# Patient Record
Sex: FEMALE | Race: WHITE | Employment: PART TIME | URBAN - METROPOLITAN AREA
[De-identification: names, ages, dates, MRNs, and addresses within clinical notes are randomized per-mention and may not be internally consistent; named-entity substitution may affect disease eponyms.]

---

## 2017-01-03 RX ORDER — TOPIRAMATE 100 MG/1
100 TABLET, FILM COATED ORAL 2 TIMES DAILY
Qty: 60 TABLET | Refills: 3 | Status: SHIPPED | OUTPATIENT
Start: 2017-01-03 | End: 2017-05-17 | Stop reason: SDUPTHER

## 2017-01-13 ENCOUNTER — OFFICE VISIT (OUTPATIENT)
Dept: FAMILY MEDICINE CLINIC | Age: 47
End: 2017-01-13

## 2017-01-13 VITALS
WEIGHT: 110.3 LBS | BODY MASS INDEX: 20.3 KG/M2 | OXYGEN SATURATION: 99 % | HEART RATE: 84 BPM | DIASTOLIC BLOOD PRESSURE: 78 MMHG | HEIGHT: 62 IN | SYSTOLIC BLOOD PRESSURE: 120 MMHG | RESPIRATION RATE: 18 BRPM | TEMPERATURE: 98.8 F

## 2017-01-13 DIAGNOSIS — M79.602 PAIN OF LEFT UPPER EXTREMITY: ICD-10-CM

## 2017-01-13 DIAGNOSIS — M79.89 LEFT ARM SWELLING: Primary | ICD-10-CM

## 2017-01-13 PROCEDURE — 1036F TOBACCO NON-USER: CPT | Performed by: FAMILY MEDICINE

## 2017-01-13 PROCEDURE — G8484 FLU IMMUNIZE NO ADMIN: HCPCS | Performed by: FAMILY MEDICINE

## 2017-01-13 PROCEDURE — 99213 OFFICE O/P EST LOW 20 MIN: CPT | Performed by: FAMILY MEDICINE

## 2017-01-13 PROCEDURE — G8427 DOCREV CUR MEDS BY ELIG CLIN: HCPCS | Performed by: FAMILY MEDICINE

## 2017-01-13 PROCEDURE — G8420 CALC BMI NORM PARAMETERS: HCPCS | Performed by: FAMILY MEDICINE

## 2017-01-13 RX ORDER — PREDNISONE 10 MG/1
TABLET ORAL
Qty: 30 TABLET | Refills: 0 | Status: SHIPPED | OUTPATIENT
Start: 2017-01-13 | End: 2017-01-30

## 2017-01-30 ENCOUNTER — TELEPHONE (OUTPATIENT)
Dept: FAMILY MEDICINE CLINIC | Age: 47
End: 2017-01-30

## 2017-01-30 ENCOUNTER — OFFICE VISIT (OUTPATIENT)
Dept: FAMILY MEDICINE CLINIC | Age: 47
End: 2017-01-30

## 2017-01-30 VITALS
SYSTOLIC BLOOD PRESSURE: 100 MMHG | BODY MASS INDEX: 19.77 KG/M2 | HEART RATE: 84 BPM | TEMPERATURE: 99.1 F | OXYGEN SATURATION: 99 % | WEIGHT: 107.4 LBS | DIASTOLIC BLOOD PRESSURE: 60 MMHG | HEIGHT: 62 IN

## 2017-01-30 DIAGNOSIS — M79.89 LEFT ARM SWELLING: ICD-10-CM

## 2017-01-30 DIAGNOSIS — F98.8 ADD (ATTENTION DEFICIT DISORDER): ICD-10-CM

## 2017-01-30 DIAGNOSIS — L98.9 SKIN LESION: ICD-10-CM

## 2017-01-30 DIAGNOSIS — R59.0 AXILLARY LYMPHADENOPATHY: Primary | ICD-10-CM

## 2017-01-30 PROCEDURE — 99213 OFFICE O/P EST LOW 20 MIN: CPT | Performed by: NURSE PRACTITIONER

## 2017-01-30 PROCEDURE — G8484 FLU IMMUNIZE NO ADMIN: HCPCS | Performed by: NURSE PRACTITIONER

## 2017-01-30 PROCEDURE — 1036F TOBACCO NON-USER: CPT | Performed by: NURSE PRACTITIONER

## 2017-01-30 PROCEDURE — G8428 CUR MEDS NOT DOCUMENT: HCPCS | Performed by: NURSE PRACTITIONER

## 2017-01-30 PROCEDURE — G8420 CALC BMI NORM PARAMETERS: HCPCS | Performed by: NURSE PRACTITIONER

## 2017-01-30 RX ORDER — DEXTROAMPHETAMINE SACCHARATE, AMPHETAMINE ASPARTATE MONOHYDRATE, DEXTROAMPHETAMINE SULFATE AND AMPHETAMINE SULFATE 6.25; 6.25; 6.25; 6.25 MG/1; MG/1; MG/1; MG/1
25 CAPSULE, EXTENDED RELEASE ORAL 2 TIMES DAILY
Qty: 60 CAPSULE | Refills: 0 | Status: SHIPPED | OUTPATIENT
Start: 2017-01-30 | End: 2017-03-01 | Stop reason: SDUPTHER

## 2017-01-30 RX ORDER — AMOXICILLIN 500 MG/1
500 CAPSULE ORAL 2 TIMES DAILY
Qty: 20 CAPSULE | Refills: 0 | Status: SHIPPED | OUTPATIENT
Start: 2017-01-30 | End: 2017-02-09

## 2017-01-31 ENCOUNTER — HOSPITAL ENCOUNTER (OUTPATIENT)
Dept: CT IMAGING | Age: 47
Discharge: HOME | End: 2017-01-31
Payer: MEDICARE

## 2017-01-31 VITALS
SYSTOLIC BLOOD PRESSURE: 107 MMHG | WEIGHT: 107 LBS | HEART RATE: 87 BPM | RESPIRATION RATE: 16 BRPM | HEIGHT: 62 IN | DIASTOLIC BLOOD PRESSURE: 73 MMHG | BODY MASS INDEX: 19.69 KG/M2

## 2017-01-31 DIAGNOSIS — R59.0 AXILLARY LYMPHADENOPATHY: ICD-10-CM

## 2017-01-31 DIAGNOSIS — M79.89 LEFT ARM SWELLING: ICD-10-CM

## 2017-01-31 PROCEDURE — 6360000004 HC RX CONTRAST MEDICATION: Performed by: RADIOLOGY

## 2017-01-31 PROCEDURE — 2580000003 HC RX 258: Performed by: RADIOLOGY

## 2017-01-31 PROCEDURE — 71260 CT THORAX DX C+: CPT

## 2017-01-31 RX ORDER — SODIUM CHLORIDE 0.9 % (FLUSH) 0.9 %
10 SYRINGE (ML) INJECTION
Status: COMPLETED | OUTPATIENT
Start: 2017-01-31 | End: 2017-01-31

## 2017-01-31 RX ADMIN — SODIUM CHLORIDE, PRESERVATIVE FREE 10 ML: 5 INJECTION INTRAVENOUS at 14:18

## 2017-01-31 RX ADMIN — IOPAMIDOL 75 ML: 755 INJECTION, SOLUTION INTRAVENOUS at 14:17

## 2017-02-01 ENCOUNTER — TELEPHONE (OUTPATIENT)
Dept: FAMILY MEDICINE CLINIC | Age: 47
End: 2017-02-01

## 2017-02-02 ENCOUNTER — HOSPITAL ENCOUNTER (EMERGENCY)
Age: 47
Discharge: HOME | End: 2017-02-03
Payer: MEDICARE

## 2017-02-02 DIAGNOSIS — K85.90 ACUTE PANCREATITIS WITHOUT INFECTION OR NECROSIS, UNSPECIFIED PANCREATITIS TYPE: Primary | ICD-10-CM

## 2017-02-02 PROCEDURE — 99284 EMERGENCY DEPT VISIT MOD MDM: CPT

## 2017-02-02 ASSESSMENT — PAIN DESCRIPTION - LOCATION: LOCATION: ABDOMEN

## 2017-02-02 ASSESSMENT — PAIN DESCRIPTION - PAIN TYPE: TYPE: ACUTE PAIN

## 2017-02-02 ASSESSMENT — PAIN SCALES - GENERAL: PAINLEVEL_OUTOF10: 8

## 2017-02-02 ASSESSMENT — PAIN DESCRIPTION - DESCRIPTORS: DESCRIPTORS: ACHING

## 2017-02-03 VITALS
HEART RATE: 60 BPM | HEIGHT: 61 IN | OXYGEN SATURATION: 100 % | TEMPERATURE: 98.1 F | WEIGHT: 104 LBS | SYSTOLIC BLOOD PRESSURE: 106 MMHG | BODY MASS INDEX: 19.63 KG/M2 | DIASTOLIC BLOOD PRESSURE: 76 MMHG | RESPIRATION RATE: 20 BRPM

## 2017-02-03 LAB
ALBUMIN SERPL-MCNC: 4.4 G/DL (ref 3.9–4.9)
ALP BLD-CCNC: 64 U/L (ref 40–130)
ALT SERPL-CCNC: 10 U/L (ref 0–33)
ANION GAP SERPL CALCULATED.3IONS-SCNC: 12 MEQ/L (ref 7–13)
AST SERPL-CCNC: 11 U/L (ref 0–35)
BASOPHILS ABSOLUTE: 0.1 K/UL (ref 0–0.2)
BASOPHILS RELATIVE PERCENT: 2 %
BILIRUB SERPL-MCNC: 0.2 MG/DL (ref 0–1.2)
BILIRUBIN URINE: NEGATIVE
BLOOD, URINE: NEGATIVE
BUN BLDV-MCNC: 17 MG/DL (ref 6–20)
CALCIUM SERPL-MCNC: 9.8 MG/DL (ref 8.6–10.2)
CHLORIDE BLD-SCNC: 104 MEQ/L (ref 98–107)
CLARITY: ABNORMAL
CO2: 21 MEQ/L (ref 22–29)
COLOR: YELLOW
CREAT SERPL-MCNC: 0.81 MG/DL (ref 0.5–0.9)
EOSINOPHILS ABSOLUTE: 0.1 K/UL (ref 0–0.7)
EOSINOPHILS RELATIVE PERCENT: 2.1 %
GFR AFRICAN AMERICAN: >60
GFR NON-AFRICAN AMERICAN: >60
GLOBULIN: 2.3 G/DL (ref 2.3–3.5)
GLUCOSE BLD-MCNC: 82 MG/DL (ref 74–109)
GLUCOSE URINE: NEGATIVE MG/DL
HCT VFR BLD CALC: 36.1 % (ref 37–47)
HEMOGLOBIN: 11.9 G/DL (ref 12–16)
KETONES, URINE: NEGATIVE MG/DL
LEUKOCYTE ESTERASE, URINE: NEGATIVE
LIPASE: 112 U/L (ref 13–60)
LYMPHOCYTES ABSOLUTE: 2.3 K/UL (ref 1–4.8)
LYMPHOCYTES RELATIVE PERCENT: 44.3 %
MCH RBC QN AUTO: 28.6 PG (ref 27–31.3)
MCHC RBC AUTO-ENTMCNC: 33.1 % (ref 33–37)
MCV RBC AUTO: 86.4 FL (ref 82–100)
MONOCYTES ABSOLUTE: 0.4 K/UL (ref 0.2–0.8)
MONOCYTES RELATIVE PERCENT: 7.3 %
NEUTROPHILS ABSOLUTE: 2.3 K/UL (ref 1.4–6.5)
NEUTROPHILS RELATIVE PERCENT: 44.3 %
NITRITE, URINE: NEGATIVE
PDW BLD-RTO: 12.7 % (ref 11.5–14.5)
PH UA: 7 (ref 5–9)
PLATELET # BLD: 275 K/UL (ref 130–400)
POTASSIUM SERPL-SCNC: 3.6 MEQ/L (ref 3.5–5.1)
PROTEIN UA: NEGATIVE MG/DL
RBC # BLD: 4.18 M/UL (ref 4.2–5.4)
SODIUM BLD-SCNC: 137 MEQ/L (ref 132–144)
SPECIFIC GRAVITY UA: 1.02 (ref 1–1.03)
TOTAL PROTEIN: 6.7 G/DL (ref 6.4–8.1)
UROBILINOGEN, URINE: 0.2 E.U./DL
WBC # BLD: 5.2 K/UL (ref 4.8–10.8)

## 2017-02-03 PROCEDURE — 6360000002 HC RX W HCPCS: Performed by: NURSE PRACTITIONER

## 2017-02-03 PROCEDURE — 83690 ASSAY OF LIPASE: CPT

## 2017-02-03 PROCEDURE — 81003 URINALYSIS AUTO W/O SCOPE: CPT

## 2017-02-03 PROCEDURE — 85025 COMPLETE CBC W/AUTO DIFF WBC: CPT

## 2017-02-03 PROCEDURE — 80053 COMPREHEN METABOLIC PANEL: CPT

## 2017-02-03 PROCEDURE — 36415 COLL VENOUS BLD VENIPUNCTURE: CPT

## 2017-02-03 PROCEDURE — 2580000003 HC RX 258: Performed by: NURSE PRACTITIONER

## 2017-02-03 PROCEDURE — 96375 TX/PRO/DX INJ NEW DRUG ADDON: CPT

## 2017-02-03 PROCEDURE — 96374 THER/PROPH/DIAG INJ IV PUSH: CPT

## 2017-02-03 RX ORDER — HYDROCODONE BITARTRATE AND ACETAMINOPHEN 5; 325 MG/1; MG/1
1-2 TABLET ORAL EVERY 6 HOURS PRN
Qty: 14 TABLET | Refills: 0 | Status: SHIPPED | OUTPATIENT
Start: 2017-02-03 | End: 2017-02-10

## 2017-02-03 RX ORDER — MORPHINE SULFATE 4 MG/ML
4 INJECTION, SOLUTION INTRAMUSCULAR; INTRAVENOUS
Status: DISCONTINUED | OUTPATIENT
Start: 2017-02-03 | End: 2017-02-03 | Stop reason: HOSPADM

## 2017-02-03 RX ORDER — 0.9 % SODIUM CHLORIDE 0.9 %
1000 INTRAVENOUS SOLUTION INTRAVENOUS ONCE
Status: COMPLETED | OUTPATIENT
Start: 2017-02-03 | End: 2017-02-03

## 2017-02-03 RX ORDER — SODIUM CHLORIDE 9 MG/ML
INJECTION, SOLUTION INTRAVENOUS CONTINUOUS
Status: DISCONTINUED | OUTPATIENT
Start: 2017-02-03 | End: 2017-02-03 | Stop reason: HOSPADM

## 2017-02-03 RX ORDER — ONDANSETRON 2 MG/ML
4 INJECTION INTRAMUSCULAR; INTRAVENOUS ONCE
Status: COMPLETED | OUTPATIENT
Start: 2017-02-03 | End: 2017-02-03

## 2017-02-03 RX ORDER — ONDANSETRON 4 MG/1
4-8 TABLET, ORALLY DISINTEGRATING ORAL EVERY 12 HOURS PRN
Qty: 12 TABLET | Refills: 0 | Status: SHIPPED | OUTPATIENT
Start: 2017-02-03 | End: 2019-05-31

## 2017-02-03 RX ADMIN — MORPHINE SULFATE 4 MG: 4 INJECTION, SOLUTION INTRAMUSCULAR; INTRAVENOUS at 00:18

## 2017-02-03 RX ADMIN — ONDANSETRON 4 MG: 2 INJECTION INTRAMUSCULAR; INTRAVENOUS at 00:18

## 2017-02-03 RX ADMIN — SODIUM CHLORIDE 1000 ML: 9 INJECTION, SOLUTION INTRAVENOUS at 00:18

## 2017-02-03 RX ADMIN — SODIUM CHLORIDE: 9 INJECTION, SOLUTION INTRAVENOUS at 01:03

## 2017-02-03 ASSESSMENT — ENCOUNTER SYMPTOMS
EYES NEGATIVE: 1
ABDOMINAL PAIN: 1
RESPIRATORY NEGATIVE: 1
ALLERGIC/IMMUNOLOGIC NEGATIVE: 1

## 2017-02-03 ASSESSMENT — PAIN DESCRIPTION - DESCRIPTORS: DESCRIPTORS: STABBING

## 2017-02-03 ASSESSMENT — PAIN DESCRIPTION - ORIENTATION: ORIENTATION: LEFT

## 2017-02-03 ASSESSMENT — PAIN SCALES - GENERAL
PAINLEVEL_OUTOF10: 8
PAINLEVEL_OUTOF10: 6

## 2017-02-03 ASSESSMENT — PAIN DESCRIPTION - LOCATION: LOCATION: ABDOMEN

## 2017-02-06 ENCOUNTER — CARE COORDINATION (OUTPATIENT)
Dept: CARE COORDINATION | Age: 47
End: 2017-02-06

## 2017-02-07 ENCOUNTER — OFFICE VISIT (OUTPATIENT)
Dept: FAMILY MEDICINE CLINIC | Age: 47
End: 2017-02-07

## 2017-02-07 VITALS
BODY MASS INDEX: 19.88 KG/M2 | SYSTOLIC BLOOD PRESSURE: 110 MMHG | HEIGHT: 62 IN | HEART RATE: 81 BPM | WEIGHT: 108 LBS | TEMPERATURE: 97.1 F | DIASTOLIC BLOOD PRESSURE: 76 MMHG | OXYGEN SATURATION: 99 %

## 2017-02-07 DIAGNOSIS — R16.1 SPLENOMEGALY: Primary | ICD-10-CM

## 2017-02-07 DIAGNOSIS — R74.8 ELEVATED LIPASE: ICD-10-CM

## 2017-02-07 PROCEDURE — 99213 OFFICE O/P EST LOW 20 MIN: CPT | Performed by: NURSE PRACTITIONER

## 2017-02-07 PROCEDURE — G8484 FLU IMMUNIZE NO ADMIN: HCPCS | Performed by: NURSE PRACTITIONER

## 2017-02-07 PROCEDURE — G8428 CUR MEDS NOT DOCUMENT: HCPCS | Performed by: NURSE PRACTITIONER

## 2017-02-07 PROCEDURE — G8420 CALC BMI NORM PARAMETERS: HCPCS | Performed by: NURSE PRACTITIONER

## 2017-02-07 PROCEDURE — 1036F TOBACCO NON-USER: CPT | Performed by: NURSE PRACTITIONER

## 2017-02-07 ASSESSMENT — ENCOUNTER SYMPTOMS
COUGH: 0
ABDOMINAL PAIN: 1
SHORTNESS OF BREATH: 0

## 2017-02-08 ENCOUNTER — HOSPITAL ENCOUNTER (OUTPATIENT)
Dept: ULTRASOUND IMAGING | Age: 47
Discharge: HOME | End: 2017-02-08
Payer: MEDICARE

## 2017-02-08 DIAGNOSIS — R16.1 SPLENOMEGALY: ICD-10-CM

## 2017-02-08 DIAGNOSIS — R74.8 ELEVATED LIPASE: ICD-10-CM

## 2017-02-08 PROCEDURE — 76705 ECHO EXAM OF ABDOMEN: CPT

## 2017-02-09 ENCOUNTER — TELEPHONE (OUTPATIENT)
Dept: FAMILY MEDICINE CLINIC | Age: 47
End: 2017-02-09

## 2017-02-09 DIAGNOSIS — E83.59 NEPHROCALCINOSIS: Primary | ICD-10-CM

## 2017-02-09 DIAGNOSIS — N29 NEPHROCALCINOSIS: Primary | ICD-10-CM

## 2017-02-14 ENCOUNTER — OFFICE VISIT (OUTPATIENT)
Dept: FAMILY MEDICINE CLINIC | Age: 47
End: 2017-02-14

## 2017-02-14 VITALS
RESPIRATION RATE: 14 BRPM | HEIGHT: 62 IN | WEIGHT: 110 LBS | OXYGEN SATURATION: 97 % | SYSTOLIC BLOOD PRESSURE: 108 MMHG | TEMPERATURE: 98.1 F | BODY MASS INDEX: 20.24 KG/M2 | HEART RATE: 73 BPM | DIASTOLIC BLOOD PRESSURE: 60 MMHG

## 2017-02-14 DIAGNOSIS — R07.9 INTERMITTENT CHEST PAIN: ICD-10-CM

## 2017-02-14 DIAGNOSIS — R53.83 OTHER FATIGUE: ICD-10-CM

## 2017-02-14 DIAGNOSIS — R53.83 OTHER FATIGUE: Primary | ICD-10-CM

## 2017-02-14 LAB
T4 FREE: 1 NG/DL (ref 0.93–1.7)
TSH SERPL DL<=0.05 MIU/L-ACNC: 3.14 UIU/ML (ref 0.27–4.2)

## 2017-02-14 PROCEDURE — 93040 RHYTHM ECG WITH REPORT: CPT | Performed by: NURSE PRACTITIONER

## 2017-02-14 PROCEDURE — G8420 CALC BMI NORM PARAMETERS: HCPCS | Performed by: NURSE PRACTITIONER

## 2017-02-14 PROCEDURE — 1036F TOBACCO NON-USER: CPT | Performed by: NURSE PRACTITIONER

## 2017-02-14 PROCEDURE — G8427 DOCREV CUR MEDS BY ELIG CLIN: HCPCS | Performed by: NURSE PRACTITIONER

## 2017-02-14 PROCEDURE — 99213 OFFICE O/P EST LOW 20 MIN: CPT | Performed by: NURSE PRACTITIONER

## 2017-02-14 PROCEDURE — G8484 FLU IMMUNIZE NO ADMIN: HCPCS | Performed by: NURSE PRACTITIONER

## 2017-02-14 ASSESSMENT — ENCOUNTER SYMPTOMS
COUGH: 0
SHORTNESS OF BREATH: 0

## 2017-02-16 LAB
CALCIUM SERPL-MCNC: NORMAL MG/DL (ref 8.4–10.2)
PARATHYROID HORMONE INTACT: 28 PG/ML (ref 15–65)

## 2017-02-20 ENCOUNTER — HOSPITAL ENCOUNTER (OUTPATIENT)
Dept: CT IMAGING | Age: 47
Discharge: HOME | End: 2017-02-20
Payer: MEDICARE

## 2017-02-20 ENCOUNTER — OFFICE VISIT (OUTPATIENT)
Dept: FAMILY MEDICINE CLINIC | Age: 47
End: 2017-02-20

## 2017-02-20 VITALS — HEIGHT: 62 IN | BODY MASS INDEX: 19.88 KG/M2 | WEIGHT: 108 LBS

## 2017-02-20 VITALS
DIASTOLIC BLOOD PRESSURE: 70 MMHG | OXYGEN SATURATION: 96 % | SYSTOLIC BLOOD PRESSURE: 114 MMHG | TEMPERATURE: 97.8 F | HEART RATE: 123 BPM

## 2017-02-20 DIAGNOSIS — E31.20 MULTIPLE ENDOCRINE NEOPLASIA (HCC): ICD-10-CM

## 2017-02-20 DIAGNOSIS — R10.32 LLQ PAIN: Primary | ICD-10-CM

## 2017-02-20 DIAGNOSIS — R10.32 LLQ PAIN: ICD-10-CM

## 2017-02-20 PROCEDURE — 6360000004 HC RX CONTRAST MEDICATION: Performed by: RADIOLOGY

## 2017-02-20 PROCEDURE — 74177 CT ABD & PELVIS W/CONTRAST: CPT

## 2017-02-20 PROCEDURE — G8420 CALC BMI NORM PARAMETERS: HCPCS | Performed by: NURSE PRACTITIONER

## 2017-02-20 PROCEDURE — 1036F TOBACCO NON-USER: CPT | Performed by: NURSE PRACTITIONER

## 2017-02-20 PROCEDURE — G8428 CUR MEDS NOT DOCUMENT: HCPCS | Performed by: NURSE PRACTITIONER

## 2017-02-20 PROCEDURE — G8484 FLU IMMUNIZE NO ADMIN: HCPCS | Performed by: NURSE PRACTITIONER

## 2017-02-20 PROCEDURE — 2580000003 HC RX 258: Performed by: RADIOLOGY

## 2017-02-20 PROCEDURE — 99213 OFFICE O/P EST LOW 20 MIN: CPT | Performed by: NURSE PRACTITIONER

## 2017-02-20 PROCEDURE — 2500000003 HC RX 250 WO HCPCS: Performed by: RADIOLOGY

## 2017-02-20 RX ORDER — SODIUM CHLORIDE 0.9 % (FLUSH) 0.9 %
10 SYRINGE (ML) INJECTION PRN
Status: DISCONTINUED | OUTPATIENT
Start: 2017-02-20 | End: 2017-02-23 | Stop reason: HOSPADM

## 2017-02-20 RX ADMIN — IOPAMIDOL 100 ML: 612 INJECTION, SOLUTION INTRAVENOUS at 14:20

## 2017-02-20 RX ADMIN — BARIUM SULFATE 450 ML: 21 SUSPENSION ORAL at 12:55

## 2017-02-20 RX ADMIN — SODIUM CHLORIDE, PRESERVATIVE FREE 10 ML: 5 INJECTION INTRAVENOUS at 14:22

## 2017-02-20 ASSESSMENT — ENCOUNTER SYMPTOMS: ABDOMINAL PAIN: 1

## 2017-02-21 DIAGNOSIS — R10.32 LEFT LOWER QUADRANT PAIN: Primary | ICD-10-CM

## 2017-02-28 ENCOUNTER — OFFICE VISIT (OUTPATIENT)
Dept: FAMILY MEDICINE CLINIC | Age: 47
End: 2017-02-28

## 2017-02-28 VITALS
OXYGEN SATURATION: 98 % | HEART RATE: 89 BPM | HEIGHT: 62 IN | SYSTOLIC BLOOD PRESSURE: 110 MMHG | BODY MASS INDEX: 20.24 KG/M2 | DIASTOLIC BLOOD PRESSURE: 70 MMHG | TEMPERATURE: 98.2 F | WEIGHT: 110 LBS

## 2017-02-28 DIAGNOSIS — R10.9 ABDOMINAL PAIN, UNSPECIFIED LOCATION: ICD-10-CM

## 2017-02-28 DIAGNOSIS — E86.0 DEHYDRATION: Primary | ICD-10-CM

## 2017-02-28 PROCEDURE — 1036F TOBACCO NON-USER: CPT | Performed by: NURSE PRACTITIONER

## 2017-02-28 PROCEDURE — G8427 DOCREV CUR MEDS BY ELIG CLIN: HCPCS | Performed by: NURSE PRACTITIONER

## 2017-02-28 PROCEDURE — G8484 FLU IMMUNIZE NO ADMIN: HCPCS | Performed by: NURSE PRACTITIONER

## 2017-02-28 PROCEDURE — G8420 CALC BMI NORM PARAMETERS: HCPCS | Performed by: NURSE PRACTITIONER

## 2017-02-28 PROCEDURE — 99213 OFFICE O/P EST LOW 20 MIN: CPT | Performed by: NURSE PRACTITIONER

## 2017-02-28 ASSESSMENT — ENCOUNTER SYMPTOMS: ABDOMINAL PAIN: 1

## 2017-03-01 DIAGNOSIS — F98.8 ADD (ATTENTION DEFICIT DISORDER): ICD-10-CM

## 2017-03-02 RX ORDER — DEXTROAMPHETAMINE SACCHARATE, AMPHETAMINE ASPARTATE MONOHYDRATE, DEXTROAMPHETAMINE SULFATE AND AMPHETAMINE SULFATE 6.25; 6.25; 6.25; 6.25 MG/1; MG/1; MG/1; MG/1
25 CAPSULE, EXTENDED RELEASE ORAL 2 TIMES DAILY
Qty: 60 CAPSULE | Refills: 0 | Status: SHIPPED | OUTPATIENT
Start: 2017-03-02 | End: 2017-04-04 | Stop reason: SDUPTHER

## 2017-03-03 ENCOUNTER — TELEPHONE (OUTPATIENT)
Dept: FAMILY MEDICINE CLINIC | Age: 47
End: 2017-03-03

## 2017-03-29 DIAGNOSIS — F98.8 ADD (ATTENTION DEFICIT DISORDER): ICD-10-CM

## 2017-03-29 RX ORDER — HYDROXYZINE PAMOATE 25 MG/1
25 CAPSULE ORAL 3 TIMES DAILY PRN
Qty: 30 CAPSULE | Refills: 1 | Status: SHIPPED | OUTPATIENT
Start: 2017-03-29 | End: 2018-02-08 | Stop reason: SDUPTHER

## 2017-04-04 RX ORDER — DEXTROAMPHETAMINE SACCHARATE, AMPHETAMINE ASPARTATE MONOHYDRATE, DEXTROAMPHETAMINE SULFATE AND AMPHETAMINE SULFATE 6.25; 6.25; 6.25; 6.25 MG/1; MG/1; MG/1; MG/1
25 CAPSULE, EXTENDED RELEASE ORAL 2 TIMES DAILY
Qty: 60 CAPSULE | Refills: 0 | Status: SHIPPED | OUTPATIENT
Start: 2017-04-04 | End: 2017-05-01 | Stop reason: SDUPTHER

## 2017-04-05 ENCOUNTER — TELEPHONE (OUTPATIENT)
Dept: FAMILY MEDICINE CLINIC | Age: 47
End: 2017-04-05

## 2017-04-06 RX ORDER — DEXLANSOPRAZOLE 30 MG/1
30 CAPSULE, DELAYED RELEASE ORAL DAILY
Qty: 30 CAPSULE | Refills: 3 | Status: SHIPPED | OUTPATIENT
Start: 2017-04-06 | End: 2017-06-07

## 2017-04-24 ENCOUNTER — OFFICE VISIT (OUTPATIENT)
Dept: FAMILY MEDICINE CLINIC | Age: 47
End: 2017-04-24

## 2017-04-24 VITALS
TEMPERATURE: 98.4 F | DIASTOLIC BLOOD PRESSURE: 70 MMHG | WEIGHT: 112.7 LBS | BODY MASS INDEX: 20.74 KG/M2 | OXYGEN SATURATION: 98 % | HEIGHT: 62 IN | SYSTOLIC BLOOD PRESSURE: 120 MMHG | HEART RATE: 87 BPM

## 2017-04-24 DIAGNOSIS — J18.9 PNEUMONIA DUE TO INFECTIOUS ORGANISM, UNSPECIFIED LATERALITY, UNSPECIFIED PART OF LUNG: Primary | ICD-10-CM

## 2017-04-24 DIAGNOSIS — R53.83 OTHER FATIGUE: ICD-10-CM

## 2017-04-24 DIAGNOSIS — R10.9 ABDOMINAL PAIN, UNSPECIFIED LOCATION: ICD-10-CM

## 2017-04-24 PROCEDURE — G8420 CALC BMI NORM PARAMETERS: HCPCS | Performed by: NURSE PRACTITIONER

## 2017-04-24 PROCEDURE — 1036F TOBACCO NON-USER: CPT | Performed by: NURSE PRACTITIONER

## 2017-04-24 PROCEDURE — G8427 DOCREV CUR MEDS BY ELIG CLIN: HCPCS | Performed by: NURSE PRACTITIONER

## 2017-04-24 PROCEDURE — 99213 OFFICE O/P EST LOW 20 MIN: CPT | Performed by: NURSE PRACTITIONER

## 2017-04-24 ASSESSMENT — ENCOUNTER SYMPTOMS
SHORTNESS OF BREATH: 0
COUGH: 0

## 2017-04-25 ENCOUNTER — TELEPHONE (OUTPATIENT)
Dept: FAMILY MEDICINE CLINIC | Age: 47
End: 2017-04-25

## 2017-04-27 DIAGNOSIS — R53.83 OTHER FATIGUE: ICD-10-CM

## 2017-04-27 DIAGNOSIS — J18.9 PNEUMONIA DUE TO INFECTIOUS ORGANISM, UNSPECIFIED LATERALITY, UNSPECIFIED PART OF LUNG: ICD-10-CM

## 2017-04-27 DIAGNOSIS — R10.9 ABDOMINAL PAIN, UNSPECIFIED LOCATION: ICD-10-CM

## 2017-05-01 DIAGNOSIS — F98.8 ADD (ATTENTION DEFICIT DISORDER): ICD-10-CM

## 2017-05-02 RX ORDER — DEXTROAMPHETAMINE SACCHARATE, AMPHETAMINE ASPARTATE MONOHYDRATE, DEXTROAMPHETAMINE SULFATE AND AMPHETAMINE SULFATE 6.25; 6.25; 6.25; 6.25 MG/1; MG/1; MG/1; MG/1
25 CAPSULE, EXTENDED RELEASE ORAL 2 TIMES DAILY
Qty: 60 CAPSULE | Refills: 0 | Status: SHIPPED | OUTPATIENT
Start: 2017-05-02 | End: 2017-05-31 | Stop reason: SDUPTHER

## 2017-05-03 DIAGNOSIS — R10.9 ABDOMINAL PAIN, UNSPECIFIED LOCATION: ICD-10-CM

## 2017-05-03 LAB
ALBUMIN SERPL-MCNC: 4.5 G/DL (ref 3.9–4.9)
ALP BLD-CCNC: 70 U/L (ref 40–130)
ALT SERPL-CCNC: 10 U/L (ref 0–33)
AMYLASE: 156 U/L (ref 28–100)
ANION GAP SERPL CALCULATED.3IONS-SCNC: 14 MEQ/L (ref 7–13)
AST SERPL-CCNC: 12 U/L (ref 0–35)
BASOPHILS ABSOLUTE: 0.1 K/UL (ref 0–0.2)
BASOPHILS RELATIVE PERCENT: 2.3 %
BILIRUB SERPL-MCNC: 0.2 MG/DL (ref 0–1.2)
BUN BLDV-MCNC: 21 MG/DL (ref 6–20)
CALCIUM SERPL-MCNC: 9.1 MG/DL (ref 8.6–10.2)
CHLORIDE BLD-SCNC: 101 MEQ/L (ref 98–107)
CO2: 23 MEQ/L (ref 22–29)
CREAT SERPL-MCNC: 0.89 MG/DL (ref 0.5–0.9)
EOSINOPHILS ABSOLUTE: 0 K/UL (ref 0–0.7)
EOSINOPHILS RELATIVE PERCENT: 0.9 %
GFR AFRICAN AMERICAN: >60
GFR NON-AFRICAN AMERICAN: >60
GLOBULIN: 2.6 G/DL (ref 2.3–3.5)
GLUCOSE BLD-MCNC: 77 MG/DL (ref 74–109)
HCT VFR BLD CALC: 36.6 % (ref 37–47)
HEMOGLOBIN: 12.3 G/DL (ref 12–16)
LIPASE: 93 U/L (ref 13–60)
LYMPHOCYTES ABSOLUTE: 2.2 K/UL (ref 1–4.8)
LYMPHOCYTES RELATIVE PERCENT: 40.7 %
MCH RBC QN AUTO: 28.9 PG (ref 27–31.3)
MCHC RBC AUTO-ENTMCNC: 33.7 % (ref 33–37)
MCV RBC AUTO: 86 FL (ref 82–100)
MONOCYTES ABSOLUTE: 0.3 K/UL (ref 0.2–0.8)
MONOCYTES RELATIVE PERCENT: 5.1 %
NEUTROPHILS ABSOLUTE: 2.8 K/UL (ref 1.4–6.5)
NEUTROPHILS RELATIVE PERCENT: 51 %
PDW BLD-RTO: 13.1 % (ref 11.5–14.5)
PLATELET # BLD: 333 K/UL (ref 130–400)
POTASSIUM SERPL-SCNC: 3.6 MEQ/L (ref 3.5–5.1)
RBC # BLD: 4.25 M/UL (ref 4.2–5.4)
SODIUM BLD-SCNC: 138 MEQ/L (ref 132–144)
TOTAL PROTEIN: 7.1 G/DL (ref 6.4–8.1)
WBC # BLD: 5.5 K/UL (ref 4.8–10.8)

## 2017-05-08 ENCOUNTER — TELEPHONE (OUTPATIENT)
Dept: FAMILY MEDICINE CLINIC | Age: 47
End: 2017-05-08

## 2017-05-08 DIAGNOSIS — R10.9 ABDOMINAL PAIN, UNSPECIFIED LOCATION: ICD-10-CM

## 2017-05-08 DIAGNOSIS — R74.8 ELEVATED PANCREATIC ENZYME: Primary | ICD-10-CM

## 2017-05-12 ENCOUNTER — APPOINTMENT (OUTPATIENT)
Dept: MRI IMAGING | Age: 47
End: 2017-05-12
Attending: FAMILY MEDICINE
Payer: MEDICARE

## 2017-05-12 ENCOUNTER — HOSPITAL ENCOUNTER (OUTPATIENT)
Age: 47
Setting detail: OBSERVATION
Discharge: HOME | End: 2017-05-13
Attending: FAMILY MEDICINE | Admitting: FAMILY MEDICINE
Payer: MEDICARE

## 2017-05-12 LAB
ALBUMIN SERPL-MCNC: 3.1 G/DL (ref 3.9–4.9)
ALP BLD-CCNC: 50 U/L (ref 40–130)
ALT SERPL-CCNC: 9 U/L (ref 0–33)
AMYLASE: 179 U/L (ref 28–100)
ANION GAP SERPL CALCULATED.3IONS-SCNC: 8 MEQ/L (ref 7–13)
AST SERPL-CCNC: 11 U/L (ref 0–35)
BASOPHILS ABSOLUTE: 0.1 K/UL (ref 0–0.2)
BASOPHILS RELATIVE PERCENT: 1.3 %
BILIRUB SERPL-MCNC: 0.2 MG/DL (ref 0–1.2)
BUN BLDV-MCNC: 19 MG/DL (ref 6–20)
CALCIUM SERPL-MCNC: 7.9 MG/DL (ref 8.6–10.2)
CHLORIDE BLD-SCNC: 114 MEQ/L (ref 98–107)
CO2: 19 MEQ/L (ref 22–29)
CREAT SERPL-MCNC: 0.58 MG/DL (ref 0.5–0.9)
EOSINOPHILS ABSOLUTE: 0.1 K/UL (ref 0–0.7)
EOSINOPHILS RELATIVE PERCENT: 2.2 %
GFR AFRICAN AMERICAN: >60
GFR NON-AFRICAN AMERICAN: >60
GLOBULIN: 1.9 G/DL (ref 2.3–3.5)
GLUCOSE BLD-MCNC: 112 MG/DL (ref 74–109)
HCT VFR BLD CALC: 29.4 % (ref 37–47)
HEMOGLOBIN: 9.9 G/DL (ref 12–16)
LIPASE: 196 U/L (ref 13–60)
LYMPHOCYTES ABSOLUTE: 1.9 K/UL (ref 1–4.8)
LYMPHOCYTES RELATIVE PERCENT: 48.9 %
MCH RBC QN AUTO: 29.3 PG (ref 27–31.3)
MCHC RBC AUTO-ENTMCNC: 33.8 % (ref 33–37)
MCV RBC AUTO: 86.8 FL (ref 82–100)
MONOCYTES ABSOLUTE: 0.3 K/UL (ref 0.2–0.8)
MONOCYTES RELATIVE PERCENT: 7.4 %
NEUTROPHILS ABSOLUTE: 1.6 K/UL (ref 1.4–6.5)
NEUTROPHILS RELATIVE PERCENT: 40.2 %
PDW BLD-RTO: 13.1 % (ref 11.5–14.5)
PLATELET # BLD: 206 K/UL (ref 130–400)
POTASSIUM SERPL-SCNC: 3.6 MEQ/L (ref 3.5–5.1)
RBC # BLD: 3.38 M/UL (ref 4.2–5.4)
SLIDE REVIEW: ABNORMAL
SODIUM BLD-SCNC: 141 MEQ/L (ref 132–144)
TOTAL PROTEIN: 5 G/DL (ref 6.4–8.1)
WBC # BLD: 3.9 K/UL (ref 4.8–10.8)

## 2017-05-12 PROCEDURE — 6360000002 HC RX W HCPCS: Performed by: FAMILY MEDICINE

## 2017-05-12 PROCEDURE — G0378 HOSPITAL OBSERVATION PER HR: HCPCS

## 2017-05-12 PROCEDURE — 82150 ASSAY OF AMYLASE: CPT

## 2017-05-12 PROCEDURE — 96375 TX/PRO/DX INJ NEW DRUG ADDON: CPT

## 2017-05-12 PROCEDURE — 96372 THER/PROPH/DIAG INJ SC/IM: CPT

## 2017-05-12 PROCEDURE — 2580000003 HC RX 258: Performed by: FAMILY MEDICINE

## 2017-05-12 PROCEDURE — 83690 ASSAY OF LIPASE: CPT

## 2017-05-12 PROCEDURE — A9579 GAD-BASE MR CONTRAST NOS,1ML: HCPCS | Performed by: RADIOLOGY

## 2017-05-12 PROCEDURE — 85025 COMPLETE CBC W/AUTO DIFF WBC: CPT

## 2017-05-12 PROCEDURE — C9113 INJ PANTOPRAZOLE SODIUM, VIA: HCPCS | Performed by: INTERNAL MEDICINE

## 2017-05-12 PROCEDURE — 96376 TX/PRO/DX INJ SAME DRUG ADON: CPT

## 2017-05-12 PROCEDURE — 6360000004 HC RX CONTRAST MEDICATION: Performed by: RADIOLOGY

## 2017-05-12 PROCEDURE — 74183 MRI ABD W/O CNTR FLWD CNTR: CPT

## 2017-05-12 PROCEDURE — 96374 THER/PROPH/DIAG INJ IV PUSH: CPT

## 2017-05-12 PROCEDURE — 80053 COMPREHEN METABOLIC PANEL: CPT

## 2017-05-12 PROCEDURE — 36415 COLL VENOUS BLD VENIPUNCTURE: CPT

## 2017-05-12 PROCEDURE — 6360000002 HC RX W HCPCS: Performed by: INTERNAL MEDICINE

## 2017-05-12 RX ORDER — ONDANSETRON 2 MG/ML
4 INJECTION INTRAMUSCULAR; INTRAVENOUS EVERY 6 HOURS PRN
Status: DISCONTINUED | OUTPATIENT
Start: 2017-05-12 | End: 2017-05-13 | Stop reason: HOSPADM

## 2017-05-12 RX ORDER — PANTOPRAZOLE SODIUM 40 MG/10ML
40 INJECTION, POWDER, LYOPHILIZED, FOR SOLUTION INTRAVENOUS DAILY
Status: DISCONTINUED | OUTPATIENT
Start: 2017-05-12 | End: 2017-05-13 | Stop reason: HOSPADM

## 2017-05-12 RX ORDER — MORPHINE SULFATE 4 MG/ML
4 INJECTION, SOLUTION INTRAMUSCULAR; INTRAVENOUS
Status: DISCONTINUED | OUTPATIENT
Start: 2017-05-12 | End: 2017-05-13 | Stop reason: HOSPADM

## 2017-05-12 RX ORDER — SODIUM CHLORIDE 0.9 % (FLUSH) 0.9 %
10 SYRINGE (ML) INJECTION 2 TIMES DAILY
Status: DISCONTINUED | OUTPATIENT
Start: 2017-05-12 | End: 2017-05-13 | Stop reason: HOSPADM

## 2017-05-12 RX ORDER — MORPHINE SULFATE 2 MG/ML
2 INJECTION, SOLUTION INTRAMUSCULAR; INTRAVENOUS
Status: DISCONTINUED | OUTPATIENT
Start: 2017-05-12 | End: 2017-05-13 | Stop reason: HOSPADM

## 2017-05-12 RX ORDER — SODIUM CHLORIDE 9 MG/ML
INJECTION, SOLUTION INTRAVENOUS CONTINUOUS
Status: DISCONTINUED | OUTPATIENT
Start: 2017-05-12 | End: 2017-05-13 | Stop reason: HOSPADM

## 2017-05-12 RX ORDER — SODIUM CHLORIDE 9 MG/ML
INJECTION, SOLUTION INTRAVENOUS ONCE
Status: COMPLETED | OUTPATIENT
Start: 2017-05-12 | End: 2017-05-12

## 2017-05-12 RX ADMIN — PANTOPRAZOLE SODIUM 40 MG: 40 INJECTION, POWDER, FOR SOLUTION INTRAVENOUS at 13:30

## 2017-05-12 RX ADMIN — SODIUM CHLORIDE: 9 INJECTION, SOLUTION INTRAVENOUS at 05:53

## 2017-05-12 RX ADMIN — MORPHINE SULFATE 2 MG: 2 INJECTION, SOLUTION INTRAMUSCULAR; INTRAVENOUS at 02:21

## 2017-05-12 RX ADMIN — MORPHINE SULFATE 2 MG: 2 INJECTION, SOLUTION INTRAMUSCULAR; INTRAVENOUS at 08:55

## 2017-05-12 RX ADMIN — SODIUM CHLORIDE: 9 INJECTION, SOLUTION INTRAVENOUS at 02:21

## 2017-05-12 RX ADMIN — MORPHINE SULFATE 2 MG: 2 INJECTION, SOLUTION INTRAMUSCULAR; INTRAVENOUS at 13:35

## 2017-05-12 RX ADMIN — MORPHINE SULFATE 2 MG: 2 INJECTION, SOLUTION INTRAMUSCULAR; INTRAVENOUS at 21:34

## 2017-05-12 RX ADMIN — GADOVERSETAMIDE 30 ML: 0.5 INJECTION, SOLUTION INTRAVENOUS at 15:50

## 2017-05-12 RX ADMIN — ENOXAPARIN SODIUM 40 MG: 40 INJECTION SUBCUTANEOUS at 08:55

## 2017-05-12 RX ADMIN — MORPHINE SULFATE 2 MG: 2 INJECTION, SOLUTION INTRAMUSCULAR; INTRAVENOUS at 19:53

## 2017-05-12 ASSESSMENT — PAIN SCALES - GENERAL
PAINLEVEL_OUTOF10: 2
PAINLEVEL_OUTOF10: 8
PAINLEVEL_OUTOF10: 9
PAINLEVEL_OUTOF10: 7
PAINLEVEL_OUTOF10: 5
PAINLEVEL_OUTOF10: 8
PAINLEVEL_OUTOF10: 8

## 2017-05-12 ASSESSMENT — ENCOUNTER SYMPTOMS
NAUSEA: 1
SHORTNESS OF BREATH: 0
VOMITING: 0
DIARRHEA: 0

## 2017-05-13 VITALS
BODY MASS INDEX: 13.39 KG/M2 | OXYGEN SATURATION: 100 % | HEIGHT: 62 IN | SYSTOLIC BLOOD PRESSURE: 90 MMHG | RESPIRATION RATE: 16 BRPM | WEIGHT: 72.75 LBS | DIASTOLIC BLOOD PRESSURE: 44 MMHG | TEMPERATURE: 98.6 F | HEART RATE: 74 BPM

## 2017-05-13 PROBLEM — K85.90 PANCREATITIS: Status: ACTIVE | Noted: 2017-05-13

## 2017-05-13 LAB
ALBUMIN SERPL-MCNC: 3.1 G/DL (ref 3.9–4.9)
ALP BLD-CCNC: 51 U/L (ref 40–130)
ALT SERPL-CCNC: 8 U/L (ref 0–33)
AMYLASE: 128 U/L (ref 28–100)
ANION GAP SERPL CALCULATED.3IONS-SCNC: 8 MEQ/L (ref 7–13)
AST SERPL-CCNC: 11 U/L (ref 0–35)
BASOPHILS ABSOLUTE: 0.1 K/UL (ref 0–0.2)
BASOPHILS RELATIVE PERCENT: 1.8 %
BILIRUB SERPL-MCNC: 0.1 MG/DL (ref 0–1.2)
BUN BLDV-MCNC: 15 MG/DL (ref 6–20)
CALCIUM SERPL-MCNC: 8.2 MG/DL (ref 8.6–10.2)
CHLORIDE BLD-SCNC: 108 MEQ/L (ref 98–107)
CO2: 24 MEQ/L (ref 22–29)
CREAT SERPL-MCNC: 0.73 MG/DL (ref 0.5–0.9)
EOSINOPHILS ABSOLUTE: 0.1 K/UL (ref 0–0.7)
EOSINOPHILS RELATIVE PERCENT: 1.6 %
GFR AFRICAN AMERICAN: >60
GFR NON-AFRICAN AMERICAN: >60
GLOBULIN: 1.9 G/DL (ref 2.3–3.5)
GLUCOSE BLD-MCNC: 99 MG/DL (ref 74–109)
HCT VFR BLD CALC: 30.2 % (ref 37–47)
HEMOGLOBIN: 10.2 G/DL (ref 12–16)
LIPASE: 118 U/L (ref 13–60)
LYMPHOCYTES ABSOLUTE: 2.1 K/UL (ref 1–4.8)
LYMPHOCYTES RELATIVE PERCENT: 51.6 %
MCH RBC QN AUTO: 29.4 PG (ref 27–31.3)
MCHC RBC AUTO-ENTMCNC: 33.9 % (ref 33–37)
MCV RBC AUTO: 86.7 FL (ref 82–100)
MONOCYTES ABSOLUTE: 0.3 K/UL (ref 0.2–0.8)
MONOCYTES RELATIVE PERCENT: 8 %
NEUTROPHILS ABSOLUTE: 1.5 K/UL (ref 1.4–6.5)
NEUTROPHILS RELATIVE PERCENT: 37 %
PDW BLD-RTO: 13.2 % (ref 11.5–14.5)
PLATELET # BLD: 212 K/UL (ref 130–400)
POTASSIUM SERPL-SCNC: 3.9 MEQ/L (ref 3.5–5.1)
RBC # BLD: 3.48 M/UL (ref 4.2–5.4)
SODIUM BLD-SCNC: 140 MEQ/L (ref 132–144)
TOTAL PROTEIN: 5 G/DL (ref 6.4–8.1)
WBC # BLD: 4.1 K/UL (ref 4.8–10.8)

## 2017-05-13 PROCEDURE — G0378 HOSPITAL OBSERVATION PER HR: HCPCS

## 2017-05-13 PROCEDURE — 6360000002 HC RX W HCPCS: Performed by: FAMILY MEDICINE

## 2017-05-13 PROCEDURE — 85025 COMPLETE CBC W/AUTO DIFF WBC: CPT

## 2017-05-13 PROCEDURE — 80053 COMPREHEN METABOLIC PANEL: CPT

## 2017-05-13 PROCEDURE — 36415 COLL VENOUS BLD VENIPUNCTURE: CPT

## 2017-05-13 PROCEDURE — C9113 INJ PANTOPRAZOLE SODIUM, VIA: HCPCS | Performed by: INTERNAL MEDICINE

## 2017-05-13 PROCEDURE — 96376 TX/PRO/DX INJ SAME DRUG ADON: CPT

## 2017-05-13 PROCEDURE — 83690 ASSAY OF LIPASE: CPT

## 2017-05-13 PROCEDURE — 2580000003 HC RX 258: Performed by: FAMILY MEDICINE

## 2017-05-13 PROCEDURE — 6370000000 HC RX 637 (ALT 250 FOR IP): Performed by: INTERNAL MEDICINE

## 2017-05-13 PROCEDURE — 6360000002 HC RX W HCPCS: Performed by: INTERNAL MEDICINE

## 2017-05-13 PROCEDURE — 82150 ASSAY OF AMYLASE: CPT

## 2017-05-13 PROCEDURE — 96372 THER/PROPH/DIAG INJ SC/IM: CPT

## 2017-05-13 RX ORDER — ESTRADIOL 1 MG/1
1 TABLET ORAL DAILY
Status: DISCONTINUED | OUTPATIENT
Start: 2017-05-13 | End: 2017-05-13 | Stop reason: HOSPADM

## 2017-05-13 RX ORDER — PROCHLORPERAZINE MALEATE 10 MG
10 TABLET ORAL EVERY 6 HOURS PRN
Status: DISCONTINUED | OUTPATIENT
Start: 2017-05-13 | End: 2017-05-13 | Stop reason: HOSPADM

## 2017-05-13 RX ORDER — TOPIRAMATE 100 MG/1
100 TABLET, FILM COATED ORAL 2 TIMES DAILY
Status: DISCONTINUED | OUTPATIENT
Start: 2017-05-13 | End: 2017-05-13 | Stop reason: HOSPADM

## 2017-05-13 RX ORDER — LEVOTHYROXINE SODIUM 0.03 MG/1
25 TABLET ORAL DAILY
Status: DISCONTINUED | OUTPATIENT
Start: 2017-05-13 | End: 2017-05-13 | Stop reason: HOSPADM

## 2017-05-13 RX ORDER — OXYCODONE HYDROCHLORIDE AND ACETAMINOPHEN 5; 325 MG/1; MG/1
1 TABLET ORAL EVERY 8 HOURS PRN
Qty: 19 TABLET | Refills: 0 | Status: SHIPPED | OUTPATIENT
Start: 2017-05-13 | End: 2017-05-20

## 2017-05-13 RX ORDER — DEXTROAMPHETAMINE SACCHARATE, AMPHETAMINE ASPARTATE MONOHYDRATE, DEXTROAMPHETAMINE SULFATE AND AMPHETAMINE SULFATE 6.25; 6.25; 6.25; 6.25 MG/1; MG/1; MG/1; MG/1
25 CAPSULE, EXTENDED RELEASE ORAL 2 TIMES DAILY
Status: DISCONTINUED | OUTPATIENT
Start: 2017-05-13 | End: 2017-05-13 | Stop reason: HOSPADM

## 2017-05-13 RX ORDER — HYDROXYCHLOROQUINE SULFATE 200 MG/1
300 TABLET, FILM COATED ORAL DAILY
Status: DISCONTINUED | OUTPATIENT
Start: 2017-05-13 | End: 2017-05-13 | Stop reason: HOSPADM

## 2017-05-13 RX ORDER — PROMETHAZINE HYDROCHLORIDE 25 MG/1
25 TABLET ORAL EVERY 6 HOURS PRN
Status: DISCONTINUED | OUTPATIENT
Start: 2017-05-13 | End: 2017-05-13 | Stop reason: HOSPADM

## 2017-05-13 RX ORDER — BUPROPION HYDROCHLORIDE 150 MG/1
300 TABLET ORAL DAILY
Status: DISCONTINUED | OUTPATIENT
Start: 2017-05-13 | End: 2017-05-13 | Stop reason: HOSPADM

## 2017-05-13 RX ORDER — ONDANSETRON 4 MG/1
4-8 TABLET, ORALLY DISINTEGRATING ORAL EVERY 12 HOURS PRN
Status: CANCELLED | OUTPATIENT
Start: 2017-05-13

## 2017-05-13 RX ORDER — ACETAMINOPHEN 80 MG
TABLET,CHEWABLE ORAL ONCE
Status: DISCONTINUED | OUTPATIENT
Start: 2017-05-13 | End: 2017-05-13 | Stop reason: HOSPADM

## 2017-05-13 RX ADMIN — MORPHINE SULFATE 2 MG: 2 INJECTION, SOLUTION INTRAMUSCULAR; INTRAVENOUS at 16:01

## 2017-05-13 RX ADMIN — SODIUM CHLORIDE: 9 INJECTION, SOLUTION INTRAVENOUS at 05:03

## 2017-05-13 RX ADMIN — ENOXAPARIN SODIUM 40 MG: 40 INJECTION SUBCUTANEOUS at 08:22

## 2017-05-13 RX ADMIN — BUPROPION HYDROCHLORIDE 300 MG: 150 TABLET, FILM COATED, EXTENDED RELEASE ORAL at 15:06

## 2017-05-13 RX ADMIN — LEVOTHYROXINE SODIUM 25 MCG: 25 TABLET ORAL at 15:06

## 2017-05-13 RX ADMIN — PANTOPRAZOLE SODIUM 40 MG: 40 INJECTION, POWDER, FOR SOLUTION INTRAVENOUS at 08:49

## 2017-05-13 RX ADMIN — PANCRELIPASE 3 CAPSULE: 60000; 12000; 38000 CAPSULE, DELAYED RELEASE PELLETS ORAL at 15:05

## 2017-05-13 RX ADMIN — ESTRADIOL 1 MG: 1 TABLET ORAL at 15:06

## 2017-05-13 RX ADMIN — MORPHINE SULFATE 2 MG: 2 INJECTION, SOLUTION INTRAMUSCULAR; INTRAVENOUS at 04:50

## 2017-05-13 RX ADMIN — HYDROXYCHLOROQUINE SULFATE 300 MG: 200 TABLET, FILM COATED ORAL at 15:05

## 2017-05-13 ASSESSMENT — PAIN SCALES - GENERAL
PAINLEVEL_OUTOF10: 8
PAINLEVEL_OUTOF10: 7

## 2017-05-14 ENCOUNTER — CARE COORDINATION (OUTPATIENT)
Dept: CASE MANAGEMENT | Age: 47
End: 2017-05-14

## 2017-05-14 ENCOUNTER — EPISODE CHANGES (OUTPATIENT)
Dept: CASE MANAGEMENT | Age: 47
End: 2017-05-14

## 2017-05-15 ENCOUNTER — CARE COORDINATION (OUTPATIENT)
Dept: CASE MANAGEMENT | Age: 47
End: 2017-05-15

## 2017-05-18 RX ORDER — TOPIRAMATE 100 MG/1
100 TABLET, FILM COATED ORAL 2 TIMES DAILY
Qty: 60 TABLET | Refills: 3 | Status: SHIPPED | OUTPATIENT
Start: 2017-05-18 | End: 2017-09-20 | Stop reason: SDUPTHER

## 2017-05-23 ENCOUNTER — OFFICE VISIT (OUTPATIENT)
Dept: FAMILY MEDICINE CLINIC | Age: 47
End: 2017-05-23

## 2017-05-23 VITALS
TEMPERATURE: 98 F | HEART RATE: 85 BPM | DIASTOLIC BLOOD PRESSURE: 70 MMHG | WEIGHT: 111.4 LBS | OXYGEN SATURATION: 98 % | BODY MASS INDEX: 20.5 KG/M2 | HEIGHT: 62 IN | SYSTOLIC BLOOD PRESSURE: 110 MMHG

## 2017-05-23 DIAGNOSIS — K85.90 ACUTE PANCREATITIS, UNSPECIFIED COMPLICATION STATUS, UNSPECIFIED PANCREATITIS TYPE: Primary | ICD-10-CM

## 2017-05-23 DIAGNOSIS — R10.9 FLANK PAIN: ICD-10-CM

## 2017-05-23 DIAGNOSIS — D70.9 NEUTROPENIA, UNSPECIFIED TYPE (HCC): ICD-10-CM

## 2017-05-23 DIAGNOSIS — D64.9 ANEMIA, UNSPECIFIED TYPE: ICD-10-CM

## 2017-05-23 DIAGNOSIS — R93.3 ABNORMAL MAGNETIC RESONANCE CHOLANGIOPANCREATOGRAPHY (MRCP): ICD-10-CM

## 2017-05-23 PROCEDURE — 99213 OFFICE O/P EST LOW 20 MIN: CPT | Performed by: NURSE PRACTITIONER

## 2017-05-23 PROCEDURE — G8420 CALC BMI NORM PARAMETERS: HCPCS | Performed by: NURSE PRACTITIONER

## 2017-05-23 PROCEDURE — 1036F TOBACCO NON-USER: CPT | Performed by: NURSE PRACTITIONER

## 2017-05-23 PROCEDURE — G8427 DOCREV CUR MEDS BY ELIG CLIN: HCPCS | Performed by: NURSE PRACTITIONER

## 2017-05-23 RX ORDER — OXYCODONE HYDROCHLORIDE AND ACETAMINOPHEN 5; 325 MG/1; MG/1
1 TABLET ORAL EVERY 4 HOURS PRN
COMMUNITY
End: 2017-09-20

## 2017-05-23 ASSESSMENT — ENCOUNTER SYMPTOMS
NAUSEA: 1
ABDOMINAL PAIN: 1

## 2017-05-31 DIAGNOSIS — G43.809 OTHER MIGRAINE WITHOUT STATUS MIGRAINOSUS, NOT INTRACTABLE: ICD-10-CM

## 2017-05-31 DIAGNOSIS — F98.8 ADD (ATTENTION DEFICIT DISORDER): ICD-10-CM

## 2017-05-31 RX ORDER — DEXTROAMPHETAMINE SACCHARATE, AMPHETAMINE ASPARTATE MONOHYDRATE, DEXTROAMPHETAMINE SULFATE AND AMPHETAMINE SULFATE 6.25; 6.25; 6.25; 6.25 MG/1; MG/1; MG/1; MG/1
25 CAPSULE, EXTENDED RELEASE ORAL 2 TIMES DAILY
Qty: 60 CAPSULE | Refills: 0 | Status: SHIPPED | OUTPATIENT
Start: 2017-05-31 | End: 2017-06-13 | Stop reason: SDUPTHER

## 2017-05-31 RX ORDER — KETOROLAC TROMETHAMINE 10 MG/1
10 TABLET, FILM COATED ORAL EVERY 6 HOURS PRN
Qty: 20 TABLET | Refills: 0 | Status: SHIPPED | OUTPATIENT
Start: 2017-05-31 | End: 2018-02-08 | Stop reason: SDUPTHER

## 2017-06-07 ENCOUNTER — OFFICE VISIT (OUTPATIENT)
Dept: SURGERY | Age: 47
End: 2017-06-07

## 2017-06-07 VITALS
BODY MASS INDEX: 20.8 KG/M2 | DIASTOLIC BLOOD PRESSURE: 76 MMHG | TEMPERATURE: 97.6 F | HEIGHT: 62 IN | SYSTOLIC BLOOD PRESSURE: 118 MMHG | WEIGHT: 113 LBS

## 2017-06-07 DIAGNOSIS — S60.459A FOREIGN BODY IN SKIN OF FINGER, INITIAL ENCOUNTER: Primary | ICD-10-CM

## 2017-06-07 PROCEDURE — 10120 INC&RMVL FB SUBQ TISS SMPL: CPT | Performed by: SURGERY

## 2017-06-07 PROCEDURE — G8420 CALC BMI NORM PARAMETERS: HCPCS | Performed by: SURGERY

## 2017-06-07 PROCEDURE — 1036F TOBACCO NON-USER: CPT | Performed by: SURGERY

## 2017-06-07 PROCEDURE — G8427 DOCREV CUR MEDS BY ELIG CLIN: HCPCS | Performed by: SURGERY

## 2017-06-07 PROCEDURE — 99212 OFFICE O/P EST SF 10 MIN: CPT | Performed by: SURGERY

## 2017-06-07 RX ORDER — CYCLOBENZAPRINE HCL 5 MG
TABLET ORAL
Refills: 0 | COMMUNITY
Start: 2017-05-28 | End: 2018-02-20 | Stop reason: CLARIF

## 2017-06-13 DIAGNOSIS — F98.8 ADD (ATTENTION DEFICIT DISORDER): ICD-10-CM

## 2017-06-13 RX ORDER — DEXTROAMPHETAMINE SACCHARATE, AMPHETAMINE ASPARTATE MONOHYDRATE, DEXTROAMPHETAMINE SULFATE AND AMPHETAMINE SULFATE 6.25; 6.25; 6.25; 6.25 MG/1; MG/1; MG/1; MG/1
25 CAPSULE, EXTENDED RELEASE ORAL 2 TIMES DAILY
Qty: 60 CAPSULE | Refills: 0 | Status: SHIPPED | OUTPATIENT
Start: 2017-06-13 | End: 2017-07-18 | Stop reason: SDUPTHER

## 2017-06-26 RX ORDER — LEVOTHYROXINE SODIUM 0.03 MG/1
TABLET ORAL
Qty: 90 TABLET | Refills: 1 | Status: SHIPPED | OUTPATIENT
Start: 2017-06-26 | End: 2017-11-06 | Stop reason: SDUPTHER

## 2017-06-28 ENCOUNTER — CARE COORDINATION (OUTPATIENT)
Dept: CASE MANAGEMENT | Age: 47
End: 2017-06-28

## 2017-07-14 ENCOUNTER — CARE COORDINATION (OUTPATIENT)
Dept: CASE MANAGEMENT | Age: 47
End: 2017-07-14

## 2017-07-18 DIAGNOSIS — F98.8 ADD (ATTENTION DEFICIT DISORDER): ICD-10-CM

## 2017-07-18 RX ORDER — DEXTROAMPHETAMINE SACCHARATE, AMPHETAMINE ASPARTATE MONOHYDRATE, DEXTROAMPHETAMINE SULFATE AND AMPHETAMINE SULFATE 6.25; 6.25; 6.25; 6.25 MG/1; MG/1; MG/1; MG/1
25 CAPSULE, EXTENDED RELEASE ORAL 2 TIMES DAILY
Qty: 60 CAPSULE | Refills: 0 | Status: SHIPPED | OUTPATIENT
Start: 2017-07-18 | End: 2017-08-14 | Stop reason: SDUPTHER

## 2017-07-25 ENCOUNTER — TELEPHONE (OUTPATIENT)
Dept: FAMILY MEDICINE CLINIC | Age: 47
End: 2017-07-25

## 2017-07-31 RX ORDER — ESOMEPRAZOLE MAGNESIUM 40 MG/1
CAPSULE, DELAYED RELEASE ORAL
Qty: 30 CAPSULE | Refills: 5 | Status: SHIPPED | OUTPATIENT
Start: 2017-07-31 | End: 2018-02-02 | Stop reason: SDUPTHER

## 2017-08-14 DIAGNOSIS — F98.8 ADD (ATTENTION DEFICIT DISORDER): ICD-10-CM

## 2017-08-14 RX ORDER — DEXTROAMPHETAMINE SACCHARATE, AMPHETAMINE ASPARTATE MONOHYDRATE, DEXTROAMPHETAMINE SULFATE AND AMPHETAMINE SULFATE 6.25; 6.25; 6.25; 6.25 MG/1; MG/1; MG/1; MG/1
25 CAPSULE, EXTENDED RELEASE ORAL 2 TIMES DAILY
Qty: 60 CAPSULE | Refills: 0 | Status: SHIPPED | OUTPATIENT
Start: 2017-08-14 | End: 2017-09-20 | Stop reason: SDUPTHER

## 2017-09-11 RX ORDER — FUROSEMIDE 20 MG/1
40 TABLET ORAL DAILY
Qty: 120 TABLET | Refills: 3 | Status: SHIPPED | OUTPATIENT
Start: 2017-09-11 | End: 2018-04-25 | Stop reason: SDUPTHER

## 2017-09-11 RX ORDER — FUROSEMIDE 40 MG/1
40 TABLET ORAL DAILY
Qty: 90 TABLET | Refills: 1 | Status: SHIPPED | OUTPATIENT
Start: 2017-09-11 | End: 2017-09-11 | Stop reason: RX

## 2017-09-20 ENCOUNTER — OFFICE VISIT (OUTPATIENT)
Dept: FAMILY MEDICINE CLINIC | Age: 47
End: 2017-09-20

## 2017-09-20 VITALS
TEMPERATURE: 98.1 F | DIASTOLIC BLOOD PRESSURE: 68 MMHG | HEIGHT: 62 IN | WEIGHT: 112.4 LBS | BODY MASS INDEX: 20.68 KG/M2 | SYSTOLIC BLOOD PRESSURE: 110 MMHG | HEART RATE: 83 BPM | OXYGEN SATURATION: 98 %

## 2017-09-20 DIAGNOSIS — G43.909 MIGRAINE WITHOUT STATUS MIGRAINOSUS, NOT INTRACTABLE, UNSPECIFIED MIGRAINE TYPE: ICD-10-CM

## 2017-09-20 DIAGNOSIS — F98.8 ADD (ATTENTION DEFICIT DISORDER): ICD-10-CM

## 2017-09-20 DIAGNOSIS — R53.83 FATIGUE, UNSPECIFIED TYPE: ICD-10-CM

## 2017-09-20 DIAGNOSIS — K85.90 ACUTE PANCREATITIS, UNSPECIFIED COMPLICATION STATUS, UNSPECIFIED PANCREATITIS TYPE: ICD-10-CM

## 2017-09-20 DIAGNOSIS — F32.4 DEPRESSION, MAJOR, SINGLE EPISODE, IN PARTIAL REMISSION (HCC): ICD-10-CM

## 2017-09-20 DIAGNOSIS — K85.90 ACUTE PANCREATITIS, UNSPECIFIED COMPLICATION STATUS, UNSPECIFIED PANCREATITIS TYPE: Primary | ICD-10-CM

## 2017-09-20 LAB
ALBUMIN SERPL-MCNC: 4.7 G/DL (ref 3.9–4.9)
ALP BLD-CCNC: 80 U/L (ref 40–130)
ALT SERPL-CCNC: 13 U/L (ref 0–33)
AMYLASE: 228 U/L (ref 28–100)
ANION GAP SERPL CALCULATED.3IONS-SCNC: 18 MEQ/L (ref 7–13)
AST SERPL-CCNC: 15 U/L (ref 0–35)
BILIRUB SERPL-MCNC: 0.2 MG/DL (ref 0–1.2)
BUN BLDV-MCNC: 18 MG/DL (ref 6–20)
CALCIUM SERPL-MCNC: 9.4 MG/DL (ref 8.6–10.2)
CHLORIDE BLD-SCNC: 102 MEQ/L (ref 98–107)
CO2: 23 MEQ/L (ref 22–29)
CREAT SERPL-MCNC: 0.71 MG/DL (ref 0.5–0.9)
GFR AFRICAN AMERICAN: >60
GFR NON-AFRICAN AMERICAN: >60
GLOBULIN: 2.5 G/DL (ref 2.3–3.5)
GLUCOSE BLD-MCNC: 86 MG/DL (ref 74–109)
LIPASE: 137 U/L (ref 13–60)
POTASSIUM SERPL-SCNC: 3.4 MEQ/L (ref 3.5–5.1)
SODIUM BLD-SCNC: 143 MEQ/L (ref 132–144)
T4 FREE: 1.29 NG/DL (ref 0.93–1.7)
TOTAL PROTEIN: 7.2 G/DL (ref 6.4–8.1)
TSH SERPL DL<=0.05 MIU/L-ACNC: 1.23 UIU/ML (ref 0.27–4.2)

## 2017-09-20 PROCEDURE — 99214 OFFICE O/P EST MOD 30 MIN: CPT | Performed by: NURSE PRACTITIONER

## 2017-09-20 PROCEDURE — G8427 DOCREV CUR MEDS BY ELIG CLIN: HCPCS | Performed by: NURSE PRACTITIONER

## 2017-09-20 PROCEDURE — G8420 CALC BMI NORM PARAMETERS: HCPCS | Performed by: NURSE PRACTITIONER

## 2017-09-20 PROCEDURE — 1036F TOBACCO NON-USER: CPT | Performed by: NURSE PRACTITIONER

## 2017-09-20 RX ORDER — TOPIRAMATE 100 MG/1
100 TABLET, FILM COATED ORAL 2 TIMES DAILY
Qty: 60 TABLET | Refills: 3 | Status: SHIPPED | OUTPATIENT
Start: 2017-09-20 | End: 2018-01-19 | Stop reason: SDUPTHER

## 2017-09-20 RX ORDER — OXYCODONE HYDROCHLORIDE AND ACETAMINOPHEN 5; 325 MG/1; MG/1
1 TABLET ORAL EVERY 8 HOURS PRN
Qty: 21 TABLET | Refills: 0 | Status: SHIPPED | OUTPATIENT
Start: 2017-09-20 | End: 2017-09-27

## 2017-09-20 RX ORDER — DEXTROAMPHETAMINE SACCHARATE, AMPHETAMINE ASPARTATE MONOHYDRATE, DEXTROAMPHETAMINE SULFATE AND AMPHETAMINE SULFATE 6.25; 6.25; 6.25; 6.25 MG/1; MG/1; MG/1; MG/1
25 CAPSULE, EXTENDED RELEASE ORAL 2 TIMES DAILY
Qty: 60 CAPSULE | Refills: 0 | Status: SHIPPED | OUTPATIENT
Start: 2017-09-20 | End: 2017-10-18 | Stop reason: SDUPTHER

## 2017-09-20 ASSESSMENT — ENCOUNTER SYMPTOMS
SHORTNESS OF BREATH: 0
DIARRHEA: 0
COUGH: 0
ABDOMINAL PAIN: 1
NAUSEA: 1

## 2017-09-21 ENCOUNTER — TELEPHONE (OUTPATIENT)
Dept: FAMILY MEDICINE CLINIC | Age: 47
End: 2017-09-21

## 2017-10-18 ENCOUNTER — TELEPHONE (OUTPATIENT)
Dept: FAMILY MEDICINE CLINIC | Age: 47
End: 2017-10-18

## 2017-10-18 DIAGNOSIS — F98.8 ADD (ATTENTION DEFICIT DISORDER): ICD-10-CM

## 2017-10-19 RX ORDER — DEXTROAMPHETAMINE SACCHARATE, AMPHETAMINE ASPARTATE MONOHYDRATE, DEXTROAMPHETAMINE SULFATE AND AMPHETAMINE SULFATE 6.25; 6.25; 6.25; 6.25 MG/1; MG/1; MG/1; MG/1
25 CAPSULE, EXTENDED RELEASE ORAL 2 TIMES DAILY
Qty: 60 CAPSULE | Refills: 0 | Status: SHIPPED | OUTPATIENT
Start: 2017-10-19 | End: 2017-11-14 | Stop reason: SDUPTHER

## 2017-11-01 PROBLEM — D21.9 FIBROIDS: Status: ACTIVE | Noted: 2017-11-01

## 2017-11-01 PROBLEM — M35.00 SJOGREN'S SYNDROME (HCC): Chronic | Status: ACTIVE | Noted: 2017-11-01

## 2017-11-01 PROBLEM — Z92.89 TRANSFUSION HISTORY: Status: ACTIVE | Noted: 2017-11-01

## 2017-11-01 PROBLEM — R07.89 OTHER CHEST PAIN: Status: ACTIVE | Noted: 2017-11-01

## 2017-11-01 PROBLEM — K57.32 DIVERTICULITIS OF LARGE INTESTINE WITHOUT PERFORATION OR ABSCESS WITHOUT BLEEDING: Chronic | Status: ACTIVE | Noted: 2017-11-01

## 2017-11-01 PROBLEM — R10.32 LEFT LOWER QUADRANT PAIN: Status: ACTIVE | Noted: 2017-11-01

## 2017-11-06 ENCOUNTER — OFFICE VISIT (OUTPATIENT)
Dept: FAMILY MEDICINE CLINIC | Age: 47
End: 2017-11-06

## 2017-11-06 ENCOUNTER — HOSPITAL ENCOUNTER (OUTPATIENT)
Dept: CT IMAGING | Age: 47
Discharge: HOME | End: 2017-11-06
Payer: MEDICARE

## 2017-11-06 VITALS
WEIGHT: 113.8 LBS | HEART RATE: 92 BPM | DIASTOLIC BLOOD PRESSURE: 70 MMHG | OXYGEN SATURATION: 99 % | HEIGHT: 61 IN | SYSTOLIC BLOOD PRESSURE: 114 MMHG | TEMPERATURE: 97 F | BODY MASS INDEX: 21.49 KG/M2

## 2017-11-06 VITALS — HEART RATE: 66 BPM | RESPIRATION RATE: 16 BRPM | SYSTOLIC BLOOD PRESSURE: 104 MMHG | DIASTOLIC BLOOD PRESSURE: 56 MMHG

## 2017-11-06 DIAGNOSIS — R10.32 LLQ PAIN: Primary | ICD-10-CM

## 2017-11-06 DIAGNOSIS — R10.32 LLQ PAIN: ICD-10-CM

## 2017-11-06 PROCEDURE — 1036F TOBACCO NON-USER: CPT | Performed by: NURSE PRACTITIONER

## 2017-11-06 PROCEDURE — G8420 CALC BMI NORM PARAMETERS: HCPCS | Performed by: NURSE PRACTITIONER

## 2017-11-06 PROCEDURE — 74177 CT ABD & PELVIS W/CONTRAST: CPT

## 2017-11-06 PROCEDURE — G8427 DOCREV CUR MEDS BY ELIG CLIN: HCPCS | Performed by: NURSE PRACTITIONER

## 2017-11-06 PROCEDURE — 99213 OFFICE O/P EST LOW 20 MIN: CPT | Performed by: NURSE PRACTITIONER

## 2017-11-06 PROCEDURE — 6360000004 HC RX CONTRAST MEDICATION: Performed by: NURSE PRACTITIONER

## 2017-11-06 PROCEDURE — G8484 FLU IMMUNIZE NO ADMIN: HCPCS | Performed by: NURSE PRACTITIONER

## 2017-11-06 RX ORDER — LEVOTHYROXINE SODIUM 0.03 MG/1
TABLET ORAL
Qty: 90 TABLET | Refills: 1 | Status: SHIPPED | OUTPATIENT
Start: 2017-11-06 | End: 2018-04-27 | Stop reason: SDUPTHER

## 2017-11-06 RX ORDER — SODIUM CHLORIDE 0.9 % (FLUSH) 0.9 %
10 SYRINGE (ML) INJECTION
Status: DISPENSED | OUTPATIENT
Start: 2017-11-06 | End: 2017-11-06

## 2017-11-06 RX ADMIN — IOPAMIDOL 100 ML: 755 INJECTION, SOLUTION INTRAVENOUS at 11:13

## 2017-11-06 ASSESSMENT — ENCOUNTER SYMPTOMS
DIARRHEA: 1
CONSTIPATION: 1
ABDOMINAL PAIN: 1

## 2017-11-06 NOTE — PROGRESS NOTES
Patient complains of left lower quadrant pain that radiates around to her lower back since Thursday. She had been treated with Cipro antibiotics and has had no relief of pain. Pain level currently 5/10 all the time. Complains of periods where pain level is sharp and radiates around to her back at level 8/ 10. Patient dos have history of pancreatitis and Sjogren's syndrome. Patient informed that will will begin the study once the barium travel onto her colon. Encouraged to drink plenty of fluids taday after the testing to help remove the contrast and the barium from her system.

## 2017-11-06 NOTE — PROGRESS NOTES
Subjective  Chief Complaint   Patient presents with    Abdominal Pain     pt in Thursday to see another phycian in [facility]. pt was given flagyl and cipro. NOT any better. lower left abdominal area. HPI     Pt here for a fu of LLQ pain. States that it is not really any better. Tx for presumed diverticulitis. Has been taking the flagyl and cipro  UA was negative. 5/10 with occassional break through pain. Feels like it is endometriosis which she used to have. Slightly more diarrhea. Not new. Getting more solid. Due for colonoscopy.     Patient Active Problem List    Diagnosis Date Noted    Other chest pain 11/01/2017    Left lower quadrant pain 11/01/2017    Sjogren's syndrome ([facility] [address] 11/01/2017    Diverticulitis of large intestine without perforation or abscess without bleeding 11/01/2017    Fibroids 11/01/2017    Transfusion history 11/01/2017    Pancreatitis 05/13/2017    Acquired hypothyroidism 09/26/2016    GERD (gastroesophageal reflux disease) 09/26/2016    Anxiety 12/09/2015    Depression 12/09/2015    ADD (attention deficit disorder) 02/12/2015    Endometriosis 09/09/2014    ASCUS favoring benign 05/01/2013    S/P endometrial ablation 05/01/2013     Past Medical History:   Diagnosis Date    Acquired hypothyroidism 9/26/2016    Acute pancreatitis     ADD (attention deficit disorder) 2/12/2015    Anxiety     Depression 12/9/2015    Endometrial cyst of ovary 5/10/14    RT ovary, ruptured    GERD (gastroesophageal reflux disease) 9/26/2016    Stress     Swelling      Past Surgical History:   Procedure Laterality Date    CHOLECYSTECTOMY  2011    HYSTERECTOMY  2014    sept    OVARY REMOVAL Left Jan 2014    UPPER GASTROINTESTINAL ENDOSCOPY  09/16/2016    EGD W/BX     Family History   Problem Relation Age of Onset    Heart Disease Father 48    Cancer Maternal Grandmother      breast    Heart Disease Other      mom and dad's side     Social History     Social History   

## 2017-11-14 DIAGNOSIS — F98.8 ADD (ATTENTION DEFICIT DISORDER): ICD-10-CM

## 2017-11-14 RX ORDER — DEXTROAMPHETAMINE SACCHARATE, AMPHETAMINE ASPARTATE MONOHYDRATE, DEXTROAMPHETAMINE SULFATE AND AMPHETAMINE SULFATE 6.25; 6.25; 6.25; 6.25 MG/1; MG/1; MG/1; MG/1
25 CAPSULE, EXTENDED RELEASE ORAL 2 TIMES DAILY
Qty: 60 CAPSULE | Refills: 0 | Status: SHIPPED | OUTPATIENT
Start: 2017-11-14 | End: 2017-12-12 | Stop reason: SDUPTHER

## 2017-11-14 NOTE — TELEPHONE ENCOUNTER
From: Fitz Carter  Sent: 11/14/2017 10:33 AM EST  Subject: Medication Renewal Request    Donna Roland. Jennifer Salomon would like a refill of the following medications:  amphetamine-dextroamphetamine (ADDERALL XR) 25 MG extended release capsule Marsha HOLLAND Moore]    Preferred pharmacy: [id number] - [facility] [place] [address] [facility] [phone] -  [phone]    Comment:  I will need script soon, last dosage is over this Weekend, thanks. I did see gastro for colitis And also ended up with bad eye inflammation, was put Due to the colitis., On large dosage of steroids over weekend. Feeling Slightly better today.  Thanks for everything

## 2017-12-07 ENCOUNTER — CARE COORDINATION (OUTPATIENT)
Dept: CARE COORDINATION | Age: 47
End: 2017-12-07

## 2017-12-12 DIAGNOSIS — F98.8 ADD (ATTENTION DEFICIT DISORDER): ICD-10-CM

## 2017-12-13 RX ORDER — DEXTROAMPHETAMINE SACCHARATE, AMPHETAMINE ASPARTATE MONOHYDRATE, DEXTROAMPHETAMINE SULFATE AND AMPHETAMINE SULFATE 6.25; 6.25; 6.25; 6.25 MG/1; MG/1; MG/1; MG/1
25 CAPSULE, EXTENDED RELEASE ORAL 2 TIMES DAILY
Qty: 60 CAPSULE | Refills: 0 | Status: SHIPPED | OUTPATIENT
Start: 2017-12-13 | End: 2018-01-15 | Stop reason: SDUPTHER

## 2017-12-19 NOTE — CARE COORDINATION
I spoke with Nehal Tracy about care coordination. She says that she is not doing very good today. She did receive my earlier call and she says that she is interested in the program and she says that it sounds like a very good program. She has asked that I call her back tomorrow after 1 pm.    I have made several attempts to call Nehal Tracy to discuss care coordination. She tells me that she is interested in this program but she has been very busy with her health and her daughter's health. She did talk with me today and she says that she is seeing her new rheumatoidologist as she had been seeing Dr Adriel Cline and did not connect with the staff and seemed to get delayed in obtaining appointments during \"flare ups. \"  She also tells me that she is going to see her nephrologist while she is there for her nephrocalcinosis as she is having severe pain in her kidney. We have agreed to begin the care coordination protocol tomorrow morning as she does not have anything on her schedule.

## 2017-12-29 ENCOUNTER — CARE COORDINATION (OUTPATIENT)
Dept: CARE COORDINATION | Age: 47
End: 2017-12-29

## 2017-12-29 NOTE — CARE COORDINATION
Yes Dene Osler, NP   furosemide (LASIX) 20 MG tablet Take 2 tablets by mouth daily 9/11/17  Yes Dene Osler, NP   esomeprazole (NEXIUM) 40 MG delayed release capsule TK ONE C PO D 7/31/17  Yes Dene Osler, NP   ketorolac (TORADOL) 10 MG tablet Take 1 tablet by mouth every 6 hours as needed for Pain 5/31/17  Yes Dene Osler, NP   Polyethylene Glycol 3350 (MIRALAX PO) Take by mouth   Yes Historical Provider, MD   ondansetron (ZOFRAN ODT) 4 MG disintegrating tablet Take 1-2 tablets by mouth every 12 hours as needed for Nausea May Sub regular tablet (non-ODT) if insurance does not cover ODT. 2/3/17  Yes Nik Thomson CNP   hydroxychloroquine (PLAQUENIL) 200 MG tablet Take 300 mg by mouth daily    Yes Historical Provider, MD   prochlorperazine (COMPAZINE) 10 MG tablet Take 1 tablet by mouth every 6 hours as needed (nausea) 10/4/16  Yes Dene Osler, NP   Pancrelipase, Lip-Prot-Amyl, (CREON) 70233 UNITS CPEP 2 caps tid 9/23/16  Yes Trino Campuzano MD   promethazine (PHENERGAN) 25 MG tablet Take 1 tablet by mouth every 6 hours as needed for Nausea 4/27/16  Yes Dene Osler, NP   fluorouracil (EFUDEX) 5 % cream APPLY TO AFFECTED AREAS TWICE DAILY UNTIL AREA BECOMES CRUSTY 8/22/17   Historical Provider, MD   cyclobenzaprine (FLEXERIL) 5 MG tablet TK 1 T PO TID 5/28/17   Historical Provider, MD   POTASSIUM CITRATE PO Take 10 mEq by mouth daily     Historical Provider, MD   hydrOXYzine (VISTARIL) 25 MG capsule Take 1 capsule by mouth 3 times daily as needed for Anxiety 3/29/17   Dene Osler, NP   estradiol (ESTRACE) 1 MG tablet TAKE 1 TABLET BY MOUTH EVERY DAY 2/10/16   Historical Provider, MD       No future appointments.

## 2018-01-05 ENCOUNTER — CARE COORDINATION (OUTPATIENT)
Dept: CARE COORDINATION | Age: 48
End: 2018-01-05

## 2018-01-15 DIAGNOSIS — F98.8 ADD (ATTENTION DEFICIT DISORDER): ICD-10-CM

## 2018-01-16 RX ORDER — DEXTROAMPHETAMINE SACCHARATE, AMPHETAMINE ASPARTATE MONOHYDRATE, DEXTROAMPHETAMINE SULFATE AND AMPHETAMINE SULFATE 6.25; 6.25; 6.25; 6.25 MG/1; MG/1; MG/1; MG/1
25 CAPSULE, EXTENDED RELEASE ORAL 2 TIMES DAILY
Qty: 60 CAPSULE | Refills: 0 | Status: SHIPPED | OUTPATIENT
Start: 2018-01-16 | End: 2018-02-13 | Stop reason: SDUPTHER

## 2018-01-16 NOTE — CARE COORDINATION
tid 9/23/16   Susi Chang MD   promethazine (PHENERGAN) 25 MG tablet Take 1 tablet by mouth every 6 hours as needed for Nausea 4/27/16   Isamar Shay NP   estradiol (ESTRACE) 1 MG tablet TAKE 1 TABLET BY MOUTH EVERY DAY 2/10/16   Historical Provider, MD       No future appointments.

## 2018-01-18 ENCOUNTER — HOSPITAL ENCOUNTER (OUTPATIENT)
Dept: MRI IMAGING | Age: 48
Discharge: HOME | End: 2018-01-18
Payer: MEDICARE

## 2018-01-18 DIAGNOSIS — M25.531 WRIST PAIN, ACUTE, RIGHT: ICD-10-CM

## 2018-01-18 PROCEDURE — 73221 MRI JOINT UPR EXTREM W/O DYE: CPT

## 2018-01-19 DIAGNOSIS — G43.909 MIGRAINE WITHOUT STATUS MIGRAINOSUS, NOT INTRACTABLE, UNSPECIFIED MIGRAINE TYPE: ICD-10-CM

## 2018-01-19 RX ORDER — TOPIRAMATE 100 MG/1
100 TABLET, FILM COATED ORAL 2 TIMES DAILY
Qty: 60 TABLET | Refills: 3 | Status: SHIPPED | OUTPATIENT
Start: 2018-01-19 | End: 2018-04-06 | Stop reason: ALTCHOICE

## 2018-01-30 ENCOUNTER — CARE COORDINATION (OUTPATIENT)
Dept: FAMILY MEDICINE CLINIC | Age: 48
End: 2018-01-30

## 2018-02-03 RX ORDER — ESOMEPRAZOLE MAGNESIUM 40 MG/1
CAPSULE, DELAYED RELEASE ORAL
Qty: 30 CAPSULE | Refills: 5 | Status: SHIPPED | OUTPATIENT
Start: 2018-02-03 | End: 2018-07-26 | Stop reason: SDUPTHER

## 2018-02-08 ENCOUNTER — CARE COORDINATION (OUTPATIENT)
Dept: FAMILY MEDICINE CLINIC | Age: 48
End: 2018-02-08

## 2018-02-08 ENCOUNTER — OFFICE VISIT (OUTPATIENT)
Dept: FAMILY MEDICINE CLINIC | Age: 48
End: 2018-02-08
Payer: MEDICARE

## 2018-02-08 VITALS
HEART RATE: 87 BPM | BODY MASS INDEX: 21.42 KG/M2 | SYSTOLIC BLOOD PRESSURE: 92 MMHG | OXYGEN SATURATION: 98 % | TEMPERATURE: 97.8 F | HEIGHT: 62 IN | DIASTOLIC BLOOD PRESSURE: 68 MMHG | WEIGHT: 116.4 LBS

## 2018-02-08 DIAGNOSIS — R07.9 CHEST PAIN, UNSPECIFIED TYPE: ICD-10-CM

## 2018-02-08 DIAGNOSIS — F41.9 ANXIETY: ICD-10-CM

## 2018-02-08 DIAGNOSIS — G43.809 OTHER MIGRAINE WITHOUT STATUS MIGRAINOSUS, NOT INTRACTABLE: ICD-10-CM

## 2018-02-08 DIAGNOSIS — F32.4 DEPRESSION, MAJOR, SINGLE EPISODE, IN PARTIAL REMISSION (HCC): ICD-10-CM

## 2018-02-08 DIAGNOSIS — R10.9 ABDOMINAL PAIN, UNSPECIFIED ABDOMINAL LOCATION: ICD-10-CM

## 2018-02-08 DIAGNOSIS — R10.9 ABDOMINAL PAIN, UNSPECIFIED ABDOMINAL LOCATION: Primary | ICD-10-CM

## 2018-02-08 LAB
ALBUMIN SERPL-MCNC: 4.7 G/DL (ref 3.9–4.9)
ALP BLD-CCNC: 88 U/L (ref 40–130)
ALT SERPL-CCNC: 11 U/L (ref 0–33)
AMYLASE: 218 U/L (ref 28–100)
ANION GAP SERPL CALCULATED.3IONS-SCNC: 16 MEQ/L (ref 7–13)
AST SERPL-CCNC: 12 U/L (ref 0–35)
BILIRUB SERPL-MCNC: 0.1 MG/DL (ref 0–1.2)
BUN BLDV-MCNC: 20 MG/DL (ref 6–20)
CALCIUM SERPL-MCNC: 9.8 MG/DL (ref 8.6–10.2)
CHLORIDE BLD-SCNC: 104 MEQ/L (ref 98–107)
CO2: 23 MEQ/L (ref 22–29)
CREAT SERPL-MCNC: 0.71 MG/DL (ref 0.5–0.9)
GFR AFRICAN AMERICAN: >60
GFR NON-AFRICAN AMERICAN: >60
GLOBULIN: 2.6 G/DL (ref 2.3–3.5)
GLUCOSE BLD-MCNC: 97 MG/DL (ref 74–109)
LIPASE: 158 U/L (ref 13–60)
POTASSIUM SERPL-SCNC: 3.8 MEQ/L (ref 3.5–5.1)
SODIUM BLD-SCNC: 143 MEQ/L (ref 132–144)
TOTAL PROTEIN: 7.3 G/DL (ref 6.4–8.1)

## 2018-02-08 PROCEDURE — G8420 CALC BMI NORM PARAMETERS: HCPCS | Performed by: NURSE PRACTITIONER

## 2018-02-08 PROCEDURE — 99214 OFFICE O/P EST MOD 30 MIN: CPT | Performed by: NURSE PRACTITIONER

## 2018-02-08 PROCEDURE — G8427 DOCREV CUR MEDS BY ELIG CLIN: HCPCS | Performed by: NURSE PRACTITIONER

## 2018-02-08 PROCEDURE — G8484 FLU IMMUNIZE NO ADMIN: HCPCS | Performed by: NURSE PRACTITIONER

## 2018-02-08 PROCEDURE — 1036F TOBACCO NON-USER: CPT | Performed by: NURSE PRACTITIONER

## 2018-02-08 PROCEDURE — 93000 ELECTROCARDIOGRAM COMPLETE: CPT | Performed by: NURSE PRACTITIONER

## 2018-02-08 RX ORDER — KETOROLAC TROMETHAMINE 10 MG/1
10 TABLET, FILM COATED ORAL EVERY 6 HOURS PRN
Qty: 20 TABLET | Refills: 1 | Status: SHIPPED | OUTPATIENT
Start: 2018-02-08 | End: 2018-10-30 | Stop reason: SDUPTHER

## 2018-02-08 RX ORDER — PROCHLORPERAZINE MALEATE 10 MG
10 TABLET ORAL EVERY 6 HOURS PRN
Qty: 30 TABLET | Refills: 3 | Status: SHIPPED | OUTPATIENT
Start: 2018-02-08 | End: 2018-08-19 | Stop reason: SDUPTHER

## 2018-02-08 RX ORDER — BUPROPION HYDROCHLORIDE 300 MG/1
TABLET ORAL
COMMUNITY
Start: 2017-10-30 | End: 2018-10-23

## 2018-02-08 RX ORDER — HYDROXYZINE PAMOATE 25 MG/1
25 CAPSULE ORAL 3 TIMES DAILY PRN
Qty: 30 CAPSULE | Refills: 1 | Status: SHIPPED | OUTPATIENT
Start: 2018-02-08 | End: 2018-08-03 | Stop reason: ALTCHOICE

## 2018-02-08 ASSESSMENT — ENCOUNTER SYMPTOMS: ABDOMINAL PAIN: 1

## 2018-02-08 NOTE — CARE COORDINATION
Ambulatory Care Coordination Note  2/8/2018  CM Risk Score: 8  Thom Mortality Risk Score:      ACC: Patricia Kowalski RN    Summary Note: Romy Martinez called me following her check up with Ericka Turk. She says that she is not feeling well in general and she feels that her pancreas is acting up. She is going to have lab work completed that Ericka Turk ordered. She says that she also talked with Ericka Turk about chest pain and she did complete an EKG but it was normal.  However, due to her family history Ericka Turk referred her to cardiology. She tells me that her hand is still not working well and she continues to see orthopedics for this issue. She says that she is using it a little bit better and now she is at least able to button buttons on her clothes. She tells me that she is taking her medications as prescribed and she is compliant with her health care         Care Coordination Interventions    Program Enrollment:  Rising Risk  Referral from Primary Care Provider:  No  Suggested Interventions and Community Resources         Goals Addressed             Most Recent     Conditions and Symptoms   Improving (2/8/2018)             I will schedule office visits, as directed by my provider. I will keep my appointment or reschedule if I have to cancel. I will notify my provider of any symptoms that indicate a worsening of my condition. Barriers: none  Plan for overcoming my barriers: N/A  Confidence: 10/10  Anticipated Goal Completion Date: 3/30/17              Prior to Admission medications    Medication Sig Start Date End Date Taking?  Authorizing Provider   buPROPion (WELLBUTRIN XL) 300 MG extended release tablet  10/30/17   Historical Provider, MD   ketorolac (TORADOL) 10 MG tablet Take 1 tablet by mouth every 6 hours as needed for Pain 2/8/18   Aileen Montoya NP   prochlorperazine (COMPAZINE) 10 MG tablet Take 1 tablet by mouth every 6 hours as needed (nausea) 2/8/18   Aileen Montoya NP   hydrOXYzine (VISTARIL) 25 MG capsule

## 2018-02-08 NOTE — PROGRESS NOTES
side     Social History     Social History    Marital status: Single     Spouse name: N/A    Number of children: N/A    Years of education: N/A     Social History Main Topics    Smoking status: Never Smoker    Smokeless tobacco: Never Used    Alcohol use No    Drug use: No    Sexual activity: Not Asked     Other Topics Concern    None     Social History Narrative    ** Merged History Encounter **          Current Outpatient Prescriptions on File Prior to Visit   Medication Sig Dispense Refill    esomeprazole (NEXIUM) 40 MG delayed release capsule TK ONE C PO D 30 capsule 5    topiramate (TOPAMAX) 100 MG tablet Take 1 tablet by mouth 2 times daily 60 tablet 3    amphetamine-dextroamphetamine (ADDERALL XR) 25 MG extended release capsule Take 1 capsule by mouth 2 times daily for 30 days. 60 capsule 0    levothyroxine (SYNTHROID) 25 MCG tablet Take one daily 90 tablet 1    potassium chloride (KLOR-CON M) 20 MEQ extended release tablet Take 20 mEq by mouth      linaclotide (LINZESS) 290 MCG CAPS capsule Take 1 capsule by mouth every morning (before breakfast) 30 capsule 5    furosemide (LASIX) 20 MG tablet Take 2 tablets by mouth daily 120 tablet 3    cyclobenzaprine (FLEXERIL) 5 MG tablet TK 1 T PO TID  0    POTASSIUM CITRATE PO Take 10 mEq by mouth daily       Polyethylene Glycol 3350 (MIRALAX PO) Take by mouth      ondansetron (ZOFRAN ODT) 4 MG disintegrating tablet Take 1-2 tablets by mouth every 12 hours as needed for Nausea May Sub regular tablet (non-ODT) if insurance does not cover ODT.  12 tablet 0    hydroxychloroquine (PLAQUENIL) 200 MG tablet Take 300 mg by mouth daily       Pancrelipase, Lip-Prot-Amyl, (CREON) 63942 UNITS CPEP 2 caps tid 180 capsule 03    promethazine (PHENERGAN) 25 MG tablet Take 1 tablet by mouth every 6 hours as needed for Nausea 40 tablet 1    HYDROcodone-acetaminophen (NORCO) 5-325 MG per tablet TAKE ONE TABLET BY MOUTH EVERY 6 HOURS AS NEEDED  0     No current facility-administered medications on file prior to visit. No Known Allergies    Review of Systems   Constitutional: Positive for fatigue. Cardiovascular: Positive for chest pain. Gastrointestinal: Positive for abdominal pain. Objective  Vitals:    02/08/18 0803   BP: 92/68   Site: Left Arm   Position: Sitting   Cuff Size: Medium Adult   Pulse: 87   Temp: 97.8 °F (36.6 °C)   TempSrc: Tympanic   SpO2: 98%   Weight: 116 lb 6.4 oz (52.8 kg)   Height: 5' 2\" (1.575 m)     Physical Exam   Constitutional: She is oriented to person, place, and time. She appears well-developed and well-nourished. Eyes: Conjunctivae are normal. Pupils are equal, round, and reactive to light. Neck: Neck supple. No thyromegaly present. Cardiovascular: Normal rate, regular rhythm, normal heart sounds and intact distal pulses. Pulmonary/Chest: Effort normal and breath sounds normal.   Abdominal: Soft. She exhibits no distension. There is no tenderness. There is no rebound. Lymphadenopathy:     She has no cervical adenopathy. Neurological: She is alert and oriented to person, place, and time. Psychiatric: She has a normal mood and affect. Her behavior is normal. Judgment and thought content normal.     EKG ok    Assessment & Plan    1. Abdominal pain, unspecified abdominal location  Amylase    Lipase    Comprehensive Metabolic Panel   2. Other migraine without status migrainosus, not intractable  ketorolac (TORADOL) 10 MG tablet    prochlorperazine (COMPAZINE) 10 MG tablet   3. Anxiety  hydrOXYzine (VISTARIL) 25 MG capsule   4.  Chest pain, unspecified type  EKG 12 lead       Orders Placed This Encounter   Procedures    Amylase     Standing Status:   Future     Standing Expiration Date:   2/8/2019    Lipase     Standing Status:   Future     Standing Expiration Date:   2/8/2019    Comprehensive Metabolic Panel     Standing Status:   Future     Standing Expiration Date:   2/8/2019    EKG 12 lead     Order Specific Question:   Reason for Exam?     Answer:   Chest pain       Orders Placed This Encounter   Medications    ketorolac (TORADOL) 10 MG tablet     Sig: Take 1 tablet by mouth every 6 hours as needed for Pain     Dispense:  20 tablet     Refill:  1    prochlorperazine (COMPAZINE) 10 MG tablet     Sig: Take 1 tablet by mouth every 6 hours as needed (nausea)     Dispense:  30 tablet     Refill:  3    hydrOXYzine (VISTARIL) 25 MG capsule     Sig: Take 1 capsule by mouth 3 times daily as needed for Anxiety     Dispense:  30 capsule     Refill:  1     Side effects, adverse effects of the medication prescribed today, as well as treatment plan/ rationale and result expectations have been discussed with the patient who expresses understanding and desires to proceed. Close follow up to evaluate treatment results and for coordination of care. I have reviewed the patient's medical history in detail and updated the computerized patient record. As always, patient is advised that if symptoms worsen in any way they will proceed to the nearest emergency room. Toni lees.     Phil Mike NP

## 2018-02-09 ENCOUNTER — TELEPHONE (OUTPATIENT)
Dept: FAMILY MEDICINE CLINIC | Age: 48
End: 2018-02-09

## 2018-02-09 RX ORDER — ONDANSETRON 4 MG/1
4 TABLET, ORALLY DISINTEGRATING ORAL EVERY 8 HOURS PRN
Qty: 15 TABLET | Refills: 0 | Status: SHIPPED | OUTPATIENT
Start: 2018-02-09 | End: 2018-06-05 | Stop reason: SDUPTHER

## 2018-02-09 NOTE — TELEPHONE ENCOUNTER
Pancreatic enzymes up where they wer in 9/2017  Would consider ER if continue epigastric pain/nausea

## 2018-02-13 ENCOUNTER — TELEPHONE (OUTPATIENT)
Dept: FAMILY MEDICINE CLINIC | Age: 48
End: 2018-02-13

## 2018-02-13 DIAGNOSIS — K21.9 GASTROESOPHAGEAL REFLUX DISEASE WITHOUT ESOPHAGITIS: Primary | ICD-10-CM

## 2018-02-13 DIAGNOSIS — F98.8 ADD (ATTENTION DEFICIT DISORDER): ICD-10-CM

## 2018-02-14 RX ORDER — DEXTROAMPHETAMINE SACCHARATE, AMPHETAMINE ASPARTATE MONOHYDRATE, DEXTROAMPHETAMINE SULFATE AND AMPHETAMINE SULFATE 6.25; 6.25; 6.25; 6.25 MG/1; MG/1; MG/1; MG/1
25 CAPSULE, EXTENDED RELEASE ORAL 2 TIMES DAILY
Qty: 60 CAPSULE | Refills: 0 | Status: SHIPPED | OUTPATIENT
Start: 2018-02-14 | End: 2018-03-13 | Stop reason: SDUPTHER

## 2018-02-14 RX ORDER — DEXLANSOPRAZOLE 30 MG/1
30 CAPSULE, DELAYED RELEASE ORAL DAILY
Qty: 30 CAPSULE | Refills: 5 | Status: SHIPPED | OUTPATIENT
Start: 2018-02-14 | End: 2018-04-06 | Stop reason: ALTCHOICE

## 2018-02-20 ENCOUNTER — HOSPITAL ENCOUNTER (EMERGENCY)
Age: 48
Discharge: HOME | End: 2018-02-20
Payer: MEDICARE

## 2018-02-20 ENCOUNTER — TELEPHONE (OUTPATIENT)
Dept: FAMILY MEDICINE CLINIC | Age: 48
End: 2018-02-20

## 2018-02-20 ENCOUNTER — APPOINTMENT (OUTPATIENT)
Dept: GENERAL RADIOLOGY | Age: 48
End: 2018-02-20
Payer: MEDICARE

## 2018-02-20 ENCOUNTER — APPOINTMENT (OUTPATIENT)
Dept: CT IMAGING | Age: 48
End: 2018-02-20
Payer: MEDICARE

## 2018-02-20 VITALS
RESPIRATION RATE: 18 BRPM | SYSTOLIC BLOOD PRESSURE: 133 MMHG | HEIGHT: 62 IN | WEIGHT: 115 LBS | DIASTOLIC BLOOD PRESSURE: 84 MMHG | OXYGEN SATURATION: 99 % | TEMPERATURE: 98.3 F | HEART RATE: 88 BPM | BODY MASS INDEX: 21.16 KG/M2

## 2018-02-20 DIAGNOSIS — N28.1 BILATERAL RENAL CYSTS: Primary | ICD-10-CM

## 2018-02-20 DIAGNOSIS — R10.9 LEFT FLANK PAIN: ICD-10-CM

## 2018-02-20 DIAGNOSIS — R10.9 LEFT FLANK PAIN: Primary | ICD-10-CM

## 2018-02-20 LAB
ACETAMINOPHEN LEVEL: <15 UG/ML (ref 10–30)
ALBUMIN SERPL-MCNC: 4.1 G/DL (ref 3.9–4.9)
ALP BLD-CCNC: 75 U/L (ref 40–130)
ALT SERPL-CCNC: 8 U/L (ref 0–33)
ANION GAP SERPL CALCULATED.3IONS-SCNC: 13 MEQ/L (ref 7–13)
APTT: 23.8 SEC (ref 21.6–35.4)
AST SERPL-CCNC: 10 U/L (ref 0–35)
BACTERIA: ABNORMAL /HPF
BASOPHILS ABSOLUTE: 0.1 K/UL (ref 0–0.2)
BASOPHILS RELATIVE PERCENT: 1.3 %
BILIRUB SERPL-MCNC: 0.1 MG/DL (ref 0–1.2)
BILIRUBIN URINE: NEGATIVE
BLOOD, URINE: ABNORMAL
BUN BLDV-MCNC: 20 MG/DL (ref 6–20)
CALCIUM SERPL-MCNC: 9.4 MG/DL (ref 8.6–10.2)
CHLORIDE BLD-SCNC: 104 MEQ/L (ref 98–107)
CLARITY: CLEAR
CO2: 22 MEQ/L (ref 22–29)
COLOR: YELLOW
CREAT SERPL-MCNC: 0.63 MG/DL (ref 0.5–0.9)
CRYSTALS, UA: ABNORMAL
EKG ATRIAL RATE: 66 BPM
EKG P AXIS: 58 DEGREES
EKG P-R INTERVAL: 134 MS
EKG Q-T INTERVAL: 388 MS
EKG QRS DURATION: 84 MS
EKG QTC CALCULATION (BAZETT): 406 MS
EKG R AXIS: 59 DEGREES
EKG T AXIS: 55 DEGREES
EKG VENTRICULAR RATE: 66 BPM
EOSINOPHILS ABSOLUTE: 0.1 K/UL (ref 0–0.7)
EOSINOPHILS RELATIVE PERCENT: 1.9 %
EPITHELIAL CELLS, UA: ABNORMAL /HPF
GFR AFRICAN AMERICAN: >60
GFR NON-AFRICAN AMERICAN: >60
GLOBULIN: 2.2 G/DL (ref 2.3–3.5)
GLUCOSE BLD-MCNC: 105 MG/DL (ref 74–109)
GLUCOSE URINE: NEGATIVE MG/DL
HCT VFR BLD CALC: 38.8 % (ref 37–47)
HEMOGLOBIN: 13 G/DL (ref 12–16)
INR BLD: 1
KETONES, URINE: NEGATIVE MG/DL
LACTIC ACID, SEPSIS: 1.3 MMOL/L (ref 0.5–1.9)
LEUKOCYTE ESTERASE, URINE: ABNORMAL
LIPASE: 101 U/L (ref 13–60)
LYMPHOCYTES ABSOLUTE: 2.3 K/UL (ref 1–4.8)
LYMPHOCYTES RELATIVE PERCENT: 43.1 %
MAGNESIUM: 2.1 MG/DL (ref 1.7–2.3)
MCH RBC QN AUTO: 30.1 PG (ref 27–31.3)
MCHC RBC AUTO-ENTMCNC: 33.5 % (ref 33–37)
MCV RBC AUTO: 89.7 FL (ref 82–100)
MONO TEST: NEGATIVE
MONOCYTES ABSOLUTE: 0.4 K/UL (ref 0.2–0.8)
MONOCYTES RELATIVE PERCENT: 7.4 %
NEUTROPHILS ABSOLUTE: 2.5 K/UL (ref 1.4–6.5)
NEUTROPHILS RELATIVE PERCENT: 46.3 %
NITRITE, URINE: NEGATIVE
PDW BLD-RTO: 12.4 % (ref 11.5–14.5)
PH UA: 6 (ref 5–9)
PLATELET # BLD: 267 K/UL (ref 130–400)
POTASSIUM SERPL-SCNC: 3.8 MEQ/L (ref 3.5–5.1)
PRO-BNP: 78 PG/ML
PROTEIN UA: NEGATIVE MG/DL
PROTHROMBIN TIME: 10.3 SEC (ref 8.1–13.7)
RAPID INFLUENZA  B AGN: NEGATIVE
RAPID INFLUENZA A AGN: NEGATIVE
RBC # BLD: 4.33 M/UL (ref 4.2–5.4)
RBC UA: ABNORMAL /HPF (ref 0–2)
SODIUM BLD-SCNC: 139 MEQ/L (ref 132–144)
SPECIFIC GRAVITY UA: 1.02 (ref 1–1.03)
TOTAL CK: 70 U/L (ref 0–170)
TOTAL PROTEIN: 6.3 G/DL (ref 6.4–8.1)
TROPONIN: <0.01 NG/ML (ref 0–0.01)
TSH SERPL DL<=0.05 MIU/L-ACNC: 2.24 UIU/ML (ref 0.27–4.2)
URINE REFLEX TO CULTURE: YES
UROBILINOGEN, URINE: 0.2 E.U./DL
WBC # BLD: 5.3 K/UL (ref 4.8–10.8)
WBC UA: ABNORMAL /HPF (ref 0–5)

## 2018-02-20 PROCEDURE — 80053 COMPREHEN METABOLIC PANEL: CPT

## 2018-02-20 PROCEDURE — 93005 ELECTROCARDIOGRAM TRACING: CPT

## 2018-02-20 PROCEDURE — 86308 HETEROPHILE ANTIBODY SCREEN: CPT

## 2018-02-20 PROCEDURE — 85610 PROTHROMBIN TIME: CPT

## 2018-02-20 PROCEDURE — 84484 ASSAY OF TROPONIN QUANT: CPT

## 2018-02-20 PROCEDURE — 85730 THROMBOPLASTIN TIME PARTIAL: CPT

## 2018-02-20 PROCEDURE — 83690 ASSAY OF LIPASE: CPT

## 2018-02-20 PROCEDURE — 83880 ASSAY OF NATRIURETIC PEPTIDE: CPT

## 2018-02-20 PROCEDURE — 96374 THER/PROPH/DIAG INJ IV PUSH: CPT

## 2018-02-20 PROCEDURE — 2580000003 HC RX 258: Performed by: PERSONAL EMERGENCY RESPONSE ATTENDANT

## 2018-02-20 PROCEDURE — 86403 PARTICLE AGGLUT ANTBDY SCRN: CPT

## 2018-02-20 PROCEDURE — 87040 BLOOD CULTURE FOR BACTERIA: CPT

## 2018-02-20 PROCEDURE — 99284 EMERGENCY DEPT VISIT MOD MDM: CPT

## 2018-02-20 PROCEDURE — 74176 CT ABD & PELVIS W/O CONTRAST: CPT

## 2018-02-20 PROCEDURE — 83735 ASSAY OF MAGNESIUM: CPT

## 2018-02-20 PROCEDURE — 82550 ASSAY OF CK (CPK): CPT

## 2018-02-20 PROCEDURE — 81001 URINALYSIS AUTO W/SCOPE: CPT

## 2018-02-20 PROCEDURE — 84443 ASSAY THYROID STIM HORMONE: CPT

## 2018-02-20 PROCEDURE — G0480 DRUG TEST DEF 1-7 CLASSES: HCPCS

## 2018-02-20 PROCEDURE — 6360000002 HC RX W HCPCS: Performed by: PERSONAL EMERGENCY RESPONSE ATTENDANT

## 2018-02-20 PROCEDURE — 85025 COMPLETE CBC W/AUTO DIFF WBC: CPT

## 2018-02-20 PROCEDURE — 93010 ELECTROCARDIOGRAM REPORT: CPT | Performed by: INTERNAL MEDICINE

## 2018-02-20 PROCEDURE — 96375 TX/PRO/DX INJ NEW DRUG ADDON: CPT

## 2018-02-20 PROCEDURE — 71046 X-RAY EXAM CHEST 2 VIEWS: CPT

## 2018-02-20 PROCEDURE — 36415 COLL VENOUS BLD VENIPUNCTURE: CPT

## 2018-02-20 PROCEDURE — 83605 ASSAY OF LACTIC ACID: CPT

## 2018-02-20 PROCEDURE — 87086 URINE CULTURE/COLONY COUNT: CPT

## 2018-02-20 RX ORDER — ONDANSETRON 2 MG/ML
4 INJECTION INTRAMUSCULAR; INTRAVENOUS ONCE
Status: COMPLETED | OUTPATIENT
Start: 2018-02-20 | End: 2018-02-20

## 2018-02-20 RX ORDER — KETOROLAC TROMETHAMINE 30 MG/ML
30 INJECTION, SOLUTION INTRAMUSCULAR; INTRAVENOUS ONCE
Status: COMPLETED | OUTPATIENT
Start: 2018-02-20 | End: 2018-02-20

## 2018-02-20 RX ORDER — SODIUM CHLORIDE 9 MG/ML
INJECTION, SOLUTION INTRAVENOUS CONTINUOUS
Status: DISCONTINUED | OUTPATIENT
Start: 2018-02-20 | End: 2018-02-20 | Stop reason: HOSPADM

## 2018-02-20 RX ORDER — CYCLOBENZAPRINE HCL 10 MG
10 TABLET ORAL 3 TIMES DAILY PRN
Qty: 6 TABLET | Refills: 0 | Status: SHIPPED | OUTPATIENT
Start: 2018-02-20 | End: 2018-03-02

## 2018-02-20 RX ORDER — IBUPROFEN 600 MG/1
600 TABLET ORAL EVERY 6 HOURS PRN
COMMUNITY
End: 2018-06-05 | Stop reason: ALTCHOICE

## 2018-02-20 RX ORDER — OXYCODONE HYDROCHLORIDE AND ACETAMINOPHEN 5; 325 MG/1; MG/1
1 TABLET ORAL EVERY 8 HOURS PRN
COMMUNITY
End: 2018-04-06 | Stop reason: ALTCHOICE

## 2018-02-20 RX ADMIN — SODIUM CHLORIDE: 9 INJECTION, SOLUTION INTRAVENOUS at 01:07

## 2018-02-20 RX ADMIN — ONDANSETRON 4 MG: 2 INJECTION INTRAMUSCULAR; INTRAVENOUS at 01:06

## 2018-02-20 RX ADMIN — KETOROLAC TROMETHAMINE 30 MG: 30 INJECTION, SOLUTION INTRAMUSCULAR at 01:06

## 2018-02-20 ASSESSMENT — ENCOUNTER SYMPTOMS
COUGH: 0
VOMITING: 0
ABDOMINAL PAIN: 1
NAUSEA: 1
RHINORRHEA: 0
COLOR CHANGE: 0
DIARRHEA: 1
SHORTNESS OF BREATH: 1
BLOOD IN STOOL: 0
PHOTOPHOBIA: 0

## 2018-02-20 ASSESSMENT — PAIN SCALES - GENERAL
PAINLEVEL_OUTOF10: 6
PAINLEVEL_OUTOF10: 6
PAINLEVEL_OUTOF10: 3

## 2018-02-20 ASSESSMENT — PAIN DESCRIPTION - LOCATION
LOCATION: FLANK
LOCATION: FLANK

## 2018-02-20 ASSESSMENT — PAIN DESCRIPTION - ORIENTATION: ORIENTATION: LEFT

## 2018-02-20 ASSESSMENT — PAIN DESCRIPTION - PAIN TYPE
TYPE: ACUTE PAIN
TYPE: ACUTE PAIN

## 2018-02-20 NOTE — TELEPHONE ENCOUNTER
Pt was in the ER for \"left-side pain\" and had a bunch of testing done, but isn't sure why an US wasn't done. Pt states that she still has severe left-side pain, thinking it's her kidney. Pt is wondering if you could create a US order for her to get her kidney checked? Please advise pt.

## 2018-02-20 NOTE — ED TRIAGE NOTES
Pt c/o left flank pain for a week, states she has hx of pancreatitis, they did blood work about the same time the pain started and her enzymes were elevated, she said this pain doesn't feel like pancreatitis pain, she last took percocet last night, nothing taken today for pain, she rates the pain 6/10, she's had some diarrhea, c/o nausea, denies vomiting, denies painful urination.

## 2018-02-20 NOTE — ED PROVIDER NOTES
Take 300 mg by mouth daily     HYDROXYZINE (VISTARIL) 25 MG CAPSULE    Take 1 capsule by mouth 3 times daily as needed for Anxiety    IBUPROFEN (ADVIL;MOTRIN) 600 MG TABLET    Take 600 mg by mouth every 6 hours as needed for Pain    KETOROLAC (TORADOL) 10 MG TABLET    Take 1 tablet by mouth every 6 hours as needed for Pain    LEVOTHYROXINE (SYNTHROID) 25 MCG TABLET    Take one daily    LINACLOTIDE (LINZESS) 290 MCG CAPS CAPSULE    Take 1 capsule by mouth every morning (before breakfast)    ONDANSETRON (ZOFRAN ODT) 4 MG DISINTEGRATING TABLET    Take 1-2 tablets by mouth every 12 hours as needed for Nausea May Sub regular tablet (non-ODT) if insurance does not cover ODT. ONDANSETRON (ZOFRAN ODT) 4 MG DISINTEGRATING TABLET    Take 1 tablet by mouth every 8 hours as needed for Nausea or Vomiting    OXYCODONE-ACETAMINOPHEN (PERCOCET) 5-325 MG PER TABLET    Take 1 tablet by mouth every 8 hours as needed for Pain. PANCRELIPASE, LIP-PROT-AMYL, (CREON) 58325 UNITS CPEP    2 caps tid    POLYETHYLENE GLYCOL 3350 (MIRALAX PO)    Take by mouth    POTASSIUM CHLORIDE (KLOR-CON M) 20 MEQ EXTENDED RELEASE TABLET    Take 20 mEq by mouth    POTASSIUM CITRATE PO    Take 10 mEq by mouth daily     PROCHLORPERAZINE (COMPAZINE) 10 MG TABLET    Take 1 tablet by mouth every 6 hours as needed (nausea)    PROMETHAZINE (PHENERGAN) 25 MG TABLET    Take 1 tablet by mouth every 6 hours as needed for Nausea    TOPIRAMATE (TOPAMAX) 100 MG TABLET    Take 1 tablet by mouth 2 times daily       ALLERGIES     Review of patient's allergies indicates no known allergies.     FAMILY HISTORY       Family History   Problem Relation Age of Onset    Heart Disease Father 48    Cancer Maternal Grandmother      breast    Heart Disease Other      mom and dad's side          SOCIAL HISTORY       Social History     Social History    Marital status: Single     Spouse name: N/A    Number of children: N/A    Years of education: N/A     Social History Main Topics    Smoking status: Never Smoker    Smokeless tobacco: Never Used    Alcohol use No    Drug use: No    Sexual activity: Not Asked     Other Topics Concern    None     Social History Narrative    ** Merged History Encounter **              PHYSICAL EXAM         ED Triage Vitals [02/19/18 2359]   BP Temp Temp Source Pulse Resp SpO2 Height Weight   135/89 98.3 °F (36.8 °C) Oral 92 18 100 % 5' 2\" (1.575 m) 115 lb (52.2 kg)       Physical Exam   Constitutional: She is oriented to person, place, and time. She appears well-developed and well-nourished. HENT:   Head: Normocephalic and atraumatic. Right Ear: External ear normal.   Left Ear: External ear normal.   Mouth/Throat: Oropharynx is clear and moist.   Poor dentition throughout. However no obvious lacerations or wounds, no abscess, no gingivitis, no erythema. No blood noted   Eyes: Conjunctivae and EOM are normal. Pupils are equal, round, and reactive to light. Neck: Normal range of motion. Neck supple. No tracheal deviation present. Cardiovascular: Normal heart sounds and intact distal pulses. Pulmonary/Chest: Effort normal and breath sounds normal. No stridor. No respiratory distress. Abdominal: Soft. Bowel sounds are normal. She exhibits no distension and no mass. There is no tenderness. There is no rebound and no guarding. Patient is moderately tender palpate throughout her upper abdomen, no rebound or rigidity, abdomen soft and nondistended, bowel sounds hypoactive   Musculoskeletal: Normal range of motion. She exhibits tenderness. Back:    Neurological: She is alert and oriented to person, place, and time. She has normal reflexes. Skin: Skin is warm and dry. No rash noted. Psychiatric: She has a normal mood and affect.  Her behavior is normal. Judgment and thought content normal.       DIAGNOSTIC RESULTS     EKG: All EKG's are interpreted by the Emergency Department Physician who either signs or Co-signs this chart in the absence of a cardiologist.    EKG shows normal sinus rhythm, heart 66, normal intervals, normal axis, no ST segment changes    RADIOLOGY:   Non-plain film images such as CT, Ultrasound and MRI are read by the radiologist. Plain radiographic images are visualized and preliminarily interpreted by the emergency physician with the below findings:    Interpretation per the Radiologist below, if available at the time of this note:    CT ABDOMEN PELVIS WO CONTRAST Additional Contrast? None    (Results Pending)   XR CHEST STANDARD (2 VW)    (Results Pending)           LABS:  Labs Reviewed   COMPREHENSIVE METABOLIC PANEL - Abnormal; Notable for the following:        Result Value    Total Protein 6.3 (*)     Globulin 2.2 (*)     All other components within normal limits   LIPASE - Abnormal; Notable for the following:     Lipase 101 (*)     All other components within normal limits   URINE RT REFLEX TO CULTURE - Abnormal; Notable for the following:     Blood, Urine TRACE (*)     Leukocyte Esterase, Urine TRACE (*)     All other components within normal limits   MICROSCOPIC URINALYSIS - Abnormal; Notable for the following:     RBC, UA 3-5 (*)     All other components within normal limits   RAPID INFLUENZA A/B ANTIGENS   CULTURE BLOOD #1   CULTURE BLOOD #2   URINE CULTURE   APTT   BRAIN NATRIURETIC PEPTIDE   CBC WITH AUTO DIFFERENTIAL   CK   LACTATE, SEPSIS   MAGNESIUM   PROTIME-INR   TROPONIN   TSH WITHOUT REFLEX   ACETAMINOPHEN LEVEL   MONONUCLEOSIS SCREEN       All other labs were within normal range or not returned as of this dictation. EMERGENCY DEPARTMENT COURSE and DIFFERENTIAL DIAGNOSIS/MDM:   Vitals:    Vitals:    02/19/18 2359   BP: 135/89   Pulse: 92   Resp: 18   Temp: 98.3 °F (36.8 °C)   TempSrc: Oral   SpO2: 100%   Weight: 115 lb (52.2 kg)   Height: 5' 2\" (1.575 m)         MDM    CT of the abdomen reveals bilateral hemorrhagic cysts.   Pneumobilia cholecystectomy is noted which may represent incompetence of signed)  Emergency Physician [address], [address]  02/20/18 0028

## 2018-02-21 LAB — URINE CULTURE, ROUTINE: NORMAL

## 2018-02-22 ENCOUNTER — CARE COORDINATION (OUTPATIENT)
Dept: CARE COORDINATION | Age: 48
End: 2018-02-22

## 2018-02-25 LAB
BLOOD CULTURE, ROUTINE: NORMAL
CULTURE, BLOOD 2: NORMAL

## 2018-03-07 ENCOUNTER — CARE COORDINATION (OUTPATIENT)
Dept: FAMILY MEDICINE CLINIC | Age: 48
End: 2018-03-07

## 2018-03-13 DIAGNOSIS — F98.8 ADD (ATTENTION DEFICIT DISORDER): ICD-10-CM

## 2018-03-13 DIAGNOSIS — R11.0 NAUSEA: ICD-10-CM

## 2018-03-14 RX ORDER — PROMETHAZINE HYDROCHLORIDE 25 MG/1
25 TABLET ORAL EVERY 6 HOURS PRN
Qty: 40 TABLET | Refills: 1 | Status: SHIPPED | OUTPATIENT
Start: 2018-03-14 | End: 2019-01-12 | Stop reason: SDUPTHER

## 2018-03-14 RX ORDER — DEXTROAMPHETAMINE SACCHARATE, AMPHETAMINE ASPARTATE MONOHYDRATE, DEXTROAMPHETAMINE SULFATE AND AMPHETAMINE SULFATE 6.25; 6.25; 6.25; 6.25 MG/1; MG/1; MG/1; MG/1
25 CAPSULE, EXTENDED RELEASE ORAL 2 TIMES DAILY
Qty: 60 CAPSULE | Refills: 0 | Status: SHIPPED | OUTPATIENT
Start: 2018-03-14 | End: 2018-04-13 | Stop reason: SDUPTHER

## 2018-04-06 ENCOUNTER — OFFICE VISIT (OUTPATIENT)
Dept: FAMILY MEDICINE CLINIC | Age: 48
End: 2018-04-06
Payer: MEDICARE

## 2018-04-06 ENCOUNTER — CARE COORDINATION (OUTPATIENT)
Dept: FAMILY MEDICINE CLINIC | Age: 48
End: 2018-04-06

## 2018-04-06 VITALS
HEIGHT: 62 IN | DIASTOLIC BLOOD PRESSURE: 62 MMHG | WEIGHT: 120 LBS | OXYGEN SATURATION: 97 % | BODY MASS INDEX: 22.08 KG/M2 | TEMPERATURE: 98.7 F | RESPIRATION RATE: 16 BRPM | HEART RATE: 86 BPM | SYSTOLIC BLOOD PRESSURE: 90 MMHG

## 2018-04-06 DIAGNOSIS — L03.211 CELLULITIS, FACE: Chronic | ICD-10-CM

## 2018-04-06 PROCEDURE — G8420 CALC BMI NORM PARAMETERS: HCPCS | Performed by: FAMILY MEDICINE

## 2018-04-06 PROCEDURE — G8427 DOCREV CUR MEDS BY ELIG CLIN: HCPCS | Performed by: FAMILY MEDICINE

## 2018-04-06 PROCEDURE — 1036F TOBACCO NON-USER: CPT | Performed by: FAMILY MEDICINE

## 2018-04-06 PROCEDURE — 99213 OFFICE O/P EST LOW 20 MIN: CPT | Performed by: FAMILY MEDICINE

## 2018-04-06 RX ORDER — FLUCONAZOLE 150 MG/1
TABLET ORAL
Qty: 2 TABLET | Refills: 1 | Status: SHIPPED | OUTPATIENT
Start: 2018-04-06 | End: 2018-08-06 | Stop reason: SDUPTHER

## 2018-04-06 RX ORDER — CEPHALEXIN 500 MG/1
500 CAPSULE ORAL 3 TIMES DAILY
Qty: 21 CAPSULE | Refills: 0 | Status: SHIPPED | OUTPATIENT
Start: 2018-04-06 | End: 2018-04-13

## 2018-04-13 ENCOUNTER — CARE COORDINATION (OUTPATIENT)
Dept: FAMILY MEDICINE CLINIC | Age: 48
End: 2018-04-13

## 2018-04-13 RX ORDER — DEXTROAMPHETAMINE SACCHARATE, AMPHETAMINE ASPARTATE MONOHYDRATE, DEXTROAMPHETAMINE SULFATE AND AMPHETAMINE SULFATE 6.25; 6.25; 6.25; 6.25 MG/1; MG/1; MG/1; MG/1
25 CAPSULE, EXTENDED RELEASE ORAL 2 TIMES DAILY
Qty: 60 CAPSULE | Refills: 0 | Status: SHIPPED | OUTPATIENT
Start: 2018-04-13 | End: 2018-05-15 | Stop reason: SDUPTHER

## 2018-04-26 RX ORDER — FUROSEMIDE 20 MG/1
40 TABLET ORAL DAILY
Qty: 120 TABLET | Refills: 3 | Status: SHIPPED | OUTPATIENT
Start: 2018-04-26 | End: 2018-12-14 | Stop reason: SDUPTHER

## 2018-04-29 RX ORDER — LEVOTHYROXINE SODIUM 0.03 MG/1
TABLET ORAL
Qty: 90 TABLET | Refills: 1 | Status: SHIPPED | OUTPATIENT
Start: 2018-04-29

## 2018-05-03 RX ORDER — LINACLOTIDE 290 UG/1
CAPSULE, GELATIN COATED ORAL
Qty: 30 CAPSULE | Refills: 4 | Status: SHIPPED | OUTPATIENT
Start: 2018-05-03 | End: 2018-05-03 | Stop reason: SDUPTHER

## 2018-05-04 ENCOUNTER — CARE COORDINATION (OUTPATIENT)
Dept: FAMILY MEDICINE CLINIC | Age: 48
End: 2018-05-04

## 2018-05-11 ENCOUNTER — CARE COORDINATION (OUTPATIENT)
Dept: FAMILY MEDICINE CLINIC | Age: 48
End: 2018-05-11

## 2018-05-15 ENCOUNTER — OFFICE VISIT (OUTPATIENT)
Dept: FAMILY MEDICINE CLINIC | Age: 48
End: 2018-05-15
Payer: MEDICARE

## 2018-05-15 VITALS
BODY MASS INDEX: 21.71 KG/M2 | WEIGHT: 118 LBS | OXYGEN SATURATION: 98 % | HEART RATE: 89 BPM | TEMPERATURE: 98.2 F | SYSTOLIC BLOOD PRESSURE: 108 MMHG | HEIGHT: 62 IN | DIASTOLIC BLOOD PRESSURE: 70 MMHG

## 2018-05-15 DIAGNOSIS — R00.2 HEART PALPITATIONS: ICD-10-CM

## 2018-05-15 DIAGNOSIS — F98.8 ATTENTION DEFICIT DISORDER, UNSPECIFIED HYPERACTIVITY PRESENCE: Primary | ICD-10-CM

## 2018-05-15 DIAGNOSIS — F32.A DEPRESSION, UNSPECIFIED DEPRESSION TYPE: ICD-10-CM

## 2018-05-15 PROCEDURE — G8427 DOCREV CUR MEDS BY ELIG CLIN: HCPCS | Performed by: NURSE PRACTITIONER

## 2018-05-15 PROCEDURE — 1036F TOBACCO NON-USER: CPT | Performed by: NURSE PRACTITIONER

## 2018-05-15 PROCEDURE — G0444 DEPRESSION SCREEN ANNUAL: HCPCS | Performed by: NURSE PRACTITIONER

## 2018-05-15 PROCEDURE — G8420 CALC BMI NORM PARAMETERS: HCPCS | Performed by: NURSE PRACTITIONER

## 2018-05-15 PROCEDURE — 99213 OFFICE O/P EST LOW 20 MIN: CPT | Performed by: NURSE PRACTITIONER

## 2018-05-15 RX ORDER — DEXTROAMPHETAMINE SACCHARATE, AMPHETAMINE ASPARTATE MONOHYDRATE, DEXTROAMPHETAMINE SULFATE AND AMPHETAMINE SULFATE 6.25; 6.25; 6.25; 6.25 MG/1; MG/1; MG/1; MG/1
25 CAPSULE, EXTENDED RELEASE ORAL 2 TIMES DAILY
Qty: 60 CAPSULE | Refills: 0 | Status: SHIPPED | OUTPATIENT
Start: 2018-05-15 | End: 2018-06-13 | Stop reason: SDUPTHER

## 2018-05-15 RX ORDER — BUTALBITAL, ACETAMINOPHEN AND CAFFEINE 50; 325; 40 MG/1; MG/1; MG/1
CAPSULE ORAL
Refills: 3 | COMMUNITY
Start: 2018-04-24 | End: 2019-03-11 | Stop reason: ALTCHOICE

## 2018-05-15 RX ORDER — LAMOTRIGINE 100 MG/1
TABLET ORAL
Refills: 3 | COMMUNITY
Start: 2018-04-10 | End: 2018-12-20 | Stop reason: ALTCHOICE

## 2018-05-15 ASSESSMENT — PATIENT HEALTH QUESTIONNAIRE - PHQ9
SUM OF ALL RESPONSES TO PHQ9 QUESTIONS 1 & 2: 2
2. FEELING DOWN, DEPRESSED OR HOPELESS: 2
3. TROUBLE FALLING OR STAYING ASLEEP: 3
8. MOVING OR SPEAKING SO SLOWLY THAT OTHER PEOPLE COULD HAVE NOTICED. OR THE OPPOSITE, BEING SO FIGETY OR RESTLESS THAT YOU HAVE BEEN MOVING AROUND A LOT MORE THAN USUAL: 1
5. POOR APPETITE OR OVEREATING: 2
4. FEELING TIRED OR HAVING LITTLE ENERGY: 3
9. THOUGHTS THAT YOU WOULD BE BETTER OFF DEAD, OR OF HURTING YOURSELF: 0
SUM OF ALL RESPONSES TO PHQ QUESTIONS 1-9: 14
7. TROUBLE CONCENTRATING ON THINGS, SUCH AS READING THE NEWSPAPER OR WATCHING TELEVISION: 3
1. LITTLE INTEREST OR PLEASURE IN DOING THINGS: 0
6. FEELING BAD ABOUT YOURSELF - OR THAT YOU ARE A FAILURE OR HAVE LET YOURSELF OR YOUR FAMILY DOWN: 0
10. IF YOU CHECKED OFF ANY PROBLEMS, HOW DIFFICULT HAVE THESE PROBLEMS MADE IT FOR YOU TO DO YOUR WORK, TAKE CARE OF THINGS AT HOME, OR GET ALONG WITH OTHER PEOPLE: 1

## 2018-05-15 ASSESSMENT — ENCOUNTER SYMPTOMS
COUGH: 0
SHORTNESS OF BREATH: 0

## 2018-06-05 ENCOUNTER — OFFICE VISIT (OUTPATIENT)
Dept: FAMILY MEDICINE CLINIC | Age: 48
End: 2018-06-05
Payer: MEDICARE

## 2018-06-05 VITALS
SYSTOLIC BLOOD PRESSURE: 104 MMHG | WEIGHT: 120.4 LBS | DIASTOLIC BLOOD PRESSURE: 68 MMHG | HEART RATE: 87 BPM | BODY MASS INDEX: 22.16 KG/M2 | TEMPERATURE: 98.3 F | HEIGHT: 62 IN | OXYGEN SATURATION: 98 %

## 2018-06-05 DIAGNOSIS — Z01.818 PRE-OPERATIVE CLEARANCE: ICD-10-CM

## 2018-06-05 DIAGNOSIS — M35.00 SJOGREN'S SYNDROME, WITH UNSPECIFIED ORGAN INVOLVEMENT (HCC): ICD-10-CM

## 2018-06-05 DIAGNOSIS — M65.4 TENOSYNOVITIS, DE QUERVAIN: Primary | ICD-10-CM

## 2018-06-05 DIAGNOSIS — Z01.818 PREOPERATIVE TESTING: ICD-10-CM

## 2018-06-05 DIAGNOSIS — G47.00 INSOMNIA, UNSPECIFIED TYPE: ICD-10-CM

## 2018-06-05 LAB
ALBUMIN SERPL-MCNC: 4.4 G/DL (ref 3.9–4.9)
ALP BLD-CCNC: 87 U/L (ref 40–130)
ALT SERPL-CCNC: 13 U/L (ref 0–33)
ANION GAP SERPL CALCULATED.3IONS-SCNC: 13 MEQ/L (ref 7–13)
APTT: 26.8 SEC (ref 21.6–35.4)
AST SERPL-CCNC: 15 U/L (ref 0–35)
BACTERIA: NORMAL /HPF
BASOPHILS ABSOLUTE: 0.1 K/UL (ref 0–0.2)
BASOPHILS RELATIVE PERCENT: 1.3 %
BILIRUB SERPL-MCNC: <0.2 MG/DL (ref 0–1.2)
BILIRUBIN URINE: NEGATIVE
BLOOD, URINE: ABNORMAL
BUN BLDV-MCNC: 15 MG/DL (ref 6–20)
CALCIUM SERPL-MCNC: 9.3 MG/DL (ref 8.6–10.2)
CHLORIDE BLD-SCNC: 99 MEQ/L (ref 98–107)
CLARITY: CLEAR
CO2: 25 MEQ/L (ref 22–29)
COLOR: YELLOW
CREAT SERPL-MCNC: 0.77 MG/DL (ref 0.5–0.9)
EOSINOPHILS ABSOLUTE: 0.2 K/UL (ref 0–0.7)
EOSINOPHILS RELATIVE PERCENT: 3.6 %
GFR AFRICAN AMERICAN: >60
GFR NON-AFRICAN AMERICAN: >60
GLOBULIN: 2.6 G/DL (ref 2.3–3.5)
GLUCOSE BLD-MCNC: 86 MG/DL (ref 74–109)
GLUCOSE URINE: NEGATIVE MG/DL
HCT VFR BLD CALC: 37.4 % (ref 37–47)
HEMOGLOBIN: 12.5 G/DL (ref 12–16)
INR BLD: 1
KETONES, URINE: NEGATIVE MG/DL
LEUKOCYTE ESTERASE, URINE: NEGATIVE
LYMPHOCYTES ABSOLUTE: 1.8 K/UL (ref 1–4.8)
LYMPHOCYTES RELATIVE PERCENT: 32.3 %
MCH RBC QN AUTO: 29.7 PG (ref 27–31.3)
MCHC RBC AUTO-ENTMCNC: 33.6 % (ref 33–37)
MCV RBC AUTO: 88.5 FL (ref 82–100)
MONOCYTES ABSOLUTE: 0.4 K/UL (ref 0.2–0.8)
MONOCYTES RELATIVE PERCENT: 7.4 %
NEUTROPHILS ABSOLUTE: 3 K/UL (ref 1.4–6.5)
NEUTROPHILS RELATIVE PERCENT: 55.4 %
NITRITE, URINE: NEGATIVE
PDW BLD-RTO: 12.6 % (ref 11.5–14.5)
PH UA: 7 (ref 5–9)
PLATELET # BLD: 279 K/UL (ref 130–400)
POTASSIUM SERPL-SCNC: 3.9 MEQ/L (ref 3.5–5.1)
PROTEIN UA: NEGATIVE MG/DL
PROTHROMBIN TIME: 10.2 SEC (ref 9.6–12.3)
RBC # BLD: 4.22 M/UL (ref 4.2–5.4)
RBC UA: NORMAL /HPF (ref 0–2)
SODIUM BLD-SCNC: 137 MEQ/L (ref 132–144)
SPECIFIC GRAVITY UA: 1.02 (ref 1–1.03)
TOTAL PROTEIN: 7 G/DL (ref 6.4–8.1)
UROBILINOGEN, URINE: 0.2 E.U./DL
WBC # BLD: 5.4 K/UL (ref 4.8–10.8)
WBC UA: NORMAL /HPF (ref 0–5)

## 2018-06-05 PROCEDURE — G8420 CALC BMI NORM PARAMETERS: HCPCS | Performed by: NURSE PRACTITIONER

## 2018-06-05 PROCEDURE — 99214 OFFICE O/P EST MOD 30 MIN: CPT | Performed by: NURSE PRACTITIONER

## 2018-06-05 PROCEDURE — 1036F TOBACCO NON-USER: CPT | Performed by: NURSE PRACTITIONER

## 2018-06-05 PROCEDURE — G8427 DOCREV CUR MEDS BY ELIG CLIN: HCPCS | Performed by: NURSE PRACTITIONER

## 2018-06-05 RX ORDER — ZOLPIDEM TARTRATE 5 MG/1
5 TABLET ORAL NIGHTLY PRN
Qty: 30 TABLET | Refills: 1 | Status: SHIPPED | OUTPATIENT
Start: 2018-06-05 | End: 2018-07-28 | Stop reason: SDUPTHER

## 2018-06-05 RX ORDER — NORTRIPTYLINE HYDROCHLORIDE 10 MG/1
CAPSULE ORAL
Refills: 2 | COMMUNITY
Start: 2018-05-22 | End: 2018-12-20 | Stop reason: ALTCHOICE

## 2018-06-05 RX ORDER — PREDNISOLONE ACETATE 10 MG/ML
1 SUSPENSION/ DROPS OPHTHALMIC 4 TIMES DAILY
COMMUNITY
End: 2018-12-20 | Stop reason: ALTCHOICE

## 2018-06-05 ASSESSMENT — ENCOUNTER SYMPTOMS
SHORTNESS OF BREATH: 0
CONSTIPATION: 1
COUGH: 0
ABDOMINAL PAIN: 0

## 2018-06-07 LAB — URINE CULTURE, ROUTINE: NORMAL

## 2018-06-08 ENCOUNTER — CARE COORDINATION (OUTPATIENT)
Dept: FAMILY MEDICINE CLINIC | Age: 48
End: 2018-06-08

## 2018-06-13 DIAGNOSIS — F98.8 ATTENTION DEFICIT DISORDER, UNSPECIFIED HYPERACTIVITY PRESENCE: ICD-10-CM

## 2018-06-14 RX ORDER — DEXTROAMPHETAMINE SACCHARATE, AMPHETAMINE ASPARTATE MONOHYDRATE, DEXTROAMPHETAMINE SULFATE AND AMPHETAMINE SULFATE 6.25; 6.25; 6.25; 6.25 MG/1; MG/1; MG/1; MG/1
25 CAPSULE, EXTENDED RELEASE ORAL 2 TIMES DAILY
Qty: 60 CAPSULE | Refills: 0 | Status: SHIPPED | OUTPATIENT
Start: 2018-06-14 | End: 2018-06-27

## 2018-06-27 ENCOUNTER — OFFICE VISIT (OUTPATIENT)
Dept: FAMILY MEDICINE CLINIC | Age: 48
End: 2018-06-27
Payer: MEDICARE

## 2018-06-27 VITALS
WEIGHT: 123 LBS | BODY MASS INDEX: 22.63 KG/M2 | DIASTOLIC BLOOD PRESSURE: 70 MMHG | HEIGHT: 62 IN | SYSTOLIC BLOOD PRESSURE: 98 MMHG | TEMPERATURE: 98.2 F | HEART RATE: 83 BPM | OXYGEN SATURATION: 98 %

## 2018-06-27 DIAGNOSIS — B02.9 HERPES ZOSTER WITHOUT COMPLICATION: ICD-10-CM

## 2018-06-27 DIAGNOSIS — R10.32 LLQ PAIN: ICD-10-CM

## 2018-06-27 DIAGNOSIS — F98.8 ATTENTION DEFICIT DISORDER, UNSPECIFIED HYPERACTIVITY PRESENCE: Primary | ICD-10-CM

## 2018-06-27 PROCEDURE — G8420 CALC BMI NORM PARAMETERS: HCPCS | Performed by: NURSE PRACTITIONER

## 2018-06-27 PROCEDURE — 99214 OFFICE O/P EST MOD 30 MIN: CPT | Performed by: NURSE PRACTITIONER

## 2018-06-27 PROCEDURE — 1036F TOBACCO NON-USER: CPT | Performed by: NURSE PRACTITIONER

## 2018-06-27 PROCEDURE — G8427 DOCREV CUR MEDS BY ELIG CLIN: HCPCS | Performed by: NURSE PRACTITIONER

## 2018-06-27 RX ORDER — ACYCLOVIR 800 MG/1
800 TABLET ORAL EVERY 4 HOURS
COMMUNITY
End: 2018-08-03 | Stop reason: ALTCHOICE

## 2018-06-27 RX ORDER — HYDROCODONE BITARTRATE AND ACETAMINOPHEN 5; 325 MG/1; MG/1
1 TABLET ORAL EVERY 6 HOURS PRN
COMMUNITY
End: 2018-10-29 | Stop reason: ALTCHOICE

## 2018-06-27 RX ORDER — VALACYCLOVIR HYDROCHLORIDE 1 G/1
1000 TABLET, FILM COATED ORAL 3 TIMES DAILY
Qty: 21 TABLET | Refills: 0 | Status: SHIPPED | OUTPATIENT
Start: 2018-06-27 | End: 2018-07-04

## 2018-06-27 RX ORDER — CIPROFLOXACIN 500 MG/1
TABLET, FILM COATED ORAL
Refills: 0 | COMMUNITY
Start: 2018-06-18 | End: 2018-08-03 | Stop reason: ALTCHOICE

## 2018-06-27 RX ORDER — DEXTROAMPHETAMINE SACCHARATE, AMPHETAMINE ASPARTATE MONOHYDRATE, DEXTROAMPHETAMINE SULFATE AND AMPHETAMINE SULFATE 7.5; 7.5; 7.5; 7.5 MG/1; MG/1; MG/1; MG/1
30 CAPSULE, EXTENDED RELEASE ORAL 2 TIMES DAILY
Qty: 60 CAPSULE | Refills: 0 | Status: SHIPPED | OUTPATIENT
Start: 2018-06-27 | End: 2018-08-01 | Stop reason: SDUPTHER

## 2018-06-27 RX ORDER — ZOLMITRIPTAN 5 MG/1
TABLET, FILM COATED ORAL
Refills: 11 | COMMUNITY
Start: 2018-06-04

## 2018-06-27 ASSESSMENT — ENCOUNTER SYMPTOMS
ABDOMINAL PAIN: 1
NAUSEA: 0
FLATUS: 0
CONSTIPATION: 1
DIARRHEA: 1

## 2018-06-28 ENCOUNTER — TELEPHONE (OUTPATIENT)
Dept: FAMILY MEDICINE CLINIC | Age: 48
End: 2018-06-28

## 2018-06-28 NOTE — TELEPHONE ENCOUNTER
[address] [facility] [place] reg PA for Adderall? What has failed, anything?

## 2018-07-26 RX ORDER — ESOMEPRAZOLE MAGNESIUM 40 MG/1
CAPSULE, DELAYED RELEASE ORAL
Qty: 30 CAPSULE | Refills: 5 | Status: SHIPPED | OUTPATIENT
Start: 2018-07-26 | End: 2019-01-14 | Stop reason: SDUPTHER

## 2018-07-28 DIAGNOSIS — G47.00 INSOMNIA, UNSPECIFIED TYPE: ICD-10-CM

## 2018-07-30 ENCOUNTER — CARE COORDINATION (OUTPATIENT)
Dept: FAMILY MEDICINE CLINIC | Age: 48
End: 2018-07-30

## 2018-07-30 RX ORDER — ZOLPIDEM TARTRATE 5 MG/1
TABLET ORAL
Qty: 30 TABLET | Refills: 0 | Status: SHIPPED | OUTPATIENT
Start: 2018-07-30 | End: 2018-08-29

## 2018-08-01 DIAGNOSIS — F98.8 ATTENTION DEFICIT DISORDER, UNSPECIFIED HYPERACTIVITY PRESENCE: ICD-10-CM

## 2018-08-01 RX ORDER — DEXTROAMPHETAMINE SACCHARATE, AMPHETAMINE ASPARTATE MONOHYDRATE, DEXTROAMPHETAMINE SULFATE AND AMPHETAMINE SULFATE 7.5; 7.5; 7.5; 7.5 MG/1; MG/1; MG/1; MG/1
30 CAPSULE, EXTENDED RELEASE ORAL 2 TIMES DAILY
Qty: 60 CAPSULE | Refills: 0 | Status: SHIPPED | OUTPATIENT
Start: 2018-08-01 | End: 2018-08-20 | Stop reason: SDUPTHER

## 2018-08-01 NOTE — TELEPHONE ENCOUNTER
Rx request   Requested Prescriptions     Pending Prescriptions Disp Refills    fluconazole (DIFLUCAN) 150 MG tablet [Pharmacy Med Name: Fluconazole Oral Tablet 150 MG] 2 tablet 0     Sig: take 1 tablet by mouth now then repeat in 48 hours is symptoms persist     LOV 4/6/2018  Next Visit Date:  Future Appointments  Date Time Provider Angela Hernandez   8/15/2018 10:00 AM ORTIZ Marr CNP [facility] [place]

## 2018-08-03 ENCOUNTER — OFFICE VISIT (OUTPATIENT)
Dept: INTERNAL MEDICINE CLINIC | Age: 48
End: 2018-08-03
Payer: MEDICARE

## 2018-08-03 VITALS
HEART RATE: 96 BPM | TEMPERATURE: 98.3 F | SYSTOLIC BLOOD PRESSURE: 128 MMHG | BODY MASS INDEX: 21.53 KG/M2 | WEIGHT: 117 LBS | OXYGEN SATURATION: 98 % | HEIGHT: 62 IN | DIASTOLIC BLOOD PRESSURE: 86 MMHG | RESPIRATION RATE: 14 BRPM

## 2018-08-03 DIAGNOSIS — R10.13 EPIGASTRIC PAIN: ICD-10-CM

## 2018-08-03 DIAGNOSIS — R10.13 EPIGASTRIC PAIN: Primary | ICD-10-CM

## 2018-08-03 LAB
ALBUMIN SERPL-MCNC: 4.6 G/DL (ref 3.9–4.9)
ALP BLD-CCNC: 87 U/L (ref 40–130)
ALT SERPL-CCNC: 13 U/L (ref 0–33)
AST SERPL-CCNC: 14 U/L (ref 0–35)
BASOPHILS ABSOLUTE: 0.1 K/UL (ref 0–0.2)
BASOPHILS RELATIVE PERCENT: 1.1 %
BILIRUB SERPL-MCNC: <0.2 MG/DL (ref 0–1.2)
BILIRUBIN DIRECT: <0.2 MG/DL (ref 0–0.3)
BILIRUBIN, INDIRECT: NORMAL MG/DL (ref 0–0.6)
EOSINOPHILS ABSOLUTE: 0 K/UL (ref 0–0.7)
EOSINOPHILS RELATIVE PERCENT: 0.6 %
HCT VFR BLD CALC: 40.2 % (ref 37–47)
HEMOGLOBIN: 13.6 G/DL (ref 12–16)
LIPASE: 39 U/L (ref 13–60)
LYMPHOCYTES ABSOLUTE: 1.8 K/UL (ref 1–4.8)
LYMPHOCYTES RELATIVE PERCENT: 35.8 %
MCH RBC QN AUTO: 30.1 PG (ref 27–31.3)
MCHC RBC AUTO-ENTMCNC: 33.9 % (ref 33–37)
MCV RBC AUTO: 88.8 FL (ref 82–100)
MONOCYTES ABSOLUTE: 0.4 K/UL (ref 0.2–0.8)
MONOCYTES RELATIVE PERCENT: 7 %
NEUTROPHILS ABSOLUTE: 2.8 K/UL (ref 1.4–6.5)
NEUTROPHILS RELATIVE PERCENT: 55.5 %
PDW BLD-RTO: 13 % (ref 11.5–14.5)
PLATELET # BLD: 298 K/UL (ref 130–400)
RBC # BLD: 4.53 M/UL (ref 4.2–5.4)
TOTAL PROTEIN: 7.4 G/DL (ref 6.4–8.1)
WBC # BLD: 5 K/UL (ref 4.8–10.8)

## 2018-08-03 PROCEDURE — 99214 OFFICE O/P EST MOD 30 MIN: CPT | Performed by: FAMILY MEDICINE

## 2018-08-03 RX ORDER — FLUCONAZOLE 150 MG/1
TABLET ORAL
Qty: 2 TABLET | Refills: 0 | OUTPATIENT
Start: 2018-08-03

## 2018-08-03 NOTE — PROGRESS NOTES
mouth every 6 hours as needed (nausea) 30 tablet 3    ondansetron (ZOFRAN ODT) 4 MG disintegrating tablet Take 1-2 tablets by mouth every 12 hours as needed for Nausea May Sub regular tablet (non-ODT) if insurance does not cover ODT. 12 tablet 0    hydroxychloroquine (PLAQUENIL) 200 MG tablet Take 300 mg by mouth daily       Pancrelipase, Lip-Prot-Amyl, (CREON) 20885 UNITS CPEP 2 caps tid 180 capsule 03     No current facility-administered medications for this visit. ROS  CONSTITUTIONAL: subjective fevers   HEENT: Denies headache, blurry vision, eye pain, tinnitus, vertigo,  sore throat, neck or thyroid masses. RESPIRATORY: Denies cough, sputum, hemoptysis. CARDIAC: Denies chest pain, pressure, palpitations, Denies lower extremity edema. GASTROINTESTINAL: Admits to abdominal pain. Denies constipation, diarrhea, bleeding in the stools,   GENITOURINARY: Denies dysuria, hematuria, nocturia or frequency, urinary incontinence. NEUROLOGIC: Denies headaches, dizziness, syncope, weakness  MUSCULOSKELETAL: changes in range of motion, joint pain, stiffness. ENDOCRINOLOGY: Denies heat or cold intolerance. HEMATOLOGY: Denies easy bleeding or blood transfusion,anemia  DERMATOLOGY: Denies changes in moles or pigmentation changes. PSYCHIATRY: Admits to stress.     Past Medical History:   Diagnosis Date    Acquired hypothyroidism 9/26/2016    Acute pancreatitis     ADD (attention deficit disorder) 2/12/2015    Anxiety     Depression 12/9/2015    Endometrial cyst of ovary 5/10/14    RT ovary, ruptured    GERD (gastroesophageal reflux disease) 9/26/2016    Sjogren's disease ([facility] [address]     Stress     Swelling         Past Surgical History:   Procedure Laterality Date    CHOLECYSTECTOMY  2011    HYSTERECTOMY  2014    sept    OVARY REMOVAL Left Jan 2014    UPPER GASTROINTESTINAL ENDOSCOPY  09/16/2016    EGD W/BX        Family History   Problem Relation Age of Onset    Heart Disease Father 48    Cancer Maternal Grandmother         breast    Heart Disease Other         mom and dad's side        Social History     Social History    Marital status: Single     Spouse name: N/A    Number of children: N/A    Years of education: N/A     Occupational History    Not on file. Social History Main Topics    Smoking status: Never Smoker    Smokeless tobacco: Never Used    Alcohol use No      Comment: no alcohol since 2011    Drug use: No    Sexual activity: Not on file     Other Topics Concern    Not on file     Social History Narrative    ** Merged History Encounter **             /86 (Site: Right Arm, Position: Sitting, Cuff Size: Medium Adult)   Pulse 96   Temp 98.3 °F (36.8 °C) (Tympanic)   Resp 14   Ht 5' 2\" (1.575 m)   Wt 117 lb (53.1 kg)   SpO2 98%   BMI 21.40 kg/m²        Physical Exam:    General appearance - alert, well appearing,   Mental Status - alert, oriented to person, place, and time  Eyes - pupils equal and reactive, extraocular eye movements intact   Sinuses - Normal paranasal sinuses without tenderness   Throat - mild erythema in the oropharynx. Neck - supple, no significant adenopathy   Thyroid - thyroid is normal in size without nodules or tenderness    Chest - clear to auscultation, no wheezes, rales or rhonchi, symmetric air entry   Heart - normal rate, regular rhythm, normal S1, S2, no murmurs, rubs, clicks or gallops  Abdomen - tenderness to palpation in the epigastric region and in the left upper quadrant. No organomegaly. Back exam - full range of motion, no tenderness, palpable spasm or pain on motion   Neurological - alert, oriented, normal speech, no focal findings or movement disorder noted   Musculoskeletal - no joint tenderness, deformity or swelling   Extremities - peripheral pulses normal, no pedal edema, no clubbing or cyanosis   Skin - normal coloration and turgor, no rashes, no suspicious skin lesions noted, warm to touch.     A/P: Tabby Maldonado 41 y.o. female presenting for     1. Epigastric pain  Pt is afebrile, non-tachy, non-tachypneic in the office. Given history of pancreatitis we'll obtain labs. Will notify patient with the results. Went over with patient if symptoms get worse to go to the emergency department. Patient is agreeable with this plan. Unlikely UTI given no urinary complaints. Unlikely kidney stone given no hematuria and Ro sign negative. - CBC with Differential; Future  - Hepatic Function Panel; Future  - Lipase;  Future

## 2018-08-06 ENCOUNTER — TELEPHONE (OUTPATIENT)
Dept: FAMILY MEDICINE CLINIC | Age: 48
End: 2018-08-06

## 2018-08-06 ENCOUNTER — TELEPHONE (OUTPATIENT)
Dept: INTERNAL MEDICINE CLINIC | Age: 48
End: 2018-08-06

## 2018-08-06 RX ORDER — FLUCONAZOLE 150 MG/1
TABLET ORAL
Qty: 2 TABLET | Refills: 1 | Status: SHIPPED | OUTPATIENT
Start: 2018-08-06 | End: 2018-08-17 | Stop reason: SDUPTHER

## 2018-08-06 RX ORDER — FLUCONAZOLE 150 MG/1
TABLET ORAL
Qty: 2 TABLET | Refills: 1 | Status: CANCELLED | OUTPATIENT
Start: 2018-08-06 | End: 2018-08-07

## 2018-08-18 RX ORDER — FLUCONAZOLE 150 MG/1
TABLET ORAL
Qty: 2 TABLET | Refills: 0 | Status: SHIPPED | OUTPATIENT
Start: 2018-08-18 | End: 2018-12-20 | Stop reason: ALTCHOICE

## 2018-08-19 DIAGNOSIS — G43.809 OTHER MIGRAINE WITHOUT STATUS MIGRAINOSUS, NOT INTRACTABLE: ICD-10-CM

## 2018-08-20 DIAGNOSIS — F98.8 ATTENTION DEFICIT DISORDER, UNSPECIFIED HYPERACTIVITY PRESENCE: ICD-10-CM

## 2018-08-20 RX ORDER — PROCHLORPERAZINE MALEATE 10 MG
TABLET ORAL
Qty: 30 TABLET | Refills: 2 | Status: SHIPPED | OUTPATIENT
Start: 2018-08-20 | End: 2018-10-30 | Stop reason: SDUPTHER

## 2018-08-20 RX ORDER — DEXTROAMPHETAMINE SACCHARATE, AMPHETAMINE ASPARTATE MONOHYDRATE, DEXTROAMPHETAMINE SULFATE AND AMPHETAMINE SULFATE 7.5; 7.5; 7.5; 7.5 MG/1; MG/1; MG/1; MG/1
30 CAPSULE, EXTENDED RELEASE ORAL 2 TIMES DAILY
Qty: 60 CAPSULE | Refills: 0 | Status: SHIPPED | OUTPATIENT
Start: 2018-08-20 | End: 2018-09-22 | Stop reason: SDUPTHER

## 2018-08-20 NOTE — TELEPHONE ENCOUNTER
From: Oseas Seaman  Sent: 8/20/2018 10:13 AM EDT  Subject: Medication Renewal Request    Suzan Ortega.  Neptali Mckeon would like a refill of the following medications:     amphetamine-dextroamphetamine (ADDERALL XR) 30 MG extended release capsule Marlen Koenig MD]    Preferred pharmacy: [facility] [address] [place] [address]

## 2018-09-04 ENCOUNTER — OFFICE VISIT (OUTPATIENT)
Dept: FAMILY MEDICINE CLINIC | Age: 48
End: 2018-09-04
Payer: MEDICARE

## 2018-09-04 VITALS
WEIGHT: 121 LBS | HEIGHT: 62 IN | TEMPERATURE: 98.1 F | DIASTOLIC BLOOD PRESSURE: 60 MMHG | OXYGEN SATURATION: 98 % | HEART RATE: 96 BPM | SYSTOLIC BLOOD PRESSURE: 128 MMHG | BODY MASS INDEX: 22.26 KG/M2

## 2018-09-04 DIAGNOSIS — H65.01 RIGHT ACUTE SEROUS OTITIS MEDIA, RECURRENCE NOT SPECIFIED: Primary | ICD-10-CM

## 2018-09-04 DIAGNOSIS — K52.9 COLITIS: ICD-10-CM

## 2018-09-04 DIAGNOSIS — Z13.31 POSITIVE DEPRESSION SCREENING: ICD-10-CM

## 2018-09-04 PROCEDURE — G8431 POS CLIN DEPRES SCRN F/U DOC: HCPCS | Performed by: NURSE PRACTITIONER

## 2018-09-04 PROCEDURE — 99214 OFFICE O/P EST MOD 30 MIN: CPT | Performed by: NURSE PRACTITIONER

## 2018-09-04 PROCEDURE — G8427 DOCREV CUR MEDS BY ELIG CLIN: HCPCS | Performed by: NURSE PRACTITIONER

## 2018-09-04 PROCEDURE — G8420 CALC BMI NORM PARAMETERS: HCPCS | Performed by: NURSE PRACTITIONER

## 2018-09-04 PROCEDURE — 1036F TOBACCO NON-USER: CPT | Performed by: NURSE PRACTITIONER

## 2018-09-04 RX ORDER — CIPROFLOXACIN 500 MG/1
500 TABLET, FILM COATED ORAL 2 TIMES DAILY
Qty: 14 TABLET | Refills: 0 | Status: SHIPPED | OUTPATIENT
Start: 2018-09-04 | End: 2018-09-11

## 2018-09-04 RX ORDER — AMOXICILLIN 500 MG/1
500 CAPSULE ORAL 3 TIMES DAILY
COMMUNITY
End: 2018-12-20 | Stop reason: ALTCHOICE

## 2018-09-04 RX ORDER — FLUTICASONE PROPIONATE 50 MCG
1 SPRAY, SUSPENSION (ML) NASAL DAILY
Qty: 1 BOTTLE | Refills: 3 | Status: SHIPPED | OUTPATIENT
Start: 2018-09-04 | End: 2018-10-29 | Stop reason: ALTCHOICE

## 2018-09-04 ASSESSMENT — ENCOUNTER SYMPTOMS
EYE PAIN: 0
RHINORRHEA: 1
ANAL BLEEDING: 0
EYE ITCHING: 0
COLOR CHANGE: 1
EYE DISCHARGE: 0
BLOOD IN STOOL: 0
COUGH: 0
DIARRHEA: 1
CHOKING: 0
SHORTNESS OF BREATH: 0
CONSTIPATION: 0

## 2018-09-04 NOTE — PROGRESS NOTES
dad's side     Social History     Social History    Marital status: Single     Spouse name: N/A    Number of children: N/A    Years of education: N/A     Social History Main Topics    Smoking status: Never Smoker    Smokeless tobacco: Never Used    Alcohol use No      Comment: no alcohol since 2011    Drug use: No    Sexual activity: Not Asked     Other Topics Concern    None     Social History Narrative    ** Merged History Encounter **          Current Outpatient Prescriptions on File Prior to Visit   Medication Sig Dispense Refill    prochlorperazine (COMPAZINE) 10 MG tablet TAKE ONE TABLET BY MOUTH EVERY 6 HOURS AS NEEDED FOR NAUSEA 30 tablet 2    amphetamine-dextroamphetamine (ADDERALL XR) 30 MG extended release capsule Take 1 capsule by mouth 2 times daily for 30 days. . 60 capsule 0    fluconazole (DIFLUCAN) 150 MG tablet take 1 tablet by mouth now, then repeat in 48 hours if symptoms persists 2 tablet 0    esomeprazole (NEXIUM) 40 MG delayed release capsule TK ONE C PO D 30 capsule 5    ZOLMitriptan (ZOMIG) 5 MG tablet TAKE 1 TABLET BY MOUTH AT ONSET OF MIGRAINE. MAY REPEAT ONCE AFTER 2 HOURS. MAX 10 MG (2 TABLETS) PER DAY  11    HYDROcodone-acetaminophen (NORCO) 5-325 MG per tablet Take 1 tablet by mouth every 6 hours as needed for Pain. Wendy Chambers nortriptyline (PAMELOR) 10 MG capsule TAKE ONE CAPSULE BY MOUTH AT BEDTIME FOR 7 NIGHTS  THEN INCREASE TO 2 CAPSULES AT NIGHT THEREAFTER  2    prednisoLONE acetate (PRED FORTE) 1 % ophthalmic suspension 1 drop 4 times daily      lamoTRIgine (LAMICTAL) 100 MG tablet TAKE ONE TABLET BY MOUTH TWO TIMES A DAY  3    butalbital-apap-caffeine -40 MG CAPS TAKE 1 TO 2 CAPSULES BY MOUTH EVERY 4 TO 6 HOURS AS NEEDED FOR PAIN  3    linaclotide (LINZESS) 290 MCG CAPS capsule TAKE ONE CAPSULE BY MOUTH EVERY MORNING BEFORE BREAKFAST 30 capsule 4    levothyroxine (SYNTHROID) 25 MCG tablet Take one daily 90 tablet 1    furosemide (LASIX) 20 MG tablet Take 2 tablets by mouth daily 120 tablet 3    promethazine (PHENERGAN) 25 MG tablet Take 1 tablet by mouth every 6 hours as needed for Nausea 40 tablet 1    buPROPion (WELLBUTRIN XL) 300 MG extended release tablet       ketorolac (TORADOL) 10 MG tablet Take 1 tablet by mouth every 6 hours as needed for Pain 20 tablet 1    ondansetron (ZOFRAN ODT) 4 MG disintegrating tablet Take 1-2 tablets by mouth every 12 hours as needed for Nausea May Sub regular tablet (non-ODT) if insurance does not cover ODT. 12 tablet 0    hydroxychloroquine (PLAQUENIL) 200 MG tablet Take 300 mg by mouth daily       Pancrelipase, Lip-Prot-Amyl, (CREON) 69016 UNITS CPEP 2 caps tid 180 capsule 03     No current facility-administered medications on file prior to visit. No Known Allergies    Review of Systems   Constitutional: Positive for activity change and fatigue. Negative for diaphoresis, fever and unexpected weight change. HENT: Positive for ear pain and rhinorrhea. Negative for ear discharge, hearing loss and mouth sores. Eyes: Negative for pain, discharge and itching. Respiratory: Negative for cough, choking and shortness of breath. Cardiovascular: Negative. Gastrointestinal: Positive for diarrhea. Negative for anal bleeding, blood in stool and constipation. Musculoskeletal: Positive for arthralgias, myalgias, neck pain and neck stiffness. Skin: Positive for color change. Neurological: Positive for dizziness and headaches. Negative for tremors, syncope, weakness and numbness. Psychiatric/Behavioral: Positive for dysphoric mood and sleep disturbance. Negative for self-injury and suicidal ideas. Objective  Vitals:    09/04/18 1115   BP: 128/60   Pulse: 96   Temp: 98.1 °F (36.7 °C)   SpO2: 98%   Weight: 121 lb (54.9 kg)   Height: 5' 2\" (1.575 m)     Physical Exam   Constitutional: She is oriented to person, place, and time. She appears well-developed and well-nourished.    HENT:   Head: Normocephalic and atraumatic. Eyes: Pupils are equal, round, and reactive to light. Conjunctivae are normal.   Neck: Neck supple. No tracheal deviation present. No thyromegaly present. Cardiovascular: Normal rate and regular rhythm. Pulmonary/Chest: Effort normal and breath sounds normal.   Abdominal: She exhibits distension. She exhibits no mass. There is tenderness. There is no rebound and no guarding. Musculoskeletal: She exhibits tenderness. Lymphadenopathy:     She has no cervical adenopathy. Neurological: She is alert and oriented to person, place, and time. Skin: Skin is warm and dry. Psychiatric: Her speech is normal. Judgment normal. She is withdrawn. She is not actively hallucinating. Cognition and memory are normal. She exhibits a depressed mood. She expresses no suicidal ideation. She expresses no suicidal plans. Assessment & Plan     Diagnosis Orders   1. Right acute serous otitis media, recurrence not specified  fluticasone (FLONASE) 50 MCG/ACT nasal spray   2. Colitis  ciprofloxacin (CIPRO) 500 MG tablet   3. Positive depression screening  Positive Screen for Clinical Depression with a Documented Follow-up Plan        No orders of the defined types were placed in this encounter. Orders Placed This Encounter   Medications    fluticasone (FLONASE) 50 MCG/ACT nasal spray     Si spray by Nasal route daily     Dispense:  1 Bottle     Refill:  3    ciprofloxacin (CIPRO) 500 MG tablet     Sig: Take 1 tablet by mouth 2 times daily for 7 days     Dispense:  14 tablet     Refill:  0     Discussed with pt to continue with current medication regimen and try new med addons for current sx mgmt and to come back sooner if her diarrhea worsens significantly sooner than schedule.      Side effects, adverse effects of the medication prescribed today, as well as treatment plan/ rationale and result expectations have been discussed with the patient who expresses understanding and desires to proceed. Close follow up to evaluate treatment results and for coordination of care. I have reviewed the patient's medical history in detail and updated the computerized patient record. As always, patient is advised that if symptoms worsen in any way they will proceed to the nearest emergency room. Return in about 1 week (around 9/11/2018), or if symptoms worsen or fail to improve, for sx fu. ORTIZ Almodovar CNP        On the basis of positive PHQ-9 screening ( ), the following plan was implemented: patient declines further evaluation/treatment for depression. Patient will follow-up in 1 month(s) with PCP.

## 2018-09-14 NOTE — CARE COORDINATION
Ambulatory Care Coordination Note  9/14/2018  CM Risk Score: 8  Thom Mortality Risk Score:      ACC: Aimee Garcia, RN    Summary Note: I called to check in on Rome Nguyễn. She states that she has a lot of things going on with her health. She states that she had surgery on her hand and she is still dealing with a lot of issues related to the damage. She states that she is still very swollen but she is moving it a little better. She states that she did ask Dr Rema Estrada about occupational health helping with the hand but she was told that this needs to wait for a bit. She says that her liver has been biopsied but nothing really showed up with the iron deposits. She tells me that her Sjogren's has flared up. She states that she had a low grade fever and chills so much so over the weekend that she had herself wrapped up in a blanket all weekend. She says that her bones just ached and she ended up calling her rheumatoid who sent her to see immunology. She also spoke at length about loosing her 13year old dog a few weeks and I spoke with her about her positive depression screening. I also scheduled her for follow up with Charla Jose regarding her depression. Care Coordination Interventions    Program Enrollment:  Rising Risk  Referral from Primary Care Provider:  No  Suggested Interventions and Community Resources         Goals Addressed     None          Prior to Admission medications    Medication Sig Start Date End Date Taking? Authorizing Provider   amoxicillin (AMOXIL) 500 MG capsule Take 500 mg by mouth 3 times daily    Historical Provider, MD   mupirocin (BACTROBAN) 2 % nasal ointment by Nasal route 2 times daily Take by Nasal route 2 times daily.     Historical Provider, MD   fluticasone Maryelizabeth Acre) 50 MCG/ACT nasal spray 1 spray by Nasal route daily 9/4/18   Berdine Starch, APRN - CNP   prochlorperazine (COMPAZINE) 10 MG tablet TAKE ONE TABLET BY MOUTH EVERY 6 HOURS AS NEEDED FOR NAUSEA 8/20/18   Lanie Lanes

## 2018-09-22 DIAGNOSIS — F98.8 ATTENTION DEFICIT DISORDER, UNSPECIFIED HYPERACTIVITY PRESENCE: ICD-10-CM

## 2018-09-22 RX ORDER — DEXTROAMPHETAMINE SACCHARATE, AMPHETAMINE ASPARTATE MONOHYDRATE, DEXTROAMPHETAMINE SULFATE AND AMPHETAMINE SULFATE 7.5; 7.5; 7.5; 7.5 MG/1; MG/1; MG/1; MG/1
30 CAPSULE, EXTENDED RELEASE ORAL 2 TIMES DAILY
Qty: 60 CAPSULE | Refills: 0 | Status: SHIPPED | OUTPATIENT
Start: 2018-09-22 | End: 2018-10-30 | Stop reason: SDUPTHER

## 2018-10-15 ENCOUNTER — CARE COORDINATION (OUTPATIENT)
Dept: FAMILY MEDICINE CLINIC | Age: 48
End: 2018-10-15

## 2018-10-24 ENCOUNTER — HOSPITAL ENCOUNTER (OUTPATIENT)
Dept: MRI IMAGING | Age: 48
Discharge: HOME | End: 2018-10-26
Payer: MEDICARE

## 2018-10-24 DIAGNOSIS — S83.91XA SPRAIN OF RIGHT KNEE, UNSPECIFIED LIGAMENT, INITIAL ENCOUNTER: ICD-10-CM

## 2018-10-24 DIAGNOSIS — S83.241A TEAR OF MEDIAL MENISCUS OF RIGHT KNEE, UNSPECIFIED TEAR TYPE, UNSPECIFIED WHETHER OLD OR CURRENT TEAR, INITIAL ENCOUNTER: ICD-10-CM

## 2018-10-24 PROCEDURE — 73721 MRI JNT OF LWR EXTRE W/O DYE: CPT

## 2018-10-29 ENCOUNTER — OFFICE VISIT (OUTPATIENT)
Dept: FAMILY MEDICINE CLINIC | Age: 48
End: 2018-10-29
Payer: MEDICARE

## 2018-10-29 VITALS
HEIGHT: 62 IN | HEART RATE: 89 BPM | SYSTOLIC BLOOD PRESSURE: 118 MMHG | OXYGEN SATURATION: 98 % | TEMPERATURE: 97.8 F | DIASTOLIC BLOOD PRESSURE: 68 MMHG | BODY MASS INDEX: 22.71 KG/M2 | WEIGHT: 123.4 LBS

## 2018-10-29 DIAGNOSIS — R20.2 NUMBNESS AND TINGLING IN RIGHT HAND: ICD-10-CM

## 2018-10-29 DIAGNOSIS — M35.00 SJOGREN'S SYNDROME, WITH UNSPECIFIED ORGAN INVOLVEMENT (HCC): Chronic | ICD-10-CM

## 2018-10-29 DIAGNOSIS — F41.9 ANXIETY: ICD-10-CM

## 2018-10-29 DIAGNOSIS — R22.31 LOCALIZED SWELLING ON RIGHT HAND: ICD-10-CM

## 2018-10-29 DIAGNOSIS — R53.83 FATIGUE, UNSPECIFIED TYPE: ICD-10-CM

## 2018-10-29 DIAGNOSIS — R20.0 NUMBNESS AND TINGLING IN RIGHT HAND: ICD-10-CM

## 2018-10-29 DIAGNOSIS — F32.A DEPRESSION, UNSPECIFIED DEPRESSION TYPE: ICD-10-CM

## 2018-10-29 DIAGNOSIS — M25.50 ARTHRALGIA, UNSPECIFIED JOINT: Primary | ICD-10-CM

## 2018-10-29 PROCEDURE — 99214 OFFICE O/P EST MOD 30 MIN: CPT | Performed by: NURSE PRACTITIONER

## 2018-10-29 PROCEDURE — G8420 CALC BMI NORM PARAMETERS: HCPCS | Performed by: NURSE PRACTITIONER

## 2018-10-29 PROCEDURE — 1036F TOBACCO NON-USER: CPT | Performed by: NURSE PRACTITIONER

## 2018-10-29 PROCEDURE — G8484 FLU IMMUNIZE NO ADMIN: HCPCS | Performed by: NURSE PRACTITIONER

## 2018-10-29 PROCEDURE — G8427 DOCREV CUR MEDS BY ELIG CLIN: HCPCS | Performed by: NURSE PRACTITIONER

## 2018-10-29 RX ORDER — METHYLPREDNISOLONE 4 MG/1
TABLET ORAL
Qty: 21 TABLET | Refills: 0 | Status: SHIPPED | OUTPATIENT
Start: 2018-10-29 | End: 2018-11-04

## 2018-10-29 ASSESSMENT — ENCOUNTER SYMPTOMS
GASTROINTESTINAL NEGATIVE: 1
RESPIRATORY NEGATIVE: 1
EYES NEGATIVE: 1

## 2018-10-29 NOTE — PROGRESS NOTES
Subjective  Chief Complaint   Patient presents with    Wrist Pain     RT wrist pain, from an IV a year ago Dec 6th. pt states swelling in the Rt thumb area. pt states in a lot of pain.  Flu Vaccine     DECLINED       HPI     Pt is here for a fu of multiple issues. Really struggling with right thumb pain and swelling. Ortho suggested plastic surgery, pain likely related to nerve damage in combination with sjogren's. She notes significant swelling even more than usual the past few days. Also complains of severe right knee pain. Extending into the thigh. Recent MRI completed. Has appt with ortho today at 2:15 to go over results. Has difficulty walking far distances. Overall pt mentally struggling with the many medical issues she has going on.     Patient Active Problem List    Diagnosis Date Noted    Cellulitis, face 04/06/2018    Other chest pain 11/01/2017    Left lower quadrant pain 11/01/2017    Sjogren's syndrome ([facility] [address] 11/01/2017    Diverticulitis of large intestine without perforation or abscess without bleeding 11/01/2017    Fibroids 11/01/2017    Transfusion history 11/01/2017    Pancreatitis 05/13/2017    Acquired hypothyroidism 09/26/2016    GERD (gastroesophageal reflux disease) 09/26/2016    Anxiety 12/09/2015    Depression 12/09/2015    ADD (attention deficit disorder) 02/12/2015    Endometriosis 09/09/2014    ASCUS favoring benign 05/01/2013    S/P endometrial ablation 05/01/2013     Past Medical History:   Diagnosis Date    Acquired hypothyroidism 9/26/2016    Acute pancreatitis     ADD (attention deficit disorder) 2/12/2015    Anxiety     Depression 12/9/2015    Endometrial cyst of ovary 5/10/14    RT ovary, ruptured    GERD (gastroesophageal reflux disease) 9/26/2016    Sjogren's disease ([facility] [address]     Stress     Swelling      Past Surgical History:   Procedure Laterality Date    CHOLECYSTECTOMY  2011    HYSTERECTOMY  2014    sept    OVARY REMOVAL Left Jan Pt instructed to continue fu appts with specialists and take medications as prescribed. Encouraged positive coping mechanisms. Lab work orders provided. Discussed avoiding pain triggers and reducing weight on affected knee. Side effects, adverse effects of the medication prescribed today, as well as treatment plan/ rationale and result expectations have been discussed with the patient who expresses understanding and desires to proceed. Close follow up to evaluate treatment results and for coordination of care. I have reviewed the patient's medical history in detail and updated the computerized patient record. As always, patient is advised that if symptoms worsen in any way they will proceed to the nearest emergency room.      Zach Fisher, APRN - CNP

## 2018-10-30 DIAGNOSIS — G43.809 OTHER MIGRAINE WITHOUT STATUS MIGRAINOSUS, NOT INTRACTABLE: ICD-10-CM

## 2018-10-30 DIAGNOSIS — F98.8 ATTENTION DEFICIT DISORDER, UNSPECIFIED HYPERACTIVITY PRESENCE: ICD-10-CM

## 2018-10-30 NOTE — TELEPHONE ENCOUNTER
----- Message from Arabella Galeano sent at 10/30/2018  3:02 PM EDT -----  Regarding: PLEASE EXTEND EXPIRATION DATE  We have scheduled this patient on 1/4/19 for the EMG that you ordered. However, the order expires on 12/30/18. Please extend the expiration date and re-submit to the EPIC system so that there is no delay in the patient's care on the date of service. Thank you  [employer]

## 2018-10-31 ENCOUNTER — TELEPHONE (OUTPATIENT)
Dept: FAMILY MEDICINE CLINIC | Age: 48
End: 2018-10-31

## 2018-10-31 DIAGNOSIS — R20.2 NUMBNESS AND TINGLING IN RIGHT HAND: Primary | ICD-10-CM

## 2018-10-31 DIAGNOSIS — R20.0 NUMBNESS AND TINGLING IN RIGHT HAND: Primary | ICD-10-CM

## 2018-10-31 RX ORDER — DEXTROAMPHETAMINE SACCHARATE, AMPHETAMINE ASPARTATE MONOHYDRATE, DEXTROAMPHETAMINE SULFATE AND AMPHETAMINE SULFATE 7.5; 7.5; 7.5; 7.5 MG/1; MG/1; MG/1; MG/1
30 CAPSULE, EXTENDED RELEASE ORAL 2 TIMES DAILY
Qty: 60 CAPSULE | Refills: 0 | Status: SHIPPED | OUTPATIENT
Start: 2018-10-31 | End: 2018-11-05 | Stop reason: SDUPTHER

## 2018-10-31 RX ORDER — KETOROLAC TROMETHAMINE 10 MG/1
10 TABLET, FILM COATED ORAL EVERY 6 HOURS PRN
Qty: 20 TABLET | Refills: 1 | Status: SHIPPED | OUTPATIENT
Start: 2018-10-31 | End: 2019-03-11 | Stop reason: ALTCHOICE

## 2018-10-31 RX ORDER — PROCHLORPERAZINE MALEATE 10 MG
10 TABLET ORAL EVERY 6 HOURS PRN
Qty: 30 TABLET | Refills: 2 | Status: SHIPPED | OUTPATIENT
Start: 2018-10-31 | End: 2020-09-29 | Stop reason: SDUPTHER

## 2018-10-31 NOTE — TELEPHONE ENCOUNTER
MARIAM called, Barry Rust. Barry Rust states that Toradol is not covered and should not have refills!!! Barry Rust called back and states that pt is paying out of pocket @ a discount. Barry Rust asking to confirm NO REFILLS. Per Robin Turk, no refills. She did not mean to put refills on that med.

## 2018-10-31 NOTE — TELEPHONE ENCOUNTER
----- Message from Marito Garnett sent at 10/30/2018  3:02 PM EDT -----  Regarding: PLEASE EXTEND EXPIRATION DATE  We have scheduled this patient on 1/4/19 for the EMG that you ordered. However, the order expires on 12/30/18. Please extend the expiration date and re-submit to the EPIC system so that there is no delay in the patient's care on the date of service. Thank you  [employer]

## 2018-11-01 ENCOUNTER — TELEPHONE (OUTPATIENT)
Dept: FAMILY MEDICINE CLINIC | Age: 48
End: 2018-11-01

## 2018-11-02 DIAGNOSIS — R53.83 FATIGUE, UNSPECIFIED TYPE: ICD-10-CM

## 2018-11-02 DIAGNOSIS — M25.50 ARTHRALGIA, UNSPECIFIED JOINT: ICD-10-CM

## 2018-11-02 LAB
ALBUMIN SERPL-MCNC: 4.7 G/DL (ref 3.9–4.9)
ALP BLD-CCNC: 94 U/L (ref 40–130)
ALT SERPL-CCNC: 13 U/L (ref 0–33)
ANION GAP SERPL CALCULATED.3IONS-SCNC: 18 MEQ/L (ref 7–13)
AST SERPL-CCNC: 16 U/L (ref 0–35)
BASOPHILS ABSOLUTE: 0.1 K/UL (ref 0–0.2)
BASOPHILS RELATIVE PERCENT: 1.9 %
BILIRUB SERPL-MCNC: <0.2 MG/DL (ref 0–1.2)
BUN BLDV-MCNC: 19 MG/DL (ref 6–20)
C-REACTIVE PROTEIN: <0.3 MG/L (ref 0–5)
CALCIUM SERPL-MCNC: 9.5 MG/DL (ref 8.6–10.2)
CHLORIDE BLD-SCNC: 101 MEQ/L (ref 98–107)
CO2: 22 MEQ/L (ref 22–29)
CREAT SERPL-MCNC: 0.7 MG/DL (ref 0.5–0.9)
EOSINOPHILS ABSOLUTE: 0.1 K/UL (ref 0–0.7)
EOSINOPHILS RELATIVE PERCENT: 1.9 %
GFR AFRICAN AMERICAN: >60
GFR NON-AFRICAN AMERICAN: >60
GLOBULIN: 2.5 G/DL (ref 2.3–3.5)
GLUCOSE BLD-MCNC: 59 MG/DL (ref 74–109)
HCT VFR BLD CALC: 38.6 % (ref 37–47)
HEMOGLOBIN: 13 G/DL (ref 12–16)
LYMPHOCYTES ABSOLUTE: 1.6 K/UL (ref 1–4.8)
LYMPHOCYTES RELATIVE PERCENT: 34.5 %
MCH RBC QN AUTO: 30.3 PG (ref 27–31.3)
MCHC RBC AUTO-ENTMCNC: 33.8 % (ref 33–37)
MCV RBC AUTO: 89.6 FL (ref 82–100)
MONOCYTES ABSOLUTE: 0.3 K/UL (ref 0.2–0.8)
MONOCYTES RELATIVE PERCENT: 6.5 %
NEUTROPHILS ABSOLUTE: 2.6 K/UL (ref 1.4–6.5)
NEUTROPHILS RELATIVE PERCENT: 55.2 %
PDW BLD-RTO: 13.7 % (ref 11.5–14.5)
PLATELET # BLD: 307 K/UL (ref 130–400)
POTASSIUM SERPL-SCNC: 4 MEQ/L (ref 3.5–5.1)
RBC # BLD: 4.3 M/UL (ref 4.2–5.4)
SEDIMENTATION RATE, ERYTHROCYTE: 5 MM (ref 0–20)
SODIUM BLD-SCNC: 141 MEQ/L (ref 132–144)
TOTAL PROTEIN: 7.2 G/DL (ref 6.4–8.1)
WBC # BLD: 4.7 K/UL (ref 4.8–10.8)

## 2018-11-09 ENCOUNTER — OFFICE VISIT (OUTPATIENT)
Dept: BEHAVIORAL/MENTAL HEALTH CLINIC | Age: 48
End: 2018-11-09
Payer: MEDICARE

## 2018-11-09 DIAGNOSIS — F43.23 ADJUSTMENT DISORDER WITH MIXED ANXIETY AND DEPRESSED MOOD: Primary | ICD-10-CM

## 2018-11-09 PROCEDURE — 90791 PSYCH DIAGNOSTIC EVALUATION: CPT | Performed by: PSYCHOLOGIST

## 2018-11-09 ASSESSMENT — PATIENT HEALTH QUESTIONNAIRE - PHQ9
1. LITTLE INTEREST OR PLEASURE IN DOING THINGS: 1
SUM OF ALL RESPONSES TO PHQ QUESTIONS 1-9: 8
6. FEELING BAD ABOUT YOURSELF - OR THAT YOU ARE A FAILURE OR HAVE LET YOURSELF OR YOUR FAMILY DOWN: 0
5. POOR APPETITE OR OVEREATING: 0
SUM OF ALL RESPONSES TO PHQ9 QUESTIONS 1 & 2: 3
8. MOVING OR SPEAKING SO SLOWLY THAT OTHER PEOPLE COULD HAVE NOTICED. OR THE OPPOSITE, BEING SO FIGETY OR RESTLESS THAT YOU HAVE BEEN MOVING AROUND A LOT MORE THAN USUAL: 0
4. FEELING TIRED OR HAVING LITTLE ENERGY: 2
2. FEELING DOWN, DEPRESSED OR HOPELESS: 2
10. IF YOU CHECKED OFF ANY PROBLEMS, HOW DIFFICULT HAVE THESE PROBLEMS MADE IT FOR YOU TO DO YOUR WORK, TAKE CARE OF THINGS AT HOME, OR GET ALONG WITH OTHER PEOPLE: 2
SUM OF ALL RESPONSES TO PHQ QUESTIONS 1-9: 8
3. TROUBLE FALLING OR STAYING ASLEEP: 3
7. TROUBLE CONCENTRATING ON THINGS, SUCH AS READING THE NEWSPAPER OR WATCHING TELEVISION: 0
9. THOUGHTS THAT YOU WOULD BE BETTER OFF DEAD, OR OF HURTING YOURSELF: 0

## 2018-11-09 NOTE — PATIENT INSTRUCTIONS
1. Dedicate 5 minutes 3x/day to practice the deep breathing    2. Review the list of apps and give some a try! ([id number], CBTi  and progressive muscle relaxation for sleep, etc.)    3. Read the below information about stress management    4. Return in about 2-3 weeks    \"The entire autonomic nervous system (and through it, our internal organs and glands) is largely driven by our breathing patterns. By changing our breathing we can influence millions of biochemical reactions in our body, producing more relaxing substances such as endorphins and fewer anxiety-producing ones like adrenaline and higher blood acidity. Mindfulness of the breath is so effective that it is common to all meditative and prayer traditions. \" Anxiety Fear & Breathing - Breathing. com    \"When overcoming high levels of anxiety, it is important to learn the techniques of correct breathing. Many people who live with high levels of anxiety are known to breathe through their chest. Shallow breathing through the chest means you are disrupting the balance of oxygen and carbon dioxide necessary to be in a relaxed state. This type of breathing will perpetuate the symptoms of anxiety. \" Bit9. com      Diaphragmatic Breathing             _____________________________________________________________________________  1. Sit in a comfortable position    2. Place one hand on your stomach and the other on your chest    3. Try to breathe so that only your stomach rises and falls    As you inhale, concentrate on your chest remaining relatively still while your stomach rises. It may be helpful for you to imagine that your pants are too big and you need to push your stomach out to hold them up. When exhaling, allow your stomach to fall in and the air to fully escape. Inhale slowly. You may choose to hold the air in for about a second. Exhale slowly.   Dont push the air out, but just let the natural pressure of your body slowly move it

## 2018-11-12 ENCOUNTER — TELEPHONE (OUTPATIENT)
Dept: FAMILY MEDICINE CLINIC | Age: 48
End: 2018-11-12

## 2018-11-12 DIAGNOSIS — K85.90 ACUTE PANCREATITIS, UNSPECIFIED COMPLICATION STATUS, UNSPECIFIED PANCREATITIS TYPE: Primary | ICD-10-CM

## 2018-11-13 DIAGNOSIS — K85.90 ACUTE PANCREATITIS, UNSPECIFIED COMPLICATION STATUS, UNSPECIFIED PANCREATITIS TYPE: ICD-10-CM

## 2018-11-13 LAB
AMYLASE: 188 U/L (ref 28–100)
LIPASE: 137 U/L (ref 13–60)

## 2018-11-15 ENCOUNTER — OFFICE VISIT (OUTPATIENT)
Dept: FAMILY MEDICINE CLINIC | Age: 48
End: 2018-11-15
Payer: MEDICARE

## 2018-11-15 VITALS
HEIGHT: 62 IN | BODY MASS INDEX: 22.57 KG/M2 | DIASTOLIC BLOOD PRESSURE: 70 MMHG | SYSTOLIC BLOOD PRESSURE: 118 MMHG | TEMPERATURE: 97.7 F | HEART RATE: 88 BPM | OXYGEN SATURATION: 98 %

## 2018-11-15 DIAGNOSIS — J02.9 ACUTE PHARYNGITIS, UNSPECIFIED ETIOLOGY: Primary | ICD-10-CM

## 2018-11-15 DIAGNOSIS — J01.90 ACUTE BACTERIAL SINUSITIS: ICD-10-CM

## 2018-11-15 DIAGNOSIS — R05.9 COUGH: ICD-10-CM

## 2018-11-15 DIAGNOSIS — Z23 NEED FOR PNEUMOCOCCAL VACCINATION: ICD-10-CM

## 2018-11-15 DIAGNOSIS — B96.89 ACUTE BACTERIAL SINUSITIS: ICD-10-CM

## 2018-11-15 LAB — S PYO AG THROAT QL: NORMAL

## 2018-11-15 PROCEDURE — G8484 FLU IMMUNIZE NO ADMIN: HCPCS | Performed by: NURSE PRACTITIONER

## 2018-11-15 PROCEDURE — G8427 DOCREV CUR MEDS BY ELIG CLIN: HCPCS | Performed by: NURSE PRACTITIONER

## 2018-11-15 PROCEDURE — G0009 ADMIN PNEUMOCOCCAL VACCINE: HCPCS | Performed by: NURSE PRACTITIONER

## 2018-11-15 PROCEDURE — 1036F TOBACCO NON-USER: CPT | Performed by: NURSE PRACTITIONER

## 2018-11-15 PROCEDURE — G8420 CALC BMI NORM PARAMETERS: HCPCS | Performed by: NURSE PRACTITIONER

## 2018-11-15 PROCEDURE — 87880 STREP A ASSAY W/OPTIC: CPT | Performed by: NURSE PRACTITIONER

## 2018-11-15 PROCEDURE — 90732 PPSV23 VACC 2 YRS+ SUBQ/IM: CPT | Performed by: NURSE PRACTITIONER

## 2018-11-15 PROCEDURE — 99214 OFFICE O/P EST MOD 30 MIN: CPT | Performed by: NURSE PRACTITIONER

## 2018-11-15 RX ORDER — ESTRADIOL 1 MG/1
1 TABLET ORAL
COMMUNITY
Start: 2018-10-08 | End: 2021-03-23 | Stop reason: SDUPTHER

## 2018-11-15 RX ORDER — PROMETHAZINE HYDROCHLORIDE AND CODEINE PHOSPHATE 6.25; 1 MG/5ML; MG/5ML
5 SYRUP ORAL EVERY 4 HOURS PRN
Qty: 180 ML | Refills: 0 | Status: SHIPPED | OUTPATIENT
Start: 2018-11-15 | End: 2018-11-20 | Stop reason: SDUPTHER

## 2018-11-15 RX ORDER — AMOXICILLIN AND CLAVULANATE POTASSIUM 875; 125 MG/1; MG/1
1 TABLET, FILM COATED ORAL 2 TIMES DAILY
Qty: 20 TABLET | Refills: 0 | Status: SHIPPED | OUTPATIENT
Start: 2018-11-15 | End: 2018-11-25

## 2018-11-15 ASSESSMENT — ENCOUNTER SYMPTOMS
COUGH: 1
TROUBLE SWALLOWING: 1
SHORTNESS OF BREATH: 0
RHINORRHEA: 1
SINUS PAIN: 1
SWOLLEN GLANDS: 1
SORE THROAT: 1
SINUS PRESSURE: 1
ABDOMINAL PAIN: 1

## 2018-11-15 NOTE — LETTER
[address], [address] [place] [address], [address]  [place] Diop  Phone: [phone]  Fax: [address] [facility] ORTIZ Aiken - CNP        November 15, 2018     Patient: Paola Dominguez   YOB: 1970   Date of Visit: 11/15/2018       To Whom it May Concern:    Lisa Hui was seen in my clinic on 11/15/2018. She may return to work on 11/26/2018 with no restrictions. Please excuse 11/15/2018. If you have any questions or concerns, please don't hesitate to call.     Sincerely,           ORTIZ Matute CNP

## 2018-11-15 NOTE — PROGRESS NOTES
After obtaining consent, and per orders of Dr. schultz, injection of pneumovax given in Left deltoid by Troy Westbrook. Patient instructed to remain in clinic for 20 minutes afterwards, and to report any adverse reaction to me immediately.
(gastroesophageal reflux disease) 9/26/2016    Sjogren's disease ([address]     Stress     Swelling      Past Surgical History:   Procedure Laterality Date    CHOLECYSTECTOMY  2011    HYSTERECTOMY  2014    sept    OVARY REMOVAL Left Jan 2014    UPPER GASTROINTESTINAL ENDOSCOPY  09/16/2016    EGD W/BX     Family History   Problem Relation Age of Onset    Heart Disease Father 48    Cancer Maternal Grandmother         breast    Heart Disease Other         mom and dad's side     Social History     Social History    Marital status: Single     Spouse name: N/A    Number of children: N/A    Years of education: N/A     Social History Main Topics    Smoking status: Never Smoker    Smokeless tobacco: Never Used    Alcohol use No      Comment: no alcohol since 2011    Drug use: No    Sexual activity: Not on file     Other Topics Concern    Not on file     Social History Narrative    ** Merged History Encounter **          Current Outpatient Prescriptions on File Prior to Visit   Medication Sig Dispense Refill    amphetamine-dextroamphetamine (ADDERALL XR) 30 MG extended release capsule Take 1 capsule by mouth 2 times daily for 15 days. . 30 capsule 0    ketorolac (TORADOL) 10 MG tablet Take 1 tablet by mouth every 6 hours as needed for Pain 20 tablet 1    prochlorperazine (COMPAZINE) 10 MG tablet Take 1 tablet by mouth every 6 hours as needed (nausea) 30 tablet 2    buPROPion (WELLBUTRIN XL) 300 MG extended release tablet TAKE ONE TABLET BY MOUTH EVERY MORNING 90 tablet 2    linaclotide (LINZESS) 290 MCG CAPS capsule TAKE ONE CAPSULE BY MOUTH EVERY MORNING BEFORE BREAKFAST 30 capsule 4    amoxicillin (AMOXIL) 500 MG capsule Take 500 mg by mouth 3 times daily      fluconazole (DIFLUCAN) 150 MG tablet take 1 tablet by mouth now, then repeat in 48 hours if symptoms persists (Patient taking differently: take 1 tablet by mouth now, then repeat in 48 hours if symptoms persists, PRN) 2 tablet 0    esomeprazole

## 2018-11-20 ENCOUNTER — TELEPHONE (OUTPATIENT)
Dept: FAMILY MEDICINE CLINIC | Age: 48
End: 2018-11-20

## 2018-11-20 DIAGNOSIS — R05.9 COUGH: ICD-10-CM

## 2018-11-20 RX ORDER — AZITHROMYCIN 250 MG/1
250 TABLET, FILM COATED ORAL SEE ADMIN INSTRUCTIONS
Qty: 6 TABLET | Refills: 0 | Status: SHIPPED | OUTPATIENT
Start: 2018-11-20 | End: 2018-11-25

## 2018-11-20 RX ORDER — PROMETHAZINE HYDROCHLORIDE AND CODEINE PHOSPHATE 6.25; 1 MG/5ML; MG/5ML
5 SYRUP ORAL EVERY 4 HOURS PRN
Qty: 180 ML | Refills: 0 | Status: SHIPPED | OUTPATIENT
Start: 2018-11-20 | End: 2018-11-27

## 2018-11-27 ENCOUNTER — TELEPHONE (OUTPATIENT)
Dept: FAMILY MEDICINE CLINIC | Age: 48
End: 2018-11-27

## 2018-11-29 DIAGNOSIS — F98.8 ATTENTION DEFICIT DISORDER, UNSPECIFIED HYPERACTIVITY PRESENCE: ICD-10-CM

## 2018-11-29 RX ORDER — DEXTROAMPHETAMINE SACCHARATE, AMPHETAMINE ASPARTATE MONOHYDRATE, DEXTROAMPHETAMINE SULFATE AND AMPHETAMINE SULFATE 7.5; 7.5; 7.5; 7.5 MG/1; MG/1; MG/1; MG/1
30 CAPSULE, EXTENDED RELEASE ORAL 2 TIMES DAILY
Qty: 30 CAPSULE | Refills: 0 | Status: SHIPPED | OUTPATIENT
Start: 2018-11-29 | End: 2018-12-04 | Stop reason: SDUPTHER

## 2018-11-30 ENCOUNTER — CARE COORDINATION (OUTPATIENT)
Dept: CARE COORDINATION | Age: 48
End: 2018-11-30

## 2018-12-04 DIAGNOSIS — F98.8 ATTENTION DEFICIT DISORDER, UNSPECIFIED HYPERACTIVITY PRESENCE: ICD-10-CM

## 2018-12-04 RX ORDER — DEXTROAMPHETAMINE SACCHARATE, AMPHETAMINE ASPARTATE MONOHYDRATE, DEXTROAMPHETAMINE SULFATE AND AMPHETAMINE SULFATE 7.5; 7.5; 7.5; 7.5 MG/1; MG/1; MG/1; MG/1
30 CAPSULE, EXTENDED RELEASE ORAL 2 TIMES DAILY
Qty: 30 CAPSULE | Refills: 0 | Status: SHIPPED | OUTPATIENT
Start: 2018-12-04 | End: 2018-12-04 | Stop reason: SDUPTHER

## 2018-12-17 RX ORDER — FUROSEMIDE 20 MG/1
TABLET ORAL
Qty: 120 TABLET | Refills: 2 | Status: SHIPPED | OUTPATIENT
Start: 2018-12-17 | End: 2019-01-31 | Stop reason: SDUPTHER

## 2018-12-20 ENCOUNTER — HOSPITAL ENCOUNTER (EMERGENCY)
Age: 48
Discharge: HOME | End: 2018-12-20
Attending: EMERGENCY MEDICINE
Payer: MEDICARE

## 2018-12-20 ENCOUNTER — APPOINTMENT (OUTPATIENT)
Dept: CT IMAGING | Age: 48
End: 2018-12-20
Payer: MEDICARE

## 2018-12-20 ENCOUNTER — OFFICE VISIT (OUTPATIENT)
Dept: INTERNAL MEDICINE CLINIC | Age: 48
End: 2018-12-20
Payer: MEDICARE

## 2018-12-20 VITALS
WEIGHT: 121 LBS | SYSTOLIC BLOOD PRESSURE: 110 MMHG | BODY MASS INDEX: 22.26 KG/M2 | DIASTOLIC BLOOD PRESSURE: 82 MMHG | OXYGEN SATURATION: 97 % | HEIGHT: 62 IN | HEART RATE: 78 BPM | TEMPERATURE: 97.4 F | RESPIRATION RATE: 20 BRPM

## 2018-12-20 VITALS
WEIGHT: 121.8 LBS | TEMPERATURE: 98.1 F | HEIGHT: 62 IN | DIASTOLIC BLOOD PRESSURE: 80 MMHG | SYSTOLIC BLOOD PRESSURE: 116 MMHG | HEART RATE: 86 BPM | BODY MASS INDEX: 22.41 KG/M2 | OXYGEN SATURATION: 100 %

## 2018-12-20 DIAGNOSIS — Z86.19 HISTORY OF SHINGLES: ICD-10-CM

## 2018-12-20 DIAGNOSIS — D84.9 IMMUNOSUPPRESSION (HCC): Primary | ICD-10-CM

## 2018-12-20 DIAGNOSIS — R10.32 LEFT LOWER QUADRANT PAIN: Primary | ICD-10-CM

## 2018-12-20 DIAGNOSIS — R10.12 LEFT UPPER QUADRANT PAIN: Primary | ICD-10-CM

## 2018-12-20 PROBLEM — E87.20 METABOLIC ACIDOSIS: Status: ACTIVE | Noted: 2018-05-22

## 2018-12-20 PROBLEM — R63.4 WEIGHT LOSS, ABNORMAL: Status: ACTIVE | Noted: 2018-05-22

## 2018-12-20 PROBLEM — E83.19 IRON OVERLOAD: Status: ACTIVE | Noted: 2018-05-22

## 2018-12-20 PROBLEM — R60.9 EDEMA: Status: ACTIVE | Noted: 2018-05-22

## 2018-12-20 PROBLEM — R11.0 NAUSEA: Status: ACTIVE | Noted: 2018-05-22

## 2018-12-20 PROBLEM — R51.9 CHRONIC HEADACHES: Status: ACTIVE | Noted: 2018-05-22

## 2018-12-20 PROBLEM — N25.89 RENAL TUBULAR ACIDOSIS TYPE I: Status: ACTIVE | Noted: 2018-05-22

## 2018-12-20 PROBLEM — H90.12 CONDUCTIVE HEARING LOSS IN LEFT EAR: Status: ACTIVE | Noted: 2018-05-22

## 2018-12-20 PROBLEM — R93.2 ABNORMAL MRI, LIVER: Status: ACTIVE | Noted: 2018-05-22

## 2018-12-20 PROBLEM — Z91.89 AT RISK OF FRACTURE DUE TO OSTEOPOROSIS: Status: ACTIVE | Noted: 2018-05-22

## 2018-12-20 PROBLEM — R22.1 MASS OF LEFT SIDE OF NECK: Status: ACTIVE | Noted: 2018-05-22

## 2018-12-20 PROBLEM — G44.209 TENSION TYPE HEADACHE: Status: ACTIVE | Noted: 2018-05-22

## 2018-12-20 PROBLEM — E83.59 CALCINOSIS: Status: ACTIVE | Noted: 2018-05-22

## 2018-12-20 PROBLEM — R31.29 MICROSCOPIC HEMATURIA: Status: ACTIVE | Noted: 2018-05-22

## 2018-12-20 PROBLEM — M06.9 RHEUMATOID ARTHRITIS (HCC): Status: ACTIVE | Noted: 2018-05-22

## 2018-12-20 PROBLEM — Q61.5 MEDULLARY SPONGE KIDNEY OF BOTH KIDNEYS: Status: ACTIVE | Noted: 2018-05-22

## 2018-12-20 PROBLEM — M81.0 AT RISK OF FRACTURE DUE TO OSTEOPOROSIS: Status: ACTIVE | Noted: 2018-05-22

## 2018-12-20 PROBLEM — G89.29 CHRONIC HEADACHES: Status: ACTIVE | Noted: 2018-05-22

## 2018-12-20 PROBLEM — K85.90 ACUTE RECURRENT PANCREATITIS: Status: ACTIVE | Noted: 2018-05-22

## 2018-12-20 LAB
ALBUMIN SERPL-MCNC: 4.4 G/DL (ref 3.9–4.9)
ALP BLD-CCNC: 94 U/L (ref 40–130)
ALT SERPL-CCNC: 14 U/L (ref 0–33)
ANION GAP SERPL CALCULATED.3IONS-SCNC: 13 MEQ/L (ref 7–13)
AST SERPL-CCNC: 14 U/L (ref 0–35)
BACTERIA: NORMAL /HPF
BASOPHILS ABSOLUTE: 0.1 K/UL (ref 0–0.2)
BASOPHILS RELATIVE PERCENT: 1.3 %
BILIRUB SERPL-MCNC: 0.3 MG/DL (ref 0–1.2)
BILIRUBIN URINE: NEGATIVE
BLOOD, URINE: ABNORMAL
BUN BLDV-MCNC: 9 MG/DL (ref 6–20)
CALCIUM SERPL-MCNC: 9.5 MG/DL (ref 8.6–10.2)
CHLORIDE BLD-SCNC: 101 MEQ/L (ref 98–107)
CLARITY: CLEAR
CO2: 24 MEQ/L (ref 22–29)
COLOR: YELLOW
CREAT SERPL-MCNC: 0.57 MG/DL (ref 0.5–0.9)
EOSINOPHILS ABSOLUTE: 0.1 K/UL (ref 0–0.7)
EOSINOPHILS RELATIVE PERCENT: 0.9 %
GFR AFRICAN AMERICAN: >60
GFR AFRICAN AMERICAN: >60
GFR NON-AFRICAN AMERICAN: >60
GFR NON-AFRICAN AMERICAN: >60
GLOBULIN: 2.8 G/DL (ref 2.3–3.5)
GLUCOSE BLD-MCNC: 88 MG/DL (ref 74–109)
GLUCOSE URINE: NEGATIVE MG/DL
HCT VFR BLD CALC: 38.7 % (ref 37–47)
HEMOGLOBIN: 13.2 G/DL (ref 12–16)
KETONES, URINE: NEGATIVE MG/DL
LACTIC ACID: 1.1 MMOL/L (ref 0.5–2.2)
LEUKOCYTE ESTERASE, URINE: NEGATIVE
LIPASE: 43 U/L (ref 13–60)
LYMPHOCYTES ABSOLUTE: 2.2 K/UL (ref 1–4.8)
LYMPHOCYTES RELATIVE PERCENT: 40.6 %
MCH RBC QN AUTO: 30.3 PG (ref 27–31.3)
MCHC RBC AUTO-ENTMCNC: 34.2 % (ref 33–37)
MCV RBC AUTO: 88.5 FL (ref 82–100)
MONOCYTES ABSOLUTE: 0.3 K/UL (ref 0.2–0.8)
MONOCYTES RELATIVE PERCENT: 5.6 %
NEUTROPHILS ABSOLUTE: 2.8 K/UL (ref 1.4–6.5)
NEUTROPHILS RELATIVE PERCENT: 51.6 %
NITRITE, URINE: NEGATIVE
PDW BLD-RTO: 13.1 % (ref 11.5–14.5)
PERFORMED ON: NORMAL
PH UA: 7 (ref 5–9)
PLATELET # BLD: 270 K/UL (ref 130–400)
POC CREATININE: 0.6 MG/DL (ref 0.6–1.1)
POC SAMPLE TYPE: NORMAL
POTASSIUM SERPL-SCNC: 3.8 MEQ/L (ref 3.5–5.1)
PROTEIN UA: NEGATIVE MG/DL
RBC # BLD: 4.37 M/UL (ref 4.2–5.4)
RBC UA: NORMAL /HPF (ref 0–2)
SODIUM BLD-SCNC: 138 MEQ/L (ref 132–144)
SPECIFIC GRAVITY UA: 1.01 (ref 1–1.03)
TOTAL PROTEIN: 7.2 G/DL (ref 6.4–8.1)
UROBILINOGEN, URINE: 0.2 E.U./DL
WBC # BLD: 5.5 K/UL (ref 4.8–10.8)
WBC UA: NORMAL /HPF (ref 0–5)

## 2018-12-20 PROCEDURE — G8484 FLU IMMUNIZE NO ADMIN: HCPCS | Performed by: FAMILY MEDICINE

## 2018-12-20 PROCEDURE — 80053 COMPREHEN METABOLIC PANEL: CPT

## 2018-12-20 PROCEDURE — 85025 COMPLETE CBC W/AUTO DIFF WBC: CPT

## 2018-12-20 PROCEDURE — 81001 URINALYSIS AUTO W/SCOPE: CPT

## 2018-12-20 PROCEDURE — 99284 EMERGENCY DEPT VISIT MOD MDM: CPT

## 2018-12-20 PROCEDURE — G8427 DOCREV CUR MEDS BY ELIG CLIN: HCPCS | Performed by: FAMILY MEDICINE

## 2018-12-20 PROCEDURE — 6360000002 HC RX W HCPCS: Performed by: EMERGENCY MEDICINE

## 2018-12-20 PROCEDURE — 6360000004 HC RX CONTRAST MEDICATION: Performed by: EMERGENCY MEDICINE

## 2018-12-20 PROCEDURE — G8420 CALC BMI NORM PARAMETERS: HCPCS | Performed by: FAMILY MEDICINE

## 2018-12-20 PROCEDURE — 1036F TOBACCO NON-USER: CPT | Performed by: FAMILY MEDICINE

## 2018-12-20 PROCEDURE — 96374 THER/PROPH/DIAG INJ IV PUSH: CPT

## 2018-12-20 PROCEDURE — 96376 TX/PRO/DX INJ SAME DRUG ADON: CPT

## 2018-12-20 PROCEDURE — 96375 TX/PRO/DX INJ NEW DRUG ADDON: CPT

## 2018-12-20 PROCEDURE — 83690 ASSAY OF LIPASE: CPT

## 2018-12-20 PROCEDURE — 99214 OFFICE O/P EST MOD 30 MIN: CPT | Performed by: FAMILY MEDICINE

## 2018-12-20 PROCEDURE — 83605 ASSAY OF LACTIC ACID: CPT

## 2018-12-20 PROCEDURE — 36415 COLL VENOUS BLD VENIPUNCTURE: CPT

## 2018-12-20 PROCEDURE — 74177 CT ABD & PELVIS W/CONTRAST: CPT

## 2018-12-20 PROCEDURE — 2580000003 HC RX 258: Performed by: EMERGENCY MEDICINE

## 2018-12-20 RX ORDER — 0.9 % SODIUM CHLORIDE 0.9 %
1000 INTRAVENOUS SOLUTION INTRAVENOUS ONCE
Status: COMPLETED | OUTPATIENT
Start: 2018-12-20 | End: 2018-12-20

## 2018-12-20 RX ORDER — ONDANSETRON 2 MG/ML
4 INJECTION INTRAMUSCULAR; INTRAVENOUS ONCE
Status: COMPLETED | OUTPATIENT
Start: 2018-12-20 | End: 2018-12-20

## 2018-12-20 RX ORDER — OXYCODONE HYDROCHLORIDE AND ACETAMINOPHEN 5; 325 MG/1; MG/1
1-2 TABLET ORAL EVERY 6 HOURS PRN
Qty: 15 TABLET | Refills: 0 | Status: SHIPPED | OUTPATIENT
Start: 2018-12-20 | End: 2018-12-23

## 2018-12-20 RX ORDER — KETOROLAC TROMETHAMINE 30 MG/ML
30 INJECTION, SOLUTION INTRAMUSCULAR; INTRAVENOUS ONCE
Status: COMPLETED | OUTPATIENT
Start: 2018-12-20 | End: 2018-12-20

## 2018-12-20 RX ORDER — ONDANSETRON 4 MG/1
4 TABLET, FILM COATED ORAL EVERY 8 HOURS PRN
Qty: 20 TABLET | Refills: 0 | Status: SHIPPED | OUTPATIENT
Start: 2018-12-20 | End: 2019-01-15 | Stop reason: SDUPTHER

## 2018-12-20 RX ADMIN — HYDROMORPHONE HYDROCHLORIDE 0.5 MG: 1 INJECTION, SOLUTION INTRAMUSCULAR; INTRAVENOUS; SUBCUTANEOUS at 20:16

## 2018-12-20 RX ADMIN — HYDROMORPHONE HYDROCHLORIDE 1 MG: 1 INJECTION, SOLUTION INTRAMUSCULAR; INTRAVENOUS; SUBCUTANEOUS at 18:01

## 2018-12-20 RX ADMIN — ONDANSETRON 4 MG: 2 INJECTION INTRAMUSCULAR; INTRAVENOUS at 18:01

## 2018-12-20 RX ADMIN — IOPAMIDOL 100 ML: 612 INJECTION, SOLUTION INTRAVENOUS at 18:57

## 2018-12-20 RX ADMIN — HYDROMORPHONE HYDROCHLORIDE 1 MG: 1 INJECTION, SOLUTION INTRAMUSCULAR; INTRAVENOUS; SUBCUTANEOUS at 19:16

## 2018-12-20 RX ADMIN — KETOROLAC TROMETHAMINE 30 MG: 30 INJECTION, SOLUTION INTRAMUSCULAR at 18:01

## 2018-12-20 RX ADMIN — SODIUM CHLORIDE 1000 ML: 9 INJECTION, SOLUTION INTRAVENOUS at 18:01

## 2018-12-20 ASSESSMENT — ENCOUNTER SYMPTOMS
VOMITING: 0
ABDOMINAL PAIN: 1
DIARRHEA: 1
NAUSEA: 0
COUGH: 0
SORE THROAT: 0
SHORTNESS OF BREATH: 0
BACK PAIN: 0

## 2018-12-20 ASSESSMENT — PAIN DESCRIPTION - DESCRIPTORS
DESCRIPTORS: CONSTANT;RADIATING;SHARP
DESCRIPTORS: THROBBING

## 2018-12-20 ASSESSMENT — PAIN DESCRIPTION - FREQUENCY: FREQUENCY: CONTINUOUS

## 2018-12-20 ASSESSMENT — PAIN SCALES - GENERAL
PAINLEVEL_OUTOF10: 5
PAINLEVEL_OUTOF10: 10
PAINLEVEL_OUTOF10: 7
PAINLEVEL_OUTOF10: 10
PAINLEVEL_OUTOF10: 7

## 2018-12-20 ASSESSMENT — PAIN DESCRIPTION - PAIN TYPE
TYPE: ACUTE PAIN
TYPE: ACUTE PAIN

## 2018-12-20 ASSESSMENT — PAIN DESCRIPTION - LOCATION
LOCATION: ABDOMEN
LOCATION: ABDOMEN

## 2018-12-20 ASSESSMENT — PAIN DESCRIPTION - PROGRESSION: CLINICAL_PROGRESSION: NOT CHANGED

## 2018-12-20 NOTE — PATIENT INSTRUCTIONS
gallstone may need to be removed. This is done during a procedure called ERCP. The doctor puts a scope in your mouth and moves it gently through the stomach and into the ducts from the pancreas and gallbladder. The doctor is then able to see a stone and remove it. Sometimes the gallbladder, which makes gallstones, needs to be removed by surgery. People with pancreatitis often need a lot of fluid to help support their other organs and their blood pressure. They get fluids through a vein (intravenous, or IV). Instead of food by mouth, nutrition is sometimes given through a vein while the pancreas heals. Follow-up care is a key part of your treatment and safety. Be sure to make and go to all appointments, and call your doctor if you are having problems. It's also a good idea to know your test results and keep a list of the medicines you take. Where can you learn more? Go to https://chpepiceweb.[employer]. org and sign in to your shopp account. Enter B334 in the [employer] box to learn more about \"Learning About Acute Pancreatitis. \"     If you do not have an account, please click on the \"Sign Up Now\" link. Current as of: March 28, 2018  Content Version: 11.8  © 3866-2958 [employer], [employer]. Care instructions adapted under license by [facility] ([facility]). If you have questions about a medical condition or this instruction, always ask your healthcare professional. [place] [address] any warranty or liability for your use of this information.

## 2018-12-20 NOTE — PROGRESS NOTES
(gastroesophageal reflux disease) 9/26/2016    Medullary sponge kidney of both kidneys 5/22/2018    Sjogren's disease ([facility] [address]     Stress     Swelling         Past Surgical History:   Procedure Laterality Date    CHOLECYSTECTOMY  2011    HYSTERECTOMY  2014    sept    OVARY REMOVAL Left Jan 2014    UPPER GASTROINTESTINAL ENDOSCOPY  09/16/2016    EGD W/BX        Family History   Problem Relation Age of Onset    Heart Disease Father 48    Cancer Maternal Grandmother         breast    Heart Disease Other         mom and dad's side        Social History     Social History    Marital status: Single     Spouse name: N/A    Number of children: N/A    Years of education: N/A     Occupational History    Not on file. Social History Main Topics    Smoking status: Never Smoker    Smokeless tobacco: Never Used    Alcohol use No      Comment: no alcohol since 2011    Drug use: No    Sexual activity: Not on file     Other Topics Concern    Not on file     Social History Narrative    ** Merged History Encounter **             /80 (Site: Left Upper Arm, Position: Sitting, Cuff Size: Medium Adult)   Pulse 86   Temp 98.1 °F (36.7 °C) (Oral)   Ht 5' 2\" (1.575 m)   Wt 121 lb 12.8 oz (55.2 kg)   SpO2 100%   BMI 22.28 kg/m²        Physical Exam:    General appearance - alert, in moderate discomfort   Mental Status - alert, oriented to person, place, and time  Eyes - pupils equal and reactive, extraocular eye movements intact   Sinuses - Normal paranasal sinuses without tenderness   Throat - mild erythema in the oropharynx. Neck - supple, no significant adenopathy   Chest - clear to auscultation, no wheezes, rales or rhonchi, symmetric air entry   Heart - normal rate, regular rhythm, normal S1, S2, no murmurs, rubs, clicks or gallops  Abdomen - tenderness to palpation in the left upper abdomen. Bowel sounds hypoactive bowel sounds. Abdomen soft to palpation.     Back exam - full range of motion, no

## 2018-12-24 ENCOUNTER — OFFICE VISIT (OUTPATIENT)
Dept: FAMILY MEDICINE CLINIC | Age: 48
End: 2018-12-24
Payer: MEDICARE

## 2018-12-24 VITALS
HEART RATE: 89 BPM | WEIGHT: 122.6 LBS | BODY MASS INDEX: 22.56 KG/M2 | OXYGEN SATURATION: 99 % | SYSTOLIC BLOOD PRESSURE: 120 MMHG | HEIGHT: 62 IN | DIASTOLIC BLOOD PRESSURE: 76 MMHG | TEMPERATURE: 98.2 F

## 2018-12-24 DIAGNOSIS — M79.604 RIGHT LEG PAIN: Primary | ICD-10-CM

## 2018-12-24 PROCEDURE — 96372 THER/PROPH/DIAG INJ SC/IM: CPT | Performed by: NURSE PRACTITIONER

## 2018-12-24 PROCEDURE — G8484 FLU IMMUNIZE NO ADMIN: HCPCS | Performed by: NURSE PRACTITIONER

## 2018-12-24 PROCEDURE — 1036F TOBACCO NON-USER: CPT | Performed by: NURSE PRACTITIONER

## 2018-12-24 PROCEDURE — 99213 OFFICE O/P EST LOW 20 MIN: CPT | Performed by: NURSE PRACTITIONER

## 2018-12-24 PROCEDURE — G8420 CALC BMI NORM PARAMETERS: HCPCS | Performed by: NURSE PRACTITIONER

## 2018-12-24 PROCEDURE — G8427 DOCREV CUR MEDS BY ELIG CLIN: HCPCS | Performed by: NURSE PRACTITIONER

## 2018-12-24 RX ORDER — CYCLOBENZAPRINE HCL 10 MG
TABLET ORAL
Qty: 30 TABLET | Refills: 0 | Status: SHIPPED | OUTPATIENT
Start: 2018-12-24 | End: 2019-12-17

## 2018-12-24 RX ORDER — KETOROLAC TROMETHAMINE 30 MG/ML
60 INJECTION, SOLUTION INTRAMUSCULAR; INTRAVENOUS ONCE
Status: COMPLETED | OUTPATIENT
Start: 2018-12-24 | End: 2018-12-24

## 2018-12-24 RX ADMIN — KETOROLAC TROMETHAMINE 60 MG: 30 INJECTION, SOLUTION INTRAMUSCULAR; INTRAVENOUS at 16:09

## 2018-12-24 ASSESSMENT — ENCOUNTER SYMPTOMS
SHORTNESS OF BREATH: 0
BACK PAIN: 0
CHEST TIGHTNESS: 0

## 2018-12-24 NOTE — PROGRESS NOTES
Subjective  Torres Aguirre, 41 y.o. female presents today with:  Chief Complaint   Patient presents with    Leg Pain     Pt presents today c/o Rt leg pain x 2 weeks that is getting worse. Leg Pain    Incident onset: one month ago, gradually getting worse. There was no injury mechanism. The pain is present in the right knee and right thigh. The quality of the pain is described as aching. The pain is at a severity of 6/10. The pain has been fluctuating since onset. Pertinent negatives include no inability to bear weight, loss of motion, loss of sensation, muscle weakness, numbness or tingling. She reports no foreign bodies present. The symptoms are aggravated by weight bearing. She has tried heat and acetaminophen for the symptoms. The treatment provided mild relief. Review of Systems   Constitutional: Negative for fever. Respiratory: Negative for chest tightness and shortness of breath. Cardiovascular: Negative for chest pain, palpitations and leg swelling. Musculoskeletal: Positive for arthralgias and joint swelling. Negative for back pain, gait problem, myalgias and neck pain. Neurological: Negative for tingling and numbness. Objective    Vitals:    12/24/18 1536   BP: 120/76   Pulse: 89   Temp: 98.2 °F (36.8 °C)   TempSrc: Temporal   SpO2: 99%   Weight: 122 lb 9.6 oz (55.6 kg)   Height: 5' 2\" (1.575 m)       Physical Exam   Constitutional: She appears well-developed and well-nourished. No distress. HENT:   Right Ear: External ear normal.   Left Ear: External ear normal.   Mouth/Throat: Oropharynx is clear and moist.   Eyes: Conjunctivae are normal. Right eye exhibits no discharge. Left eye exhibits no discharge. Cardiovascular: Normal rate, regular rhythm and normal heart sounds. Pulmonary/Chest: Effort normal and breath sounds normal.   Musculoskeletal: She exhibits no edema.         Right knee: She exhibits normal range of motion, no swelling, no effusion, no ecchymosis, no deformity, no laceration, no erythema, normal alignment, normal meniscus and no MCL laxity. Tenderness found. No medial joint line, no lateral joint line, no MCL, no LCL and no patellar tendon tenderness noted. Legs:  Lymphadenopathy:     She has no cervical adenopathy. Skin: No rash noted. She is not diaphoretic. No erythema. No pallor. Vitals reviewed. POC Testing Today:No results found for this visit on 12/24/18. Assessment & Plan    Diagnosis Orders   1. Right leg pain  ketorolac (TORADOL) injection 60 mg    cyclobenzaprine (FLEXERIL) 10 MG tablet       No orders of the defined types were placed in this encounter. Patient has Toradol at home. She stated she will take as needed. Reviewed the use of muscle relaxers - Start with a 1/2 tab to evaluate effect. Use the lowest dose possible to achieve relief. Do not drive or operate machinery while taking them. Continue utilizing a heating pad. Do not fall asleep with heating pad on. Based on VS, ROS, HPI and physical assessment (no edema or warmth at site), patient is stable and comfortable sending patient home. Reassurance given that does not have DVT symptoms. Discussed symptoms to look out for, and patient verbalized understanding. Patient would like an ultrasound of leg due to length of time symptoms have been going on. Will discuss with pcp Rach Tejeda. Return if symptoms worsen or fail to improve, for follow up with your PCP. Side effects and adverse effects of any medication prescribed today, as well as treatment plan/rationale, follow-up care, and result expectations have been discussed with the patient. Expresses understanding and desires to proceed with treatment plan. Discussed signs and symptoms which require immediate follow-up in ED/call to 911. Understanding verbalized. I have reviewed and updated the electronic medical record.     Madonna Bravo, APRN - CNP

## 2018-12-26 DIAGNOSIS — F98.8 ATTENTION DEFICIT DISORDER, UNSPECIFIED HYPERACTIVITY PRESENCE: ICD-10-CM

## 2018-12-26 NOTE — TELEPHONE ENCOUNTER
From: Gulshan Darden  Sent: 12/26/2018 3:48 PM EST  Subject: Medication Renewal Request    Robert Davidson.  Ravindra Sheikh would like a refill of the following medications:     amphetamine-dextroamphetamine (ADDERALL XR) 30 MG extended release capsule Pradip Willis, APRN - CNP]    Preferred pharmacy: [facility] [place] [address]

## 2018-12-27 RX ORDER — DEXTROAMPHETAMINE SACCHARATE, AMPHETAMINE ASPARTATE MONOHYDRATE, DEXTROAMPHETAMINE SULFATE AND AMPHETAMINE SULFATE 7.5; 7.5; 7.5; 7.5 MG/1; MG/1; MG/1; MG/1
30 CAPSULE, EXTENDED RELEASE ORAL 2 TIMES DAILY
Qty: 30 CAPSULE | Refills: 0 | Status: SHIPPED | OUTPATIENT
Start: 2018-12-27 | End: 2019-01-10 | Stop reason: SDUPTHER

## 2018-12-28 ENCOUNTER — CARE COORDINATION (OUTPATIENT)
Dept: CARE COORDINATION | Age: 48
End: 2018-12-28

## 2019-01-10 ENCOUNTER — PATIENT MESSAGE (OUTPATIENT)
Dept: FAMILY MEDICINE CLINIC | Age: 49
End: 2019-01-10

## 2019-01-10 DIAGNOSIS — F98.8 ATTENTION DEFICIT DISORDER, UNSPECIFIED HYPERACTIVITY PRESENCE: ICD-10-CM

## 2019-01-10 RX ORDER — DEXTROAMPHETAMINE SACCHARATE, AMPHETAMINE ASPARTATE MONOHYDRATE, DEXTROAMPHETAMINE SULFATE AND AMPHETAMINE SULFATE 7.5; 7.5; 7.5; 7.5 MG/1; MG/1; MG/1; MG/1
30 CAPSULE, EXTENDED RELEASE ORAL 2 TIMES DAILY
Qty: 30 CAPSULE | Refills: 0 | Status: SHIPPED | OUTPATIENT
Start: 2019-01-10 | End: 2019-01-29 | Stop reason: SDUPTHER

## 2019-01-10 RX ORDER — DEXTROAMPHETAMINE SACCHARATE, AMPHETAMINE ASPARTATE MONOHYDRATE, DEXTROAMPHETAMINE SULFATE AND AMPHETAMINE SULFATE 7.5; 7.5; 7.5; 7.5 MG/1; MG/1; MG/1; MG/1
30 CAPSULE, EXTENDED RELEASE ORAL 2 TIMES DAILY
Qty: 30 CAPSULE | Refills: 0 | Status: CANCELLED | OUTPATIENT
Start: 2019-01-10 | End: 2019-01-25

## 2019-01-12 DIAGNOSIS — R11.0 NAUSEA: ICD-10-CM

## 2019-01-14 RX ORDER — ESOMEPRAZOLE MAGNESIUM 40 MG/1
CAPSULE, DELAYED RELEASE ORAL
Qty: 30 CAPSULE | Refills: 5 | Status: SHIPPED | OUTPATIENT
Start: 2019-01-14 | End: 2019-07-08 | Stop reason: SDUPTHER

## 2019-01-14 RX ORDER — PROMETHAZINE HYDROCHLORIDE 25 MG/1
TABLET ORAL
Qty: 40 TABLET | Refills: 0 | Status: SHIPPED | OUTPATIENT
Start: 2019-01-14 | End: 2020-10-30 | Stop reason: SDUPTHER

## 2019-01-15 ENCOUNTER — OFFICE VISIT (OUTPATIENT)
Dept: FAMILY MEDICINE CLINIC | Age: 49
End: 2019-01-15
Payer: MEDICARE

## 2019-01-15 VITALS
OXYGEN SATURATION: 98 % | DIASTOLIC BLOOD PRESSURE: 68 MMHG | WEIGHT: 128.8 LBS | TEMPERATURE: 97.1 F | HEART RATE: 86 BPM | SYSTOLIC BLOOD PRESSURE: 112 MMHG | HEIGHT: 62 IN | BODY MASS INDEX: 23.7 KG/M2

## 2019-01-15 DIAGNOSIS — F41.9 ANXIETY: ICD-10-CM

## 2019-01-15 DIAGNOSIS — M79.604 RIGHT LEG PAIN: Primary | ICD-10-CM

## 2019-01-15 DIAGNOSIS — F32.4 MAJOR DEPRESSIVE DISORDER, SINGLE EPISODE, IN PARTIAL REMISSION (HCC): ICD-10-CM

## 2019-01-15 DIAGNOSIS — M54.5 LOW BACK PAIN, UNSPECIFIED BACK PAIN LATERALITY, UNSPECIFIED CHRONICITY, WITH SCIATICA PRESENCE UNSPECIFIED: ICD-10-CM

## 2019-01-15 DIAGNOSIS — M35.00 SJOGREN'S SYNDROME, WITH UNSPECIFIED ORGAN INVOLVEMENT (HCC): Chronic | ICD-10-CM

## 2019-01-15 DIAGNOSIS — F32.A DEPRESSION, UNSPECIFIED DEPRESSION TYPE: ICD-10-CM

## 2019-01-15 PROCEDURE — G8427 DOCREV CUR MEDS BY ELIG CLIN: HCPCS | Performed by: NURSE PRACTITIONER

## 2019-01-15 PROCEDURE — G8420 CALC BMI NORM PARAMETERS: HCPCS | Performed by: NURSE PRACTITIONER

## 2019-01-15 PROCEDURE — 99214 OFFICE O/P EST MOD 30 MIN: CPT | Performed by: NURSE PRACTITIONER

## 2019-01-15 PROCEDURE — 1036F TOBACCO NON-USER: CPT | Performed by: NURSE PRACTITIONER

## 2019-01-15 PROCEDURE — G8484 FLU IMMUNIZE NO ADMIN: HCPCS | Performed by: NURSE PRACTITIONER

## 2019-01-15 RX ORDER — LORAZEPAM 0.5 MG/1
0.5 TABLET ORAL DAILY PRN
Qty: 10 TABLET | Refills: 0 | Status: SHIPPED | OUTPATIENT
Start: 2019-01-15 | End: 2019-01-25

## 2019-01-15 ASSESSMENT — ENCOUNTER SYMPTOMS
VOMITING: 0
DIARRHEA: 1
CONSTIPATION: 0
ABDOMINAL PAIN: 1
NAUSEA: 0
BACK PAIN: 1

## 2019-01-29 ENCOUNTER — HOSPITAL ENCOUNTER (OUTPATIENT)
Dept: OCCUPATIONAL THERAPY | Age: 49
Setting detail: THERAPIES SERIES
Discharge: HOME | End: 2019-01-29
Payer: MEDICARE

## 2019-01-29 DIAGNOSIS — F98.8 ATTENTION DEFICIT DISORDER, UNSPECIFIED HYPERACTIVITY PRESENCE: ICD-10-CM

## 2019-01-29 PROCEDURE — G8990 OTHER PT/OT CURRENT STATUS: HCPCS

## 2019-01-29 PROCEDURE — G8991 OTHER PT/OT GOAL STATUS: HCPCS

## 2019-01-29 PROCEDURE — 97166 OT EVAL MOD COMPLEX 45 MIN: CPT

## 2019-01-29 RX ORDER — DEXTROAMPHETAMINE SACCHARATE, AMPHETAMINE ASPARTATE MONOHYDRATE, DEXTROAMPHETAMINE SULFATE AND AMPHETAMINE SULFATE 7.5; 7.5; 7.5; 7.5 MG/1; MG/1; MG/1; MG/1
30 CAPSULE, EXTENDED RELEASE ORAL 2 TIMES DAILY
Qty: 60 CAPSULE | Refills: 0 | Status: SHIPPED | OUTPATIENT
Start: 2019-01-29 | End: 2019-02-26 | Stop reason: SDUPTHER

## 2019-01-31 RX ORDER — FUROSEMIDE 20 MG/1
20 TABLET ORAL 2 TIMES DAILY
Qty: 60 TABLET | Refills: 2 | Status: SHIPPED | OUTPATIENT
Start: 2019-01-31 | End: 2019-07-26 | Stop reason: SDUPTHER

## 2019-02-06 ENCOUNTER — HOSPITAL ENCOUNTER (OUTPATIENT)
Dept: NEUROLOGY | Age: 49
Discharge: HOME | End: 2019-02-06
Payer: MEDICARE

## 2019-02-06 DIAGNOSIS — R20.0 NUMBNESS AND TINGLING IN RIGHT HAND: ICD-10-CM

## 2019-02-06 DIAGNOSIS — G43.809 OTHER MIGRAINE WITHOUT STATUS MIGRAINOSUS, NOT INTRACTABLE: ICD-10-CM

## 2019-02-06 DIAGNOSIS — R20.2 NUMBNESS AND TINGLING IN RIGHT HAND: ICD-10-CM

## 2019-02-06 PROCEDURE — 95910 NRV CNDJ TEST 7-8 STUDIES: CPT

## 2019-02-06 PROCEDURE — 95886 MUSC TEST DONE W/N TEST COMP: CPT

## 2019-02-06 RX ORDER — KETOROLAC TROMETHAMINE 10 MG/1
10 TABLET, FILM COATED ORAL EVERY 6 HOURS PRN
Qty: 20 TABLET | Refills: 1 | Status: CANCELLED | OUTPATIENT
Start: 2019-02-06

## 2019-02-08 ENCOUNTER — HOSPITAL ENCOUNTER (OUTPATIENT)
Dept: OCCUPATIONAL THERAPY | Age: 49
Setting detail: THERAPIES SERIES
Discharge: HOME | End: 2019-02-08
Payer: MEDICARE

## 2019-02-08 PROCEDURE — 97140 MANUAL THERAPY 1/> REGIONS: CPT

## 2019-02-08 PROCEDURE — 97530 THERAPEUTIC ACTIVITIES: CPT

## 2019-02-08 ASSESSMENT — PAIN DESCRIPTION - PAIN TYPE: TYPE: CHRONIC PAIN

## 2019-02-08 ASSESSMENT — PAIN SCALES - GENERAL: PAINLEVEL_OUTOF10: 4

## 2019-02-08 ASSESSMENT — PAIN DESCRIPTION - LOCATION: LOCATION: HAND;FINGER (COMMENT WHICH ONE)

## 2019-02-13 ENCOUNTER — HOSPITAL ENCOUNTER (OUTPATIENT)
Dept: OCCUPATIONAL THERAPY | Age: 49
Setting detail: THERAPIES SERIES
Discharge: HOME | End: 2019-02-13
Payer: MEDICARE

## 2019-02-13 PROCEDURE — 97140 MANUAL THERAPY 1/> REGIONS: CPT

## 2019-02-13 PROCEDURE — 97530 THERAPEUTIC ACTIVITIES: CPT

## 2019-02-13 ASSESSMENT — PAIN SCALES - GENERAL: PAINLEVEL_OUTOF10: 3

## 2019-02-13 ASSESSMENT — PAIN DESCRIPTION - PAIN TYPE: TYPE: CHRONIC PAIN

## 2019-02-15 ENCOUNTER — HOSPITAL ENCOUNTER (OUTPATIENT)
Dept: OCCUPATIONAL THERAPY | Age: 49
Setting detail: THERAPIES SERIES
Discharge: HOME | End: 2019-02-15
Payer: MEDICARE

## 2019-02-15 PROCEDURE — 97140 MANUAL THERAPY 1/> REGIONS: CPT

## 2019-02-15 PROCEDURE — 97530 THERAPEUTIC ACTIVITIES: CPT

## 2019-02-15 ASSESSMENT — PAIN SCALES - GENERAL: PAINLEVEL_OUTOF10: 4

## 2019-02-15 ASSESSMENT — PAIN DESCRIPTION - PAIN TYPE: TYPE: CHRONIC PAIN

## 2019-02-20 ENCOUNTER — HOSPITAL ENCOUNTER (OUTPATIENT)
Dept: OCCUPATIONAL THERAPY | Age: 49
Setting detail: THERAPIES SERIES
Discharge: HOME | End: 2019-02-20
Payer: MEDICARE

## 2019-02-26 DIAGNOSIS — F98.8 ATTENTION DEFICIT DISORDER, UNSPECIFIED HYPERACTIVITY PRESENCE: ICD-10-CM

## 2019-02-27 RX ORDER — DEXTROAMPHETAMINE SACCHARATE, AMPHETAMINE ASPARTATE MONOHYDRATE, DEXTROAMPHETAMINE SULFATE AND AMPHETAMINE SULFATE 7.5; 7.5; 7.5; 7.5 MG/1; MG/1; MG/1; MG/1
30 CAPSULE, EXTENDED RELEASE ORAL 2 TIMES DAILY
Qty: 60 CAPSULE | Refills: 0 | Status: SHIPPED | OUTPATIENT
Start: 2019-02-27 | End: 2019-03-25 | Stop reason: SDUPTHER

## 2019-03-11 ENCOUNTER — OFFICE VISIT (OUTPATIENT)
Dept: FAMILY MEDICINE CLINIC | Age: 49
End: 2019-03-11
Payer: MEDICARE

## 2019-03-11 VITALS
WEIGHT: 129 LBS | HEIGHT: 62 IN | OXYGEN SATURATION: 97 % | DIASTOLIC BLOOD PRESSURE: 70 MMHG | BODY MASS INDEX: 23.74 KG/M2 | HEART RATE: 81 BPM | SYSTOLIC BLOOD PRESSURE: 112 MMHG | TEMPERATURE: 97.1 F

## 2019-03-11 DIAGNOSIS — R53.83 OTHER FATIGUE: ICD-10-CM

## 2019-03-11 DIAGNOSIS — E53.8 B12 DEFICIENCY: ICD-10-CM

## 2019-03-11 DIAGNOSIS — E03.9 HYPOTHYROIDISM, UNSPECIFIED TYPE: Primary | ICD-10-CM

## 2019-03-11 DIAGNOSIS — M79.18 BUTTOCK PAIN: ICD-10-CM

## 2019-03-11 PROCEDURE — 96372 THER/PROPH/DIAG INJ SC/IM: CPT | Performed by: NURSE PRACTITIONER

## 2019-03-11 PROCEDURE — 1036F TOBACCO NON-USER: CPT | Performed by: NURSE PRACTITIONER

## 2019-03-11 PROCEDURE — G8420 CALC BMI NORM PARAMETERS: HCPCS | Performed by: NURSE PRACTITIONER

## 2019-03-11 PROCEDURE — 99214 OFFICE O/P EST MOD 30 MIN: CPT | Performed by: NURSE PRACTITIONER

## 2019-03-11 PROCEDURE — G8484 FLU IMMUNIZE NO ADMIN: HCPCS | Performed by: NURSE PRACTITIONER

## 2019-03-11 PROCEDURE — G8427 DOCREV CUR MEDS BY ELIG CLIN: HCPCS | Performed by: NURSE PRACTITIONER

## 2019-03-11 RX ORDER — LIOTHYRONINE SODIUM 25 UG/1
25 TABLET ORAL DAILY
Qty: 30 TABLET | Refills: 5 | Status: SHIPPED | OUTPATIENT
Start: 2019-03-11 | End: 2019-03-11 | Stop reason: SDUPTHER

## 2019-03-11 RX ORDER — LIOTHYRONINE SODIUM 25 UG/1
25 TABLET ORAL DAILY
COMMUNITY
End: 2019-03-11

## 2019-03-11 RX ORDER — CYANOCOBALAMIN 1000 UG/ML
1000 INJECTION INTRAMUSCULAR; SUBCUTANEOUS ONCE
Status: COMPLETED | OUTPATIENT
Start: 2019-03-11 | End: 2019-03-11

## 2019-03-11 RX ADMIN — CYANOCOBALAMIN 1000 MCG: 1000 INJECTION INTRAMUSCULAR; SUBCUTANEOUS at 08:21

## 2019-03-13 DIAGNOSIS — M79.18 BUTTOCK PAIN: Primary | ICD-10-CM

## 2019-03-18 ENCOUNTER — TELEPHONE (OUTPATIENT)
Dept: FAMILY MEDICINE CLINIC | Age: 49
End: 2019-03-18

## 2019-03-22 ENCOUNTER — CLINICAL DOCUMENTATION (OUTPATIENT)
Dept: OCCUPATIONAL THERAPY | Age: 49
End: 2019-03-22

## 2019-03-23 ENCOUNTER — HOSPITAL ENCOUNTER (OUTPATIENT)
Dept: GENERAL RADIOLOGY | Age: 49
Discharge: HOME | End: 2019-03-25
Payer: MEDICARE

## 2019-03-23 DIAGNOSIS — M79.18 BUTTOCK PAIN: ICD-10-CM

## 2019-03-23 PROCEDURE — 72220 X-RAY EXAM SACRUM TAILBONE: CPT

## 2019-04-10 ENCOUNTER — OFFICE VISIT (OUTPATIENT)
Dept: FAMILY MEDICINE CLINIC | Age: 49
End: 2019-04-10
Payer: MEDICARE

## 2019-04-10 VITALS
OXYGEN SATURATION: 97 % | SYSTOLIC BLOOD PRESSURE: 110 MMHG | TEMPERATURE: 97.6 F | BODY MASS INDEX: 23.19 KG/M2 | HEIGHT: 62 IN | DIASTOLIC BLOOD PRESSURE: 70 MMHG | WEIGHT: 126 LBS | HEART RATE: 77 BPM

## 2019-04-10 DIAGNOSIS — M53.3 COCCYX PAIN: Primary | ICD-10-CM

## 2019-04-10 DIAGNOSIS — K86.1 CHRONIC PANCREATITIS, UNSPECIFIED PANCREATITIS TYPE (HCC): ICD-10-CM

## 2019-04-10 PROCEDURE — 1036F TOBACCO NON-USER: CPT | Performed by: NURSE PRACTITIONER

## 2019-04-10 PROCEDURE — G8428 CUR MEDS NOT DOCUMENT: HCPCS | Performed by: NURSE PRACTITIONER

## 2019-04-10 PROCEDURE — G8420 CALC BMI NORM PARAMETERS: HCPCS | Performed by: NURSE PRACTITIONER

## 2019-04-10 PROCEDURE — 99213 OFFICE O/P EST LOW 20 MIN: CPT | Performed by: NURSE PRACTITIONER

## 2019-04-10 ASSESSMENT — ENCOUNTER SYMPTOMS
NAUSEA: 1
ABDOMINAL PAIN: 1
BACK PAIN: 1

## 2019-04-10 NOTE — PROGRESS NOTES
Subjective  Chief Complaint   Patient presents with    Tailbone Pain     pt states that fell on 2/1/19. she has been in to see us and ready cares and nothing found. pt states that she is still struggling with the pain. has to be carefull how she sits        HPI    Here for tailbone pain. I had initially seen her for this about a month ago. Tailbone-still painful, even with BM's, worse on left side. Previous xray showed cortical irregularity in the coccyx   Having a hard time dealing with the pain. Would like further testing to find out what is going on . Denies warm or erythema. Having abdominal pain again that radiates around the side. Worried that she is having a flair of pancreatitis. Has not had much to eat today. Trying to push the fluids.       Patient Active Problem List    Diagnosis Date Noted    Major depressive disorder, single episode, 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[address] 01/15/2019    Abnormal MRI, liver 05/22/2018    Acute recurrent pancreatitis 05/22/2018    At risk of fracture due to osteoporosis 05/22/2018    Calcinosis 05/22/2018    Chronic headaches 05/22/2018    Conductive hearing loss in left ear 05/22/2018    Edema 05/22/2018    Iron overload 05/22/2018    Mass of left side of neck 05/22/2018    Medullary sponge kidney of both kidneys 35/08/2144    Metabolic acidosis 51/52/4253    Microscopic hematuria 05/22/2018    Nausea 05/22/2018    Renal tubular acidosis type I 05/22/2018    Rheumatoid arthritis ([address] 05/22/2018    Tension type headache 05/22/2018    Weight loss, abnormal 05/22/2018    Cellulitis, face 04/06/2018    Other chest pain 11/01/2017    Left lower quadrant pain 11/01/2017    Sjogren's syndrome ([address] 11/01/2017    Diverticulitis of large intestine without perforation or abscess without bleeding 11/01/2017    Fibroids 11/01/2017    Transfusion history 11/01/2017    Pancreatitis 05/13/2017    Acquired hypothyroidism 09/26/2016    GERD (gastroesophageal reflux disease) 09/26/2016    Anxiety 12/09/2015    Depression 12/09/2015    ADD (attention deficit disorder) 02/12/2015    Endometriosis 09/09/2014    ASCUS favoring benign 05/01/2013    S/P endometrial ablation 05/01/2013     Past Medical History:   Diagnosis Date    Acquired hypothyroidism 9/26/2016    Acute pancreatitis     ADD (attention deficit disorder) 2/12/2015    Anxiety     Depression 12/9/2015    Endometrial cyst of ovary 5/10/14    RT ovary, ruptured    GERD (gastroesophageal reflux disease) 9/26/2016    Medullary sponge kidney of both kidneys 5/22/2018    Sjogren's disease ([facility] [address]     Stress     Swelling      Past Surgical History:   Procedure Laterality Date    CHOLECYSTECTOMY  2011    HYSTERECTOMY  2014    sept    OVARY REMOVAL Left Jan 2014    UPPER GASTROINTESTINAL ENDOSCOPY  09/16/2016    EGD W/BX     Family History   Problem Relation Age of Onset    Heart Disease Father 48    Cancer Maternal Grandmother         breast    Heart Disease Other         mom and dad's side     Social History     Socioeconomic History    Marital status: Single     Spouse name: None    Number of children: None    Years of education: None    Highest education level: None   Occupational History    None   Social Needs    Financial resource strain: None    Food insecurity:     Worry: None     Inability: None    Transportation needs:     Medical: None     Non-medical: None   Tobacco Use    Smoking status: Never Smoker    Smokeless tobacco: Never Used   Substance and Sexual Activity    Alcohol use: No     Alcohol/week: 0.0 oz     Comment: no alcohol since 2011    Drug use: No    Sexual activity: None   Lifestyle    Physical activity:     Days per week: None     Minutes per session: None    Stress: None   Relationships    Social connections:     Talks on phone: None     Gets together: None     Attends [employer] service: None     Active member of club or organization: None     Attends meetings of clubs or organizations: None     Relationship status: None    Intimate partner violence:     Fear of current or ex partner: None     Emotionally abused: None     Physically abused: None     Forced sexual activity: None   Other Topics Concern    None   Social History Narrative    ** Merged History Encounter **          Current Outpatient Medications on File Prior to Visit   Medication Sig Dispense Refill    amphetamine-dextroamphetamine (ADDERALL XR) 30 MG extended release capsule Take 1 capsule by mouth 2 times daily for 30 days. 60 capsule 0    methotrexate (RHEUMATREX) 2.5 MG chemo tablet TAKE 3 TABLETS BY MOUTH ONCE WEEKLY  (TAKE ALL 3 TABLETS AT ONCE JUST ONE DAY OF WEEK)  3    liothyronine (CYTOMEL) 25 MCG tablet TAKE 1 TABLET BY MOUTH DAILY 90 tablet 1    linaclotide (LINZESS) 290 MCG CAPS capsule TAKE ONE CAPSULE BY MOUTH EVERY MORNING BEFORE BREAKFAST 30 capsule 4    furosemide (LASIX) 20 MG tablet Take 1 tablet by mouth 2 times daily 60 tablet 2    promethazine (PHENERGAN) 25 MG tablet TAKE ONE TABLET BY MOUTH EVERY SIX HOURS AS NEEDED FOR NAUSEA 40 tablet 0    esomeprazole (NEXIUM) 40 MG delayed release capsule TK ONE C PO D 30 capsule 5    cyclobenzaprine (FLEXERIL) 10 MG tablet Start with 1/2 tab. Do not drive or operate machinery while taking muscle relaxers. Can take up to three times daily as needed. 30 tablet 0    estradiol (ESTRACE) 1 MG tablet Take 1 mg by mouth      prochlorperazine (COMPAZINE) 10 MG tablet Take 1 tablet by mouth every 6 hours as needed (nausea) 30 tablet 2    buPROPion (WELLBUTRIN XL) 300 MG extended release tablet TAKE ONE TABLET BY MOUTH EVERY MORNING 90 tablet 2    ZOLMitriptan (ZOMIG) 5 MG tablet TAKE 1 TABLET BY MOUTH AT ONSET OF MIGRAINE. MAY REPEAT ONCE AFTER 2 HOURS.  MAX 10 MG (2 TABLETS) PER DAY  11    levothyroxine (SYNTHROID) 25 MCG tablet Take one daily 90 tablet 1    ondansetron (ZOFRAN ODT) 4 MG disintegrating tablet Take 1-2 tablets by mouth every 12 hours as needed for Nausea May Sub regular tablet (non-ODT) if insurance does not cover ODT. 12 tablet 0    hydroxychloroquine (PLAQUENIL) 200 MG tablet Take 300 mg by mouth daily       Pancrelipase, Lip-Prot-Amyl, (CREON) 30587 UNITS CPEP 2 caps tid 180 capsule 03     No current facility-administered medications on file prior to visit. No Known Allergies    Review of Systems   Constitutional: Negative for fatigue. Gastrointestinal: Positive for abdominal pain and nausea. Musculoskeletal: Positive for back pain. Objective  Vitals:    04/10/19 1634   BP: 110/70   Pulse: 77   Temp: 97.6 °F (36.4 °C)   SpO2: 97%   Weight: 126 lb (57.2 kg)   Height: 5' 2\" (1.575 m)     Physical Exam   Constitutional: She is oriented to person, place, and time. She appears well-developed and well-nourished. Cardiovascular: Normal rate, regular rhythm, normal heart sounds and intact distal pulses. Pulmonary/Chest: Effort normal and breath sounds normal.   Abdominal: Soft. There is tenderness (LUQ). Musculoskeletal:        Lumbar back: She exhibits tenderness, bony tenderness and pain. Back:    Neurological: She is alert and oriented to person, place, and time. Skin: Skin is warm. Psychiatric: She has a normal mood and affect. Her behavior is normal. Judgment and thought content normal.   Nursing note and vitals reviewed. Assessment & Plan     Diagnosis Orders   1. Coccyx pain  MRI SACRUM COCCYX WO CONTRAST   2.  Chronic pancreatitis, unspecified pancreatitis type ([facility] [address]  CBC Auto Differential    Comprehensive Metabolic Panel    Amylase    Lipase       Orders Placed This Encounter   Procedures    MRI SACRUM COCCYX WO CONTRAST     Standing Status:   Future     Standing Expiration Date:   4/10/2020     Order Specific Question:   Reason for exam:     Answer:   persistent coccyx pain, abnormal xray    CBC Auto Differential     Standing Status:   Future     Standing Expiration Date: 4/10/2020    Comprehensive Metabolic Panel     Standing Status:   Future     Standing Expiration Date:   4/10/2020    Amylase     Standing Status:   Future     Standing Expiration Date:   4/10/2020    Lipase     Standing Status:   Future     Standing Expiration Date:   4/10/2020     We will fu after testing. Side effects, adverse effects of the medication prescribed today, as well as treatment plan/ rationale and result expectations have been discussed with the patient who expresses understanding and desires to proceed. Close follow up to evaluate treatment results and for coordination of care. I have reviewed the patient's medical history in detail and updated the computerized patient record. As always, patient is advised that if symptoms worsen in any way they will proceed to the nearest emergency room.      Marisol Zheng, ORTIZ - CNP

## 2019-04-12 ENCOUNTER — HOSPITAL ENCOUNTER (OUTPATIENT)
Dept: MRI IMAGING | Age: 49
Discharge: HOME | End: 2019-04-14
Payer: MEDICARE

## 2019-04-12 DIAGNOSIS — M53.3 COCCYX PAIN: ICD-10-CM

## 2019-04-12 PROCEDURE — 72195 MRI PELVIS W/O DYE: CPT

## 2019-04-15 DIAGNOSIS — S32.2XXS CLOSED FRACTURE OF COCCYX, SEQUELA: Primary | ICD-10-CM

## 2019-04-15 RX ORDER — TRAMADOL HYDROCHLORIDE 50 MG/1
50 TABLET ORAL EVERY 6 HOURS PRN
Qty: 28 TABLET | Refills: 0 | Status: SHIPPED | OUTPATIENT
Start: 2019-04-15 | End: 2019-04-22

## 2019-04-25 DIAGNOSIS — F98.8 ATTENTION DEFICIT DISORDER, UNSPECIFIED HYPERACTIVITY PRESENCE: ICD-10-CM

## 2019-04-25 RX ORDER — DEXTROAMPHETAMINE SACCHARATE, AMPHETAMINE ASPARTATE MONOHYDRATE, DEXTROAMPHETAMINE SULFATE AND AMPHETAMINE SULFATE 7.5; 7.5; 7.5; 7.5 MG/1; MG/1; MG/1; MG/1
30 CAPSULE, EXTENDED RELEASE ORAL 2 TIMES DAILY
Qty: 60 CAPSULE | Refills: 0 | Status: SHIPPED | OUTPATIENT
Start: 2019-04-25 | End: 2019-05-28 | Stop reason: SDUPTHER

## 2019-05-15 ENCOUNTER — HOSPITAL ENCOUNTER (OUTPATIENT)
Dept: OCCUPATIONAL THERAPY | Age: 49
Setting detail: THERAPIES SERIES
Discharge: HOME | End: 2019-05-15
Payer: MEDICARE

## 2019-05-15 PROCEDURE — 97166 OT EVAL MOD COMPLEX 45 MIN: CPT

## 2019-05-15 NOTE — PROGRESS NOTES
MCG tablet, TAKE 1 TABLET BY MOUTH DAILY, Disp: 90 tablet, Rfl: 1    linaclotide (LINZESS) 290 MCG CAPS capsule, TAKE ONE CAPSULE BY MOUTH EVERY MORNING BEFORE BREAKFAST, Disp: 30 capsule, Rfl: 4    furosemide (LASIX) 20 MG tablet, Take 1 tablet by mouth 2 times daily, Disp: 60 tablet, Rfl: 2    promethazine (PHENERGAN) 25 MG tablet, TAKE ONE TABLET BY MOUTH EVERY SIX HOURS AS NEEDED FOR NAUSEA, Disp: 40 tablet, Rfl: 0    esomeprazole (NEXIUM) 40 MG delayed release capsule, TK ONE C PO D, Disp: 30 capsule, Rfl: 5    cyclobenzaprine (FLEXERIL) 10 MG tablet, Start with 1/2 tab. Do not drive or operate machinery while taking muscle relaxers. Can take up to three times daily as needed. , Disp: 30 tablet, Rfl: 0    estradiol (ESTRACE) 1 MG tablet, Take 1 mg by mouth, Disp: , Rfl:     prochlorperazine (COMPAZINE) 10 MG tablet, Take 1 tablet by mouth every 6 hours as needed (nausea), Disp: 30 tablet, Rfl: 2    buPROPion (WELLBUTRIN XL) 300 MG extended release tablet, TAKE ONE TABLET BY MOUTH EVERY MORNING, Disp: 90 tablet, Rfl: 2    ZOLMitriptan (ZOMIG) 5 MG tablet, TAKE 1 TABLET BY MOUTH AT ONSET OF MIGRAINE. MAY REPEAT ONCE AFTER 2 HOURS.  MAX 10 MG (2 TABLETS) PER DAY, Disp: , Rfl: 11    levothyroxine (SYNTHROID) 25 MCG tablet, Take one daily, Disp: 90 tablet, Rfl: 1    ondansetron (ZOFRAN ODT) 4 MG disintegrating tablet, Take 1-2 tablets by mouth every 12 hours as needed for Nausea May Sub regular tablet (non-ODT) if insurance does not cover ODT., Disp: 12 tablet, Rfl: 0    hydroxychloroquine (PLAQUENIL) 200 MG tablet, Take 300 mg by mouth daily , Disp: , Rfl:     Pancrelipase, Lip-Prot-Amyl, (CREON) 83090 UNITS CPEP, 2 caps tid, Disp: 180 capsule, Rfl: 03    Visits Allowed/Insurance/Certification Information:   OT Visit Information  OT Insurance Information: Medicare  Total # of Visits Approved: 10  Progress Note Due Date: 06/13/19  Progress Note Counter: Evaluation    Restrictions/Precautions  [] None  [] Fall Risk  [] Pacemaker  [] WB status [x] Other: Patient states no heavy lifiting; otherwise, activity as tolerated          SUBJECTIVE FINDINGS     Support contact: Adult children     Pt lives:  [] Alone [] With spouse [x] Other:    Comment: Adult children available to assist as needed      Pain:   [x] Present   []  Not Present:   Pain Location  Description Initial Rating  Current Rating  Improved by Worsened by   Radial surface of right wrist, radiates distally to base of thumb and proximal to mid forearm Patient describes pain as an ache with an occasional \"shock\" 4-5/10 4-5/10 Resting Functional use   Max pain at home during activities: Patient indicates pain is as high as 8/10 with functional use  Lowest pain at home during activities/rest: Patient rates pain at its best a 4/10 at rest    Action:   [] No action necessary:        [x] Patient reports pain is at acceptable level for treatment:        [] Patient instructed to call physician:        [] Other:    Comments:    Prior Level of Functioning:   [x] Patient performing at independent level for ADL and IADL activities:    [] Patient receiving assistance for ADL and IADL activities:    [] Other:    Comments:      Work Status:  Employed:   [] No  [x] Yes:  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  Pt's work requirements are manual blood pressures and handwritten documentation    [] Normal   [] Restricted:   [] Off D/T Injury/Condition  [] Retired [] Unemployed    [] Disabled  [x] Other: Patient currently working 1 day/week with difficulty performing job duties due to increased pain  Is this a work related injury:  [x] No    [] Yes     Is this a [facility] claim:  [x] No  []  Yes:       Driving:  [] No  [x]  Yes: Patient is stabilizing steering wheel with dominant hand and using non-dominant hand for steering/turning      Hobbies, Leisure, activities: Patient enjoys shopping and gardening- hasn't been able to perform due to pain     History of Present Illness or Pain:  Patient reports she went in for a routine colonoscopy and had severe complications from IV placement. Patient reports she has been dealing with right wrist pain, swelling, and difficulty performing ADL/IADL activities since 2017. She had an MRI in January of 2018 and started seeing an orthopedic doctor (Dr. Germania Gray) for injections. She was referred to Dr. Criselda Mchugh and underwent surgery on right wrist for a \"tendon release\" per patient report in June of 2018. Patient was then referred to a plastic surgeon due to continued pain and no functional improvements. She was then referred to another orthopedic specialist who performed a revision of right wrist on 4/24/2019. Current Functional Limitations:     Orientation: Oriented to  [x] Person  [x] Place  [x] Time     Communication:   [x] WFL   [] Impaired  Comments:    Hearing:  [x] WFL  []  Impaired   Comments:   Perception:  [x] WFL  []  Impaired:    Vision:  [] WFL  [] glasses   []  blind   [x] Other: Patient with blurry vision- she reports she developed cataracts and is currently following up for possible treatment     Feeding: Patient using left hand to feed self with utensils; Patient with increased difficulty cutting foods  Grooming: Patient is currently brushing teeth with non-dominant hand. Patient has assistance from daughter for styling hair  Bathing: Patient reports taking showers versus baths due to pain level. She requires assistance from daughter for washing hair due to pain.    UE dressing: Patient requires assistance with clothing fasteners, especially to clasp bra; however, performing other UB dressing tasks independently  LE dressing: Patient is independent to dress lower body; however, adapted clothing for increased ease (I.e. Elastic waist pants)   Toileting: Patient is performing independently with increased difficulty with hygiene, performing with left (non-dominant) hand  Toilet transfer: Independent  Tub/Shower transfer: Independent    Cooking: Patient reports family shares responsibility; she is performing simple, microwave meals and is unable to lift/carry heavy pots and pans. She is also not reaching into cupboards for items with right hand  Cleaning: Patient is performing cleaning tasks with left (non-dominant) hand  Laundry: Patient is performing with assistance from daughters     Patient goal for therapy: \"To be able to do more with my right hand\"       OBSERVATION:     Posture:  [x] Symmetrical:    [] Asymmetrical:    [] Kyphotic:    [] Shoulder Subluxation:    [] Scoliosis:    [] Lordosis:      Balance  Sitting  Standing    Static   [x] Good    [] Fair   [] Poor  [x] Good    [] Fair   [] Poor    Dynamic   [x] Good    [] Fair   [] Poor  [x] Good     [] Fair   [] Poor     OBJECTIVE FINDINGS    Hand Dominance:   [x] Right   [] Left         Upper Extremity Strength and Range of Motion    Right  Left    MMT A P Norm  A P MMT   Shoulder          Flexion 3+/5 WFL NT 0-180 WFL NT 4/5   Abduction 3+/5 WFL NT 0-180 WFL NT 4/5   Internal Rotation 3/5 To lower lumbar back NT 0-80 WFL NT 4/5   External Rotation 3-/5 Unable to reach to back of head NT 0-60 WFL NT 4/5   Elbow          Flexion 3+/5 WFL NT 0-150 WFL NT 4/5   Extension 3+/5 WFL -0 WFL NT 4/5   Pronation 3-/5 65 NT 0-80 85 NT 4/5   Supination 3-/5 70 NT 0-80 80 NT 4/5   Wrist          Flexion 3-/5 45 NT 0-70 80 NT 4/5   Extension  3-/5 40 NT 0-60 65 NT 4/5   Ulnar deviation NT NT NT 0-30 NT NT NT   Radial Deviation  NT NT NT 0-20 NT NT NT   Comments: Patient with increased pain with ROM measurements;  Formal MMT held on evaluation secondary to increased pain- gross assessment performed on evaluation    Hand Range of Motion   Right: Patient unable to form a full composite grasp on evaluation; able to fully extend digits  Left: WFL on evaluation     Right   Left    A P  A P   Thumb        MP Flexion 55 NT  WFL NT   IP Flexion 60 NT  WFL NT   Radial Abduction NT NT  WFL NT   Palmar Abduction NT NT  WFL NT Opposition  Right Hand: Patient with inconsistent opposition on evaluation; able to oppose index and ring finger; unable to oppose middle and little digit  Left Hand: WFL    Distance Thumb Tip from Head of 5th MC:    Right Hand: 1\"  Left Hand: WFL    Edema- Mild edema noted in right wrist and base of right thumb     & Pinch Strength  Average of 3 tries Right Norm Left Norm    (lb) 3.3 55 49.6 49   Duran Pinch (lb) NT NT NT NT   Lateral Pinch (lb) NT NT NT NT   Comments:    Coordination & Dexterity   Right Norm Left Norm   Box & Blocks  (# of blocks) NT NT NT NT   Nine Hole Peg Test  (seconds) 1 min, 34 sec 17.3 20.17 sec 18.4   Comments:     Skin Integrity  Patient with a well-healed, ~2\" zig-zag scar along radial surface of right wrist. Scar appears pink in color with a small open spot in the middle (patient reports she scratched) and a small black speck (possible suture) at distal end. Scar with mild tightness noted on evaluation    Cognition:WFL       Sensation: Patient reports occasional numbness and tingling at scar and at base of right thumb       Tone:   [x] Normal  [] Hypertonic   [] Hypotonic   Comments:      Joint Mobility  Patient with decreased mobility throughout right wrist     Palpation/Tenderness  Patient with mild tenderness to touch at right wrist    ASSESSMENT  Patient presents 3 weeks s/p right wrist revision of DeQuervain's tenosynovitis. Patient presents with decreased scar mobility with decreased overall right UE ROM, strength, and coordination in dominant hand leading to difficulty carrying out ADL and IADL activities. Patient would benefit from outpatient occupational therapy to address the abovementioned deficits in order to improve her overall quality of life.      Problems:  [x] Decreased UE strength   [x] Decreased UE ROM   [x] Decreased  strength   [x] Decreased fine motor skills   [x] Increased pain    [x] Decreased ADL status   [x] Decreased joint mobility   [x] Decreased coordination   [x] Decreased sensation    [] Other      Complexity:         [] Low Complexity:   ¨ History: Brief history including review of medical records relating to the problem  ¨ Exam: 1-3 performance Deficits  ¨ Assistance/Modification: No assistance or modifications required to perform tasks. No comorbities affecting occupational performance  [x] Medium Complexity:   ¨ History: Expanded review of medical records and additional review of physical, cognitive, or psychosocial history related to current functional performance  ¨ Exam: 3-5 performance deficits  ¨ Assistance/Modification: Min/mod assistance or modifications required to perform tasks. May have comorbidities that affect occupational performance. [] High Complexity:   ¨ History: Extensive review of medical records and additional review of physical, cognitive, or psychosocial history related to current functional performance. ¨ Exam: 5 or more performance deficits  ¨ Assistance/Modification: Significant assistance or modifications required to perform tasks. Have comorbidities that affect occupational performance.     Falls Risk Assessment     Age: 0-58 = 0          60-69= 1            > 70= 2 History of Falls:   0  Falls  last 6 mo = 0    1  fall  Last  6 mo = 1   1-3 falls last 6 mo = 2 Medical History:   Parkinsons, CVA,HTN, vertigo, >4 meds, use of assistive device (1pt.for each)  Mental Status:  A & O x 3 = 0  Disoriented to person, place, or time = 2     [x]  INITIAL ASSESSMENT:                                                      Date: 5/15/2019                                                  Age:   2                                                         Falls: 1                                                          PMH: 0                                                          Mental: 0                                                       Total:  1                                                        *Patient 4 or younger: will demonstrate ability to make a full composite fist with right hand to increase participation in functional tasks. Long term goal 4: Patient will increase right hand coordination to [facility] to perform ADL/IADL tasks as measured by a 20 second improvement on 9-HPT. Long term goal 5: Patient will increase right UE strength to 4/5 and  strength to >/= 25# for increased ease with ADL/IADL tasks. Long term goals 6: Patient will be independent with all recommended HEP for pain managment, scar and edema management, ROM, coordination, and strength. Time In: 9532  Time Out: 0269  Total Minutes: 60    Electronically signed by FLOR Mgcraw on 5/15/2019 at 5:11 PM      The following patient has been evaluated for occupational therapy services. For therapy to continue, insurance requires physician review of the treatment plan. Please review the attached evaluation and/or summary of the patient's plan of care, and verify that you agree therapy should continue by signing the attached document and sending it back to our office.  Thank you for this referral.    Physician signature____________________________________     Date________________

## 2019-05-21 ENCOUNTER — OFFICE VISIT (OUTPATIENT)
Dept: FAMILY MEDICINE CLINIC | Age: 49
End: 2019-05-21
Payer: MEDICARE

## 2019-05-21 VITALS
SYSTOLIC BLOOD PRESSURE: 110 MMHG | RESPIRATION RATE: 12 BRPM | WEIGHT: 126 LBS | OXYGEN SATURATION: 99 % | HEIGHT: 62 IN | BODY MASS INDEX: 23.19 KG/M2 | TEMPERATURE: 98.6 F | DIASTOLIC BLOOD PRESSURE: 70 MMHG | HEART RATE: 105 BPM

## 2019-05-21 DIAGNOSIS — J18.0 BRONCHOPNEUMONIA: Primary | ICD-10-CM

## 2019-05-21 PROCEDURE — 99213 OFFICE O/P EST LOW 20 MIN: CPT | Performed by: FAMILY MEDICINE

## 2019-05-21 PROCEDURE — 1036F TOBACCO NON-USER: CPT | Performed by: FAMILY MEDICINE

## 2019-05-21 PROCEDURE — G8420 CALC BMI NORM PARAMETERS: HCPCS | Performed by: FAMILY MEDICINE

## 2019-05-21 PROCEDURE — G8427 DOCREV CUR MEDS BY ELIG CLIN: HCPCS | Performed by: FAMILY MEDICINE

## 2019-05-21 RX ORDER — LEVOFLOXACIN 500 MG/1
500 TABLET, FILM COATED ORAL DAILY
Qty: 10 TABLET | Refills: 0 | Status: SHIPPED | OUTPATIENT
Start: 2019-05-21 | End: 2019-05-31

## 2019-05-21 RX ORDER — PROMETHAZINE HYDROCHLORIDE AND CODEINE PHOSPHATE 6.25; 1 MG/5ML; MG/5ML
5 SYRUP ORAL EVERY 4 HOURS PRN
Qty: 240 ML | Refills: 1 | Status: SHIPPED | OUTPATIENT
Start: 2019-05-21 | End: 2019-09-11

## 2019-05-21 RX ORDER — TOPIRAMATE 50 MG/1
50 TABLET, FILM COATED ORAL 2 TIMES DAILY
Qty: 60 TABLET | Refills: 3 | Status: SHIPPED | OUTPATIENT
Start: 2019-05-21 | End: 2019-09-09 | Stop reason: SDUPTHER

## 2019-05-21 ASSESSMENT — ENCOUNTER SYMPTOMS
COUGH: 1
SORE THROAT: 1

## 2019-05-21 NOTE — PROGRESS NOTES
Patient is seen in follow up for   Chief Complaint   Patient presents with    Cough     Patient of NP Alba Lira following up from urgent care near [address] ruptured eardrum (right) and URI - x 3 weeks ago. Finished amoxicillin. Still coughing     Shoulder Pain     Complaining of left shoulder pain for 1 week. Cough   This is a chronic problem. The current episode started 1 to 4 weeks ago. The problem has been unchanged. The cough is productive of sputum. Associated symptoms include chills, a fever and a sore throat. The treatment provided moderate relief. Shoulder Pain    Associated symptoms include a fever.        Past Medical History:   Diagnosis Date    Acquired hypothyroidism 9/26/2016    Acute pancreatitis     ADD (attention deficit disorder) 2/12/2015    Anxiety     Depression 12/9/2015    Endometrial cyst of ovary 5/10/14    RT ovary, ruptured    GERD (gastroesophageal reflux disease) 9/26/2016    Medullary sponge kidney of both kidneys 5/22/2018    Sjogren's disease ([address]     Stress     Swelling      Patient Active Problem List    Diagnosis Date Noted    Major depressive disorder, single episode, 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[address] 01/15/2019    Abnormal MRI, liver 05/22/2018    Acute recurrent pancreatitis 05/22/2018    At risk of fracture due to osteoporosis 05/22/2018    Calcinosis 05/22/2018    Chronic headaches 05/22/2018    Conductive hearing loss in left ear 05/22/2018    Edema 05/22/2018    Iron overload 05/22/2018    Mass of left side of neck 05/22/2018    Medullary sponge kidney of both kidneys 56/93/1632    Metabolic acidosis 96/66/4120    Microscopic hematuria 05/22/2018    Nausea 05/22/2018    Renal tubular acidosis type I 05/22/2018    Rheumatoid arthritis ([address] 05/22/2018    Tension type headache 05/22/2018    Weight loss, abnormal 05/22/2018    Cellulitis, face 04/06/2018    Other chest pain 11/01/2017    Left lower quadrant pain 11/01/2017    Sjogren's Emotionally abused: None     Physically abused: None     Forced sexual activity: None   Other Topics Concern    None   Social History Narrative    ** Merged History Encounter **          Current Outpatient Medications   Medication Sig Dispense Refill    levofloxacin (LEVAQUIN) 500 MG tablet Take 1 tablet by mouth daily for 10 days 10 tablet 0    promethazine-codeine (PHENERGAN WITH CODEINE) 6.25-10 MG/5ML syrup Take 5 mLs by mouth every 4 hours as needed for Cough. 240 mL 1    topiramate (TOPAMAX) 50 MG tablet Take 1 tablet by mouth 2 times daily 60 tablet 3    amphetamine-dextroamphetamine (ADDERALL XR) 30 MG extended release capsule Take 1 capsule by mouth 2 times daily for 30 days. 60 capsule 0    methotrexate (RHEUMATREX) 2.5 MG chemo tablet TAKE 3 TABLETS BY MOUTH ONCE WEEKLY  (TAKE ALL 3 TABLETS AT ONCE JUST ONE DAY OF WEEK)  3    liothyronine (CYTOMEL) 25 MCG tablet TAKE 1 TABLET BY MOUTH DAILY 90 tablet 1    linaclotide (LINZESS) 290 MCG CAPS capsule TAKE ONE CAPSULE BY MOUTH EVERY MORNING BEFORE BREAKFAST 30 capsule 4    furosemide (LASIX) 20 MG tablet Take 1 tablet by mouth 2 times daily 60 tablet 2    promethazine (PHENERGAN) 25 MG tablet TAKE ONE TABLET BY MOUTH EVERY SIX HOURS AS NEEDED FOR NAUSEA 40 tablet 0    esomeprazole (NEXIUM) 40 MG delayed release capsule TK ONE C PO D 30 capsule 5    cyclobenzaprine (FLEXERIL) 10 MG tablet Start with 1/2 tab. Do not drive or operate machinery while taking muscle relaxers. Can take up to three times daily as needed. 30 tablet 0    estradiol (ESTRACE) 1 MG tablet Take 1 mg by mouth      prochlorperazine (COMPAZINE) 10 MG tablet Take 1 tablet by mouth every 6 hours as needed (nausea) 30 tablet 2    buPROPion (WELLBUTRIN XL) 300 MG extended release tablet TAKE ONE TABLET BY MOUTH EVERY MORNING 90 tablet 2    ZOLMitriptan (ZOMIG) 5 MG tablet TAKE 1 TABLET BY MOUTH AT ONSET OF MIGRAINE. MAY REPEAT ONCE AFTER 2 HOURS.  MAX 10 MG (2 TABLETS) PER DAY 11    levothyroxine (SYNTHROID) 25 MCG tablet Take one daily 90 tablet 1    ondansetron (ZOFRAN ODT) 4 MG disintegrating tablet Take 1-2 tablets by mouth every 12 hours as needed for Nausea May Sub regular tablet (non-ODT) if insurance does not cover ODT. 12 tablet 0    hydroxychloroquine (PLAQUENIL) 200 MG tablet Take 300 mg by mouth daily       Pancrelipase, Lip-Prot-Amyl, (CREON) 78433 UNITS CPEP 2 caps tid 180 capsule 03     No current facility-administered medications for this visit. Current Outpatient Medications on File Prior to Visit   Medication Sig Dispense Refill    amphetamine-dextroamphetamine (ADDERALL XR) 30 MG extended release capsule Take 1 capsule by mouth 2 times daily for 30 days. 60 capsule 0    methotrexate (RHEUMATREX) 2.5 MG chemo tablet TAKE 3 TABLETS BY MOUTH ONCE WEEKLY  (TAKE ALL 3 TABLETS AT ONCE JUST ONE DAY OF WEEK)  3    liothyronine (CYTOMEL) 25 MCG tablet TAKE 1 TABLET BY MOUTH DAILY 90 tablet 1    linaclotide (LINZESS) 290 MCG CAPS capsule TAKE ONE CAPSULE BY MOUTH EVERY MORNING BEFORE BREAKFAST 30 capsule 4    furosemide (LASIX) 20 MG tablet Take 1 tablet by mouth 2 times daily 60 tablet 2    promethazine (PHENERGAN) 25 MG tablet TAKE ONE TABLET BY MOUTH EVERY SIX HOURS AS NEEDED FOR NAUSEA 40 tablet 0    esomeprazole (NEXIUM) 40 MG delayed release capsule TK ONE C PO D 30 capsule 5    cyclobenzaprine (FLEXERIL) 10 MG tablet Start with 1/2 tab. Do not drive or operate machinery while taking muscle relaxers. Can take up to three times daily as needed. 30 tablet 0    estradiol (ESTRACE) 1 MG tablet Take 1 mg by mouth      prochlorperazine (COMPAZINE) 10 MG tablet Take 1 tablet by mouth every 6 hours as needed (nausea) 30 tablet 2    buPROPion (WELLBUTRIN XL) 300 MG extended release tablet TAKE ONE TABLET BY MOUTH EVERY MORNING 90 tablet 2    ZOLMitriptan (ZOMIG) 5 MG tablet TAKE 1 TABLET BY MOUTH AT ONSET OF MIGRAINE. MAY REPEAT ONCE AFTER 2 HOURS.  MAX 10 MG respiratory distress. She has no wheezes. She has no rales. She exhibits no tenderness. Abdominal: Soft. Bowel sounds are normal. She exhibits no distension and no mass. There is no tenderness. There is no rebound and no guarding. Musculoskeletal: Normal range of motion. Lymphadenopathy:     She has no cervical adenopathy. Neurological: She is alert and oriented to person, place, and time. No cranial nerve deficit. Coordination normal.   Skin: Skin is warm and dry. Psychiatric: She has a normal mood and affect. Assessment   Diagnosis Orders   1. Bronchopneumonia  promethazine-codeine (PHENERGAN WITH CODEINE) 6.25-10 MG/5ML syrup     Problem List     None          Plan  No orders of the defined types were placed in this encounter. Orders Placed This Encounter   Medications    levofloxacin (LEVAQUIN) 500 MG tablet     Sig: Take 1 tablet by mouth daily for 10 days     Dispense:  10 tablet     Refill:  0    promethazine-codeine (PHENERGAN WITH CODEINE) 6.25-10 MG/5ML syrup     Sig: Take 5 mLs by mouth every 4 hours as needed for Cough. Dispense:  240 mL     Refill:  1     Reduce doses taken as pain becomes manageable    topiramate (TOPAMAX) 50 MG tablet     Sig: Take 1 tablet by mouth 2 times daily     Dispense:  60 tablet     Refill:  3     No follow-ups on file.   Mathew Hernandez MD

## 2019-05-23 ENCOUNTER — TELEPHONE (OUTPATIENT)
Dept: FAMILY MEDICINE CLINIC | Age: 49
End: 2019-05-23

## 2019-05-23 ENCOUNTER — APPOINTMENT (OUTPATIENT)
Dept: CT IMAGING | Age: 49
End: 2019-05-23
Payer: MEDICARE

## 2019-05-23 ENCOUNTER — HOSPITAL ENCOUNTER (EMERGENCY)
Age: 49
Discharge: HOME | End: 2019-05-23
Payer: MEDICARE

## 2019-05-23 ENCOUNTER — OFFICE VISIT (OUTPATIENT)
Dept: FAMILY MEDICINE CLINIC | Age: 49
End: 2019-05-23
Payer: MEDICARE

## 2019-05-23 VITALS
HEART RATE: 101 BPM | SYSTOLIC BLOOD PRESSURE: 102 MMHG | DIASTOLIC BLOOD PRESSURE: 62 MMHG | WEIGHT: 124.8 LBS | OXYGEN SATURATION: 98 % | HEIGHT: 62 IN | TEMPERATURE: 98.2 F | RESPIRATION RATE: 16 BRPM | BODY MASS INDEX: 22.97 KG/M2

## 2019-05-23 VITALS
SYSTOLIC BLOOD PRESSURE: 105 MMHG | TEMPERATURE: 98 F | OXYGEN SATURATION: 99 % | WEIGHT: 124 LBS | BODY MASS INDEX: 22.68 KG/M2 | RESPIRATION RATE: 16 BRPM | DIASTOLIC BLOOD PRESSURE: 76 MMHG | HEART RATE: 98 BPM

## 2019-05-23 DIAGNOSIS — R10.12 LUQ ABDOMINAL PAIN: Primary | ICD-10-CM

## 2019-05-23 DIAGNOSIS — R10.12 ABDOMINAL PAIN, LEFT UPPER QUADRANT: Primary | ICD-10-CM

## 2019-05-23 DIAGNOSIS — K86.1 CHRONIC PANCREATITIS, UNSPECIFIED PANCREATITIS TYPE (HCC): ICD-10-CM

## 2019-05-23 LAB
ALBUMIN SERPL-MCNC: 4.5 G/DL (ref 3.5–4.6)
ALBUMIN SERPL-MCNC: 4.6 G/DL (ref 3.5–4.6)
ALP BLD-CCNC: 101 U/L (ref 40–130)
ALP BLD-CCNC: 104 U/L (ref 40–130)
ALT SERPL-CCNC: 10 U/L (ref 0–33)
ALT SERPL-CCNC: 11 U/L (ref 0–33)
AMYLASE: 88 U/L (ref 22–93)
ANION GAP SERPL CALCULATED.3IONS-SCNC: 14 MEQ/L (ref 9–15)
ANION GAP SERPL CALCULATED.3IONS-SCNC: 15 MEQ/L (ref 9–15)
AST SERPL-CCNC: 10 U/L (ref 0–35)
AST SERPL-CCNC: 10 U/L (ref 0–35)
BACTERIA: NEGATIVE /HPF
BASOPHILS ABSOLUTE: 0.1 K/UL (ref 0–0.2)
BASOPHILS ABSOLUTE: 0.1 K/UL (ref 0–0.2)
BASOPHILS RELATIVE PERCENT: 1.3 %
BASOPHILS RELATIVE PERCENT: 1.4 %
BILIRUB SERPL-MCNC: <0.2 MG/DL (ref 0.2–0.7)
BILIRUB SERPL-MCNC: <0.2 MG/DL (ref 0.2–0.7)
BILIRUBIN URINE: NEGATIVE
BLOOD, URINE: ABNORMAL
BUN BLDV-MCNC: 12 MG/DL (ref 6–20)
BUN BLDV-MCNC: 14 MG/DL (ref 6–20)
CALCIUM SERPL-MCNC: 9.3 MG/DL (ref 8.5–9.9)
CALCIUM SERPL-MCNC: 9.8 MG/DL (ref 8.5–9.9)
CHLORIDE BLD-SCNC: 101 MEQ/L (ref 95–107)
CHLORIDE BLD-SCNC: 104 MEQ/L (ref 95–107)
CLARITY: CLEAR
CO2: 22 MEQ/L (ref 20–31)
CO2: 22 MEQ/L (ref 20–31)
COLOR: YELLOW
CREAT SERPL-MCNC: 0.78 MG/DL (ref 0.5–0.9)
CREAT SERPL-MCNC: 0.87 MG/DL (ref 0.5–0.9)
EOSINOPHILS ABSOLUTE: 0.1 K/UL (ref 0–0.7)
EOSINOPHILS ABSOLUTE: 0.1 K/UL (ref 0–0.7)
EOSINOPHILS RELATIVE PERCENT: 1 %
EOSINOPHILS RELATIVE PERCENT: 1.2 %
EPITHELIAL CELLS, UA: ABNORMAL /HPF (ref 0–5)
GFR AFRICAN AMERICAN: >60
GFR AFRICAN AMERICAN: >60
GFR NON-AFRICAN AMERICAN: >60
GFR NON-AFRICAN AMERICAN: >60
GLOBULIN: 2.5 G/DL (ref 2.3–3.5)
GLOBULIN: 2.6 G/DL (ref 2.3–3.5)
GLUCOSE BLD-MCNC: 90 MG/DL (ref 70–99)
GLUCOSE BLD-MCNC: 93 MG/DL (ref 70–99)
GLUCOSE URINE: NEGATIVE MG/DL
HCT VFR BLD CALC: 37.8 % (ref 37–47)
HCT VFR BLD CALC: 39.6 % (ref 37–47)
HEMOGLOBIN: 13.2 G/DL (ref 12–16)
HEMOGLOBIN: 13.5 G/DL (ref 12–16)
HYALINE CASTS: ABNORMAL /HPF (ref 0–5)
KETONES, URINE: ABNORMAL MG/DL
LACTIC ACID: 1.5 MMOL/L (ref 0.5–2.2)
LEUKOCYTE ESTERASE, URINE: NEGATIVE
LIPASE: 46 U/L (ref 12–95)
LIPASE: 49 U/L (ref 12–95)
LYMPHOCYTES ABSOLUTE: 1.7 K/UL (ref 1–4.8)
LYMPHOCYTES ABSOLUTE: 1.7 K/UL (ref 1–4.8)
LYMPHOCYTES RELATIVE PERCENT: 27.8 %
LYMPHOCYTES RELATIVE PERCENT: 34.2 %
MCH RBC QN AUTO: 29.4 PG (ref 27–31.3)
MCH RBC QN AUTO: 30 PG (ref 27–31.3)
MCHC RBC AUTO-ENTMCNC: 34.2 % (ref 33–37)
MCHC RBC AUTO-ENTMCNC: 34.9 % (ref 33–37)
MCV RBC AUTO: 85.8 FL (ref 82–100)
MCV RBC AUTO: 85.9 FL (ref 82–100)
MONOCYTES ABSOLUTE: 0.3 K/UL (ref 0.2–0.8)
MONOCYTES ABSOLUTE: 0.3 K/UL (ref 0.2–0.8)
MONOCYTES RELATIVE PERCENT: 5.3 %
MONOCYTES RELATIVE PERCENT: 6.6 %
NEUTROPHILS ABSOLUTE: 2.9 K/UL (ref 1.4–6.5)
NEUTROPHILS ABSOLUTE: 3.9 K/UL (ref 1.4–6.5)
NEUTROPHILS RELATIVE PERCENT: 56.9 %
NEUTROPHILS RELATIVE PERCENT: 64.3 %
NITRITE, URINE: NEGATIVE
PDW BLD-RTO: 13 % (ref 11.5–14.5)
PDW BLD-RTO: 13.1 % (ref 11.5–14.5)
PH UA: 7.5 (ref 5–9)
PLATELET # BLD: 316 K/UL (ref 130–400)
PLATELET # BLD: 325 K/UL (ref 130–400)
POC CREATININE WHOLE BLOOD: 0.8
POTASSIUM SERPL-SCNC: 3.5 MEQ/L (ref 3.4–4.9)
POTASSIUM SERPL-SCNC: 4 MEQ/L (ref 3.4–4.9)
PROTEIN UA: NEGATIVE MG/DL
RBC # BLD: 4.4 M/UL (ref 4.2–5.4)
RBC # BLD: 4.6 M/UL (ref 4.2–5.4)
RBC UA: ABNORMAL /HPF (ref 0–5)
SODIUM BLD-SCNC: 137 MEQ/L (ref 135–144)
SODIUM BLD-SCNC: 141 MEQ/L (ref 135–144)
SPECIFIC GRAVITY UA: 1.02 (ref 1–1.03)
TOTAL PROTEIN: 7.1 G/DL (ref 6.3–8)
TOTAL PROTEIN: 7.1 G/DL (ref 6.3–8)
URINE REFLEX TO CULTURE: YES
UROBILINOGEN, URINE: 0.2 E.U./DL
WBC # BLD: 5 K/UL (ref 4.8–10.8)
WBC # BLD: 6.1 K/UL (ref 4.8–10.8)
WBC UA: ABNORMAL /HPF (ref 0–5)

## 2019-05-23 PROCEDURE — 6360000004 HC RX CONTRAST MEDICATION: Performed by: PHYSICIAN ASSISTANT

## 2019-05-23 PROCEDURE — 83605 ASSAY OF LACTIC ACID: CPT

## 2019-05-23 PROCEDURE — 36415 COLL VENOUS BLD VENIPUNCTURE: CPT

## 2019-05-23 PROCEDURE — 99213 OFFICE O/P EST LOW 20 MIN: CPT | Performed by: NURSE PRACTITIONER

## 2019-05-23 PROCEDURE — 99284 EMERGENCY DEPT VISIT MOD MDM: CPT

## 2019-05-23 PROCEDURE — 87086 URINE CULTURE/COLONY COUNT: CPT

## 2019-05-23 PROCEDURE — G8420 CALC BMI NORM PARAMETERS: HCPCS | Performed by: NURSE PRACTITIONER

## 2019-05-23 PROCEDURE — 96375 TX/PRO/DX INJ NEW DRUG ADDON: CPT

## 2019-05-23 PROCEDURE — 80053 COMPREHEN METABOLIC PANEL: CPT

## 2019-05-23 PROCEDURE — 1036F TOBACCO NON-USER: CPT | Performed by: NURSE PRACTITIONER

## 2019-05-23 PROCEDURE — 2580000003 HC RX 258: Performed by: PHYSICIAN ASSISTANT

## 2019-05-23 PROCEDURE — 83690 ASSAY OF LIPASE: CPT

## 2019-05-23 PROCEDURE — 6360000002 HC RX W HCPCS: Performed by: PHYSICIAN ASSISTANT

## 2019-05-23 PROCEDURE — 74177 CT ABD & PELVIS W/CONTRAST: CPT

## 2019-05-23 PROCEDURE — G8427 DOCREV CUR MEDS BY ELIG CLIN: HCPCS | Performed by: NURSE PRACTITIONER

## 2019-05-23 PROCEDURE — 81001 URINALYSIS AUTO W/SCOPE: CPT

## 2019-05-23 PROCEDURE — 96374 THER/PROPH/DIAG INJ IV PUSH: CPT

## 2019-05-23 PROCEDURE — 85025 COMPLETE CBC W/AUTO DIFF WBC: CPT

## 2019-05-23 RX ORDER — 0.9 % SODIUM CHLORIDE 0.9 %
1000 INTRAVENOUS SOLUTION INTRAVENOUS ONCE
Status: COMPLETED | OUTPATIENT
Start: 2019-05-23 | End: 2019-05-23

## 2019-05-23 RX ORDER — MORPHINE SULFATE 2 MG/ML
4 INJECTION, SOLUTION INTRAMUSCULAR; INTRAVENOUS ONCE
Status: COMPLETED | OUTPATIENT
Start: 2019-05-23 | End: 2019-05-23

## 2019-05-23 RX ORDER — ONDANSETRON 2 MG/ML
4 INJECTION INTRAMUSCULAR; INTRAVENOUS ONCE
Status: COMPLETED | OUTPATIENT
Start: 2019-05-23 | End: 2019-05-23

## 2019-05-23 RX ORDER — FLUTICASONE PROPIONATE 50 MCG
SPRAY, SUSPENSION (ML) NASAL
Refills: 3 | COMMUNITY
Start: 2019-05-15

## 2019-05-23 RX ORDER — TRAMADOL HYDROCHLORIDE 50 MG/1
50 TABLET ORAL EVERY 6 HOURS PRN
Qty: 8 TABLET | Refills: 0 | Status: SHIPPED | OUTPATIENT
Start: 2019-05-23 | End: 2019-05-25

## 2019-05-23 RX ORDER — ONDANSETRON 4 MG/1
4 TABLET, FILM COATED ORAL EVERY 8 HOURS PRN
Qty: 20 TABLET | Refills: 0 | Status: SHIPPED | OUTPATIENT
Start: 2019-05-23 | End: 2019-05-31

## 2019-05-23 RX ORDER — FENTANYL CITRATE 50 UG/ML
50 INJECTION, SOLUTION INTRAMUSCULAR; INTRAVENOUS ONCE
Status: COMPLETED | OUTPATIENT
Start: 2019-05-23 | End: 2019-05-23

## 2019-05-23 RX ADMIN — FENTANYL CITRATE 50 MCG: 50 INJECTION, SOLUTION INTRAMUSCULAR; INTRAVENOUS at 18:29

## 2019-05-23 RX ADMIN — ONDANSETRON 4 MG: 2 INJECTION INTRAMUSCULAR; INTRAVENOUS at 17:13

## 2019-05-23 RX ADMIN — MORPHINE SULFATE 4 MG: 2 INJECTION, SOLUTION INTRAMUSCULAR; INTRAVENOUS at 17:13

## 2019-05-23 RX ADMIN — IOPAMIDOL 100 ML: 755 INJECTION, SOLUTION INTRAVENOUS at 17:31

## 2019-05-23 RX ADMIN — SODIUM CHLORIDE 1000 ML: 9 INJECTION, SOLUTION INTRAVENOUS at 17:13

## 2019-05-23 ASSESSMENT — ENCOUNTER SYMPTOMS
ABDOMINAL PAIN: 1
EYE DISCHARGE: 0
RHINORRHEA: 0
ABDOMINAL PAIN: 1
NAUSEA: 1
NAUSEA: 1
BACK PAIN: 0
DIARRHEA: 1
SHORTNESS OF BREATH: 0
ABDOMINAL DISTENTION: 0
DIARRHEA: 0
CONSTIPATION: 0
CONSTIPATION: 1
VOMITING: 0
COUGH: 0
VOMITING: 0
SORE THROAT: 0
COLOR CHANGE: 0

## 2019-05-23 ASSESSMENT — PAIN SCALES - GENERAL
PAINLEVEL_OUTOF10: 7
PAINLEVEL_OUTOF10: 7
PAINLEVEL_OUTOF10: 8
PAINLEVEL_OUTOF10: 8

## 2019-05-23 ASSESSMENT — PAIN DESCRIPTION - LOCATION
LOCATION: ABDOMEN
LOCATION: ABDOMEN

## 2019-05-23 ASSESSMENT — PAIN DESCRIPTION - PAIN TYPE
TYPE: ACUTE PAIN
TYPE: ACUTE PAIN

## 2019-05-23 ASSESSMENT — PAIN DESCRIPTION - ORIENTATION
ORIENTATION: LEFT;UPPER
ORIENTATION: LEFT;UPPER

## 2019-05-23 ASSESSMENT — PAIN DESCRIPTION - DESCRIPTORS
DESCRIPTORS: ACHING;BURNING
DESCRIPTORS: SPASM

## 2019-05-23 NOTE — ED PROVIDER NOTES
[address] [facility] ED  eMERGENCY dEPARTMENT eNCOUnter      Pt Name: Marshall Johns  MRN: [id number]  [place] 1970  Date of evaluation: 5/23/2019  Provider: [place] PA-C    CHIEF COMPLAINT       Chief Complaint   Patient presents with    Abdominal Pain         HISTORY OF PRESENT ILLNESS   (Location/Symptom, Timing/Onset,Context/Setting, Quality, Duration, Modifying Factors, Severity)  Note limiting factors. Marshall Johns is a 57 y.o. female who presents to the emergency department with a complaint of left upper quadrant abdominal pain which patient states been ongoing for last 3-4 days. She states is progressively gotten worse the last couple days more isolated to right upper quadrant as well as the left flank region. Patient complain some nausea but no vomiting. She states she does have a past history of pancreatitis, and states this is similar to pain to her pancreas in the past.  She rates her current pain as 8 out of 10. She states she had seen her regular family physician earlier today and had some routine lab work done states the pain has progressively gotten worse and she was advised to come to the ER. She denies any other acute illness or injury. No constipation or diarrhea she denies any urinary complaints. HPI    NursingNotes were reviewed. REVIEW OF SYSTEMS    (2-9 systems for level 4, 10 or more for level 5)     Review of Systems   Constitutional: Negative for activity change and appetite change. HENT: Negative for congestion, ear discharge, ear pain, nosebleeds, rhinorrhea and sore throat. Eyes: Negative for discharge. Respiratory: Negative for cough and shortness of breath. Cardiovascular: Negative for chest pain, palpitations and leg swelling. Gastrointestinal: Positive for abdominal pain and nausea. Negative for abdominal distention, constipation, diarrhea and vomiting. Genitourinary: Positive for flank pain.  Negative for difficulty urinating, dysuria and urgency. Musculoskeletal: Negative for arthralgias, back pain and myalgias. Skin: Negative for color change, pallor and wound. Neurological: Negative for dizziness, tremors, syncope, weakness, numbness and headaches. Psychiatric/Behavioral: Negative for agitation and confusion. Except as noted above the remainder of the review of systems was reviewed and negative. PAST MEDICAL HISTORY     Past Medical History:   Diagnosis Date    Acquired hypothyroidism 9/26/2016    Acute pancreatitis     ADD (attention deficit disorder) 2/12/2015    Anxiety     Depression 12/9/2015    Endometrial cyst of ovary 5/10/14    RT ovary, ruptured    GERD (gastroesophageal reflux disease) 9/26/2016    Medullary sponge kidney of both kidneys 5/22/2018    Sjogren's disease ([facility] [address]     Stress     Swelling          SURGICALHISTORY       Past Surgical History:   Procedure Laterality Date    CHOLECYSTECTOMY  2011    HYSTERECTOMY  2014    sept    OVARY REMOVAL Left Jan 2014    UPPER GASTROINTESTINAL ENDOSCOPY  09/16/2016    EGD W/BX         CURRENT MEDICATIONS       Discharge Medication List as of 5/23/2019  6:25 PM      CONTINUE these medications which have NOT CHANGED    Details   fluticasone (FLONASE) 50 MCG/ACT nasal spray USE 1 SPRAY NASALLY DAILY, R-3Historical Med      levofloxacin (LEVAQUIN) 500 MG tablet Take 1 tablet by mouth daily for 10 days, Disp-10 tablet, R-0Normal      promethazine-codeine (PHENERGAN WITH CODEINE) 6.25-10 MG/5ML syrup Take 5 mLs by mouth every 4 hours as needed for Cough. , Disp-240 mL, R-1Print      topiramate (TOPAMAX) 50 MG tablet Take 1 tablet by mouth 2 times daily, Disp-60 tablet, R-3Normal      amphetamine-dextroamphetamine (ADDERALL XR) 30 MG extended release capsule Take 1 capsule by mouth 2 times daily for 30 days. , Disp-60 capsule, R-0Normal      methotrexate (RHEUMATREX) 2.5 MG chemo tablet TAKE 3 TABLETS BY MOUTH ONCE WEEKLY  (TAKE ALL 3 TABLETS AT ONCE JUST ONE DAY Cancer Maternal Grandmother         breast    Heart Disease Other         mom and dad's side          SOCIAL HISTORY       Social History     Socioeconomic History    Marital status: Single     Spouse name: None    Number of children: None    Years of education: None    Highest education level: None   Occupational History    None   Social Needs    Financial resource strain: None    Food insecurity:     Worry: None     Inability: None    Transportation needs:     Medical: None     Non-medical: None   Tobacco Use    Smoking status: Never Smoker    Smokeless tobacco: Never Used   Substance and Sexual Activity    Alcohol use: No     Alcohol/week: 0.0 oz     Comment: no alcohol since 2011    Drug use: No    Sexual activity: None   Lifestyle    Physical activity:     Days per week: None     Minutes per session: None    Stress: None   Relationships    Social connections:     Talks on phone: None     Gets together: None     Attends Protestant service: None     Active member of club or organization: None     Attends meetings of clubs or organizations: None     Relationship status: None    Intimate partner violence:     Fear of current or ex partner: None     Emotionally abused: None     Physically abused: None     Forced sexual activity: None   Other Topics Concern    None   Social History Narrative    ** Merged History Encounter **            SCREENINGS      @FLOW([id number])@      PHYSICAL EXAM    (up to 7 for level 4, 8 or more for level 5)     ED Triage Vitals [05/23/19 1621]   BP Temp Temp Source Pulse Resp SpO2 Height Weight   126/84 98 °F (36.7 °C) Oral 109 20 99 % -- 124 lb (56.2 kg)       Physical Exam   Constitutional: She is oriented to person, place, and time. She appears well-developed and well-nourished. HENT:   Head: Normocephalic. Eyes: Pupils are equal, round, and reactive to light. Neck: Normal range of motion. Neck supple. No JVD present. No tracheal deviation present. achievable. ED BEDSIDE ULTRASOUND:   Performed by ED Physician - none    LABS:  Labs Reviewed   URINE RT REFLEX TO CULTURE - Abnormal; Notable for the following components:       Result Value    Ketones, Urine TRACE (*)     Blood, Urine SMALL (*)     All other components within normal limits   MICROSCOPIC URINALYSIS - Abnormal; Notable for the following components:    RBC, UA 10-20 (*)     All other components within normal limits   POCT CREATININE - Normal   URINE CULTURE    Narrative:     ORDER#: [id number]                          ORDERED BY: Mario Leon  SOURCE: Urine Clean Catch                  COLLECTED:  05/23/19 17:00  ANTIBIOTICS AT [facility]                      RECEIVED :  05/23/19 17:31   COMPREHENSIVE METABOLIC PANEL   CBC WITH AUTO DIFFERENTIAL   LACTIC ACID, PLASMA   LIPASE   POCT VENOUS       All other labs were within normal range or not returned as of this dictation. EMERGENCY DEPARTMENT COURSE and DIFFERENTIAL DIAGNOSIS/MDM:   Vitals:    Vitals:    05/23/19 1621 05/23/19 1814   BP: 126/84 105/76   Pulse: 109 98   Resp: 20 16   Temp: 98 °F (36.7 °C)    TempSrc: Oral    SpO2: 99% 99%   Weight: 124 lb (56.2 kg)           MDM  Number of Diagnoses or Management Options  Abdominal pain, left upper quadrant:   Diagnosis management comments: After patient was medicated she states she does feel somewhat better I did review her labs as well as her CAT scan with her there is no acute findings at this time lipase levels within normal range I do not see any elevation of liver enzymes and bilirubin is 0.2. CT scan abdomen and pelvis shows no acute intrapelvic process. This time advised patient that he do not know specifically what is causing her abdominal pain but she was recommended to follow-up with her regular family physician tomorrow and also given follow-up with the GI for further evaluation for left upper quadrant nonspecific abdominal pain.   She was discharged home with a prescription for Ultram and Zofran for nausea and advised follow-up with her family physician tomorrow she is also advised if she has worsening symptoms return to ER. CRITICAL CARE TIME   Total Critical Care time was 0 minutes, excluding separately reportableprocedures. There was a high probability of clinicallysignificant/life threatening deterioration in the patient's condition which required my urgent intervention. CONSULTS:  None    PROCEDURES:  Unless otherwise noted below, none     Procedures    FINAL IMPRESSION      1. Abdominal pain, left upper quadrant          DISPOSITION/PLAN   DISPOSITION Decision To Discharge 05/23/2019 06:17:45 PM      PATIENT REFERRED TO:  ORTIZ Strong - CNP  [address], [address]  [place] [address]  [phone]    In 1 day      Rk Raya MD  [address]  [phone]    In 1 week        DISCHARGE MEDICATIONS:  Discharge Medication List as of 5/23/2019  6:25 PM      START taking these medications    Details   traMADol (ULTRAM) 50 MG tablet Take 1 tablet by mouth every 6 hours as needed for Pain for up to 2 days. , Disp-8 tablet, R-0Print      ondansetron (ZOFRAN) 4 MG tablet Take 1 tablet by mouth every 8 hours as needed for Nausea, Disp-20 tablet, R-0Print                (Please note that portions of this note were completed with a voice recognition program.  Efforts were made to edit the dictations but occasionally words are mis-transcribed.)    Miguel A Zamora PA-C (electronically signed)  Attending Emergency Physician         Miguel A Zamora PA-C  05/23/19 [address] Kassi Joe PA-C  05/24/19 1212

## 2019-05-23 NOTE — PROGRESS NOTES
2014    sept    OVARY REMOVAL Left Jan 2014    UPPER GASTROINTESTINAL ENDOSCOPY  09/16/2016    EGD W/BX     Social History     Socioeconomic History    Marital status: Single     Spouse name: Not on file    Number of children: Not on file    Years of education: Not on file    Highest education level: Not on file   Occupational History    Not on file   Social Needs    Financial resource strain: Not on file    Food insecurity:     Worry: Not on file     Inability: Not on file    Transportation needs:     Medical: Not on file     Non-medical: Not on file   Tobacco Use    Smoking status: Never Smoker    Smokeless tobacco: Never Used   Substance and Sexual Activity    Alcohol use: No     Alcohol/week: 0.0 oz     Comment: no alcohol since 2011    Drug use: No    Sexual activity: Not on file   Lifestyle    Physical activity:     Days per week: Not on file     Minutes per session: Not on file    Stress: Not on file   Relationships    Social connections:     Talks on phone: Not on file     Gets together: Not on file     Attends Druze service: Not on file     Active member of club or organization: Not on file     Attends meetings of clubs or organizations: Not on file     Relationship status: Not on file    Intimate partner violence:     Fear of current or ex partner: Not on file     Emotionally abused: Not on file     Physically abused: Not on file     Forced sexual activity: Not on file   Other Topics Concern    Not on file   Social History Narrative    ** Merged History Encounter **          Family History   Problem Relation Age of Onset    Heart Disease Father 48    Cancer Maternal Grandmother         breast    Heart Disease Other         mom and dad's side     No Known Allergies  Current Outpatient Medications   Medication Sig Dispense Refill    fluticasone (FLONASE) 50 MCG/ACT nasal spray USE 1 SPRAY NASALLY DAILY  3    levofloxacin (LEVAQUIN) 500 MG tablet Take 1 tablet by mouth daily for 10 days 10 tablet 0    promethazine-codeine (PHENERGAN WITH CODEINE) 6.25-10 MG/5ML syrup Take 5 mLs by mouth every 4 hours as needed for Cough. 240 mL 1    topiramate (TOPAMAX) 50 MG tablet Take 1 tablet by mouth 2 times daily 60 tablet 3    amphetamine-dextroamphetamine (ADDERALL XR) 30 MG extended release capsule Take 1 capsule by mouth 2 times daily for 30 days. 60 capsule 0    liothyronine (CYTOMEL) 25 MCG tablet TAKE 1 TABLET BY MOUTH DAILY 90 tablet 1    linaclotide (LINZESS) 290 MCG CAPS capsule TAKE ONE CAPSULE BY MOUTH EVERY MORNING BEFORE BREAKFAST 30 capsule 4    furosemide (LASIX) 20 MG tablet Take 1 tablet by mouth 2 times daily 60 tablet 2    promethazine (PHENERGAN) 25 MG tablet TAKE ONE TABLET BY MOUTH EVERY SIX HOURS AS NEEDED FOR NAUSEA 40 tablet 0    esomeprazole (NEXIUM) 40 MG delayed release capsule TK ONE C PO D 30 capsule 5    estradiol (ESTRACE) 1 MG tablet Take 1 mg by mouth      prochlorperazine (COMPAZINE) 10 MG tablet Take 1 tablet by mouth every 6 hours as needed (nausea) 30 tablet 2    buPROPion (WELLBUTRIN XL) 300 MG extended release tablet TAKE ONE TABLET BY MOUTH EVERY MORNING 90 tablet 2    ZOLMitriptan (ZOMIG) 5 MG tablet TAKE 1 TABLET BY MOUTH AT ONSET OF MIGRAINE. MAY REPEAT ONCE AFTER 2 HOURS. MAX 10 MG (2 TABLETS) PER DAY  11    levothyroxine (SYNTHROID) 25 MCG tablet Take one daily 90 tablet 1    ondansetron (ZOFRAN ODT) 4 MG disintegrating tablet Take 1-2 tablets by mouth every 12 hours as needed for Nausea May Sub regular tablet (non-ODT) if insurance does not cover ODT. 12 tablet 0    hydroxychloroquine (PLAQUENIL) 200 MG tablet Take 300 mg by mouth daily       Pancrelipase, Lip-Prot-Amyl, (CREON) 86361 UNITS CPEP 2 caps tid 180 capsule 03    traMADol (ULTRAM) 50 MG tablet Take 1 tablet by mouth every 6 hours as needed for Pain for up to 2 days.  8 tablet 0    ondansetron (ZOFRAN) 4 MG tablet Take 1 tablet by mouth every 8 hours as needed for Nausea 20 and affect. Her speech is normal and behavior is normal.     Assessment & Plan    Diagnosis Orders   1. LUQ abdominal pain       No orders of the defined types were placed in this encounter. No orders of the defined types were placed in this encounter. There are no discontinued medications. Return in about 2 weeks (around 6/6/2019). Reviewed with the patient: current clinical status,medications, activities and diet. Pt to go to ER and follow up PCP     Side effects, adverse effects of themedication prescribed today, as well as treatment plan/ rationale and result expectations have been discussed with the patient who expresses understanding and desires to proceed. Close follow up to evaluate treatment results and for coordination of care. I have reviewed the patient's medical history in detail and updated the computerized patient record.     Darion Bar, APRN

## 2019-05-23 NOTE — TELEPHONE ENCOUNTER
Pt has been on levaquin since Tuesday and it is giving her real bad abdominal pain and bloating. Has some slight back pain, is concerned that it may flair up pancreatitis she has had in the past.  Please advise if she should stop taking and change meds or keep on it. She said the pain has gotten worse over the days.

## 2019-05-24 ENCOUNTER — HOSPITAL ENCOUNTER (OUTPATIENT)
Dept: OCCUPATIONAL THERAPY | Age: 49
Setting detail: THERAPIES SERIES
Discharge: HOME | End: 2019-05-24
Payer: MEDICARE

## 2019-05-24 LAB
GFR AFRICAN AMERICAN: >60
GFR NON-AFRICAN AMERICAN: >60
PERFORMED ON: NORMAL
POC CREATININE: 0.8 MG/DL (ref 0.6–1.1)
POC SAMPLE TYPE: NORMAL

## 2019-05-24 PROCEDURE — 97140 MANUAL THERAPY 1/> REGIONS: CPT

## 2019-05-24 PROCEDURE — 97530 THERAPEUTIC ACTIVITIES: CPT

## 2019-05-24 ASSESSMENT — PAIN DESCRIPTION - LOCATION: LOCATION: HAND

## 2019-05-24 ASSESSMENT — PAIN DESCRIPTION - PAIN TYPE: TYPE: ACUTE PAIN

## 2019-05-24 ASSESSMENT — ENCOUNTER SYMPTOMS
WHEEZING: 0
SHORTNESS OF BREATH: 0
BACK PAIN: 1
COUGH: 0

## 2019-05-24 ASSESSMENT — PAIN DESCRIPTION - ORIENTATION: ORIENTATION: RIGHT;OUTER

## 2019-05-24 ASSESSMENT — PAIN SCALES - GENERAL: PAINLEVEL_OUTOF10: 5

## 2019-05-24 NOTE — PROGRESS NOTES
Occupational Therapy  Daily Note     Name: Marc Echavarria  : 1970  MRN: [id number]  Diagnosis: DeQuervain's tenosynovitis    Visit Information:   OT Insurance Information: Medicare; TRADE TO REBATE  Total # of Visits Approved: 30  Total # of Visits to Date: 5  Progress Note Due Date: 19  Progress Note Counter: 1    Date: 2019       OT Individual Minutes  Time In: 0804  Time Out: 0900  Minutes: 64    Referring Practitioner: Dr. Dov Montaño DO       Subjective:    Patient had a late arrival. Patient states she spent most of the day yesterday in the ED due to pancreatitis. Pre-treatment pain:  Pre Treatment Pain Screening  Pain at present: 5  Scale Used: Numeric Score  Intervention List: Patient able to continue with treatment    Current pain:   Pain Assessment  Patient Currently in Pain: Yes  Pain Assessment: 0-10  Pain Level: 5  Pain Type: Acute pain  Pain Location: Hand  Pain Orientation: Right, Outer          Treatment Activities:    Focus of treatment was on the following:   []Neuromuscular Re-education []Orthotic/Splint/Brace: fabrication/education []UE strength  []Right []Left  []Both   [] Strength   []Right []Left  []Both     []Cognition  []Balance  []Posture/positioning   [x]Decreasing pain    []Coordination []ADL's []Functional Mobility []Home management    [x]ROM []Endurance  []Transfers []Fine Motor   []Visual []Caregiver training []Perceptual    [x]Other:Edema management       Modality:   [x]Fluidotherapy  []Heat  []Ice  []TENS  []NMES  []Taping   [x]Other: Thermal Ultrasound  3MHZ, .6 intensity, 5 minutes, continuous to painful R hand. Comments: 20 minutes          Manual/MFR: Began treatment with thermal ultrasound for pain management followed by direct treatment techniques of transverse plane releases, soft tissue mobility, and retrograde massage for edema reduction and scar massage. Fitted patient for edema compression glove 3/4 fingers, small.  Patient is to wear the glove as needed for comfort and edema management, as well as at night to cover the topigel I issued for the scar management. Patient was then provided 20 minutes fluidotherapy for continued pain management. Assessment:Patient tolerated treatment well and reports pain reduction to 3/10. Plan: Continue with current POC. Goals:  Long term goals 2,3,6 addressed. Time Frame for Long term goals : 2x/week for 5 weeks (10 visits total)  Long term goal 1: Patient will engage in functional/therapeutic activities to decrease pain in right UE to a 2-3/10 (now as high as 8/10). Long term goal 2: Patient will increase right forearm and wrist AROM by 10-15* to increase ability to perform self-care tasks with dominant hand. Long term goal 3: Patient will demonstrate ability to make a full composite fist with right hand to increase participation in functional tasks. Long term goal 4: Patient will increase right hand coordination to [facility] to perform ADL/IADL tasks as measured by a 20 second improvement on 9-HPT. Long term goal 5: Patient will increase right UE strength to 4/5 and  strength to >/= 25# for increased ease with ADL/IADL tasks. Long term goals 6: Patient will be independent with all recommended HEP for pain managment, scar and edema management, ROM, coordination, and strength.      DAVID Ma   5/24/2019    Electronically signed by CHACORTA Ma on 5/24/19 at 9:41 AM

## 2019-05-25 LAB — URINE CULTURE, ROUTINE: NORMAL

## 2019-05-28 DIAGNOSIS — F98.8 ATTENTION DEFICIT DISORDER, UNSPECIFIED HYPERACTIVITY PRESENCE: ICD-10-CM

## 2019-05-28 RX ORDER — DEXTROAMPHETAMINE SACCHARATE, AMPHETAMINE ASPARTATE MONOHYDRATE, DEXTROAMPHETAMINE SULFATE AND AMPHETAMINE SULFATE 7.5; 7.5; 7.5; 7.5 MG/1; MG/1; MG/1; MG/1
30 CAPSULE, EXTENDED RELEASE ORAL 2 TIMES DAILY
Qty: 60 CAPSULE | Refills: 0 | Status: SHIPPED | OUTPATIENT
Start: 2019-05-28 | End: 2019-06-28 | Stop reason: SDUPTHER

## 2019-05-31 ENCOUNTER — OFFICE VISIT (OUTPATIENT)
Dept: GASTROENTEROLOGY | Age: 49
End: 2019-05-31
Payer: MEDICARE

## 2019-05-31 VITALS
SYSTOLIC BLOOD PRESSURE: 112 MMHG | OXYGEN SATURATION: 99 % | TEMPERATURE: 97.8 F | WEIGHT: 128.2 LBS | RESPIRATION RATE: 16 BRPM | HEIGHT: 62 IN | DIASTOLIC BLOOD PRESSURE: 80 MMHG | BODY MASS INDEX: 23.59 KG/M2 | HEART RATE: 87 BPM

## 2019-05-31 DIAGNOSIS — K21.9 GASTROESOPHAGEAL REFLUX DISEASE WITHOUT ESOPHAGITIS: ICD-10-CM

## 2019-05-31 DIAGNOSIS — R11.0 NAUSEOUS: ICD-10-CM

## 2019-05-31 DIAGNOSIS — K86.81 EXOCRINE PANCREATIC INSUFFICIENCY: ICD-10-CM

## 2019-05-31 DIAGNOSIS — Z87.19 HISTORY OF PANCREATITIS: Primary | ICD-10-CM

## 2019-05-31 DIAGNOSIS — R10.12 LUQ PAIN: ICD-10-CM

## 2019-05-31 DIAGNOSIS — K58.1 IRRITABLE BOWEL SYNDROME WITH CONSTIPATION: ICD-10-CM

## 2019-05-31 DIAGNOSIS — R14.0 ABDOMINAL BLOATING: ICD-10-CM

## 2019-05-31 PROCEDURE — G8420 CALC BMI NORM PARAMETERS: HCPCS | Performed by: NURSE PRACTITIONER

## 2019-05-31 PROCEDURE — G8427 DOCREV CUR MEDS BY ELIG CLIN: HCPCS | Performed by: NURSE PRACTITIONER

## 2019-05-31 PROCEDURE — 1036F TOBACCO NON-USER: CPT | Performed by: NURSE PRACTITIONER

## 2019-05-31 PROCEDURE — 99214 OFFICE O/P EST MOD 30 MIN: CPT | Performed by: NURSE PRACTITIONER

## 2019-05-31 RX ORDER — HYOSCYAMINE SULFATE 0.12 MG/1
125 TABLET SUBLINGUAL EVERY 4 HOURS PRN
Qty: 120 EACH | Refills: 3 | Status: SHIPPED | OUTPATIENT
Start: 2019-05-31 | End: 2020-02-28 | Stop reason: SDUPTHER

## 2019-05-31 RX ORDER — TRAZODONE HYDROCHLORIDE 50 MG/1
50 TABLET ORAL NIGHTLY
Qty: 90 TABLET | Refills: 1 | Status: SHIPPED | OUTPATIENT
Start: 2019-05-31 | End: 2020-08-11 | Stop reason: SDUPTHER

## 2019-05-31 RX ORDER — ONDANSETRON 4 MG/1
4 TABLET, FILM COATED ORAL EVERY 8 HOURS PRN
Qty: 30 TABLET | Refills: 1 | Status: SHIPPED | OUTPATIENT
Start: 2019-05-31 | End: 2020-02-25 | Stop reason: SDUPTHER

## 2019-05-31 NOTE — PROGRESS NOTES
Gastroenterology Clinic Visit    Prateek Henriquez  [id number]  Chief Complaint   Patient presents with    Consultation     HPI: 57 y.o. female presents to the clinic with complaints of left upper quadrant pain. Patient reports this pain was so extreme that she went to the ED recently and underwent a CAT scan, which she reports as being unremarkable. Patient reports she has a history of pancreatitis along with stents that had been placed in her pancreatic ducts. This happened at [facility] in 2011 with Dr. Deepa Perez. Patient reports he feels bloated and distended after all meals and drinks. She reports occasional nausea. She denies unintentional weight loss or loss of appetite. However, but she is afraid to eat due to symptoms. Patient reports her stools vary from being soft and formed to loose and watery. She denies melena or hematochezia. She takes Linzess 290 micrograms daily long with lactulose daily. Patient reports she has been taking Creon since 2012. She takes 1 pill in the morning, 2 with lunch, 2 with dinner. Patient denies alcohol or nicotine abuse. She denies chest pain, shortness of breath, or palpitations. Review of Systems   All other systems reviewed and are negative.        Past Medical History:   Diagnosis Date    Acquired hypothyroidism 9/26/2016    Acute pancreatitis     ADD (attention deficit disorder) 2/12/2015    Anxiety     Depression 12/9/2015    Endometrial cyst of ovary 5/10/14    RT ovary, ruptured    GERD (gastroesophageal reflux disease) 9/26/2016    Medullary sponge kidney of both kidneys 5/22/2018    Sjogren's disease ([facility] [address]     Stress     Swelling          Past Surgical History:   Procedure Laterality Date    CHOLECYSTECTOMY  2011    HYSTERECTOMY  2014    sept    OVARY REMOVAL Left Jan 2014    UPPER GASTROINTESTINAL ENDOSCOPY  09/16/2016    EGD W/BX     Current Outpatient Medications on File Prior to Visit   Medication Sig Dispense Refill    amphetamine-dextroamphetamine (ADDERALL XR) 30 MG extended release capsule Take 1 capsule by mouth 2 times daily for 30 days. 60 capsule 0    fluticasone (FLONASE) 50 MCG/ACT nasal spray USE 1 SPRAY NASALLY DAILY  3    ondansetron (ZOFRAN) 4 MG tablet Take 1 tablet by mouth every 8 hours as needed for Nausea 20 tablet 0    levofloxacin (LEVAQUIN) 500 MG tablet Take 1 tablet by mouth daily for 10 days 10 tablet 0    promethazine-codeine (PHENERGAN WITH CODEINE) 6.25-10 MG/5ML syrup Take 5 mLs by mouth every 4 hours as needed for Cough. 240 mL 1    topiramate (TOPAMAX) 50 MG tablet Take 1 tablet by mouth 2 times daily 60 tablet 3    methotrexate (RHEUMATREX) 2.5 MG chemo tablet TAKE 3 TABLETS BY MOUTH ONCE WEEKLY  (TAKE ALL 3 TABLETS AT ONCE JUST ONE DAY OF WEEK)  3    liothyronine (CYTOMEL) 25 MCG tablet TAKE 1 TABLET BY MOUTH DAILY 90 tablet 1    linaclotide (LINZESS) 290 MCG CAPS capsule TAKE ONE CAPSULE BY MOUTH EVERY MORNING BEFORE BREAKFAST 30 capsule 4    furosemide (LASIX) 20 MG tablet Take 1 tablet by mouth 2 times daily 60 tablet 2    promethazine (PHENERGAN) 25 MG tablet TAKE ONE TABLET BY MOUTH EVERY SIX HOURS AS NEEDED FOR NAUSEA 40 tablet 0    esomeprazole (NEXIUM) 40 MG delayed release capsule TK ONE C PO D 30 capsule 5    cyclobenzaprine (FLEXERIL) 10 MG tablet Start with 1/2 tab. Do not drive or operate machinery while taking muscle relaxers. Can take up to three times daily as needed. 30 tablet 0    estradiol (ESTRACE) 1 MG tablet Take 1 mg by mouth      prochlorperazine (COMPAZINE) 10 MG tablet Take 1 tablet by mouth every 6 hours as needed (nausea) 30 tablet 2    buPROPion (WELLBUTRIN XL) 300 MG extended release tablet TAKE ONE TABLET BY MOUTH EVERY MORNING 90 tablet 2    ZOLMitriptan (ZOMIG) 5 MG tablet TAKE 1 TABLET BY MOUTH AT ONSET OF MIGRAINE. MAY REPEAT ONCE AFTER 2 HOURS.  MAX 10 MG (2 TABLETS) PER DAY  11    levothyroxine (SYNTHROID) 25 MCG tablet Take one daily 90 tablet 1  ondansetron (ZOFRAN ODT) 4 MG disintegrating tablet Take 1-2 tablets by mouth every 12 hours as needed for Nausea May Sub regular tablet (non-ODT) if insurance does not cover ODT. 12 tablet 0    hydroxychloroquine (PLAQUENIL) 200 MG tablet Take 300 mg by mouth daily       Pancrelipase, Lip-Prot-Amyl, (CREON) 98625 UNITS CPEP 2 caps tid 180 capsule 03     No current facility-administered medications on file prior to visit. Family History   Problem Relation Age of Onset    Heart Disease Father 48    Cancer Maternal Grandmother         breast    Heart Disease Other         mom and dad's side      Social History     Socioeconomic History    Marital status: Single     Spouse name: None    Number of children: None    Years of education: None    Highest education level: None   Occupational History    None   Social Needs    Financial resource strain: None    Food insecurity:     Worry: None     Inability: None    Transportation needs:     Medical: None     Non-medical: None   Tobacco Use    Smoking status: Never Smoker    Smokeless tobacco: Never Used   Substance and Sexual Activity    Alcohol use: No     Alcohol/week: 0.0 oz     Comment: no alcohol since 2011    Drug use: No    Sexual activity: None   Lifestyle    Physical activity:     Days per week: None     Minutes per session: None    Stress: None   Relationships    Social connections:     Talks on phone: None     Gets together: None     Attends Gnosticism service: None     Active member of club or organization: None     Attends meetings of clubs or organizations: None     Relationship status: None    Intimate partner violence:     Fear of current or ex partner: None     Emotionally abused: None     Physically abused: None     Forced sexual activity: None   Other Topics Concern    None   Social History Narrative    ** Merged History Encounter **            Blood pressure 112/80, pulse 87, temperature 97.8 °F (36.6 °C), resp.  rate 16, height 5' 2\" (1.575 m), weight 128 lb 3.2 oz (58.2 kg), SpO2 99 %, not currently breastfeeding. Physical Exam   Constitutional: She is oriented to person, place, and time. She appears well-developed and well-nourished. No distress. HENT:   Head: Normocephalic and atraumatic. Eyes: Pupils are equal, round, and reactive to light. Conjunctivae and EOM are normal. No scleral icterus. Neck: Normal range of motion. Neck supple. Cardiovascular: Normal rate, regular rhythm and normal heart sounds. No murmur heard. Pulmonary/Chest: Effort normal and breath sounds normal. No respiratory distress. She has no wheezes. She has no rales. She exhibits no tenderness. Abdominal: Soft. Bowel sounds are normal. She exhibits no distension and no mass. There is no tenderness. There is no rebound and no guarding. Musculoskeletal: Normal range of motion. Lymphadenopathy:     She has no cervical adenopathy. Neurological: She is alert and oriented to person, place, and time. Skin: Skin is warm and dry. No rash noted. She is not diaphoretic. No erythema. No pallor. Psychiatric: She has a normal mood and affect. Her behavior is normal. Judgment and thought content normal.   Vitals reviewed. Laboratory, Pathology, Radiology reviewed indetail with relevant important investigations summarized below:  Lab Results   Component Value Date    WBC 6.1 05/23/2019    HGB 13.5 05/23/2019    HCT 39.6 05/23/2019    MCV 85.9 05/23/2019     05/23/2019     Lab Results   Component Value Date    ALT 11 05/23/2019    AST 10 05/23/2019    ALKPHOS 104 05/23/2019    BILITOT <0.2 05/23/2019       Ct Abdomen Pelvis W Iv Contrast Additional Contrast? None  Result Date: 5/23/2019  POSTSURGICAL CHANGES ASSOCIATED WITH THE BILIARY TREE. LARGE VOLUME OF STOOL IN THE COLON. THERE IS NO SIGN OF DIVERTICULITIS OR COLITIS OR APPENDICITIS. THERE IS NO CT EVIDENCE OF PANCREATITIS.  THERE IS NO ACUTE PROCESS IN THE ABDOMINAL CAVITY,

## 2019-06-04 ENCOUNTER — HOSPITAL ENCOUNTER (OUTPATIENT)
Dept: OCCUPATIONAL THERAPY | Age: 49
Setting detail: THERAPIES SERIES
Discharge: HOME | End: 2019-06-04
Payer: MEDICARE

## 2019-06-04 PROCEDURE — 97530 THERAPEUTIC ACTIVITIES: CPT

## 2019-06-04 PROCEDURE — 97140 MANUAL THERAPY 1/> REGIONS: CPT

## 2019-06-04 ASSESSMENT — PAIN DESCRIPTION - PAIN TYPE: TYPE: ACUTE PAIN

## 2019-06-04 ASSESSMENT — PAIN DESCRIPTION - ORIENTATION: ORIENTATION: RIGHT;OUTER

## 2019-06-04 ASSESSMENT — PAIN DESCRIPTION - LOCATION: LOCATION: HAND

## 2019-06-04 ASSESSMENT — PAIN SCALES - GENERAL: PAINLEVEL_OUTOF10: 4

## 2019-06-04 NOTE — PROGRESS NOTES
safely without having to drive too fast. Patient reports 5/10 pain at the end of the treatment. Plan:Continue with current POC. Goals:  Long term goals 1,2,6 addressed. Time Frame for Long term goals : 2x/week for 5 weeks (10 visits total)  Long term goal 1: Patient will engage in functional/therapeutic activities to decrease pain in right UE to a 2-3/10 (now as high as 8/10). Long term goal 2: Patient will increase right forearm and wrist AROM by 10-15* to increase ability to perform self-care tasks with dominant hand. Long term goal 3: Patient will demonstrate ability to make a full composite fist with right hand to increase participation in functional tasks. Long term goal 4: Patient will increase right hand coordination to [facility] to perform ADL/IADL tasks as measured by a 20 second improvement on 9-HPT. Long term goal 5: Patient will increase right UE strength to 4/5 and  strength to >/= 25# for increased ease with ADL/IADL tasks. Long term goals 6: Patient will be independent with all recommended HEP for pain managment, scar and edema management, ROM, coordination, and strength.      DAVID Doran   6/4/2019   Electronically signed by CHACORTA Doran on 6/4/19 at 8:51 AM

## 2019-06-11 ENCOUNTER — HOSPITAL ENCOUNTER (OUTPATIENT)
Dept: OCCUPATIONAL THERAPY | Age: 49
Setting detail: THERAPIES SERIES
Discharge: HOME | End: 2019-06-11
Payer: MEDICARE

## 2019-06-11 PROCEDURE — 97140 MANUAL THERAPY 1/> REGIONS: CPT

## 2019-06-11 PROCEDURE — 97530 THERAPEUTIC ACTIVITIES: CPT

## 2019-06-11 ASSESSMENT — PAIN DESCRIPTION - ORIENTATION: ORIENTATION: RIGHT;OUTER

## 2019-06-11 ASSESSMENT — PAIN DESCRIPTION - LOCATION: LOCATION: HAND

## 2019-06-11 ASSESSMENT — PAIN SCALES - GENERAL: PAINLEVEL_OUTOF10: 4

## 2019-06-11 ASSESSMENT — PAIN DESCRIPTION - PAIN TYPE: TYPE: ACUTE PAIN

## 2019-06-11 NOTE — PROGRESS NOTES
Occupational Therapy  Daily Note     Name: Markus Bhagat  : 1970  MRN: [id number]  Diagnosis: DeQuervain's tenosynovitis    Visit Information:   OT Insurance Information: Medicare; [employer]  Total # of Visits Approved: 30  Progress Note Due Date: 19  Progress Note Counter: 3    Date: 2019       OT Individual Minutes  Time In: 9233  Time Out: 0900  Minutes: 48    Referring Practitioner: Dr. Corona Milner, DO       Subjective:    Patient had a late arrival.    Pain rating:     Pre-treatment pain:  Pre Treatment Pain Screening  Pain at present: 6  Scale Used: Numeric Score  Intervention List: Patient able to continue with treatment    Pain after treatment:    Pain Assessment  Patient Currently in Pain: Yes  Pain Assessment: 0-10  Pain Level: 4  Pain Type: Acute pain  Pain Location: Hand  Pain Orientation: Right, Outer            Focus of treatment was on the following:   []Neuromuscular Re-education   []Orthotic/Splint/Brace: fabrication/education []UE strength  []Right []Left []Both   [] Strength   []Right []Left []Both     []Cognition  []Balance  []Posture/positioning   [x]Decreasing pain    []Coordination []ADL's []Functional Mobility []Home management    [x]ROM []Endurance  []Transfers [x]Fine Motor   []Visual []Caregiver training []Perceptual                    []Sensory                    [x]Edema management  []Other:       Modality:   [x]Fluidotherapy []Infrared  []Ice  []TENS  []NMES []Taping  [x]Thermal Ultrasound []MFR []Moist heat packs []Other:    Parameters:   20 minutes fluidotherapy after direct treatment, thermal US 3 MHZ, .7 intensity, 5 minutes continuous x2 on both medial and lateral side of R hand prior to direct treatment techniques    Manual/MFR:  Provided transverse plane releases, PROM,      Comments: Recommended a large adaptive pen such as a Dr. [place] Candelario for improved hand writing.  Patient reports performing her  strengthening with the foam block and theraputty for HEP's    Assessment:Patient tolerated treatment well. Plan:Continue with current POC. Patient to see the referring physician today. Goals:  Long term goals 1-3,5,6 addressed. Time Frame for Long term goals : 2x/week for 5 weeks (10 visits total)  Long term goal 1: Patient will engage in functional/therapeutic activities to decrease pain in right UE to a 2-3/10 (now as high as 8/10). Long term goal 2: Patient will increase right forearm and wrist AROM by 10-15* to increase ability to perform self-care tasks with dominant hand. Long term goal 3: Patient will demonstrate ability to make a full composite fist with right hand to increase participation in functional tasks. Long term goal 4: Patient will increase right hand coordination to [facility] to perform ADL/IADL tasks as measured by a 20 second improvement on 9-HPT. Long term goal 5: Patient will increase right UE strength to 4/5 and  strength to >/= 25# for increased ease with ADL/IADL tasks. Long term goals 6: Patient will be independent with all recommended HEP for pain managment, scar and edema management, ROM, coordination, and strength.      DAVID Delcid   6/11/2019   Electronically signed by CHACORTA Delcid on 6/11/19 at 11:39 AM

## 2019-06-18 ENCOUNTER — HOSPITAL ENCOUNTER (OUTPATIENT)
Dept: OCCUPATIONAL THERAPY | Age: 49
Setting detail: THERAPIES SERIES
Discharge: HOME | End: 2019-06-18
Payer: MEDICARE

## 2019-06-18 NOTE — PROGRESS NOTES
Therapy                                       No-show Note    Date: 2019  Patient Name: Ivonne Pfeiffer    : 1970  (57 y.o.)     MRN: [id number]    Account #: [de-identified]    No Show: 1       For today's appointment patient:  []  Cancelled  []  Rescheduled appointment  [x]  No-show   [x]  Called pt to remind of next appointment  Left her a VM and that a  will call her to change her next schedule into the lead OTR's schedule, NNEKA Mccartney, for a 30 day progress note. Reason given by patient:  []  Patient ill  []  Conflicting appointment  []  No transportation    []  Conflict with work  []  No reason given  []  Other:      [x] Pt has future appointments scheduled, no follow up needed  [] Pt requests to be on hold. Reason:   If > 2 weeks please discuss with therapist.  [] Therapist to call pt for follow up     Comments:   Patient needs to be scheduled with NNEKA Mccartney for 30 day update.     Signature: Electronically signed by DAVID Palmer on 19 at 8:25 AM  Electronically signed by CHACORTA Palmer on 19 at 8:31 AM

## 2019-06-25 ENCOUNTER — APPOINTMENT (OUTPATIENT)
Dept: OCCUPATIONAL THERAPY | Age: 49
End: 2019-06-25
Payer: MEDICARE

## 2019-06-26 ENCOUNTER — OFFICE VISIT (OUTPATIENT)
Dept: FAMILY MEDICINE CLINIC | Age: 49
End: 2019-06-26
Payer: MEDICARE

## 2019-06-26 VITALS
OXYGEN SATURATION: 99 % | TEMPERATURE: 98.1 F | BODY MASS INDEX: 22.97 KG/M2 | HEIGHT: 62 IN | WEIGHT: 124.8 LBS | SYSTOLIC BLOOD PRESSURE: 116 MMHG | HEART RATE: 91 BPM | DIASTOLIC BLOOD PRESSURE: 78 MMHG

## 2019-06-26 DIAGNOSIS — M53.3 COCCYGEAL PAIN: Primary | ICD-10-CM

## 2019-06-26 DIAGNOSIS — R93.89 ABNORMAL MRI: ICD-10-CM

## 2019-06-26 DIAGNOSIS — R53.83 OTHER FATIGUE: ICD-10-CM

## 2019-06-26 DIAGNOSIS — R05.9 COUGH: ICD-10-CM

## 2019-06-26 DIAGNOSIS — R05.3 PERSISTENT COUGH: ICD-10-CM

## 2019-06-26 PROCEDURE — G8427 DOCREV CUR MEDS BY ELIG CLIN: HCPCS | Performed by: NURSE PRACTITIONER

## 2019-06-26 PROCEDURE — G8420 CALC BMI NORM PARAMETERS: HCPCS | Performed by: NURSE PRACTITIONER

## 2019-06-26 PROCEDURE — 1036F TOBACCO NON-USER: CPT | Performed by: NURSE PRACTITIONER

## 2019-06-26 PROCEDURE — 99214 OFFICE O/P EST MOD 30 MIN: CPT | Performed by: NURSE PRACTITIONER

## 2019-06-26 RX ORDER — KETOROLAC TROMETHAMINE 10 MG/1
TABLET, FILM COATED ORAL
Refills: 0 | COMMUNITY
Start: 2019-06-01 | End: 2020-02-25

## 2019-06-26 NOTE — PROGRESS NOTES
intestine without perforation or abscess without bleeding 11/01/2017    Fibroids 11/01/2017    Transfusion history 11/01/2017    Pancreatitis 05/13/2017    Acquired hypothyroidism 09/26/2016    GERD (gastroesophageal reflux disease) 09/26/2016    Anxiety 12/09/2015    Depression 12/09/2015    ADD (attention deficit disorder) 02/12/2015    Endometriosis 09/09/2014    ASCUS favoring benign 05/01/2013    S/P endometrial ablation 05/01/2013     Past Medical History:   Diagnosis Date    Acquired hypothyroidism 9/26/2016    Acute pancreatitis     ADD (attention deficit disorder) 2/12/2015    Anxiety     Depression 12/9/2015    Endometrial cyst of ovary 5/10/14    RT ovary, ruptured    GERD (gastroesophageal reflux disease) 9/26/2016    Medullary sponge kidney of both kidneys 5/22/2018    [facility]ren's [facility] ([facility] [address]     Stress     Swelling      Past Surgical History:   Procedure Laterality Date    CHOLECYSTECTOMY  2011    HYSTERECTOMY  2014    sept    OVARY REMOVAL Left Jan 2014    UPPER GASTROINTESTINAL ENDOSCOPY  09/16/2016    EGD W/BX     Family History   Problem Relation Age of Onset    Heart Disease Father 48    Cancer Maternal Grandmother         breast    Heart Disease Other         mom and dad's side     Social History     Socioeconomic History    Marital status: Single     Spouse name: None    Number of children: None    Years of education: None    Highest education level: None   Occupational History    None   Social Needs    Financial resource strain: None    Food insecurity:     Worry: None     Inability: None    Transportation needs:     Medical: None     Non-medical: None   Tobacco Use    Smoking status: Never Smoker    Smokeless tobacco: Never Used   Substance and Sexual Activity    Alcohol use: No     Alcohol/week: 0.0 oz     Comment: no alcohol since 2011    Drug use: No    Sexual activity: None   Lifestyle    Physical activity:     Days per week: None     Minutes per session: None    Stress: None   Relationships    Social connections:     Talks on phone: None     Gets together: None     Attends Jewish service: None     Active member of club or organization: None     Attends meetings of clubs or organizations: None     Relationship status: None    Intimate partner violence:     Fear of current or ex partner: None     Emotionally abused: None     Physically abused: None     Forced sexual activity: None   Other Topics Concern    None   Social History Narrative    ** Merged History Encounter **          Current Outpatient Medications on File Prior to Visit   Medication Sig Dispense Refill    linaclotide (LINZESS) 290 MCG CAPS capsule TAKE ONE CAPSULE BY MOUTH EVERY MORNING BEFORE BREAKFAST 30 capsule 3    ondansetron (ZOFRAN) 4 MG tablet Take 1 tablet by mouth every 8 hours as needed for Nausea or Vomiting 30 tablet 1    Hyoscyamine Sulfate SL (LEVSIN/SL) 0.125 MG SUBL Place 125 mcg under the tongue every 4 hours as needed (pain) 120 each 3    traZODone (DESYREL) 50 MG tablet Take 1 tablet by mouth nightly 90 tablet 1    amphetamine-dextroamphetamine (ADDERALL XR) 30 MG extended release capsule Take 1 capsule by mouth 2 times daily for 30 days. 60 capsule 0    fluticasone (FLONASE) 50 MCG/ACT nasal spray USE 1 SPRAY NASALLY DAILY  3    promethazine-codeine (PHENERGAN WITH CODEINE) 6.25-10 MG/5ML syrup Take 5 mLs by mouth every 4 hours as needed for Cough.  240 mL 1    topiramate (TOPAMAX) 50 MG tablet Take 1 tablet by mouth 2 times daily 60 tablet 3    methotrexate (RHEUMATREX) 2.5 MG chemo tablet TAKE 3 TABLETS BY MOUTH ONCE WEEKLY  (TAKE ALL 3 TABLETS AT ONCE JUST ONE DAY OF WEEK)  3    liothyronine (CYTOMEL) 25 MCG tablet TAKE 1 TABLET BY MOUTH DAILY 90 tablet 1    furosemide (LASIX) 20 MG tablet Take 1 tablet by mouth 2 times daily 60 tablet 2    promethazine (PHENERGAN) 25 MG tablet TAKE ONE TABLET BY MOUTH EVERY SIX HOURS AS NEEDED FOR NAUSEA 40 tablet 0    esomeprazole (NEXIUM) 40 MG delayed release capsule TK ONE C PO D 30 capsule 5    cyclobenzaprine (FLEXERIL) 10 MG tablet Start with 1/2 tab. Do not drive or operate machinery while taking muscle relaxers. Can take up to three times daily as needed. 30 tablet 0    estradiol (ESTRACE) 1 MG tablet Take 1 mg by mouth      prochlorperazine (COMPAZINE) 10 MG tablet Take 1 tablet by mouth every 6 hours as needed (nausea) 30 tablet 2    buPROPion (WELLBUTRIN XL) 300 MG extended release tablet TAKE ONE TABLET BY MOUTH EVERY MORNING 90 tablet 2    ZOLMitriptan (ZOMIG) 5 MG tablet TAKE 1 TABLET BY MOUTH AT ONSET OF MIGRAINE. MAY REPEAT ONCE AFTER 2 HOURS. MAX 10 MG (2 TABLETS) PER DAY  11    levothyroxine (SYNTHROID) 25 MCG tablet Take one daily 90 tablet 1    hydroxychloroquine (PLAQUENIL) 200 MG tablet Take 300 mg by mouth daily       Pancrelipase, Lip-Prot-Amyl, (CREON) 20043 UNITS CPEP 2 caps tid 180 capsule 03    ketorolac (TORADOL) 10 MG tablet TAKE 1 TABLET BY MOUTH EVERY 8 HOURS  0     No current facility-administered medications on file prior to visit. No Known Allergies    Review of Systems   Constitutional: Negative for fatigue. Respiratory: Positive for cough. Negative for shortness of breath. Cardiovascular: Negative for chest pain. Musculoskeletal: Positive for back pain. Objective  Vitals:    06/26/19 1717   BP: 116/78   Pulse: 91   Temp: 98.1 °F (36.7 °C)   SpO2: 99%   Weight: 124 lb 12.8 oz (56.6 kg)   Height: 5' 2\" (1.575 m)     Physical Exam   Constitutional: She is oriented to person, place, and time. She appears well-developed and well-nourished. HENT:   Head: Normocephalic. Eyes: Pupils are equal, round, and reactive to light. Conjunctivae and EOM are normal.   Neck: Neck supple. No thyromegaly present. Cardiovascular: Normal rate, regular rhythm, normal heart sounds and intact distal pulses. Pulmonary/Chest: Effort normal. She has decreased breath sounds. medical history in detail and updated the computerized patient record. As always, patient is advised that if symptoms worsen in any way they will proceed to the nearest emergency room. Fu a few weeks.     ORTIZ Sorto - CNP

## 2019-06-27 ENCOUNTER — NURSE ONLY (OUTPATIENT)
Dept: FAMILY MEDICINE CLINIC | Age: 49
End: 2019-06-27
Payer: MEDICARE

## 2019-06-27 DIAGNOSIS — R05.3 PERSISTENT COUGH: ICD-10-CM

## 2019-06-27 DIAGNOSIS — R53.83 OTHER FATIGUE: ICD-10-CM

## 2019-06-27 LAB
ALBUMIN SERPL-MCNC: 4.5 G/DL (ref 3.5–4.6)
ALP BLD-CCNC: 87 U/L (ref 40–130)
ALT SERPL-CCNC: 10 U/L (ref 0–33)
ANION GAP SERPL CALCULATED.3IONS-SCNC: 14 MEQ/L (ref 9–15)
AST SERPL-CCNC: 12 U/L (ref 0–35)
BASOPHILS ABSOLUTE: 0.1 K/UL (ref 0–0.2)
BASOPHILS RELATIVE PERCENT: 1 %
BILIRUB SERPL-MCNC: <0.2 MG/DL (ref 0.2–0.7)
BUN BLDV-MCNC: 15 MG/DL (ref 6–20)
CALCIUM SERPL-MCNC: 9.3 MG/DL (ref 8.5–9.9)
CHLORIDE BLD-SCNC: 103 MEQ/L (ref 95–107)
CO2: 23 MEQ/L (ref 20–31)
CREAT SERPL-MCNC: 0.67 MG/DL (ref 0.5–0.9)
EOSINOPHILS ABSOLUTE: 0.1 K/UL (ref 0–0.7)
EOSINOPHILS RELATIVE PERCENT: 1.2 %
GFR AFRICAN AMERICAN: >60
GFR NON-AFRICAN AMERICAN: >60
GLOBULIN: 2.4 G/DL (ref 2.3–3.5)
GLUCOSE BLD-MCNC: 105 MG/DL (ref 70–99)
HCT VFR BLD CALC: 38.6 % (ref 37–47)
HEMOGLOBIN: 12.9 G/DL (ref 12–16)
LYMPHOCYTES ABSOLUTE: 2.3 K/UL (ref 1–4.8)
LYMPHOCYTES RELATIVE PERCENT: 39 %
MCH RBC QN AUTO: 29.8 PG (ref 27–31.3)
MCHC RBC AUTO-ENTMCNC: 33.4 % (ref 33–37)
MCV RBC AUTO: 89.1 FL (ref 82–100)
MONOCYTES ABSOLUTE: 0.4 K/UL (ref 0.2–0.8)
MONOCYTES RELATIVE PERCENT: 5.9 %
NEUTROPHILS ABSOLUTE: 3.2 K/UL (ref 1.4–6.5)
NEUTROPHILS RELATIVE PERCENT: 52.9 %
PDW BLD-RTO: 13.1 % (ref 11.5–14.5)
PLATELET # BLD: 336 K/UL (ref 130–400)
POTASSIUM SERPL-SCNC: 3.5 MEQ/L (ref 3.4–4.9)
RBC # BLD: 4.34 M/UL (ref 4.2–5.4)
SODIUM BLD-SCNC: 140 MEQ/L (ref 135–144)
TOTAL PROTEIN: 6.9 G/DL (ref 6.3–8)
TSH REFLEX: 0.6 UIU/ML (ref 0.44–3.86)
WBC # BLD: 6 K/UL (ref 4.8–10.8)

## 2019-06-27 PROCEDURE — 71046 X-RAY EXAM CHEST 2 VIEWS: CPT | Performed by: NURSE PRACTITIONER

## 2019-06-27 ASSESSMENT — ENCOUNTER SYMPTOMS
BACK PAIN: 1
COUGH: 1
SHORTNESS OF BREATH: 0

## 2019-07-02 ENCOUNTER — APPOINTMENT (OUTPATIENT)
Dept: OCCUPATIONAL THERAPY | Age: 49
End: 2019-07-02
Payer: MEDICARE

## 2019-07-03 ENCOUNTER — HOSPITAL ENCOUNTER (OUTPATIENT)
Dept: OCCUPATIONAL THERAPY | Age: 49
Setting detail: THERAPIES SERIES
Discharge: HOME | End: 2019-07-03
Payer: MEDICARE

## 2019-07-03 ENCOUNTER — OFFICE VISIT (OUTPATIENT)
Dept: FAMILY MEDICINE CLINIC | Age: 49
End: 2019-07-03
Payer: MEDICARE

## 2019-07-03 VITALS
TEMPERATURE: 98.4 F | HEIGHT: 62 IN | WEIGHT: 124 LBS | OXYGEN SATURATION: 100 % | SYSTOLIC BLOOD PRESSURE: 114 MMHG | DIASTOLIC BLOOD PRESSURE: 82 MMHG | HEART RATE: 94 BPM | BODY MASS INDEX: 22.82 KG/M2

## 2019-07-03 DIAGNOSIS — M53.3 COCCYGEAL PAIN: ICD-10-CM

## 2019-07-03 DIAGNOSIS — M35.00 SJOGREN'S SYNDROME, WITH UNSPECIFIED ORGAN INVOLVEMENT (HCC): ICD-10-CM

## 2019-07-03 DIAGNOSIS — J30.2 SEASONAL ALLERGIES: ICD-10-CM

## 2019-07-03 DIAGNOSIS — R05.9 COUGH: Primary | ICD-10-CM

## 2019-07-03 DIAGNOSIS — R53.83 OTHER FATIGUE: ICD-10-CM

## 2019-07-03 DIAGNOSIS — M79.10 MYALGIA: ICD-10-CM

## 2019-07-03 PROCEDURE — 99213 OFFICE O/P EST LOW 20 MIN: CPT | Performed by: NURSE PRACTITIONER

## 2019-07-03 PROCEDURE — G8420 CALC BMI NORM PARAMETERS: HCPCS | Performed by: NURSE PRACTITIONER

## 2019-07-03 PROCEDURE — 97530 THERAPEUTIC ACTIVITIES: CPT

## 2019-07-03 PROCEDURE — 1036F TOBACCO NON-USER: CPT | Performed by: NURSE PRACTITIONER

## 2019-07-03 PROCEDURE — G8427 DOCREV CUR MEDS BY ELIG CLIN: HCPCS | Performed by: NURSE PRACTITIONER

## 2019-07-03 RX ORDER — MONTELUKAST SODIUM 10 MG/1
10 TABLET ORAL DAILY
Qty: 30 TABLET | Refills: 3 | Status: SHIPPED | OUTPATIENT
Start: 2019-07-03 | End: 2022-02-02

## 2019-07-03 ASSESSMENT — ENCOUNTER SYMPTOMS
SHORTNESS OF BREATH: 1
COUGH: 1

## 2019-07-03 ASSESSMENT — PAIN DESCRIPTION - ORIENTATION: ORIENTATION: RIGHT

## 2019-07-03 ASSESSMENT — PAIN SCALES - GENERAL: PAINLEVEL_OUTOF10: 4

## 2019-07-03 ASSESSMENT — PAIN DESCRIPTION - LOCATION: LOCATION: HAND

## 2019-07-03 ASSESSMENT — PAIN DESCRIPTION - PAIN TYPE: TYPE: ACUTE PAIN

## 2019-07-03 NOTE — PROGRESS NOTES
Occupational Therapy  Daily Treatment Note  Date: 7/3/2019  Patient Name: Donovan Jansen  :  1970  MRN: [id number]       Subjective   General  Additional Pertinent Hx: 5 lb lifting restriction per patient. and numerous co-morbidities. Referring Practitioner: Dr. Roger Rogel DO  Diagnosis: DeQuervain's tenosynovitis  OT Visit Information  OT Insurance Information: Medicare; [employer]  Total # of Visits Approved: 30  No Show: 1  Progress Note Due Date: 19  Progress Note Counter: 4(New POC written this date)(1x/week for 6 weeks)  Pain Assessment  Pain Assessment: 0-10  Pain Level: 4  Pain Type: Acute pain  Pain Location: Hand  Pain Orientation: Right  Vital Signs  Patient Currently in Pain: Yes   Patient reports continued pain and stiffness in right wrist/forearm. Treatment Activities:    Patient reassessed for progress towards current goals. See below for current functional status. Pain  Patient continues to report pain in right forearm and wrist, described as a burning/tightness. She states it is intermittent throughout the day. She states pain at best is rated 3-4/10 and pain at worst is 8-9/10. Patient states she continues to wear wrist support prn for increased comfort. ROM- Right UE  Forearm Supination: 85 (evaluation: 70)  Forearm Pronation: 90 (evaluation: 65)  Wrist Extension: 45 (evaluation: 40)  Wrist Flexion: 55 (evaluation: 45)    Patient still unable to make a full composite grasp with right hand with ~1cm gap. Coordination  Right hand 9-HPT: 1 minute, 24 seconds (evaluation: 1 minute, 34 seconds)  Comments: Patient performs with compensation, attempting to avoid use of right thumb despite encouragement from therapist.   Strength  Right UE Strength: Patient unable to tolerate formal MMT testing this date. Gross Assessment: 3-/5  Right  Strength: 6.6 (evaluation:3.3)  HEP  Patient reports good follow through and understanding of all recommended HEP.   Patient provided with
Good  [] Fair  [] Poor      Patient Status:     [x] Continue/Initate plan of Care                    []  Discharge OT     []  Additional visits requested, please re-certify for additional visits        Electronically signed by:  Electronically signed by FLOR Monroy on 7/3/2019 at 3:48 PM      If you have any questions or concerns, please don't hesitate to call. Thank you for your referral.      I have reviewed this plan of care and certify a need for medically necessary rehabilitation services.     Physician Signature:__________________________________________________________    Date: 7/3/2019  Please sign and return

## 2019-07-05 ENCOUNTER — HOSPITAL ENCOUNTER (OUTPATIENT)
Dept: MRI IMAGING | Age: 49
Discharge: HOME | End: 2019-07-07
Payer: MEDICARE

## 2019-07-05 DIAGNOSIS — R93.89 ABNORMAL MRI: ICD-10-CM

## 2019-07-05 DIAGNOSIS — M53.3 COCCYGEAL PAIN: ICD-10-CM

## 2019-07-05 PROCEDURE — A9579 GAD-BASE MR CONTRAST NOS,1ML: HCPCS | Performed by: RADIOLOGY

## 2019-07-05 PROCEDURE — 72197 MRI PELVIS W/O & W/DYE: CPT

## 2019-07-05 PROCEDURE — 6360000004 HC RX CONTRAST MEDICATION: Performed by: RADIOLOGY

## 2019-07-05 RX ORDER — SODIUM CHLORIDE 0.9 % (FLUSH) 0.9 %
10 SYRINGE (ML) INJECTION 2 TIMES DAILY
Status: DISCONTINUED | OUTPATIENT
Start: 2019-07-05 | End: 2019-07-08 | Stop reason: HOSPADM

## 2019-07-05 RX ADMIN — GADOTERIDOL 10 ML: 279.3 INJECTION, SOLUTION INTRAVENOUS at 09:15

## 2019-07-08 DIAGNOSIS — M53.3 COCCYDYNIA: ICD-10-CM

## 2019-07-08 DIAGNOSIS — S32.2XXS CLOSED FRACTURE OF COCCYX, SEQUELA: Primary | ICD-10-CM

## 2019-07-08 RX ORDER — ESOMEPRAZOLE MAGNESIUM 40 MG/1
CAPSULE, DELAYED RELEASE ORAL
Qty: 30 CAPSULE | Refills: 4 | Status: SHIPPED | OUTPATIENT
Start: 2019-07-08 | End: 2019-11-30 | Stop reason: SDUPTHER

## 2019-07-10 ENCOUNTER — HOSPITAL ENCOUNTER (OUTPATIENT)
Dept: OCCUPATIONAL THERAPY | Age: 49
Setting detail: THERAPIES SERIES
Discharge: HOME | End: 2019-07-10
Payer: MEDICARE

## 2019-07-12 ENCOUNTER — HOSPITAL ENCOUNTER (OUTPATIENT)
Dept: OCCUPATIONAL THERAPY | Age: 49
Setting detail: THERAPIES SERIES
Discharge: HOME | End: 2019-07-12
Payer: MEDICARE

## 2019-07-12 PROCEDURE — 97530 THERAPEUTIC ACTIVITIES: CPT

## 2019-07-12 PROCEDURE — 97140 MANUAL THERAPY 1/> REGIONS: CPT

## 2019-07-12 NOTE — PROGRESS NOTES
AROM by 10-15* to increase ability to perform self-care tasks with dominant hand. Long term goal 3: Patient will demonstrate ability to make a full composite fist with right hand to increase participation in functional tasks. Long term goal 4: Patient will increase right hand coordination to [facility] to perform ADL/IADL tasks as measured by a 20 second improvement on 9-HPT. Long term goal 5: Patient will increase right UE strength to 4/5 and  strength to >/= 25# for increased ease with ADL/IADL tasks. Long term goals 6: Patient will be independent with all recommended HEP for pain managment, scar and edema management, ROM, coordination, and strength.      DAVID Dempsey   7/12/2019    Electronically signed by CHACORTA Dempsey on 7/12/19 at 11:56 AM

## 2019-07-15 ENCOUNTER — OFFICE VISIT (OUTPATIENT)
Dept: GASTROENTEROLOGY | Age: 49
End: 2019-07-15
Payer: MEDICARE

## 2019-07-15 VITALS
SYSTOLIC BLOOD PRESSURE: 98 MMHG | HEIGHT: 62 IN | WEIGHT: 124 LBS | TEMPERATURE: 98.7 F | BODY MASS INDEX: 22.82 KG/M2 | OXYGEN SATURATION: 98 % | HEART RATE: 96 BPM | DIASTOLIC BLOOD PRESSURE: 64 MMHG

## 2019-07-15 DIAGNOSIS — R10.11 RUQ PAIN: Primary | ICD-10-CM

## 2019-07-15 DIAGNOSIS — E87.6 HYPOKALEMIA: Primary | ICD-10-CM

## 2019-07-15 DIAGNOSIS — M79.10 MYALGIA: ICD-10-CM

## 2019-07-15 DIAGNOSIS — R10.12 LUQ PAIN: ICD-10-CM

## 2019-07-15 DIAGNOSIS — R11.0 NAUSEA: ICD-10-CM

## 2019-07-15 DIAGNOSIS — R63.0 APPETITE LOSS: ICD-10-CM

## 2019-07-15 DIAGNOSIS — R14.0 BLOATED ABDOMEN: ICD-10-CM

## 2019-07-15 DIAGNOSIS — R10.11 RUQ PAIN: ICD-10-CM

## 2019-07-15 LAB
ALBUMIN SERPL-MCNC: 4.7 G/DL (ref 3.5–4.6)
ALP BLD-CCNC: 106 U/L (ref 40–130)
ALT SERPL-CCNC: 13 U/L (ref 0–33)
ANION GAP SERPL CALCULATED.3IONS-SCNC: 14 MEQ/L (ref 9–15)
AST SERPL-CCNC: 17 U/L (ref 0–35)
BILIRUB SERPL-MCNC: <0.2 MG/DL (ref 0.2–0.7)
BUN BLDV-MCNC: 19 MG/DL (ref 6–20)
C-REACTIVE PROTEIN: <0.3 MG/L (ref 0–5)
CALCIUM SERPL-MCNC: 9.4 MG/DL (ref 8.5–9.9)
CHLORIDE BLD-SCNC: 103 MEQ/L (ref 95–107)
CO2: 23 MEQ/L (ref 20–31)
CREAT SERPL-MCNC: 0.76 MG/DL (ref 0.5–0.9)
GFR AFRICAN AMERICAN: >60
GFR NON-AFRICAN AMERICAN: >60
GLOBULIN: 2.6 G/DL (ref 2.3–3.5)
GLUCOSE BLD-MCNC: 72 MG/DL (ref 70–99)
HCT VFR BLD CALC: 37.2 % (ref 37–47)
HEMOGLOBIN: 13.2 G/DL (ref 12–16)
LACTIC ACID: 0.7 MMOL/L (ref 0.5–2.2)
LIPASE: 61 U/L (ref 12–95)
MCH RBC QN AUTO: 31.3 PG (ref 27–31.3)
MCHC RBC AUTO-ENTMCNC: 35.5 % (ref 33–37)
MCV RBC AUTO: 88 FL (ref 82–100)
PDW BLD-RTO: 13 % (ref 11.5–14.5)
PLATELET # BLD: 317 K/UL (ref 130–400)
POTASSIUM SERPL-SCNC: 3.1 MEQ/L (ref 3.4–4.9)
RBC # BLD: 4.22 M/UL (ref 4.2–5.4)
SEDIMENTATION RATE, ERYTHROCYTE: 2 MM (ref 0–20)
SODIUM BLD-SCNC: 140 MEQ/L (ref 135–144)
TOTAL PROTEIN: 7.3 G/DL (ref 6.3–8)
WBC # BLD: 5.2 K/UL (ref 4.8–10.8)

## 2019-07-15 PROCEDURE — 99213 OFFICE O/P EST LOW 20 MIN: CPT | Performed by: NURSE PRACTITIONER

## 2019-07-15 PROCEDURE — 1036F TOBACCO NON-USER: CPT | Performed by: NURSE PRACTITIONER

## 2019-07-15 PROCEDURE — G8420 CALC BMI NORM PARAMETERS: HCPCS | Performed by: NURSE PRACTITIONER

## 2019-07-15 PROCEDURE — G8427 DOCREV CUR MEDS BY ELIG CLIN: HCPCS | Performed by: NURSE PRACTITIONER

## 2019-07-15 NOTE — PROGRESS NOTES
Gastroenterology Clinic Follow up Visit    Sergei Montanez  [id number]  Chief Complaint   Patient presents with    Follow-up     Background:48 y.o. female last seen in GI clinic on 5/31/19 with complaints of LUQ pain. history of pancreatitis and pancreatitic duct stent placement at [facility] in 2011. Records requested. Bloating and abdominal distention. Nausea. IBS-C, taking Linzess and lactulose. Likely etiology due to IBS. Levsin and IB Thalia added. Interval change: Patient presents to the [address] [place] [address] clinic with worsening complaints of LUQ pain radiating straight through her back. She reports RUQ pain as well. The pain is constant. The pain worsened on Thursday 7/11/19. She reports nausea and anorexia. She reports she is unable to tolerate liquids at this time. She reports bloating. She has taken Levsin and Gas-Ex with no relief. She took Toradol last night with minimal relief of symptoms. Patient reports taking Linzess this morning and had a large stool. She reports passing flatus. No emesis, taking Phenergan. Overall does not feel well. Denies fever or chills. Denies CP, SOB, or palpitations. Denies constipation, diarrhea, or bleeding. Review of Systems   All other systems reviewed and are negative. Past medical history, past surgical history, medication list, social and familyhistory reviewed    Blood pressure 98/64, pulse 96, temperature 98.7 °F (37.1 °C), height 5' 2\" (1.575 m), weight 124 lb (56.2 kg), SpO2 98 %, not currently breastfeeding. Physical Exam   Constitutional: She is oriented to person, place, and time. She appears well-developed and well-nourished. No distress. HENT:   Head: Normocephalic and atraumatic. Eyes: Pupils are equal, round, and reactive to light. Conjunctivae and EOM are normal. No scleral icterus. Neck: Normal range of motion. Neck supple. Cardiovascular: Normal rate, regular rhythm and normal heart sounds. No murmur heard.   Pulmonary/Chest: Effort normal and breath sounds normal. No respiratory distress. She has no wheezes. She has no rales. She exhibits no tenderness. Abdominal: Soft. Bowel sounds are normal. She exhibits distension. She exhibits no mass. There is tenderness (LUQ). There is no rebound and no guarding. Musculoskeletal: Normal range of motion. Lymphadenopathy:     She has no cervical adenopathy. Neurological: She is alert and oriented to person, place, and time. Skin: Skin is warm and dry. No rash noted. She is not diaphoretic. No erythema. No pallor. Psychiatric: She has a normal mood and affect. Her behavior is normal. Judgment and thought content normal.   Vitals reviewed. Laboratory, Pathology, Radiology reviewed in detail with relevantimportant investigations summarized below:    Recent Labs     06/27/19  1344   WBC 6.0   HGB 12.9   HCT 38.6   MCV 89.1        Lab Results   Component Value Date    ALT 10 06/27/2019    AST 12 06/27/2019    ALKPHOS 87 06/27/2019    BILITOT <0.2 06/27/2019     Endoscopic investigations: Colonoscopy 12/2017- examined portion of the ileum was normal.  Small amount of stool in the entire examined colon. The entire examined colon is normal.  Biopsied. Internal hemorrhoids. EGD 2016- mild antral gastritis     Assessment and Plan:  Lisa Bhandari 57 y.o. female with IBS-C. Taking Linzess and lactulose with loose/puddinglike stool daily. Patient with increased complaints of LUQ and RUQ pain, along with abdominal bloating and nausea. Patient unable to tolerate PO. She has a history of pancreatitis in the past. Records requested from [facility], nothing received. Will order liver function test, lipase, and CBC. Will schedule the patient for EGD with EUS. Risks, benefits, and alternate treatment discussed. Patient encouraged to consume a bland diet with adequate fluid intake. Patient encouraged to go to the ED if she is unable to tolerate liquids.      ORTIZ Steward - CNP   Gastroenterology Nurse Practitioner [facility]    Please note this report has been partially produced using speech recognitionsoftware  and may cause contain errors related to that system including grammar, punctuation and spelling as well as words andphrases that may seem inappropriate. If there are questions or concerns please feel free to contact me to clarify.

## 2019-07-16 ENCOUNTER — TELEPHONE (OUTPATIENT)
Dept: GASTROENTEROLOGY | Age: 49
End: 2019-07-16

## 2019-07-17 ENCOUNTER — HOSPITAL ENCOUNTER (OUTPATIENT)
Dept: OCCUPATIONAL THERAPY | Age: 49
Setting detail: THERAPIES SERIES
Discharge: HOME | End: 2019-07-17
Payer: MEDICARE

## 2019-07-17 RX ORDER — POTASSIUM CHLORIDE 750 MG/1
10 TABLET, EXTENDED RELEASE ORAL DAILY
Qty: 30 TABLET | Refills: 5 | Status: SHIPPED | OUTPATIENT
Start: 2019-07-17 | End: 2021-12-08 | Stop reason: SDUPTHER

## 2019-07-22 ENCOUNTER — HOSPITAL ENCOUNTER (OUTPATIENT)
Dept: OCCUPATIONAL THERAPY | Age: 49
Setting detail: THERAPIES SERIES
Discharge: HOME | End: 2019-07-22
Payer: MEDICARE

## 2019-07-22 PROCEDURE — 97530 THERAPEUTIC ACTIVITIES: CPT

## 2019-07-22 ASSESSMENT — PAIN DESCRIPTION - PAIN TYPE: TYPE: ACUTE PAIN

## 2019-07-22 ASSESSMENT — PAIN SCALES - GENERAL: PAINLEVEL_OUTOF10: 3

## 2019-07-22 ASSESSMENT — PAIN DESCRIPTION - LOCATION: LOCATION: HAND

## 2019-07-22 ASSESSMENT — PAIN DESCRIPTION - ORIENTATION: ORIENTATION: RIGHT

## 2019-07-22 NOTE — PROGRESS NOTES
Occupational Therapy  Daily Treatment Note  Date: 2019  Patient Name: Damari Mishra  :  1970  MRN: [id number]       Subjective   General  Additional Pertinent Hx: 5 lb lifting restriction per patient. and numerous co-morbidities. Referring Practitioner: Dr. Luke Natarajan DO  Diagnosis: DeQuervain's tenosynovitis  OT Visit Information  OT Insurance Information: Medicare; [employer] - Financial Reconciliation  Total # of Visits Approved: 30()  No Show: 1  Progress Note Due Date: 19  Canceled Appointment: 4  Progress Note Counter:  (new POC written on 7/3/2019)  Pain Assessment  Pain Assessment: 0-10  Pain Level: 3  Pain Type: Acute pain  Pain Location: Hand  Pain Orientation: Right  Vital Signs  Patient Currently in Pain: Yes   Patient reports she has a follow up appointment with orthopedic doctor tomorrow morning. Treatment Activities:    Patient provided with thermal ultrasound for pain management to right wrist and base of thumb at 3MHz, 0.8 intensity, 5 minutes. Patient tolerates well. Following ultrasound, patient provided with soft tissue mobility to base of right thumb and radial surface of right wrist for decreased tightness and pain. Patient performs active ROM with PROM follow through by therapist to right thumb with increased soreness reported. Patient ends session with additional heat modality via fluidotherapy x20 minutes while performing AROM and light gripping while in the heat. Patient reports increased comfort in heat. Assessment      Post Treatment Pain Screening  Pain at present: 2  Scale Used: Numeric Score  Intervention List: Patient able to continue with treatment         Plan   Patient to continue progress towards plan of care. Next Visit: 2019       Goals  Long term goals  Time Frame for Long term goals : 1x/week for 6 weeks  Long term goal 1: Patient will engage in functional/therapeutic activities to decrease pain in right UE to a 2-3/10 (now as high as 8/10).   Long term goal 2:

## 2019-07-26 DIAGNOSIS — F98.8 ATTENTION DEFICIT DISORDER, UNSPECIFIED HYPERACTIVITY PRESENCE: ICD-10-CM

## 2019-07-26 RX ORDER — FUROSEMIDE 20 MG/1
20 TABLET ORAL 2 TIMES DAILY
Qty: 60 TABLET | Refills: 2 | Status: SHIPPED | OUTPATIENT
Start: 2019-07-26 | End: 2019-10-24 | Stop reason: SDUPTHER

## 2019-07-26 RX ORDER — DEXTROAMPHETAMINE SACCHARATE, AMPHETAMINE ASPARTATE MONOHYDRATE, DEXTROAMPHETAMINE SULFATE AND AMPHETAMINE SULFATE 7.5; 7.5; 7.5; 7.5 MG/1; MG/1; MG/1; MG/1
30 CAPSULE, EXTENDED RELEASE ORAL 2 TIMES DAILY
Qty: 60 CAPSULE | Refills: 0 | Status: SHIPPED | OUTPATIENT
Start: 2019-07-26 | End: 2019-08-26 | Stop reason: SDUPTHER

## 2019-08-02 ENCOUNTER — HOSPITAL ENCOUNTER (OUTPATIENT)
Dept: OCCUPATIONAL THERAPY | Age: 49
Setting detail: THERAPIES SERIES
Discharge: HOME | End: 2019-08-02
Payer: MEDICARE

## 2019-08-20 ENCOUNTER — HOSPITAL ENCOUNTER (OUTPATIENT)
Dept: OCCUPATIONAL THERAPY | Age: 49
Setting detail: THERAPIES SERIES
Discharge: HOME | End: 2019-08-20
Payer: MEDICARE

## 2019-08-20 NOTE — PROGRESS NOTES
Therapy                                          Cancellation    Date: 2019  Patient Name: Kimberlyn Castillo    : 1970  (57 y.o.)     MRN: [id number]    Account #: [de-identified]       Canceled Appointment: 6    For today's appointment patient:  [x]  Cancelled  []  Rescheduled appointment  []  No-show   []  Called pt to remind of next appointment     Reason given by patient: Patient is having a CT scan done. []  Patient ill  [x]  Conflicting appointment  []  No transportation    []  Conflict with work  []  No reason given  []  Other:      [x] Pt has future appointments scheduled, no follow up needed 1 is scheduled. Electronically signed by CHACORTA Wong on 19 at 1:30 PM  [] Pt requests to be on hold.     Reason:   If > 2 weeks please discuss with therapist.  [] Therapist to call pt for follow up     Comments:       Signature: Electronically signed by DAVID Wong on 19 at 1:06 PM

## 2019-08-23 ENCOUNTER — HOSPITAL ENCOUNTER (OUTPATIENT)
Dept: OCCUPATIONAL THERAPY | Age: 49
Setting detail: THERAPIES SERIES
Discharge: HOME | End: 2019-08-23
Payer: MEDICARE

## 2019-09-02 DIAGNOSIS — E03.9 HYPOTHYROIDISM, UNSPECIFIED TYPE: ICD-10-CM

## 2019-09-03 RX ORDER — LIOTHYRONINE SODIUM 25 UG/1
25 TABLET ORAL DAILY
Qty: 90 TABLET | Refills: 0 | Status: SHIPPED | OUTPATIENT
Start: 2019-09-03 | End: 2020-01-13

## 2019-09-09 RX ORDER — TOPIRAMATE 50 MG/1
50 TABLET, FILM COATED ORAL 2 TIMES DAILY
Qty: 60 TABLET | Refills: 3 | Status: SHIPPED | OUTPATIENT
Start: 2019-09-09 | End: 2019-12-17

## 2019-09-11 ENCOUNTER — OFFICE VISIT (OUTPATIENT)
Dept: FAMILY MEDICINE CLINIC | Age: 49
End: 2019-09-11
Payer: MEDICARE

## 2019-09-11 ENCOUNTER — HOSPITAL ENCOUNTER (OUTPATIENT)
Dept: ULTRASOUND IMAGING | Age: 49
Discharge: HOME | End: 2019-09-13
Payer: MEDICARE

## 2019-09-11 VITALS
OXYGEN SATURATION: 98 % | TEMPERATURE: 97.6 F | HEIGHT: 62 IN | SYSTOLIC BLOOD PRESSURE: 118 MMHG | DIASTOLIC BLOOD PRESSURE: 70 MMHG | WEIGHT: 123.6 LBS | HEART RATE: 91 BPM | BODY MASS INDEX: 22.74 KG/M2

## 2019-09-11 DIAGNOSIS — R53.83 OTHER FATIGUE: ICD-10-CM

## 2019-09-11 DIAGNOSIS — N64.4 BREAST PAIN: ICD-10-CM

## 2019-09-11 DIAGNOSIS — R23.3 BRUISING, SPONTANEOUS: ICD-10-CM

## 2019-09-11 DIAGNOSIS — M79.89 LEFT AXILLARY SWELLING: ICD-10-CM

## 2019-09-11 DIAGNOSIS — R23.3 BRUISING, SPONTANEOUS: Primary | ICD-10-CM

## 2019-09-11 LAB
ALBUMIN SERPL-MCNC: 4.3 G/DL (ref 3.5–4.6)
ALP BLD-CCNC: 84 U/L (ref 40–130)
ALT SERPL-CCNC: 11 U/L (ref 0–33)
ANION GAP SERPL CALCULATED.3IONS-SCNC: 11 MEQ/L (ref 9–15)
APTT: 27.1 SEC (ref 24.4–36.8)
AST SERPL-CCNC: 13 U/L (ref 0–35)
BASOPHILS ABSOLUTE: 0.1 K/UL (ref 0–0.2)
BASOPHILS RELATIVE PERCENT: 1.2 %
BILIRUB SERPL-MCNC: 0.3 MG/DL (ref 0.2–0.7)
BUN BLDV-MCNC: 15 MG/DL (ref 6–20)
CALCIUM SERPL-MCNC: 8.9 MG/DL (ref 8.5–9.9)
CHLORIDE BLD-SCNC: 105 MEQ/L (ref 95–107)
CO2: 26 MEQ/L (ref 20–31)
CREAT SERPL-MCNC: 0.55 MG/DL (ref 0.5–0.9)
EOSINOPHILS ABSOLUTE: 0 K/UL (ref 0–0.7)
EOSINOPHILS RELATIVE PERCENT: 0.7 %
GFR AFRICAN AMERICAN: >60
GFR NON-AFRICAN AMERICAN: >60
GLOBULIN: 2.8 G/DL (ref 2.3–3.5)
GLUCOSE BLD-MCNC: 111 MG/DL (ref 70–99)
HCT VFR BLD CALC: 38.1 % (ref 37–47)
HEMOGLOBIN: 12.5 G/DL (ref 12–16)
INR BLD: 1
LYMPHOCYTES ABSOLUTE: 2 K/UL (ref 1–4.8)
LYMPHOCYTES RELATIVE PERCENT: 39.6 %
MCH RBC QN AUTO: 28.9 PG (ref 27–31.3)
MCHC RBC AUTO-ENTMCNC: 32.9 % (ref 33–37)
MCV RBC AUTO: 87.9 FL (ref 82–100)
MONOCYTES ABSOLUTE: 0.3 K/UL (ref 0.2–0.8)
MONOCYTES RELATIVE PERCENT: 6.5 %
NEUTROPHILS ABSOLUTE: 2.7 K/UL (ref 1.4–6.5)
NEUTROPHILS RELATIVE PERCENT: 52 %
PDW BLD-RTO: 12.9 % (ref 11.5–14.5)
PLATELET # BLD: 311 K/UL (ref 130–400)
POTASSIUM SERPL-SCNC: 3.3 MEQ/L (ref 3.4–4.9)
PROTHROMBIN TIME: 13.3 SEC (ref 12.3–14.9)
RBC # BLD: 4.33 M/UL (ref 4.2–5.4)
SODIUM BLD-SCNC: 142 MEQ/L (ref 135–144)
TOTAL PROTEIN: 7.1 G/DL (ref 6.3–8)
WBC # BLD: 5.1 K/UL (ref 4.8–10.8)

## 2019-09-11 PROCEDURE — G8427 DOCREV CUR MEDS BY ELIG CLIN: HCPCS | Performed by: NURSE PRACTITIONER

## 2019-09-11 PROCEDURE — 76999 ECHO EXAMINATION PROCEDURE: CPT

## 2019-09-11 PROCEDURE — 99213 OFFICE O/P EST LOW 20 MIN: CPT | Performed by: NURSE PRACTITIONER

## 2019-09-11 PROCEDURE — G8420 CALC BMI NORM PARAMETERS: HCPCS | Performed by: NURSE PRACTITIONER

## 2019-09-11 PROCEDURE — 1036F TOBACCO NON-USER: CPT | Performed by: NURSE PRACTITIONER

## 2019-09-11 ASSESSMENT — ENCOUNTER SYMPTOMS: ROS SKIN COMMENTS: BRUISING

## 2019-09-13 ENCOUNTER — HOSPITAL ENCOUNTER (OUTPATIENT)
Dept: ULTRASOUND IMAGING | Age: 49
Discharge: HOME | End: 2019-09-15
Payer: MEDICARE

## 2019-09-13 ENCOUNTER — HOSPITAL ENCOUNTER (OUTPATIENT)
Dept: WOMENS IMAGING | Age: 49
Discharge: HOME | End: 2019-09-15
Payer: MEDICARE

## 2019-09-13 DIAGNOSIS — N64.4 BREAST PAIN: ICD-10-CM

## 2019-09-13 DIAGNOSIS — M79.89 LEFT AXILLARY SWELLING: ICD-10-CM

## 2019-09-13 PROCEDURE — 77066 DX MAMMO INCL CAD BI: CPT

## 2019-09-13 PROCEDURE — 76642 ULTRASOUND BREAST LIMITED: CPT

## 2019-09-19 DIAGNOSIS — K86.1 CHRONIC PANCREATITIS, UNSPECIFIED PANCREATITIS TYPE (HCC): ICD-10-CM

## 2019-09-19 LAB
AMYLASE: 105 U/L (ref 22–93)
LIPASE: 62 U/L (ref 12–95)

## 2019-09-21 ENCOUNTER — TELEPHONE (OUTPATIENT)
Dept: FAMILY MEDICINE CLINIC | Age: 49
End: 2019-09-21

## 2019-09-26 ENCOUNTER — OFFICE VISIT (OUTPATIENT)
Dept: GASTROENTEROLOGY | Age: 49
End: 2019-09-26
Payer: MEDICARE

## 2019-09-26 VITALS
HEIGHT: 62 IN | SYSTOLIC BLOOD PRESSURE: 124 MMHG | BODY MASS INDEX: 21.9 KG/M2 | HEART RATE: 95 BPM | WEIGHT: 119 LBS | OXYGEN SATURATION: 97 % | DIASTOLIC BLOOD PRESSURE: 78 MMHG

## 2019-09-26 DIAGNOSIS — R10.9 LEFT SIDED ABDOMINAL PAIN: Primary | ICD-10-CM

## 2019-09-26 PROCEDURE — 99214 OFFICE O/P EST MOD 30 MIN: CPT | Performed by: INTERNAL MEDICINE

## 2019-09-26 PROCEDURE — G8420 CALC BMI NORM PARAMETERS: HCPCS | Performed by: INTERNAL MEDICINE

## 2019-09-26 PROCEDURE — 1036F TOBACCO NON-USER: CPT | Performed by: INTERNAL MEDICINE

## 2019-09-26 PROCEDURE — G8427 DOCREV CUR MEDS BY ELIG CLIN: HCPCS | Performed by: INTERNAL MEDICINE

## 2019-09-26 NOTE — PROGRESS NOTES
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 Clinical history and examination raises a concern for abdominal wall tenderness/functional abdominal pain.  -Patient advised to seek consultation with pain management to consider local block  -MRCP to evaluate pancreas for presence of any pancreatic duct disease/stones    Return if symptoms worsen or fail to improve. Stacia Lu MD   StaffGastroenterologist  [facility]    Please note this report has been partially produced using speech recognitionsoftware  and may cause contain errors related to that system including grammar, punctuation and spelling as well as words andphrases that may seem inappropriate. If there are questions or concerns please feel free to contact me to clarify.

## 2019-09-28 DIAGNOSIS — F98.8 ATTENTION DEFICIT DISORDER, UNSPECIFIED HYPERACTIVITY PRESENCE: ICD-10-CM

## 2019-09-30 RX ORDER — DEXTROAMPHETAMINE SACCHARATE, AMPHETAMINE ASPARTATE MONOHYDRATE, DEXTROAMPHETAMINE SULFATE AND AMPHETAMINE SULFATE 7.5; 7.5; 7.5; 7.5 MG/1; MG/1; MG/1; MG/1
30 CAPSULE, EXTENDED RELEASE ORAL 2 TIMES DAILY
Qty: 60 CAPSULE | Refills: 0 | Status: SHIPPED | OUTPATIENT
Start: 2019-09-30 | End: 2019-10-23 | Stop reason: SDUPTHER

## 2019-10-02 ENCOUNTER — HOSPITAL ENCOUNTER (OUTPATIENT)
Dept: MRI IMAGING | Age: 49
Discharge: HOME | End: 2019-10-04
Payer: MEDICARE

## 2019-10-02 ENCOUNTER — TELEPHONE (OUTPATIENT)
Dept: GASTROENTEROLOGY | Age: 49
End: 2019-10-02

## 2019-10-02 DIAGNOSIS — R10.9 LEFT SIDED ABDOMINAL PAIN: ICD-10-CM

## 2019-10-02 PROCEDURE — 74183 MRI ABD W/O CNTR FLWD CNTR: CPT

## 2019-10-02 PROCEDURE — 6360000004 HC RX CONTRAST MEDICATION: Performed by: RADIOLOGY

## 2019-10-02 PROCEDURE — A9577 INJ MULTIHANCE: HCPCS | Performed by: RADIOLOGY

## 2019-10-02 RX ORDER — SODIUM CHLORIDE 0.9 % (FLUSH) 0.9 %
10 SYRINGE (ML) INJECTION 2 TIMES DAILY
Status: DISCONTINUED | OUTPATIENT
Start: 2019-10-02 | End: 2019-10-05 | Stop reason: HOSPADM

## 2019-10-02 RX ADMIN — GADOBENATE DIMEGLUMINE 13 ML: 529 INJECTION, SOLUTION INTRAVENOUS at 09:33

## 2019-10-23 DIAGNOSIS — F98.8 ATTENTION DEFICIT DISORDER, UNSPECIFIED HYPERACTIVITY PRESENCE: ICD-10-CM

## 2019-10-23 RX ORDER — FUROSEMIDE 20 MG/1
20 TABLET ORAL 2 TIMES DAILY
Qty: 60 TABLET | Refills: 2 | Status: CANCELLED | OUTPATIENT
Start: 2019-10-23

## 2019-10-24 RX ORDER — FUROSEMIDE 20 MG/1
20 TABLET ORAL 2 TIMES DAILY
Qty: 60 TABLET | Refills: 2 | Status: SHIPPED | OUTPATIENT
Start: 2019-10-24 | End: 2020-01-17

## 2019-10-24 RX ORDER — DEXTROAMPHETAMINE SACCHARATE, AMPHETAMINE ASPARTATE MONOHYDRATE, DEXTROAMPHETAMINE SULFATE AND AMPHETAMINE SULFATE 7.5; 7.5; 7.5; 7.5 MG/1; MG/1; MG/1; MG/1
30 CAPSULE, EXTENDED RELEASE ORAL 2 TIMES DAILY
Qty: 60 CAPSULE | Refills: 0 | Status: SHIPPED | OUTPATIENT
Start: 2019-10-24 | End: 2019-11-21 | Stop reason: SDUPTHER

## 2019-10-28 ENCOUNTER — OFFICE VISIT (OUTPATIENT)
Dept: NEUROLOGY | Age: 49
End: 2019-10-28
Payer: MEDICARE

## 2019-10-28 VITALS
BODY MASS INDEX: 20.43 KG/M2 | HEART RATE: 90 BPM | WEIGHT: 111 LBS | DIASTOLIC BLOOD PRESSURE: 72 MMHG | SYSTOLIC BLOOD PRESSURE: 115 MMHG | HEIGHT: 62 IN

## 2019-10-28 DIAGNOSIS — G56.03 CARPAL TUNNEL SYNDROME, BILATERAL: ICD-10-CM

## 2019-10-28 DIAGNOSIS — G56.30 RADIAL NERVE ENTRAPMENT: Primary | ICD-10-CM

## 2019-10-28 PROCEDURE — 99204 OFFICE O/P NEW MOD 45 MIN: CPT | Performed by: PSYCHIATRY & NEUROLOGY

## 2019-10-28 RX ORDER — DULOXETIN HYDROCHLORIDE 30 MG/1
CAPSULE, DELAYED RELEASE ORAL
Refills: 5 | COMMUNITY
Start: 2019-10-15 | End: 2019-12-03

## 2019-10-28 RX ORDER — MELOXICAM 15 MG/1
15 TABLET ORAL DAILY
Qty: 30 TABLET | Refills: 0 | Status: SHIPPED | OUTPATIENT
Start: 2019-10-28 | End: 2020-09-30 | Stop reason: ALTCHOICE

## 2019-10-28 ASSESSMENT — ENCOUNTER SYMPTOMS
TROUBLE SWALLOWING: 0
PHOTOPHOBIA: 0
NAUSEA: 0
BACK PAIN: 0
VOMITING: 0
CHOKING: 0
SHORTNESS OF BREATH: 0

## 2019-11-19 ENCOUNTER — OFFICE VISIT (OUTPATIENT)
Dept: FAMILY MEDICINE CLINIC | Age: 49
End: 2019-11-19
Payer: MEDICARE

## 2019-11-19 VITALS
BODY MASS INDEX: 21.35 KG/M2 | SYSTOLIC BLOOD PRESSURE: 100 MMHG | TEMPERATURE: 98.1 F | HEART RATE: 90 BPM | WEIGHT: 116 LBS | OXYGEN SATURATION: 98 % | HEIGHT: 62 IN | DIASTOLIC BLOOD PRESSURE: 60 MMHG

## 2019-11-19 DIAGNOSIS — M79.605 LEFT LEG PAIN: Primary | ICD-10-CM

## 2019-11-19 DIAGNOSIS — M79.605 LEFT LEG PAIN: ICD-10-CM

## 2019-11-19 LAB
ALBUMIN SERPL-MCNC: 4.5 G/DL (ref 3.5–4.6)
ALP BLD-CCNC: 93 U/L (ref 40–130)
ALT SERPL-CCNC: 10 U/L (ref 0–33)
ANION GAP SERPL CALCULATED.3IONS-SCNC: 13 MEQ/L (ref 9–15)
AST SERPL-CCNC: 11 U/L (ref 0–35)
BASOPHILS ABSOLUTE: 0.1 K/UL (ref 0–0.2)
BASOPHILS RELATIVE PERCENT: 1.4 %
BILIRUB SERPL-MCNC: <0.2 MG/DL (ref 0.2–0.7)
BUN BLDV-MCNC: 18 MG/DL (ref 6–20)
CALCIUM SERPL-MCNC: 9.6 MG/DL (ref 8.5–9.9)
CHLORIDE BLD-SCNC: 105 MEQ/L (ref 95–107)
CO2: 21 MEQ/L (ref 20–31)
CREAT SERPL-MCNC: 0.64 MG/DL (ref 0.5–0.9)
EOSINOPHILS ABSOLUTE: 0 K/UL (ref 0–0.7)
EOSINOPHILS RELATIVE PERCENT: 0.7 %
GFR AFRICAN AMERICAN: >60
GFR NON-AFRICAN AMERICAN: >60
GLOBULIN: 3 G/DL (ref 2.3–3.5)
GLUCOSE BLD-MCNC: 89 MG/DL (ref 70–99)
HCT VFR BLD CALC: 40.5 % (ref 37–47)
HEMOGLOBIN: 13.6 G/DL (ref 12–16)
LYMPHOCYTES ABSOLUTE: 1.7 K/UL (ref 1–4.8)
LYMPHOCYTES RELATIVE PERCENT: 32.6 %
MCH RBC QN AUTO: 29.4 PG (ref 27–31.3)
MCHC RBC AUTO-ENTMCNC: 33.4 % (ref 33–37)
MCV RBC AUTO: 87.9 FL (ref 82–100)
MONOCYTES ABSOLUTE: 0.3 K/UL (ref 0.2–0.8)
MONOCYTES RELATIVE PERCENT: 5.6 %
NEUTROPHILS ABSOLUTE: 3.1 K/UL (ref 1.4–6.5)
NEUTROPHILS RELATIVE PERCENT: 59.7 %
PDW BLD-RTO: 12.8 % (ref 11.5–14.5)
PLATELET # BLD: 342 K/UL (ref 130–400)
POTASSIUM SERPL-SCNC: 3.6 MEQ/L (ref 3.4–4.9)
RBC # BLD: 4.61 M/UL (ref 4.2–5.4)
SODIUM BLD-SCNC: 139 MEQ/L (ref 135–144)
TOTAL PROTEIN: 7.5 G/DL (ref 6.3–8)
VITAMIN D 25-HYDROXY: 81.8 NG/ML (ref 30–100)
WBC # BLD: 5.2 K/UL (ref 4.8–10.8)

## 2019-11-19 PROCEDURE — 1036F TOBACCO NON-USER: CPT | Performed by: FAMILY MEDICINE

## 2019-11-19 PROCEDURE — G8484 FLU IMMUNIZE NO ADMIN: HCPCS | Performed by: FAMILY MEDICINE

## 2019-11-19 PROCEDURE — 99214 OFFICE O/P EST MOD 30 MIN: CPT | Performed by: FAMILY MEDICINE

## 2019-11-19 PROCEDURE — G8427 DOCREV CUR MEDS BY ELIG CLIN: HCPCS | Performed by: FAMILY MEDICINE

## 2019-11-19 PROCEDURE — G8420 CALC BMI NORM PARAMETERS: HCPCS | Performed by: FAMILY MEDICINE

## 2019-11-21 DIAGNOSIS — F98.8 ATTENTION DEFICIT DISORDER, UNSPECIFIED HYPERACTIVITY PRESENCE: ICD-10-CM

## 2019-11-21 RX ORDER — DEXTROAMPHETAMINE SACCHARATE, AMPHETAMINE ASPARTATE MONOHYDRATE, DEXTROAMPHETAMINE SULFATE AND AMPHETAMINE SULFATE 7.5; 7.5; 7.5; 7.5 MG/1; MG/1; MG/1; MG/1
30 CAPSULE, EXTENDED RELEASE ORAL 2 TIMES DAILY
Qty: 60 CAPSULE | Refills: 0 | Status: SHIPPED | OUTPATIENT
Start: 2019-11-21 | End: 2019-12-17 | Stop reason: SDUPTHER

## 2019-12-02 RX ORDER — ESOMEPRAZOLE MAGNESIUM 40 MG/1
CAPSULE, DELAYED RELEASE ORAL
Qty: 30 CAPSULE | Refills: 0 | Status: SHIPPED | OUTPATIENT
Start: 2019-12-02 | End: 2020-01-06 | Stop reason: SDUPTHER

## 2019-12-03 ENCOUNTER — OFFICE VISIT (OUTPATIENT)
Dept: FAMILY MEDICINE CLINIC | Age: 49
End: 2019-12-03
Payer: MEDICARE

## 2019-12-03 VITALS
WEIGHT: 117 LBS | DIASTOLIC BLOOD PRESSURE: 72 MMHG | RESPIRATION RATE: 12 BRPM | HEIGHT: 62 IN | TEMPERATURE: 97.6 F | SYSTOLIC BLOOD PRESSURE: 122 MMHG | BODY MASS INDEX: 21.53 KG/M2 | OXYGEN SATURATION: 99 % | HEART RATE: 86 BPM

## 2019-12-03 DIAGNOSIS — R07.81 PLEURITIC CHEST PAIN: ICD-10-CM

## 2019-12-03 DIAGNOSIS — R07.81 PLEURITIC CHEST PAIN: Primary | ICD-10-CM

## 2019-12-03 LAB
ALBUMIN SERPL-MCNC: 4.6 G/DL (ref 3.5–4.6)
ALP BLD-CCNC: 101 U/L (ref 40–130)
ALT SERPL-CCNC: 13 U/L (ref 0–33)
ANION GAP SERPL CALCULATED.3IONS-SCNC: 19 MEQ/L (ref 9–15)
AST SERPL-CCNC: 13 U/L (ref 0–35)
BASOPHILS ABSOLUTE: 0.1 K/UL (ref 0–0.2)
BASOPHILS RELATIVE PERCENT: 1.2 %
BILIRUB SERPL-MCNC: 0.3 MG/DL (ref 0.2–0.7)
BUN BLDV-MCNC: 17 MG/DL (ref 6–20)
CALCIUM SERPL-MCNC: 9.8 MG/DL (ref 8.5–9.9)
CHLORIDE BLD-SCNC: 102 MEQ/L (ref 95–107)
CO2: 21 MEQ/L (ref 20–31)
CREAT SERPL-MCNC: 0.73 MG/DL (ref 0.5–0.9)
D DIMER: 0.49 MG/L FEU (ref 0–0.5)
EOSINOPHILS ABSOLUTE: 0.1 K/UL (ref 0–0.7)
EOSINOPHILS RELATIVE PERCENT: 1.9 %
GFR AFRICAN AMERICAN: >60
GFR NON-AFRICAN AMERICAN: >60
GLOBULIN: 2.6 G/DL (ref 2.3–3.5)
GLUCOSE BLD-MCNC: 55 MG/DL (ref 70–99)
HCT VFR BLD CALC: 40.3 % (ref 37–47)
HEMOGLOBIN: 13.3 G/DL (ref 12–16)
LYMPHOCYTES ABSOLUTE: 1.9 K/UL (ref 1–4.8)
LYMPHOCYTES RELATIVE PERCENT: 34.2 %
MCH RBC QN AUTO: 29 PG (ref 27–31.3)
MCHC RBC AUTO-ENTMCNC: 33 % (ref 33–37)
MCV RBC AUTO: 87.8 FL (ref 82–100)
MONOCYTES ABSOLUTE: 0.3 K/UL (ref 0.2–0.8)
MONOCYTES RELATIVE PERCENT: 6.2 %
NEUTROPHILS ABSOLUTE: 3.2 K/UL (ref 1.4–6.5)
NEUTROPHILS RELATIVE PERCENT: 56.5 %
PDW BLD-RTO: 13 % (ref 11.5–14.5)
PLATELET # BLD: 352 K/UL (ref 130–400)
POTASSIUM SERPL-SCNC: 3.6 MEQ/L (ref 3.4–4.9)
RBC # BLD: 4.59 M/UL (ref 4.2–5.4)
SEDIMENTATION RATE, ERYTHROCYTE: 2 MM (ref 0–20)
SODIUM BLD-SCNC: 142 MEQ/L (ref 135–144)
TOTAL PROTEIN: 7.2 G/DL (ref 6.3–8)
WBC # BLD: 5.6 K/UL (ref 4.8–10.8)

## 2019-12-03 PROCEDURE — 1036F TOBACCO NON-USER: CPT | Performed by: NURSE PRACTITIONER

## 2019-12-03 PROCEDURE — G8420 CALC BMI NORM PARAMETERS: HCPCS | Performed by: NURSE PRACTITIONER

## 2019-12-03 PROCEDURE — G8427 DOCREV CUR MEDS BY ELIG CLIN: HCPCS | Performed by: NURSE PRACTITIONER

## 2019-12-03 PROCEDURE — 99214 OFFICE O/P EST MOD 30 MIN: CPT | Performed by: NURSE PRACTITIONER

## 2019-12-03 PROCEDURE — G8484 FLU IMMUNIZE NO ADMIN: HCPCS | Performed by: NURSE PRACTITIONER

## 2019-12-03 RX ORDER — PREDNISONE 20 MG/1
20 TABLET ORAL 2 TIMES DAILY
Qty: 10 TABLET | Refills: 0 | Status: SHIPPED | OUTPATIENT
Start: 2019-12-03 | End: 2019-12-08

## 2019-12-03 RX ORDER — AZELASTINE 1 MG/ML
1 SPRAY, METERED NASAL
COMMUNITY
Start: 2019-11-27

## 2019-12-03 RX ORDER — MOXIFLOXACIN 5 MG/ML
1 SOLUTION/ DROPS OPHTHALMIC
COMMUNITY

## 2019-12-03 RX ORDER — POTASSIUM CITRATE 15 MEQ/1
15 TABLET, EXTENDED RELEASE ORAL
COMMUNITY
Start: 2019-02-05 | End: 2021-04-13 | Stop reason: DRUGHIGH

## 2019-12-03 RX ORDER — AMOXICILLIN AND CLAVULANATE POTASSIUM 875; 125 MG/1; MG/1
1 TABLET, FILM COATED ORAL
COMMUNITY
Start: 2019-11-27 | End: 2019-12-08

## 2019-12-03 ASSESSMENT — ENCOUNTER SYMPTOMS
CONSTIPATION: 0
COUGH: 0
SHORTNESS OF BREATH: 1
DIARRHEA: 0

## 2019-12-04 ENCOUNTER — TELEPHONE (OUTPATIENT)
Dept: FAMILY MEDICINE CLINIC | Age: 49
End: 2019-12-04

## 2019-12-17 ENCOUNTER — OFFICE VISIT (OUTPATIENT)
Dept: FAMILY MEDICINE CLINIC | Age: 49
End: 2019-12-17
Payer: MEDICARE

## 2019-12-17 VITALS
SYSTOLIC BLOOD PRESSURE: 124 MMHG | HEART RATE: 87 BPM | HEIGHT: 62 IN | RESPIRATION RATE: 12 BRPM | BODY MASS INDEX: 21.38 KG/M2 | OXYGEN SATURATION: 97 % | WEIGHT: 116.2 LBS | TEMPERATURE: 98.9 F | DIASTOLIC BLOOD PRESSURE: 70 MMHG

## 2019-12-17 DIAGNOSIS — M35.00 SJOGREN'S SYNDROME, WITH UNSPECIFIED ORGAN INVOLVEMENT (HCC): Chronic | ICD-10-CM

## 2019-12-17 DIAGNOSIS — F98.8 ATTENTION DEFICIT DISORDER, UNSPECIFIED HYPERACTIVITY PRESENCE: ICD-10-CM

## 2019-12-17 DIAGNOSIS — J01.40 ACUTE PANSINUSITIS, RECURRENCE NOT SPECIFIED: Primary | ICD-10-CM

## 2019-12-17 DIAGNOSIS — R09.1 PLEURISY: ICD-10-CM

## 2019-12-17 DIAGNOSIS — R51.9 CHRONIC NONINTRACTABLE HEADACHE, UNSPECIFIED HEADACHE TYPE: ICD-10-CM

## 2019-12-17 DIAGNOSIS — G89.29 CHRONIC NONINTRACTABLE HEADACHE, UNSPECIFIED HEADACHE TYPE: ICD-10-CM

## 2019-12-17 PROCEDURE — G8420 CALC BMI NORM PARAMETERS: HCPCS | Performed by: NURSE PRACTITIONER

## 2019-12-17 PROCEDURE — 99213 OFFICE O/P EST LOW 20 MIN: CPT | Performed by: NURSE PRACTITIONER

## 2019-12-17 PROCEDURE — G8484 FLU IMMUNIZE NO ADMIN: HCPCS | Performed by: NURSE PRACTITIONER

## 2019-12-17 PROCEDURE — G8427 DOCREV CUR MEDS BY ELIG CLIN: HCPCS | Performed by: NURSE PRACTITIONER

## 2019-12-17 PROCEDURE — 1036F TOBACCO NON-USER: CPT | Performed by: NURSE PRACTITIONER

## 2019-12-17 RX ORDER — HYDROXYZINE PAMOATE 25 MG/1
CAPSULE ORAL
COMMUNITY
End: 2020-06-01

## 2019-12-17 RX ORDER — DOXYCYCLINE HYCLATE 100 MG
TABLET ORAL
Refills: 0 | COMMUNITY
Start: 2019-12-10 | End: 2020-02-25 | Stop reason: ALTCHOICE

## 2019-12-17 RX ORDER — CYCLOBENZAPRINE HCL 10 MG
10 TABLET ORAL NIGHTLY PRN
Qty: 30 TABLET | Refills: 0 | Status: SHIPPED | OUTPATIENT
Start: 2019-12-17 | End: 2020-01-16

## 2019-12-17 RX ORDER — DULOXETIN HYDROCHLORIDE 30 MG/1
CAPSULE, DELAYED RELEASE ORAL
Refills: 5 | COMMUNITY
Start: 2019-12-08 | End: 2020-03-31

## 2019-12-17 RX ORDER — PROMETHAZINE HYDROCHLORIDE 12.5 MG/1
SUPPOSITORY RECTAL
COMMUNITY
End: 2020-02-25 | Stop reason: SDUPTHER

## 2019-12-17 RX ORDER — TOPIRAMATE 100 MG/1
100 TABLET, FILM COATED ORAL 2 TIMES DAILY
Qty: 60 TABLET | Refills: 3 | Status: SHIPPED | OUTPATIENT
Start: 2019-12-17 | End: 2020-04-01

## 2019-12-17 RX ORDER — PREDNISONE 20 MG/1
TABLET ORAL
Refills: 0 | COMMUNITY
Start: 2019-12-10 | End: 2020-02-25

## 2019-12-17 ASSESSMENT — ENCOUNTER SYMPTOMS
SINUS PRESSURE: 1
SHORTNESS OF BREATH: 0
COUGH: 1

## 2019-12-18 RX ORDER — DEXTROAMPHETAMINE SACCHARATE, AMPHETAMINE ASPARTATE MONOHYDRATE, DEXTROAMPHETAMINE SULFATE AND AMPHETAMINE SULFATE 7.5; 7.5; 7.5; 7.5 MG/1; MG/1; MG/1; MG/1
30 CAPSULE, EXTENDED RELEASE ORAL 2 TIMES DAILY
Qty: 60 CAPSULE | Refills: 0 | Status: SHIPPED | OUTPATIENT
Start: 2019-12-18 | End: 2020-01-24 | Stop reason: SDUPTHER

## 2019-12-20 RX ORDER — BUPROPION HYDROCHLORIDE 300 MG/1
300 TABLET ORAL EVERY MORNING
Qty: 90 TABLET | Refills: 1 | Status: SHIPPED | OUTPATIENT
Start: 2019-12-20 | End: 2019-12-31

## 2019-12-26 ENCOUNTER — CARE COORDINATION (OUTPATIENT)
Dept: CARE COORDINATION | Age: 49
End: 2019-12-26

## 2020-01-02 ENCOUNTER — OFFICE VISIT (OUTPATIENT)
Dept: FAMILY MEDICINE CLINIC | Age: 50
End: 2020-01-02
Payer: MEDICARE

## 2020-01-02 VITALS
HEIGHT: 62 IN | WEIGHT: 115.8 LBS | RESPIRATION RATE: 14 BRPM | SYSTOLIC BLOOD PRESSURE: 106 MMHG | DIASTOLIC BLOOD PRESSURE: 80 MMHG | BODY MASS INDEX: 21.31 KG/M2 | TEMPERATURE: 97.8 F | OXYGEN SATURATION: 99 % | HEART RATE: 82 BPM

## 2020-01-02 LAB
BILIRUBIN, POC: ABNORMAL
BLOOD URINE, POC: ABNORMAL
CLARITY, POC: CLEAR
COLOR, POC: YELLOW
GLUCOSE URINE, POC: ABNORMAL
KETONES, POC: ABNORMAL
LEUKOCYTE EST, POC: ABNORMAL
NITRITE, POC: ABNORMAL
PH, POC: 6
PROTEIN, POC: ABNORMAL
SPECIFIC GRAVITY, POC: 1.02
UROBILINOGEN, POC: 0.2

## 2020-01-02 PROCEDURE — G8420 CALC BMI NORM PARAMETERS: HCPCS | Performed by: NURSE PRACTITIONER

## 2020-01-02 PROCEDURE — 1036F TOBACCO NON-USER: CPT | Performed by: NURSE PRACTITIONER

## 2020-01-02 PROCEDURE — 99214 OFFICE O/P EST MOD 30 MIN: CPT | Performed by: NURSE PRACTITIONER

## 2020-01-02 PROCEDURE — 81003 URINALYSIS AUTO W/O SCOPE: CPT | Performed by: NURSE PRACTITIONER

## 2020-01-02 PROCEDURE — G8427 DOCREV CUR MEDS BY ELIG CLIN: HCPCS | Performed by: NURSE PRACTITIONER

## 2020-01-02 PROCEDURE — G8484 FLU IMMUNIZE NO ADMIN: HCPCS | Performed by: NURSE PRACTITIONER

## 2020-01-02 RX ORDER — CLINDAMYCIN HYDROCHLORIDE 300 MG/1
CAPSULE ORAL
COMMUNITY
Start: 2019-10-02 | End: 2020-02-25 | Stop reason: ALTCHOICE

## 2020-01-02 ASSESSMENT — ENCOUNTER SYMPTOMS
SHORTNESS OF BREATH: 0
CONSTIPATION: 0
DIARRHEA: 0
COUGH: 0

## 2020-01-02 NOTE — PROGRESS NOTES
Anxiety 12/09/2015    Depression 12/09/2015    ADD (attention deficit disorder) 02/12/2015    Endometriosis 09/09/2014    ASCUS favoring benign 05/01/2013    S/P endometrial ablation 05/01/2013     Past Medical History:   Diagnosis Date    Acquired hypothyroidism 9/26/2016    Acute pancreatitis     ADD (attention deficit disorder) 2/12/2015    Anxiety     Depression 12/9/2015    Endometrial cyst of ovary 5/10/14    RT ovary, ruptured    GERD (gastroesophageal reflux disease) 9/26/2016    Medullary sponge kidney of both kidneys 5/22/2018    Sjogren's disease ([facility] [address]     Stress     Swelling      Past Surgical History:   Procedure Laterality Date    CHOLECYSTECTOMY  2011    HYSTERECTOMY  2014    sept    OVARY REMOVAL Left Jan 2014    UPPER GASTROINTESTINAL ENDOSCOPY  09/16/2016    EGD W/BX     Family History   Problem Relation Age of Onset    Heart Disease Father 48    Cancer Maternal Grandmother         breast    Heart Disease Other         mom and dad's side     Social History     Socioeconomic History    Marital status: Single     Spouse name: Not on file    Number of children: Not on file    Years of education: Not on file    Highest education level: Not on file   Occupational History    Not on file   Social Needs    Financial resource strain: Not on file    Food insecurity:     Worry: Not on file     Inability: Not on file    Transportation needs:     Medical: Not on file     Non-medical: Not on file   Tobacco Use    Smoking status: Never Smoker    Smokeless tobacco: Never Used   Substance and Sexual Activity    Alcohol use: No     Alcohol/week: 0.0 standard drinks     Comment: no alcohol since 2011    Drug use: No    Sexual activity: Not on file   Lifestyle    Physical activity:     Days per week: Not on file     Minutes per session: Not on file    Stress: Not on file   Relationships    Social connections:     Talks on phone: Not on file     Gets together: Not on file Attends Restoration service: Not on file     Active member of club or organization: Not on file     Attends meetings of clubs or organizations: Not on file     Relationship status: Not on file    Intimate partner violence:     Fear of current or ex partner: Not on file     Emotionally abused: Not on file     Physically abused: Not on file     Forced sexual activity: Not on file   Other Topics Concern    Not on file   Social History Narrative    ** Merged History Encounter **          Current Outpatient Medications on File Prior to Visit   Medication Sig Dispense Refill    clindamycin (CLEOCIN) 300 MG capsule       buPROPion (WELLBUTRIN XL) 300 MG extended release tablet TAKE ONE TABLET BY MOUTH EVERY MORNING 90 tablet 0    amphetamine-dextroamphetamine (ADDERALL XR) 30 MG extended release capsule Take 1 capsule by mouth 2 times daily for 30 days.  60 capsule 0    DULoxetine (CYMBALTA) 30 MG extended release capsule TAKE ONE CAPSULE BY MOUTH daily with food  5    doxycycline hyclate (VIBRA-TABS) 100 MG tablet TAKE ONE TABLET BY MOUTH TWO TIMES A DAY  0    promethazine (PHENERGAN) 12.5 MG suppository Take by mouth      hydrOXYzine (VISTARIL) 25 MG capsule Take by mouth      predniSONE (DELTASONE) 20 MG tablet TAKE ONE TABLET BY MOUTH THREE TIMES A DAY  0    cyclobenzaprine (FLEXERIL) 10 MG tablet Take 1 tablet by mouth nightly as needed for Muscle spasms 30 tablet 0    topiramate (TOPAMAX) 100 MG tablet Take 1 tablet by mouth 2 times daily 60 tablet 3    azelastine (ASTELIN) 0.1 % nasal spray 1 spray by Nasal route      moxifloxacin (VIGAMOX) 0.5 % ophthalmic solution 1 drop      Potassium Citrate ER 15 MEQ (1620 MG) TBCR Take 15 mEq by mouth      esomeprazole (NEXIUM) 40 MG delayed release capsule TAKE ONE CAPSULE BY MOUTH EVERY DAY 30 capsule 0    diclofenac sodium 1 % GEL Apply 4 g topically 2 times daily 1 Tube 3    meloxicam (MOBIC) 15 MG tablet Take 1 tablet by mouth daily 30 tablet 0    Objective  Vitals:    01/02/20 1628   BP: 106/80   Site: Right Upper Arm   Position: Sitting   Cuff Size: Medium Adult   Pulse: 82   Resp: 14   Temp: 97.8 °F (36.6 °C)   TempSrc: Temporal   SpO2: 99%   Weight: 115 lb 12.8 oz (52.5 kg)   Height: 5' 2\" (1.575 m)     Physical Exam  Vitals signs and nursing note reviewed. Constitutional:       Appearance: Normal appearance. She is normal weight. HENT:      Head: Normocephalic. Right Ear: Tympanic membrane normal.      Left Ear: Tympanic membrane normal.      Mouth/Throat:      Mouth: Mucous membranes are moist.   Eyes:      Extraocular Movements: Extraocular movements intact. Conjunctiva/sclera: Conjunctivae normal.      Pupils: Pupils are equal, round, and reactive to light. Cardiovascular:      Rate and Rhythm: Normal rate and regular rhythm. Pulses: Normal pulses. Heart sounds: Normal heart sounds. Musculoskeletal:      Lumbar back: She exhibits decreased range of motion, tenderness, bony tenderness and pain. She exhibits no swelling, no deformity and normal pulse. Skin:     General: Skin is warm. Neurological:      General: No focal deficit present. Mental Status: She is alert and oriented to person, place, and time. Mental status is at baseline. Psychiatric:         Mood and Affect: Mood normal.         Behavior: Behavior normal.         Thought Content: Thought content normal.         Judgment: Judgment normal.         Assessment & Plan     Diagnosis Orders   1. Lumbar pain  MRI LUMBAR SPINE WO CONTRAST   2. Urinary incontinence, unspecified type  MRI LUMBAR SPINE WO CONTRAST    POCT Urinalysis No Micro (Auto)   3. Swelling of calf  US Soft Tissue Limited Area   4.  Sjogren's syndrome, with unspecified organ involvement ([address]            Orders Placed This Encounter   Procedures    MRI LUMBAR SPINE WO CONTRAST     Standing Status:   Future     Standing Expiration Date:   1/2/2021     Order Specific Question:

## 2020-01-06 ENCOUNTER — HOSPITAL ENCOUNTER (OUTPATIENT)
Dept: ULTRASOUND IMAGING | Age: 50
Discharge: HOME | End: 2020-01-08
Payer: MEDICARE

## 2020-01-06 PROCEDURE — 76882 US LMTD JT/FCL EVL NVASC XTR: CPT

## 2020-01-06 RX ORDER — ESOMEPRAZOLE MAGNESIUM 40 MG/1
CAPSULE, DELAYED RELEASE ORAL
Qty: 30 CAPSULE | Refills: 0 | Status: SHIPPED | OUTPATIENT
Start: 2020-01-06 | End: 2020-02-07 | Stop reason: SDUPTHER

## 2020-01-09 ENCOUNTER — HOSPITAL ENCOUNTER (OUTPATIENT)
Dept: MRI IMAGING | Age: 50
Discharge: HOME | End: 2020-01-11
Payer: MEDICARE

## 2020-01-09 PROCEDURE — 72148 MRI LUMBAR SPINE W/O DYE: CPT

## 2020-01-10 ENCOUNTER — TELEPHONE (OUTPATIENT)
Dept: FAMILY MEDICINE CLINIC | Age: 50
End: 2020-01-10

## 2020-01-10 NOTE — TELEPHONE ENCOUNTER
NL pt. Pt is going out of town at 5 AM tomorrow and is really worried about her MRI results. Can you please result for the pt, and advise her ASAP? Thanks.

## 2020-01-13 RX ORDER — LIOTHYRONINE SODIUM 25 UG/1
25 TABLET ORAL DAILY
Qty: 90 TABLET | Refills: 0 | Status: SHIPPED | OUTPATIENT
Start: 2020-01-13 | End: 2020-03-19 | Stop reason: SDUPTHER

## 2020-01-13 NOTE — TELEPHONE ENCOUNTER
Pharmacy requesting medication refill.  Please approve or deny this request.    Rx requested:  Requested Prescriptions     Pending Prescriptions Disp Refills    liothyronine (CYTOMEL) 25 MCG tablet [Pharmacy Med Name: Calderon Gutierrez 25MCG TABLETS] 90 tablet 0     Sig: TAKE 1 TABLET BY MOUTH DAILY         Last Office Visit:   1/2/2020      Next Visit Date:  Future Appointments   Date Time Provider Angela Hernandez   1/15/2020  8:00 AM [facility] ROOM 1 [facility]   1/21/2020  8:30 AM [facility] ROOM 1 [facility] [place]   1/27/2020  3:45 PM MD Julia Chawla

## 2020-01-15 ENCOUNTER — TELEPHONE (OUTPATIENT)
Dept: FAMILY MEDICINE CLINIC | Age: 50
End: 2020-01-15

## 2020-01-16 ENCOUNTER — HOSPITAL ENCOUNTER (OUTPATIENT)
Dept: MRI IMAGING | Age: 50
Discharge: HOME | End: 2020-01-18
Payer: MEDICARE

## 2020-01-16 PROCEDURE — 6360000004 HC RX CONTRAST MEDICATION: Performed by: NURSE PRACTITIONER

## 2020-01-16 PROCEDURE — A9579 GAD-BASE MR CONTRAST NOS,1ML: HCPCS | Performed by: NURSE PRACTITIONER

## 2020-01-16 PROCEDURE — 73720 MRI LWR EXTREMITY W/O&W/DYE: CPT

## 2020-01-16 RX ORDER — SODIUM CHLORIDE 0.9 % (FLUSH) 0.9 %
10 SYRINGE (ML) INJECTION 2 TIMES DAILY
Status: DISCONTINUED | OUTPATIENT
Start: 2020-01-16 | End: 2020-01-19 | Stop reason: HOSPADM

## 2020-01-16 RX ADMIN — GADOTERIDOL 10 ML: 279.3 INJECTION, SOLUTION INTRAVENOUS at 20:10

## 2020-01-17 ENCOUNTER — TELEPHONE (OUTPATIENT)
Dept: FAMILY MEDICINE CLINIC | Age: 50
End: 2020-01-17

## 2020-01-17 RX ORDER — FUROSEMIDE 20 MG/1
TABLET ORAL
Qty: 60 TABLET | Refills: 0 | Status: SHIPPED | OUTPATIENT
Start: 2020-01-17 | End: 2020-02-07 | Stop reason: SDUPTHER

## 2020-01-17 NOTE — TELEPHONE ENCOUNTER
Pt calling to inform NL that she completed MRI yesterday. Pt knows she is not in office today, and is wondering if another provider could result on MRI since she is feeling anxious. Please advise.

## 2020-01-28 RX ORDER — DEXTROAMPHETAMINE SACCHARATE, AMPHETAMINE ASPARTATE MONOHYDRATE, DEXTROAMPHETAMINE SULFATE AND AMPHETAMINE SULFATE 7.5; 7.5; 7.5; 7.5 MG/1; MG/1; MG/1; MG/1
30 CAPSULE, EXTENDED RELEASE ORAL 2 TIMES DAILY
Qty: 60 CAPSULE | Refills: 0 | OUTPATIENT
Start: 2020-01-28 | End: 2020-02-27

## 2020-01-28 RX ORDER — DEXTROAMPHETAMINE SACCHARATE, AMPHETAMINE ASPARTATE MONOHYDRATE, DEXTROAMPHETAMINE SULFATE AND AMPHETAMINE SULFATE 7.5; 7.5; 7.5; 7.5 MG/1; MG/1; MG/1; MG/1
30 CAPSULE, EXTENDED RELEASE ORAL 2 TIMES DAILY
Qty: 60 CAPSULE | Refills: 0 | Status: SHIPPED | OUTPATIENT
Start: 2020-01-28 | End: 2020-02-25 | Stop reason: SDUPTHER

## 2020-02-25 ENCOUNTER — OFFICE VISIT (OUTPATIENT)
Dept: FAMILY MEDICINE CLINIC | Age: 50
End: 2020-02-25
Payer: MEDICARE

## 2020-02-25 VITALS
BODY MASS INDEX: 21.6 KG/M2 | HEIGHT: 62 IN | TEMPERATURE: 98 F | DIASTOLIC BLOOD PRESSURE: 78 MMHG | SYSTOLIC BLOOD PRESSURE: 112 MMHG | WEIGHT: 117.4 LBS | HEART RATE: 94 BPM | OXYGEN SATURATION: 99 %

## 2020-02-25 DIAGNOSIS — R10.9 ABDOMINAL PAIN, UNSPECIFIED ABDOMINAL LOCATION: ICD-10-CM

## 2020-02-25 LAB
ALBUMIN SERPL-MCNC: 4.5 G/DL (ref 3.5–4.6)
ALP BLD-CCNC: 101 U/L (ref 40–130)
ALT SERPL-CCNC: 12 U/L (ref 0–33)
AMYLASE: 95 U/L (ref 22–93)
ANION GAP SERPL CALCULATED.3IONS-SCNC: 14 MEQ/L (ref 9–15)
AST SERPL-CCNC: 13 U/L (ref 0–35)
BASOPHILS ABSOLUTE: 0.1 K/UL (ref 0–0.2)
BASOPHILS RELATIVE PERCENT: 1.2 %
BILIRUB SERPL-MCNC: <0.2 MG/DL (ref 0.2–0.7)
BUN BLDV-MCNC: 11 MG/DL (ref 6–20)
CALCIUM SERPL-MCNC: 9.5 MG/DL (ref 8.5–9.9)
CHLORIDE BLD-SCNC: 103 MEQ/L (ref 95–107)
CO2: 21 MEQ/L (ref 20–31)
CREAT SERPL-MCNC: 0.69 MG/DL (ref 0.5–0.9)
EOSINOPHILS ABSOLUTE: 0.1 K/UL (ref 0–0.7)
EOSINOPHILS RELATIVE PERCENT: 2 %
GFR AFRICAN AMERICAN: >60
GFR NON-AFRICAN AMERICAN: >60
GLOBULIN: 3.3 G/DL (ref 2.3–3.5)
GLUCOSE BLD-MCNC: 72 MG/DL (ref 70–99)
HCT VFR BLD CALC: 40.5 % (ref 37–47)
HEMOGLOBIN: 13.2 G/DL (ref 12–16)
LIPASE: 45 U/L (ref 12–95)
LYMPHOCYTES ABSOLUTE: 1.8 K/UL (ref 1–4.8)
LYMPHOCYTES RELATIVE PERCENT: 32.6 %
MCH RBC QN AUTO: 28.8 PG (ref 27–31.3)
MCHC RBC AUTO-ENTMCNC: 32.7 % (ref 33–37)
MCV RBC AUTO: 88.1 FL (ref 82–100)
MONOCYTES ABSOLUTE: 0.3 K/UL (ref 0.2–0.8)
MONOCYTES RELATIVE PERCENT: 5.7 %
NEUTROPHILS ABSOLUTE: 3.2 K/UL (ref 1.4–6.5)
NEUTROPHILS RELATIVE PERCENT: 58.5 %
PDW BLD-RTO: 13.5 % (ref 11.5–14.5)
PLATELET # BLD: 341 K/UL (ref 130–400)
POTASSIUM SERPL-SCNC: 3.8 MEQ/L (ref 3.4–4.9)
RBC # BLD: 4.59 M/UL (ref 4.2–5.4)
SODIUM BLD-SCNC: 138 MEQ/L (ref 135–144)
TOTAL PROTEIN: 7.8 G/DL (ref 6.3–8)
WBC # BLD: 5.5 K/UL (ref 4.8–10.8)

## 2020-02-25 PROCEDURE — G8427 DOCREV CUR MEDS BY ELIG CLIN: HCPCS | Performed by: NURSE PRACTITIONER

## 2020-02-25 PROCEDURE — G8484 FLU IMMUNIZE NO ADMIN: HCPCS | Performed by: NURSE PRACTITIONER

## 2020-02-25 PROCEDURE — G8420 CALC BMI NORM PARAMETERS: HCPCS | Performed by: NURSE PRACTITIONER

## 2020-02-25 PROCEDURE — 1036F TOBACCO NON-USER: CPT | Performed by: NURSE PRACTITIONER

## 2020-02-25 PROCEDURE — 99214 OFFICE O/P EST MOD 30 MIN: CPT | Performed by: NURSE PRACTITIONER

## 2020-02-25 RX ORDER — LISINOPRIL 10 MG/1
10 TABLET ORAL DAILY
COMMUNITY
End: 2020-02-25

## 2020-02-25 RX ORDER — ONDANSETRON 4 MG/1
4 TABLET, FILM COATED ORAL EVERY 8 HOURS PRN
Qty: 30 TABLET | Refills: 1 | Status: SHIPPED | OUTPATIENT
Start: 2020-02-25

## 2020-02-25 RX ORDER — VALACYCLOVIR HYDROCHLORIDE 1 G/1
1 TABLET, FILM COATED ORAL
COMMUNITY
Start: 2020-02-03 | End: 2020-02-25 | Stop reason: SDUPTHER

## 2020-02-25 RX ORDER — AMLODIPINE BESYLATE 2.5 MG/1
2.5 TABLET ORAL DAILY
COMMUNITY
Start: 2020-02-04 | End: 2022-05-31

## 2020-02-25 RX ORDER — DEXTROAMPHETAMINE SACCHARATE, AMPHETAMINE ASPARTATE MONOHYDRATE, DEXTROAMPHETAMINE SULFATE AND AMPHETAMINE SULFATE 7.5; 7.5; 7.5; 7.5 MG/1; MG/1; MG/1; MG/1
30 CAPSULE, EXTENDED RELEASE ORAL 2 TIMES DAILY
Qty: 60 CAPSULE | Refills: 0 | Status: SHIPPED | OUTPATIENT
Start: 2020-02-25 | End: 2020-02-29 | Stop reason: SDUPTHER

## 2020-02-25 RX ORDER — VALACYCLOVIR HYDROCHLORIDE 1 G/1
1 TABLET, FILM COATED ORAL 3 TIMES DAILY
Qty: 21 TABLET | Refills: 0 | Status: SHIPPED | OUTPATIENT
Start: 2020-02-25 | End: 2020-03-03

## 2020-02-25 ASSESSMENT — ENCOUNTER SYMPTOMS
COUGH: 0
ABDOMINAL PAIN: 1
NAUSEA: 1
ABDOMINAL DISTENTION: 1
SHORTNESS OF BREATH: 0

## 2020-02-25 NOTE — PROGRESS NOTES
tablet 0    estradiol (ESTRACE) 1 MG tablet Take 1 mg by mouth      prochlorperazine (COMPAZINE) 10 MG tablet Take 1 tablet by mouth every 6 hours as needed (nausea) 30 tablet 2    ZOLMitriptan (ZOMIG) 5 MG tablet TAKE 1 TABLET BY MOUTH AT ONSET OF MIGRAINE. MAY REPEAT ONCE AFTER 2 HOURS. MAX 10 MG (2 TABLETS) PER DAY  11    levothyroxine (SYNTHROID) 25 MCG tablet Take one daily 90 tablet 1    hydroxychloroquine (PLAQUENIL) 200 MG tablet Take 300 mg by mouth daily       Pancrelipase, Lip-Prot-Amyl, (CREON) 54546 UNITS CPEP 2 caps tid 180 capsule 03    DULoxetine (CYMBALTA) 30 MG extended release capsule TAKE ONE CAPSULE BY MOUTH daily with food  5    hydrOXYzine (VISTARIL) 25 MG capsule Take by mouth      diclofenac sodium 1 % GEL Apply 4 g topically 2 times daily (Patient not taking: Reported on 2/25/2020) 1 Tube 3    meloxicam (MOBIC) 15 MG tablet Take 1 tablet by mouth daily (Patient not taking: Reported on 2/25/2020) 30 tablet 0    Hyoscyamine Sulfate SL (LEVSIN/SL) 0.125 MG SUBL Place 125 mcg under the tongue every 4 hours as needed (pain) (Patient not taking: Reported on 2/25/2020) 120 each 3    fluticasone (FLONASE) 50 MCG/ACT nasal spray USE 1 SPRAY NASALLY DAILY  3     No current facility-administered medications on file prior to visit. No Known Allergies    Review of Systems   Constitutional: Positive for fatigue. Respiratory: Negative for cough and shortness of breath. Cardiovascular: Negative for chest pain. Gastrointestinal: Positive for abdominal distention, abdominal pain and nausea. Psychiatric/Behavioral: The patient is nervous/anxious. Objective  Vitals:    02/25/20 1312   BP: 112/78   Pulse: 94   Temp: 98 °F (36.7 °C)   SpO2: 99%   Weight: 117 lb 6.4 oz (53.3 kg)   Height: 5' 2\" (1.575 m)     Physical Exam  Vitals signs and nursing note reviewed. Constitutional:       Appearance: Normal appearance. She is normal weight. HENT:      Head: Normocephalic. 2/25/2021    Lipase     Standing Status:   Future     Number of Occurrences:   1     Standing Expiration Date:   2/25/2021    CBC Auto Differential     Standing Status:   Future     Number of Occurrences:   1     Standing Expiration Date:   2/25/2021       Orders Placed This Encounter   Medications    amphetamine-dextroamphetamine (ADDERALL XR) 30 MG extended release capsule     Sig: Take 1 capsule by mouth 2 times daily for 30 days. Dispense:  60 capsule     Refill:  0    ondansetron (ZOFRAN) 4 MG tablet     Sig: Take 1 tablet by mouth every 8 hours as needed for Nausea or Vomiting     Dispense:  30 tablet     Refill:  1    valACYclovir (VALTREX) 1 g tablet     Sig: Take 1 tablet by mouth 3 times daily for 7 days     Dispense:  21 tablet     Refill:  0     Fu after testing. Side effects, adverse effects of the medication prescribed today, as well as treatment plan/ rationale and result expectations have been discussed with the patient who expresses understanding and desires to proceed. Close follow up to evaluate treatment results and for coordination of care. I have reviewed the patient's medical history in detail and updated the computerized patient record. As always, patient is advised that if symptoms worsen in any way they will proceed to the nearest emergency room.      Mabel Bryant, ORTIZ - CNP

## 2020-02-28 RX ORDER — HYOSCYAMINE SULFATE 0.12 MG/1
125 TABLET SUBLINGUAL EVERY 4 HOURS PRN
Qty: 120 EACH | Refills: 3 | Status: SHIPPED | OUTPATIENT
Start: 2020-02-28 | End: 2022-04-07 | Stop reason: ALTCHOICE

## 2020-02-28 RX ORDER — DEXTROAMPHETAMINE SACCHARATE, AMPHETAMINE ASPARTATE MONOHYDRATE, DEXTROAMPHETAMINE SULFATE AND AMPHETAMINE SULFATE 7.5; 7.5; 7.5; 7.5 MG/1; MG/1; MG/1; MG/1
30 CAPSULE, EXTENDED RELEASE ORAL 2 TIMES DAILY
Qty: 60 CAPSULE | Refills: 0 | OUTPATIENT
Start: 2020-02-28 | End: 2020-03-29

## 2020-02-29 ENCOUNTER — TELEPHONE (OUTPATIENT)
Dept: FAMILY MEDICINE CLINIC | Age: 50
End: 2020-02-29

## 2020-02-29 ENCOUNTER — OFFICE VISIT (OUTPATIENT)
Dept: FAMILY MEDICINE CLINIC | Age: 50
End: 2020-02-29
Payer: MEDICARE

## 2020-02-29 VITALS
SYSTOLIC BLOOD PRESSURE: 102 MMHG | HEIGHT: 62 IN | OXYGEN SATURATION: 99 % | DIASTOLIC BLOOD PRESSURE: 68 MMHG | WEIGHT: 117 LBS | HEART RATE: 81 BPM | BODY MASS INDEX: 21.53 KG/M2 | TEMPERATURE: 96.1 F

## 2020-02-29 PROCEDURE — G8420 CALC BMI NORM PARAMETERS: HCPCS | Performed by: NURSE PRACTITIONER

## 2020-02-29 PROCEDURE — 1036F TOBACCO NON-USER: CPT | Performed by: NURSE PRACTITIONER

## 2020-02-29 PROCEDURE — G8484 FLU IMMUNIZE NO ADMIN: HCPCS | Performed by: NURSE PRACTITIONER

## 2020-02-29 PROCEDURE — G8427 DOCREV CUR MEDS BY ELIG CLIN: HCPCS | Performed by: NURSE PRACTITIONER

## 2020-02-29 PROCEDURE — 99214 OFFICE O/P EST MOD 30 MIN: CPT | Performed by: NURSE PRACTITIONER

## 2020-02-29 RX ORDER — DEXTROAMPHETAMINE SACCHARATE, AMPHETAMINE ASPARTATE MONOHYDRATE, DEXTROAMPHETAMINE SULFATE AND AMPHETAMINE SULFATE 7.5; 7.5; 7.5; 7.5 MG/1; MG/1; MG/1; MG/1
30 CAPSULE, EXTENDED RELEASE ORAL 2 TIMES DAILY
Qty: 60 CAPSULE | Refills: 0 | Status: SHIPPED | OUTPATIENT
Start: 2020-02-29 | End: 2020-03-22 | Stop reason: SDUPTHER

## 2020-02-29 RX ORDER — METOCLOPRAMIDE 10 MG/1
10 TABLET ORAL PRN
COMMUNITY
Start: 2020-02-27 | End: 2022-06-29

## 2020-02-29 RX ORDER — CYCLOBENZAPRINE HCL 10 MG
10 TABLET ORAL PRN
COMMUNITY
Start: 2020-02-27 | End: 2020-09-30 | Stop reason: ALTCHOICE

## 2020-02-29 ASSESSMENT — ENCOUNTER SYMPTOMS
ABDOMINAL PAIN: 1
ABDOMINAL DISTENTION: 1
SHORTNESS OF BREATH: 0
CONSTIPATION: 1
COUGH: 0

## 2020-02-29 NOTE — PROGRESS NOTES
Subjective  Chief Complaint   Patient presents with    Results    Medication Refill     pt states that she was unable to get her adderall at [facility], would like [facility] to [facility]. HPI     Feeling like something is getting stuck in the epigastric area. Has appt with Abram Webster on March 9th. Doesn't feel like she can wait that long. Is very uncomfortable. Pain is radiating into back. Has had CT via [facility] ER. See media. Having urinary incontinence as well as lower back pain. Previous MRI completed wnl. However, on recent CT she was shown to have lumbar spinal narrowing with broad based disc narrowing. Pt very uncomfortable. Lesion on kidney also found on CT. Has appt with nephrology this week for follow up.      Patient Active Problem List    Diagnosis Date Noted    Major depressive disorder, single episode, 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[address] 01/15/2019    Abnormal MRI, liver 05/22/2018    Acute recurrent pancreatitis 05/22/2018    At risk of fracture due to osteoporosis 05/22/2018    Calcinosis 05/22/2018    Chronic headaches 05/22/2018    Conductive hearing loss in left ear 05/22/2018    Edema 05/22/2018    Iron overload 05/22/2018    Mass of left side of neck 05/22/2018    Medullary sponge kidney of both kidneys 87/23/7561    Metabolic acidosis 79/50/0824    Microscopic hematuria 05/22/2018    Nausea 05/22/2018    Renal tubular acidosis type I 05/22/2018    Rheumatoid arthritis ([address] 05/22/2018    Tension type headache 05/22/2018    Weight loss, abnormal 05/22/2018    Cellulitis, face 04/06/2018    Other chest pain 11/01/2017    Left lower quadrant pain 11/01/2017    Sjogren's syndrome ([address] 11/01/2017    Diverticulitis of large intestine without perforation or abscess without bleeding 11/01/2017    Fibroids 11/01/2017    Transfusion history 11/01/2017    Pancreatitis 05/13/2017    Acquired hypothyroidism 09/26/2016    GERD (gastroesophageal reflux disease) 09/26/2016    Anxiety 12/09/2015    Depression 12/09/2015    ADD (attention deficit disorder) 02/12/2015    Endometriosis 09/09/2014    ASCUS favoring benign 05/01/2013    S/P endometrial ablation 05/01/2013     Past Medical History:   Diagnosis Date    Acquired hypothyroidism 9/26/2016    Acute pancreatitis     ADD (attention deficit disorder) 2/12/2015    Anxiety     Depression 12/9/2015    Endometrial cyst of ovary 5/10/14    RT ovary, ruptured    GERD (gastroesophageal reflux disease) 9/26/2016    Medullary sponge kidney of both kidneys 5/22/2018    Sjogren's disease ([address]     Stress     Swelling      Past Surgical History:   Procedure Laterality Date    CHOLECYSTECTOMY  2011    HYSTERECTOMY  2014    sept    OVARY REMOVAL Left Jan 2014    UPPER GASTROINTESTINAL ENDOSCOPY  09/16/2016    EGD W/BX     Family History   Problem Relation Age of Onset    Heart Disease Father 48    Cancer Maternal Grandmother         breast    Heart Disease Other         mom and dad's side     Social History     Socioeconomic History    Marital status: Single     Spouse name: None    Number of children: None    Years of education: None    Highest education level: None   Occupational History    None   Social Needs    Financial resource strain: None    Food insecurity:     Worry: None     Inability: None    Transportation needs:     Medical: None     Non-medical: None   Tobacco Use    Smoking status: Never Smoker    Smokeless tobacco: Never Used   Substance and Sexual Activity    Alcohol use: No     Alcohol/week: 0.0 standard drinks     Comment: no alcohol since 2011    Drug use: No    Sexual activity: None   Lifestyle    Physical activity:     Days per week: None     Minutes per session: None    Stress: None   Relationships    Social connections:     Talks on phone: None     Gets together: None     Attends Worship service: None     Active member of club or organization: None     Attends meetings of clubs or organizations: None     Relationship status: None    Intimate partner violence:     Fear of current or ex partner: None     Emotionally abused: None     Physically abused: None     Forced sexual activity: None   Other Topics Concern    None   Social History Narrative    ** Merged History Encounter **          Current Outpatient Medications on File Prior to Visit   Medication Sig Dispense Refill    cyclobenzaprine (FLEXERIL) 10 MG tablet Take 10 mg by mouth as needed      Hyoscyamine Sulfate SL (LEVSIN/SL) 0.125 MG SUBL Place 125 mcg under the tongue every 4 hours as needed (pain) 120 each 3    amLODIPine (NORVASC) 2.5 MG tablet Take 2.5 mg by mouth daily      ondansetron (ZOFRAN) 4 MG tablet Take 1 tablet by mouth every 8 hours as needed for Nausea or Vomiting 30 tablet 1    valACYclovir (VALTREX) 1 g tablet Take 1 tablet by mouth 3 times daily for 7 days 21 tablet 0    esomeprazole (NEXIUM) 40 MG delayed release capsule TAKE ONE CAPSULE BY MOUTH EVERY DAY 30 capsule 5    furosemide (LASIX) 20 MG tablet Take 1 tablet by mouth 2 times daily TAKE ONE TABLET BY MOUTH TWO TIMES A DAY 60 tablet 5    liothyronine (CYTOMEL) 25 MCG tablet TAKE 1 TABLET BY MOUTH DAILY 90 tablet 0    linaclotide (LINZESS) 290 MCG CAPS capsule TAKE ONE CAPSULE BY MOUTH EVERY MORNING BEFORE BREAKFAST 30 capsule 2    buPROPion (WELLBUTRIN XL) 300 MG extended release tablet TAKE ONE TABLET BY MOUTH EVERY MORNING 90 tablet 0    DULoxetine (CYMBALTA) 30 MG extended release capsule TAKE ONE CAPSULE BY MOUTH daily with food  5    hydrOXYzine (VISTARIL) 25 MG capsule Take by mouth      topiramate (TOPAMAX) 100 MG tablet Take 1 tablet by mouth 2 times daily 60 tablet 3    azelastine (ASTELIN) 0.1 % nasal spray 1 spray by Nasal route      moxifloxacin (VIGAMOX) 0.5 % ophthalmic solution 1 drop      Potassium Citrate ER 15 MEQ (1620 MG) TBCR Take 15 mEq by mouth      potassium chloride (KLOR-CON M) 10 MEQ extended release tablet Take 1 tablet by mouth daily 30 tablet 5    montelukast (SINGULAIR) 10 MG tablet Take 1 tablet by mouth daily 30 tablet 3    traZODone (DESYREL) 50 MG tablet Take 1 tablet by mouth nightly 90 tablet 1    fluticasone (FLONASE) 50 MCG/ACT nasal spray USE 1 SPRAY NASALLY DAILY  3    promethazine (PHENERGAN) 25 MG tablet TAKE ONE TABLET BY MOUTH EVERY SIX HOURS AS NEEDED FOR NAUSEA 40 tablet 0    estradiol (ESTRACE) 1 MG tablet Take 1 mg by mouth      prochlorperazine (COMPAZINE) 10 MG tablet Take 1 tablet by mouth every 6 hours as needed (nausea) 30 tablet 2    ZOLMitriptan (ZOMIG) 5 MG tablet TAKE 1 TABLET BY MOUTH AT ONSET OF MIGRAINE. MAY REPEAT ONCE AFTER 2 HOURS. MAX 10 MG (2 TABLETS) PER DAY  11    levothyroxine (SYNTHROID) 25 MCG tablet Take one daily 90 tablet 1    hydroxychloroquine (PLAQUENIL) 200 MG tablet Take 300 mg by mouth daily       Pancrelipase, Lip-Prot-Amyl, (CREON) 03522 UNITS CPEP 2 caps tid 180 capsule 03    metoclopramide (REGLAN) 10 MG tablet Take 10 mg by mouth as needed      diclofenac sodium 1 % GEL Apply 4 g topically 2 times daily (Patient not taking: Reported on 2/25/2020) 1 Tube 3    meloxicam (MOBIC) 15 MG tablet Take 1 tablet by mouth daily (Patient not taking: Reported on 2/25/2020) 30 tablet 0     No current facility-administered medications on file prior to visit. No Known Allergies    Review of Systems   Respiratory: Negative for cough and shortness of breath. Cardiovascular: Negative for chest pain. Gastrointestinal: Positive for abdominal distention, abdominal pain and constipation. Genitourinary:        Urinary incontinence        Objective  Vitals:    02/29/20 0956   BP: 102/68   Pulse: 81   Temp: 96.1 °F (35.6 °C)   SpO2: 99%   Weight: 117 lb (53.1 kg)   Height: 5' 2\" (1.575 m)     Physical Exam  Vitals signs and nursing note reviewed. Constitutional:       Appearance: Normal appearance. She is normal weight. HENT:      Head: Normocephalic.       Right

## 2020-03-07 ENCOUNTER — OFFICE VISIT (OUTPATIENT)
Dept: FAMILY MEDICINE CLINIC | Age: 50
End: 2020-03-07
Payer: MEDICARE

## 2020-03-07 VITALS
BODY MASS INDEX: 21.79 KG/M2 | DIASTOLIC BLOOD PRESSURE: 70 MMHG | HEART RATE: 86 BPM | RESPIRATION RATE: 16 BRPM | OXYGEN SATURATION: 100 % | WEIGHT: 118.4 LBS | HEIGHT: 62 IN | SYSTOLIC BLOOD PRESSURE: 110 MMHG | TEMPERATURE: 98.2 F

## 2020-03-07 PROCEDURE — G8484 FLU IMMUNIZE NO ADMIN: HCPCS | Performed by: NURSE PRACTITIONER

## 2020-03-07 PROCEDURE — G8420 CALC BMI NORM PARAMETERS: HCPCS | Performed by: NURSE PRACTITIONER

## 2020-03-07 PROCEDURE — 1036F TOBACCO NON-USER: CPT | Performed by: NURSE PRACTITIONER

## 2020-03-07 PROCEDURE — G8427 DOCREV CUR MEDS BY ELIG CLIN: HCPCS | Performed by: NURSE PRACTITIONER

## 2020-03-07 PROCEDURE — 99213 OFFICE O/P EST LOW 20 MIN: CPT | Performed by: NURSE PRACTITIONER

## 2020-03-07 RX ORDER — SULFAMETHOXAZOLE AND TRIMETHOPRIM 800; 160 MG/1; MG/1
1 TABLET ORAL 2 TIMES DAILY
Qty: 14 TABLET | Refills: 0 | Status: SHIPPED | OUTPATIENT
Start: 2020-03-07 | End: 2020-03-14

## 2020-03-07 ASSESSMENT — ENCOUNTER SYMPTOMS
ABDOMINAL PAIN: 0
COUGH: 0
SORE THROAT: 0
EYE ITCHING: 0
EYE REDNESS: 0
DIARRHEA: 0
NAUSEA: 0
RHINORRHEA: 0
EYE DISCHARGE: 0

## 2020-03-07 NOTE — PATIENT INSTRUCTIONS
Patient Education        sulfamethoxazole and trimethoprim (oral/injection)  Pronunciation:  SUL fa meth OX a zole and trye METH oh prim  Brand:  Bactrim, Bactrim DS, Bactrim I.V., Septra I.V., SMZ-TMP DS, Sulfatrim Pediatric  What is the most important information I should know about sulfamethoxazole and trimethoprim? You should not use this medicine if you have severe liver disease, kidney disease that is not being monitored, anemia caused by folic acid deficiency, if you take dofetilide, or if you have had low platelets caused by using trimethoprim or a sulfa drug. You should not take sulfamethoxazole and trimethoprim if you are pregnant or breastfeeding. What is sulfamethoxazole and trimethoprim? Sulfamethoxazole and trimethoprim is a combination antibiotic used to treat ear infections, urinary tract infections, bronchitis, traveler's diarrhea, shigellosis, and Pneumocystis jiroveci pneumonia. Sulfamethoxazole and trimethoprim may also be used for purposes not listed in this medication guide. What should I discuss with my healthcare provider before using sulfamethoxazole and trimethoprim? You should not use this medicine if you are allergic to sulfamethoxazole or trimethoprim, or if you have:  · severe liver disease;  · kidney disease that is not being treated or monitored;  · anemia (low red blood cells) caused by folic acid deficiency;  · a history of low blood platelets after taking trimethoprim or any sulfa drug; or  · if you take dofetilide (Tikosyn). Do not use if you are pregnant. Use effective birth control, and tell your doctor if you become pregnant. Do not breastfeed while using this medicine. This medicine should not be given to a child younger than 3 months old.   Tell your doctor if you have ever had:  · kidney or liver disease;  · a folate (folic acid) deficiency;  · asthma or severe allergies;  · a thyroid disorder;  · HIV or AIDS;  · malnourishment;  · alcoholism;  · high levels of [phone]. Overdose symptoms may include loss of appetite, vomiting, fever, blood in your urine, yellowing of your skin or eyes, confusion, or loss of consciousness. What should I avoid while using sulfamethoxazole and trimethoprim? Antibiotic medicines can cause diarrhea, which may be a sign of a new infection. If you have diarrhea that is watery or bloody, call your doctor before using anti-diarrhea medicine. If you use the injection form of this medicine, do not eat or drink anything that contains propylene glycol (an ingredient in many processed foods, soft drinks, and medicines). Dangerous effects could occur. Sulfamethoxazole and trimethoprim could make you sunburn more easily. Avoid sunlight or tanning beds. Wear protective clothing and use sunscreen (SPF 30 or higher) when you are outdoors. What are the possible side effects of sulfamethoxazole and trimethoprim? Get emergency medical help if you have signs of an allergic reaction (hives, cough, shortness of breath, swelling in your face or throat) or a severe skin reaction (fever, sore throat, burning eyes, skin pain, red or purple skin rash with blistering and peeling). Call your doctor at once if you have:  · severe stomach pain, diarrhea that is watery or bloody (even if it occurs months after your last dose);  · a skin rash, no matter how mild;  · yellowing of your skin or eyes;  · a seizure;  · new or unusual joint pain;  · increased or decreased urination;  · swelling, bruising, or irritation around the IV needle;  · increased thirst, dry mouth, fruity breath odor;  · an electrolyte imbalance --headache, confusion, weakness, slurred speech, tingly feeling, chest pain, irregular heartbeats, loss of coordination or movement, feeling unsteady; or  · low blood cell counts --fever, chills, mouth sores, skin sores, easy bruising, unusual bleeding, pale skin, cold hands and feet, feeling light-headed or short of breath.   Common side effects may

## 2020-03-07 NOTE — PROGRESS NOTES
Subjective  Noe Lawson, 54 y.o. female presents today with:  Chief Complaint   Patient presents with    Other     infecton armpit x 1 day       HPI  Presents to [facility] for a bump under her right armpit after shaving  She noticed the area today   Area w/o erythema or drainage   Afebrile        Past Medical History:   Diagnosis Date    Acquired hypothyroidism 9/26/2016    Acute pancreatitis     ADD (attention deficit disorder) 2/12/2015    Anxiety     Depression 12/9/2015    Endometrial cyst of ovary 5/10/14    RT ovary, ruptured    GERD (gastroesophageal reflux disease) 9/26/2016    Medullary sponge kidney of both kidneys 5/22/2018    Sjogren's disease ([address]     Stress     Swelling       Past Surgical History:   Procedure Laterality Date    CHOLECYSTECTOMY  2011    HYSTERECTOMY  2014    sept    OVARY REMOVAL Left Jan 2014    UPPER GASTROINTESTINAL ENDOSCOPY  09/16/2016    EGD W/BX     Family History   Problem Relation Age of Onset    Heart Disease Father 48    Cancer Maternal Grandmother         breast    Heart Disease Other         mom and dad's side       Review of Systems   Constitutional: Negative for activity change, appetite change, chills, diaphoresis, fatigue and fever. HENT: Negative for congestion, ear pain, rhinorrhea and sore throat. Eyes: Negative for discharge, redness and itching. Respiratory: Negative for cough. Gastrointestinal: Negative for abdominal pain, diarrhea and nausea. Musculoskeletal: Negative for myalgias. Skin: Negative for pallor. Neurological: Negative for dizziness, light-headedness and headaches. PMH, Surgical Hx, Family Hx, and Social Hx reviewed and updated.       Objective  Vitals:    03/07/20 1619   BP: 110/70   Site: Left Upper Arm   Position: Sitting   Cuff Size: Medium Adult   Pulse: 86   Resp: 16   Temp: 98.2 °F (36.8 °C)   TempSrc: Temporal   SpO2: 100%   Weight: 118 lb 6.4 oz (53.7 kg)   Height: 5' 2\" (1.575 m)     BP Readings from Last 3 Encounters:   03/07/20 110/70   02/29/20 102/68   02/25/20 112/78     Wt Readings from Last 3 Encounters:   03/07/20 118 lb 6.4 oz (53.7 kg)   02/29/20 117 lb (53.1 kg)   02/25/20 117 lb 6.4 oz (53.3 kg)       Physical Exam  Vitals signs reviewed. Constitutional:       Appearance: Normal appearance. She is well-developed. She is not ill-appearing. Cardiovascular:      Rate and Rhythm: Normal rate. Pulmonary:      Effort: Pulmonary effort is normal.   Skin:     General: Skin is warm and dry. Neurological:      General: No focal deficit present. Mental Status: She is alert and oriented to person, place, and time. Psychiatric:         Mood and Affect: Mood normal.         Behavior: Behavior normal.       Assessment & Plan    Diagnosis Orders   1. Folliculitis  sulfamethoxazole-trimethoprim (BACTRIM DS) 800-160 MG per tablet     No orders of the defined types were placed in this encounter. Orders Placed This Encounter   Medications    sulfamethoxazole-trimethoprim (BACTRIM DS) 800-160 MG per tablet     Sig: Take 1 tablet by mouth 2 times daily for 7 days     Dispense:  14 tablet     Refill:  0     Return if symptoms worsen or fail to improve, for follow up with PCP. Reviewed with the patient: current clinical status & medications. Side effects of the medication prescribed today, as well as treatment plan/rationale and result expectations have been discussed with the patient who expresses understanding. Encouraged patient to increase fluids while taking bactrim. She verbalized understanding. How can you care for yourself at home? · Take your medicine exactly as prescribed. If your doctor prescribed antibiotics, take them as directed. Do not stop taking them just because you feel better. You need to take the full course of antibiotics. · Use a soap that kills bacteria to wash the infected area. If your scalp or beard is infected, use a shampoo with selenium or propylene glycol. Be careful. Do not scrub too long or too hard. · Mix 1 1/3 cup warm water and 1 tablespoon vinegar. Soak a cloth in the mixture, and place it over the infected skin until it cools off (usually 5 to 10 minutes). You can do this 3 to 6 times a day. · Do not share your razor, towel, or washcloth. That can spread folliculitis. · Use a new blade in your razor each time you shave to keep from re-infecting your skin. · If you tend to get folliculitis, avoid using hot tubs. They can contain bacteria that cause folliculitis. When should you call for help? Call your doctor now or seek immediate medical care if:    · You have symptoms of infection, such as:  ? Increased pain, swelling, warmth, or redness. ? Red streaks leading from the area. ? Pus draining from the area. ? A fever.        Watch closely for changes in your health, and be sure to contact your doctor if:    · You do not get better as expected. Close follow up to evaluate treatment results and for coordination of care. I have reviewed the patient's medical history in detail and updated the computerized patient record.       Purvi Mendenhall, APRN - NP

## 2020-03-17 ENCOUNTER — PATIENT MESSAGE (OUTPATIENT)
Dept: FAMILY MEDICINE CLINIC | Age: 50
End: 2020-03-17

## 2020-03-19 RX ORDER — DOXYCYCLINE HYCLATE 100 MG
100 TABLET ORAL 2 TIMES DAILY
Qty: 14 TABLET | Refills: 0 | Status: SHIPPED | OUTPATIENT
Start: 2020-03-19 | End: 2020-03-26

## 2020-03-19 RX ORDER — LIOTHYRONINE SODIUM 25 UG/1
25 TABLET ORAL DAILY
Qty: 90 TABLET | Refills: 0 | Status: SHIPPED | OUTPATIENT
Start: 2020-03-19 | End: 2020-07-12

## 2020-03-23 RX ORDER — ESOMEPRAZOLE MAGNESIUM 40 MG/1
CAPSULE, DELAYED RELEASE ORAL
Qty: 30 CAPSULE | Refills: 5 | Status: SHIPPED | OUTPATIENT
Start: 2020-03-23 | End: 2020-10-30 | Stop reason: SDUPTHER

## 2020-03-23 RX ORDER — BUPROPION HYDROCHLORIDE 300 MG/1
300 TABLET ORAL EVERY MORNING
Qty: 90 TABLET | Refills: 0 | Status: SHIPPED | OUTPATIENT
Start: 2020-03-23 | End: 2020-06-23 | Stop reason: SDUPTHER

## 2020-03-23 RX ORDER — DEXTROAMPHETAMINE SACCHARATE, AMPHETAMINE ASPARTATE MONOHYDRATE, DEXTROAMPHETAMINE SULFATE AND AMPHETAMINE SULFATE 7.5; 7.5; 7.5; 7.5 MG/1; MG/1; MG/1; MG/1
30 CAPSULE, EXTENDED RELEASE ORAL 2 TIMES DAILY
Qty: 60 CAPSULE | Refills: 0 | Status: SHIPPED | OUTPATIENT
Start: 2020-03-23 | End: 2020-04-28 | Stop reason: SDUPTHER

## 2020-03-24 ENCOUNTER — VIRTUAL VISIT (OUTPATIENT)
Dept: FAMILY MEDICINE CLINIC | Age: 50
End: 2020-03-24
Payer: MEDICARE

## 2020-03-24 VITALS — WEIGHT: 115 LBS | BODY MASS INDEX: 21.16 KG/M2 | TEMPERATURE: 98.9 F | HEIGHT: 62 IN

## 2020-03-24 PROCEDURE — G8427 DOCREV CUR MEDS BY ELIG CLIN: HCPCS | Performed by: NURSE PRACTITIONER

## 2020-03-24 PROCEDURE — 99213 OFFICE O/P EST LOW 20 MIN: CPT | Performed by: NURSE PRACTITIONER

## 2020-03-24 RX ORDER — SUCRALFATE 1 G/1
TABLET ORAL DAILY
COMMUNITY
Start: 2020-03-10 | End: 2022-02-02

## 2020-03-24 RX ORDER — PLECANATIDE 3 MG/1
3 TABLET ORAL DAILY
COMMUNITY
Start: 2020-03-10

## 2020-03-24 RX ORDER — METHYLPREDNISOLONE 4 MG/1
TABLET ORAL
Qty: 21 TABLET | Refills: 0 | Status: SHIPPED | OUTPATIENT
Start: 2020-03-24 | End: 2020-03-30

## 2020-03-24 RX ORDER — LEVOFLOXACIN 500 MG/1
500 TABLET, FILM COATED ORAL DAILY
Qty: 7 TABLET | Refills: 0 | Status: SHIPPED | OUTPATIENT
Start: 2020-03-24 | End: 2020-03-31 | Stop reason: ALTCHOICE

## 2020-03-24 ASSESSMENT — ENCOUNTER SYMPTOMS
SHORTNESS OF BREATH: 1
SINUS PRESSURE: 1
COUGH: 1

## 2020-03-24 NOTE — PROGRESS NOTES
BY MOUTH EVERY SIX HOURS AS NEEDED FOR NAUSEA Yes ORTIZ Hyde CNP   estradiol (ESTRACE) 1 MG tablet Take 1 mg by mouth Yes Historical Provider, MD   prochlorperazine (COMPAZINE) 10 MG tablet Take 1 tablet by mouth every 6 hours as needed (nausea) Yes ORTIZ Hyde CNP   ZOLMitriptan (ZOMIG) 5 MG tablet TAKE 1 TABLET BY MOUTH AT ONSET OF MIGRAINE. MAY REPEAT ONCE AFTER 2 HOURS.  MAX 10 MG (2 TABLETS) PER DAY Yes Historical Provider, MD   levothyroxine (SYNTHROID) 25 MCG tablet Take one daily Yes ORTIZ Hyde CNP   hydroxychloroquine (PLAQUENIL) 200 MG tablet Take 300 mg by mouth daily  Yes Historical Provider, MD   Pancrelipase, Lip-Prot-Amyl, (CREON) 64040 UNITS CPEP 2 caps tid Yes Heather Rojas MD   meloxicam (MOBIC) 15 MG tablet Take 1 tablet by mouth daily  Patient not taking: Reported on 3/24/2020  Bruna Diaz MD   traZODone (DESYREL) 50 MG tablet Take 1 tablet by mouth nightly  Patient not taking: Reported on 3/24/2020  ORTIZ Clancy CNP       Social History     Tobacco Use    Smoking status: Never Smoker    Smokeless tobacco: Never Used   Substance Use Topics    Alcohol use: No     Alcohol/week: 0.0 standard drinks     Comment: no alcohol since 2011    Drug use: No        No Known Allergies,   Past Medical History:   Diagnosis Date    Acquired hypothyroidism 9/26/2016    Acute pancreatitis     ADD (attention deficit disorder) 2/12/2015    Anxiety     Depression 12/9/2015    Endometrial cyst of ovary 5/10/14    RT ovary, ruptured    GERD (gastroesophageal reflux disease) 9/26/2016    Medullary sponge kidney of both kidneys 5/22/2018    Sjogren's [facility] [address] [address]     Stress     Swelling    ,   Past Surgical History:   Procedure Laterality Date    CHOLECYSTECTOMY  2011    HYSTERECTOMY  2014    sept    OVARY REMOVAL Left Jan 2014    UPPER GASTROINTESTINAL ENDOSCOPY  09/16/2016    EGD W/BX   ,   Social History     Tobacco Use    Smoking status: Never Smoker    Smokeless tobacco: Never Used   Substance Use Topics    Alcohol use: No     Alcohol/week: 0.0 standard drinks     Comment: no alcohol since 2011    Drug use: No   ,   Family History   Problem Relation Age of Onset    Heart Disease Father 48    Cancer Maternal Grandmother         breast    Heart Disease Other         mom and dad's side       PHYSICAL EXAMINATION:  [ INSTRUCTIONS:  \"[x]\" Indicates a positive item  \"[]\" Indicates a negative item  -- DELETE ALL ITEMS NOT EXAMINED]  [x] Alert  [x] Oriented to person/place/time    [x] No apparent distress  [] Toxic appearing    [] Face flushed appearing [x] Sclera clear  [] Lips are cyanotic      [x] Breathing appears normal  [] Appears tachypneic      [x] Normal Mood  [] Anxious appearing    [] Depressed appearing  [] Confused appearing      [] Poor short term memory  [] Poor long term memory    [] OTHER:      Due to this being a TeleHealth encounter, evaluation of the following organ systems is limited: Vitals/Constitutional/EENT/Resp/CV/GI//MS/Neuro/Skin/Heme-Lymph-Imm. ASSESSMENT/PLAN:  1. Acute bacterial sinusitis    - methylPREDNISolone (MEDROL DOSEPACK) 4 MG tablet; Take by mouth. Dispense: 21 tablet; Refill: 0  - levoFLOXacin (LEVAQUIN) 500 MG tablet; Take 1 tablet by mouth daily for 7 days  Dispense: 7 tablet; Refill: 0    2. Bronchitis    - methylPREDNISolone (MEDROL DOSEPACK) 4 MG tablet; Take by mouth. Dispense: 21 tablet; Refill: 0  - levoFLOXacin (LEVAQUIN) 500 MG tablet; Take 1 tablet by mouth daily for 7 days  Dispense: 7 tablet; Refill: 0      FU in 1 weeks. Side effects, adverse effects of the medication prescribed today, as well as treatment plan/ rationale and result expectations have been discussed with the patient who expresses understanding and desires to proceed. Close follow up to evaluate treatment results and for coordination of care.   I have reviewed the patient's medical history in detail and updated the

## 2020-03-26 ENCOUNTER — VIRTUAL VISIT (OUTPATIENT)
Dept: FAMILY MEDICINE CLINIC | Age: 50
End: 2020-03-26
Payer: MEDICARE

## 2020-03-26 PROCEDURE — 99213 OFFICE O/P EST LOW 20 MIN: CPT | Performed by: NURSE PRACTITIONER

## 2020-03-26 PROCEDURE — G8427 DOCREV CUR MEDS BY ELIG CLIN: HCPCS | Performed by: NURSE PRACTITIONER

## 2020-03-26 RX ORDER — METRONIDAZOLE 7.5 MG/G
GEL TOPICAL
Qty: 45 G | Refills: 0 | Status: SHIPPED | OUTPATIENT
Start: 2020-03-26 | End: 2020-10-27

## 2020-03-26 ASSESSMENT — ENCOUNTER SYMPTOMS
COUGH: 0
SHORTNESS OF BREATH: 0
RHINORRHEA: 1

## 2020-03-26 NOTE — PROGRESS NOTES
3/26/2020    TELEHEALTH EVALUATION -- Audio/Visual (During MQEFW-07 public health emergency)    Due to Augusto 19 outbreak, patient's office visit was converted to a virtual visit. Patient was contacted and agreed to proceed with a virtual visit via [employer] me  The risks and benefits of converting to a virtual visit were discussed in light of the current infectious disease epidemic. Patient also understood that insurance coverage and co-pays are up to their individual insurance plans. HPI:    Noe Lawson (:  1970) has requested an audio/video evaluation for the following concern(s):    Review of Systems   HENT: Positive for congestion, postnasal drip and rhinorrhea. Respiratory: Negative for cough and shortness of breath. Cardiovascular: Negative for chest pain. Prior to Visit Medications    Medication Sig Taking? Authorizing Provider   metroNIDAZOLE (METROGEL) 0.75 % gel Apply topically 2 times daily. Yes ORTIZ Santana CNP   TRULANCE 3 MG TABS Take 3 mg by mouth daily Yes Historical Provider, MD   sucralfate (CARAFATE) 1 GM tablet Take by mouth daily Yes Historical Provider, MD   methylPREDNISolone (MEDROL DOSEPACK) 4 MG tablet Take by mouth. Yes ORTIZ Santana CNP   levoFLOXacin (LEVAQUIN) 500 MG tablet Take 1 tablet by mouth daily for 7 days Yes ORTIZ Santana CNP   esomeprazole (NEXIUM) 40 MG delayed release capsule TAKE ONE CAPSULE BY MOUTH EVERY DAY Yes ORTIZ Santana CNP   amphetamine-dextroamphetamine (ADDERALL XR) 30 MG extended release capsule Take 1 capsule by mouth 2 times daily for 30 days.  Yes ORTIZ Santana CNP   buPROPion (WELLBUTRIN XL) 300 MG extended release tablet Take 1 tablet by mouth every morning Yes ORTIZ Santana CNP   liothyronine (CYTOMEL) 25 MCG tablet Take 1 tablet by mouth daily Yes ORTIZ Santana CNP   metoclopramide (REGLAN) 10 MG tablet Take 10 mg by mouth as needed Yes Historical Provider, MD cyclobenzaprine (FLEXERIL) 10 MG tablet Take 10 mg by mouth as needed Yes Historical Provider, MD   Hyoscyamine Sulfate SL (LEVSIN/SL) 0.125 MG SUBL Place 125 mcg under the tongue every 4 hours as needed (pain) Yes ORTIZ Rg CNP   amLODIPine (NORVASC) 2.5 MG tablet Take 2.5 mg by mouth daily Yes Historical Provider, MD   ondansetron (ZOFRAN) 4 MG tablet Take 1 tablet by mouth every 8 hours as needed for Nausea or Vomiting Yes ORTIZ Castillo CNP   furosemide (LASIX) 20 MG tablet Take 1 tablet by mouth 2 times daily TAKE ONE TABLET BY MOUTH TWO TIMES A DAY Yes ORTIZ Castillo CNP   DULoxetine (CYMBALTA) 30 MG extended release capsule TAKE ONE CAPSULE BY MOUTH daily with food Yes Historical Provider, MD   hydrOXYzine (VISTARIL) 25 MG capsule Take by mouth Yes Historical Provider, MD   topiramate (TOPAMAX) 100 MG tablet Take 1 tablet by mouth 2 times daily Yes ORTIZ Castillo CNP   azelastine (ASTELIN) 0.1 % nasal spray 1 spray by Nasal route Yes Historical Provider, MD   moxifloxacin (VIGAMOX) 0.5 % ophthalmic solution 1 drop Yes Historical Provider, MD   Potassium Citrate ER 15 MEQ (1620 MG) TBCR Take 15 mEq by mouth Yes Historical Provider, MD   diclofenac sodium 1 % GEL Apply 4 g topically 2 times daily Yes Jenna Guzmán MD   potassium chloride (KLOR-CON M) 10 MEQ extended release tablet Take 1 tablet by mouth daily Yes ORTIZ Castillo CNP   montelukast (SINGULAIR) 10 MG tablet Take 1 tablet by mouth daily Yes ORTIZ Castillo CNP   fluticasone (FLONASE) 50 MCG/ACT nasal spray USE 1 SPRAY NASALLY DAILY Yes Historical Provider, MD   promethazine (PHENERGAN) 25 MG tablet TAKE ONE TABLET BY MOUTH EVERY SIX HOURS AS NEEDED FOR NAUSEA Yes ORTIZ Castillo CNP   estradiol (ESTRACE) 1 MG tablet Take 1 mg by mouth Yes Historical Provider, MD   prochlorperazine (COMPAZINE) 10 MG tablet Take 1 tablet by mouth every 6 hours as needed (nausea) Yes Sabine Broussard APRN - CNP   ZOLMitriptan (ZOMIG) 5 MG tablet TAKE 1 TABLET BY MOUTH AT ONSET OF MIGRAINE. MAY REPEAT ONCE AFTER 2 HOURS. MAX 10 MG (2 TABLETS) PER DAY Yes Historical Provider, MD   levothyroxine (SYNTHROID) 25 MCG tablet Take one daily Yes ORTIZ Hedrick CNP   hydroxychloroquine (PLAQUENIL) 200 MG tablet Take 300 mg by mouth daily  Yes Historical Provider, MD   Pancrelipase, Lip-Prot-Amyl, (CREON) 18240 UNITS CPEP 2 caps tid Yes Shilo Allen MD   doxycycline hyclate (VIBRA-TABS) 100 MG tablet Take 1 tablet by mouth 2 times daily for 7 days  Patient not taking: Reported on 3/26/2020  ORTIZ Hedrick CNP   meloxicam (MOBIC) 15 MG tablet Take 1 tablet by mouth daily  Patient not taking: Reported on 3/24/2020  Merissa Aggarwal MD   traZODone (DESYREL) 50 MG tablet Take 1 tablet by mouth nightly  Patient not taking: Reported on 3/24/2020  ORTIZ Navarrete CNP       Social History     Tobacco Use    Smoking status: Never Smoker    Smokeless tobacco: Never Used   Substance Use Topics    Alcohol use: No     Alcohol/week: 0.0 standard drinks     Comment: no alcohol since 2011    Drug use: No            PHYSICAL EXAMINATION:  [ INSTRUCTIONS:  \"[x]\" Indicates a positive item  \"[]\" Indicates a negative item  -- DELETE ALL ITEMS NOT EXAMINED]  [x] Alert  [x] Oriented to person/place/time    [x] No apparent distress  [] Toxic appearing    [x] Face flushed appearing [] Sclera clear  [] Lips are cyanotic    Nose appears erythematous    [x] Breathing appears normal  [] Appears tachypneic      [x] Rash on visible skin  [] Normal Mood  [x] Anxious appearing    [] Depressed appearing  [] Confused appearing          Due to this being a TeleHealth encounter, evaluation of the following organ systems is limited: Vitals/Constitutional/EENT/Resp/CV/GI//MS/Neuro/Skin/Heme-Lymph-Imm. ASSESSMENT/PLAN:  1. Rosacea    - metroNIDAZOLE (METROGEL) 0.75 % gel; Apply topically 2 times daily.   Dispense: 45 g;

## 2020-03-31 ENCOUNTER — VIRTUAL VISIT (OUTPATIENT)
Dept: FAMILY MEDICINE CLINIC | Age: 50
End: 2020-03-31
Payer: MEDICARE

## 2020-03-31 VITALS — WEIGHT: 115 LBS | BODY MASS INDEX: 21.16 KG/M2 | HEIGHT: 62 IN | TEMPERATURE: 100 F

## 2020-03-31 PROCEDURE — 99213 OFFICE O/P EST LOW 20 MIN: CPT | Performed by: NURSE PRACTITIONER

## 2020-03-31 PROCEDURE — G8427 DOCREV CUR MEDS BY ELIG CLIN: HCPCS | Performed by: NURSE PRACTITIONER

## 2020-03-31 RX ORDER — AZITHROMYCIN 250 MG/1
TABLET, FILM COATED ORAL
COMMUNITY
Start: 2020-03-27 | End: 2020-08-26 | Stop reason: ALTCHOICE

## 2020-03-31 ASSESSMENT — ENCOUNTER SYMPTOMS
SHORTNESS OF BREATH: 0
COUGH: 1

## 2020-03-31 NOTE — PROGRESS NOTES
tablet Take 1 tablet by mouth every 6 hours as needed (nausea) Yes ORTIZ Martin CNP   ZOLMitriptan (ZOMIG) 5 MG tablet TAKE 1 TABLET BY MOUTH AT ONSET OF MIGRAINE. MAY REPEAT ONCE AFTER 2 HOURS.  MAX 10 MG (2 TABLETS) PER DAY Yes Historical Provider, MD   levothyroxine (SYNTHROID) 25 MCG tablet Take one daily Yes ORTIZ Martin CNP   hydroxychloroquine (PLAQUENIL) 200 MG tablet Take 300 mg by mouth daily  Yes Historical Provider, MD   Pancrelipase, Lip-Prot-Amyl, (CREON) 43131 UNITS CPEP 2 caps tid Yes Melissa Du MD   meloxicam (MOBIC) 15 MG tablet Take 1 tablet by mouth daily  Patient not taking: Reported on 3/24/2020  Lona Bledsoe MD   traZODone (DESYREL) 50 MG tablet Take 1 tablet by mouth nightly  Patient not taking: Reported on 3/24/2020  ORTIZ López CNP       Social History     Tobacco Use    Smoking status: Never Smoker    Smokeless tobacco: Never Used   Substance Use Topics    Alcohol use: No     Alcohol/week: 0.0 standard drinks     Comment: no alcohol since 2011    Drug use: No        No Known Allergies,   Past Medical History:   Diagnosis Date    Acquired hypothyroidism 9/26/2016    Acute pancreatitis     ADD (attention deficit disorder) 2/12/2015    Anxiety     Depression 12/9/2015    Endometrial cyst of ovary 5/10/14    RT ovary, ruptured    GERD (gastroesophageal reflux disease) 9/26/2016    Medullary sponge kidney of both kidneys 5/22/2018    [facility]ogren's [facility] ([facility] [address]     Stress     Swelling    ,   Past Surgical History:   Procedure Laterality Date    CHOLECYSTECTOMY  2011    HYSTERECTOMY  2014    sept    OVARY REMOVAL Left Jan 2014    UPPER GASTROINTESTINAL ENDOSCOPY  09/16/2016    EGD W/BX   ,   Social History     Tobacco Use    Smoking status: Never Smoker    Smokeless tobacco: Never Used   Substance Use Topics    Alcohol use: No     Alcohol/week: 0.0 standard drinks     Comment: no alcohol since 2011    Drug use: No   ,   Family History Problem Relation Age of Onset    Heart Disease Father 48    Cancer Maternal Grandmother         breast    Heart Disease Other         mom and dad's side       PHYSICAL EXAMINATION:  [ INSTRUCTIONS:  \"[x]\" Indicates a positive item  \"[]\" Indicates a negative item  -- DELETE ALL ITEMS NOT EXAMINED]  [] Alert  [] Oriented to person/place/time    [] No apparent distress  [] Toxic appearing    [] Face flushed appearing [] Sclera clear  [] Lips are cyanotic      [] Breathing appears normal  [] Appears tachypneic      [] Rash on visible skin    [] Cranial Nerves II-XII grossly intact    [] Motor grossly intact in visible upper extremities    [] Motor grossly intact in visible lower extremities    [] Normal Mood  [] Anxious appearing    [] Depressed appearing  [] Confused appearing      [] Poor short term memory  [] Poor long term memory    [] OTHER:      Due to this being a TeleHealth encounter, evaluation of the following organ systems is limited: Vitals/Constitutional/EENT/Resp/CV/GI//MS/Neuro/Skin/Heme-Lymph-Imm. ASSESSMENT/PLAN:  1. Rosacea  -continue metrogel prn    2. Bronchitis  - finish up zpak. 3. Sjogren's syndrome, with unspecified organ involvement ([facility] [address]    Side effects, adverse effects of the medication prescribed today, as well as treatment plan/ rationale and result expectations have been discussed with the patient who expresses understanding and desires to proceed. Close follow up to evaluate treatment results and for coordination of care. I have reviewed the patient's medical history in detail and updated the computerized patient record. As always, patient is advised that if symptoms worsen in any way they will proceed to the nearest emergency room. Return in about 2 weeks (around 4/14/2020). An  electronic signature was used to authenticate this note.     --ORTIZ Alberto - CNP on 3/31/2020 at 1:46 PM        Pursuant to the emergency declaration under the [facility] and the [employer] Act, [facility] waiver authority and the Coronavirus Preparedness and [employer] Act, this Virtual  Visit was conducted, with patient's consent, to reduce the patient's risk of exposure to COVID-19 and provide continuity of care for an established patient. Services were provided through a video synchronous discussion virtually to substitute for in-person clinic visit.

## 2020-04-01 RX ORDER — TOPIRAMATE 100 MG/1
TABLET, FILM COATED ORAL
Qty: 60 TABLET | Refills: 5 | Status: SHIPPED | OUTPATIENT
Start: 2020-04-01 | End: 2020-10-10 | Stop reason: SDUPTHER

## 2020-04-14 ENCOUNTER — VIRTUAL VISIT (OUTPATIENT)
Dept: FAMILY MEDICINE CLINIC | Age: 50
End: 2020-04-14
Payer: MEDICARE

## 2020-04-14 VITALS — BODY MASS INDEX: 21.16 KG/M2 | HEIGHT: 62 IN | WEIGHT: 115 LBS

## 2020-04-14 PROCEDURE — 99214 OFFICE O/P EST MOD 30 MIN: CPT | Performed by: NURSE PRACTITIONER

## 2020-04-14 PROCEDURE — G8427 DOCREV CUR MEDS BY ELIG CLIN: HCPCS | Performed by: NURSE PRACTITIONER

## 2020-04-14 RX ORDER — METHYLPREDNISOLONE 4 MG/1
TABLET ORAL
Qty: 21 TABLET | Refills: 0 | Status: SHIPPED | OUTPATIENT
Start: 2020-04-14 | End: 2020-04-20

## 2020-04-14 NOTE — PROGRESS NOTES
ORTIZ Lunsford Junior, CNP   metoclopramide (REGLAN) 10 MG tablet Take 10 mg by mouth as needed Yes Historical Provider, MD   cyclobenzaprine (FLEXERIL) 10 MG tablet Take 10 mg by mouth as needed Yes Historical Provider, MD   Hyoscyamine Sulfate SL (LEVSIN/SL) 0.125 MG SUBL Place 125 mcg under the tongue every 4 hours as needed (pain) Yes ORTIZ Ozuna CNP   amLODIPine (NORVASC) 2.5 MG tablet Take 2.5 mg by mouth daily Yes Historical Provider, MD   ondansetron (ZOFRAN) 4 MG tablet Take 1 tablet by mouth every 8 hours as needed for Nausea or Vomiting Yes ORTIZ Lunsford Junior, CNP   furosemide (LASIX) 20 MG tablet Take 1 tablet by mouth 2 times daily TAKE ONE TABLET BY MOUTH TWO TIMES A DAY Yes ORTIZ Lunsford Junior, CNP   hydrOXYzine (VISTARIL) 25 MG capsule Take by mouth Yes Historical Provider, MD   azelastine (ASTELIN) 0.1 % nasal spray 1 spray by Nasal route Yes Historical Provider, MD   moxifloxacin (VIGAMOX) 0.5 % ophthalmic solution 1 drop Yes Historical Provider, MD   Potassium Citrate ER 15 MEQ (1620 MG) TBCR Take 15 mEq by mouth Yes Historical Provider, MD   diclofenac sodium 1 % GEL Apply 4 g topically 2 times daily Yes Cinthia Rivera MD   meloxicam (MOBIC) 15 MG tablet Take 1 tablet by mouth daily Yes Cinthia Rivera MD   potassium chloride (KLOR-CON M) 10 MEQ extended release tablet Take 1 tablet by mouth daily Yes ORTIZ Lunsford Junior, CNP   montelukast (SINGULAIR) 10 MG tablet Take 1 tablet by mouth daily Yes ORTIZ Lunsford Junior, CNP   traZODone (DESYREL) 50 MG tablet Take 1 tablet by mouth nightly Yes ORTIZ Ozuna CNP   fluticasone (FLONASE) 50 MCG/ACT nasal spray USE 1 SPRAY NASALLY DAILY Yes Historical Provider, MD   promethazine (PHENERGAN) 25 MG tablet TAKE ONE TABLET BY MOUTH EVERY SIX HOURS AS NEEDED FOR NAUSEA Yes ORTIZ Lunsford Junior, CNP   estradiol (ESTRACE) 1 MG tablet Take 1 mg by mouth Yes Historical Provider, MD   prochlorperazine (COMPAZINE) 10 MG

## 2020-04-28 ENCOUNTER — TELEPHONE (OUTPATIENT)
Dept: FAMILY MEDICINE CLINIC | Age: 50
End: 2020-04-28

## 2020-04-28 ENCOUNTER — VIRTUAL VISIT (OUTPATIENT)
Dept: FAMILY MEDICINE CLINIC | Age: 50
End: 2020-04-28
Payer: MEDICARE

## 2020-04-28 VITALS — BODY MASS INDEX: 21.16 KG/M2 | HEIGHT: 62 IN | WEIGHT: 115 LBS

## 2020-04-28 PROCEDURE — 99213 OFFICE O/P EST LOW 20 MIN: CPT | Performed by: NURSE PRACTITIONER

## 2020-04-28 PROCEDURE — G8427 DOCREV CUR MEDS BY ELIG CLIN: HCPCS | Performed by: NURSE PRACTITIONER

## 2020-04-28 RX ORDER — DEXTROAMPHETAMINE SACCHARATE, AMPHETAMINE ASPARTATE MONOHYDRATE, DEXTROAMPHETAMINE SULFATE AND AMPHETAMINE SULFATE 7.5; 7.5; 7.5; 7.5 MG/1; MG/1; MG/1; MG/1
30 CAPSULE, EXTENDED RELEASE ORAL 2 TIMES DAILY
Qty: 60 CAPSULE | Refills: 0 | Status: SHIPPED | OUTPATIENT
Start: 2020-04-28 | End: 2020-05-30 | Stop reason: SDUPTHER

## 2020-04-28 ASSESSMENT — ENCOUNTER SYMPTOMS
SHORTNESS OF BREATH: 0
COUGH: 0
DIARRHEA: 0
VOMITING: 0
ABDOMINAL PAIN: 1
NAUSEA: 0

## 2020-04-28 NOTE — PROGRESS NOTES
alcohol since 2011    Drug use: No   ,   Family History   Problem Relation Age of Onset    Heart Disease Father 48    Cancer Maternal Grandmother         breast    Heart Disease Other         mom and dad's side       PHYSICAL EXAMINATION:  [ INSTRUCTIONS:  \"[x]\" Indicates a positive item  \"[]\" Indicates a negative item  -- DELETE ALL ITEMS NOT EXAMINED]  [x] Alert  [x] Oriented to person/place/time    [x] No apparent distress  [] Toxic appearing    [] Face flushed appearing [] Sclera clear  [] Lips are cyanotic      [x] Breathing appears normal  [] Appears tachypneic      [] Rash on visible skin    [] Cranial Nerves II-XII grossly intact    [x] Motor grossly intact in visible upper extremities    [x] Motor grossly intact in visible lower extremities    Discomfort when pressing on upper abdomen    [] Normal Mood  [x] Anxious appearing    [] Depressed appearing  [] Confused appearing      [] Poor short term memory  [] Poor long term memory    [] OTHER:      Due to this being a TeleHealth encounter, evaluation of the following organ systems is limited: Vitals/Constitutional/EENT/Resp/CV/GI//MS/Neuro/Skin/Heme-Lymph-Imm. ASSESSMENT/PLAN:    1. Attention deficit disorder, unspecified hyperactivity presence    - amphetamine-dextroamphetamine (ADDERALL XR) 30 MG extended release capsule; Take 1 capsule by mouth 2 times daily for 30 days. Dispense: 60 capsule; Refill: 0    2. Acute abdominal pain    - CT ABDOMEN PELVIS W IV CONTRAST Additional Contrast? Radiologist Recommendation; Future    3. Elevated pancreatic enzyme    - CT ABDOMEN PELVIS W IV CONTRAST Additional Contrast? Radiologist Recommendation; Future      Side effects, adverse effects of the medication prescribed today, as well as treatment plan/ rationale and result expectations have been discussed with the patient who expresses understanding and desires to proceed. Close follow up to evaluate treatment results and for coordination of care.   I have

## 2020-04-29 ENCOUNTER — HOSPITAL ENCOUNTER (OUTPATIENT)
Dept: CT IMAGING | Age: 50
Discharge: HOME | End: 2020-05-01
Payer: MEDICARE

## 2020-04-29 ENCOUNTER — TELEPHONE (OUTPATIENT)
Dept: FAMILY MEDICINE CLINIC | Age: 50
End: 2020-04-29

## 2020-04-29 VITALS — HEIGHT: 62 IN | BODY MASS INDEX: 21.16 KG/M2 | WEIGHT: 115 LBS

## 2020-04-29 DIAGNOSIS — R10.9 ACUTE ABDOMINAL PAIN: ICD-10-CM

## 2020-04-29 DIAGNOSIS — K56.1 SMALL BOWEL INTUSSUSCEPTION (HCC): Primary | ICD-10-CM

## 2020-04-29 DIAGNOSIS — R74.8 ELEVATED PANCREATIC ENZYME: ICD-10-CM

## 2020-04-29 LAB
ALBUMIN SERPL-MCNC: 4.6 G/DL (ref 3.5–4.6)
ALP BLD-CCNC: 92 U/L (ref 40–130)
ALT SERPL-CCNC: 11 U/L (ref 0–33)
AMYLASE: 81 U/L (ref 22–93)
ANION GAP SERPL CALCULATED.3IONS-SCNC: 14 MEQ/L (ref 9–15)
AST SERPL-CCNC: 9 U/L (ref 0–35)
BASOPHILS ABSOLUTE: 0.1 K/UL (ref 0–0.2)
BASOPHILS RELATIVE PERCENT: 1.4 %
BILIRUB SERPL-MCNC: <0.2 MG/DL (ref 0.2–0.7)
BUN BLDV-MCNC: 13 MG/DL (ref 6–20)
CALCIUM SERPL-MCNC: 8.7 MG/DL (ref 8.5–9.9)
CHLORIDE BLD-SCNC: 99 MEQ/L (ref 95–107)
CO2: 25 MEQ/L (ref 20–31)
CREAT SERPL-MCNC: 0.7 MG/DL (ref 0.5–0.9)
EOSINOPHILS ABSOLUTE: 0.1 K/UL (ref 0–0.7)
EOSINOPHILS RELATIVE PERCENT: 1.3 %
GFR AFRICAN AMERICAN: >60
GFR NON-AFRICAN AMERICAN: >60
GLOBULIN: 2.6 G/DL (ref 2.3–3.5)
GLUCOSE BLD-MCNC: 86 MG/DL (ref 70–99)
HCT VFR BLD CALC: 37.4 % (ref 37–47)
HEMOGLOBIN: 12.4 G/DL (ref 12–16)
LIPASE: 35 U/L (ref 12–95)
LYMPHOCYTES ABSOLUTE: 2.1 K/UL (ref 1–4.8)
LYMPHOCYTES RELATIVE PERCENT: 42.5 %
MCH RBC QN AUTO: 29.1 PG (ref 27–31.3)
MCHC RBC AUTO-ENTMCNC: 33 % (ref 33–37)
MCV RBC AUTO: 88 FL (ref 82–100)
MONOCYTES ABSOLUTE: 0.3 K/UL (ref 0.2–0.8)
MONOCYTES RELATIVE PERCENT: 5.7 %
NEUTROPHILS ABSOLUTE: 2.5 K/UL (ref 1.4–6.5)
NEUTROPHILS RELATIVE PERCENT: 49.1 %
PDW BLD-RTO: 13.5 % (ref 11.5–14.5)
PLATELET # BLD: 302 K/UL (ref 130–400)
POTASSIUM SERPL-SCNC: 3.3 MEQ/L (ref 3.4–4.9)
RBC # BLD: 4.25 M/UL (ref 4.2–5.4)
SODIUM BLD-SCNC: 138 MEQ/L (ref 135–144)
TOTAL PROTEIN: 7.2 G/DL (ref 6.3–8)
WBC # BLD: 5 K/UL (ref 4.8–10.8)

## 2020-04-29 PROCEDURE — 74177 CT ABD & PELVIS W/CONTRAST: CPT

## 2020-04-29 PROCEDURE — 6360000004 HC RX CONTRAST MEDICATION: Performed by: NURSE PRACTITIONER

## 2020-04-29 RX ORDER — SODIUM CHLORIDE 0.9 % (FLUSH) 0.9 %
10 SYRINGE (ML) INJECTION ONCE
Status: DISCONTINUED | OUTPATIENT
Start: 2020-04-29 | End: 2020-05-02 | Stop reason: HOSPADM

## 2020-04-29 RX ADMIN — IOPAMIDOL 100 ML: 612 INJECTION, SOLUTION INTRAVENOUS at 14:38

## 2020-04-29 NOTE — TELEPHONE ENCOUNTER
Pt requesting CT results to be faxed to her gastroenterologist. Kenisha Harrington  Verbal permission given.      Fax # [phone]

## 2020-04-30 ENCOUNTER — PATIENT MESSAGE (OUTPATIENT)
Dept: FAMILY MEDICINE CLINIC | Age: 50
End: 2020-04-30

## 2020-04-30 DIAGNOSIS — R10.9 ABDOMINAL PAIN, UNSPECIFIED ABDOMINAL LOCATION: ICD-10-CM

## 2020-04-30 LAB — LACTIC ACID: 0.8 MMOL/L (ref 0.5–2.2)

## 2020-04-30 NOTE — TELEPHONE ENCOUNTER
From: Deepti Rocha  To: Annita Trammell APRN - CNP  Sent: 4/30/2020 7:44 AM EDT  Subject: Non-Urgent Medical Question    Libia Cruz Im sorry to bother you but again Im concerned because I look so yellow and Im itching so bad, I feel like I have mired or something and I dont. After reading report it makes some sense, but Im turning yellow . . certain spots of face and face. But the itching is crazy my ankles. Back. I wanted to pull iv out yesterday when they put dye in. Should we do ultrasound now? And now I havent had a bowel movement in 2 days can I take a laxative. [place] I dont want something to go wrong. Im nervous and all these questions . I dont want to mess this up.  Thanks and thanks for listening when I tell you something is wrong

## 2020-05-30 ENCOUNTER — PATIENT MESSAGE (OUTPATIENT)
Dept: FAMILY MEDICINE CLINIC | Age: 50
End: 2020-05-30

## 2020-06-01 RX ORDER — HYDROXYZINE PAMOATE 25 MG/1
25 CAPSULE ORAL 3 TIMES DAILY PRN
Qty: 90 CAPSULE | Refills: 2 | Status: SHIPPED | OUTPATIENT
Start: 2020-06-01 | End: 2020-08-30

## 2020-06-01 RX ORDER — DEXTROAMPHETAMINE SACCHARATE, AMPHETAMINE ASPARTATE MONOHYDRATE, DEXTROAMPHETAMINE SULFATE AND AMPHETAMINE SULFATE 7.5; 7.5; 7.5; 7.5 MG/1; MG/1; MG/1; MG/1
30 CAPSULE, EXTENDED RELEASE ORAL 2 TIMES DAILY
Qty: 60 CAPSULE | Refills: 0 | Status: SHIPPED | OUTPATIENT
Start: 2020-06-01 | End: 2020-06-29 | Stop reason: SDUPTHER

## 2020-06-23 RX ORDER — BUPROPION HYDROCHLORIDE 300 MG/1
300 TABLET ORAL EVERY MORNING
Qty: 90 TABLET | Refills: 1 | Status: SHIPPED | OUTPATIENT
Start: 2020-06-23 | End: 2020-12-09 | Stop reason: SDUPTHER

## 2020-06-30 RX ORDER — DEXTROAMPHETAMINE SACCHARATE, AMPHETAMINE ASPARTATE MONOHYDRATE, DEXTROAMPHETAMINE SULFATE AND AMPHETAMINE SULFATE 7.5; 7.5; 7.5; 7.5 MG/1; MG/1; MG/1; MG/1
30 CAPSULE, EXTENDED RELEASE ORAL 2 TIMES DAILY
Qty: 60 CAPSULE | Refills: 0 | Status: SHIPPED | OUTPATIENT
Start: 2020-06-30 | End: 2020-07-27 | Stop reason: SDUPTHER

## 2020-07-07 ENCOUNTER — NURSE TRIAGE (OUTPATIENT)
Dept: OTHER | Facility: CLINIC | Age: 50
End: 2020-07-07

## 2020-07-07 ENCOUNTER — TELEPHONE (OUTPATIENT)
Dept: FAMILY MEDICINE CLINIC | Age: 50
End: 2020-07-07

## 2020-07-07 NOTE — TELEPHONE ENCOUNTER
Patient c/o right sided abdominal pain since this earlier am, pain increasing. some nausea.  :Pt said she normally has a high tolerance for pain and this concerns her. Warm tx to [facility].   psc/dm

## 2020-07-07 NOTE — TELEPHONE ENCOUNTER
Received call from Everton Chance [id number] in [facility]. Started at 7 AM with right middle lower abdominal pain. It is getting worse and more sharp. It is constant and pain is 7-8/10. He pain is increasing and she states she is going to proceed to ER. Please do not reply to the triage nurse through this encounter. Any subsequent communication should be directly with the patient.      Reason for Disposition   SEVERE abdominal pain (e.g., excruciating)    Protocols used: ABDOMINAL PAIN - [facility] - [facility]

## 2020-07-12 RX ORDER — LIOTHYRONINE SODIUM 25 UG/1
25 TABLET ORAL DAILY
Qty: 90 TABLET | Refills: 0 | Status: SHIPPED | OUTPATIENT
Start: 2020-07-12 | End: 2020-10-09

## 2020-07-21 ENCOUNTER — VIRTUAL VISIT (OUTPATIENT)
Dept: FAMILY MEDICINE CLINIC | Age: 50
End: 2020-07-21
Payer: MEDICARE

## 2020-07-21 PROCEDURE — G8427 DOCREV CUR MEDS BY ELIG CLIN: HCPCS | Performed by: NURSE PRACTITIONER

## 2020-07-21 PROCEDURE — 99213 OFFICE O/P EST LOW 20 MIN: CPT | Performed by: NURSE PRACTITIONER

## 2020-07-21 RX ORDER — AMOXICILLIN 875 MG/1
875 TABLET, COATED ORAL 2 TIMES DAILY
Qty: 20 TABLET | Refills: 0 | Status: SHIPPED | OUTPATIENT
Start: 2020-07-21 | End: 2020-07-31

## 2020-07-21 ASSESSMENT — ENCOUNTER SYMPTOMS
DIARRHEA: 0
ABDOMINAL DISTENTION: 0
WHEEZING: 0
SINUS PAIN: 0
EYE REDNESS: 0
APNEA: 0
SINUS PRESSURE: 1
COUGH: 0
NAUSEA: 0
SHORTNESS OF BREATH: 0
CONSTIPATION: 0
CHEST TIGHTNESS: 0
SORE THROAT: 1
EYE ITCHING: 0

## 2020-07-21 NOTE — PROGRESS NOTES
2020      Patient and provider completed this visit today via the [employer] link    TELEHEALTH EVALUATION -- Audio/Visual (During CVDFW-45 public health emergency)    HPI:    Patient presents virtually today with c/o as she started to not \"feel good yesterday with some chills, swollen glands, bilateral ear otalgia, generalized body aches, sore throat, headache and tired. Patient denies any SOB, wheezing or chest pain at this time. Pt unsure if she should Covid test at this time and I advised her it is up to her but explained how testing to early can sometimes show as a neg. Pt reported she will wait to be tested as she will see how the ATB will work first as she does not know anybody that tested positive. Sary Corbett (:  1970) has requested an audio/video evaluation for the following concern(s):    Patient reports being out in the public but just starting to not \"feel well as of yesterday\" with sore throat, lymph nodes swollen, body aches, bilateral ear otalgia, and occasional headaches. Pt advised to stay hydrated and to take the medication as prescribed and if she still having s/s of Covid to follow up and get tested. Review of Systems   Constitutional: Positive for chills, fatigue and fever (low grade at this time but a fever of 100 f yesterday). Negative for activity change and appetite change. HENT: Positive for congestion, postnasal drip, sinus pressure and sore throat. Negative for drooling, ear pain, hearing loss and sinus pain. Eyes: Negative for redness, itching and visual disturbance. Respiratory: Negative for apnea, cough, chest tightness, shortness of breath and wheezing. Cardiovascular: Negative for chest pain. Gastrointestinal: Negative for abdominal distention, constipation, diarrhea and nausea. Endocrine: Negative for polydipsia, polyphagia and polyuria. Genitourinary: Negative for difficulty urinating, flank pain, genital sores and urgency.    Musculoskeletal: Negative for gait problem, myalgias and neck stiffness. Skin: Negative for rash. Allergic/Immunologic: Negative for immunocompromised state. Neurological: Positive for headaches (occasional HA but denies any thunderclap in appearance or the worst HA of her life ). Negative for tremors, seizures, facial asymmetry, weakness and light-headedness. Hematological: Positive for adenopathy (bilateral ). Psychiatric/Behavioral: Negative for confusion. All other systems reviewed and are negative. Prior to Visit Medications    Medication Sig Taking? Authorizing Provider   amoxicillin (AMOXIL) 875 MG tablet Take 1 tablet by mouth 2 times daily for 10 days Yes ORTIZ Rolle CNP   liothyronine (CYTOMEL) 25 MCG tablet TAKE 1 TABLET BY MOUTH DAILY Yes ORTIZ Emmanuel CNP   amphetamine-dextroamphetamine (ADDERALL XR) 30 MG extended release capsule Take 1 capsule by mouth 2 times daily for 30 days. Yes ORTIZ Emmanuel CNP   buPROPion (WELLBUTRIN XL) 300 MG extended release tablet Take 1 tablet by mouth every morning Yes ORTIZ Emmanuel CNP   hydrOXYzine (VISTARIL) 25 MG capsule Take 1 capsule by mouth 3 times daily as needed for Anxiety Yes ORTIZ Emmanuel CNP   topiramate (TOPAMAX) 100 MG tablet TAKE ONE TABLET BY MOUTH TWO TIMES A DAY Yes ORTIZ Emmanuel CNP   azithromycin (ZITHROMAX) 250 MG tablet  Yes Historical Provider, MD   metroNIDAZOLE (METROGEL) 0.75 % gel Apply topically 2 times daily.  Yes ORTIZ Emmanuel CNP   TRULANCE 3 MG TABS Take 3 mg by mouth daily Yes Historical Provider, MD   sucralfate (CARAFATE) 1 GM tablet Take by mouth daily Yes Historical Provider, MD   esomeprazole (NEXIUM) 40 MG delayed release capsule TAKE ONE CAPSULE BY MOUTH EVERY DAY Yes ORTIZ Emmanuel CNP   metoclopramide (REGLAN) 10 MG tablet Take 10 mg by mouth as needed Yes Historical Provider, MD   cyclobenzaprine (FLEXERIL) 10 MG tablet Take 10 mg by mouth as needed Yes Historical Provider, MD   Hyoscyamine Sulfate SL (LEVSIN/SL) 0.125 MG SUBL Place 125 mcg under the tongue every 4 hours as needed (pain) Yes ORTIZ Rollins CNP   amLODIPine (NORVASC) 2.5 MG tablet Take 2.5 mg by mouth daily Yes Historical Provider, MD   ondansetron (ZOFRAN) 4 MG tablet Take 1 tablet by mouth every 8 hours as needed for Nausea or Vomiting Yes ORTIZ Roe CNP   furosemide (LASIX) 20 MG tablet Take 1 tablet by mouth 2 times daily TAKE ONE TABLET BY MOUTH TWO TIMES A DAY Yes ORTIZ Roe CNP   azelastine (ASTELIN) 0.1 % nasal spray 1 spray by Nasal route Yes Historical Provider, MD   moxifloxacin (VIGAMOX) 0.5 % ophthalmic solution 1 drop Yes Historical Provider, MD   Potassium Citrate ER 15 MEQ (1620 MG) TBCR Take 15 mEq by mouth Yes Historical Provider, MD   diclofenac sodium 1 % GEL Apply 4 g topically 2 times daily Yes Mary Frederick MD   meloxicam (MOBIC) 15 MG tablet Take 1 tablet by mouth daily Yes Mary Frederick MD   potassium chloride (KLOR-CON M) 10 MEQ extended release tablet Take 1 tablet by mouth daily Yes ORTIZ Roe CNP   montelukast (SINGULAIR) 10 MG tablet Take 1 tablet by mouth daily Yes ORTIZ Roe CNP   traZODone (DESYREL) 50 MG tablet Take 1 tablet by mouth nightly Yes ORTIZ Rollins CNP   fluticasone (FLONASE) 50 MCG/ACT nasal spray USE 1 SPRAY NASALLY DAILY Yes Historical Provider, MD   promethazine (PHENERGAN) 25 MG tablet TAKE ONE TABLET BY MOUTH EVERY SIX HOURS AS NEEDED FOR NAUSEA Yes ORTIZ Roe CNP   estradiol (ESTRACE) 1 MG tablet Take 1 mg by mouth Yes Historical Provider, MD   prochlorperazine (COMPAZINE) 10 MG tablet Take 1 tablet by mouth every 6 hours as needed (nausea) Yes ORTIZ Roe CNP   ZOLMitriptan (ZOMIG) 5 MG tablet TAKE 1 TABLET BY MOUTH AT ONSET OF MIGRAINE. MAY REPEAT ONCE AFTER 2 HOURS.  MAX 10 MG (2 TABLETS) PER DAY Yes Historical Provider, MD   levothyroxine (SYNTHROID) 25 MCG tablet Take one daily Yes ORTIZ Wilder - KRIS   hydroxychloroquine (PLAQUENIL) 200 MG tablet Take 300 mg by mouth daily  Yes Historical Provider, MD   Pancrelipase, Lip-Prot-Amyl, (CREON) 25490 UNITS CPEP 2 caps tid Yes Gabby Avila MD       Social History     Tobacco Use    Smoking status: Never Smoker    Smokeless tobacco: Never Used   Substance Use Topics    Alcohol use: No     Alcohol/week: 0.0 standard drinks     Comment: no alcohol since 2011    Drug use: No        No Known Allergies,   Past Medical History:   Diagnosis Date    Acquired hypothyroidism 9/26/2016    Acute pancreatitis     ADD (attention deficit disorder) 2/12/2015    Anxiety     Depression 12/9/2015    Endometrial cyst of ovary 5/10/14    RT ovary, ruptured    GERD (gastroesophageal reflux disease) 9/26/2016    Medullary sponge kidney of both kidneys 5/22/2018    [facility]ogren's disease ([facility] [address]     Stress     Swelling    ,   Past Surgical History:   Procedure Laterality Date    CHOLECYSTECTOMY  2011    HYSTERECTOMY  2014    sept    OVARY REMOVAL Left Jan 2014    UPPER GASTROINTESTINAL ENDOSCOPY  09/16/2016    EGD W/BX   ,   Social History     Tobacco Use    Smoking status: Never Smoker    Smokeless tobacco: Never Used   Substance Use Topics    Alcohol use: No     Alcohol/week: 0.0 standard drinks     Comment: no alcohol since 2011    Drug use: No   ,   Family History   Problem Relation Age of Onset    Heart Disease Father 48    Cancer Maternal Grandmother         breast    Heart Disease Other         mom and dad's side   ,   Immunization History   Administered Date(s) Administered    Pneumococcal Polysaccharide ([id number]) 11/15/2018    Tdap (Boostrix, Adacel) 04/01/2019       PHYSICAL EXAMINATION:  [ INSTRUCTIONS:  \"[x]\" Indicates a positive item  \"[]\" Indicates a negative item  -- DELETE ALL ITEMS NOT EXAMINED]  Vital Signs: (As obtained by patient/caregiver or practitioner observation)    Blood pressure-  Heart rate-    Respiratory rate-    Temperature-  Pulse oximetry-     Patient unable to provide me with her VS today via the VV visit. Constitutional: [x] Appears well-developed and well-nourished [x] No apparent distress      [] Abnormal-   Mental status  [x] Alert and awake  [x] Oriented to person/place/time [x]Able to follow commands      Eyes:  EOM    [x]  Normal  [] Abnormal-  Sclera  [x]  Normal  [] Abnormal -         Discharge [x]  None visible  [] Abnormal -    HENT:   [x] Normocephalic, atraumatic. [] Abnormal   [x] Mouth/Throat: Mucous membranes are moist.    Pt reports both of her ears have some pain and feel plugged. External Ears [x] Normal  [] Abnormal-     Neck: [x] No visualized mass     Pulmonary/Chest: [x] Respiratory effort normal.  [x] No visualized signs of difficulty breathing or respiratory distress        [] Abnormal-      Musculoskeletal:   [x] Normal gait with no signs of ataxia         [x] Normal range of motion of neck        [] Abnormal-       Neurological:        [x] No Facial Asymmetry (Cranial nerve 7 motor function) (limited exam to video visit)          [x] No gaze palsy        [] Abnormal-         Skin:        [x] No significant exanthematous lesions or discoloration noted on facial skin         [] Abnormal-            Psychiatric:       [x] Normal Affect [] No Hallucinations        [] Abnormal-     Other pertinent observable physical exam findings-     ASSESSMENT/PLAN:  1. Otalgia of both ears    - amoxicillin (AMOXIL) 875 MG tablet; Take 1 tablet by mouth 2 times daily for 10 days  Dispense: 20 tablet; Refill: 0    2. Acute pharyngitis, unspecified etiology    - amoxicillin (AMOXIL) 875 MG tablet; Take 1 tablet by mouth 2 times daily for 10 days  Dispense: 20 tablet; Refill: 0    3.  Fever, unspecified fever cause    Patient advised to increase her fluid intake, take zinc, vit c and Tylenol as needed for fevers and prescribed ATB and if she still has s/s to follow up with her PCP. Pt verbalized understanding. Return if symptoms worsen or fail to improve, for follow up with PCP. Miguel Snow is a 54 y.o. female being evaluated by a Virtual Visit (video visit) encounter to address concerns as mentioned above. A caregiver was present when appropriate. Due to this being a TeleHealth encounter (During VSS-01 public health emergency), evaluation of the following organ systems was limited: Vitals/Constitutional/EENT/Resp/CV/GI//MS/Neuro/Skin/Heme-Lymph-Imm. Pursuant to the emergency declaration under the [address] [place] [address], [address] [place] [address] authority and the [employer] Act, this Virtual Visit was conducted with patient's (and/or legal guardian's) consent, to reduce the patient's risk of exposure to COVID-19 and provide necessary medical care. The patient (and/or legal guardian) has also been advised to contact this office for worsening conditions or problems, and seek emergency medical treatment and/or call 911 if deemed necessary. Patient identification was verified at the start of the visit: Yes    Total time spent on this encounter: Not billed by time    Services were provided through a video synchronous discussion virtually to substitute for in-person clinic visit. Patient and provider were located at their individual homes. --ORTIZ Carvalho CNP on 7/21/2020 at 9:32 PM    An electronic signature was used to authenticate this note.

## 2020-07-22 NOTE — PATIENT INSTRUCTIONS
[employer] account. Enter N549 in the [facility] box to learn more about \"[place]: Care Instructions. \"     If you do not have an account, please click on the \"Sign Up Now\" link. Current as of: July 29, 2019               Content Version: 12.5  © 8009-3128 [employer]. Care instructions adapted under license by [facility] [place] ([facility]). If you have questions about a medical condition or this instruction, always ask your healthcare professional. [place] [address] any warranty or liability for your use of this information. Patient Education        Learning About Fever  What is a fever? A fever is a high body temperature. It's one way your body fights being sick. A fever shows that the body is responding to infection or other illnesses, both minor and severe. A fever is a symptom, not an illness by itself. A fever can be a sign that you are ill, but most fevers are not caused by a serious problem. You may have a fever with a minor illness, such as a cold. But sometimes a very serious infection may cause little or no fever. It is important to look at other symptoms, other conditions you have, and how you feel in general. In children, notice how they act and see what symptoms they complain of. What is a normal body temperature? A normal body temperature is about 98. 6ºF. Some people have a normal temperature that is a little higher or a little lower than this. Your temperature may be a little lower in the morning than it is later in the day. It may go up during hot weather or when you exercise, wear heavy clothes, or take a hot bath. Your temperature may also be different depending on how you take it. A temperature taken in the mouth (oral) or under the arm may be a little lower than your core temperature (rectal). What is a fever temperature? A core temperature of 100.4°F or above is considered a fever. What can cause a fever? A fever may be caused by:  · Infections. cause a sore throat. Sore throats can be painful and annoying. Fortunately, most sore throats go away on their own. If you have a bacterial infection, your doctor may prescribe antibiotics. Follow-up care is a key part of your treatment and safety. Be sure to make and go to all appointments, and call your doctor if you are having problems. It's also a good idea to know your test results and keep a list of the medicines you take. How can you care for yourself at home? · If your doctor prescribed antibiotics, take them as directed. Do not stop taking them just because you feel better. You need to take the full course of antibiotics. · Gargle with warm salt water once an hour to help reduce swelling and relieve discomfort. Use 1 teaspoon of salt mixed in 1 cup of warm water. · Take an over-the-counter pain medicine, such as acetaminophen (Tylenol), ibuprofen (Advil, Motrin), or naproxen (Aleve). Read and follow all instructions on the label. · Be careful when taking over-the-counter cold or flu medicines and Tylenol at the same time. Many of these medicines have acetaminophen, which is Tylenol. Read the labels to make sure that you are not taking more than the recommended dose. Too much acetaminophen (Tylenol) can be harmful. · Drink plenty of fluids. Fluids may help soothe an irritated throat. Hot fluids, such as tea or soup, may help decrease throat pain. · Use over-the-counter throat lozenges to soothe pain. Regular cough drops or hard candy may also help. These should not be given to young children because of the risk of choking. · Do not smoke or allow others to smoke around you. If you need help quitting, talk to your doctor about stop-smoking programs and medicines. These can increase your chances of quitting for good. · Use a vaporizer or humidifier to add moisture to your bedroom. Follow the directions for cleaning the machine. When should you call for help?    Call your doctor now or seek immediate medical care if:  · You have new or worse trouble swallowing. · Your sore throat gets much worse on one side. Watch closely for changes in your health, and be sure to contact your doctor if you do not get better as expected. Where can you learn more? Go to [email or web address]://[email or web address].[email or web address]. org and sign in to your [employer] account. Enter N403 in the [facility] box to learn more about \"Sore Throat: Care Instructions. \"     If you do not have an account, please click on the \"Sign Up Now\" link. Current as of: July 29, 2019               Content Version: 12.5  © 7651-7491 [employer]. Care instructions adapted under license by [facility] ([facility]). If you have questions about a medical condition or this instruction, always ask your healthcare professional. [address] any warranty or liability for your use of this information. Antibiotic Instructions: Complete the full course of antibiotics as ordered. Take each dose with a small snack or meal to lessen potential GI upset. To prevent antibiotic resistance, please take medication as ordered and for the full duration even if you start to feel better. Consider intake of yogurt or probiotic during antibiotic use and for a few days after to help reduce the risk of developing a secondary infection. Separate the yogurt and antibiotic by at least 1 hour. Avoid alcohol while taking antibiotics. Discussed signs and symptoms which require immediate follow-up in ED/call to 911. Patient verbalized understanding.

## 2020-07-28 RX ORDER — DEXTROAMPHETAMINE SACCHARATE, AMPHETAMINE ASPARTATE MONOHYDRATE, DEXTROAMPHETAMINE SULFATE AND AMPHETAMINE SULFATE 7.5; 7.5; 7.5; 7.5 MG/1; MG/1; MG/1; MG/1
30 CAPSULE, EXTENDED RELEASE ORAL 2 TIMES DAILY
Qty: 60 CAPSULE | Refills: 0 | Status: SHIPPED | OUTPATIENT
Start: 2020-07-28 | End: 2020-08-11 | Stop reason: SDUPTHER

## 2020-08-04 RX ORDER — FUROSEMIDE 20 MG/1
20 TABLET ORAL 2 TIMES DAILY
Qty: 60 TABLET | Refills: 5 | Status: SHIPPED | OUTPATIENT
Start: 2020-08-04 | Stop reason: SDUPTHER

## 2020-08-11 ENCOUNTER — VIRTUAL VISIT (OUTPATIENT)
Dept: FAMILY MEDICINE CLINIC | Age: 50
End: 2020-08-11
Payer: MEDICARE

## 2020-08-11 PROBLEM — K86.1 CHRONIC PANCREATITIS (HCC): Status: ACTIVE | Noted: 2020-08-11

## 2020-08-11 PROCEDURE — 99214 OFFICE O/P EST MOD 30 MIN: CPT | Performed by: NURSE PRACTITIONER

## 2020-08-11 PROCEDURE — G8427 DOCREV CUR MEDS BY ELIG CLIN: HCPCS | Performed by: NURSE PRACTITIONER

## 2020-08-11 PROCEDURE — G8420 CALC BMI NORM PARAMETERS: HCPCS | Performed by: NURSE PRACTITIONER

## 2020-08-11 PROCEDURE — 1036F TOBACCO NON-USER: CPT | Performed by: NURSE PRACTITIONER

## 2020-08-11 RX ORDER — TRAZODONE HYDROCHLORIDE 50 MG/1
50 TABLET ORAL NIGHTLY
Qty: 90 TABLET | Refills: 1 | Status: SHIPPED | OUTPATIENT
Start: 2020-08-11 | End: 2020-10-04

## 2020-08-11 RX ORDER — DEXTROAMPHETAMINE SACCHARATE, AMPHETAMINE ASPARTATE MONOHYDRATE, DEXTROAMPHETAMINE SULFATE AND AMPHETAMINE SULFATE 7.5; 7.5; 7.5; 7.5 MG/1; MG/1; MG/1; MG/1
30 CAPSULE, EXTENDED RELEASE ORAL 2 TIMES DAILY
Qty: 30 CAPSULE | Refills: 0 | Status: SHIPPED | OUTPATIENT
Start: 2020-08-11 | End: 2020-09-01 | Stop reason: SDUPTHER

## 2020-08-11 ASSESSMENT — ENCOUNTER SYMPTOMS
ABDOMINAL DISTENTION: 1
SHORTNESS OF BREATH: 0
ABDOMINAL PAIN: 1
COUGH: 0

## 2020-08-11 NOTE — LETTER
[address]  Phone: [phone]  Fax: [phone]    ORTIZ Bartlett CNP        August 11, 2020     Patient: Dipti Kowalski   YOB: 1970   Date of Visit: 8/11/2020       To Whom It May Concern: It is my medical opinion that Nato Siegel has significant medical concerns that are currently being investigated. It may be in her best interest that her daughter not work for a couple of weeks while her mother's medical concerns are being investigated so she does not run the risk of potential covid exposure. If you have any questions or concerns, please don't hesitate to call.     Sincerely,        ORTIZ Bartlett CNP

## 2020-08-11 NOTE — PROGRESS NOTES
2020    TELEHEALTH EVALUATION -- Audio/Visual (During VTXYT-08 public health emergency)    Due to COVID 19 outbreak, patient's office visit was converted to a virtual visit. Patient was contacted and agreed to proceed with a virtual visit via [email or web address]  The risks and benefits of converting to a virtual visit were discussed in light of the current infectious disease epidemic. Patient also understood that insurance coverage and co-pays are up to their individual insurance plans. HPI:    Lisa Manuel (:  1970) has requested an audio/video evaluation for the following concern(s): ADHD- adderall helping. Has been seeing GI and getting testing. A gastric AVM was found. Pain is gradually getting worse. Feeling more bloated. Pt waiting to hear what the \"game plan\" is to help her from the GI. Pt anxious and depressed about the situation. Pt also struggling with sleep. Needs med refilled. Review of Systems   Constitutional: Positive for fatigue. Respiratory: Negative for cough and shortness of breath. Cardiovascular: Negative for chest pain. Gastrointestinal: Positive for abdominal distention and abdominal pain. Psychiatric/Behavioral: Negative for decreased concentration. The patient is nervous/anxious. Prior to Visit Medications    Medication Sig Taking? Authorizing Provider   amphetamine-dextroamphetamine (ADDERALL XR) 30 MG extended release capsule Take 1 capsule by mouth 2 times daily for 15 days.  Yes Terryl Formosa, APRN - CNP   traZODone (DESYREL) 50 MG tablet Take 1 tablet by mouth nightly Yes Terryl Formosa, APRN - CNP   furosemide (LASIX) 20 MG tablet Take 1 tablet by mouth 2 times daily TAKE ONE TABLET BY MOUTH TWO TIMES A DAY Yes Terryl Formosa, APRN - CNP   liothyronine (CYTOMEL) 25 MCG tablet TAKE 1 TABLET BY MOUTH DAILY Yes Terryl Formosa, APRN - CNP   buPROPion (WELLBUTRIN XL) 300 MG extended release tablet Take 1 tablet by mouth every morning Yes Bouchra Kamara Soif Garre, APRN - CNP   hydrOXYzine (VISTARIL) 25 MG capsule Take 1 capsule by mouth 3 times daily as needed for Anxiety Yes ORTIZ Emmanuel CNP   topiramate (TOPAMAX) 100 MG tablet TAKE ONE TABLET BY MOUTH TWO TIMES A DAY Yes ORTIZ Emmanuel CNP   azithromycin (ZITHROMAX) 250 MG tablet  Yes Historical Provider, MD   metroNIDAZOLE (METROGEL) 0.75 % gel Apply topically 2 times daily.  Yes ORTIZ Emmanuel CNP   TRULANCE 3 MG TABS Take 3 mg by mouth daily Yes Historical Provider, MD   sucralfate (CARAFATE) 1 GM tablet Take by mouth daily Yes Historical Provider, MD   esomeprazole ([address]) 40 MG delayed release capsule TAKE ONE CAPSULE BY MOUTH EVERY DAY Yes ORTIZ Emmanuel CNP   metoclopramide (REGLAN) 10 MG tablet Take 10 mg by mouth as needed Yes Historical Provider, MD   cyclobenzaprine (FLEXERIL) 10 MG tablet Take 10 mg by mouth as needed Yes Historical Provider, MD   Hyoscyamine Sulfate SL (LEVSIN/SL) 0.125 MG SUBL Place 125 mcg under the tongue every 4 hours as needed (pain) Yes ORTIZ Ulloa CNP   amLODIPine (NORVASC) 2.5 MG tablet Take 2.5 mg by mouth daily Yes Historical Provider, MD   ondansetron (ZOFRAN) 4 MG tablet Take 1 tablet by mouth every 8 hours as needed for Nausea or Vomiting Yes ORTIZ Emmanuel CNP   azelastine (ASTELIN) 0.1 % nasal spray 1 spray by Nasal route Yes Historical Provider, MD   moxifloxacin (VIGAMOX) 0.5 % ophthalmic solution 1 drop Yes Historical Provider, MD   Potassium Citrate ER 15 MEQ (1620 MG) TBCR Take 15 mEq by mouth Yes Historical Provider, MD   diclofenac sodium 1 % GEL Apply 4 g topically 2 times daily Yes Nayla Martines MD   meloxicam (MOBIC) 15 MG tablet Take 1 tablet by mouth daily Yes Nayla Martines MD   potassium chloride (KLOR-CON M) 10 MEQ extended release tablet Take 1 tablet by mouth daily Yes ORTIZ Emmanuel CNP   montelukast (SINGULAIR) 10 MG tablet Take 1 tablet by mouth daily Yes Lucas Alford APRN - CNP fluticasone (FLONASE) 50 MCG/ACT nasal spray USE 1 SPRAY NASALLY DAILY Yes Historical Provider, MD   promethazine (PHENERGAN) 25 MG tablet TAKE ONE TABLET BY MOUTH EVERY SIX HOURS AS NEEDED FOR NAUSEA Yes Daryle Gaucher, APRN - CNP   estradiol (ESTRACE) 1 MG tablet Take 1 mg by mouth Yes Historical Provider, MD   prochlorperazine (COMPAZINE) 10 MG tablet Take 1 tablet by mouth every 6 hours as needed (nausea) Yes Daryle Gaucher, APRN - CNP   ZOLMitriptan (ZOMIG) 5 MG tablet TAKE 1 TABLET BY MOUTH AT ONSET OF MIGRAINE. MAY REPEAT ONCE AFTER 2 HOURS.  MAX 10 MG (2 TABLETS) PER DAY Yes Historical Provider, MD   levothyroxine (SYNTHROID) 25 MCG tablet Take one daily Yes Daryle Gaucher, APRN - CNP   hydroxychloroquine (PLAQUENIL) 200 MG tablet Take 300 mg by mouth daily  Yes Historical Provider, MD   Pancrelipase, Lip-Prot-Amyl, (CREON) 50790 UNITS CPEP 2 caps tid Yes Chinedu Sellers MD       Social History     Tobacco Use    Smoking status: Never Smoker    Smokeless tobacco: Never Used   Substance Use Topics    Alcohol use: No     Alcohol/week: 0.0 standard drinks     Comment: no alcohol since 2011    Drug use: No        No Known Allergies,   Past Medical History:   Diagnosis Date    Acquired hypothyroidism 9/26/2016    Acute pancreatitis     ADD (attention deficit disorder) 2/12/2015    Anxiety     Depression 12/9/2015    Endometrial cyst of ovary 5/10/14    RT ovary, ruptured    GERD (gastroesophageal reflux disease) 9/26/2016    Medullary sponge kidney of both kidneys 5/22/2018    [facility]jogren's [facility] [address]     Stress     Swelling    ,   Past Surgical History:   Procedure Laterality Date    CHOLECYSTECTOMY  2011    HYSTERECTOMY  2014    sept    OVARY REMOVAL Left Jan 2014    UPPER GASTROINTESTINAL ENDOSCOPY  09/16/2016    EGD W/BX   ,   Social History     Tobacco Use    Smoking status: Never Smoker    Smokeless tobacco: Never Used   Substance Use Topics    Alcohol use: No     Alcohol/week: 0.0 standard drinks     Comment: no alcohol since 2011    Drug use: No   ,   Family History   Problem Relation Age of Onset    Heart Disease Father 48    Cancer Maternal Grandmother         breast    Heart Disease Other         mom and dad's side       PHYSICAL EXAMINATION:  [ INSTRUCTIONS:  \"[x]\" Indicates a positive item  \"[]\" Indicates a negative item  -- DELETE ALL ITEMS NOT EXAMINED]  [x] Alert  [x] Oriented to person/place/time    [x] No apparent distress  [] Toxic appearing    [] Face flushed appearing [x] Sclera clear  [] Lips are cyanotic      [x] Breathing appears normal  [] Appears tachypneic      [] Rash on visible skin    [x] Cranial Nerves II-XII grossly intact    [x] Motor grossly intact in visible upper extremities    [x] Motor grossly intact in visible lower extremities    [] Normal Mood  [x] Anxious appearing    [x] Depressed appearing  [] Confused appearing      [] Poor short term memory  [] Poor long term memory    [] OTHER:      Due to this being a TeleHealth encounter, evaluation of the following organ systems is limited: Vitals/Constitutional/EENT/Resp/CV/GI//MS/Neuro/Skin/Heme-Lymph-Imm. ASSESSMENT/PLAN:  1. Attention deficit disorder, unspecified hyperactivity presence    - amphetamine-dextroamphetamine (ADDERALL XR) 30 MG extended release capsule; Take 1 capsule by mouth 2 times daily for 15 days. Dispense: 30 capsule; Refill: 0    2. Insomnia, unspecified type    - traZODone (DESYREL) 50 MG tablet; Take 1 tablet by mouth nightly  Dispense: 90 tablet; Refill: 1    3. Major depressive disorder, single episode, 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[facility] [address]      4. Chronic pancreatitis, unspecified pancreatitis type ([facility] [address]      5. Gastric AVM  - encouraged fu with GI    Side effects, adverse effects of the medication prescribed today, as well as treatment plan/ rationale and result expectations have been discussed with the patient who expresses understanding and desires to proceed.     Close follow up to evaluate treatment results and for coordination of care. I have reviewed the patient's medical history in detail and updated the computerized patient record. As always, patient is advised that if symptoms worsen in any way they will proceed to the nearest emergency room. Return in about 2 weeks (around 8/25/2020). An  electronic signature was used to authenticate this note. --Romana Morales, OTRIZ - CNP on 8/11/2020 at 12:40 PM        Pursuant to the emergency declaration under the [address] [place] [address], [facility] waiver authority and the [employer] and [employer] Act, this Virtual  Visit was conducted, with patient's consent, to reduce the patient's risk of exposure to COVID-19 and provide continuity of care for an established patient. Services were provided through a video synchronous discussion virtually to substitute for in-person clinic visit.

## 2020-08-26 ENCOUNTER — OFFICE VISIT (OUTPATIENT)
Dept: FAMILY MEDICINE CLINIC | Age: 50
End: 2020-08-26
Payer: MEDICARE

## 2020-08-26 ENCOUNTER — HOSPITAL ENCOUNTER (OUTPATIENT)
Dept: ULTRASOUND IMAGING | Age: 50
Discharge: HOME | End: 2020-08-28
Payer: MEDICARE

## 2020-08-26 VITALS
HEART RATE: 98 BPM | WEIGHT: 107 LBS | DIASTOLIC BLOOD PRESSURE: 62 MMHG | BODY MASS INDEX: 19.69 KG/M2 | HEIGHT: 62 IN | SYSTOLIC BLOOD PRESSURE: 90 MMHG

## 2020-08-26 DIAGNOSIS — R06.00 DYSPNEA, UNSPECIFIED TYPE: ICD-10-CM

## 2020-08-26 DIAGNOSIS — R10.9 ABDOMINAL PAIN, UNSPECIFIED ABDOMINAL LOCATION: ICD-10-CM

## 2020-08-26 DIAGNOSIS — M79.672 LEFT FOOT PAIN: ICD-10-CM

## 2020-08-26 DIAGNOSIS — R10.9 LEFT FLANK PAIN: ICD-10-CM

## 2020-08-26 DIAGNOSIS — E03.9 HYPOTHYROIDISM, UNSPECIFIED TYPE: ICD-10-CM

## 2020-08-26 LAB
ALBUMIN SERPL-MCNC: 4.5 G/DL (ref 3.5–4.6)
ALP BLD-CCNC: 79 U/L (ref 40–130)
ALT SERPL-CCNC: 10 U/L (ref 0–33)
ANION GAP SERPL CALCULATED.3IONS-SCNC: 10 MEQ/L (ref 9–15)
AST SERPL-CCNC: 10 U/L (ref 0–35)
BASOPHILS ABSOLUTE: 0.1 K/UL (ref 0–0.2)
BASOPHILS RELATIVE PERCENT: 1.2 %
BILIRUB SERPL-MCNC: <0.2 MG/DL (ref 0.2–0.7)
BUN BLDV-MCNC: 15 MG/DL (ref 6–20)
CALCIUM SERPL-MCNC: 9.2 MG/DL (ref 8.5–9.9)
CHLORIDE BLD-SCNC: 102 MEQ/L (ref 95–107)
CO2: 25 MEQ/L (ref 20–31)
CREAT SERPL-MCNC: 0.73 MG/DL (ref 0.5–0.9)
D DIMER: 0.33 MG/L FEU (ref 0–0.5)
EOSINOPHILS ABSOLUTE: 0.1 K/UL (ref 0–0.7)
EOSINOPHILS RELATIVE PERCENT: 1.2 %
GFR AFRICAN AMERICAN: >60
GFR NON-AFRICAN AMERICAN: >60
GLOBULIN: 2.4 G/DL (ref 2.3–3.5)
GLUCOSE BLD-MCNC: 76 MG/DL (ref 70–99)
HCT VFR BLD CALC: 37.2 % (ref 37–47)
HEMOGLOBIN: 12.4 G/DL (ref 12–16)
LYMPHOCYTES ABSOLUTE: 2.4 K/UL (ref 1–4.8)
LYMPHOCYTES RELATIVE PERCENT: 39.3 %
MCH RBC QN AUTO: 29.1 PG (ref 27–31.3)
MCHC RBC AUTO-ENTMCNC: 33.4 % (ref 33–37)
MCV RBC AUTO: 87.1 FL (ref 82–100)
MONOCYTES ABSOLUTE: 0.4 K/UL (ref 0.2–0.8)
MONOCYTES RELATIVE PERCENT: 6.5 %
NEUTROPHILS ABSOLUTE: 3.1 K/UL (ref 1.4–6.5)
NEUTROPHILS RELATIVE PERCENT: 51.8 %
PDW BLD-RTO: 12.7 % (ref 11.5–14.5)
PLATELET # BLD: 293 K/UL (ref 130–400)
POTASSIUM SERPL-SCNC: 3.2 MEQ/L (ref 3.4–4.9)
RBC # BLD: 4.27 M/UL (ref 4.2–5.4)
SEDIMENTATION RATE, ERYTHROCYTE: 2 MM (ref 0–20)
SODIUM BLD-SCNC: 137 MEQ/L (ref 135–144)
T4 FREE: 0.71 NG/DL (ref 0.84–1.68)
TOTAL PROTEIN: 6.9 G/DL (ref 6.3–8)
TSH REFLEX: 0.09 UIU/ML (ref 0.44–3.86)
URIC ACID, SERUM: 3.9 MG/DL (ref 2.4–5.7)
WBC # BLD: 6.1 K/UL (ref 4.8–10.8)

## 2020-08-26 PROCEDURE — 99214 OFFICE O/P EST MOD 30 MIN: CPT | Performed by: NURSE PRACTITIONER

## 2020-08-26 PROCEDURE — 1036F TOBACCO NON-USER: CPT | Performed by: NURSE PRACTITIONER

## 2020-08-26 PROCEDURE — G8420 CALC BMI NORM PARAMETERS: HCPCS | Performed by: NURSE PRACTITIONER

## 2020-08-26 PROCEDURE — 76775 US EXAM ABDO BACK WALL LIM: CPT

## 2020-08-26 PROCEDURE — 76705 ECHO EXAM OF ABDOMEN: CPT

## 2020-08-26 PROCEDURE — G8427 DOCREV CUR MEDS BY ELIG CLIN: HCPCS | Performed by: NURSE PRACTITIONER

## 2020-08-26 ASSESSMENT — ENCOUNTER SYMPTOMS
DIARRHEA: 1
ABDOMINAL PAIN: 1
SHORTNESS OF BREATH: 1

## 2020-08-26 NOTE — PROGRESS NOTES
Subjective  Chief Complaint   Patient presents with    Foot Pain     started yesterday    Abdominal Pain     stabbing pains that shoot to the back, SOB       HPI    Pt here for multiple concerns        Patient Active Problem List    Diagnosis Date Noted    Chronic pancreatitis ([address] 08/11/2020    Major depressive disorder, single episode, 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[address] 01/15/2019    Abnormal MRI, liver 05/22/2018    Acute recurrent pancreatitis 05/22/2018    At risk of fracture due to osteoporosis 05/22/2018    Calcinosis 05/22/2018    Chronic headaches 05/22/2018    Conductive hearing loss in left ear 05/22/2018    Edema 05/22/2018    Iron overload 05/22/2018    Mass of left side of neck 05/22/2018    Medullary sponge kidney of both kidneys 30/51/0417    Metabolic acidosis 94/88/2706    Microscopic hematuria 05/22/2018    Nausea 05/22/2018    Renal tubular acidosis type I 05/22/2018    Rheumatoid arthritis ([address] 05/22/2018    Tension type headache 05/22/2018    Weight loss, abnormal 05/22/2018    Cellulitis, face 04/06/2018    Other chest pain 11/01/2017    Left lower quadrant pain 11/01/2017    Sjogren's syndrome ([address] 11/01/2017    Diverticulitis of large intestine without perforation or abscess without bleeding 11/01/2017    Fibroids 11/01/2017    Transfusion history 11/01/2017    Pancreatitis 05/13/2017    Acquired hypothyroidism 09/26/2016    GERD (gastroesophageal reflux disease) 09/26/2016    Anxiety 12/09/2015    Depression 12/09/2015    ADD (attention deficit disorder) 02/12/2015    Endometriosis 09/09/2014    ASCUS favoring benign 05/01/2013    S/P endometrial ablation 05/01/2013     Past Medical History:   Diagnosis Date    Acquired hypothyroidism 9/26/2016    Acute pancreatitis     ADD (attention deficit disorder) 2/12/2015    Anxiety     Depression 12/9/2015    Endometrial cyst of ovary 5/10/14    RT ovary, ruptured    GERD (gastroesophageal reflux disease) 9/26/2016    Medullary sponge kidney of both kidneys 5/22/2018    Sjogren's disease ([address]     Stress     Swelling      Past Surgical History:   Procedure Laterality Date    CHOLECYSTECTOMY  2011    HYSTERECTOMY  2014    sept    OVARY REMOVAL Left Jan 2014    UPPER GASTROINTESTINAL ENDOSCOPY  09/16/2016    EGD W/BX     Family History   Problem Relation Age of Onset    Heart Disease Father 48    Cancer Maternal Grandmother         breast    Heart Disease Other         mom and dad's side     Social History     Socioeconomic History    Marital status: Single     Spouse name: None    Number of children: None    Years of education: None    Highest education level: None   Occupational History    None   Social Needs    Financial resource strain: None    Food insecurity     Worry: None     Inability: None    Transportation needs     Medical: None     Non-medical: None   Tobacco Use    Smoking status: Never Smoker    Smokeless tobacco: Never Used   Substance and Sexual Activity    Alcohol use: No     Alcohol/week: 0.0 standard drinks     Comment: no alcohol since 2011    Drug use: No    Sexual activity: None   Lifestyle    Physical activity     Days per week: None     Minutes per session: None    Stress: None   Relationships    Social connections     Talks on phone: None     Gets together: None     Attends Restoration service: None     Active member of club or organization: None     Attends meetings of clubs or organizations: None     Relationship status: None    Intimate partner violence     Fear of current or ex partner: None     Emotionally abused: None     Physically abused: None     Forced sexual activity: None   Other Topics Concern    None   Social History Narrative    ** Merged History Encounter **          Current Outpatient Medications on File Prior to Visit   Medication Sig Dispense Refill    amphetamine-dextroamphetamine (ADDERALL XR) 30 MG extended release capsule Take 1 capsule by mouth 2 times daily for 15 days. 30 capsule 0    traZODone (DESYREL) 50 MG tablet Take 1 tablet by mouth nightly 90 tablet 1    furosemide (LASIX) 20 MG tablet Take 1 tablet by mouth 2 times daily TAKE ONE TABLET BY MOUTH TWO TIMES A DAY 60 tablet 5    liothyronine (CYTOMEL) 25 MCG tablet TAKE 1 TABLET BY MOUTH DAILY 90 tablet 0    buPROPion (WELLBUTRIN XL) 300 MG extended release tablet Take 1 tablet by mouth every morning 90 tablet 1    hydrOXYzine (VISTARIL) 25 MG capsule Take 1 capsule by mouth 3 times daily as needed for Anxiety 90 capsule 2    topiramate (TOPAMAX) 100 MG tablet TAKE ONE TABLET BY MOUTH TWO TIMES A DAY 60 tablet 5    metroNIDAZOLE (METROGEL) 0.75 % gel Apply topically 2 times daily.  45 g 0    TRULANCE 3 MG TABS Take 3 mg by mouth daily      sucralfate (CARAFATE) 1 GM tablet Take by mouth daily      esomeprazole (NEXIUM) 40 MG delayed release capsule TAKE ONE CAPSULE BY MOUTH EVERY DAY 30 capsule 5    metoclopramide (REGLAN) 10 MG tablet Take 10 mg by mouth as needed      cyclobenzaprine (FLEXERIL) 10 MG tablet Take 10 mg by mouth as needed      Hyoscyamine Sulfate SL (LEVSIN/SL) 0.125 MG SUBL Place 125 mcg under the tongue every 4 hours as needed (pain) 120 each 3    amLODIPine (NORVASC) 2.5 MG tablet Take 2.5 mg by mouth daily      ondansetron (ZOFRAN) 4 MG tablet Take 1 tablet by mouth every 8 hours as needed for Nausea or Vomiting 30 tablet 1    azelastine (ASTELIN) 0.1 % nasal spray 1 spray by Nasal route      moxifloxacin (VIGAMOX) 0.5 % ophthalmic solution 1 drop      Potassium Citrate ER 15 MEQ (1620 MG) TBCR Take 15 mEq by mouth      diclofenac sodium 1 % GEL Apply 4 g topically 2 times daily 1 Tube 3    meloxicam (MOBIC) 15 MG tablet Take 1 tablet by mouth daily 30 tablet 0    potassium chloride (KLOR-CON M) 10 MEQ extended release tablet Take 1 tablet by mouth daily 30 tablet 5    montelukast (SINGULAIR) 10 MG tablet Take 1 tablet by mouth daily 30 tablet 3    fluticasone (FLONASE) 50 MCG/ACT nasal spray USE 1 SPRAY NASALLY DAILY  3    promethazine (PHENERGAN) 25 MG tablet TAKE ONE TABLET BY MOUTH EVERY SIX HOURS AS NEEDED FOR NAUSEA 40 tablet 0    estradiol (ESTRACE) 1 MG tablet Take 1 mg by mouth      prochlorperazine (COMPAZINE) 10 MG tablet Take 1 tablet by mouth every 6 hours as needed (nausea) 30 tablet 2    ZOLMitriptan (ZOMIG) 5 MG tablet TAKE 1 TABLET BY MOUTH AT ONSET OF MIGRAINE. MAY REPEAT ONCE AFTER 2 HOURS. MAX 10 MG (2 TABLETS) PER DAY  11    levothyroxine (SYNTHROID) 25 MCG tablet Take one daily 90 tablet 1    hydroxychloroquine (PLAQUENIL) 200 MG tablet Take 300 mg by mouth daily       Pancrelipase, Lip-Prot-Amyl, (CREON) 42482 UNITS CPEP 2 caps tid 180 capsule 03     No current facility-administered medications on file prior to visit. No Known Allergies    Review of Systems    Objective  Vitals:    08/26/20 1408   BP: 90/62   Pulse: 98   Weight: 107 lb (48.5 kg)   Height: 5' 2\" (1.575 m)     Physical Exam    Assessment & Plan     Diagnosis Orders   1. Left flank pain  US LIVER SPLEEN    Uric Acid    Sedimentation Rate    US RETROPERITONEAL LIMITED   2. Abdominal pain, unspecified abdominal location  TSH with Reflex    CBC Auto Differential    Comprehensive Metabolic Panel    US LIVER SPLEEN    Uric Acid    Sedimentation Rate   3. Dyspnea, unspecified type  D-Dimer, Quantitative    Uric Acid    Sedimentation Rate   4. Left foot pain  JAIDEN Arguello DPM, Podiatry, [place]   5. [place] of foot  JAIDEN Arguello DPM, Podiatry, [place]   6.  Hypothyroidism, unspecified type  TSH with Reflex          Orders Placed This Encounter   Procedures    US LIVER SPLEEN     Standing Status:   Future     Standing Expiration Date:   8/26/2021     Order Specific Question:   Reason for exam:     Answer:   flank pain and abdominal pain    US RETROPERITONEAL LIMITED     Standing Status:   Future     Standing Expiration Date:   8/26/2021     Order

## 2020-08-26 NOTE — PROGRESS NOTES
Subjective  Chief Complaint   Patient presents with    Foot Pain     started yesterday    Abdominal Pain     stabbing pains that shoot to the back, SOB     Here for abdominal pain. Abdominal Pain   This is a recurrent problem. The current episode started more than 1 month ago. The onset quality is gradual. The problem occurs constantly. The problem has been gradually worsening. The pain is located in the LUQ, epigastric region and left flank. The pain is severe. The quality of the pain is sharp. Associated symptoms include diarrhea and hematuria. Pertinent negatives include no dysuria or fever. The pain is aggravated by deep breathing and coughing. The pain is relieved by nothing.      -Pt reports SOB. Unable to take a deep breath d/t pain. Reports cough at nighttime.   -Denies fever.   -Reports pain on L ankle. Tender, red, and warm to touch. Doesn't recall any injury.   -Wants to remove corn on L foot - referral to podiatry    Patient Active Problem List    Diagnosis Date Noted    Chronic pancreatitis ([address] 08/11/2020    Major depressive disorder, single episode, 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[address] 01/15/2019    Abnormal MRI, liver 05/22/2018    Acute recurrent pancreatitis 05/22/2018    At risk of fracture due to osteoporosis 05/22/2018    Calcinosis 05/22/2018    Chronic headaches 05/22/2018    Conductive hearing loss in left ear 05/22/2018    Edema 05/22/2018    Iron overload 05/22/2018    Mass of left side of neck 05/22/2018    Medullary sponge kidney of both kidneys 18/52/6939    Metabolic acidosis 31/80/2063    Microscopic hematuria 05/22/2018    Nausea 05/22/2018    Renal tubular acidosis type I 05/22/2018    Rheumatoid arthritis ([address] 05/22/2018    Tension type headache 05/22/2018    Weight loss, abnormal 05/22/2018    Cellulitis, face 04/06/2018    Other chest pain 11/01/2017    Left lower quadrant pain 11/01/2017    Sjogren's syndrome ([address] 11/01/2017    Diverticulitis of large intestine without perforation or abscess without bleeding 11/01/2017    Fibroids 11/01/2017    Transfusion history 11/01/2017    Pancreatitis 05/13/2017    Acquired hypothyroidism 09/26/2016    GERD (gastroesophageal reflux disease) 09/26/2016    Anxiety 12/09/2015    Depression 12/09/2015    ADD (attention deficit disorder) 02/12/2015    Endometriosis 09/09/2014    ASCUS favoring benign 05/01/2013    S/P endometrial ablation 05/01/2013     Past Medical History:   Diagnosis Date    Acquired hypothyroidism 9/26/2016    Acute pancreatitis     ADD (attention deficit disorder) 2/12/2015    Anxiety     Depression 12/9/2015    Endometrial cyst of ovary 5/10/14    RT ovary, ruptured    GERD (gastroesophageal reflux disease) 9/26/2016    Medullary sponge kidney of both kidneys 5/22/2018    [facility]jogren's [facility]     Stress     Swelling      Past Surgical History:   Procedure Laterality Date    CHOLECYSTECTOMY  2011    HYSTERECTOMY  2014    sept    OVARY REMOVAL Left Jan 2014    UPPER GASTROINTESTINAL ENDOSCOPY  09/16/2016    EGD W/BX     Family History   Problem Relation Age of Onset    Heart Disease Father 48    Cancer Maternal Grandmother         breast    Heart Disease Other         mom and dad's side     Social History     Socioeconomic History    Marital status: Single     Spouse name: None    Number of children: None    Years of education: None    Highest education level: None   Occupational History    None   Social Needs    Financial resource strain: None    Food insecurity     Worry: None     Inability: None    Transportation needs     Medical: None     Non-medical: None   Tobacco Use    Smoking status: Never Smoker    Smokeless tobacco: Never Used   Substance and Sexual Activity    Alcohol use: No     Alcohol/week: 0.0 standard drinks     Comment: no alcohol since 2011    Drug use: No    Sexual activity: None   Lifestyle    Physical activity     Days per week: None (pain) 120 each 3    amLODIPine (NORVASC) 2.5 MG tablet Take 2.5 mg by mouth daily      ondansetron (ZOFRAN) 4 MG tablet Take 1 tablet by mouth every 8 hours as needed for Nausea or Vomiting 30 tablet 1    azelastine (ASTELIN) 0.1 % nasal spray 1 spray by Nasal route      moxifloxacin (VIGAMOX) 0.5 % ophthalmic solution 1 drop      Potassium Citrate ER 15 MEQ (1620 MG) TBCR Take 15 mEq by mouth      diclofenac sodium 1 % GEL Apply 4 g topically 2 times daily 1 Tube 3    meloxicam (MOBIC) 15 MG tablet Take 1 tablet by mouth daily 30 tablet 0    potassium chloride (KLOR-CON M) 10 MEQ extended release tablet Take 1 tablet by mouth daily 30 tablet 5    montelukast (SINGULAIR) 10 MG tablet Take 1 tablet by mouth daily 30 tablet 3    fluticasone (FLONASE) 50 MCG/ACT nasal spray USE 1 SPRAY NASALLY DAILY  3    promethazine (PHENERGAN) 25 MG tablet TAKE ONE TABLET BY MOUTH EVERY SIX HOURS AS NEEDED FOR NAUSEA 40 tablet 0    estradiol (ESTRACE) 1 MG tablet Take 1 mg by mouth      prochlorperazine (COMPAZINE) 10 MG tablet Take 1 tablet by mouth every 6 hours as needed (nausea) 30 tablet 2    ZOLMitriptan (ZOMIG) 5 MG tablet TAKE 1 TABLET BY MOUTH AT ONSET OF MIGRAINE. MAY REPEAT ONCE AFTER 2 HOURS. MAX 10 MG (2 TABLETS) PER DAY  11    levothyroxine (SYNTHROID) 25 MCG tablet Take one daily 90 tablet 1    hydroxychloroquine (PLAQUENIL) 200 MG tablet Take 300 mg by mouth daily       Pancrelipase, Lip-Prot-Amyl, (CREON) 69832 UNITS CPEP 2 caps tid 180 capsule 03     No current facility-administered medications on file prior to visit. No Known Allergies    Review of Systems   Constitutional: Positive for fatigue. Negative for chills and fever. Respiratory: Positive for shortness of breath. Cardiovascular: Negative for chest pain. Gastrointestinal: Positive for abdominal pain and diarrhea. Genitourinary: Positive for flank pain and hematuria. Negative for dysuria.        Objective  Vitals:    08/26/20 1408   BP: 90/62   Pulse: 98   Weight: 107 lb (48.5 kg)   Height: 5' 2\" (1.575 m)     Physical Exam  Vitals signs and nursing note reviewed. Constitutional:       Appearance: Normal appearance. She is normal weight. HENT:      Head: Normocephalic. Nose: Nose normal.   Eyes:      Extraocular Movements: Extraocular movements intact. Conjunctiva/sclera: Conjunctivae normal.      Pupils: Pupils are equal, round, and reactive to light. Cardiovascular:      Rate and Rhythm: Normal rate and regular rhythm. Heart sounds: Normal heart sounds. Pulmonary:      Effort: Pulmonary effort is normal.      Breath sounds: Normal breath sounds. Abdominal:      General: Abdomen is flat. Palpations: Abdomen is soft. Tenderness: There is abdominal tenderness in the epigastric area and left upper quadrant. There is left CVA tenderness. Musculoskeletal:        Feet:    Skin:     General: Skin is warm. Neurological:      General: No focal deficit present. Mental Status: She is alert and oriented to person, place, and time. Mental status is at baseline. Psychiatric:         Mood and Affect: Mood normal.         Behavior: Behavior normal.         Thought Content: Thought content normal.         Judgment: Judgment normal.       Assessment & Plan     Diagnosis Orders   1. Left flank pain  US LIVER SPLEEN    Sedimentation Rate    US RETROPERITONEAL LIMITED   2. Abdominal pain, unspecified abdominal location  TSH with Reflex    CBC Auto Differential    Comprehensive Metabolic Panel    US LIVER SPLEEN    Sedimentation Rate   3. Dyspnea, unspecified type  D-Dimer, Quantitative    Sedimentation Rate   4. Left foot pain  Uric Acid    Sedimentation Rate    AFL - Jestine Flicker, DPM, Podiatry, [place]   5. Corn of foot  AFL - Jestine Flicker, DPM, Podiatry, [place]   6.  Hypothyroidism, unspecified type  TSH with Reflex          Orders Placed This Encounter   Procedures    US LIVER SPLEEN     Standing Status:   Future     Standing Expiration Date:   8/26/2021     Order Specific Question:   Reason for exam:     Answer:   flank pain and abdominal pain    US RETROPERITONEAL LIMITED     Standing Status:   Future     Standing Expiration Date:   8/26/2021     Order Specific Question:   Reason for exam:     Answer:   flank pain    TSH with Reflex     Standing Status:   Future     Standing Expiration Date:   8/26/2021    CBC Auto Differential     Standing Status:   Future     Standing Expiration Date:   8/26/2021    Comprehensive Metabolic Panel     Standing Status:   Future     Standing Expiration Date:   8/26/2021    D-Dimer, Quantitative     Standing Status:   Future     Standing Expiration Date:   8/26/2021    Uric Acid     Standing Status:   Future     Standing Expiration Date:   8/26/2021    Sedimentation Rate     Standing Status:   Future     Standing Expiration Date:   8/26/2021    JAIDEN - Alek Oliveira DPM, Podiatry, [place]     Referral Priority:   Routine     Referral Type:   Eval and Treat     Referral Reason:   Specialty Services Required     Referred to Provider:   Sharon Hill DPM     Requested Specialty:   Podiatry     Number of Visits Requested:   1       No orders of the defined types were placed in this encounter.     RACHAEL Amado, APRN - CNP

## 2020-09-01 RX ORDER — DEXTROAMPHETAMINE SACCHARATE, AMPHETAMINE ASPARTATE MONOHYDRATE, DEXTROAMPHETAMINE SULFATE AND AMPHETAMINE SULFATE 7.5; 7.5; 7.5; 7.5 MG/1; MG/1; MG/1; MG/1
30 CAPSULE, EXTENDED RELEASE ORAL 2 TIMES DAILY
Qty: 60 CAPSULE | Refills: 0 | Status: SHIPPED | OUTPATIENT
Start: 2020-09-01 | End: 2020-09-29 | Stop reason: SDUPTHER

## 2020-09-02 ENCOUNTER — HOSPITAL ENCOUNTER (OUTPATIENT)
Dept: CT IMAGING | Age: 50
Discharge: HOME | End: 2020-09-04
Payer: MEDICARE

## 2020-09-02 VITALS — WEIGHT: 107 LBS | BODY MASS INDEX: 19.69 KG/M2 | HEIGHT: 62 IN

## 2020-09-02 PROCEDURE — 6360000004 HC RX CONTRAST MEDICATION: Performed by: NURSE PRACTITIONER

## 2020-09-02 PROCEDURE — 71260 CT THORAX DX C+: CPT

## 2020-09-02 RX ORDER — SODIUM CHLORIDE 0.9 % (FLUSH) 0.9 %
10 SYRINGE (ML) INJECTION ONCE
Status: DISCONTINUED | OUTPATIENT
Start: 2020-09-02 | End: 2020-09-05 | Stop reason: HOSPADM

## 2020-09-02 RX ADMIN — IOPAMIDOL 75 ML: 612 INJECTION, SOLUTION INTRAVENOUS at 15:21

## 2020-09-15 ENCOUNTER — TELEPHONE (OUTPATIENT)
Dept: FAMILY MEDICINE CLINIC | Age: 50
End: 2020-09-15

## 2020-09-29 RX ORDER — TRAZODONE HYDROCHLORIDE 50 MG/1
50 TABLET ORAL NIGHTLY
Qty: 90 TABLET | Refills: 1 | OUTPATIENT
Start: 2020-09-29

## 2020-09-30 ENCOUNTER — APPOINTMENT (OUTPATIENT)
Dept: ULTRASOUND IMAGING | Age: 50
End: 2020-09-30
Payer: MEDICARE

## 2020-09-30 ENCOUNTER — OFFICE VISIT (OUTPATIENT)
Dept: FAMILY MEDICINE CLINIC | Age: 50
End: 2020-09-30
Payer: MEDICARE

## 2020-09-30 ENCOUNTER — HOSPITAL ENCOUNTER (OUTPATIENT)
Dept: ULTRASOUND IMAGING | Age: 50
Discharge: HOME | End: 2020-10-02
Payer: MEDICARE

## 2020-09-30 VITALS
TEMPERATURE: 97.3 F | DIASTOLIC BLOOD PRESSURE: 64 MMHG | WEIGHT: 107 LBS | OXYGEN SATURATION: 90 % | HEIGHT: 62 IN | HEART RATE: 90 BPM | SYSTOLIC BLOOD PRESSURE: 102 MMHG | BODY MASS INDEX: 19.69 KG/M2

## 2020-09-30 PROCEDURE — G8427 DOCREV CUR MEDS BY ELIG CLIN: HCPCS | Performed by: NURSE PRACTITIONER

## 2020-09-30 PROCEDURE — 93970 EXTREMITY STUDY: CPT

## 2020-09-30 PROCEDURE — G8420 CALC BMI NORM PARAMETERS: HCPCS | Performed by: NURSE PRACTITIONER

## 2020-09-30 PROCEDURE — 99213 OFFICE O/P EST LOW 20 MIN: CPT | Performed by: NURSE PRACTITIONER

## 2020-09-30 PROCEDURE — 1036F TOBACCO NON-USER: CPT | Performed by: NURSE PRACTITIONER

## 2020-09-30 RX ORDER — PROCHLORPERAZINE MALEATE 10 MG
10 TABLET ORAL EVERY 6 HOURS PRN
Qty: 30 TABLET | Refills: 2 | Status: SHIPPED | OUTPATIENT
Start: 2020-09-30 | End: 2021-01-12 | Stop reason: SDUPTHER

## 2020-09-30 RX ORDER — DEXTROAMPHETAMINE SACCHARATE, AMPHETAMINE ASPARTATE MONOHYDRATE, DEXTROAMPHETAMINE SULFATE AND AMPHETAMINE SULFATE 7.5; 7.5; 7.5; 7.5 MG/1; MG/1; MG/1; MG/1
30 CAPSULE, EXTENDED RELEASE ORAL 2 TIMES DAILY
Qty: 60 CAPSULE | Refills: 0 | Status: SHIPPED | OUTPATIENT
Start: 2020-09-30 | End: 2020-10-29 | Stop reason: SDUPTHER

## 2020-09-30 ASSESSMENT — ENCOUNTER SYMPTOMS: SHORTNESS OF BREATH: 0

## 2020-09-30 NOTE — PROGRESS NOTES
Subjective  Chief Complaint   Patient presents with    Leg Pain     Pt stated she is having bilateral leg pain, calves are swollen, ankles are swollen, bothersome at night, noticed bruising on her calves for the past 8 weeks     Health Maintenance     Pt due for AWV before 2020 ends, refusing Flu shot        HPI     Here for bilateral leg swelling and bilateral leg pain. Has been going on for a few weeks. Getting worse. Having leg cramping. Keeping her up at night. Legs feel heavy and weak.      Patient Active Problem List    Diagnosis Date Noted    Chronic pancreatitis ([facility] 08/11/2020    Major depressive disorder, single episode, 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[address] 01/15/2019    Abnormal MRI, liver 05/22/2018    Acute recurrent pancreatitis 05/22/2018    At risk of fracture due to osteoporosis 05/22/2018    Calcinosis 05/22/2018    Chronic headaches 05/22/2018    Conductive hearing loss in left ear 05/22/2018    Edema 05/22/2018    Iron overload 05/22/2018    Mass of left side of neck 05/22/2018    Medullary sponge kidney of both kidneys 31/07/4645    Metabolic acidosis 48/28/9149    Microscopic hematuria 05/22/2018    Nausea 05/22/2018    Renal tubular acidosis type I 05/22/2018    Rheumatoid arthritis ([address] 05/22/2018    Tension type headache 05/22/2018    Weight loss, abnormal 05/22/2018    Cellulitis, face 04/06/2018    Other chest pain 11/01/2017    Left lower quadrant pain 11/01/2017    Sjogren's syndrome ([address] 11/01/2017    Diverticulitis of large intestine without perforation or abscess without bleeding 11/01/2017    Fibroids 11/01/2017    Transfusion history 11/01/2017    Pancreatitis 05/13/2017    Acquired hypothyroidism 09/26/2016    GERD (gastroesophageal reflux disease) 09/26/2016    Anxiety 12/09/2015    Depression 12/09/2015    ADD (attention deficit disorder) 02/12/2015    Endometriosis 09/09/2014    ASCUS favoring benign 05/01/2013    S/P endometrial ablation 05/01/2013     Past Medical History:   Diagnosis Date    Acquired hypothyroidism 9/26/2016    Acute pancreatitis     ADD (attention deficit disorder) 2/12/2015    Anxiety     Depression 12/9/2015    Endometrial cyst of ovary 5/10/14    RT ovary, ruptured    GERD (gastroesophageal reflux disease) 9/26/2016    Medullary sponge kidney of both kidneys 5/22/2018    Sjogren's disease ([address]     Stress     Swelling      Past Surgical History:   Procedure Laterality Date    CHOLECYSTECTOMY  2011    HYSTERECTOMY  2014    sept    OVARY REMOVAL Left Jan 2014    UPPER GASTROINTESTINAL ENDOSCOPY  09/16/2016    EGD W/BX     Family History   Problem Relation Age of Onset    Heart Disease Father 48    Cancer Maternal Grandmother         breast    Heart Disease Other         mom and dad's side     Social History     Socioeconomic History    Marital status: Single     Spouse name: None    Number of children: None    Years of education: None    Highest education level: None   Occupational History    None   Social Needs    Financial resource strain: None    Food insecurity     Worry: None     Inability: None    Transportation needs     Medical: None     Non-medical: None   Tobacco Use    Smoking status: Never Smoker    Smokeless tobacco: Never Used   Substance and Sexual Activity    Alcohol use: No     Alcohol/week: 0.0 standard drinks     Comment: no alcohol since 2011    Drug use: No    Sexual activity: None   Lifestyle    Physical activity     Days per week: None     Minutes per session: None    Stress: None   Relationships    Social connections     Talks on phone: None     Gets together: None     Attends Buddhist service: None     Active member of club or organization: None     Attends meetings of clubs or organizations: None     Relationship status: None    Intimate partner violence     Fear of current or ex partner: None     Emotionally abused: None     Physically abused: None     Forced sexual activity: None   Other Topics Concern    None   Social History Narrative    ** Merged History Encounter **          Current Outpatient Medications on File Prior to Visit   Medication Sig Dispense Refill    prochlorperazine (COMPAZINE) 10 MG tablet Take 1 tablet by mouth every 6 hours as needed (nausea) 30 tablet 2    amphetamine-dextroamphetamine (ADDERALL XR) 30 MG extended release capsule Take 1 capsule by mouth 2 times daily for 30 days. 60 capsule 0    traZODone (DESYREL) 50 MG tablet Take 1 tablet by mouth nightly 90 tablet 1    furosemide (LASIX) 20 MG tablet Take 1 tablet by mouth 2 times daily TAKE ONE TABLET BY MOUTH TWO TIMES A DAY 60 tablet 5    liothyronine (CYTOMEL) 25 MCG tablet TAKE 1 TABLET BY MOUTH DAILY 90 tablet 0    buPROPion (WELLBUTRIN XL) 300 MG extended release tablet Take 1 tablet by mouth every morning 90 tablet 1    topiramate (TOPAMAX) 100 MG tablet TAKE ONE TABLET BY MOUTH TWO TIMES A DAY 60 tablet 5    metroNIDAZOLE (METROGEL) 0.75 % gel Apply topically 2 times daily.  45 g 0    TRULANCE 3 MG TABS Take 3 mg by mouth daily      sucralfate (CARAFATE) 1 GM tablet Take by mouth daily      esomeprazole (NEXIUM) 40 MG delayed release capsule TAKE ONE CAPSULE BY MOUTH EVERY DAY 30 capsule 5    Hyoscyamine Sulfate SL (LEVSIN/SL) 0.125 MG SUBL Place 125 mcg under the tongue every 4 hours as needed (pain) 120 each 3    amLODIPine (NORVASC) 2.5 MG tablet Take 2.5 mg by mouth daily      ondansetron (ZOFRAN) 4 MG tablet Take 1 tablet by mouth every 8 hours as needed for Nausea or Vomiting 30 tablet 1    azelastine (ASTELIN) 0.1 % nasal spray 1 spray by Nasal route      moxifloxacin (VIGAMOX) 0.5 % ophthalmic solution 1 drop      Potassium Citrate ER 15 MEQ (1620 MG) TBCR Take 15 mEq by mouth      potassium chloride (KLOR-CON M) 10 MEQ extended release tablet Take 1 tablet by mouth daily 30 tablet 5    montelukast (SINGULAIR) 10 MG tablet Take 1 tablet by mouth daily 30 tablet 3  fluticasone (FLONASE) 50 MCG/ACT nasal spray USE 1 SPRAY NASALLY DAILY  3    promethazine (PHENERGAN) 25 MG tablet TAKE ONE TABLET BY MOUTH EVERY SIX HOURS AS NEEDED FOR NAUSEA 40 tablet 0    estradiol (ESTRACE) 1 MG tablet Take 1 mg by mouth      ZOLMitriptan (ZOMIG) 5 MG tablet TAKE 1 TABLET BY MOUTH AT ONSET OF MIGRAINE. MAY REPEAT ONCE AFTER 2 HOURS. MAX 10 MG (2 TABLETS) PER DAY  11    levothyroxine (SYNTHROID) 25 MCG tablet Take one daily 90 tablet 1    hydroxychloroquine (PLAQUENIL) 200 MG tablet Take 300 mg by mouth daily       Pancrelipase, Lip-Prot-Amyl, (CREON) 82485 UNITS CPEP 2 caps tid 180 capsule 03    metoclopramide (REGLAN) 10 MG tablet Take 10 mg by mouth as needed       No current facility-administered medications on file prior to visit. No Known Allergies    Review of Systems   Constitutional: Positive for fatigue. Negative for chills and fever. Respiratory: Negative for shortness of breath. Cardiovascular: Positive for leg swelling. Negative for chest pain. Musculoskeletal: Positive for myalgias (b/l legs). Psychiatric/Behavioral: Positive for sleep disturbance. Objective  Vitals:    09/30/20 1556   BP: 102/64   Site: Left Upper Arm   Position: Sitting   Cuff Size: Medium Adult   Pulse: 90   Temp: 97.3 °F (36.3 °C)   SpO2: 90%   Weight: 107 lb (48.5 kg)   Height: 5' 2\" (1.575 m)     Physical Exam  Vitals signs and nursing note reviewed. Constitutional:       Appearance: Normal appearance. She is normal weight. HENT:      Head: Normocephalic. Nose: Nose normal.      Mouth/Throat:      Mouth: Mucous membranes are moist.   Eyes:      Pupils: Pupils are equal, round, and reactive to light. Cardiovascular:      Rate and Rhythm: Normal rate. Pulmonary:      Effort: Pulmonary effort is normal.   Musculoskeletal:      Right lower leg: She exhibits swelling. Left lower leg: She exhibits swelling. Legs:    Skin:     General: Skin is warm. Neurological:      Mental Status: She is alert and oriented to person, place, and time. Mental status is at baseline. Psychiatric:         Mood and Affect: Mood normal.         Behavior: Behavior normal.         Thought Content: Thought content normal.         Judgment: Judgment normal.       Assessment & Plan     Diagnosis Orders   1. Leg swelling  US Duplex Lower Extremity Left Sindhu    US DUP LOWER EXTREMITY RIGHT SINDHU   2. Pain in both lower extremities  US Duplex Lower Extremity Left Sindhu    US DUP LOWER EXTREMITY RIGHT SINDHU          Orders Placed This Encounter   Procedures    US Duplex Lower Extremity Left Sindhu     Standing Status:   Future     Standing Expiration Date:   9/30/2021     Order Specific Question:   Reason for exam:     Answer:   pain and swelling    US DUP LOWER EXTREMITY RIGHT SINDHU     Standing Status:   Future     Standing Expiration Date:   9/30/2021     Order Specific Question:   Reason for exam:     Answer:   pain and swelling       No orders of the defined types were placed in this encounter. Side effects, adverse effects of the medication prescribed today, as well as treatment plan/ rationale and result expectations have been discussed with the patient who expresses understanding and desires to proceed. Close follow up to evaluate treatment results and for coordination of care. I have reviewed the patient's medical history in detail and updated the computerized patient record. As always, patient is advised that if symptoms worsen in any way they will proceed to the nearest emergency room.      RACHALE Chadwick, APRN - CNP

## 2020-10-03 ENCOUNTER — APPOINTMENT (OUTPATIENT)
Dept: GENERAL RADIOLOGY | Age: 50
End: 2020-10-03
Payer: MEDICARE

## 2020-10-03 ENCOUNTER — HOSPITAL ENCOUNTER (EMERGENCY)
Age: 50
Discharge: HOME | End: 2020-10-03
Payer: MEDICARE

## 2020-10-03 VITALS
DIASTOLIC BLOOD PRESSURE: 66 MMHG | WEIGHT: 107 LBS | OXYGEN SATURATION: 100 % | HEART RATE: 87 BPM | SYSTOLIC BLOOD PRESSURE: 107 MMHG | TEMPERATURE: 98.7 F | BODY MASS INDEX: 19.57 KG/M2 | RESPIRATION RATE: 24 BRPM

## 2020-10-03 LAB
ALBUMIN SERPL-MCNC: 4 G/DL (ref 3.5–4.6)
ALP BLD-CCNC: 70 U/L (ref 40–130)
ALT SERPL-CCNC: 11 U/L (ref 0–33)
ANION GAP SERPL CALCULATED.3IONS-SCNC: 12 MEQ/L (ref 9–15)
AST SERPL-CCNC: 12 U/L (ref 0–35)
BASOPHILS ABSOLUTE: 0.1 K/UL (ref 0–0.2)
BASOPHILS RELATIVE PERCENT: 0.8 %
BILIRUB SERPL-MCNC: <0.2 MG/DL (ref 0.2–0.7)
BILIRUBIN URINE: NEGATIVE
BLOOD, URINE: NEGATIVE
BUN BLDV-MCNC: 16 MG/DL (ref 6–20)
CALCIUM SERPL-MCNC: 9.2 MG/DL (ref 8.5–9.9)
CHLORIDE BLD-SCNC: 105 MEQ/L (ref 95–107)
CLARITY: CLEAR
CO2: 20 MEQ/L (ref 20–31)
COLOR: YELLOW
CREAT SERPL-MCNC: 0.71 MG/DL (ref 0.5–0.9)
D DIMER: 0.43 MG/L FEU (ref 0–0.5)
EKG ATRIAL RATE: 90 BPM
EKG P AXIS: 67 DEGREES
EKG P-R INTERVAL: 144 MS
EKG Q-T INTERVAL: 372 MS
EKG QRS DURATION: 90 MS
EKG QTC CALCULATION (BAZETT): 455 MS
EKG R AXIS: 39 DEGREES
EKG T AXIS: 57 DEGREES
EKG VENTRICULAR RATE: 90 BPM
EOSINOPHILS ABSOLUTE: 0.1 K/UL (ref 0–0.7)
EOSINOPHILS RELATIVE PERCENT: 0.7 %
GFR AFRICAN AMERICAN: >60
GFR NON-AFRICAN AMERICAN: >60
GLOBULIN: 2.3 G/DL (ref 2.3–3.5)
GLUCOSE BLD-MCNC: 114 MG/DL (ref 70–99)
GLUCOSE URINE: NEGATIVE MG/DL
HCT VFR BLD CALC: 36.6 % (ref 37–47)
HEMOGLOBIN: 12 G/DL (ref 12–16)
KETONES, URINE: NEGATIVE MG/DL
LEUKOCYTE ESTERASE, URINE: NEGATIVE
LYMPHOCYTES ABSOLUTE: 2.5 K/UL (ref 1–4.8)
LYMPHOCYTES RELATIVE PERCENT: 33.3 %
MAGNESIUM: 1.8 MG/DL (ref 1.7–2.4)
MCH RBC QN AUTO: 28.5 PG (ref 27–31.3)
MCHC RBC AUTO-ENTMCNC: 32.7 % (ref 33–37)
MCV RBC AUTO: 87.3 FL (ref 82–100)
MONOCYTES ABSOLUTE: 0.4 K/UL (ref 0.2–0.8)
MONOCYTES RELATIVE PERCENT: 5.2 %
NEUTROPHILS ABSOLUTE: 4.5 K/UL (ref 1.4–6.5)
NEUTROPHILS RELATIVE PERCENT: 60 %
NITRITE, URINE: NEGATIVE
PDW BLD-RTO: 13 % (ref 11.5–14.5)
PH UA: 6 (ref 5–9)
PLATELET # BLD: 282 K/UL (ref 130–400)
POTASSIUM SERPL-SCNC: 3.2 MEQ/L (ref 3.4–4.9)
PROTEIN UA: NEGATIVE MG/DL
RBC # BLD: 4.2 M/UL (ref 4.2–5.4)
SODIUM BLD-SCNC: 137 MEQ/L (ref 135–144)
SPECIFIC GRAVITY UA: 1.01 (ref 1–1.03)
TOTAL CK: 107 U/L (ref 0–170)
TOTAL PROTEIN: 6.3 G/DL (ref 6.3–8)
TROPONIN: <0.01 NG/ML (ref 0–0.01)
URINE REFLEX TO CULTURE: NORMAL
UROBILINOGEN, URINE: 0.2 E.U./DL
WBC # BLD: 7.6 K/UL (ref 4.8–10.8)

## 2020-10-03 PROCEDURE — 6360000002 HC RX W HCPCS: Performed by: PHYSICIAN ASSISTANT

## 2020-10-03 PROCEDURE — 93005 ELECTROCARDIOGRAM TRACING: CPT | Performed by: PHYSICIAN ASSISTANT

## 2020-10-03 PROCEDURE — 80053 COMPREHEN METABOLIC PANEL: CPT

## 2020-10-03 PROCEDURE — 99285 EMERGENCY DEPT VISIT HI MDM: CPT

## 2020-10-03 PROCEDURE — 85379 FIBRIN DEGRADATION QUANT: CPT

## 2020-10-03 PROCEDURE — 99284 EMERGENCY DEPT VISIT MOD MDM: CPT

## 2020-10-03 PROCEDURE — 6370000000 HC RX 637 (ALT 250 FOR IP): Performed by: PHYSICIAN ASSISTANT

## 2020-10-03 PROCEDURE — 85025 COMPLETE CBC W/AUTO DIFF WBC: CPT

## 2020-10-03 PROCEDURE — 83735 ASSAY OF MAGNESIUM: CPT

## 2020-10-03 PROCEDURE — 82550 ASSAY OF CK (CPK): CPT

## 2020-10-03 PROCEDURE — 96365 THER/PROPH/DIAG IV INF INIT: CPT

## 2020-10-03 PROCEDURE — 71045 X-RAY EXAM CHEST 1 VIEW: CPT

## 2020-10-03 PROCEDURE — 81003 URINALYSIS AUTO W/O SCOPE: CPT

## 2020-10-03 PROCEDURE — 2580000003 HC RX 258: Performed by: PHYSICIAN ASSISTANT

## 2020-10-03 PROCEDURE — 84484 ASSAY OF TROPONIN QUANT: CPT

## 2020-10-03 PROCEDURE — 36415 COLL VENOUS BLD VENIPUNCTURE: CPT

## 2020-10-03 RX ORDER — 0.9 % SODIUM CHLORIDE 0.9 %
1000 INTRAVENOUS SOLUTION INTRAVENOUS ONCE
Status: COMPLETED | OUTPATIENT
Start: 2020-10-03 | End: 2020-10-03

## 2020-10-03 RX ORDER — MAGNESIUM SULFATE IN WATER 40 MG/ML
2 INJECTION, SOLUTION INTRAVENOUS ONCE
Status: COMPLETED | OUTPATIENT
Start: 2020-10-03 | End: 2020-10-03

## 2020-10-03 RX ORDER — CALCIUM CARBONATE 300MG(750)
1 TABLET,CHEWABLE ORAL DAILY
Qty: 30 TABLET | Refills: 0 | Status: SHIPPED | OUTPATIENT
Start: 2020-10-03

## 2020-10-03 RX ORDER — POTASSIUM CHLORIDE 20 MEQ/1
20 TABLET, EXTENDED RELEASE ORAL ONCE
Status: COMPLETED | OUTPATIENT
Start: 2020-10-03 | End: 2020-10-03

## 2020-10-03 RX ADMIN — SODIUM CHLORIDE 1000 ML: 9 INJECTION, SOLUTION INTRAVENOUS at 20:59

## 2020-10-03 RX ADMIN — MAGNESIUM SULFATE IN WATER 2 G: 40 INJECTION, SOLUTION INTRAVENOUS at 21:58

## 2020-10-03 RX ADMIN — POTASSIUM CHLORIDE 20 MEQ: 20 TABLET, EXTENDED RELEASE ORAL at 21:58

## 2020-10-03 ASSESSMENT — ENCOUNTER SYMPTOMS
EYE PAIN: 0
CHEST TIGHTNESS: 1
VOMITING: 0
COLOR CHANGE: 0
APNEA: 0
ALLERGIC/IMMUNOLOGIC NEGATIVE: 1
SHORTNESS OF BREATH: 0
TROUBLE SWALLOWING: 0
ABDOMINAL PAIN: 0
DIARRHEA: 0

## 2020-10-03 NOTE — ED TRIAGE NOTES
Pt presents from home with c/o palpitations, R arm pain. Onset approx 1/2 hr. Pt was shopping at store and became dizzy. Denies fall. Pt a&o x4. resp even,unlabored. ls clear bl. Skin p/w/d. Pt afebrile. Pt tachycardic. Pt denies dizziness at present. All neuro checks intact. Denies chest pain.

## 2020-10-04 RX ORDER — TRAZODONE HYDROCHLORIDE 150 MG/1
150 TABLET ORAL NIGHTLY
Qty: 30 TABLET | Refills: 5 | Status: SHIPPED | OUTPATIENT
Start: 2020-10-04 | End: 2021-05-11

## 2020-10-04 NOTE — ED PROVIDER NOTES
LEVOTHYROXINE (SYNTHROID) 25 MCG TABLET    Take one daily    LIOTHYRONINE (CYTOMEL) 25 MCG TABLET    TAKE 1 TABLET BY MOUTH DAILY    METOCLOPRAMIDE (REGLAN) 10 MG TABLET    Take 10 mg by mouth as needed    METRONIDAZOLE (METROGEL) 0.75 % GEL    Apply topically 2 times daily. MONTELUKAST (SINGULAIR) 10 MG TABLET    Take 1 tablet by mouth daily    MOXIFLOXACIN (VIGAMOX) 0.5 % OPHTHALMIC SOLUTION    1 drop    ONDANSETRON (ZOFRAN) 4 MG TABLET    Take 1 tablet by mouth every 8 hours as needed for Nausea or Vomiting    PANCRELIPASE, LIP-PROT-AMYL, (CREON) 33377 UNITS CPEP    2 caps tid    POTASSIUM CHLORIDE (KLOR-CON M) 10 MEQ EXTENDED RELEASE TABLET    Take 1 tablet by mouth daily    POTASSIUM CITRATE ER 15 MEQ (1620 MG) TBCR    Take 15 mEq by mouth    PROCHLORPERAZINE (COMPAZINE) 10 MG TABLET    Take 1 tablet by mouth every 6 hours as needed (nausea)    PROMETHAZINE (PHENERGAN) 25 MG TABLET    TAKE ONE TABLET BY MOUTH EVERY SIX HOURS AS NEEDED FOR NAUSEA    SUCRALFATE (CARAFATE) 1 GM TABLET    Take by mouth daily    TOPIRAMATE (TOPAMAX) 100 MG TABLET    TAKE ONE TABLET BY MOUTH TWO TIMES A DAY    TRAZODONE (DESYREL) 50 MG TABLET    Take 1 tablet by mouth nightly    TRULANCE 3 MG TABS    Take 3 mg by mouth daily    ZOLMITRIPTAN (ZOMIG) 5 MG TABLET    TAKE 1 TABLET BY MOUTH AT ONSET OF MIGRAINE. MAY REPEAT ONCE AFTER 2 HOURS. MAX 10 MG (2 TABLETS) PER DAY       ALLERGIES     Patient has no known allergies.     FAMILY HISTORY       Family History   Problem Relation Age of Onset    Heart Disease Father 48    Cancer Maternal Grandmother         breast    Heart Disease Other         mom and dad's side          SOCIAL HISTORY       Social History     Socioeconomic History    Marital status: Single     Spouse name: None    Number of children: None    Years of education: None    Highest education level: None   Occupational History    None   Social Needs    Financial resource strain: None    Food insecurity     Worry: None     Inability: None    Transportation needs     Medical: None     Non-medical: None   Tobacco Use    Smoking status: Never Smoker    Smokeless tobacco: Never Used   Substance and Sexual Activity    Alcohol use: No     Alcohol/week: 0.0 standard drinks     Comment: no alcohol since 2011    Drug use: No    Sexual activity: None   Lifestyle    Physical activity     Days per week: None     Minutes per session: None    Stress: None   Relationships    Social connections     Talks on phone: None     Gets together: None     Attends [employer]ist service: None     Active member of club or organization: None     Attends meetings of clubs or organizations: None     Relationship status: None    Intimate partner violence     Fear of current or ex partner: None     Emotionally abused: None     Physically abused: None     Forced sexual activity: None   Other Topics Concern    None   Social History Narrative    ** Merged History Encounter **            SCREENINGS        [place] Coma Scale  Eye Opening: Spontaneous  Best Verbal Response: Oriented  Best Motor Response: Obeys commands  [place] Coma Scale Score: 15               PHYSICAL EXAM    (up to 7 for level 4, 8 or more for level 5)     ED Triage Vitals   BP Temp Temp src Pulse Resp SpO2 Height Weight   10/03/20 1949 -- -- 10/03/20 1949 10/03/20 1949 10/03/20 1949 -- 10/03/20 1947   (!) 140/90   113 18 100 %  107 lb (48.5 kg)       Physical Exam  Vitals signs and nursing note reviewed. Constitutional:       General: She is not in acute distress. Appearance: She is well-developed. She is not diaphoretic. HENT:      Head: Normocephalic and atraumatic. Mouth/Throat:      Pharynx: No oropharyngeal exudate. Eyes:      General: No scleral icterus. Conjunctiva/sclera: Conjunctivae normal.      Pupils: Pupils are equal, round, and reactive to light. Neck:      Musculoskeletal: Normal range of motion and neck supple. Trachea: No tracheal deviation. Cardiovascular:      Rate and Rhythm: Normal rate. Heart sounds: Normal heart sounds. Pulmonary:      Effort: Pulmonary effort is normal. No respiratory distress. Breath sounds: Normal breath sounds. Abdominal:      General: Bowel sounds are normal. There is no distension. Palpations: Abdomen is soft. Musculoskeletal: Normal range of motion. Skin:     General: Skin is warm and dry. Findings: No erythema or rash. Neurological:      Mental Status: She is alert and oriented to person, place, and time. Cranial Nerves: No cranial nerve deficit. Motor: No abnormal muscle tone. Psychiatric:         Mood and Affect: Mood is anxious. Behavior: Behavior normal.         Thought Content:  Thought content normal.         Judgment: Judgment normal.         DIAGNOSTIC RESULTS     EKG: All EKG's are interpreted by the Emergency Department Physician who either signs or Co-signs this chart in the absence of a cardiologist.    Normal sinus rhythm, rate 90 bpm, no acute ST elevation or ischemic changes    RADIOLOGY:   Non-plain film images such as CT, Ultrasound and MRI are read by the radiologist. Plain radiographic images are visualized and preliminarily interpreted by the emergency physician with the below findings:    NAD    Interpretation per the Radiologist below, if available at the time of this note:    XR CHEST PORTABLE    (Results Pending)         ED BEDSIDE ULTRASOUND:   Performed by ED Physician - none    LABS:  Labs Reviewed   COMPREHENSIVE METABOLIC PANEL - Abnormal; Notable for the following components:       Result Value    Potassium 3.2 (*)     Glucose 114 (*)     All other components within normal limits   CBC WITH AUTO DIFFERENTIAL - Abnormal; Notable for the following components:    Hematocrit 36.6 (*)     MCHC 32.7 (*)     All other components within normal limits   MAGNESIUM   TROPONIN   CK   D-DIMER, QUANTITATIVE   URINE RT REFLEX TO CULTURE       All other labs Controlled Substance Monitoring -       (Please note that portions of this note were completed with a voice recognition program.  Efforts were made to edit the dictations but occasionally words are mis-transcribed.)    Jean Jordan PA-C (electronically signed)  Attending Emergency Physician            Jean Jordan PA-C  10/03/20 [id number]

## 2020-10-04 NOTE — ED NOTES
Pt was given d/c instructions, follow up care instructions, and prescriptions. Pt at this time is a+ox4, no signs of distress, and was ambulatory on exit. Pt has no questions and states understanding of information given.       Angel Irene RN  10/03/20 [id number]

## 2020-10-05 PROCEDURE — 93010 ELECTROCARDIOGRAM REPORT: CPT | Performed by: INTERNAL MEDICINE

## 2020-10-09 RX ORDER — LIOTHYRONINE SODIUM 25 UG/1
25 TABLET ORAL DAILY
Qty: 90 TABLET | Refills: 0 | Status: SHIPPED | OUTPATIENT
Start: 2020-10-09 | End: 2021-01-07 | Stop reason: SDUPTHER

## 2020-10-27 ENCOUNTER — OFFICE VISIT (OUTPATIENT)
Dept: FAMILY MEDICINE CLINIC | Age: 50
End: 2020-10-27
Payer: MEDICARE

## 2020-10-27 VITALS
WEIGHT: 105.8 LBS | SYSTOLIC BLOOD PRESSURE: 122 MMHG | HEIGHT: 62 IN | TEMPERATURE: 97.8 F | HEART RATE: 104 BPM | OXYGEN SATURATION: 98 % | BODY MASS INDEX: 19.47 KG/M2 | DIASTOLIC BLOOD PRESSURE: 78 MMHG

## 2020-10-27 DIAGNOSIS — M79.605 LEFT LEG PAIN: ICD-10-CM

## 2020-10-27 DIAGNOSIS — M79.604 BILATERAL LEG PAIN: ICD-10-CM

## 2020-10-27 DIAGNOSIS — M79.605 BILATERAL LEG PAIN: ICD-10-CM

## 2020-10-27 DIAGNOSIS — R11.0 NAUSEA: ICD-10-CM

## 2020-10-27 DIAGNOSIS — E87.6 HYPOKALEMIA: ICD-10-CM

## 2020-10-27 LAB
ALBUMIN SERPL-MCNC: 4.8 G/DL (ref 3.5–4.6)
ALP BLD-CCNC: 90 U/L (ref 40–130)
ALT SERPL-CCNC: 12 U/L (ref 0–33)
AMYLASE: 83 U/L (ref 22–93)
ANION GAP SERPL CALCULATED.3IONS-SCNC: 15 MEQ/L (ref 9–15)
AST SERPL-CCNC: 14 U/L (ref 0–35)
BILIRUB SERPL-MCNC: <0.2 MG/DL (ref 0.2–0.7)
BUN BLDV-MCNC: 20 MG/DL (ref 6–20)
CALCIUM SERPL-MCNC: 9.4 MG/DL (ref 8.5–9.9)
CHLORIDE BLD-SCNC: 103 MEQ/L (ref 95–107)
CO2: 22 MEQ/L (ref 20–31)
CREAT SERPL-MCNC: 0.8 MG/DL (ref 0.5–0.9)
GFR AFRICAN AMERICAN: >60
GFR NON-AFRICAN AMERICAN: >60
GLOBULIN: 2.7 G/DL (ref 2.3–3.5)
GLUCOSE BLD-MCNC: 88 MG/DL (ref 70–99)
LIPASE: 48 U/L (ref 12–95)
POTASSIUM SERPL-SCNC: 4.1 MEQ/L (ref 3.4–4.9)
SEDIMENTATION RATE, ERYTHROCYTE: 6 MM (ref 0–20)
SODIUM BLD-SCNC: 140 MEQ/L (ref 135–144)
TOTAL CK: 113 U/L (ref 0–170)
TOTAL PROTEIN: 7.5 G/DL (ref 6.3–8)

## 2020-10-27 PROCEDURE — G8484 FLU IMMUNIZE NO ADMIN: HCPCS | Performed by: NURSE PRACTITIONER

## 2020-10-27 PROCEDURE — G8427 DOCREV CUR MEDS BY ELIG CLIN: HCPCS | Performed by: NURSE PRACTITIONER

## 2020-10-27 PROCEDURE — 1036F TOBACCO NON-USER: CPT | Performed by: NURSE PRACTITIONER

## 2020-10-27 PROCEDURE — 99214 OFFICE O/P EST MOD 30 MIN: CPT | Performed by: NURSE PRACTITIONER

## 2020-10-27 PROCEDURE — G8420 CALC BMI NORM PARAMETERS: HCPCS | Performed by: NURSE PRACTITIONER

## 2020-10-27 ASSESSMENT — ENCOUNTER SYMPTOMS
ABDOMINAL PAIN: 1
COUGH: 0
SHORTNESS OF BREATH: 0

## 2020-10-27 NOTE — PROGRESS NOTES
Subjective  Chief Complaint   Patient presents with    Leg Pain     ongoing bilateral leg pain, states that sometimes there is swelling and bruising. HPI      Pt is here for a fu of bilateral leg pain. She has been struggling with leg pain now few a few mos. She states that it is so severe that she cries at night sometimes. Worse at night. Reports it as a \"deep pain\" that does not improve with anything she has taken. Reports persistent LUQ pain as well that has had an extensive work up. Has had venous duplex completed previously.       Patient Active Problem List    Diagnosis Date Noted    Chronic pancreatitis ([address] 08/11/2020    Major depressive disorder, single episode, 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[address] 01/15/2019    Abnormal MRI, liver 05/22/2018    Acute recurrent pancreatitis 05/22/2018    At risk of fracture due to osteoporosis 05/22/2018    Calcinosis 05/22/2018    Chronic headaches 05/22/2018    Conductive hearing loss in left ear 05/22/2018    Edema 05/22/2018    Iron overload 05/22/2018    Mass of left side of neck 05/22/2018    Medullary sponge kidney of both kidneys 95/97/2129    Metabolic acidosis 10/63/5440    Microscopic hematuria 05/22/2018    Nausea 05/22/2018    Renal tubular acidosis type I 05/22/2018    Rheumatoid arthritis ([address] 05/22/2018    Tension type headache 05/22/2018    Weight loss, abnormal 05/22/2018    Cellulitis, face 04/06/2018    Other chest pain 11/01/2017    Left lower quadrant pain 11/01/2017    Sjogren's syndrome ([address] 11/01/2017    Diverticulitis of large intestine without perforation or abscess without bleeding 11/01/2017    Fibroids 11/01/2017    Transfusion history 11/01/2017    Pancreatitis 05/13/2017    Acquired hypothyroidism 09/26/2016    GERD (gastroesophageal reflux disease) 09/26/2016    Anxiety 12/09/2015    Depression 12/09/2015    ADD (attention deficit disorder) 02/12/2015    Endometriosis 09/09/2014    ASCUS favoring benign 05/01/2013    S/P endometrial ablation 05/01/2013     Past Medical History:   Diagnosis Date    Acquired hypothyroidism 9/26/2016    Acute pancreatitis     ADD (attention deficit disorder) 2/12/2015    Anxiety     Depression 12/9/2015    Endometrial cyst of ovary 5/10/14    RT ovary, ruptured    GERD (gastroesophageal reflux disease) 9/26/2016    Medullary sponge kidney of both kidneys 5/22/2018    Sjogren's disease ([address]     Stress     Swelling      Past Surgical History:   Procedure Laterality Date    CHOLECYSTECTOMY  2011    HYSTERECTOMY  2014    sept    OVARY REMOVAL Left Jan 2014    UPPER GASTROINTESTINAL ENDOSCOPY  09/16/2016    EGD W/BX     Family History   Problem Relation Age of Onset    Heart Disease Father 48    Cancer Maternal Grandmother         breast    Heart Disease Other         mom and dad's side     Social History     Socioeconomic History    Marital status: Single     Spouse name: None    Number of children: None    Years of education: None    Highest education level: None   Occupational History    None   Social Needs    Financial resource strain: None    Food insecurity     Worry: None     Inability: None    Transportation needs     Medical: None     Non-medical: None   Tobacco Use    Smoking status: Never Smoker    Smokeless tobacco: Never Used   Substance and Sexual Activity    Alcohol use: No     Alcohol/week: 0.0 standard drinks     Comment: no alcohol since 2011    Drug use: No    Sexual activity: None   Lifestyle    Physical activity     Days per week: None     Minutes per session: None    Stress: None   Relationships    Social connections     Talks on phone: None     Gets together: None     Attends Sikh service: None     Active member of club or organization: None     Attends meetings of clubs or organizations: None     Relationship status: None    Intimate partner violence     Fear of current or ex partner: None     Emotionally fluticasone (FLONASE) 50 MCG/ACT nasal spray USE 1 SPRAY NASALLY DAILY  3    promethazine (PHENERGAN) 25 MG tablet TAKE ONE TABLET BY MOUTH EVERY SIX HOURS AS NEEDED FOR NAUSEA 40 tablet 0    estradiol (ESTRACE) 1 MG tablet Take 1 mg by mouth      ZOLMitriptan (ZOMIG) 5 MG tablet TAKE 1 TABLET BY MOUTH AT ONSET OF MIGRAINE. MAY REPEAT ONCE AFTER 2 HOURS. MAX 10 MG (2 TABLETS) PER DAY  11    levothyroxine (SYNTHROID) 25 MCG tablet Take one daily 90 tablet 1    hydroxychloroquine (PLAQUENIL) 200 MG tablet Take 300 mg by mouth daily       Pancrelipase, Lip-Prot-Amyl, (CREON) 68795 UNITS CPEP 2 caps tid 180 capsule 03    Hyoscyamine Sulfate SL (LEVSIN/SL) 0.125 MG SUBL Place 125 mcg under the tongue every 4 hours as needed (pain) 120 each 3     No current facility-administered medications on file prior to visit. No Known Allergies    Review of Systems   Constitutional: Negative for fatigue. Respiratory: Negative for cough and shortness of breath. Cardiovascular: Negative for chest pain. Gastrointestinal: Positive for abdominal pain. Musculoskeletal: Positive for arthralgias. Objective  Vitals:    10/27/20 0819   BP: 122/78   Site: Left Upper Arm   Position: Sitting   Cuff Size: Medium Adult   Pulse: 104   Temp: 97.8 °F (36.6 °C)   SpO2: 98%   Weight: 105 lb 12.8 oz (48 kg)   Height: 5' 2\" (1.575 m)     Physical Exam  Vitals signs and nursing note reviewed. Constitutional:       Appearance: Normal appearance. She is normal weight. HENT:      Head: Normocephalic. Nose: Nose normal.      Mouth/Throat:      Mouth: Mucous membranes are moist.   Eyes:      Extraocular Movements: Extraocular movements intact. Conjunctiva/sclera: Conjunctivae normal.      Pupils: Pupils are equal, round, and reactive to light. Cardiovascular:      Rate and Rhythm: Normal rate and regular rhythm. Pulses: Normal pulses. Heart sounds: Normal heart sounds.    Pulmonary:      Effort: Pulmonary effort is normal.      Breath sounds: Normal breath sounds. Musculoskeletal:         General: Swelling and tenderness (bilaterally in lower legs) present. Skin:     General: Skin is warm. Neurological:      General: No focal deficit present. Mental Status: She is alert and oriented to person, place, and time. Mental status is at baseline. Psychiatric:         Mood and Affect: Mood normal.         Behavior: Behavior normal.         Thought Content: Thought content normal.         Judgment: Judgment normal.       Assessment & Plan     Diagnosis Orders   1. Left leg pain  XR FEMUR LEFT (MIN 2 VIEWS)    XR TIBIA FIBULA LEFT (2 VIEWS)    Sedimentation Rate    CK    Lactic Acid, Plasma   2.  Bilateral leg pain  [facility] - Bailey Berkowitz NP, Interventional Radiology, [place]    Sedimentation Rate    External Referral to Hematology    CK    Lactic Acid, Plasma       Orders Placed This Encounter   Procedures    XR FEMUR LEFT (MIN 2 VIEWS)     Standing Status:   Future     Number of Occurrences:   1     Standing Expiration Date:   10/27/2021     Order Specific Question:   Reason for exam:     Answer:   left leg pain    XR TIBIA FIBULA LEFT (2 VIEWS)     Standing Status:   Future     Number of Occurrences:   1     Standing Expiration Date:   10/27/2021     Order Specific Question:   Reason for exam:     Answer:   leg pain    Sedimentation Rate     Standing Status:   Future     Number of Occurrences:   1     Standing Expiration Date:   10/27/2021    CK     Standing Status:   Future     Number of Occurrences:   1     Standing Expiration Date:   10/27/2021    Lactic Acid, Plasma     Standing Status:   Future     Standing Expiration Date:   10/27/2021   [facility] - Bailey Berkowitz NP, Interventional Radiology, [place]     Referral Priority:   Routine     Referral Type:   Eval and Treat     Referral Reason:   Specialty Services Required     Referred to Provider:   ORTIZ Rascon CNP     Requested Specialty: Nurse Practitioner     Number of Visits Requested:   1    External Referral to Hematology     Referral Priority:   Routine     Referral Type:   Eval and Treat     Referral Reason:   Specialty Services Required     Requested Specialty:   Hematology and Oncology     Number of Visits Requested:   1       No orders of the defined types were placed in this encounter. Side effects, adverse effects of the medication prescribed today, as well as treatment plan/ rationale and result expectations have been discussed with the patient who expresses understanding and desires to proceed. Close follow up to evaluate treatment results and for coordination of care. I have reviewed the patient's medical history in detail and updated the computerized patient record. As always, patient is advised that if symptoms worsen in any way they will proceed to the nearest emergency room.      FU in 2 weeks    Kiki Clayton, APRN - CNP

## 2020-11-02 RX ORDER — DEXTROAMPHETAMINE SACCHARATE, AMPHETAMINE ASPARTATE MONOHYDRATE, DEXTROAMPHETAMINE SULFATE AND AMPHETAMINE SULFATE 7.5; 7.5; 7.5; 7.5 MG/1; MG/1; MG/1; MG/1
30 CAPSULE, EXTENDED RELEASE ORAL 2 TIMES DAILY
Qty: 60 CAPSULE | Refills: 0 | Status: SHIPPED | OUTPATIENT
Start: 2020-11-02 | End: 2020-12-01 | Stop reason: SDUPTHER

## 2020-11-04 ENCOUNTER — VIRTUAL VISIT (OUTPATIENT)
Dept: FAMILY MEDICINE CLINIC | Age: 50
End: 2020-11-04
Payer: MEDICARE

## 2020-11-04 ENCOUNTER — NURSE ONLY (OUTPATIENT)
Dept: PRIMARY CARE CLINIC | Age: 50
End: 2020-11-04

## 2020-11-04 PROCEDURE — 1036F TOBACCO NON-USER: CPT | Performed by: NURSE PRACTITIONER

## 2020-11-04 PROCEDURE — G8420 CALC BMI NORM PARAMETERS: HCPCS | Performed by: NURSE PRACTITIONER

## 2020-11-04 PROCEDURE — G8427 DOCREV CUR MEDS BY ELIG CLIN: HCPCS | Performed by: NURSE PRACTITIONER

## 2020-11-04 PROCEDURE — 99213 OFFICE O/P EST LOW 20 MIN: CPT | Performed by: NURSE PRACTITIONER

## 2020-11-04 PROCEDURE — G8484 FLU IMMUNIZE NO ADMIN: HCPCS | Performed by: NURSE PRACTITIONER

## 2020-11-04 ASSESSMENT — ENCOUNTER SYMPTOMS
ABDOMINAL PAIN: 1
NAUSEA: 0
ABDOMINAL DISTENTION: 1
DIARRHEA: 0

## 2020-11-04 NOTE — PROGRESS NOTES
2020    TELEHEALTH EVALUATION -- Audio/Visual (During TXDKF-26 public health emergency)    Due to COVID 19 outbreak, patient's office visit was converted to a virtual visit. Patient was contacted and agreed to proceed with a virtual visit via [employer] me  The risks and benefits of converting to a virtual visit were discussed in light of the current infectious disease epidemic. Patient also understood that insurance coverage and co-pays are up to their individual insurance plans. HPI:    Juan Brannon (:  1970) has requested an audio/video evaluation for the following concern(s):    Having issues with constipation recently. After numerous laxatives she was able to have a BM. Has been nauseous. Has gotten somewhat better. Still having significant abdominal pain. Feels like previously when she had a telescoping bowel. Review of Systems   Constitutional: Positive for chills and fatigue. Gastrointestinal: Positive for abdominal distention and abdominal pain. Negative for diarrhea and nausea. Prior to Visit Medications    Medication Sig Taking? Authorizing Provider   amphetamine-dextroamphetamine (ADDERALL XR) 30 MG extended release capsule Take 1 capsule by mouth 2 times daily for 30 days.  Yes ORTIZ Doshi CNP   promethazine (PHENERGAN) 25 MG tablet Take 1 tablet by mouth every 8 hours as needed for Nausea Yes ORTIZ Doshi CNP   NEXIUM 40 MG delayed release capsule TAKE ONE CAPSULE BY MOUTH EVERY DAY Yes ORTIZ Doshi CNP   topiramate (TOPAMAX) 100 MG tablet Take 1 tablet by mouth 2 times daily Yes ORTIZ Doshi CNP   liothyronine (CYTOMEL) 25 MCG tablet TAKE 1 TABLET BY MOUTH DAILY Yes ORTIZ Doshi CNP   traZODone (DESYREL) 150 MG tablet Take 1 tablet by mouth nightly Yes ORTIZ Doshi CNP   Magnesium 400 MG TABS Take 1 tablet by mouth daily Yes Juan Penn PA-C   prochlorperazine (COMPAZINE) 10 MG tablet Take 1 tablet by mouth every 6 hours as needed (nausea) Yes ORTIZ Hoffman CNP   furosemide (LASIX) 20 MG tablet Take 1 tablet by mouth 2 times daily TAKE ONE TABLET BY MOUTH TWO TIMES A DAY Yes ORTIZ Hoffman CNP   buPROPion (WELLBUTRIN XL) 300 MG extended release tablet Take 1 tablet by mouth every morning Yes ORTIZ Hoffman CNP   TRULANCE 3 MG TABS Take 3 mg by mouth daily Yes Historical Provider, MD   sucralfate (CARAFATE) 1 GM tablet Take by mouth daily Yes Historical Provider, MD   metoclopramide (REGLAN) 10 MG tablet Take 10 mg by mouth as needed Yes Historical Provider, MD   Hyoscyamine Sulfate SL (LEVSIN/SL) 0.125 MG SUBL Place 125 mcg under the tongue every 4 hours as needed (pain) Yes Itzel Bigness, APRN - CNP   amLODIPine (NORVASC) 2.5 MG tablet Take 2.5 mg by mouth daily Yes Historical Provider, MD   ondansetron (ZOFRAN) 4 MG tablet Take 1 tablet by mouth every 8 hours as needed for Nausea or Vomiting Yes ORITZ Hoffman CNP   azelastine (ASTELIN) 0.1 % nasal spray 1 spray by Nasal route Yes Historical Provider, MD   moxifloxacin (VIGAMOX) 0.5 % ophthalmic solution 1 drop Yes Historical Provider, MD   Potassium Citrate ER 15 MEQ (1620 MG) TBCR Take 15 mEq by mouth Yes Historical Provider, MD   potassium chloride (KLOR-CON M) 10 MEQ extended release tablet Take 1 tablet by mouth daily Yes ORTIZ Hoffman CNP   montelukast (SINGULAIR) 10 MG tablet Take 1 tablet by mouth daily Yes ORTIZ Hoffman CNP   fluticasone (FLONASE) 50 MCG/ACT nasal spray USE 1 SPRAY NASALLY DAILY Yes Historical Provider, MD   estradiol (ESTRACE) 1 MG tablet Take 1 mg by mouth Yes Historical Provider, MD   ZOLMitriptan (ZOMIG) 5 MG tablet TAKE 1 TABLET BY MOUTH AT ONSET OF MIGRAINE. MAY REPEAT ONCE AFTER 2 HOURS.  MAX 10 MG (2 TABLETS) PER DAY Yes Historical Provider, MD   levothyroxine (SYNTHROID) 25 MCG tablet Take one daily Yes ORTIZ Hoffman CNP   hydroxychloroquine (PLAQUENIL) 200 MG tablet Take 300 mg by mouth daily  Yes Historical Provider, MD   Pancrelipase, Lip-Prot-Amyl, (CREON) 11984 UNITS CPEP 2 caps tid Yes Viet Camargo MD       Social History     Tobacco Use    Smoking status: Never Smoker    Smokeless tobacco: Never Used   Substance Use Topics    Alcohol use: No     Alcohol/week: 0.0 standard drinks     Comment: no alcohol since 2011    Drug use: No        No Known Allergies,   Past Medical History:   Diagnosis Date    Acquired hypothyroidism 9/26/2016    Acute pancreatitis     ADD (attention deficit disorder) 2/12/2015    Anxiety     Depression 12/9/2015    Endometrial cyst of ovary 5/10/14    RT ovary, ruptured    GERD (gastroesophageal reflux disease) 9/26/2016    Medullary sponge kidney of both kidneys 5/22/2018    Sjogren's disease ([address]     Stress     Swelling    ,   Past Surgical History:   Procedure Laterality Date    CHOLECYSTECTOMY  2011    HYSTERECTOMY  2014    sept    OVARY REMOVAL Left Jan 2014    UPPER GASTROINTESTINAL ENDOSCOPY  09/16/2016    EGD W/BX   ,   Social History     Tobacco Use    Smoking status: Never Smoker    Smokeless tobacco: Never Used   Substance Use Topics    Alcohol use: No     Alcohol/week: 0.0 standard drinks     Comment: no alcohol since 2011    Drug use: No   ,   Family History   Problem Relation Age of Onset    Heart Disease Father 48    Cancer Maternal Grandmother         breast    Heart Disease Other         mom and dad's side       PHYSICAL EXAMINATION:  [ INSTRUCTIONS:  \"[x]\" Indicates a positive item  \"[]\" Indicates a negative item  -- DELETE ALL ITEMS NOT EXAMINED]  [x] Alert  [x] Oriented to person/place/time    [x] No apparent distress  [] Toxic appearing    [] Face flushed appearing [x] Sclera clear  [] Lips are cyanotic      [x] Breathing appears normal  [] Appears tachypneic      [] Rash on visible skin    [x] Cranial Nerves II-XII grossly intact    [x] Motor grossly intact in visible upper extremities    [x] Motor grossly intact in visible lower extremities    [x] Normal Mood  [] Anxious appearing    [] Depressed appearing  [] Confused appearing      [] Poor short term memory  [] Poor long term memory    [] OTHER:      Due to this being a TeleHealth encounter, evaluation of the following organ systems is limited: Vitals/Constitutional/EENT/Resp/CV/GI//MS/Neuro/Skin/Heme-Lymph-Imm. ASSESSMENT/PLAN:  1. Left lower quadrant abdominal pain      2. Constipation, unspecified constipation type    After a long discussion it was decided that pt go to ER due to her hx of intussusception and significant abdominal pain. Pt agrees. Side effects, adverse effects of the medication prescribed today, as well as treatment plan/ rationale and result expectations have been discussed with the patient who expresses understanding and desires to proceed. Close follow up to evaluate treatment results and for coordination of care. I have reviewed the patient's medical history in detail and updated the computerized patient record. As always, patient is advised that if symptoms worsen in any way they will proceed to the nearest emergency room. RACHAEL prn. An  electronic signature was used to authenticate this note. --Eber Lundy, ORTIZ - CNP on 11/4/2020 at 4:24 PM        Pursuant to the emergency declaration under the [facility] waiver authority and the [employer] Act, this Virtual  Visit was conducted, with patient's consent, to reduce the patient's risk of exposure to COVID-19 and provide continuity of care for an established patient. Services were provided through a video synchronous discussion virtually to substitute for in-person clinic visit.

## 2020-11-07 LAB
SARS-COV-2: NOT DETECTED
SOURCE: NORMAL

## 2020-11-08 PROCEDURE — 99284 EMERGENCY DEPT VISIT MOD MDM: CPT

## 2020-11-08 PROCEDURE — 96374 THER/PROPH/DIAG INJ IV PUSH: CPT

## 2020-11-08 PROCEDURE — 96361 HYDRATE IV INFUSION ADD-ON: CPT

## 2020-11-08 PROCEDURE — 96375 TX/PRO/DX INJ NEW DRUG ADDON: CPT

## 2020-11-08 PROCEDURE — 96372 THER/PROPH/DIAG INJ SC/IM: CPT

## 2020-11-08 ASSESSMENT — PAIN DESCRIPTION - LOCATION: LOCATION: ABDOMEN

## 2020-11-08 ASSESSMENT — PAIN DESCRIPTION - PAIN TYPE: TYPE: ACUTE PAIN

## 2020-11-08 ASSESSMENT — PAIN DESCRIPTION - ORIENTATION: ORIENTATION: LEFT

## 2020-11-08 ASSESSMENT — PAIN SCALES - GENERAL: PAINLEVEL_OUTOF10: 7

## 2020-11-08 ASSESSMENT — PAIN DESCRIPTION - FREQUENCY: FREQUENCY: INTERMITTENT

## 2020-11-08 ASSESSMENT — PAIN DESCRIPTION - DESCRIPTORS: DESCRIPTORS: ACHING

## 2020-11-09 ENCOUNTER — HOSPITAL ENCOUNTER (EMERGENCY)
Age: 50
Discharge: HOME | End: 2020-11-09
Attending: EMERGENCY MEDICINE
Payer: MEDICARE

## 2020-11-09 ENCOUNTER — APPOINTMENT (OUTPATIENT)
Dept: CT IMAGING | Age: 50
End: 2020-11-09
Payer: MEDICARE

## 2020-11-09 VITALS
WEIGHT: 105 LBS | HEIGHT: 62 IN | HEART RATE: 58 BPM | OXYGEN SATURATION: 100 % | TEMPERATURE: 97.9 F | SYSTOLIC BLOOD PRESSURE: 114 MMHG | DIASTOLIC BLOOD PRESSURE: 73 MMHG | BODY MASS INDEX: 19.32 KG/M2 | RESPIRATION RATE: 16 BRPM

## 2020-11-09 LAB
ALBUMIN SERPL-MCNC: 4.6 G/DL (ref 3.5–4.6)
ALP BLD-CCNC: 75 U/L (ref 40–130)
ALT SERPL-CCNC: 12 U/L (ref 0–33)
ANION GAP SERPL CALCULATED.3IONS-SCNC: 6 MEQ/L (ref 9–15)
AST SERPL-CCNC: 11 U/L (ref 0–35)
BASOPHILS ABSOLUTE: 0.1 K/UL (ref 0–0.2)
BASOPHILS RELATIVE PERCENT: 1 %
BILIRUB SERPL-MCNC: <0.2 MG/DL (ref 0.2–0.7)
BILIRUBIN URINE: NEGATIVE
BLOOD, URINE: NEGATIVE
BUN BLDV-MCNC: 14 MG/DL (ref 6–20)
CALCIUM SERPL-MCNC: 9.4 MG/DL (ref 8.5–9.9)
CHLORIDE BLD-SCNC: 95 MEQ/L (ref 95–107)
CLARITY: ABNORMAL
CO2: 27 MEQ/L (ref 20–31)
COLOR: YELLOW
CREAT SERPL-MCNC: 0.74 MG/DL (ref 0.5–0.9)
EOSINOPHILS ABSOLUTE: 0.1 K/UL (ref 0–0.7)
EOSINOPHILS RELATIVE PERCENT: 1.3 %
GFR AFRICAN AMERICAN: >60
GFR NON-AFRICAN AMERICAN: >60
GLOBULIN: 2 G/DL (ref 2.3–3.5)
GLUCOSE BLD-MCNC: 83 MG/DL (ref 70–99)
GLUCOSE URINE: NEGATIVE MG/DL
HCT VFR BLD CALC: 35.7 % (ref 37–47)
HEMOGLOBIN: 11.9 G/DL (ref 12–16)
KETONES, URINE: NEGATIVE MG/DL
LACTIC ACID: 1.1 MMOL/L (ref 0.5–2.2)
LEUKOCYTE ESTERASE, URINE: NEGATIVE
LIPASE: 64 U/L (ref 12–95)
LYMPHOCYTES ABSOLUTE: 2.4 K/UL (ref 1–4.8)
LYMPHOCYTES RELATIVE PERCENT: 37.7 %
MCH RBC QN AUTO: 29.4 PG (ref 27–31.3)
MCHC RBC AUTO-ENTMCNC: 33.4 % (ref 33–37)
MCV RBC AUTO: 88 FL (ref 82–100)
MONOCYTES ABSOLUTE: 0.4 K/UL (ref 0.2–0.8)
MONOCYTES RELATIVE PERCENT: 6.1 %
NEUTROPHILS ABSOLUTE: 3.5 K/UL (ref 1.4–6.5)
NEUTROPHILS RELATIVE PERCENT: 53.9 %
NITRITE, URINE: NEGATIVE
PDW BLD-RTO: 13.6 % (ref 11.5–14.5)
PH UA: 8.5 (ref 5–9)
PLATELET # BLD: 330 K/UL (ref 130–400)
POTASSIUM SERPL-SCNC: 3.1 MEQ/L (ref 3.4–4.9)
PROTEIN UA: NEGATIVE MG/DL
RBC # BLD: 4.06 M/UL (ref 4.2–5.4)
SODIUM BLD-SCNC: 128 MEQ/L (ref 135–144)
SPECIFIC GRAVITY UA: 1.01 (ref 1–1.03)
TOTAL PROTEIN: 6.6 G/DL (ref 6.3–8)
URINE REFLEX TO CULTURE: ABNORMAL
UROBILINOGEN, URINE: 0.2 E.U./DL
WBC # BLD: 6.4 K/UL (ref 4.8–10.8)

## 2020-11-09 PROCEDURE — 74177 CT ABD & PELVIS W/CONTRAST: CPT

## 2020-11-09 PROCEDURE — 6360000002 HC RX W HCPCS: Performed by: PHYSICIAN ASSISTANT

## 2020-11-09 PROCEDURE — 6360000004 HC RX CONTRAST MEDICATION: Performed by: PHYSICIAN ASSISTANT

## 2020-11-09 PROCEDURE — 83605 ASSAY OF LACTIC ACID: CPT

## 2020-11-09 PROCEDURE — 36415 COLL VENOUS BLD VENIPUNCTURE: CPT

## 2020-11-09 PROCEDURE — 81003 URINALYSIS AUTO W/O SCOPE: CPT

## 2020-11-09 PROCEDURE — 80053 COMPREHEN METABOLIC PANEL: CPT

## 2020-11-09 PROCEDURE — 85025 COMPLETE CBC W/AUTO DIFF WBC: CPT

## 2020-11-09 PROCEDURE — 6370000000 HC RX 637 (ALT 250 FOR IP): Performed by: EMERGENCY MEDICINE

## 2020-11-09 PROCEDURE — 2580000003 HC RX 258: Performed by: PHYSICIAN ASSISTANT

## 2020-11-09 PROCEDURE — 83690 ASSAY OF LIPASE: CPT

## 2020-11-09 PROCEDURE — 2500000003 HC RX 250 WO HCPCS: Performed by: PHYSICIAN ASSISTANT

## 2020-11-09 RX ORDER — SODIUM PHOSPHATE, DIBASIC AND SODIUM PHOSPHATE, MONOBASIC 7; 19 G/133ML; G/133ML
1 ENEMA RECTAL
Qty: 1 BOTTLE | Refills: 0 | Status: SHIPPED | OUTPATIENT
Start: 2020-11-09 | End: 2020-11-09

## 2020-11-09 RX ORDER — POLYETHYLENE GLYCOL 3350 17 G/17G
17 POWDER, FOR SOLUTION ORAL DAILY PRN
Qty: 527 G | Refills: 0 | Status: SHIPPED | OUTPATIENT
Start: 2020-11-09 | End: 2020-12-09

## 2020-11-09 RX ORDER — ONDANSETRON 2 MG/ML
4 INJECTION INTRAMUSCULAR; INTRAVENOUS ONCE
Status: COMPLETED | OUTPATIENT
Start: 2020-11-09 | End: 2020-11-09

## 2020-11-09 RX ORDER — 0.9 % SODIUM CHLORIDE 0.9 %
1000 INTRAVENOUS SOLUTION INTRAVENOUS ONCE
Status: COMPLETED | OUTPATIENT
Start: 2020-11-09 | End: 2020-11-09

## 2020-11-09 RX ORDER — MAGNESIUM CARB/ALUMINUM HYDROX 105-160MG
150 TABLET,CHEWABLE ORAL 2 TIMES DAILY PRN
Qty: 2 BOTTLE | Refills: 0 | Status: SHIPPED | OUTPATIENT
Start: 2020-11-09 | End: 2020-11-11

## 2020-11-09 RX ORDER — MORPHINE SULFATE 2 MG/ML
2 INJECTION, SOLUTION INTRAMUSCULAR; INTRAVENOUS ONCE
Status: COMPLETED | OUTPATIENT
Start: 2020-11-09 | End: 2020-11-09

## 2020-11-09 RX ORDER — DICYCLOMINE HYDROCHLORIDE 10 MG/ML
20 INJECTION INTRAMUSCULAR ONCE
Status: COMPLETED | OUTPATIENT
Start: 2020-11-09 | End: 2020-11-09

## 2020-11-09 RX ADMIN — ONDANSETRON 4 MG: 2 INJECTION INTRAMUSCULAR; INTRAVENOUS at 00:54

## 2020-11-09 RX ADMIN — IOPAMIDOL 100 ML: 612 INJECTION, SOLUTION INTRAVENOUS at 02:55

## 2020-11-09 RX ADMIN — FAMOTIDINE 20 MG: 10 INJECTION INTRAVENOUS at 00:54

## 2020-11-09 RX ADMIN — POTASSIUM BICARBONATE 40 MEQ: 782 TABLET, EFFERVESCENT ORAL at 04:03

## 2020-11-09 RX ADMIN — DICYCLOMINE HYDROCHLORIDE 20 MG: 20 INJECTION INTRAMUSCULAR at 00:54

## 2020-11-09 RX ADMIN — SODIUM CHLORIDE 1000 ML: 9 INJECTION, SOLUTION INTRAVENOUS at 00:54

## 2020-11-09 RX ADMIN — MORPHINE SULFATE 2 MG: 2 INJECTION, SOLUTION INTRAMUSCULAR; INTRAVENOUS at 02:06

## 2020-11-09 ASSESSMENT — ENCOUNTER SYMPTOMS
ALLERGIC/IMMUNOLOGIC NEGATIVE: 1
ABDOMINAL PAIN: 1
NAUSEA: 1
EYE PAIN: 0
COLOR CHANGE: 0
SHORTNESS OF BREATH: 0
CONSTIPATION: 1
TROUBLE SWALLOWING: 0
APNEA: 0

## 2020-11-09 ASSESSMENT — PAIN SCALES - GENERAL: PAINLEVEL_OUTOF10: 6

## 2020-11-09 NOTE — ED PROVIDER NOTES
[address] [facility] ED  eMERGENCYdEPARTMENT eNCOUnter      Pt Name: Ramila Leal  MRN: [id number]  [place] 1970  Date of evaluation: 11/8/2020  Patricia Billingsley MD    CHIEF COMPLAINT       Chief Complaint   Patient presents with    Abdominal Pain    Constipation         HISTORY OF PRESENT ILLNESS  (Location/Symptom, Timing/Onset, Context/Setting, Quality, Duration, Modifying Factors, Severity.)   Ramila Leal is a 54 y.o. female who presents to the emergency department with a 1 week history of left upper quadrant abdominal pain that radiates into the left flank with associated nausea and constipation. Patient states that her last bowel movement was approximately 6 days ago. Appetite has been decreased but she has still been eating and drinking. Patient states nausea without vomiting. Patient denies any diarrhea or blood in her stool. Patient states that the pain is constant and nothing makes it better or worse    HPI    Nursing Notes were reviewed and I agree. REVIEW OF SYSTEMS    (2-9 systems for level 4, 10 or more for level 5)     Review of Systems   Constitutional: Negative for diaphoresis and fever. HENT: Negative for hearing loss and trouble swallowing. Eyes: Negative for pain. Respiratory: Negative for apnea and shortness of breath. Cardiovascular: Negative for chest pain. Gastrointestinal: Positive for abdominal pain, constipation and nausea. Endocrine: Negative. Genitourinary: Positive for flank pain. Negative for hematuria. Musculoskeletal: Negative for neck pain and neck stiffness. Skin: Negative for color change. Allergic/Immunologic: Negative. Neurological: Negative for dizziness and numbness. Hematological: Negative. Psychiatric/Behavioral: Negative. All other systems reviewed and are negative. Except as noted above the remainder of the review of systems was reviewed and negative.        PAST MEDICAL HISTORY     Past Medical History: (VIGAMOX) 0.5 % OPHTHALMIC SOLUTION    1 drop    NEXIUM 40 MG DELAYED RELEASE CAPSULE    TAKE ONE CAPSULE BY MOUTH EVERY DAY    ONDANSETRON (ZOFRAN) 4 MG TABLET    Take 1 tablet by mouth every 8 hours as needed for Nausea or Vomiting    PANCRELIPASE, LIP-PROT-AMYL, (CREON) 89656 UNITS CPEP    2 caps tid    POTASSIUM CHLORIDE (KLOR-CON M) 10 MEQ EXTENDED RELEASE TABLET    Take 1 tablet by mouth daily    POTASSIUM CITRATE ER 15 MEQ (1620 MG) TBCR    Take 15 mEq by mouth    PROCHLORPERAZINE (COMPAZINE) 10 MG TABLET    Take 1 tablet by mouth every 6 hours as needed (nausea)    PROMETHAZINE (PHENERGAN) 25 MG TABLET    Take 1 tablet by mouth every 8 hours as needed for Nausea    SUCRALFATE (CARAFATE) 1 GM TABLET    Take by mouth daily    TOPIRAMATE (TOPAMAX) 100 MG TABLET    Take 1 tablet by mouth 2 times daily    TRAZODONE (DESYREL) 150 MG TABLET    Take 1 tablet by mouth nightly    TRULANCE 3 MG TABS    Take 3 mg by mouth daily    ZOLMITRIPTAN (ZOMIG) 5 MG TABLET    TAKE 1 TABLET BY MOUTH AT ONSET OF MIGRAINE. MAY REPEAT ONCE AFTER 2 HOURS. MAX 10 MG (2 TABLETS) PER DAY       ALLERGIES     Patient has no known allergies.     FAMILY HISTORY       Family History   Problem Relation Age of Onset    Heart Disease Father 48    Cancer Maternal Grandmother         breast    Heart Disease Other         mom and dad's side          SOCIAL HISTORY       Social History     Socioeconomic History    Marital status: Single     Spouse name: Not on file    Number of children: Not on file    Years of education: Not on file    Highest education level: Not on file   Occupational History    Not on file   Social Needs    Financial resource strain: Not on file    Food insecurity     Worry: Not on file     Inability: Not on file    Transportation needs     Medical: Not on file     Non-medical: Not on file   Tobacco Use    Smoking status: Never Smoker    Smokeless tobacco: Never Used   Substance and Sexual Activity    Alcohol use: No     Alcohol/week: 0.0 standard drinks     Comment: no alcohol since 2011    Drug use: No    Sexual activity: Not on file   Lifestyle    Physical activity     Days per week: Not on file     Minutes per session: Not on file    Stress: Not on file   Relationships    Social connections     Talks on phone: Not on file     Gets together: Not on file     Attends Yazidism service: Not on file     Active member of club or organization: Not on file     Attends meetings of clubs or organizations: Not on file     Relationship status: Not on file    Intimate partner violence     Fear of current or ex partner: Not on file     Emotionally abused: Not on file     Physically abused: Not on file     Forced sexual activity: Not on file   Other Topics Concern    Not on file   Social History Narrative    ** Merged History Encounter **            SCREENINGS    [place] Coma Scale  Eye Opening: Spontaneous  Best Verbal Response: Oriented  Best Motor Response: Obeys commands  [place] Coma Scale Score: 15      PHYSICAL EXAM    (up to 7 forlevel 4, 8 or more for level 5)     ED Triage Vitals [11/08/20 2328]   BP Temp Temp Source Pulse Resp SpO2 Height Weight   116/66 97.9 °F (36.6 °C) Temporal 79 16 100 % 5' 2\" (1.575 m) 105 lb (47.6 kg)       Physical Exam  Vitals signs and nursing note reviewed. Constitutional:       General: She is not in acute distress. Appearance: She is well-developed. She is not diaphoretic. HENT:      Head: Normocephalic and atraumatic. Mouth/Throat:      Pharynx: No oropharyngeal exudate. Eyes:      General: No scleral icterus. Conjunctiva/sclera: Conjunctivae normal.      Pupils: Pupils are equal, round, and reactive to light. Neck:      Musculoskeletal: Normal range of motion and neck supple. Trachea: No tracheal deviation. Cardiovascular:      Rate and Rhythm: Normal rate. Heart sounds: Normal heart sounds.    Pulmonary:      Effort: Pulmonary effort is normal. No respiratory distress. Breath sounds: Normal breath sounds. Abdominal:      General: Bowel sounds are normal. There is no distension. Palpations: Abdomen is soft. Tenderness: There is abdominal tenderness in the left upper quadrant. There is no guarding or rebound. Musculoskeletal: Normal range of motion. Skin:     General: Skin is warm and dry. Findings: No erythema or rash. Neurological:      Mental Status: She is alert and oriented to person, place, and time. Cranial Nerves: No cranial nerve deficit. Motor: No abnormal muscle tone. Psychiatric:         Behavior: Behavior normal.         Thought Content: Thought content normal.         Judgment: Judgment normal.           DIAGNOSTIC RESULTS       CT ABDOMEN PELVIS W IV CONTRAST Additional Contrast? None    (Results Pending)       LABS:  Labs Reviewed   CBC WITH AUTO DIFFERENTIAL - Abnormal; Notable for the following components:       Result Value    RBC 4.06 (*)     Hemoglobin 11.9 (*)     Hematocrit 35.7 (*)     All other components within normal limits   URINE RT REFLEX TO CULTURE - Abnormal; Notable for the following components:    Clarity, UA CLOUDY (*)     All other components within normal limits   COMPREHENSIVE METABOLIC PANEL - Abnormal; Notable for the following components:    Sodium 128 (*)     Potassium 3.1 (*)     Anion Gap 6 (*)     Globulin 2.0 (*)     All other components within normal limits   LIPASE   LACTIC ACID, PLASMA       All other labs were within normal range or not returnedas of this dictation. EMERGENCYDEPARTMENT COURSE and DIFFERENTIAL DIAGNOSIS/MDM:   Vitals:    Vitals:    11/08/20 2328 11/09/20 0206   BP: 116/66 114/73   Pulse: 79 58   Resp: 16 16   Temp: 97.9 °F (36.6 °C)    TempSrc: Temporal    SpO2: 100% 100%   Weight: 105 lb (47.6 kg)    Height: 5' 2\" (1.575 m)        REASSESSMENT        Patient was updated the results of labs and Radiology.   Patient feels better and is tolerating p. o.        FINAL IMPRESSION      1. Constipation, unspecified constipation type    2. Hypokalemia          DISPOSITION/PLAN   DISPOSITION Decision To Discharge 11/09/2020 03:46:03 AM      PATIENT REFERRED TO:  ORTIZ Avendano CNP  [address], [address]  [place] [id number] [phone]    Call in 1 day        DISCHARGE MEDICATIONS:  New Prescriptions    MAGNESIUM CITRATE 1.745 GM/30ML SOLUTION    Take 150 mLs by mouth 2 times daily as needed for Constipation (Take 150 ml by mouth x1 when necessary for constipation.  May repeat this x1 one again at no results in 4 hours.)    POLYETHYLENE GLYCOL (GLYCOLAX) 17 G PACKET    Take 17 g by mouth daily as needed for Constipation    SODIUM PHOSPHATES (FLEET) 7-19 GM/118ML    Place 1 enema rectally once as needed (Constipation)       (Please note that portions of this note were completed with a voice recognition program.  Efforts were made to edit the dictations but occasionally words are mis-transcribed.)    MD Rola Murrieta MD  11/09/20 1964

## 2020-11-09 NOTE — ED TRIAGE NOTES
Pt c/o LUQ abd pain x 1 week. States last BM was 6 days ago. States she has used 2 enemas, 1 suppository and drank mag citrate with no relief. Pt alert and oriented x 4. Skin pink, warm, dry. Respirations even and unlabored. No distress noted at this time.

## 2020-11-10 ENCOUNTER — OFFICE VISIT (OUTPATIENT)
Dept: FAMILY MEDICINE CLINIC | Age: 50
End: 2020-11-10
Payer: MEDICARE

## 2020-11-10 VITALS
DIASTOLIC BLOOD PRESSURE: 72 MMHG | BODY MASS INDEX: 20.09 KG/M2 | WEIGHT: 109.2 LBS | HEIGHT: 62 IN | SYSTOLIC BLOOD PRESSURE: 110 MMHG | OXYGEN SATURATION: 98 % | TEMPERATURE: 97.9 F | HEART RATE: 84 BPM

## 2020-11-10 PROCEDURE — G8420 CALC BMI NORM PARAMETERS: HCPCS | Performed by: NURSE PRACTITIONER

## 2020-11-10 PROCEDURE — G8484 FLU IMMUNIZE NO ADMIN: HCPCS | Performed by: NURSE PRACTITIONER

## 2020-11-10 PROCEDURE — 99214 OFFICE O/P EST MOD 30 MIN: CPT | Performed by: NURSE PRACTITIONER

## 2020-11-10 PROCEDURE — 1036F TOBACCO NON-USER: CPT | Performed by: NURSE PRACTITIONER

## 2020-11-10 PROCEDURE — G8427 DOCREV CUR MEDS BY ELIG CLIN: HCPCS | Performed by: NURSE PRACTITIONER

## 2020-11-10 ASSESSMENT — ENCOUNTER SYMPTOMS
ABDOMINAL DISTENTION: 1
CONSTIPATION: 1
SHORTNESS OF BREATH: 0
ABDOMINAL PAIN: 1
COUGH: 0
NAUSEA: 1

## 2020-11-10 NOTE — PROGRESS NOTES
Subjective  Chief Complaint   Patient presents with    Constipation     2 week follow up, was seen in the ER yesterday for this. HPI   Was in ER yesterday    Has been very nausous     Overall not feeling well at all. Still not having significant bowel movements.     Lab Results   Component Value Date    WBC 6.4 11/09/2020    HGB 11.9 (L) 11/09/2020    HCT 35.7 (L) 11/09/2020     11/09/2020    CHOL 226 (H) 09/20/2016    TRIG 121 09/20/2016    HDL 93 (H) 09/20/2016    ALT 12 11/09/2020    AST 11 11/09/2020     (L) 11/09/2020    K 3.1 (L) 11/09/2020    CL 95 11/09/2020    CREATININE 0.74 11/09/2020    BUN 14 11/09/2020    CO2 27 11/09/2020    TSH 0.02 (L) 04/05/2019    INR 1.0 07/07/2020     Pt reports significant discomfort in the left upper quadrant    Patient Active Problem List    Diagnosis Date Noted    Chronic pancreatitis ([address] 08/11/2020    Major depressive disorder, single episode, 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[address] 01/15/2019    Abnormal MRI, liver 05/22/2018    Acute recurrent pancreatitis 05/22/2018    At risk of fracture due to osteoporosis 05/22/2018    Calcinosis 05/22/2018    Chronic headaches 05/22/2018    Conductive hearing loss in left ear 05/22/2018    Edema 05/22/2018    Iron overload 05/22/2018    Mass of left side of neck 05/22/2018    Medullary sponge kidney of both kidneys 15/19/5835    Metabolic acidosis 93/83/9061    Microscopic hematuria 05/22/2018    Nausea 05/22/2018    Renal tubular acidosis type I 05/22/2018    Rheumatoid arthritis ([address] 05/22/2018    Tension type headache 05/22/2018    Weight loss, abnormal 05/22/2018    Cellulitis, face 04/06/2018    Other chest pain 11/01/2017    Left lower quadrant pain 11/01/2017    Sjogren's syndrome ([address] 11/01/2017    Diverticulitis of large intestine without perforation or abscess without bleeding 11/01/2017    Fibroids 11/01/2017    Transfusion history 11/01/2017    Pancreatitis 05/13/2017    Acquired Active member of club or organization: None     Attends meetings of clubs or organizations: None     Relationship status: None    Intimate partner violence     Fear of current or ex partner: None     Emotionally abused: None     Physically abused: None     Forced sexual activity: None   Other Topics Concern    None   Social History Narrative    ** Merged History Encounter **          Current Outpatient Medications on File Prior to Visit   Medication Sig Dispense Refill    Magnesium Citrate 1.745 GM/30ML solution Take 150 mLs by mouth 2 times daily as needed for Constipation (Take 150 ml by mouth x1 when necessary for constipation. May repeat this x1 one again at no results in 4 hours.) 2 Bottle 0    polyethylene glycol (GLYCOLAX) 17 g packet Take 17 g by mouth daily as needed for Constipation 527 g 0    amoxicillin-clavulanate (AUGMENTIN) 875-125 MG per tablet Take 1 tablet by mouth 2 times daily for 7 days 14 tablet 0    amphetamine-dextroamphetamine (ADDERALL XR) 30 MG extended release capsule Take 1 capsule by mouth 2 times daily for 30 days.  60 capsule 0    promethazine (PHENERGAN) 25 MG tablet Take 1 tablet by mouth every 8 hours as needed for Nausea 40 tablet 0    NEXIUM 40 MG delayed release capsule TAKE ONE CAPSULE BY MOUTH EVERY DAY 30 capsule 5    topiramate (TOPAMAX) 100 MG tablet Take 1 tablet by mouth 2 times daily 60 tablet 5    liothyronine (CYTOMEL) 25 MCG tablet TAKE 1 TABLET BY MOUTH DAILY 90 tablet 0    traZODone (DESYREL) 150 MG tablet Take 1 tablet by mouth nightly 30 tablet 5    Magnesium 400 MG TABS Take 1 tablet by mouth daily 30 tablet 0    prochlorperazine (COMPAZINE) 10 MG tablet Take 1 tablet by mouth every 6 hours as needed (nausea) 30 tablet 2    furosemide (LASIX) 20 MG tablet Take 1 tablet by mouth 2 times daily TAKE ONE TABLET BY MOUTH TWO TIMES A DAY 60 tablet 5    buPROPion (WELLBUTRIN XL) 300 MG extended release tablet Take 1 tablet by mouth every morning 90 tablet 1    TRULANCE 3 MG TABS Take 3 mg by mouth daily      sucralfate (CARAFATE) 1 GM tablet Take by mouth daily      metoclopramide (REGLAN) 10 MG tablet Take 10 mg by mouth as needed      Hyoscyamine Sulfate SL (LEVSIN/SL) 0.125 MG SUBL Place 125 mcg under the tongue every 4 hours as needed (pain) 120 each 3    amLODIPine (NORVASC) 2.5 MG tablet Take 2.5 mg by mouth daily      ondansetron (ZOFRAN) 4 MG tablet Take 1 tablet by mouth every 8 hours as needed for Nausea or Vomiting 30 tablet 1    azelastine (ASTELIN) 0.1 % nasal spray 1 spray by Nasal route      moxifloxacin (VIGAMOX) 0.5 % ophthalmic solution 1 drop      Potassium Citrate ER 15 MEQ (1620 MG) TBCR Take 15 mEq by mouth      potassium chloride (KLOR-CON M) 10 MEQ extended release tablet Take 1 tablet by mouth daily 30 tablet 5    montelukast (SINGULAIR) 10 MG tablet Take 1 tablet by mouth daily 30 tablet 3    fluticasone (FLONASE) 50 MCG/ACT nasal spray USE 1 SPRAY NASALLY DAILY  3    estradiol (ESTRACE) 1 MG tablet Take 1 mg by mouth      ZOLMitriptan (ZOMIG) 5 MG tablet TAKE 1 TABLET BY MOUTH AT ONSET OF MIGRAINE. MAY REPEAT ONCE AFTER 2 HOURS. MAX 10 MG (2 TABLETS) PER DAY  11    levothyroxine (SYNTHROID) 25 MCG tablet Take one daily 90 tablet 1    hydroxychloroquine (PLAQUENIL) 200 MG tablet Take 300 mg by mouth daily       Pancrelipase, Lip-Prot-Amyl, (CREON) 03230 UNITS CPEP 2 caps tid 180 capsule 03     No current facility-administered medications on file prior to visit. No Known Allergies    Review of Systems   Constitutional: Negative for fatigue and fever. Respiratory: Negative for cough and shortness of breath. Cardiovascular: Negative for chest pain. Gastrointestinal: Positive for abdominal distention, abdominal pain, constipation and nausea.        Objective  Vitals:    11/10/20 0817   BP: 110/72   Site: Left Upper Arm   Position: Sitting   Cuff Size: Medium Adult   Pulse: 84   Temp: 97.9 °F (36.6 °C)   SpO2: 98%

## 2020-11-17 ENCOUNTER — INITIAL CONSULT (OUTPATIENT)
Dept: INTERVENTIONAL RADIOLOGY/VASCULAR | Age: 50
End: 2020-11-17
Payer: MEDICARE

## 2020-11-17 VITALS
HEIGHT: 62 IN | DIASTOLIC BLOOD PRESSURE: 62 MMHG | BODY MASS INDEX: 20.06 KG/M2 | HEART RATE: 77 BPM | SYSTOLIC BLOOD PRESSURE: 106 MMHG | WEIGHT: 109 LBS | RESPIRATION RATE: 16 BRPM

## 2020-11-17 PROCEDURE — G8427 DOCREV CUR MEDS BY ELIG CLIN: HCPCS | Performed by: NURSE PRACTITIONER

## 2020-11-17 PROCEDURE — G8484 FLU IMMUNIZE NO ADMIN: HCPCS | Performed by: NURSE PRACTITIONER

## 2020-11-17 PROCEDURE — 99214 OFFICE O/P EST MOD 30 MIN: CPT | Performed by: NURSE PRACTITIONER

## 2020-11-17 PROCEDURE — G8420 CALC BMI NORM PARAMETERS: HCPCS | Performed by: NURSE PRACTITIONER

## 2020-11-17 ASSESSMENT — ENCOUNTER SYMPTOMS
RESPIRATORY NEGATIVE: 1
EYES NEGATIVE: 1
GASTROINTESTINAL NEGATIVE: 1

## 2020-11-17 NOTE — PROGRESS NOTES
Kaylynn Martines, a female of 54 y.o. came to the office 11/17/2020. Chief Complaint   Patient presents with   [facility].     Referral from [facility]    Leg Pain     Bilateral       11/17/2020 HPI: Kaylynn Martines presents to clinic for consultation at the request of her PCP for evaluation of bilateral leg pain. Patient presents with symptoms of: both legs with burning sensation to lower legs, occasional, not daily, and nothing exacerbates it. Both ankles and lower legs with daily edema, fatigue and heaviness and becomes worse by end of day with LLE > RLE. Both lower legs with daily intermittent discomfort she states cant describe exact pain. Pain scale 4/10. Symptom onset for over a year with progression over past few months. NSAIDS: Tolerates pain without pain medication. Uses heating pad without relief. She must take frequent rest periods with massaging area for some relief. Leg elevation: daily with minimal relief. Stocking use and dates: Has never done conservative therapy with class 2 compression stockings. Denies claudication. Denies injury  Denies any lower extremity troublesome varicose veins.    H/O stage 3 CKD  H/O sjogrens       Family History   Problem Relation Age of Onset    Heart Disease Father 48    Cancer Maternal Grandmother         breast    Heart Disease Other         mom and dad's side       Past Surgical History:   Procedure Laterality Date    CHOLECYSTECTOMY  2011    HYSTERECTOMY  2014    sept    OVARY REMOVAL Left Jan 2014    UPPER GASTROINTESTINAL ENDOSCOPY  09/16/2016    EGD W/BX        Past Medical History:   Diagnosis Date    Acquired hypothyroidism 9/26/2016    Acute pancreatitis     ADD (attention deficit disorder) 2/12/2015    Anxiety     Depression 12/9/2015    Endometrial cyst of ovary 5/10/14    RT ovary, ruptured    GERD (gastroesophageal reflux disease) 9/26/2016    Medullary sponge kidney of both kidneys 5/22/2018    Sjogren's disease ([address]     Stress     Swelling        Social History     Socioeconomic History    Marital status: Single     Spouse name: None    Number of children: None    Years of education: None    Highest education level: None   Occupational History    None   Social Needs    Financial resource strain: None    Food insecurity     Worry: None     Inability: None    Transportation needs     Medical: None     Non-medical: None   Tobacco Use    Smoking status: Never Smoker    Smokeless tobacco: Never Used   Substance and Sexual Activity    Alcohol use: No     Alcohol/week: 0.0 standard drinks     Comment: no alcohol since 2011    Drug use: No    Sexual activity: None   Lifestyle    Physical activity     Days per week: None     Minutes per session: None    Stress: None   Relationships    Social connections     Talks on phone: None     Gets together: None     Attends Oriental orthodox service: None     Active member of club or organization: None     Attends meetings of clubs or organizations: None     Relationship status: None    Intimate partner violence     Fear of current or ex partner: None     Emotionally abused: None     Physically abused: None     Forced sexual activity: None   Other Topics Concern    None   Social History Narrative    ** Merged History Encounter **            No Known Allergies    Current Outpatient Medications on File Prior to Visit   Medication Sig Dispense Refill    polyethylene glycol (GLYCOLAX) 17 g packet Take 17 g by mouth daily as needed for Constipation 527 g 0    amphetamine-dextroamphetamine (ADDERALL XR) 30 MG extended release capsule Take 1 capsule by mouth 2 times daily for 30 days.  60 capsule 0    promethazine (PHENERGAN) 25 MG tablet Take 1 tablet by mouth every 8 hours as needed for Nausea 40 tablet 0    NEXIUM 40 MG delayed release capsule TAKE ONE CAPSULE BY MOUTH EVERY DAY 30 capsule 5    topiramate (TOPAMAX) 100 MG tablet Take 1 tablet by mouth 2 times daily 60 tablet 5    liothyronine (CYTOMEL) 25 MCG tablet TAKE 1 TABLET BY MOUTH DAILY 90 tablet 0    traZODone (DESYREL) 150 MG tablet Take 1 tablet by mouth nightly 30 tablet 5    Magnesium 400 MG TABS Take 1 tablet by mouth daily 30 tablet 0    prochlorperazine (COMPAZINE) 10 MG tablet Take 1 tablet by mouth every 6 hours as needed (nausea) 30 tablet 2    furosemide (LASIX) 20 MG tablet Take 1 tablet by mouth 2 times daily TAKE ONE TABLET BY MOUTH TWO TIMES A DAY 60 tablet 5    buPROPion (WELLBUTRIN XL) 300 MG extended release tablet Take 1 tablet by mouth every morning 90 tablet 1    TRULANCE 3 MG TABS Take 3 mg by mouth daily      sucralfate (CARAFATE) 1 GM tablet Take by mouth daily      metoclopramide (REGLAN) 10 MG tablet Take 10 mg by mouth as needed      Hyoscyamine Sulfate SL (LEVSIN/SL) 0.125 MG SUBL Place 125 mcg under the tongue every 4 hours as needed (pain) 120 each 3    amLODIPine (NORVASC) 2.5 MG tablet Take 2.5 mg by mouth daily      ondansetron (ZOFRAN) 4 MG tablet Take 1 tablet by mouth every 8 hours as needed for Nausea or Vomiting 30 tablet 1    azelastine (ASTELIN) 0.1 % nasal spray 1 spray by Nasal route      moxifloxacin (VIGAMOX) 0.5 % ophthalmic solution 1 drop      Potassium Citrate ER 15 MEQ (1620 MG) TBCR Take 15 mEq by mouth      potassium chloride (KLOR-CON M) 10 MEQ extended release tablet Take 1 tablet by mouth daily 30 tablet 5    montelukast (SINGULAIR) 10 MG tablet Take 1 tablet by mouth daily 30 tablet 3    fluticasone (FLONASE) 50 MCG/ACT nasal spray USE 1 SPRAY NASALLY DAILY  3    estradiol (ESTRACE) 1 MG tablet Take 1 mg by mouth      ZOLMitriptan (ZOMIG) 5 MG tablet TAKE 1 TABLET BY MOUTH AT ONSET OF MIGRAINE. MAY REPEAT ONCE AFTER 2 HOURS.  MAX 10 MG (2 TABLETS) PER DAY  11    levothyroxine (SYNTHROID) 25 MCG tablet Take one daily 90 tablet 1    hydroxychloroquine (PLAQUENIL) 200 MG tablet Take 300 mg by mouth daily       Pancrelipase, Lip-Prot-Amyl, (CREON) 70139 UNITS CPEP 2 caps tid 180 capsule 03     No current facility-administered medications on file prior to visit. Review of Systems   Constitutional: Negative. HENT: Negative. Eyes: Negative. Respiratory: Negative. Cardiovascular: Positive for leg swelling (chornic mild ankle and lower leg non pitting edema). Negative for chest pain and palpitations. Gastrointestinal: Negative. Endocrine: Negative. Genitourinary: Negative. Musculoskeletal: Negative. Chronic lower leg edema, aching, fatigue, heaviness. Skin: Negative. Neurological: Negative. Hematological: Negative. Psychiatric/Behavioral: Negative. OBJECTIVE:  Ht 5' 2\" (1.575 m)   Wt 109 lb (49.4 kg)   BMI 19.94 kg/m²     Physical Exam  Constitutional:       General: She is not in acute distress. Appearance: Normal appearance. She is not ill-appearing. HENT:      Head: Normocephalic and atraumatic. Nose: Nose normal. No congestion. Neck:      Musculoskeletal: Normal range of motion and neck supple. Cardiovascular:      Rate and Rhythm: Normal rate and regular rhythm. Pulses: Normal pulses. Popliteal pulses are 2+ on the right side and 2+ on the left side. Dorsalis pedis pulses are 2+ on the right side and 2+ on the left side. Posterior tibial pulses are 2+ on the right side and 2+ on the left side. Heart sounds: Normal heart sounds. Pulmonary:      Effort: Pulmonary effort is normal.   Abdominal:      General: Bowel sounds are normal.   Musculoskeletal: Normal range of motion. General: No swelling, tenderness, deformity or signs of injury. Right lower leg: No edema. Left lower leg: No edema. Skin:     Capillary Refill: Capillary refill takes 2 to 3 seconds. Coloration: Skin is not pale. Findings: No bruising, erythema or rash. Neurological:      Mental Status: She is alert and oriented to person, place, and time. Psychiatric:         Mood and Affect: Mood normal.         Behavior: Behavior normal.         ASSESSMENT AND PLAN:    Assessment:   1. Chronic bilateral lower leg edema, fatigue, heaviness, aching for over a year with progression in past two months. May be component of venous insufficiency. Has not done conservative therapy with compression stockings. Plan:   1. In clinic LE Venous US evaluate for insufficiency with office follow up for results. Educated in detail procedure, venous insufficiency and purpose of compression stockings. Will determine after results if need for and type of compression stockings. 2. Elevate LE PRN  3. Notify vascular clinic if symptoms worsen before next scheduled visit. More than 50% of face to face 60 minute office consultation visit spent in counseling and/or coordination of care, and education. Thank Darrick Jacobs CNP for referral of your patient to our practice for care.      ORTIZ Romero - CNP

## 2020-11-24 ENCOUNTER — OFFICE VISIT (OUTPATIENT)
Dept: FAMILY MEDICINE CLINIC | Age: 50
End: 2020-11-24
Payer: MEDICARE

## 2020-11-24 VITALS
OXYGEN SATURATION: 99 % | SYSTOLIC BLOOD PRESSURE: 132 MMHG | DIASTOLIC BLOOD PRESSURE: 76 MMHG | BODY MASS INDEX: 19.88 KG/M2 | TEMPERATURE: 97.9 F | HEART RATE: 72 BPM | WEIGHT: 108 LBS | HEIGHT: 62 IN

## 2020-11-24 PROCEDURE — 99213 OFFICE O/P EST LOW 20 MIN: CPT | Performed by: STUDENT IN AN ORGANIZED HEALTH CARE EDUCATION/TRAINING PROGRAM

## 2020-11-24 PROCEDURE — G8484 FLU IMMUNIZE NO ADMIN: HCPCS | Performed by: STUDENT IN AN ORGANIZED HEALTH CARE EDUCATION/TRAINING PROGRAM

## 2020-11-24 PROCEDURE — 1036F TOBACCO NON-USER: CPT | Performed by: STUDENT IN AN ORGANIZED HEALTH CARE EDUCATION/TRAINING PROGRAM

## 2020-11-24 PROCEDURE — G8420 CALC BMI NORM PARAMETERS: HCPCS | Performed by: STUDENT IN AN ORGANIZED HEALTH CARE EDUCATION/TRAINING PROGRAM

## 2020-11-24 PROCEDURE — G8427 DOCREV CUR MEDS BY ELIG CLIN: HCPCS | Performed by: STUDENT IN AN ORGANIZED HEALTH CARE EDUCATION/TRAINING PROGRAM

## 2020-11-24 RX ORDER — METHYLPREDNISOLONE 4 MG/1
TABLET ORAL
Qty: 1 KIT | Refills: 0 | Status: SHIPPED | OUTPATIENT
Start: 2020-11-24 | End: 2020-11-30

## 2020-11-24 ASSESSMENT — ENCOUNTER SYMPTOMS
ABDOMINAL PAIN: 1
NAUSEA: 0
SINUS PRESSURE: 0
CONSTIPATION: 1
SHORTNESS OF BREATH: 0
COUGH: 0
VOMITING: 0
SORE THROAT: 0
DIARRHEA: 0

## 2020-11-24 NOTE — PROGRESS NOTES
(PHENERGAN) 25 MG tablet Take 1 tablet by mouth every 8 hours as needed for Nausea Yes ORTIZ Rangel CNP   NEXIUM 40 MG delayed release capsule TAKE ONE CAPSULE BY MOUTH EVERY DAY Yes ORTIZ Rangel CNP   topiramate (TOPAMAX) 100 MG tablet Take 1 tablet by mouth 2 times daily Yes ORTIZ Rangel CNP   liothyronine (CYTOMEL) 25 MCG tablet TAKE 1 TABLET BY MOUTH DAILY Yes ORTIZ Rangel CNP   traZODone (DESYREL) 150 MG tablet Take 1 tablet by mouth nightly Yes ORTIZ Rangel CNP   Magnesium 400 MG TABS Take 1 tablet by mouth daily Yes Albaro Elmore PA-C   prochlorperazine (COMPAZINE) 10 MG tablet Take 1 tablet by mouth every 6 hours as needed (nausea) Yes ORTIZ Rangel CNP   furosemide (LASIX) 20 MG tablet Take 1 tablet by mouth 2 times daily TAKE ONE TABLET BY MOUTH TWO TIMES A DAY Yes ORTIZ Rangel CNP   buPROPion (WELLBUTRIN XL) 300 MG extended release tablet Take 1 tablet by mouth every morning Yes ORTIZ Rangel CNP   TRULANCE 3 MG TABS Take 3 mg by mouth daily Yes Historical Provider, MD   sucralfate (CARAFATE) 1 GM tablet Take by mouth daily Yes Historical Provider, MD   metoclopramide (REGLAN) 10 MG tablet Take 10 mg by mouth as needed Yes Historical Provider, MD   Hyoscyamine Sulfate SL (LEVSIN/SL) 0.125 MG SUBL Place 125 mcg under the tongue every 4 hours as needed (pain) Yes ORTIZ Rea CNP   amLODIPine (NORVASC) 2.5 MG tablet Take 2.5 mg by mouth daily Yes Historical Provider, MD   ondansetron (ZOFRAN) 4 MG tablet Take 1 tablet by mouth every 8 hours as needed for Nausea or Vomiting Yes ORTIZ Rangel CNP   azelastine (ASTELIN) 0.1 % nasal spray 1 spray by Nasal route Yes Historical Provider, MD   moxifloxacin (VIGAMOX) 0.5 % ophthalmic solution 1 drop Yes Historical Provider, MD   Potassium Citrate ER 15 MEQ (1620 MG) TBCR Take 15 mEq by mouth Yes Historical Provider, MD   potassium chloride (KLOR-CON M) 10 MEQ extended release tablet Take 1 tablet by mouth daily Yes Zondra Shelton, APRN - CNP   montelukast (SINGULAIR) 10 MG tablet Take 1 tablet by mouth daily Yes Zondra Shelton, APRN - CNP   fluticasone (FLONASE) 50 MCG/ACT nasal spray USE 1 SPRAY NASALLY DAILY Yes Historical Provider, MD   estradiol (ESTRACE) 1 MG tablet Take 1 mg by mouth Yes Historical Provider, MD   ZOLMitriptan (ZOMIG) 5 MG tablet TAKE 1 TABLET BY MOUTH AT ONSET OF MIGRAINE. MAY REPEAT ONCE AFTER 2 HOURS.  MAX 10 MG (2 TABLETS) PER DAY Yes Historical Provider, MD   levothyroxine (SYNTHROID) 25 MCG tablet Take one daily Yes Zondra Shelton, APRN - CNP   hydroxychloroquine (PLAQUENIL) 200 MG tablet Take 300 mg by mouth daily  Yes Historical Provider, MD   Pancrelipase, Lip-Prot-Amyl, (CREON) 19264 UNITS CPEP 2 caps tid Yes Talisha Loomis MD        No Known Allergies    Past Medical History:   Diagnosis Date    Acquired hypothyroidism 9/26/2016    Acute pancreatitis     ADD (attention deficit disorder) 2/12/2015    Anxiety     Depression 12/9/2015    Endometrial cyst of ovary 5/10/14    RT ovary, ruptured    GERD (gastroesophageal reflux disease) 9/26/2016    Medullary sponge kidney of both kidneys 5/22/2018    Sjogren's disease ([facility] [address]     Stress     Swelling        Past Surgical History:   Procedure Laterality Date    CHOLECYSTECTOMY  2011    HYSTERECTOMY  2014    sept    OVARY REMOVAL Left Jan 2014    UPPER GASTROINTESTINAL ENDOSCOPY  09/16/2016    EGD W/BX       Social History     Socioeconomic History    Marital status: Single     Spouse name: Not on file    Number of children: Not on file    Years of education: Not on file    Highest education level: Not on file   Occupational History    Not on file   Social Needs    Financial resource strain: Not on file    Food insecurity     Worry: Not on file     Inability: Not on file    Transportation needs     Medical: Not on file     Non-medical: Not on file   Tobacco Use    Smoking status: Never Smoker    Smokeless tobacco: Never Used   Substance and Sexual Activity    Alcohol use: No     Alcohol/week: 0.0 standard drinks     Comment: no alcohol since 2011    Drug use: No    Sexual activity: Not on file   Lifestyle    Physical activity     Days per week: Not on file     Minutes per session: Not on file    Stress: Not on file   Relationships    Social connections     Talks on phone: Not on file     Gets together: Not on file     Attends [employer]ism service: Not on file     Active member of club or organization: Not on file     Attends meetings of clubs or organizations: Not on file     Relationship status: Not on file    Intimate partner violence     Fear of current or ex partner: Not on file     Emotionally abused: Not on file     Physically abused: Not on file     Forced sexual activity: Not on file   Other Topics Concern    Not on file   Social History Narrative    ** Merged History Encounter **             Family History   Problem Relation Age of Onset    Heart Disease Father 48    Cancer Maternal Grandmother         breast    Heart Disease Other         mom and dad's side       Vitals:    11/24/20 1735   BP: 132/76   Pulse: 72   Temp: 97.9 °F (36.6 °C)   SpO2: 99%   Weight: 108 lb (49 kg)   Height: 5' 2\" (1.575 m)       Estimated body mass index is 19.75 kg/m² as calculated from the following:    Height as of this encounter: 5' 2\" (1.575 m). Weight as of this encounter: 108 lb (49 kg). No results for input(s): WBC, RBC, HGB, HCT, MCV, MCH, MCHC, RDW, PLT, MPV in the last 72 hours. No results for input(s): NA, K, CL, CO2, BUN, CREATININE, GLUCOSE, CALCIUM, PROT, LABALBU, BILITOT, ALKPHOS, AST, ALT in the last 72 hours. No results found for: LABA1C    Xr Femur Left (min 2 Views)    Result Date: 10/27/2020  No evidence for fracture or dislocation of the left femur or left tibia and fibula.     Xr Tibia Fibula Left (2 Views)    Result Date: 10/27/2020  No evidence for fracture or dislocation of the left femur or left tibia and fibula. Ct Abdomen Pelvis W Iv Contrast Additional Contrast? None    Result Date: 11/9/2020  Constipation. Cholecystectomy. Hysterectomy. All CT scans at this facility use dose modulation, iterative reconstruction, and/or weight based dosing when appropriate to reduce radiation dose to as low as reasonably achievable. Physical Exam  Constitutional:       Appearance: Normal appearance. She is normal weight. HENT:      Head: Normocephalic and atraumatic. Eyes:      Extraocular Movements: Extraocular movements intact. Conjunctiva/sclera: Conjunctivae normal.   Cardiovascular:      Rate and Rhythm: Normal rate and regular rhythm. Pulses: Normal pulses. Heart sounds: Normal heart sounds. Pulmonary:      Effort: Pulmonary effort is normal.      Breath sounds: Normal breath sounds. No wheezing or rales. Abdominal:      General: Abdomen is flat. There is no distension. Palpations: Abdomen is soft. Tenderness: There is abdominal tenderness (tenderness along the lower left rib cage, ribs 9 and 10, pain with compression of the rib cage that radiates to the back ribs). There is no guarding or rebound. Skin:     General: Skin is warm. Capillary Refill: Capillary refill takes less than 2 seconds. Findings: No rash. Neurological:      Mental Status: She is alert. Psychiatric:         Mood and Affect: Mood normal.         Thought Content: Thought content normal.         Judgment: Judgment normal.         ASSESSMENT/PLAN:  1. Costochondritis, acute  - Pt appears to have inflammation of the muscle/fasica surrounding the lower left ribs  - No red flag symptoms or physical exam  - ER precautions given  - methylPREDNISolone (MEDROL DOSEPACK) 4 MG tablet; Take by mouth. Dispense: 1 kit; Refill: 0  - XR CHEST STANDARD (2 VW);  Future      ---------------------------------------------------------------------  Side effects, adverse effects of the medication prescribed today, as well as treatment plan/ rationale and result expectations have been discussed with the patient who expresses understanding and desires to proceed. Close follow up to evaluate treatment results and for coordination of care. I have reviewed the patient's medical history in detail and updated the computerized patient record. As always, patient is advised that if symptoms worsen in any way they will proceed to the nearest emergency room. --------------------------------------------------------------------    Return in about 3 days (around 11/27/2020) for f/u rib pain (virtual/telephone). An  electronic signature was used to authenticate this note.     --Rendall Leventhal, DO on 11/24/2020 at 6:06 PM

## 2020-11-24 NOTE — PATIENT INSTRUCTIONS
Patient Education        Costochondritis: Care Instructions  Your Care Instructions  You have chest pain because the cartilage of your rib cage is inflamed. This problem is called costochondritis. This type of chest wall pain may last from days to weeks. It is not a heart problem. Sometimes costochondritis occurs with a cold or the flu, and other times the exact cause is not known. Follow-up care is a key part of your treatment and safety. Be sure to make and go to all appointments, and call your doctor if you are having problems. It's also a good idea to know your test results and keep a list of the medicines you take. How can you care for yourself at home? · Take medicines for pain and inflammation exactly as directed. ? If the doctor gave you a prescription medicine, take it as prescribed. ? If you are not taking a prescription pain medicine, ask your doctor if you can take an over-the-counter medicine. ? Do not take two or more pain medicines at the same time unless the doctor told you to. Many pain medicines have acetaminophen, which is Tylenol. Too much acetaminophen (Tylenol) can be harmful. · It may help to use a warm compress or heating pad (set on low) on your chest. You can also try alternating heat and ice. Put ice or a cold pack on the area for 10 to 20 minutes at a time. Put a thin cloth between the ice and your skin. · Avoid any activity that strains the chest area. As your pain gets better, you can slowly return to your normal activities. · Do not use tape, an elastic bandage, a \"rib belt,\" or anything else that restricts your chest wall motion. When should you call for help? Call 911 anytime you think you may need emergency care. For example, call if:    · You have new or different chest pain or pressure. This may occur with:  ? Sweating. ? Shortness of breath. ? Nausea or vomiting. ? Pain that spreads from the chest to the neck, jaw, or one or both shoulders or arms.   ? Dizziness or lightheadedness. ? A fast or uneven pulse. After calling 911, chew 1 adult-strength aspirin. Wait for an ambulance. Do not try to drive yourself.     · You have severe trouble breathing. Call your doctor now or seek immediate medical care if:    · You have a fever or cough.     · You have any trouble breathing.     · Your chest pain gets worse. Watch closely for changes in your health, and be sure to contact your doctor if:    · Your chest pain continues even though you are taking anti-inflammatory medicine.     · Your chest wall pain has not improved after 5 to 7 days. Where can you learn more? Go to https://FusionOne.GetJar. org and sign in to your [employer] account. Enter H717 in the [employer] box to learn more about \"Costochondritis: Care Instructions. \"     If you do not have an account, please click on the \"Sign Up Now\" link. Current as of: June 26, 2019               Content Version: 12.6  © 6379-7211 [employer]. Care instructions adapted under license by [facility] ([facility]). If you have questions about a medical condition or this instruction, always ask your healthcare professional. [place] [address] any warranty or liability for your use of this information.

## 2020-11-26 NOTE — PROGRESS NOTES
This visit began at 11:40am    Location of the visit was the [facility] primary care site. TELEHEALTH APPOINTMENT  Patient has been screened to determine that this visit qualifies for a \"Telephone Visit\". Patient is currently established with the current medical practice, the condition being reviewed was not addressed within the previous 7 days and is not likely be determined to need a procedure within the next 24 hours. This visit was via telephone due to the restrictions of the COVID-19 pandemic. All issues as below were discussed and addressed but no physical exam was performed. It was felt the patient should be evaluated in the clinic there will be comment below demonstrating they were directed there. The patient is aware and has given verbal consent to be billed for this telephone encounter. Patient Name: Jered Conway  MRN: [id number]    Chief Complaint   Patient presents with    Follow-up     States she feels the same. Xray came back normal. States still in pain, and swelling. Pt states she feels tug in bone under arm pit. HPI  LUQ abdominal pain  Pain in the lower left rib cage that radiates to the back of her ribcage  Endorses some burning sensation along the somach  Denied nausea, vomiting   Started on medrol pack for costochondritis  ---------  Pt continues to have burning sensation and pulling sensation in the left side  Pt states this is recurrent and she is getting frustrated with symptoms  She does see rheumatology       PMH:  Current Outpatient Medications on File Prior to Visit   Medication Sig Dispense Refill    methylPREDNISolone (MEDROL DOSEPACK) 4 MG tablet Take by mouth. 1 kit 0    polyethylene glycol (GLYCOLAX) 17 g packet Take 17 g by mouth daily as needed for Constipation 527 g 0    amphetamine-dextroamphetamine (ADDERALL XR) 30 MG extended release capsule Take 1 capsule by mouth 2 times daily for 30 days.  60 capsule 0    promethazine (PHENERGAN) 25 MG tablet Take 1 tablet by mouth every 8 hours as needed for Nausea 40 tablet 0    NEXIUM 40 MG delayed release capsule TAKE ONE CAPSULE BY MOUTH EVERY DAY 30 capsule 5    topiramate (TOPAMAX) 100 MG tablet Take 1 tablet by mouth 2 times daily 60 tablet 5    liothyronine (CYTOMEL) 25 MCG tablet TAKE 1 TABLET BY MOUTH DAILY 90 tablet 0    traZODone (DESYREL) 150 MG tablet Take 1 tablet by mouth nightly 30 tablet 5    Magnesium 400 MG TABS Take 1 tablet by mouth daily 30 tablet 0    prochlorperazine (COMPAZINE) 10 MG tablet Take 1 tablet by mouth every 6 hours as needed (nausea) 30 tablet 2    furosemide (LASIX) 20 MG tablet Take 1 tablet by mouth 2 times daily TAKE ONE TABLET BY MOUTH TWO TIMES A DAY 60 tablet 5    buPROPion (WELLBUTRIN XL) 300 MG extended release tablet Take 1 tablet by mouth every morning 90 tablet 1    TRULANCE 3 MG TABS Take 3 mg by mouth daily      sucralfate (CARAFATE) 1 GM tablet Take by mouth daily      metoclopramide (REGLAN) 10 MG tablet Take 10 mg by mouth as needed      Hyoscyamine Sulfate SL (LEVSIN/SL) 0.125 MG SUBL Place 125 mcg under the tongue every 4 hours as needed (pain) 120 each 3    amLODIPine (NORVASC) 2.5 MG tablet Take 2.5 mg by mouth daily      ondansetron (ZOFRAN) 4 MG tablet Take 1 tablet by mouth every 8 hours as needed for Nausea or Vomiting 30 tablet 1    azelastine (ASTELIN) 0.1 % nasal spray 1 spray by Nasal route      moxifloxacin (VIGAMOX) 0.5 % ophthalmic solution 1 drop      Potassium Citrate ER 15 MEQ (1620 MG) TBCR Take 15 mEq by mouth      potassium chloride (KLOR-CON M) 10 MEQ extended release tablet Take 1 tablet by mouth daily 30 tablet 5    montelukast (SINGULAIR) 10 MG tablet Take 1 tablet by mouth daily 30 tablet 3    fluticasone (FLONASE) 50 MCG/ACT nasal spray USE 1 SPRAY NASALLY DAILY  3    estradiol (ESTRACE) 1 MG tablet Take 1 mg by mouth      ZOLMitriptan (ZOMIG) 5 MG tablet TAKE 1 TABLET BY MOUTH AT ONSET OF MIGRAINE. MAY REPEAT service: Not on file     Active member of club or organization: Not on file     Attends meetings of clubs or organizations: Not on file     Relationship status: Not on file    Intimate partner violence     Fear of current or ex partner: Not on file     Emotionally abused: Not on file     Physically abused: Not on file     Forced sexual activity: Not on file   Other Topics Concern    Not on file   Social History Narrative    ** Merged History Encounter **          Family History   Problem Relation Age of Onset    Heart Disease Father 48    Cancer Maternal Grandmother         breast    Heart Disease Other         mom and dad's side     Allergies:  Patient has no known allergies. Review of Systems   Constitutional: Negative for chills and fever. HENT: Negative for congestion, sinus pressure and sore throat. Respiratory: Negative for cough and shortness of breath. Cardiovascular: Negative for chest pain and palpitations. Gastrointestinal: Negative for abdominal pain and vomiting. Musculoskeletal: Negative for arthralgias and myalgias. Left upper rib pain   Skin: Negative for rash and wound. Neurological: Negative for speech difficulty and light-headedness. Psychiatric/Behavioral: Negative for suicidal ideas. The patient is not nervous/anxious. PHYSICAL EXAM/ RESULTS    There were no vitals taken for this visit.     Vitals signs: pt does not have the proper equipment to take all VS.    The physical is not performed due to the visit being a telephone counter as indicated due to the restrictions of the COVID-19 pandemic    Recent Results (from the past 2016 hour(s))   EKG 12 Lead    Collection Time: 10/03/20  8:01 PM   Result Value Ref Range    Ventricular Rate 90 BPM    Atrial Rate 90 BPM    P-R Interval 144 ms    QRS Duration 90 ms    Q-T Interval 372 ms    QTc Calculation (Bazett) 455 ms    P Axis 67 degrees    R Axis 39 degrees    T Axis 57 degrees   Comprehensive Metabolic Panel Collection Time: 10/03/20  8:15 PM   Result Value Ref Range    Sodium 137 135 - 144 mEq/L    Potassium 3.2 (L) 3.4 - 4.9 mEq/L    Chloride 105 95 - 107 mEq/L    CO2 20 20 - 31 mEq/L    Anion Gap 12 9 - 15 mEq/L    Glucose 114 (H) 70 - 99 mg/dL    BUN 16 6 - 20 mg/dL    CREATININE 0.71 0.50 - 0.90 mg/dL    GFR Non-African American >60.0 >60    GFR  >60.0 >60    Calcium 9.2 8.5 - 9.9 mg/dL    Total Protein 6.3 6.3 - 8.0 g/dL    Alb 4.0 3.5 - 4.6 g/dL    Total Bilirubin <0.2 0.2 - 0.7 mg/dL    Alkaline Phosphatase 70 40 - 130 U/L    ALT 11 0 - 33 U/L    AST 12 0 - 35 U/L    Globulin 2.3 2.3 - 3.5 g/dL   CBC Auto Differential    Collection Time: 10/03/20  8:15 PM   Result Value Ref Range    WBC 7.6 4.8 - 10.8 K/uL    RBC 4.20 4. 20 - 5.40 M/uL    Hemoglobin 12.0 12.0 - 16.0 g/dL    Hematocrit 36.6 (L) 37.0 - 47.0 %    MCV 87.3 82.0 - 100.0 fL    MCH 28.5 27.0 - 31.3 pg    MCHC 32.7 (L) 33.0 - 37.0 %    RDW 13.0 11.5 - 14.5 %    Platelets 127 109 - 963 K/uL    Neutrophils % 60.0 %    Lymphocytes % 33.3 %    Monocytes % 5.2 %    Eosinophils % 0.7 %    Basophils % 0.8 %    Neutrophils Absolute 4.5 1.4 - 6.5 K/uL    Lymphocytes Absolute 2.5 1.0 - 4.8 K/uL    Monocytes Absolute 0.4 0.2 - 0.8 K/uL    Eosinophils Absolute 0.1 0.0 - 0.7 K/uL    Basophils Absolute 0.1 0.0 - 0.2 K/uL   Magnesium    Collection Time: 10/03/20  8:15 PM   Result Value Ref Range    Magnesium 1.8 1.7 - 2.4 mg/dL   Troponin    Collection Time: 10/03/20  8:15 PM   Result Value Ref Range    Troponin <0.010 0.000 - 0.010 ng/mL   CK    Collection Time: 10/03/20  8:15 PM   Result Value Ref Range    Total  0 - 170 U/L   D-Dimer, Quantitative    Collection Time: 10/03/20  8:15 PM   Result Value Ref Range    D-Dimer, Quant 0.43 0.00 - 0.50 mg/L FEU   Urine Reflex to Culture    Collection Time: 10/03/20  8:15 PM    Specimen: Urine, clean catch   Result Value Ref Range    Color, UA Yellow Straw/Yellow    Clarity, UA Clear Clear    Glucose, Ur Negative Negative mg/dL    Bilirubin Urine Negative Negative    Ketones, Urine Negative Negative mg/dL    Specific Gravity, UA 1.010 1.005 - 1.030    Blood, Urine Negative Negative    pH, UA 6.0 5.0 - 9.0    Protein, UA Negative Negative mg/dL    Urobilinogen, Urine 0.2 <2.0 E.U./dL    Nitrite, Urine Negative Negative    Leukocyte Esterase, Urine Negative Negative    Urine Reflex to Culture Not Indicated    CK    Collection Time: 10/27/20  9:44 AM   Result Value Ref Range    Total  0 - 170 U/L   Sedimentation Rate    Collection Time: 10/27/20  9:44 AM   Result Value Ref Range    Sed Rate 6 0 - 20 mm   Lipase    Collection Time: 10/27/20  9:44 AM   Result Value Ref Range    Lipase 48 12 - 95 U/L   Amylase    Collection Time: 10/27/20  9:44 AM   Result Value Ref Range    Amylase 83 22 - 93 U/L   Comprehensive Metabolic Panel    Collection Time: 10/27/20  9:44 AM   Result Value Ref Range    Sodium 140 135 - 144 mEq/L    Potassium 4.1 3.4 - 4.9 mEq/L    Chloride 103 95 - 107 mEq/L    CO2 22 20 - 31 mEq/L    Anion Gap 15 9 - 15 mEq/L    Glucose 88 70 - 99 mg/dL    BUN 20 6 - 20 mg/dL    CREATININE 0.80 0.50 - 0.90 mg/dL    GFR Non-African American >60.0 >60    GFR  >60.0 >60    Calcium 9.4 8.5 - 9.9 mg/dL    Total Protein 7.5 6.3 - 8.0 g/dL    Alb 4.8 (H) 3.5 - 4.6 g/dL    Total Bilirubin <0.2 0.2 - 0.7 mg/dL    Alkaline Phosphatase 90 40 - 130 U/L    ALT 12 0 - 33 U/L    AST 14 0 - 35 U/L    Globulin 2.7 2.3 - 3.5 g/dL   Covid-19 Ambulatory    Collection Time: 11/04/20  6:02 PM   Result Value Ref Range    SARS-CoV-2 Not Detected Not Detected    Source Anterior nares    CBC Auto Differential    Collection Time: 11/09/20 12:45 AM   Result Value Ref Range    WBC 6.4 4.8 - 10.8 K/uL    RBC 4.06 (L) 4.20 - 5.40 M/uL    Hemoglobin 11.9 (L) 12.0 - 16.0 g/dL    Hematocrit 35.7 (L) 37.0 - 47.0 %    MCV 88.0 82.0 - 100.0 fL    MCH 29.4 27.0 - 31.3 pg    MCHC 33.4 33.0 - 37.0 %    RDW 13.6 11.5 - 14.5 % past for similar issue with no clear diagnosis  - States this problem comes and goes in different areas of her body  - Discussed that at this time she may need more workup with a specialist  - Pt is already seeing rheumatology - she will ask her doctor if symptoms could be related to any of her other conditions and see if she can be managed by them, if not will send referral to appropriate specialist - possibly neuro. No orders of the defined types were placed in this encounter. Plan:   Return if symptoms worsen or fail to improve. There are no Patient Instructions on file for this visit.     This visit ended at 11:52am    Peter Medina DO

## 2020-11-27 ENCOUNTER — VIRTUAL VISIT (OUTPATIENT)
Dept: FAMILY MEDICINE CLINIC | Age: 50
End: 2020-11-27
Payer: MEDICARE

## 2020-11-27 PROCEDURE — 99421 OL DIG E/M SVC 5-10 MIN: CPT | Performed by: STUDENT IN AN ORGANIZED HEALTH CARE EDUCATION/TRAINING PROGRAM

## 2020-11-27 ASSESSMENT — ENCOUNTER SYMPTOMS
SORE THROAT: 0
VOMITING: 0
ABDOMINAL PAIN: 0
SINUS PRESSURE: 0
SHORTNESS OF BREATH: 0
COUGH: 0

## 2020-12-01 RX ORDER — DEXTROAMPHETAMINE SACCHARATE, AMPHETAMINE ASPARTATE MONOHYDRATE, DEXTROAMPHETAMINE SULFATE AND AMPHETAMINE SULFATE 7.5; 7.5; 7.5; 7.5 MG/1; MG/1; MG/1; MG/1
30 CAPSULE, EXTENDED RELEASE ORAL 2 TIMES DAILY
Qty: 60 CAPSULE | Refills: 0 | Status: SHIPPED | OUTPATIENT
Start: 2020-12-01 | End: 2021-01-04 | Stop reason: SDUPTHER

## 2020-12-05 ENCOUNTER — OFFICE VISIT (OUTPATIENT)
Dept: PRIMARY CARE CLINIC | Age: 50
End: 2020-12-05
Payer: MEDICARE

## 2020-12-05 PROCEDURE — G8428 CUR MEDS NOT DOCUMENT: HCPCS | Performed by: PHYSICIAN ASSISTANT

## 2020-12-05 PROCEDURE — 99213 OFFICE O/P EST LOW 20 MIN: CPT | Performed by: PHYSICIAN ASSISTANT

## 2020-12-05 RX ORDER — HYDROCODONE BITARTRATE AND ACETAMINOPHEN 5; 325 MG/1; MG/1
1 TABLET ORAL EVERY 8 HOURS PRN
Qty: 9 TABLET | Refills: 0 | Status: SHIPPED | OUTPATIENT
Start: 2020-12-05 | End: 2020-12-08

## 2020-12-05 RX ORDER — LIDOCAINE HYDROCHLORIDE 20 MG/ML
5 SOLUTION OROPHARYNGEAL PRN
Qty: 100 ML | Refills: 0 | Status: ON HOLD | OUTPATIENT
Start: 2020-12-05 | End: 2021-05-06

## 2020-12-05 RX ORDER — AMOXICILLIN AND CLAVULANATE POTASSIUM 875; 125 MG/1; MG/1
1 TABLET, FILM COATED ORAL 2 TIMES DAILY
Qty: 20 TABLET | Refills: 0 | Status: SHIPPED | OUTPATIENT
Start: 2020-12-05 | End: 2020-12-15

## 2020-12-05 ASSESSMENT — ENCOUNTER SYMPTOMS: CHEST TIGHTNESS: 0

## 2020-12-05 NOTE — PROGRESS NOTES
2020    TELEHEALTH EVALUATION -- Audio/Visual (During DHIBL-73 public health emergency)    Due to COVID 19 outbreak, patient's office visit was converted to a virtual visit. Patient was contacted and agreed to proceed with a virtual visit via [email or web address] me  The risks and benefits of converting to a virtual visit were discussed in light of the current infectious disease epidemic. Patient also understood that insurance coverage and co-pays are up to their individual insurance plans. HPI:    Malissa Severin (:  1970) has requested an audio/video evaluation for the following concern(s):    Chipped molar. Patient was eating peanuts last night and chipped her R back molar. +pain, gum swelling today. Low grade fever with tender, swollen. Review of Systems   Constitutional: Positive for fever (low grade). Negative for appetite change, diaphoresis and fatigue. HENT: Positive for dental problem. Negative for drooling, ear discharge, nosebleeds and postnasal drip. Respiratory: Negative for chest tightness. Cardiovascular: Negative for chest pain. Prior to Visit Medications    Medication Sig Taking? Authorizing Provider   amoxicillin-clavulanate (AUGMENTIN) 875-125 MG per tablet Take 1 tablet by mouth 2 times daily for 10 days Yes Lucila Martínez PA-C   HYDROcodone-acetaminophen (NORCO) 5-325 MG per tablet Take 1 tablet by mouth every 8 hours as needed for Pain for up to 3 days. Intended supply: 3 days. Take lowest dose possible to manage pain Yes Lucila Martínez PA-C   lidocaine viscous hcl (XYLOCAINE) 2 % SOLN solution Take 5 mLs by mouth as needed for Irritation or Dental Pain Yes Lucila Martínez PA-C   amphetamine-dextroamphetamine (ADDERALL XR) 30 MG extended release capsule Take 1 capsule by mouth 2 times daily for 30 days.   Wilhemena Nikkie, APRN - CNP   polyethylene glycol (GLYCOLAX) 17 g packet Take 17 g by mouth daily as needed for Constipation  Sowmya Parks MD   promethazine (PHENERGAN) 25 MG tablet Take 1 tablet by mouth every 8 hours as needed for Nausea  ORTIZ Lay CNP   NEXIUM 40 MG delayed release capsule TAKE ONE CAPSULE BY MOUTH EVERY DAY  ORTIZ Lay CNP   topiramate (TOPAMAX) 100 MG tablet Take 1 tablet by mouth 2 times daily  ORTIZ Lay CNP   liothyronine (CYTOMEL) 25 MCG tablet TAKE 1 TABLET BY MOUTH DAILY  ORTIZ Lay CNP   traZODone (DESYREL) 150 MG tablet Take 1 tablet by mouth nightly  ORTIZ Lay CNP   Magnesium 400 MG TABS Take 1 tablet by mouth daily  Eliezer Flynn PA-C   prochlorperazine (COMPAZINE) 10 MG tablet Take 1 tablet by mouth every 6 hours as needed (nausea)  ORTIZ Lay CNP   furosemide (LASIX) 20 MG tablet Take 1 tablet by mouth 2 times daily TAKE ONE TABLET BY MOUTH TWO TIMES A DAY  ORTIZ Lay CNP   buPROPion (WELLBUTRIN XL) 300 MG extended release tablet Take 1 tablet by mouth every morning  ORTIZ Lay CNP   TRULANCE 3 MG TABS Take 3 mg by mouth daily  Historical Provider, MD   sucralfate (CARAFATE) 1 GM tablet Take by mouth daily  Historical Provider, MD   metoclopramide (REGLAN) 10 MG tablet Take 10 mg by mouth as needed  Historical Provider, MD   Hyoscyamine Sulfate SL (LEVSIN/SL) 0.125 MG SUBL Place 125 mcg under the tongue every 4 hours as needed (pain)  ORTIZ Lobo CNP   amLODIPine (NORVASC) 2.5 MG tablet Take 2.5 mg by mouth daily  Historical Provider, MD   ondansetron (ZOFRAN) 4 MG tablet Take 1 tablet by mouth every 8 hours as needed for Nausea or Vomiting  ORTIZ Lay CNP   azelastine (ASTELIN) 0.1 % nasal spray 1 spray by Nasal route  Historical Provider, MD   moxifloxacin (VIGAMOX) 0.5 % ophthalmic solution 1 drop  Historical Provider, MD   Potassium Citrate ER 15 MEQ (1620 MG) TBCR Take 15 mEq by mouth  Historical Provider, MD   potassium chloride (KLOR-CON M) 10 MEQ extended release tablet Take 1 tablet by mouth daily  Cortney Carrion Anastasia Turcios, APRN - CNP   montelukast (SINGULAIR) 10 MG tablet Take 1 tablet by mouth daily  Caitie Score, APRN - CNP   fluticasone (FLONASE) 50 MCG/ACT nasal spray USE 1 SPRAY NASALLY DAILY  Historical Provider, MD   estradiol (ESTRACE) 1 MG tablet Take 1 mg by mouth  Historical Provider, MD   ZOLMitriptan (ZOMIG) 5 MG tablet TAKE 1 TABLET BY MOUTH AT ONSET OF MIGRAINE. MAY REPEAT ONCE AFTER 2 HOURS.  MAX 10 MG (2 TABLETS) PER DAY  Historical Provider, MD   levothyroxine (SYNTHROID) 25 MCG tablet Take one daily  Caitie Score, APRN - CNP   hydroxychloroquine (PLAQUENIL) 200 MG tablet Take 300 mg by mouth daily   Historical Provider, MD   Pancrelipase, Lip-Prot-Amyl, (CREON) 15528 UNITS CPEP 2 caps tid  Asiya Ayala MD       Social History     Tobacco Use    Smoking status: Never Smoker    Smokeless tobacco: Never Used   Substance Use Topics    Alcohol use: No     Alcohol/week: 0.0 standard drinks     Comment: no alcohol since 2011    Drug use: No        No Known Allergies,   Past Medical History:   Diagnosis Date    Acquired hypothyroidism 9/26/2016    Acute pancreatitis     ADD (attention deficit disorder) 2/12/2015    Anxiety     Depression 12/9/2015    Endometrial cyst of ovary 5/10/14    RT ovary, ruptured    GERD (gastroesophageal reflux disease) 9/26/2016    Medullary sponge kidney of both kidneys 5/22/2018    S[facility]ogren's disease ([facility] [address]     Stress     Swelling    ,   Past Surgical History:   Procedure Laterality Date    CHOLECYSTECTOMY  2011    HYSTERECTOMY  2014    sept    OVARY REMOVAL Left Jan 2014    UPPER GASTROINTESTINAL ENDOSCOPY  09/16/2016    EGD W/BX   ,   Social History     Tobacco Use    Smoking status: Never Smoker    Smokeless tobacco: Never Used   Substance Use Topics    Alcohol use: No     Alcohol/week: 0.0 standard drinks     Comment: no alcohol since 2011    Drug use: No   ,   Family History   Problem Relation Age of Onset    Heart Disease Father 48    Cancer Maternal Grandmother         breast    Heart Disease Other         mom and dad's side   ,   Immunization History   Administered Date(s) Administered    Pneumococcal Polysaccharide ([id number]) 11/15/2018    Tdap (Boostrix, Adacel) 04/01/2019   ,   Health Maintenance   Topic Date Due    HIV screen  12/28/1987    Cervical cancer screen  12/28/1993    Annual Wellness Visit (AWV)  05/29/2019    Flu vaccine (1) 09/01/2020    TSH testing  08/26/2021    Lipid screen  09/20/2021    Potassium monitoring  11/09/2021    Creatinine monitoring  11/09/2021    DTaP/Tdap/Td vaccine (2 - Td) 04/01/2029    Hepatitis A vaccine  Aged Out    Hepatitis B vaccine  Aged Out    Hib vaccine  Aged Out    Meningococcal (ACWY) vaccine  Aged Out    Pneumococcal 0-64 years Vaccine  Aged Out       PHYSICAL EXAMINATION:  [ INSTRUCTIONS:  \"[x]\" Indicates a positive item  \"[]\" Indicates a negative item  -- DELETE ALL ITEMS NOT EXAMINED]  [x] Alert  [x] Oriented to person/place/time    [x] No apparent distress  [] Toxic appearing    [] Face flushed appearing [] Sclera clear  [] Lips are cyanotic      [x] Breathing appears normal  [] Appears tachypneic      [] Rash on visible skin    [x] Cranial Nerves II-XII grossly intact    [] Motor grossly intact in visible upper extremities    [] Motor grossly intact in visible lower extremities    [x] Normal Mood  [] Anxious appearing    [] Depressed appearing  [] Confused appearing      [] Poor short term memory  [] Poor long term memory    [] OTHER:      Due to this being a TeleHealth encounter, evaluation of the following organ systems is limited: Vitals/Constitutional/EENT/Resp/CV/GI//MS/Neuro/Skin/Heme-Lymph-Imm. ASSESSMENT/PLAN:  1. Open fracture of tooth, initial encounter    - amoxicillin-clavulanate (AUGMENTIN) 875-125 MG per tablet; Take 1 tablet by mouth 2 times daily for 10 days  Dispense: 20 tablet; Refill: 0  - HYDROcodone-acetaminophen (NORCO) 5-325 MG per tablet;  Take 1 tablet by mouth every 8 hours as needed for Pain for up to 3 days. Intended supply: 3 days. Take lowest dose possible to manage pain  Dispense: 9 tablet; Refill: 0    2. Pain, dental    - HYDROcodone-acetaminophen (NORCO) 5-325 MG per tablet; Take 1 tablet by mouth every 8 hours as needed for Pain for up to 3 days. Intended supply: 3 days. Take lowest dose possible to manage pain  Dispense: 9 tablet; Refill: 0  - lidocaine viscous hcl (XYLOCAINE) 2 % SOLN solution; Take 5 mLs by mouth as needed for Irritation or Dental Pain  Dispense: 100 mL; Refill: 0    Patient to see dentist for evaluation first thing next week. OARRS report ran and is consistent with current and past history concerning scheduled medications. Follow up at walk-in as needed. No follow-ups on file. An  electronic signature was used to authenticate this note. --Jenna Strauss PA-C on 12/5/2020 at 12:03 PM        Pursuant to the emergency declaration under the [facility], [facility] waiver authority and the [employer] Act, this Virtual  Visit was conducted, with patient's consent, to reduce the patient's risk of exposure to COVID-19 and provide continuity of care for an established patient. Services were provided through a video synchronous discussion virtually to substitute for in-person clinic visit.

## 2020-12-09 ENCOUNTER — TELEPHONE (OUTPATIENT)
Dept: FAMILY MEDICINE CLINIC | Age: 50
End: 2020-12-09

## 2020-12-09 ENCOUNTER — OFFICE VISIT (OUTPATIENT)
Dept: FAMILY MEDICINE CLINIC | Age: 50
End: 2020-12-09
Payer: MEDICARE

## 2020-12-09 VITALS
SYSTOLIC BLOOD PRESSURE: 108 MMHG | HEART RATE: 70 BPM | WEIGHT: 106.4 LBS | DIASTOLIC BLOOD PRESSURE: 64 MMHG | OXYGEN SATURATION: 99 % | BODY MASS INDEX: 19.58 KG/M2 | TEMPERATURE: 97.3 F | HEIGHT: 62 IN

## 2020-12-09 PROCEDURE — G8420 CALC BMI NORM PARAMETERS: HCPCS | Performed by: NURSE PRACTITIONER

## 2020-12-09 PROCEDURE — 1036F TOBACCO NON-USER: CPT | Performed by: NURSE PRACTITIONER

## 2020-12-09 PROCEDURE — 99214 OFFICE O/P EST MOD 30 MIN: CPT | Performed by: NURSE PRACTITIONER

## 2020-12-09 PROCEDURE — G8427 DOCREV CUR MEDS BY ELIG CLIN: HCPCS | Performed by: NURSE PRACTITIONER

## 2020-12-09 PROCEDURE — G8484 FLU IMMUNIZE NO ADMIN: HCPCS | Performed by: NURSE PRACTITIONER

## 2020-12-09 RX ORDER — BUPROPION HYDROCHLORIDE 300 MG/1
300 TABLET ORAL EVERY MORNING
Qty: 90 TABLET | Refills: 1 | Status: SHIPPED | OUTPATIENT
Start: 2020-12-09 | End: 2020-12-21 | Stop reason: SDUPTHER

## 2020-12-09 RX ORDER — PREDNISONE 10 MG/1
TABLET ORAL
Qty: 7.5 TABLET | Refills: 0 | Status: SHIPPED | OUTPATIENT
Start: 2020-12-09 | End: 2021-03-23

## 2020-12-09 RX ORDER — PREDNISONE 20 MG/1
TABLET ORAL
Qty: 15 TABLET | Refills: 0 | Status: SHIPPED | OUTPATIENT
Start: 2020-12-09 | End: 2021-03-23

## 2020-12-09 ASSESSMENT — ENCOUNTER SYMPTOMS
DIARRHEA: 0
SHORTNESS OF BREATH: 0
CONSTIPATION: 0
COUGH: 0

## 2020-12-09 NOTE — PROGRESS NOTES
Subjective  Chief Complaint   Patient presents with    Rib Injury     pt states that she has left side rib pain x 3 weeks. states that she can tell a difference when she looks at it compared to the other side and she hears a crunch when she bends down? HPI     Pt is here for a fu of pain in the left rib area. Was started on medrol dylan from Dr. Gus Glez. Helped slightly but pt still very uncomfortable. Still on plaquenil. Upon asking, symptoms have been worse since stopping prednisone from rheumatology a few mos ago secondary to side effect concerns. Pt with long-standing autoimmune issues/inflammatory issues.      Patient Active Problem List    Diagnosis Date Noted    Chronic pancreatitis ([address] 08/11/2020    Major depressive disorder, single episode, 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[address] 01/15/2019    Abnormal MRI, liver 05/22/2018    Acute recurrent pancreatitis 05/22/2018    At risk of fracture due to osteoporosis 05/22/2018    Calcinosis 05/22/2018    Chronic headaches 05/22/2018    Conductive hearing loss in left ear 05/22/2018    Edema 05/22/2018    Iron overload 05/22/2018    Mass of left side of neck 05/22/2018    Medullary sponge kidney of both kidneys 24/89/1884    Metabolic acidosis 93/20/1351    Microscopic hematuria 05/22/2018    Nausea 05/22/2018    Renal tubular acidosis type I 05/22/2018    Rheumatoid arthritis ([address] 05/22/2018    Tension type headache 05/22/2018    Weight loss, abnormal 05/22/2018    Cellulitis, face 04/06/2018    Other chest pain 11/01/2017    Left lower quadrant pain 11/01/2017    Sjogren's syndrome ([address] 11/01/2017    Diverticulitis of large intestine without perforation or abscess without bleeding 11/01/2017    Fibroids 11/01/2017    Transfusion history 11/01/2017    Pancreatitis 05/13/2017    Acquired hypothyroidism 09/26/2016    GERD (gastroesophageal reflux disease) 09/26/2016    Anxiety 12/09/2015    Depression 12/09/2015    ADD (attention deficit disorder) 02/12/2015    Endometriosis 09/09/2014    ASCUS favoring benign 05/01/2013    S/P endometrial ablation 05/01/2013     Past Medical History:   Diagnosis Date    Acquired hypothyroidism 9/26/2016    Acute pancreatitis     ADD (attention deficit disorder) 2/12/2015    Anxiety     Depression 12/9/2015    Endometrial cyst of ovary 5/10/14    RT ovary, ruptured    GERD (gastroesophageal reflux disease) 9/26/2016    Medullary sponge kidney of both kidneys 5/22/2018    Sjogren's disease ([address]     Stress     Swelling      Past Surgical History:   Procedure Laterality Date    CHOLECYSTECTOMY  2011    HYSTERECTOMY  2014    sept    OVARY REMOVAL Left Jan 2014    UPPER GASTROINTESTINAL ENDOSCOPY  09/16/2016    EGD W/BX     Family History   Problem Relation Age of Onset    Heart Disease Father 48    Cancer Maternal Grandmother         breast    Heart Disease Other         mom and dad's side     Social History     Socioeconomic History    Marital status: Single     Spouse name: None    Number of children: None    Years of education: None    Highest education level: None   Occupational History    None   Social Needs    Financial resource strain: None    Food insecurity     Worry: None     Inability: None    Transportation needs     Medical: None     Non-medical: None   Tobacco Use    Smoking status: Never Smoker    Smokeless tobacco: Never Used   Substance and Sexual Activity    Alcohol use: No     Alcohol/week: 0.0 standard drinks     Comment: no alcohol since 2011    Drug use: No    Sexual activity: None   Lifestyle    Physical activity     Days per week: None     Minutes per session: None    Stress: None   Relationships    Social connections     Talks on phone: None     Gets together: None     Attends [employer] service: None     Active member of club or organization: None     Attends meetings of clubs or organizations: None     Relationship status: None    Intimate partner violence     Fear of current or ex partner: None     Emotionally abused: None     Physically abused: None     Forced sexual activity: None   Other Topics Concern    None   Social History Narrative    ** Merged History Encounter **          Current Outpatient Medications on File Prior to Visit   Medication Sig Dispense Refill    amoxicillin-clavulanate (AUGMENTIN) 875-125 MG per tablet Take 1 tablet by mouth 2 times daily for 10 days 20 tablet 0    lidocaine viscous hcl (XYLOCAINE) 2 % SOLN solution Take 5 mLs by mouth as needed for Irritation or Dental Pain 100 mL 0    amphetamine-dextroamphetamine (ADDERALL XR) 30 MG extended release capsule Take 1 capsule by mouth 2 times daily for 30 days.  60 capsule 0    polyethylene glycol (GLYCOLAX) 17 g packet Take 17 g by mouth daily as needed for Constipation 527 g 0    promethazine (PHENERGAN) 25 MG tablet Take 1 tablet by mouth every 8 hours as needed for Nausea 40 tablet 0    NEXIUM 40 MG delayed release capsule TAKE ONE CAPSULE BY MOUTH EVERY DAY 30 capsule 5    topiramate (TOPAMAX) 100 MG tablet Take 1 tablet by mouth 2 times daily 60 tablet 5    liothyronine (CYTOMEL) 25 MCG tablet TAKE 1 TABLET BY MOUTH DAILY 90 tablet 0    traZODone (DESYREL) 150 MG tablet Take 1 tablet by mouth nightly 30 tablet 5    Magnesium 400 MG TABS Take 1 tablet by mouth daily 30 tablet 0    prochlorperazine (COMPAZINE) 10 MG tablet Take 1 tablet by mouth every 6 hours as needed (nausea) 30 tablet 2    furosemide (LASIX) 20 MG tablet Take 1 tablet by mouth 2 times daily TAKE ONE TABLET BY MOUTH TWO TIMES A DAY 60 tablet 5    TRULANCE 3 MG TABS Take 3 mg by mouth daily      sucralfate (CARAFATE) 1 GM tablet Take by mouth daily      metoclopramide (REGLAN) 10 MG tablet Take 10 mg by mouth as needed      Hyoscyamine Sulfate SL (LEVSIN/SL) 0.125 MG SUBL Place 125 mcg under the tongue every 4 hours as needed (pain) 120 each 3    amLODIPine (NORVASC) 2.5 MG tablet Take 2.5 mg by mouth daily      ondansetron (ZOFRAN) 4 MG tablet Take 1 tablet by mouth every 8 hours as needed for Nausea or Vomiting 30 tablet 1    azelastine (ASTELIN) 0.1 % nasal spray 1 spray by Nasal route      moxifloxacin (VIGAMOX) 0.5 % ophthalmic solution 1 drop      Potassium Citrate ER 15 MEQ (1620 MG) TBCR Take 15 mEq by mouth      potassium chloride (KLOR-CON M) 10 MEQ extended release tablet Take 1 tablet by mouth daily 30 tablet 5    montelukast (SINGULAIR) 10 MG tablet Take 1 tablet by mouth daily 30 tablet 3    fluticasone (FLONASE) 50 MCG/ACT nasal spray USE 1 SPRAY NASALLY DAILY  3    estradiol (ESTRACE) 1 MG tablet Take 1 mg by mouth      ZOLMitriptan (ZOMIG) 5 MG tablet TAKE 1 TABLET BY MOUTH AT ONSET OF MIGRAINE. MAY REPEAT ONCE AFTER 2 HOURS. MAX 10 MG (2 TABLETS) PER DAY  11    levothyroxine (SYNTHROID) 25 MCG tablet Take one daily 90 tablet 1    hydroxychloroquine (PLAQUENIL) 200 MG tablet Take 300 mg by mouth daily       Pancrelipase, Lip-Prot-Amyl, (CREON) 91094 UNITS CPEP 2 caps tid 180 capsule 03     No current facility-administered medications on file prior to visit. No Known Allergies    Review of Systems   Constitutional: Positive for fatigue. Respiratory: Negative for cough and shortness of breath. Cardiovascular: Positive for chest pain. Gastrointestinal: Negative for constipation and diarrhea. Objective  Vitals:    12/09/20 1302   BP: 108/64   Site: Left Upper Arm   Position: Sitting   Cuff Size: Medium Adult   Pulse: 70   Temp: 97.3 °F (36.3 °C)   SpO2: 99%   Weight: 106 lb 6.4 oz (48.3 kg)   Height: 5' 2\" (1.575 m)     Physical Exam  Vitals signs and nursing note reviewed. Constitutional:       Appearance: Normal appearance. She is normal weight. HENT:      Head: Normocephalic. Mouth/Throat:      Mouth: Mucous membranes are moist.   Eyes:      Extraocular Movements: Extraocular movements intact.       Conjunctiva/sclera: Conjunctivae normal.      Pupils: emergency room. FU in 2 weeks. Discussed fu with rheumatology as well.     Mirtha Mendez, ORTIZ - CNP

## 2020-12-18 ENCOUNTER — TELEPHONE (OUTPATIENT)
Dept: FAMILY MEDICINE CLINIC | Age: 50
End: 2020-12-18

## 2020-12-18 RX ORDER — FUROSEMIDE 20 MG/1
20 TABLET ORAL 2 TIMES DAILY
Qty: 60 TABLET | Refills: 5 | Status: SHIPPED | OUTPATIENT
Start: 2020-12-18 | End: 2021-08-02 | Stop reason: SDUPTHER

## 2020-12-18 NOTE — TELEPHONE ENCOUNTER
Wellbutrin was sent to wrong Pharmacy  Can this medication be sent to [address] [place] [address] need to cancel order at [facility] [place]

## 2020-12-21 RX ORDER — BUPROPION HYDROCHLORIDE 300 MG/1
300 TABLET ORAL EVERY MORNING
Qty: 90 TABLET | Refills: 1 | Status: SHIPPED | OUTPATIENT
Start: 2020-12-21 | End: 2021-06-17 | Stop reason: SDUPTHER

## 2020-12-22 ENCOUNTER — VIRTUAL VISIT (OUTPATIENT)
Dept: FAMILY MEDICINE CLINIC | Age: 50
End: 2020-12-22
Payer: MEDICARE

## 2020-12-22 PROCEDURE — 99214 OFFICE O/P EST MOD 30 MIN: CPT | Performed by: NURSE PRACTITIONER

## 2020-12-22 PROCEDURE — G8427 DOCREV CUR MEDS BY ELIG CLIN: HCPCS | Performed by: NURSE PRACTITIONER

## 2020-12-22 ASSESSMENT — ENCOUNTER SYMPTOMS
VOMITING: 1
ABDOMINAL PAIN: 1
SHORTNESS OF BREATH: 0
COUGH: 0
NAUSEA: 1

## 2020-12-22 NOTE — PROGRESS NOTES
2020    TELEHEALTH EVALUATION -- Audio/Visual (During GIA-47 public health emergency)    Due to COVID 19 outbreak, patient's office visit was converted to a virtual visit. Patient was contacted and agreed to proceed with a virtual visit via Doxy. me  The risks and benefits of converting to a virtual visit were discussed in light of the current infectious disease epidemic. Patient also understood that insurance coverage and co-pays are up to their individual insurance plans. HPI:    Chong Lentz (:  1970) has requested an audio/video evaluation for the following concern(s):    Has been having a lot of upper abdominal pain all the way across the abdomen  Has appt with GI in a couple of weeks. Dry heaving  Decreased appetite. Started phenergan. Hx of pancreatitis. Has had numerous work ups in regards to abd pain. Previous intussusception seen. Following closely with rheumatology    Hx of significant autoimmune issues. Review of Systems   Constitutional: Positive for fatigue. Respiratory: Negative for cough and shortness of breath. Cardiovascular: Negative for chest pain. Gastrointestinal: Positive for abdominal pain, nausea and vomiting. Musculoskeletal: Positive for arthralgias. Prior to Visit Medications    Medication Sig Taking?  Authorizing Provider   buPROPion (WELLBUTRIN XL) 300 MG extended release tablet Take 1 tablet by mouth every morning Yes Elvan Esters, APRN - CNP   furosemide (LASIX) 20 MG tablet Take 1 tablet by mouth 2 times daily TAKE ONE TABLET BY MOUTH TWO TIMES A DAY Yes Elvan Esters, APRN - CNP   predniSONE (DELTASONE) 20 MG tablet Take one tablet PO BID x 5 days then one tablet PO x 5 days Yes Elvan Esters, APRN - CNP   predniSONE (DELTASONE) 10 MG tablet Take one tablet PO daily x 5 days then take 1/5 tablet daily PO x 5 days Yes Elvan Esters, APRN - CNP amphetamine-dextroamphetamine (ADDERALL XR) 30 MG extended release capsule Take 1 capsule by mouth 2 times daily for 30 days.  Yes ORTIZ Doshi CNP   promethazine (PHENERGAN) 25 MG tablet Take 1 tablet by mouth every 8 hours as needed for Nausea Yes ORTIZ Doshi CNP   NEXIUM 40 MG delayed release capsule TAKE ONE CAPSULE BY MOUTH EVERY DAY Yes ORTIZ Doshi CNP   topiramate (TOPAMAX) 100 MG tablet Take 1 tablet by mouth 2 times daily Yes ORTIZ Doshi CNP   liothyronine (CYTOMEL) 25 MCG tablet TAKE 1 TABLET BY MOUTH DAILY Yes ORTIZ Doshi CNP   traZODone (DESYREL) 150 MG tablet Take 1 tablet by mouth nightly Yes ORTIZ Doshi CNP   Magnesium 400 MG TABS Take 1 tablet by mouth daily Yes Juan Penn PA-C   prochlorperazine (COMPAZINE) 10 MG tablet Take 1 tablet by mouth every 6 hours as needed (nausea) Yes ORTIZ Doshi CNP   TRULANCE 3 MG TABS Take 3 mg by mouth daily Yes Historical Provider, MD   sucralfate (CARAFATE) 1 GM tablet Take by mouth daily Yes Historical Provider, MD   metoclopramide (REGLAN) 10 MG tablet Take 10 mg by mouth as needed Yes Historical Provider, MD   Hyoscyamine Sulfate SL (LEVSIN/SL) 0.125 MG SUBL Place 125 mcg under the tongue every 4 hours as needed (pain) Yes ORTIZ Arguelles CNP   amLODIPine (NORVASC) 2.5 MG tablet Take 2.5 mg by mouth daily Yes Historical Provider, MD   ondansetron (ZOFRAN) 4 MG tablet Take 1 tablet by mouth every 8 hours as needed for Nausea or Vomiting Yes ORTIZ Doshi CNP   azelastine (ASTELIN) 0.1 % nasal spray 1 spray by Nasal route Yes Historical Provider, MD   Potassium Citrate ER 15 MEQ (1620 MG) TBCR Take 15 mEq by mouth Yes Historical Provider, MD   potassium chloride (KLOR-CON M) 10 MEQ extended release tablet Take 1 tablet by mouth daily Yes ORTIZ Doshi CNP   montelukast (SINGULAIR) 10 MG tablet Take 1 tablet by mouth daily Yes Corrina Canada, APRN - CNP fluticasone (FLONASE) 50 MCG/ACT nasal spray USE 1 SPRAY NASALLY DAILY Yes Historical Provider, MD   estradiol (ESTRACE) 1 MG tablet Take 1 mg by mouth Yes Historical Provider, MD   ZOLMitriptan (ZOMIG) 5 MG tablet TAKE 1 TABLET BY MOUTH AT ONSET OF MIGRAINE. MAY REPEAT ONCE AFTER 2 HOURS.  MAX 10 MG (2 TABLETS) PER DAY Yes Historical Provider, MD   levothyroxine (SYNTHROID) 25 MCG tablet Take one daily Yes ORTIZ Worrell CNP   hydroxychloroquine (PLAQUENIL) 200 MG tablet Take 300 mg by mouth daily  Yes Historical Provider, MD   Pancrelipase, Lip-Prot-Amyl, (CREON) 00134 UNITS CPEP 2 caps tid Yes Roland Lim MD   lidocaine viscous hcl (XYLOCAINE) 2 % SOLN solution Take 5 mLs by mouth as needed for Irritation or Dental Pain  Patient not taking: Reported on 12/22/2020  Lucila Martínez PA-C   furosemide (LASIX) 20 MG tablet Take 1 tablet by mouth 2 times daily TAKE ONE TABLET BY MOUTH TWO TIMES A DAY  ORTIZ Worrell CNP   moxifloxacin (VIGAMOX) 0.5 % ophthalmic solution 1 drop  Historical Provider, MD       Social History     Tobacco Use    Smoking status: Never Smoker    Smokeless tobacco: Never Used   Substance Use Topics    Alcohol use: No     Alcohol/week: 0.0 standard drinks     Comment: no alcohol since 2011    Drug use: No        No Known Allergies,   Past Medical History:   Diagnosis Date    Acquired hypothyroidism 9/26/2016    Acute pancreatitis     ADD (attention deficit disorder) 2/12/2015    Anxiety     Depression 12/9/2015    Endometrial cyst of ovary 5/10/14    RT ovary, ruptured    GERD (gastroesophageal reflux disease) 9/26/2016    Medullary sponge kidney of both kidneys 5/22/2018    Sjogren's disease ([facility]     Stress     Swelling    ,   Past Surgical History:   Procedure Laterality Date    CHOLECYSTECTOMY  2011    HYSTERECTOMY  2014    sept    OVARY REMOVAL Left Jan 2014    UPPER GASTROINTESTINAL ENDOSCOPY  09/16/2016    EGD W/BX   ,   Social History     Tobacco Use  Smoking status: Never Smoker    Smokeless tobacco: Never Used   Substance Use Topics    Alcohol use: No     Alcohol/week: 0.0 standard drinks     Comment: no alcohol since 2011    Drug use: No   ,   Family History   Problem Relation Age of Onset    Heart Disease Father 48    Cancer Maternal Grandmother         breast    Heart Disease Other         mom and dad's side       PHYSICAL EXAMINATION:  [ INSTRUCTIONS:  \"[x]\" Indicates a positive item  \"[]\" Indicates a negative item  -- DELETE ALL ITEMS NOT EXAMINED]  [x] Alert  [x] Oriented to person/place/time    [x] No apparent distress  [] Toxic appearing    [] Face flushed appearing [] Sclera clear  [] Lips are cyanotic      [x] Breathing appears normal  [] Appears tachypneic      [] Rash on visible skin    [x] Cranial Nerves II-XII grossly intact    [x] Motor grossly intact in visible upper extremities    [x] Motor grossly intact in visible lower extremities    [] Normal Mood  [x] Anxious appearing    [] Depressed appearing  [] Confused appearing      [] Poor short term memory  [] Poor long term memory    [] OTHER:      Due to this being a TeleHealth encounter, evaluation of the following organ systems is limited: Vitals/Constitutional/EENT/Resp/CV/GI//MS/Neuro/Skin/Heme-Lymph-Imm. ASSESSMENT/PLAN:  1. Upper abdominal pain    - CBC Auto Differential; Future  - Lipase; Future  - Amylase; Future  - Comprehensive Metabolic Panel; Future    2. Chronic pancreatitis, unspecified pancreatitis type ([facility]  - complete labs    3. Sjogren's syndrome, with unspecified organ involvement ([facility]  Fu with hematology    4. Gastroesophageal reflux disease without esophagitis    Side effects, adverse effects of the medication prescribed today, as well as treatment plan/ rationale and result expectations have been discussed with the patient who expresses understanding and desires to proceed. Close follow up to evaluate treatment results and for coordination of care. I have reviewed the patient's medical history in detail and updated the computerized patient record. As always, patient is advised that if symptoms worsen in any way they will proceed to the nearest emergency room. Fu after testing. An  electronic signature was used to authenticate this note. --ORTIZ Seth - CNP on 12/22/2020 at 7:48 PM        Pursuant to the emergency declaration under the [address] [place] [address], [facility] waiver authority and the [employer] and [employer] Act, this Virtual  Visit was conducted, with patient's consent, to reduce the patient's risk of exposure to COVID-19 and provide continuity of care for an established patient. Services were provided through a video synchronous discussion virtually to substitute for in-person clinic visit.

## 2020-12-23 DIAGNOSIS — M79.604 BILATERAL LEG PAIN: ICD-10-CM

## 2020-12-23 DIAGNOSIS — M79.605 BILATERAL LEG PAIN: ICD-10-CM

## 2020-12-23 DIAGNOSIS — M79.605 LEFT LEG PAIN: ICD-10-CM

## 2020-12-23 DIAGNOSIS — R10.10 UPPER ABDOMINAL PAIN: ICD-10-CM

## 2020-12-23 LAB
ALBUMIN SERPL-MCNC: 4.4 G/DL (ref 3.5–4.6)
ALP BLD-CCNC: 81 U/L (ref 40–130)
ALT SERPL-CCNC: 10 U/L (ref 0–33)
AMYLASE: 67 U/L (ref 22–93)
ANION GAP SERPL CALCULATED.3IONS-SCNC: 11 MEQ/L (ref 9–15)
AST SERPL-CCNC: 11 U/L (ref 0–35)
BASOPHILS ABSOLUTE: 0.1 K/UL (ref 0–0.2)
BASOPHILS RELATIVE PERCENT: 1.4 %
BILIRUB SERPL-MCNC: 0.3 MG/DL (ref 0.2–0.7)
BUN BLDV-MCNC: 13 MG/DL (ref 6–20)
CALCIUM SERPL-MCNC: 9.1 MG/DL (ref 8.5–9.9)
CHLORIDE BLD-SCNC: 103 MEQ/L (ref 95–107)
CO2: 24 MEQ/L (ref 20–31)
CREAT SERPL-MCNC: 0.71 MG/DL (ref 0.5–0.9)
EOSINOPHILS ABSOLUTE: 0 K/UL (ref 0–0.7)
EOSINOPHILS RELATIVE PERCENT: 0.7 %
GFR AFRICAN AMERICAN: >60
GFR NON-AFRICAN AMERICAN: >60
GLOBULIN: 2.6 G/DL (ref 2.3–3.5)
GLUCOSE BLD-MCNC: 91 MG/DL (ref 70–99)
HCT VFR BLD CALC: 37.6 % (ref 37–47)
HEMOGLOBIN: 12.2 G/DL (ref 12–16)
LACTIC ACID: 0.7 MMOL/L (ref 0.5–2.2)
LIPASE: 32 U/L (ref 12–95)
LYMPHOCYTES ABSOLUTE: 2.5 K/UL (ref 1–4.8)
LYMPHOCYTES RELATIVE PERCENT: 44.4 %
MCH RBC QN AUTO: 28.8 PG (ref 27–31.3)
MCHC RBC AUTO-ENTMCNC: 32.4 % (ref 33–37)
MCV RBC AUTO: 89 FL (ref 82–100)
MONOCYTES ABSOLUTE: 0.3 K/UL (ref 0.2–0.8)
MONOCYTES RELATIVE PERCENT: 6.2 %
NEUTROPHILS ABSOLUTE: 2.6 K/UL (ref 1.4–6.5)
NEUTROPHILS RELATIVE PERCENT: 47.3 %
PDW BLD-RTO: 13.2 % (ref 11.5–14.5)
PLATELET # BLD: 326 K/UL (ref 130–400)
POTASSIUM SERPL-SCNC: 3 MEQ/L (ref 3.4–4.9)
RBC # BLD: 4.23 M/UL (ref 4.2–5.4)
SODIUM BLD-SCNC: 138 MEQ/L (ref 135–144)
TOTAL PROTEIN: 7 G/DL (ref 6.3–8)
WBC # BLD: 5.6 K/UL (ref 4.8–10.8)

## 2020-12-29 ENCOUNTER — VIRTUAL VISIT (OUTPATIENT)
Dept: FAMILY MEDICINE CLINIC | Age: 50
End: 2020-12-29
Payer: MEDICARE

## 2020-12-29 ENCOUNTER — NURSE ONLY (OUTPATIENT)
Dept: FAMILY MEDICINE CLINIC | Age: 50
End: 2020-12-29

## 2020-12-29 DIAGNOSIS — Z20.822 SUSPECTED COVID-19 VIRUS INFECTION: ICD-10-CM

## 2020-12-29 PROCEDURE — G8427 DOCREV CUR MEDS BY ELIG CLIN: HCPCS | Performed by: NURSE PRACTITIONER

## 2020-12-29 PROCEDURE — 99442 PR PHYS/QHP TELEPHONE EVALUATION 11-20 MIN: CPT | Performed by: NURSE PRACTITIONER

## 2020-12-29 ASSESSMENT — ENCOUNTER SYMPTOMS
CHEST TIGHTNESS: 0
SINUS PAIN: 1
RHINORRHEA: 0
SORE THROAT: 1
SINUS PRESSURE: 1
SHORTNESS OF BREATH: 1
ABDOMINAL PAIN: 0
COUGH: 1
DIARRHEA: 1
NAUSEA: 1

## 2020-12-29 NOTE — PROGRESS NOTES
TELEHEALTH EVALUATION -- Audio/Visual (During QUYSV-52 public health emergency)    -   Iris Riley is a 52 y.o. female being evaluated by a Virtual Visit (video visit) encounter to address concerns as mentioned above. A caregiver was present when appropriate. Due to this being a TeleHealth encounter (During RPODT-67 public health emergency), evaluation of the following organ systems was limited: Vitals/Constitutional/EENT/Resp/CV/GI//MS/Neuro/Skin/Heme-Lymph-Imm. Pursuant to the emergency declaration under the [address] [place] [address], [address] [place] [address] authority and the [employer] and [employer] Act, this Virtual Visit was conducted with patient's (and/or legal guardian's) consent, to reduce the patient's risk of exposure to COVID-19 and provide necessary medical care. The patient (and/or legal guardian) has also been advised to contact this office for worsening conditions or problems, and seek emergency medical treatment and/or call 911 if deemed necessary. Patient was contacted and agreed to proceed with a virtual visit via Telephone Visit  The risks and benefits of converting to a virtual visit were discussed in light of the current infectious disease epidemic. Patient also understood that insurance coverage and co-pays are up to their individual insurance plans. Patient was located at their home. Provider was located at their office.      2020  Iris Riley (:  1970) has requested an audio/video evaluation for the following concern(s):    HPI  VV audio for COVID-19   Symptoms started on   Cough, chills, body aches, 'scratchy\" throat, sinus pain/pressure & SOB with exertion   Diarrhea and lightheadedness on Friday   Lost sense of smell  Taste unaffected   Appetite unchanged  Tmax 99.5F  Her son had COVID-19 around 3 weeks ago        Review of Systems   Constitutional: Positive for activity change, chills, diaphoresis and Pancrelipase, Lip-Prot-Amyl, (CREON) 17453 UNITS CPEP 2 caps tid Yes Jos Eubanks MD   furosemide (LASIX) 20 MG tablet Take 1 tablet by mouth 2 times daily TAKE ONE TABLET BY MOUTH TWO TIMES A DAY  Mirtha Mendez APRN - CNP       Past Medical History:   Diagnosis Date    Acquired hypothyroidism 9/26/2016    Acute pancreatitis     ADD (attention deficit disorder) 2/12/2015    Anxiety     Depression 12/9/2015    Endometrial cyst of ovary 5/10/14    RT ovary, ruptured    GERD (gastroesophageal reflux disease) 9/26/2016    Medullary sponge kidney of both kidneys 5/22/2018    [facility]ogren's disease ([facility] [address] [facility]     Stress     Swelling      Past Surgical History:   Procedure Laterality Date    CHOLECYSTECTOMY  2011    HYSTERECTOMY  2014 sept    OVARY REMOVAL Left Jan 2014    UPPER GASTROINTESTINAL ENDOSCOPY  09/16/2016    EGD W/BX     Social History     Socioeconomic History    Marital status: Single     Spouse name: Not on file    Number of children: Not on file    Years of education: Not on file    Highest education level: Not on file   Occupational History    Not on file   Social Needs    Financial resource strain: Not on file    Food insecurity     Worry: Not on file     Inability: Not on file    Transportation needs     Medical: Not on file     Non-medical: Not on file   Tobacco Use    Smoking status: Never Smoker    Smokeless tobacco: Never Used   Substance and Sexual Activity    Alcohol use: No     Alcohol/week: 0.0 standard drinks     Comment: no alcohol since 2011    Drug use: No    Sexual activity: Not on file   Lifestyle    Physical activity     Days per week: Not on file     Minutes per session: Not on file    Stress: Not on file   Relationships    Social connections     Talks on phone: Not on file     Gets together: Not on file     Attends Rest[employer] service: Not on file     Active member of club or organization: Not on file     Attends meetings of clubs or organizations: Not on file Relationship status: Not on file    Intimate partner violence     Fear of current or ex partner: Not on file     Emotionally abused: Not on file     Physically abused: Not on file     Forced sexual activity: Not on file   Other Topics Concern    Not on file   Social History Narrative    ** Merged History Encounter **          Family History   Problem Relation Age of Onset    Heart Disease Father 48    Cancer Maternal Grandmother         breast    Heart Disease Other         mom and dad's side     No Known Allergies    PMH, Surgical Hx, Family Hx, and Social Hx reviewed and updated. PHYSICAL EXAMINATION: N/A. VV Audio       ASSESSMENT/PLAN:  Assessment & Plan   Romario Mello was seen today for covid testing. Diagnoses and all orders for this visit:    Suspected COVID-19 virus infection  -     COVID-19 Ambulatory; Future      Orders Placed This Encounter   Procedures    COVID-19 Ambulatory     Standing Status:   Future     Standing Expiration Date:   12/29/2021     Scheduling Instructions:      Saline media preferred given current shortage of viral transport media but both acceptable     Order Specific Question:   Is this test for diagnosis or screening? Answer:   Diagnosis of ill patient     Order Specific Question:   Symptomatic for COVID-19 as defined by CDC? Answer:   Yes     Order Specific Question:   Date of Symptom Onset     Answer:   12/26/2020     Order Specific Question:   Hospitalized for COVID-19? Answer:   No     Order Specific Question:   Admitted to ICU for COVID-19? Answer:   No     Order Specific Question:   Employed in healthcare setting? Answer:   No     Order Specific Question:   Resident in a congregate (group) care setting? Answer:   No     Order Specific Question:   Pregnant? Answer:   No     Order Specific Question:   Previously tested for COVID-19? Answer:   Yes     No orders of the defined types were placed in this encounter.     There are no discontinued medications. If you experience any of the red flag s/s, seek care at the ER        Reviewed with the patient: current clinical status. Discussed with patient COVID-19 s/s. Pt aware to remain home until she hears from us about the result. Pt instructed on red flag s/s to go to the ER for or to call 911. Pt verbalized understanding. Will update pt with result when it is available. When to call for help  Call 911 anytime you think you may need emergency care. For example, call if:  · You have severe trouble breathing. · You have severe dehydration. I have reviewed the patient's medical history in detail and updated the computerized patient record. Patient identification was verified at the start of the visit: Yes    Total time spent on this encounter: 13 minutes       --Mechele Schwab, APRN - NP on 12/29/2020 at 11:50 AM    An electronic signature was used to authenticate this note.

## 2020-12-30 LAB
SARS-COV-2: NOT DETECTED
SOURCE: NORMAL

## 2021-01-03 ENCOUNTER — TELEPHONE (OUTPATIENT)
Dept: FAMILY MEDICINE CLINIC | Age: 51
End: 2021-01-03

## 2021-01-03 ENCOUNTER — NURSE ONLY (OUTPATIENT)
Dept: FAMILY MEDICINE CLINIC | Age: 51
End: 2021-01-03

## 2021-01-03 DIAGNOSIS — Z20.822 ENCOUNTER FOR LABORATORY TESTING FOR COVID-19 VIRUS: Primary | ICD-10-CM

## 2021-01-03 DIAGNOSIS — Z20.822 EXPOSURE TO COVID-19 VIRUS: ICD-10-CM

## 2021-01-04 DIAGNOSIS — F98.8 ATTENTION DEFICIT DISORDER, UNSPECIFIED HYPERACTIVITY PRESENCE: ICD-10-CM

## 2021-01-04 DIAGNOSIS — Z20.822 EXPOSURE TO COVID-19 VIRUS: ICD-10-CM

## 2021-01-05 LAB
SARS-COV-2: NOT DETECTED
SOURCE: NORMAL

## 2021-01-05 RX ORDER — DEXTROAMPHETAMINE SACCHARATE, AMPHETAMINE ASPARTATE MONOHYDRATE, DEXTROAMPHETAMINE SULFATE AND AMPHETAMINE SULFATE 7.5; 7.5; 7.5; 7.5 MG/1; MG/1; MG/1; MG/1
30 CAPSULE, EXTENDED RELEASE ORAL 2 TIMES DAILY
Qty: 60 CAPSULE | Refills: 0 | Status: SHIPPED | OUTPATIENT
Start: 2021-01-05 | End: 2021-02-02 | Stop reason: SDUPTHER

## 2021-01-07 DIAGNOSIS — E03.9 HYPOTHYROIDISM, UNSPECIFIED TYPE: ICD-10-CM

## 2021-01-07 RX ORDER — LIOTHYRONINE SODIUM 25 UG/1
25 TABLET ORAL DAILY
Qty: 90 TABLET | Refills: 0 | Status: SHIPPED | OUTPATIENT
Start: 2021-01-07 | End: 2021-04-08 | Stop reason: SDUPTHER

## 2021-01-12 ENCOUNTER — VIRTUAL VISIT (OUTPATIENT)
Dept: FAMILY MEDICINE CLINIC | Age: 51
End: 2021-01-12
Payer: MEDICARE

## 2021-01-12 ENCOUNTER — OFFICE VISIT (OUTPATIENT)
Dept: INTERVENTIONAL RADIOLOGY/VASCULAR | Age: 51
End: 2021-01-12
Payer: MEDICARE

## 2021-01-12 VITALS
TEMPERATURE: 97.5 F | DIASTOLIC BLOOD PRESSURE: 72 MMHG | BODY MASS INDEX: 19.39 KG/M2 | HEART RATE: 88 BPM | SYSTOLIC BLOOD PRESSURE: 112 MMHG | WEIGHT: 106 LBS

## 2021-01-12 DIAGNOSIS — E83.110 HEREDITARY HEMOCHROMATOSIS (HCC): ICD-10-CM

## 2021-01-12 DIAGNOSIS — E11.9 TYPE 2 DIABETES MELLITUS WITHOUT COMPLICATION, UNSPECIFIED WHETHER LONG TERM INSULIN USE (HCC): ICD-10-CM

## 2021-01-12 DIAGNOSIS — E87.6 HYPOKALEMIA: ICD-10-CM

## 2021-01-12 DIAGNOSIS — R79.89 ELEVATED TSH: ICD-10-CM

## 2021-01-12 DIAGNOSIS — M79.669 PAIN AND SWELLING OF LOWER LEG, UNSPECIFIED LATERALITY: ICD-10-CM

## 2021-01-12 DIAGNOSIS — L71.9 ROSACEA: ICD-10-CM

## 2021-01-12 DIAGNOSIS — E03.9 ACQUIRED HYPOTHYROIDISM: ICD-10-CM

## 2021-01-12 DIAGNOSIS — M79.89 PAIN AND SWELLING OF LOWER LEG, UNSPECIFIED LATERALITY: ICD-10-CM

## 2021-01-12 DIAGNOSIS — R29.898 HEAVY SENSATION OF LOWER EXTREMITY: ICD-10-CM

## 2021-01-12 DIAGNOSIS — I73.9 CLAUDICATION OF BOTH LOWER EXTREMITIES (HCC): Primary | ICD-10-CM

## 2021-01-12 DIAGNOSIS — K86.1 CHRONIC PANCREATITIS, UNSPECIFIED PANCREATITIS TYPE (HCC): ICD-10-CM

## 2021-01-12 DIAGNOSIS — M35.00 SJOGREN'S SYNDROME, WITH UNSPECIFIED ORGAN INVOLVEMENT (HCC): Chronic | ICD-10-CM

## 2021-01-12 DIAGNOSIS — E87.6 HYPOKALEMIA: Primary | ICD-10-CM

## 2021-01-12 DIAGNOSIS — D70.9 NEUTROPENIA, UNSPECIFIED TYPE (HCC): ICD-10-CM

## 2021-01-12 DIAGNOSIS — F32.4 MAJOR DEPRESSIVE DISORDER, SINGLE EPISODE, IN PARTIAL REMISSION (HCC): ICD-10-CM

## 2021-01-12 DIAGNOSIS — G43.809 OTHER MIGRAINE WITHOUT STATUS MIGRAINOSUS, NOT INTRACTABLE: ICD-10-CM

## 2021-01-12 LAB
ALBUMIN SERPL-MCNC: 4.4 G/DL (ref 3.5–4.6)
ALP BLD-CCNC: 75 U/L (ref 40–130)
ALT SERPL-CCNC: 10 U/L (ref 0–33)
ANION GAP SERPL CALCULATED.3IONS-SCNC: 13 MEQ/L (ref 9–15)
AST SERPL-CCNC: 10 U/L (ref 0–35)
BILIRUB SERPL-MCNC: <0.2 MG/DL (ref 0.2–0.7)
BUN BLDV-MCNC: 16 MG/DL (ref 6–20)
CALCIUM SERPL-MCNC: 9.6 MG/DL (ref 8.5–9.9)
CHLORIDE BLD-SCNC: 97 MEQ/L (ref 95–107)
CO2: 25 MEQ/L (ref 20–31)
CREAT SERPL-MCNC: 0.81 MG/DL (ref 0.5–0.9)
GFR AFRICAN AMERICAN: >60
GFR NON-AFRICAN AMERICAN: >60
GLOBULIN: 3 G/DL (ref 2.3–3.5)
GLUCOSE BLD-MCNC: 73 MG/DL (ref 70–99)
POTASSIUM SERPL-SCNC: 3.5 MEQ/L (ref 3.4–4.9)
SODIUM BLD-SCNC: 135 MEQ/L (ref 135–144)
TOTAL PROTEIN: 7.4 G/DL (ref 6.3–8)
TSH REFLEX: 1.59 UIU/ML (ref 0.44–3.86)

## 2021-01-12 PROCEDURE — 1036F TOBACCO NON-USER: CPT | Performed by: NURSE PRACTITIONER

## 2021-01-12 PROCEDURE — G8484 FLU IMMUNIZE NO ADMIN: HCPCS | Performed by: NURSE PRACTITIONER

## 2021-01-12 PROCEDURE — G8427 DOCREV CUR MEDS BY ELIG CLIN: HCPCS | Performed by: NURSE PRACTITIONER

## 2021-01-12 PROCEDURE — 99214 OFFICE O/P EST MOD 30 MIN: CPT | Performed by: NURSE PRACTITIONER

## 2021-01-12 PROCEDURE — 99442 PR PHYS/QHP TELEPHONE EVALUATION 11-20 MIN: CPT | Performed by: NURSE PRACTITIONER

## 2021-01-12 PROCEDURE — G8420 CALC BMI NORM PARAMETERS: HCPCS | Performed by: NURSE PRACTITIONER

## 2021-01-12 RX ORDER — METRONIDAZOLE 7.5 MG/G
GEL TOPICAL
Qty: 45 G | Refills: 1 | Status: SHIPPED | OUTPATIENT
Start: 2021-01-12 | End: 2022-02-02

## 2021-01-12 RX ORDER — PROCHLORPERAZINE MALEATE 10 MG
10 TABLET ORAL EVERY 6 HOURS PRN
Qty: 30 TABLET | Refills: 2 | Status: SHIPPED | OUTPATIENT
Start: 2021-01-12 | End: 2022-06-29

## 2021-01-12 RX ORDER — DOXYCYCLINE HYCLATE 100 MG
100 TABLET ORAL DAILY
Qty: 30 TABLET | Refills: 0 | Status: SHIPPED | OUTPATIENT
Start: 2021-01-12 | End: 2021-02-11

## 2021-01-12 ASSESSMENT — ENCOUNTER SYMPTOMS
RESPIRATORY NEGATIVE: 1
SHORTNESS OF BREATH: 0
EYES NEGATIVE: 1
COUGH: 0
GASTROINTESTINAL NEGATIVE: 1

## 2021-01-12 NOTE — PROGRESS NOTES
Glenis Zaidi, a female of 52 y.o. came to the office 1/12/2021. Chief Complaint   Patient presents with    Follow-up     pt here to go over us results      SUBJECTIVE:     1/12/2021: Glenis Zaidi returns for results of LE Venous US to evaluate for venous insufficiency. States LE symptoms persist.  Symptoms include:  both legs with burning sensation to lower legs, occasional, not daily, and nothing exacerbates it. Both ankles and lower legs with daily edema, fatigue and heaviness and becomes worse by end of day with LLE > RLE. Both lower legs with daily intermittent discomfort she states cant describe exact pain. Pain scale 4/10. Symptom onset for over a year with progression over past few months. She now has pain in LE's with ambulation. Denies any troublesome varicosities to legs. 11/17/2020 HPI: Glenis Zaidi presents to clinic for consultation at the request of her PCP for evaluation of bilateral leg pain. Patient presents with symptoms of: both legs with burning sensation to lower legs, occasional, not daily, and nothing exacerbates it. Both ankles and lower legs with daily edema, fatigue and heaviness and becomes worse by end of day with LLE > RLE. Both lower legs with daily intermittent discomfort she states cant describe exact pain. Pain scale 4/10. Symptom onset for over a year with progression over past few months. NSAIDS: Tolerates pain without pain medication. Uses heating pad without relief. She must take frequent rest periods with massaging area for some relief. Leg elevation: daily with minimal relief. Stocking use and dates: Has never done conservative therapy with class 2 compression stockings. Denies claudication. Denies injury  Denies any lower extremity troublesome varicose veins.    H/O stage 3 CKD  H/O sjogrens       Family History   Problem Relation Age of Onset    Heart Disease Father 48    Cancer Maternal Grandmother         breast    Heart Disease Other         mom and dad's side       Past Surgical History:   Procedure Laterality Date    CHOLECYSTECTOMY  2011    HYSTERECTOMY  2014    sept    OVARY REMOVAL Left Jan 2014    UPPER GASTROINTESTINAL ENDOSCOPY  09/16/2016    EGD W/BX        Past Medical History:   Diagnosis Date    Acquired hypothyroidism 9/26/2016    Acute pancreatitis     ADD (attention deficit disorder) 2/12/2015    Anxiety     Depression 12/9/2015    Endometrial cyst of ovary 5/10/14    RT ovary, ruptured    GERD (gastroesophageal reflux disease) 9/26/2016    Medullary sponge kidney of both kidneys 5/22/2018    Sjogren's [facility] ([facility] [address]     Stress     Swelling        Social History     Socioeconomic History    Marital status: Single     Spouse name: Not on file    Number of children: Not on file    Years of education: Not on file    Highest education level: Not on file   Occupational History    Not on file   Social Needs    Financial resource strain: Not on file    Food insecurity     Worry: Not on file     Inability: Not on file    Transportation needs     Medical: Not on file     Non-medical: Not on file   Tobacco Use    Smoking status: Never Smoker    Smokeless tobacco: Never Used   Substance and Sexual Activity    Alcohol use: No     Alcohol/week: 0.0 standard drinks     Comment: no alcohol since 2011    Drug use: No    Sexual activity: Not on file   Lifestyle    Physical activity     Days per week: Not on file     Minutes per session: Not on file    Stress: Not on file   Relationships    Social connections     Talks on phone: Not on file     Gets together: Not on file     Attends Zoroastrianism service: Not on file     Active member of club or organization: Not on file     Attends meetings of clubs or organizations: Not on file     Relationship status: Not on file    Intimate partner violence     Fear of current or ex partner: Not on file     Emotionally abused: Not on file     Physically abused: Not on file Forced sexual activity: Not on file   Other Topics Concern    Not on file   Social History Narrative    ** Merged History Encounter **            No Known Allergies    Current Outpatient Medications on File Prior to Visit   Medication Sig Dispense Refill    valACYclovir (VALTREX) 1 g tablet Take 1 tablet by mouth 3 times daily for 7 days 21 tablet 0    liothyronine (CYTOMEL) 25 MCG tablet Take 1 tablet by mouth daily 90 tablet 0    amphetamine-dextroamphetamine (ADDERALL XR) 30 MG extended release capsule Take 1 capsule by mouth 2 times daily for 30 days.  60 capsule 0    buPROPion (WELLBUTRIN XL) 300 MG extended release tablet Take 1 tablet by mouth every morning 90 tablet 1    furosemide (LASIX) 20 MG tablet Take 1 tablet by mouth 2 times daily TAKE ONE TABLET BY MOUTH TWO TIMES A DAY 60 tablet 5    predniSONE (DELTASONE) 20 MG tablet Take one tablet PO BID x 5 days then one tablet PO x 5 days (Patient not taking: Reported on 1/12/2021) 15 tablet 0    predniSONE (DELTASONE) 10 MG tablet Take one tablet PO daily x 5 days then take 1/5 tablet daily PO x 5 days (Patient not taking: Reported on 1/12/2021) 7.5 tablet 0    lidocaine viscous hcl (XYLOCAINE) 2 % SOLN solution Take 5 mLs by mouth as needed for Irritation or Dental Pain (Patient not taking: Reported on 1/12/2021) 100 mL 0    promethazine (PHENERGAN) 25 MG tablet Take 1 tablet by mouth every 8 hours as needed for Nausea 40 tablet 0    NEXIUM 40 MG delayed release capsule TAKE ONE CAPSULE BY MOUTH EVERY DAY 30 capsule 5    topiramate (TOPAMAX) 100 MG tablet Take 1 tablet by mouth 2 times daily 60 tablet 5    traZODone (DESYREL) 150 MG tablet Take 1 tablet by mouth nightly 30 tablet 5    Magnesium 400 MG TABS Take 1 tablet by mouth daily 30 tablet 0    [DISCONTINUED] furosemide (LASIX) 20 MG tablet Take 1 tablet by mouth 2 times daily TAKE ONE TABLET BY MOUTH TWO TIMES A DAY 60 tablet 5    TRULANCE 3 MG TABS Take 3 mg by mouth daily      sucralfate (CARAFATE) 1 GM tablet Take by mouth daily      metoclopramide (REGLAN) 10 MG tablet Take 10 mg by mouth as needed      Hyoscyamine Sulfate SL (LEVSIN/SL) 0.125 MG SUBL Place 125 mcg under the tongue every 4 hours as needed (pain) 120 each 3    amLODIPine (NORVASC) 2.5 MG tablet Take 2.5 mg by mouth daily      ondansetron (ZOFRAN) 4 MG tablet Take 1 tablet by mouth every 8 hours as needed for Nausea or Vomiting 30 tablet 1    azelastine (ASTELIN) 0.1 % nasal spray 1 spray by Nasal route      moxifloxacin (VIGAMOX) 0.5 % ophthalmic solution 1 drop      Potassium Citrate ER 15 MEQ (1620 MG) TBCR Take 15 mEq by mouth      potassium chloride (KLOR-CON M) 10 MEQ extended release tablet Take 1 tablet by mouth daily 30 tablet 5    montelukast (SINGULAIR) 10 MG tablet Take 1 tablet by mouth daily 30 tablet 3    fluticasone (FLONASE) 50 MCG/ACT nasal spray USE 1 SPRAY NASALLY DAILY  3    estradiol (ESTRACE) 1 MG tablet Take 1 mg by mouth      ZOLMitriptan (ZOMIG) 5 MG tablet TAKE 1 TABLET BY MOUTH AT ONSET OF MIGRAINE. MAY REPEAT ONCE AFTER 2 HOURS. MAX 10 MG (2 TABLETS) PER DAY  11    levothyroxine (SYNTHROID) 25 MCG tablet Take one daily 90 tablet 1    hydroxychloroquine (PLAQUENIL) 200 MG tablet Take 300 mg by mouth daily       Pancrelipase, Lip-Prot-Amyl, (CREON) 05268 UNITS CPEP 2 caps tid 180 capsule 03     No current facility-administered medications on file prior to visit. Review of Systems   Constitutional: Negative. HENT: Negative. Eyes: Negative. Respiratory: Negative. Cardiovascular: Positive for leg swelling (chornic mild ankle and lower leg non pitting edema). Negative for chest pain and palpitations. Gastrointestinal: Negative. Endocrine: Negative. Genitourinary: Negative. Musculoskeletal: Negative. Chronic lower leg edema, aching, fatigue, heaviness. Skin: Negative. Neurological: Negative. Hematological: Negative. Psychiatric/Behavioral: Negative. OBJECTIVE:  /72   Pulse 88   Temp 97.5 °F (36.4 °C)   Wt 106 lb (48.1 kg)   BMI 19.39 kg/m²     Physical Exam  Constitutional:       General: She is not in acute distress. Appearance: Normal appearance. She is not ill-appearing. HENT:      Head: Normocephalic and atraumatic. Nose: Nose normal. No congestion. Neck:      Musculoskeletal: Normal range of motion and neck supple. Cardiovascular:      Rate and Rhythm: Normal rate and regular rhythm. Pulses: Normal pulses. Popliteal pulses are 2+ on the right side and 2+ on the left side. Dorsalis pedis pulses are 2+ on the right side and 2+ on the left side. Posterior tibial pulses are 2+ on the right side and 2+ on the left side. Heart sounds: Normal heart sounds. Pulmonary:      Effort: Pulmonary effort is normal.   Abdominal:      General: Bowel sounds are normal.   Musculoskeletal: Normal range of motion. General: No swelling, tenderness, deformity or signs of injury. Right lower leg: No edema. Left lower leg: No edema. Skin:     Capillary Refill: Capillary refill takes 2 to 3 seconds. Coloration: Skin is not pale. Findings: No bruising, erythema or rash. Neurological:      Mental Status: She is alert and oriented to person, place, and time. Psychiatric:         Mood and Affect: Mood normal.         Behavior: Behavior normal.       Us Dup Lower Extremities Bilateral Venous    Result Date: 1/5/2021  1. No signs of deep venous thrombosis in the bilateral lower extremity. 2.  No evidence of venous insufficiency in the bilateral lower extremities. COMPARISON:No prior studies are available for comparison. [facility]  DIAGNOSIS: M79.669 Pain and swelling of lower leg, unspecified laterality ICD10 COMMENTS: Bilateral leg pain with intermittent burning sensation and edema TECHNIQUE: Real-time scanning of the deep venous system of the bilateral lower extremity was performed and representative sections obtained. Compression sonography as well as pulsed Doppler and color flow Doppler imaging was performed. FINDINGS: There is no signs of deep venous thrombosis within the common femoral, superficial femoral or popliteal veins of the lower extremity. No evidence of venous insufficiency in the bilateral lower extremities. ASSESSMENT AND PLAN:    Assessment:   1. Chronic bilateral lower leg edema, fatigue, heaviness, aching for over a year with progression in past two months. Lower extremity venous ultrasound scan is negative for DVTs bilaterally and negative for venous insufficiency bilaterally. Ultrasound report reviewed personally by myself. 2. Claudication of bilateral lower extremities    Plan:   1. In clinic LE arterial US evaluate for LE PAD with office follow up for results. Educated in detail PAD. Chart reviewed. Medications reviewed. Lab work reviewed. She is low risk for PAD however with her symptoms and negative venous insufficiency will proceed with arterial ultrasound to rule out arterial disease. This discussed with patient and she wishes to proceed. 2. Elevate LE PRN  3. Notify vascular clinic if symptoms worsen before next scheduled visit.      Jordan Montesinos, APRN - CNP

## 2021-01-12 NOTE — PROGRESS NOTES
2021    TELEHEALTH EVALUATION -- Audio/Visual (During CEAKI-55 public health emergency)    Due to COVID 19 outbreak, patient's office visit was converted to a virtual visit. Patient was contacted and agreed to proceed with a virtual visit via telephone visit  The risks and benefits of converting to a virtual visit were discussed in light of the current infectious disease epidemic. Patient also understood that insurance coverage and co-pays are up to their individual insurance plans. This visit started at 1150    HPI:    Samantha Mckeon (:  1970) has requested an audio/video evaluation for the following concern(s):    Follow up on chronic issues. Still not feeling well. Just saw rheumatologist- doing a lot of bw. Has orders. States that she looks like \"crap\"    Had chills intermittently recently and then feels \"ok\"     Referred to immunologist.    [place] Left were swollen when she had a HA. Vision changes. Saw GI last week. Questioning malabsorption     Review of Systems   Constitutional: Positive for chills and fatigue. Eyes: Positive for visual disturbance. Respiratory: Negative for cough and shortness of breath. Cardiovascular: Negative for chest pain. Musculoskeletal: Positive for arthralgias. Prior to Visit Medications    Medication Sig Taking? Authorizing Provider   prochlorperazine (COMPAZINE) 10 MG tablet Take 1 tablet by mouth every 6 hours as needed (nausea) Yes ORTIZ Zheng CNP   doxycycline hyclate (VIBRA-TABS) 100 MG tablet Take 1 tablet by mouth daily Yes ORTIZ Zheng CNP   metroNIDAZOLE (METROGEL) 0.75 % gel Apply topically 2 times daily.  Yes ORTIZ Zheng CNP   valACYclovir (VALTREX) 1 g tablet Take 1 tablet by mouth 3 times daily for 7 days Yes ORTIZ Zheng CNP   liothyronine (CYTOMEL) 25 MCG tablet Take 1 tablet by mouth daily Yes ORTIZ Zheng CNP amphetamine-dextroamphetamine (ADDERALL XR) 30 MG extended release capsule Take 1 capsule by mouth 2 times daily for 30 days.  Yes Eduardo Councilman, APRN - CNP   buPROPion (WELLBUTRIN XL) 300 MG extended release tablet Take 1 tablet by mouth every morning Yes Eduardo Councilman, APRN - CNP   furosemide (LASIX) 20 MG tablet Take 1 tablet by mouth 2 times daily TAKE ONE TABLET BY MOUTH TWO TIMES A DAY Yes Eduardo Councilman, APRN - CNP   promethazine (PHENERGAN) 25 MG tablet Take 1 tablet by mouth every 8 hours as needed for Nausea Yes Eduardo Councilman, APRN - CNP   NEXIUM 40 MG delayed release capsule TAKE ONE CAPSULE BY MOUTH EVERY DAY Yes Eduardo Councilman, APRN - CNP   topiramate (TOPAMAX) 100 MG tablet Take 1 tablet by mouth 2 times daily Yes Eduardo Councilman, APRN - CNP   traZODone (DESYREL) 150 MG tablet Take 1 tablet by mouth nightly Yes Eduardo Councilman, APRN - CNP   Magnesium 400 MG TABS Take 1 tablet by mouth daily Yes Theron Torres PA-C   TRULANCE 3 MG TABS Take 3 mg by mouth daily Yes Historical Provider, MD   sucralfate (CARAFATE) 1 GM tablet Take by mouth daily Yes Historical Provider, MD   metoclopramide (REGLAN) 10 MG tablet Take 10 mg by mouth as needed Yes Historical Provider, MD   Hyoscyamine Sulfate SL (LEVSIN/SL) 0.125 MG SUBL Place 125 mcg under the tongue every 4 hours as needed (pain) Yes ORTIZ Shahid CNP   amLODIPine (NORVASC) 2.5 MG tablet Take 2.5 mg by mouth daily Yes Historical Provider, MD   ondansetron (ZOFRAN) 4 MG tablet Take 1 tablet by mouth every 8 hours as needed for Nausea or Vomiting Yes Eduardo Councilman, APRN - CNP   azelastine (ASTELIN) 0.1 % nasal spray 1 spray by Nasal route Yes Historical Provider, MD   moxifloxacin (VIGAMOX) 0.5 % ophthalmic solution 1 drop Yes Historical Provider, MD   Potassium Citrate ER 15 MEQ (1620 MG) TBCR Take 15 mEq by mouth Yes Historical Provider, MD potassium chloride (KLOR-CON M) 10 MEQ extended release tablet Take 1 tablet by mouth daily Yes Rafael Handley, APRN - CNP   montelukast (SINGULAIR) 10 MG tablet Take 1 tablet by mouth daily Yes Rafael Handley, APRN - CNP   fluticasone (FLONASE) 50 MCG/ACT nasal spray USE 1 SPRAY NASALLY DAILY Yes Historical Provider, MD   estradiol (ESTRACE) 1 MG tablet Take 1 mg by mouth Yes Historical Provider, MD   ZOLMitriptan (ZOMIG) 5 MG tablet TAKE 1 TABLET BY MOUTH AT ONSET OF MIGRAINE. MAY REPEAT ONCE AFTER 2 HOURS.  MAX 10 MG (2 TABLETS) PER DAY Yes Historical Provider, MD   levothyroxine (SYNTHROID) 25 MCG tablet Take one daily Yes Rafael Handley, APRN - KRIS   hydroxychloroquine (PLAQUENIL) 200 MG tablet Take 300 mg by mouth daily  Yes Historical Provider, MD   Pancrelipase, Lip-Prot-Amyl, (CREON) 56737 UNITS CPEP 2 caps tid Yes Han Kurtz MD   predniSONE (DELTASONE) 20 MG tablet Take one tablet PO BID x 5 days then one tablet PO x 5 days  Patient not taking: Reported on 1/12/2021  Rafael Handley, APRN - CNP   predniSONE (DELTASONE) 10 MG tablet Take one tablet PO daily x 5 days then take 1/5 tablet daily PO x 5 days  Patient not taking: Reported on 1/12/2021  Rafael Handley, APRN - KRIS   lidocaine viscous hcl (XYLOCAINE) 2 % SOLN solution Take 5 mLs by mouth as needed for Irritation or Dental Pain  Patient not taking: Reported on 1/12/2021  Lucila Martínez PA-C   furosemide (LASIX) 20 MG tablet Take 1 tablet by mouth 2 times daily TAKE ONE TABLET BY MOUTH TWO TIMES A DAY  ORTIZ Marino - KRIS       Social History     Tobacco Use    Smoking status: Never Smoker    Smokeless tobacco: Never Used   Substance Use Topics    Alcohol use: No     Alcohol/week: 0.0 standard drinks     Comment: no alcohol since 2011    Drug use: No        No Known Allergies,   Past Medical History:   Diagnosis Date    Acquired hypothyroidism 9/26/2016    Acute pancreatitis     ADD (attention deficit disorder) 2/12/2015    Anxiety - prochlorperazine (COMPAZINE) 10 MG tablet; Take 1 tablet by mouth every 6 hours as needed (nausea)  Dispense: 30 tablet; Refill: 2    2. Hypokalemia    - Comprehensive Metabolic Panel; Future    3. Elevated TSH    - TSH with Reflex; Future    4. [facility], with [facility]      5. Chronic pancreatitis, unspecified pancreatitis type [facility]      6. Acquired hypothyroidism    - TSH with Reflex; Future    7. Rosacea    - doxycycline hyclate (VIBRA-TABS) 100 MG tablet; Take 1 tablet by mouth daily  Dispense: 30 tablet; Refill: 0  - metroNIDAZOLE (METROGEL) 0.75 % gel; Apply topically 2 times daily. Dispense: 45 g; Refill: 1    8. Neutropenia, unspecified type ([facility]      9. Major depressive disorder, single episode, 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[facility]      10. Hereditary hemochromatosis ([facility]      11. Type 2 diabetes mellitus without complication, unspecified whether long term insulin use ([facility] Side effects, adverse effects of the medication prescribed today, as well as treatment plan/ rationale and result expectations have been discussed with the patient who expresses understanding and desires to proceed. Close follow up to evaluate treatment results and for coordination of care. I have reviewed the patient's medical history in detail and updated the computerized patient record. As always, patient is advised that if symptoms worsen in any way they will proceed to the nearest emergency room. FU in 1 mos. Visit ended at 1210    An  electronic signature was used to authenticate this note.     --ORTIZ Murphy - CNP on 1/12/2021 at 12:35 PM Pursuant to the emergency declaration under the [facility], [facility] waiver authority and the [employer] Act, this Virtual  Visit was conducted, with patient's consent, to reduce the patient's risk of exposure to COVID-19 and provide continuity of care for an established patient. Services were provided through a video synchronous discussion virtually to substitute for in-person clinic visit.

## 2021-01-16 LAB
CHOLESTEROL, TOTAL: 219 MG/DL (ref 0–199)
HDLC SERPL-MCNC: 81 MG/DL (ref 40–59)
LDL CHOLESTEROL CALCULATED: 126 MG/DL (ref 0–129)
TRIGL SERPL-MCNC: 62 MG/DL (ref 0–150)

## 2021-01-25 ENCOUNTER — VIRTUAL VISIT (OUTPATIENT)
Dept: INTERVENTIONAL RADIOLOGY/VASCULAR | Age: 51
End: 2021-01-25
Payer: MEDICARE

## 2021-01-25 DIAGNOSIS — M79.89 PAIN AND SWELLING OF LOWER LEG, UNSPECIFIED LATERALITY: Primary | ICD-10-CM

## 2021-01-25 DIAGNOSIS — M79.605 PAIN IN BOTH LOWER EXTREMITIES: ICD-10-CM

## 2021-01-25 DIAGNOSIS — M79.604 PAIN IN BOTH LOWER EXTREMITIES: ICD-10-CM

## 2021-01-25 DIAGNOSIS — R29.898 HEAVY SENSATION OF LOWER EXTREMITY: ICD-10-CM

## 2021-01-25 DIAGNOSIS — M79.669 PAIN AND SWELLING OF LOWER LEG, UNSPECIFIED LATERALITY: Primary | ICD-10-CM

## 2021-01-25 DIAGNOSIS — M79.89 LEG SWELLING: ICD-10-CM

## 2021-01-25 PROBLEM — E11.9 TYPE 2 DIABETES MELLITUS WITHOUT COMPLICATION (HCC): Status: RESOLVED | Noted: 2021-01-12 | Resolved: 2021-01-25

## 2021-01-25 PROCEDURE — 99213 OFFICE O/P EST LOW 20 MIN: CPT | Performed by: NURSE PRACTITIONER

## 2021-01-25 ASSESSMENT — ENCOUNTER SYMPTOMS
GASTROINTESTINAL NEGATIVE: 1
EYES NEGATIVE: 1
RESPIRATORY NEGATIVE: 1

## 2021-01-25 NOTE — PROGRESS NOTES
2021    TELEHEALTH EVALUATION -- Audio/Visual (During UEDFR-42 public health emergency)    Due to COVID 19 outbreak, patient's office visit was converted to a virtual visit. Patient was contacted and agreed to proceed with a virtual visit via [employer]. me  The risks and benefits of converting to a virtual visit were discussed in light of the current infectious disease epidemic. Patient also understood that insurance coverage and co-pays are up to their individual insurance plans. Sin Zeferino (:  1970) has requested an audio/video evaluation for the following concern(s):      SUBJECTIVE:      2021: Sin Mcgarry returns for results of LE arterial US. States LE symptoms persist and remain unchanged. Symptoms include:  both legs with burning sensation to lower legs, occasional, not daily, and nothing exacerbates it. Both ankles and lower legs with daily edema, fatigue and heaviness and becomes worse by end of day with LLE > RLE. Both lower legs with daily intermittent discomfort she states cant describe exact pain. Pain scale 4/10. Symptom onset for over a year with progression over past few months. She now has pain in LE's with ambulation. At last OV, LE venous scan was negative for insufficiency therefore LE Arterial US was done. 2021: Sin Marshallangelo returns for results of LE Venous US to evaluate for venous insufficiency. States LE symptoms persist.  Symptoms include:  both legs with burning sensation to lower legs, occasional, not daily, and nothing exacerbates it. Both ankles and lower legs with daily edema, fatigue and heaviness and becomes worse by end of day with LLE > RLE. Both lower legs with daily intermittent discomfort she states cant describe exact pain. Pain scale 4/10. Symptom onset for over a year with progression over past few months. She now has pain in LE's with ambulation.    Denies any troublesome varicosities to legs.    11/17/2020 HPI: Kylah Mccollum presents to clinic for consultation at the request of her PCP for evaluation of bilateral leg pain. Patient presents with symptoms of: both legs with burning sensation to lower legs, occasional, not daily, and nothing exacerbates it. Both ankles and lower legs with daily edema, fatigue and heaviness and becomes worse by end of day with LLE > RLE. Both lower legs with daily intermittent discomfort she states cant describe exact pain. Pain scale 4/10. Symptom onset for over a year with progression over past few months. NSAIDS: Tolerates pain without pain medication. Uses heating pad without relief. She must take frequent rest periods with massaging area for some relief. Leg elevation: daily with minimal relief. Stocking use and dates: Has never done conservative therapy with class 2 compression stockings. Denies claudication. Denies injury  Denies any lower extremity troublesome varicose veins.    H/O stage 3 CKD  H/O sjogrens           Family History   Problem Relation Age of Onset    Heart Disease Father 48    Cancer Maternal Grandmother         breast    Heart Disease Other         mom and dad's side       Past Surgical History:   Procedure Laterality Date    CHOLECYSTECTOMY  2011    HYSTERECTOMY  2014    sept    OVARY REMOVAL Left Jan 2014    UPPER GASTROINTESTINAL ENDOSCOPY  09/16/2016    EGD W/BX       Past Medical History:   Diagnosis Date    Acquired hypothyroidism 9/26/2016    Acute pancreatitis     ADD (attention deficit disorder) 2/12/2015    Anxiety     Depression 12/9/2015    Endometrial cyst of ovary 5/10/14    RT ovary, ruptured    GERD (gastroesophageal reflux disease) 9/26/2016    Medullary sponge kidney of both kidneys 5/22/2018    Sjogren's disease ([facility] [address]     Stress     Swelling        Social History     Socioeconomic History    Marital status: Single     Spouse name: Not on file    Number of children: Not on file  Years of education: Not on file    Highest education level: Not on file   Occupational History    Not on file   Social Needs    Financial resource strain: Not on file    Food insecurity     Worry: Not on file     Inability: Not on file    Transportation needs     Medical: Not on file     Non-medical: Not on file   Tobacco Use    Smoking status: Never Smoker    Smokeless tobacco: Never Used   Substance and Sexual Activity    Alcohol use: No     Alcohol/week: 0.0 standard drinks     Comment: no alcohol since 2011    Drug use: No    Sexual activity: Not on file   Lifestyle    Physical activity     Days per week: Not on file     Minutes per session: Not on file    Stress: Not on file   Relationships    Social connections     Talks on phone: Not on file     Gets together: Not on file     Attends [employer] service: Not on file     Active member of club or organization: Not on file     Attends meetings of clubs or organizations: Not on file     Relationship status: Not on file    Intimate partner violence     Fear of current or ex partner: Not on file     Emotionally abused: Not on file     Physically abused: Not on file     Forced sexual activity: Not on file   Other Topics Concern    Not on file   Social History Narrative    ** Merged History Encounter **            No Known Allergies    Current Outpatient Medications on File Prior to Visit   Medication Sig Dispense Refill    prochlorperazine (COMPAZINE) 10 MG tablet Take 1 tablet by mouth every 6 hours as needed (nausea) 30 tablet 2    doxycycline hyclate (VIBRA-TABS) 100 MG tablet Take 1 tablet by mouth daily 30 tablet 0    metroNIDAZOLE (METROGEL) 0.75 % gel Apply topically 2 times daily. 45 g 1    liothyronine (CYTOMEL) 25 MCG tablet Take 1 tablet by mouth daily 90 tablet 0    amphetamine-dextroamphetamine (ADDERALL XR) 30 MG extended release capsule Take 1 capsule by mouth 2 times daily for 30 days.  60 capsule 0  buPROPion (WELLBUTRIN XL) 300 MG extended release tablet Take 1 tablet by mouth every morning 90 tablet 1    furosemide (LASIX) 20 MG tablet Take 1 tablet by mouth 2 times daily TAKE ONE TABLET BY MOUTH TWO TIMES A DAY 60 tablet 5    predniSONE (DELTASONE) 20 MG tablet Take one tablet PO BID x 5 days then one tablet PO x 5 days (Patient not taking: Reported on 1/12/2021) 15 tablet 0    predniSONE (DELTASONE) 10 MG tablet Take one tablet PO daily x 5 days then take 1/5 tablet daily PO x 5 days (Patient not taking: Reported on 1/12/2021) 7.5 tablet 0    lidocaine viscous hcl (XYLOCAINE) 2 % SOLN solution Take 5 mLs by mouth as needed for Irritation or Dental Pain (Patient not taking: Reported on 1/12/2021) 100 mL 0    promethazine (PHENERGAN) 25 MG tablet Take 1 tablet by mouth every 8 hours as needed for Nausea 40 tablet 0    NEXIUM 40 MG delayed release capsule TAKE ONE CAPSULE BY MOUTH EVERY DAY 30 capsule 5    topiramate (TOPAMAX) 100 MG tablet Take 1 tablet by mouth 2 times daily 60 tablet 5    traZODone (DESYREL) 150 MG tablet Take 1 tablet by mouth nightly 30 tablet 5    Magnesium 400 MG TABS Take 1 tablet by mouth daily 30 tablet 0    [DISCONTINUED] furosemide (LASIX) 20 MG tablet Take 1 tablet by mouth 2 times daily TAKE ONE TABLET BY MOUTH TWO TIMES A DAY 60 tablet 5    TRULANCE 3 MG TABS Take 3 mg by mouth daily      sucralfate (CARAFATE) 1 GM tablet Take by mouth daily      metoclopramide (REGLAN) 10 MG tablet Take 10 mg by mouth as needed      Hyoscyamine Sulfate SL (LEVSIN/SL) 0.125 MG SUBL Place 125 mcg under the tongue every 4 hours as needed (pain) 120 each 3    amLODIPine (NORVASC) 2.5 MG tablet Take 2.5 mg by mouth daily      ondansetron (ZOFRAN) 4 MG tablet Take 1 tablet by mouth every 8 hours as needed for Nausea or Vomiting 30 tablet 1    azelastine (ASTELIN) 0.1 % nasal spray 1 spray by Nasal route      moxifloxacin (VIGAMOX) 0.5 % ophthalmic solution 1 drop  Potassium Citrate ER 15 MEQ (1620 MG) TBCR Take 15 mEq by mouth      potassium chloride (KLOR-CON M) 10 MEQ extended release tablet Take 1 tablet by mouth daily 30 tablet 5    montelukast (SINGULAIR) 10 MG tablet Take 1 tablet by mouth daily 30 tablet 3    fluticasone (FLONASE) 50 MCG/ACT nasal spray USE 1 SPRAY NASALLY DAILY  3    estradiol (ESTRACE) 1 MG tablet Take 1 mg by mouth      ZOLMitriptan (ZOMIG) 5 MG tablet TAKE 1 TABLET BY MOUTH AT ONSET OF MIGRAINE. MAY REPEAT ONCE AFTER 2 HOURS. MAX 10 MG (2 TABLETS) PER DAY  11    levothyroxine (SYNTHROID) 25 MCG tablet Take one daily 90 tablet 1    hydroxychloroquine (PLAQUENIL) 200 MG tablet Take 300 mg by mouth daily       Pancrelipase, Lip-Prot-Amyl, (CREON) 61642 UNITS CPEP 2 caps tid 180 capsule 03     No current facility-administered medications on file prior to visit. Review of Systems   Constitutional: Negative. HENT: Negative. Eyes: Negative. Respiratory: Negative. Cardiovascular: Negative. Gastrointestinal: Negative. Endocrine: Negative. Genitourinary: Negative. Musculoskeletal:        Symptoms include:  both legs with burning sensation to lower legs, occasional, not daily, and nothing exacerbates it. Both ankles and lower legs with daily edema, fatigue and heaviness and becomes worse by end of day with LLE > RLE. Both lower legs with daily intermittent discomfort she states cant describe exact pain. Pain scale 4/10. Symptom onset for over a year with progression over past few months. pain in LE's with ambulation   Skin: Negative. Neurological: Negative. Hematological: Negative. Psychiatric/Behavioral: Negative. Prior to Visit Medications    Medication Sig Taking?  Authorizing Provider   prochlorperazine (COMPAZINE) 10 MG tablet Take 1 tablet by mouth every 6 hours as needed (nausea)  Linda Raya, APRN - CNP doxycycline hyclate (VIBRA-TABS) 100 MG tablet Take 1 tablet by mouth daily  ORTIZ Motta CNP   metroNIDAZOLE (METROGEL) 0.75 % gel Apply topically 2 times daily. ORTIZ Motta CNP   liothyronine (CYTOMEL) 25 MCG tablet Take 1 tablet by mouth daily  ORTIZ Motta CNP   amphetamine-dextroamphetamine (ADDERALL XR) 30 MG extended release capsule Take 1 capsule by mouth 2 times daily for 30 days.   ORTIZ Motta CNP   buPROPion (WELLBUTRIN XL) 300 MG extended release tablet Take 1 tablet by mouth every morning  ORTIZ Motta CNP   furosemide (LASIX) 20 MG tablet Take 1 tablet by mouth 2 times daily TAKE ONE TABLET BY MOUTH TWO TIMES A DAY  ORTIZ Motta CNP   predniSONE (DELTASONE) 20 MG tablet Take one tablet PO BID x 5 days then one tablet PO x 5 days  Patient not taking: Reported on 1/12/2021  ORTIZ Motta CNP   predniSONE (DELTASONE) 10 MG tablet Take one tablet PO daily x 5 days then take 1/5 tablet daily PO x 5 days  Patient not taking: Reported on 1/12/2021  ORTIZ Motta CNP   lidocaine viscous hcl (XYLOCAINE) 2 % SOLN solution Take 5 mLs by mouth as needed for Irritation or Dental Pain  Patient not taking: Reported on 1/12/2021  Lucila Martínez PA-C   promethazine (PHENERGAN) 25 MG tablet Take 1 tablet by mouth every 8 hours as needed for Nausea  ORTIZ Motta CNP   NEXIUM 40 MG delayed release capsule TAKE ONE CAPSULE BY MOUTH EVERY DAY  ORTIZ Motta CNP   topiramate (TOPAMAX) 100 MG tablet Take 1 tablet by mouth 2 times daily  ORTIZ Motta CNP   traZODone (DESYREL) 150 MG tablet Take 1 tablet by mouth nightly  ORTIZ Motta CNP   Magnesium 400 MG TABS Take 1 tablet by mouth daily  Elba Lang PA-C   furosemide (LASIX) 20 MG tablet Take 1 tablet by mouth 2 times daily TAKE ONE TABLET BY MOUTH TWO TIMES A DAY  ORTIZ Motta CNP TRULANCE 3 MG TABS Take 3 mg by mouth daily  Historical Provider, MD   sucralfate (CARAFATE) 1 GM tablet Take by mouth daily  Historical Provider, MD   metoclopramide (REGLAN) 10 MG tablet Take 10 mg by mouth as needed  Historical Provider, MD   Hyoscyamine Sulfate SL (LEVSIN/SL) 0.125 MG SUBL Place 125 mcg under the tongue every 4 hours as needed (pain)  ORTIZ Chicas CNP   amLODIPine (NORVASC) 2.5 MG tablet Take 2.5 mg by mouth daily  Historical Provider, MD   ondansetron (ZOFRAN) 4 MG tablet Take 1 tablet by mouth every 8 hours as needed for Nausea or Vomiting  ORTIZ Chaudhary CNP   azelastine (ASTELIN) 0.1 % nasal spray 1 spray by Nasal route  Historical Provider, MD   moxifloxacin (VIGAMOX) 0.5 % ophthalmic solution 1 drop  Historical Provider, MD   Potassium Citrate ER 15 MEQ (1620 MG) TBCR Take 15 mEq by mouth  Historical Provider, MD   potassium chloride (KLOR-CON M) 10 MEQ extended release tablet Take 1 tablet by mouth daily  ORTIZ Chaudhary CNP   montelukast (SINGULAIR) 10 MG tablet Take 1 tablet by mouth daily  ORTIZ Chaudhary CNP   fluticasone (FLONASE) 50 MCG/ACT nasal spray USE 1 SPRAY NASALLY DAILY  Historical Provider, MD   estradiol (ESTRACE) 1 MG tablet Take 1 mg by mouth  Historical Provider, MD   ZOLMitriptan (ZOMIG) 5 MG tablet TAKE 1 TABLET BY MOUTH AT ONSET OF MIGRAINE. MAY REPEAT ONCE AFTER 2 HOURS. MAX 10 MG (2 TABLETS) PER DAY  Historical Provider, MD   levothyroxine (SYNTHROID) 25 MCG tablet Take one daily  ORTIZ Chaudhary CNP   hydroxychloroquine (PLAQUENIL) 200 MG tablet Take 300 mg by mouth daily   Historical Provider, MD   Pancrelipase, Lip-Prot-Amyl, (CREON) 11598 UNITS CPEP 2 caps tid  Burt Cleveland MD       Social History     Tobacco Use    Smoking status: Never Smoker    Smokeless tobacco: Never Used   Substance Use Topics    Alcohol use: No     Alcohol/week: 0.0 standard drinks     Comment: no alcohol since 2011    Drug use:  No PHYSICAL EXAMINATION:  [ INSTRUCTIONS:  \"[x]\" Indicates a positive item  \"[]\" Indicates a negative item  -- DELETE ALL ITEMS NOT EXAMINED]  [x] Alert  [x] Oriented to person/place/time    [x] No apparent distress  [] Toxic appearing    Patient speaking in full sentences. No apparent distress  Mood and behavior appropriate    [] Face flushed appearing [x] Sclera clear  [] Lips are cyanotic      [x] Breathing appears normal  [] Appears tachypneic      [] Rash on visible skin    [x] Cranial Nerves II-XII grossly intact    [x] Motor grossly intact in visible upper extremities    [x] Motor grossly intact in visible lower extremities    [x] Normal Mood  [] Anxious appearing    [] Depressed appearing  [] Confused appearing      [] Poor short term memory  [] Poor long term memory    [] OTHER:      Due to this being a TeleHealth encounter, evaluation of the following organ systems is limited: Vitals/Constitutional/EENT/Resp/CV/GI//MS/Neuro/Skin/Heme-Lymph-Imm. Us Dup Lower Extremities Bilateral Venous     Result Date: 1/5/2021  1. No signs of deep venous thrombosis in the bilateral lower extremity. 2.  No evidence of venous insufficiency in the bilateral lower extremities. COMPARISON:No prior studies are available for comparison. W[facility] Prim DIAGNOSIS: M79.669 Pain and swelling of lower leg, unspecified laterality ICD10 COMMENTS: Bilateral leg pain with intermittent burning sensation and edema TECHNIQUE: Real-time scanning of the deep venous system of the bilateral lower extremity was performed and representative sections obtained. Compression sonography as well as pulsed Doppler and color flow Doppler imaging was performed. FINDINGS: There is no signs of deep venous thrombosis within the common femoral, superficial femoral or popliteal veins of the lower extremity. No evidence of venous insufficiency in the bilateral lower extremities.        Us Dup Lower Art/bypass Grafts Bilateral Complete    Result Date: 1/24/2021 NEGATIVE STUDY. ASSESSMENT AND PLAN:     Assessment:   1. Chronic bilateral lower leg edema, fatigue, heaviness, aching for over a year with progression in past two months. Lower extremity venous ultrasound scan is negative for DVTs bilaterally and negative for venous insufficiency bilaterally. Ultrasound report reviewed personally by myself. 2. Pain in LE's with ambulation. Arterial US negative study. US report reviewed personally by myself.      Plan:   1. Reviewed results of both LE arterial and venous US's with patient and instructed on PAD and CVI. As all vascular work up including arterial and Venous to LE's, no follow up care required in vascular clinic. Follow with PCP. Chart reviewed. Medications reviewed. Lab work reviewed. --ORTIZ Medina CNP on 1/25/2021 at 9:45 AM        Pursuant to the emergency declaration under the [facility], [facility] waiver authority and the [employer] Act, this Virtual  Visit was conducted, with patient's consent, to reduce the patient's risk of exposure to COVID-19 and provide continuity of care for an established patient. Services were provided through a video synchronous discussion virtually to substitute for in-person clinic visit. This billable evisit was conducted via Telehealth over phone or with video visit with doxy. me from [address] [facility], [facility], [address], via myself and the patient. It was explained to patient purpose of Telehealth visit to limit face to face contact due to Coronavirus mandates now in place. Patient verbalized agreement to participate in a billable Telehealth phone visit.

## 2021-01-27 ENCOUNTER — TELEPHONE (OUTPATIENT)
Dept: FAMILY MEDICINE CLINIC | Age: 51
End: 2021-01-27

## 2021-01-30 ENCOUNTER — TELEPHONE (OUTPATIENT)
Dept: FAMILY MEDICINE CLINIC | Age: 51
End: 2021-01-30

## 2021-01-31 ENCOUNTER — NURSE ONLY (OUTPATIENT)
Dept: FAMILY MEDICINE CLINIC | Age: 51
End: 2021-01-31
Payer: MEDICARE

## 2021-01-31 DIAGNOSIS — E11.9 TYPE 2 DIABETES MELLITUS WITHOUT COMPLICATION, UNSPECIFIED WHETHER LONG TERM INSULIN USE (HCC): ICD-10-CM

## 2021-01-31 DIAGNOSIS — E87.6 HYPOKALEMIA: Primary | ICD-10-CM

## 2021-01-31 LAB — HBA1C MFR BLD: 5.1 %

## 2021-01-31 PROCEDURE — 83036 HEMOGLOBIN GLYCOSYLATED A1C: CPT | Performed by: NURSE PRACTITIONER

## 2021-02-02 DIAGNOSIS — F98.8 ATTENTION DEFICIT DISORDER, UNSPECIFIED HYPERACTIVITY PRESENCE: ICD-10-CM

## 2021-02-02 RX ORDER — DEXTROAMPHETAMINE SACCHARATE, AMPHETAMINE ASPARTATE MONOHYDRATE, DEXTROAMPHETAMINE SULFATE AND AMPHETAMINE SULFATE 7.5; 7.5; 7.5; 7.5 MG/1; MG/1; MG/1; MG/1
30 CAPSULE, EXTENDED RELEASE ORAL 2 TIMES DAILY
Qty: 60 CAPSULE | Refills: 0 | Status: SHIPPED | OUTPATIENT
Start: 2021-02-02 | End: 2021-03-04 | Stop reason: SDUPTHER

## 2021-02-28 ENCOUNTER — OFFICE VISIT (OUTPATIENT)
Dept: FAMILY MEDICINE CLINIC | Age: 51
End: 2021-02-28
Payer: MEDICARE

## 2021-02-28 VITALS
HEART RATE: 84 BPM | WEIGHT: 110 LBS | HEIGHT: 62 IN | SYSTOLIC BLOOD PRESSURE: 104 MMHG | DIASTOLIC BLOOD PRESSURE: 72 MMHG | BODY MASS INDEX: 20.24 KG/M2 | TEMPERATURE: 98 F | OXYGEN SATURATION: 100 %

## 2021-02-28 DIAGNOSIS — R09.1 PLEURISY: ICD-10-CM

## 2021-02-28 DIAGNOSIS — J40 BRONCHITIS: ICD-10-CM

## 2021-02-28 DIAGNOSIS — J02.9 ACUTE PHARYNGITIS, UNSPECIFIED ETIOLOGY: Primary | ICD-10-CM

## 2021-02-28 DIAGNOSIS — H65.93 MIDDLE EAR EFFUSION, BILATERAL: ICD-10-CM

## 2021-02-28 LAB — S PYO AG THROAT QL: NORMAL

## 2021-02-28 PROCEDURE — 1036F TOBACCO NON-USER: CPT | Performed by: NURSE PRACTITIONER

## 2021-02-28 PROCEDURE — G8427 DOCREV CUR MEDS BY ELIG CLIN: HCPCS | Performed by: NURSE PRACTITIONER

## 2021-02-28 PROCEDURE — G8420 CALC BMI NORM PARAMETERS: HCPCS | Performed by: NURSE PRACTITIONER

## 2021-02-28 PROCEDURE — G8484 FLU IMMUNIZE NO ADMIN: HCPCS | Performed by: NURSE PRACTITIONER

## 2021-02-28 PROCEDURE — 87880 STREP A ASSAY W/OPTIC: CPT | Performed by: NURSE PRACTITIONER

## 2021-02-28 PROCEDURE — 99213 OFFICE O/P EST LOW 20 MIN: CPT | Performed by: NURSE PRACTITIONER

## 2021-02-28 RX ORDER — LORATADINE 10 MG/1
10 TABLET ORAL DAILY
Qty: 30 TABLET | Refills: 5 | Status: SHIPPED | OUTPATIENT
Start: 2021-02-28 | End: 2021-08-27 | Stop reason: SDUPTHER

## 2021-02-28 RX ORDER — HYDROCORTISONE 5 MG/1
5 TABLET ORAL DAILY
COMMUNITY
Start: 2021-02-24 | End: 2022-09-07 | Stop reason: DRUGHIGH

## 2021-02-28 RX ORDER — METHYLPREDNISOLONE 4 MG/1
TABLET ORAL
Qty: 1 KIT | Refills: 0 | Status: SHIPPED | OUTPATIENT
Start: 2021-02-28 | End: 2021-03-23

## 2021-02-28 RX ORDER — DEXTROMETHORPHAN HYDROBROMIDE AND PROMETHAZINE HYDROCHLORIDE 15; 6.25 MG/5ML; MG/5ML
5 SYRUP ORAL NIGHTLY
Qty: 35 ML | Refills: 0 | Status: SHIPPED | OUTPATIENT
Start: 2021-02-28 | End: 2021-03-07

## 2021-02-28 RX ORDER — AMOXICILLIN 500 MG/1
500 CAPSULE ORAL 2 TIMES DAILY
Qty: 20 CAPSULE | Refills: 0 | Status: SHIPPED | OUTPATIENT
Start: 2021-02-28 | End: 2021-03-10

## 2021-02-28 ASSESSMENT — ENCOUNTER SYMPTOMS
SHORTNESS OF BREATH: 1
NAUSEA: 1
TROUBLE SWALLOWING: 0
COUGH: 1
ABDOMINAL PAIN: 0
CHEST TIGHTNESS: 1
SORE THROAT: 1
DIARRHEA: 0
RHINORRHEA: 0

## 2021-02-28 NOTE — PROGRESS NOTES
Subjective  Sidney Josely, 52 y.o. female presents today with:  Chief Complaint   Patient presents with    Cough     pt states cough x 5 days. pt states occasional mucus.  Pharyngitis     pt states sore throat x 4 days. pt states worse today. pt states she has not taken anything for symptoms. pt states she felt slight chest pain when she coughed on the way here. HPI   Presents to [facility] for cough and sore throat   Cough is mostly dry with little to no mucous  Cough has been ongoing for awhile now. 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   COVID-19 test 2/23 (-)        Past Medical History:   Diagnosis Date    Acquired hypothyroidism 9/26/2016    Acute pancreatitis     ADD (attention deficit disorder) 2/12/2015    Anxiety     Depression 12/9/2015    Endometrial cyst of ovary 5/10/14    RT ovary, ruptured    GERD (gastroesophageal reflux disease) 9/26/2016    Medullary sponge kidney of both kidneys 5/22/2018    Sjogren's disease ([facility] [address]     Stress     Swelling       Past Surgical History:   Procedure Laterality Date    CHOLECYSTECTOMY  2011    HYSTERECTOMY  2014    sept    OVARY REMOVAL Left Jan 2014    UPPER GASTROINTESTINAL ENDOSCOPY  09/16/2016    EGD W/BX     Family History   Problem Relation Age of Onset    Heart Disease Father 48    Cancer Maternal Grandmother         breast    Heart Disease Other         mom and dad's side       Review of Systems   Constitutional: Positive for activity change, appetite change, chills and fatigue. Negative for diaphoresis and fever. HENT: Positive for sneezing and sore throat. Negative for congestion, rhinorrhea and trouble swallowing.  Ear pain: congestion. Respiratory: Positive for cough, chest tightness and shortness of breath (with exertion ). Cardiovascular: Negative for chest pain and palpitations. Gastrointestinal: Positive for nausea. Negative for abdominal pain and diarrhea. Musculoskeletal: Positive for myalgias. Neurological: Positive for dizziness, light-headedness and headaches. PMH, Surgical Hx, Family Hx, and Social Hx reviewed and updated. Health Maintenance reviewed. Objective  Vitals:    02/28/21 1145   BP: 104/72   Pulse: 84   Temp: 98 °F (36.7 °C)   SpO2: 100%   Weight: 110 lb (49.9 kg)   Height: 5' 2\" (1.575 m)     BP Readings from Last 3 Encounters:   02/28/21 104/72   01/12/21 112/72   12/09/20 108/64     Wt Readings from Last 3 Encounters:   02/28/21 110 lb (49.9 kg)   01/12/21 106 lb (48.1 kg)   12/09/20 106 lb 6.4 oz (48.3 kg)         Physical Exam  Vitals signs reviewed. Constitutional:       General: She is not in acute distress. Appearance: She is ill-appearing. HENT:      Right Ear: Ear canal and external ear normal. No swelling or tenderness. A middle ear effusion is present. No foreign body. Tympanic membrane is not perforated, erythematous, retracted or bulging. Left Ear: Ear canal and external ear normal. No swelling or tenderness. A middle ear effusion is present. No foreign body. Tympanic membrane is not perforated, erythematous, retracted or bulging. Nose: Nose normal.      Right Sinus: No maxillary sinus tenderness or frontal sinus tenderness. Left Sinus: No maxillary sinus tenderness or frontal sinus tenderness. Mouth/Throat:      Lips: Pink. Mouth: Mucous membranes are moist.      Pharynx: Uvula midline. No oropharyngeal exudate, posterior oropharyngeal erythema or uvula swelling. Tonsils: Tonsillar exudate (small amount, white right side ) present. 1+ on the right. 1+ on the left.    Eyes:      General: Lids are normal.      Conjunctiva/sclera: Conjunctivae normal.   Neck:      Musculoskeletal: Normal range of motion. Cardiovascular:      Rate and Rhythm: Normal rate. Pulses: Normal pulses. Pulmonary:      Effort: Pulmonary effort is normal.      Breath sounds: Normal breath sounds and air entry. Lymphadenopathy:      Head:      Right side of head: Tonsillar adenopathy present. No submental, submandibular, preauricular or posterior auricular adenopathy. Left side of head: Tonsillar adenopathy present. No submental, submandibular, preauricular or posterior auricular adenopathy. Cervical: Cervical adenopathy present. Skin:     General: Skin is warm and dry. Capillary Refill: Capillary refill takes less than 2 seconds. Coloration: Skin is not pale. Neurological:      General: No focal deficit present. Mental Status: She is alert and oriented to person, place, and time. Psychiatric:         Mood and Affect: Mood normal.           Assessment & Plan    Diagnosis Orders   1. Acute pharyngitis, unspecified etiology  amoxicillin (AMOXIL) 500 MG capsule    methylPREDNISolone (MEDROL, GEE,) 4 MG tablet    loratadine (CLARITIN) 10 MG tablet    POCT rapid strep A   2. Middle ear effusion, bilateral  amoxicillin (AMOXIL) 500 MG capsule    methylPREDNISolone (MEDROL, GEE,) 4 MG tablet   3. Bronchitis  methylPREDNISolone (MEDROL, GEE,) 4 MG tablet    promethazine-dextromethorphan (PROMETHAZINE-DM) 6.25-15 MG/5ML syrup   4. Pleurisy       Orders Placed This Encounter   Procedures    POCT rapid strep A     Orders Placed This Encounter   Medications    amoxicillin (AMOXIL) 500 MG capsule     Sig: Take 1 capsule by mouth 2 times daily for 10 days     Dispense:  20 capsule     Refill:  0    methylPREDNISolone (MEDROL, GEE,) 4 MG tablet     Sig: Take by mouth.      Dispense:  1 kit     Refill:  0    promethazine-dextromethorphan (PROMETHAZINE-DM) 6.25-15 MG/5ML syrup     Sig: Take 5 mLs by mouth nightly for 7 days     Dispense:  37 need emergency care. For example, call if:    · You have severe trouble breathing. Close follow up to evaluate treatment results and for coordination of care. I have reviewed the patient's medical history in detail and updated the computerized patient record.         ORTIZ Traore NP

## 2021-02-28 NOTE — LETTER
[address]  Phone: [phone]  Fax: [phone]    ORTIZ Delaney NP        February 28, 2021     Patient: Sidney Nguyen   YOB: 1970   Date of Visit: 2/28/2021       To Whom it May Concern:    Erik Peacock was seen in my clinic on 2/28/2021. She may return to work on 3/2/21. Please excuse her from work on 3/1/21. If you have any questions or concerns, please don't hesitate to call.     Sincerely,         ORTIZ Delaney NP

## 2021-03-02 DIAGNOSIS — J06.9 URI WITH COUGH AND CONGESTION: Primary | ICD-10-CM

## 2021-03-02 RX ORDER — AZITHROMYCIN 250 MG/1
250 TABLET, FILM COATED ORAL SEE ADMIN INSTRUCTIONS
Qty: 6 TABLET | Refills: 0 | Status: SHIPPED | OUTPATIENT
Start: 2021-03-02 | End: 2021-03-07

## 2021-03-04 DIAGNOSIS — F98.8 ATTENTION DEFICIT DISORDER, UNSPECIFIED HYPERACTIVITY PRESENCE: ICD-10-CM

## 2021-03-04 RX ORDER — DEXTROAMPHETAMINE SACCHARATE, AMPHETAMINE ASPARTATE MONOHYDRATE, DEXTROAMPHETAMINE SULFATE AND AMPHETAMINE SULFATE 7.5; 7.5; 7.5; 7.5 MG/1; MG/1; MG/1; MG/1
30 CAPSULE, EXTENDED RELEASE ORAL 2 TIMES DAILY
Qty: 60 CAPSULE | Refills: 0 | Status: SHIPPED | OUTPATIENT
Start: 2021-03-04 | End: 2021-04-03 | Stop reason: SDUPTHER

## 2021-03-23 ENCOUNTER — OFFICE VISIT (OUTPATIENT)
Dept: FAMILY MEDICINE CLINIC | Age: 51
End: 2021-03-23
Payer: MEDICARE

## 2021-03-23 VITALS
OXYGEN SATURATION: 99 % | HEIGHT: 62 IN | SYSTOLIC BLOOD PRESSURE: 126 MMHG | DIASTOLIC BLOOD PRESSURE: 82 MMHG | WEIGHT: 108.4 LBS | TEMPERATURE: 98 F | HEART RATE: 90 BPM | BODY MASS INDEX: 19.95 KG/M2

## 2021-03-23 DIAGNOSIS — M25.50 ARTHRALGIA, UNSPECIFIED JOINT: ICD-10-CM

## 2021-03-23 DIAGNOSIS — M53.3 COCCYX PAIN: ICD-10-CM

## 2021-03-23 DIAGNOSIS — K62.89 RECTAL PAIN: Primary | ICD-10-CM

## 2021-03-23 DIAGNOSIS — Z78.0 POST-MENOPAUSAL: ICD-10-CM

## 2021-03-23 LAB — RHEUMATOID FACTOR: <10 IU/ML (ref 0–14)

## 2021-03-23 PROCEDURE — G8420 CALC BMI NORM PARAMETERS: HCPCS | Performed by: NURSE PRACTITIONER

## 2021-03-23 PROCEDURE — 1036F TOBACCO NON-USER: CPT | Performed by: NURSE PRACTITIONER

## 2021-03-23 PROCEDURE — G8427 DOCREV CUR MEDS BY ELIG CLIN: HCPCS | Performed by: NURSE PRACTITIONER

## 2021-03-23 PROCEDURE — 99214 OFFICE O/P EST MOD 30 MIN: CPT | Performed by: NURSE PRACTITIONER

## 2021-03-23 PROCEDURE — G8484 FLU IMMUNIZE NO ADMIN: HCPCS | Performed by: NURSE PRACTITIONER

## 2021-03-23 RX ORDER — PREDNISONE 20 MG/1
20 TABLET ORAL 2 TIMES DAILY
Qty: 10 TABLET | Refills: 0 | Status: SHIPPED | OUTPATIENT
Start: 2021-03-23 | End: 2021-03-28

## 2021-03-23 RX ORDER — ESTRADIOL 1 MG/1
1 TABLET ORAL DAILY
Qty: 30 TABLET | Refills: 5 | Status: SHIPPED | OUTPATIENT
Start: 2021-03-23 | End: 2021-09-21

## 2021-03-23 ASSESSMENT — ENCOUNTER SYMPTOMS
ABDOMINAL PAIN: 1
COUGH: 0
DIARRHEA: 1
RECTAL PAIN: 1
SHORTNESS OF BREATH: 0

## 2021-03-23 NOTE — PROGRESS NOTES
Subjective  Chief Complaint   Patient presents with    Knee Pain     bilateral knee pain and swelling x 4 days.  Tailbone Pain     tailbone pain x 4 days.  Hand Pain     painful lump on right hand that she noticed yesterday.  Diarrhea     diarrhea x 2 days. HPI     Pt here for a variety of concerns. 1) pt having significant rectal pain. States that it feels like pressure and a \"hard ball\" Pt notes pain when having BM. Denies it feeling like hemorrhoid issues. 2) having bilateral knee pain and swelling. Denies any injury. 3) diarrhea x last few days    4) tailbone pain x4 days. States that it hurts significantly at times. Long standing issue. Seems to be better some times and worse others. Has had MRI previously that had questionable findings.     Patient Active Problem List    Diagnosis Date Noted    Neutropenia ([address] 01/12/2021    Hereditary hemochromatosis ([address] 01/12/2021    Chronic pancreatitis ([address] 08/11/2020    Major depressive disorder, single episode, 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[address] 01/15/2019    Abnormal MRI, liver 05/22/2018    Acute recurrent pancreatitis 05/22/2018    At risk of fracture due to osteoporosis 05/22/2018    Calcinosis 05/22/2018    Chronic headaches 05/22/2018    Conductive hearing loss in left ear 05/22/2018    Edema 05/22/2018    Iron overload 05/22/2018    Mass of left side of neck 05/22/2018    Medullary sponge kidney of both kidneys 17/65/9557    Metabolic acidosis 42/77/8466    Microscopic hematuria 05/22/2018    Nausea 05/22/2018    Renal tubular acidosis type I 05/22/2018    Rheumatoid arthritis ([address] 05/22/2018    Tension type headache 05/22/2018    Weight loss, abnormal 05/22/2018    Cellulitis, face 04/06/2018    Other chest pain 11/01/2017    Left lower quadrant pain 11/01/2017    Sjogren's syndrome ([address] 11/01/2017    Diverticulitis of large intestine without perforation or abscess without bleeding 11/01/2017    Fibroids 11/01/2017    Transfusion history 11/01/2017    Pancreatitis 05/13/2017    Acquired hypothyroidism 09/26/2016    GERD (gastroesophageal reflux disease) 09/26/2016    Anxiety 12/09/2015    Depression 12/09/2015    ADD (attention deficit disorder) 02/12/2015    Endometriosis 09/09/2014    ASCUS favoring benign 05/01/2013    S/P endometrial ablation 05/01/2013     Past Medical History:   Diagnosis Date    Acquired hypothyroidism 9/26/2016    Acute pancreatitis     ADD (attention deficit disorder) 2/12/2015    Anxiety     Depression 12/9/2015    Endometrial cyst of ovary 5/10/14    RT ovary, ruptured    GERD (gastroesophageal reflux disease) 9/26/2016    Medullary sponge kidney of both kidneys 5/22/2018    Sjogren's disease ([address]     Stress     Swelling      Past Surgical History:   Procedure Laterality Date    CHOLECYSTECTOMY  2011    HYSTERECTOMY  2014    sept    OVARY REMOVAL Left Jan 2014    UPPER GASTROINTESTINAL ENDOSCOPY  09/16/2016    EGD W/BX     Family History   Problem Relation Age of Onset    Heart Disease Father 48    Cancer Maternal Grandmother         breast    Heart Disease Other         mom and dad's side     Social History     Socioeconomic History    Marital status: Single     Spouse name: None    Number of children: None    Years of education: None    Highest education level: None   Occupational History    None   Social Needs    Financial resource strain: None    Food insecurity     Worry: None     Inability: None    Transportation needs     Medical: None     Non-medical: None   Tobacco Use    Smoking status: Never Smoker    Smokeless tobacco: Never Used   Substance and Sexual Activity    Alcohol use: No     Alcohol/week: 0.0 standard drinks     Comment: no alcohol since 2011    Drug use: No    Sexual activity: None   Lifestyle    Physical activity     Days per week: None     Minutes per session: None    Stress: None   Relationships    Social connections Talks on phone: None     Gets together: None     Attends Confucianist service: None     Active member of club or organization: None     Attends meetings of clubs or organizations: None     Relationship status: None    Intimate partner violence     Fear of current or ex partner: None     Emotionally abused: None     Physically abused: None     Forced sexual activity: None   Other Topics Concern    None   Social History Narrative    ** Merged History Encounter **          Current Outpatient Medications on File Prior to Visit   Medication Sig Dispense Refill    amphetamine-dextroamphetamine (ADDERALL XR) 30 MG extended release capsule Take 1 capsule by mouth 2 times daily for 30 days. 60 capsule 0    hydrocortisone (CORTEF) 5 MG tablet Take 5 mg by mouth daily      loratadine (CLARITIN) 10 MG tablet Take 1 tablet by mouth daily 30 tablet 5    valACYclovir (VALTREX) 500 MG tablet Take 1 tablet by mouth daily 30 tablet 5    prochlorperazine (COMPAZINE) 10 MG tablet Take 1 tablet by mouth every 6 hours as needed (nausea) 30 tablet 2    metroNIDAZOLE (METROGEL) 0.75 % gel Apply topically 2 times daily.  45 g 1    liothyronine (CYTOMEL) 25 MCG tablet Take 1 tablet by mouth daily 90 tablet 0    buPROPion (WELLBUTRIN XL) 300 MG extended release tablet Take 1 tablet by mouth every morning 90 tablet 1    furosemide (LASIX) 20 MG tablet Take 1 tablet by mouth 2 times daily TAKE ONE TABLET BY MOUTH TWO TIMES A DAY 60 tablet 5    promethazine (PHENERGAN) 25 MG tablet Take 1 tablet by mouth every 8 hours as needed for Nausea 40 tablet 0    NEXIUM 40 MG delayed release capsule TAKE ONE CAPSULE BY MOUTH EVERY DAY 30 capsule 5    topiramate (TOPAMAX) 100 MG tablet Take 1 tablet by mouth 2 times daily 60 tablet 5    traZODone (DESYREL) 150 MG tablet Take 1 tablet by mouth nightly 30 tablet 5    Magnesium 400 MG TABS Take 1 tablet by mouth daily 30 tablet 0    TRULANCE 3 MG TABS Take 3 mg by mouth daily      metoclopramide (REGLAN) 10 MG tablet Take 10 mg by mouth as needed      Hyoscyamine Sulfate SL (LEVSIN/SL) 0.125 MG SUBL Place 125 mcg under the tongue every 4 hours as needed (pain) 120 each 3    amLODIPine (NORVASC) 2.5 MG tablet Take 2.5 mg by mouth daily      ondansetron (ZOFRAN) 4 MG tablet Take 1 tablet by mouth every 8 hours as needed for Nausea or Vomiting 30 tablet 1    azelastine (ASTELIN) 0.1 % nasal spray 1 spray by Nasal route      moxifloxacin (VIGAMOX) 0.5 % ophthalmic solution 1 drop      Potassium Citrate ER 15 MEQ (1620 MG) TBCR Take 15 mEq by mouth      potassium chloride (KLOR-CON M) 10 MEQ extended release tablet Take 1 tablet by mouth daily 30 tablet 5    montelukast (SINGULAIR) 10 MG tablet Take 1 tablet by mouth daily 30 tablet 3    fluticasone (FLONASE) 50 MCG/ACT nasal spray USE 1 SPRAY NASALLY DAILY  3    ZOLMitriptan (ZOMIG) 5 MG tablet TAKE 1 TABLET BY MOUTH AT ONSET OF MIGRAINE. MAY REPEAT ONCE AFTER 2 HOURS. MAX 10 MG (2 TABLETS) PER DAY  11    levothyroxine (SYNTHROID) 25 MCG tablet Take one daily 90 tablet 1    hydroxychloroquine (PLAQUENIL) 200 MG tablet Take 300 mg by mouth daily       Pancrelipase, Lip-Prot-Amyl, (CREON) 74831 UNITS CPEP 2 caps tid 180 capsule 03    lidocaine viscous hcl (XYLOCAINE) 2 % SOLN solution Take 5 mLs by mouth as needed for Irritation or Dental Pain (Patient not taking: Reported on 3/23/2021) 100 mL 0    [DISCONTINUED] furosemide (LASIX) 20 MG tablet Take 1 tablet by mouth 2 times daily TAKE ONE TABLET BY MOUTH TWO TIMES A DAY 60 tablet 5    sucralfate (CARAFATE) 1 GM tablet Take by mouth daily       No current facility-administered medications on file prior to visit. No Known Allergies    Review of Systems   Constitutional: Negative for fatigue. Respiratory: Negative for cough and shortness of breath. Cardiovascular: Negative for chest pain. Gastrointestinal: Positive for abdominal pain, diarrhea and rectal pain. Musculoskeletal: Positive for arthralgias. Objective  Vitals:    03/23/21 1056   BP: 126/82   Site: Right Upper Arm   Position: Sitting   Cuff Size: Medium Adult   Pulse: 90   Temp: 98 °F (36.7 °C)   SpO2: 99%   Weight: 108 lb 6.4 oz (49.2 kg)   Height: 5' 2\" (1.575 m)     Physical Exam  Vitals signs and nursing note reviewed. Constitutional:       Appearance: Normal appearance. HENT:      Mouth/Throat:      Mouth: Mucous membranes are moist.   Cardiovascular:      Rate and Rhythm: Normal rate and regular rhythm. Pulses: Normal pulses. Heart sounds: Normal heart sounds. Pulmonary:      Effort: Pulmonary effort is normal.      Breath sounds: Normal breath sounds. Genitourinary:     Rectum: Tenderness and external hemorrhoid present. Musculoskeletal:      Right knee: She exhibits swelling (bilaterally in the knees). Tenderness found. Neurological:      General: No focal deficit present. Mental Status: She is alert and oriented to person, place, and time. Mental status is at baseline. Psychiatric:         Mood and Affect: Mood normal.         Behavior: Behavior normal.         Thought Content: Thought content normal.         Judgment: Judgment normal.       Assessment & Plan     Diagnosis Orders   1. Rectal pain  Kameron Portillo MD, Gastroenterology, [place]   2. Post-menopausal  estradiol (ESTRACE) 1 MG tablet   3. Arthralgia, unspecified joint  predniSONE (DELTASONE) 20 MG tablet    Rheumatoid Factor   4.  Coccyx pain         Orders Placed This Encounter   Procedures    Rheumatoid Factor     Standing Status:   Future     Standing Expiration Date:   3/23/2022   German Edouard MD, Gastroenterology, [facility]     Referral Priority:   Urgent     Referral Type:   Eval and Treat     Referral Reason:   Specialty Services Required     Referred to Provider:   Amadeo Bautista MD     Requested Specialty:   Gastroenterology     Number of Visits Requested:   1       Orders Placed This Encounter   Medications    estradiol (ESTRACE) 1 MG tablet     Sig: Take 1 tablet by mouth daily     Dispense:  30 tablet     Refill:  5    predniSONE (DELTASONE) 20 MG tablet     Sig: Take 1 tablet by mouth 2 times daily for 5 days     Dispense:  10 tablet     Refill:  0     Side effects, adverse effects of the medication prescribed today, as well as treatment plan/ rationale and result expectations have been discussed with the patient who expresses understanding and desires to proceed. Close follow up to evaluate treatment results and for coordination of care. I have reviewed the patient's medical history in detail and updated the computerized patient record. As always, patient is advised that if symptoms worsen in any way they will proceed to the nearest emergency room.      FU in 2 weeks    ORTIZ Lazaro - CNP

## 2021-03-29 NOTE — ED TRIAGE NOTES
Patient to ED with c/o upper left abdominal pain since Tuesday, hx of pancreatitis, had ;abs drawn today, sent to ED by [facility] Detail Level: Generalized General Sunscreen Counseling: I recommended a broad spectrum sunscreen with a SPF of 30 or higher.  I explained that SPF 30 sunscreens block approximately 97 percent of the sun's harmful rays.  Sunscreens should be applied at least 15 minutes prior to expected sun exposure and then every 2 hours after that as long as sun exposure continues. If swimming or exercising sunscreen should be reapplied every 45 minutes to an hour after getting wet or sweating.  One ounce, or the equivalent of a shot glass full of sunscreen, is adequate to protect the skin not covered by a bathing suit. I also recommended a lip balm with a sunscreen as well. Sun protective clothing can be used in lieu of sunscreen but must be worn the entire time you are exposed to the sun's rays.

## 2021-04-07 DIAGNOSIS — E03.9 HYPOTHYROIDISM, UNSPECIFIED TYPE: ICD-10-CM

## 2021-04-08 RX ORDER — LIOTHYRONINE SODIUM 25 UG/1
25 TABLET ORAL DAILY
Qty: 90 TABLET | Refills: 0 | Status: SHIPPED | OUTPATIENT
Start: 2021-04-08 | End: 2021-07-06 | Stop reason: SDUPTHER

## 2021-04-10 ENCOUNTER — HOSPITAL ENCOUNTER (OUTPATIENT)
Dept: ULTRASOUND IMAGING | Age: 51
Discharge: HOME | End: 2021-04-12
Payer: MEDICARE

## 2021-04-10 DIAGNOSIS — R10.9 ABDOMINAL PAIN, UNSPECIFIED ABDOMINAL LOCATION: ICD-10-CM

## 2021-04-10 DIAGNOSIS — R14.0 ABDOMINAL DISTENTION: ICD-10-CM

## 2021-04-10 PROCEDURE — 76700 US EXAM ABDOM COMPLETE: CPT

## 2021-04-13 ENCOUNTER — OFFICE VISIT (OUTPATIENT)
Dept: FAMILY MEDICINE CLINIC | Age: 51
End: 2021-04-13
Payer: MEDICARE

## 2021-04-13 VITALS
TEMPERATURE: 97 F | WEIGHT: 106.4 LBS | OXYGEN SATURATION: 98 % | HEIGHT: 62 IN | SYSTOLIC BLOOD PRESSURE: 98 MMHG | HEART RATE: 67 BPM | DIASTOLIC BLOOD PRESSURE: 60 MMHG | BODY MASS INDEX: 19.58 KG/M2

## 2021-04-13 DIAGNOSIS — F41.9 ANXIETY: ICD-10-CM

## 2021-04-13 DIAGNOSIS — R10.9 ABDOMINAL PAIN, UNSPECIFIED ABDOMINAL LOCATION: Primary | ICD-10-CM

## 2021-04-13 DIAGNOSIS — R10.9 ABDOMINAL PAIN, UNSPECIFIED ABDOMINAL LOCATION: ICD-10-CM

## 2021-04-13 LAB
ALBUMIN SERPL-MCNC: 4.4 G/DL (ref 3.5–4.6)
ALP BLD-CCNC: 82 U/L (ref 40–130)
ALT SERPL-CCNC: 13 U/L (ref 0–33)
AMYLASE: 138 U/L (ref 22–93)
ANION GAP SERPL CALCULATED.3IONS-SCNC: 14 MEQ/L (ref 9–15)
AST SERPL-CCNC: 13 U/L (ref 0–35)
BASOPHILS ABSOLUTE: 0.1 K/UL (ref 0–0.2)
BASOPHILS RELATIVE PERCENT: 1 %
BILIRUB SERPL-MCNC: <0.2 MG/DL (ref 0.2–0.7)
BUN BLDV-MCNC: 16 MG/DL (ref 6–20)
CALCIUM SERPL-MCNC: 8.5 MG/DL (ref 8.5–9.9)
CHLORIDE BLD-SCNC: 102 MEQ/L (ref 95–107)
CO2: 20 MEQ/L (ref 20–31)
CREAT SERPL-MCNC: 0.79 MG/DL (ref 0.5–0.9)
EOSINOPHILS ABSOLUTE: 0.1 K/UL (ref 0–0.7)
EOSINOPHILS RELATIVE PERCENT: 1.2 %
GFR AFRICAN AMERICAN: >60
GFR NON-AFRICAN AMERICAN: >60
GLOBULIN: 2.5 G/DL (ref 2.3–3.5)
GLUCOSE BLD-MCNC: 117 MG/DL (ref 70–99)
HCT VFR BLD CALC: 39.1 % (ref 37–47)
HEMOGLOBIN: 13 G/DL (ref 12–16)
LIPASE: 113 U/L (ref 12–95)
LYMPHOCYTES ABSOLUTE: 2.1 K/UL (ref 1–4.8)
LYMPHOCYTES RELATIVE PERCENT: 32.9 %
MCH RBC QN AUTO: 29.9 PG (ref 27–31.3)
MCHC RBC AUTO-ENTMCNC: 33.4 % (ref 33–37)
MCV RBC AUTO: 89.6 FL (ref 82–100)
MONOCYTES ABSOLUTE: 0.4 K/UL (ref 0.2–0.8)
MONOCYTES RELATIVE PERCENT: 6.2 %
NEUTROPHILS ABSOLUTE: 3.8 K/UL (ref 1.4–6.5)
NEUTROPHILS RELATIVE PERCENT: 58.7 %
PDW BLD-RTO: 13.8 % (ref 11.5–14.5)
PLATELET # BLD: 328 K/UL (ref 130–400)
POTASSIUM SERPL-SCNC: 3.2 MEQ/L (ref 3.4–4.9)
RBC # BLD: 4.36 M/UL (ref 4.2–5.4)
SODIUM BLD-SCNC: 136 MEQ/L (ref 135–144)
TOTAL PROTEIN: 6.9 G/DL (ref 6.3–8)
WBC # BLD: 6.4 K/UL (ref 4.8–10.8)

## 2021-04-13 PROCEDURE — 99214 OFFICE O/P EST MOD 30 MIN: CPT | Performed by: NURSE PRACTITIONER

## 2021-04-13 PROCEDURE — G8420 CALC BMI NORM PARAMETERS: HCPCS | Performed by: NURSE PRACTITIONER

## 2021-04-13 PROCEDURE — G8427 DOCREV CUR MEDS BY ELIG CLIN: HCPCS | Performed by: NURSE PRACTITIONER

## 2021-04-13 PROCEDURE — 1036F TOBACCO NON-USER: CPT | Performed by: NURSE PRACTITIONER

## 2021-04-13 RX ORDER — HYDROXYZINE HYDROCHLORIDE 25 MG/1
25 TABLET, FILM COATED ORAL NIGHTLY
Qty: 30 TABLET | Refills: 0 | Status: SHIPPED | OUTPATIENT
Start: 2021-04-13 | End: 2021-05-13

## 2021-04-13 ASSESSMENT — ENCOUNTER SYMPTOMS
COUGH: 0
SHORTNESS OF BREATH: 0
NAUSEA: 1
ABDOMINAL PAIN: 1
ABDOMINAL DISTENTION: 1
BLOOD IN STOOL: 0

## 2021-04-13 NOTE — PROGRESS NOTES
Subjective  Chief Complaint   Patient presents with    2 Week Follow-Up     stomach, no improvement    Discuss Medications     carafate, pt would like to talk about changing to liquid form       HPI     Having issues daily with stomach pain. Has been ongoing issue. I ordered the US this past week which was normal.  BM's have been very erratic. Pt notes that the pain is constant and radiates into her back. Mostly left lower to center of abdomen. Eating makes pain worse. Admits to decreased appetite. Some nausea. Having maria del carmen eissues with anxiety. Asking about something she can take as needed.     Patient Active Problem List    Diagnosis Date Noted    Neutropenia ([address] 01/12/2021    Hereditary hemochromatosis ([address] 01/12/2021    Chronic pancreatitis ([address] 08/11/2020    Major depressive disorder, single episode, 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[address] 01/15/2019    Abnormal MRI, liver 05/22/2018    Acute recurrent pancreatitis 05/22/2018    At risk of fracture due to osteoporosis 05/22/2018    Calcinosis 05/22/2018    Chronic headaches 05/22/2018    Conductive hearing loss in left ear 05/22/2018    Edema 05/22/2018    Iron overload 05/22/2018    Mass of left side of neck 05/22/2018    Medullary sponge kidney of both kidneys 82/93/7774    Metabolic acidosis 21/97/8445    Microscopic hematuria 05/22/2018    Nausea 05/22/2018    Renal tubular acidosis type I 05/22/2018    Rheumatoid arthritis ([address] 05/22/2018    Tension type headache 05/22/2018    Weight loss, abnormal 05/22/2018    Cellulitis, face 04/06/2018    Other chest pain 11/01/2017    Left lower quadrant pain 11/01/2017    Sjogren's syndrome ([address] 11/01/2017    Diverticulitis of large intestine without perforation or abscess without bleeding 11/01/2017    Fibroids 11/01/2017    Transfusion history 11/01/2017    Pancreatitis 05/13/2017    Acquired hypothyroidism 09/26/2016    GERD (gastroesophageal reflux disease) 09/26/2016    Anxiety 12/09/2015    Depression 12/09/2015    ADD (attention deficit disorder) 02/12/2015    Endometriosis 09/09/2014    ASCUS favoring benign 05/01/2013    S/P endometrial ablation 05/01/2013     Past Medical History:   Diagnosis Date    Acquired hypothyroidism 9/26/2016    Acute pancreatitis     ADD (attention deficit disorder) 2/12/2015    Anxiety     Depression 12/9/2015    Endometrial cyst of ovary 5/10/14    RT ovary, ruptured    GERD (gastroesophageal reflux disease) 9/26/2016    Medullary sponge kidney of both kidneys 5/22/2018    [facility]jogren's disease [facility]     Stress     Swelling      Past Surgical History:   Procedure Laterality Date    CHOLECYSTECTOMY  2011    HYSTERECTOMY  2014    sept    OVARY REMOVAL Left Jan 2014    UPPER GASTROINTESTINAL ENDOSCOPY  09/16/2016    EGD W/BX     Family History   Problem Relation Age of Onset    Heart Disease Father 48    Cancer Maternal Grandmother         breast    Heart Disease Other         mom and dad's side     Social History     Socioeconomic History    Marital status: Single     Spouse name: None    Number of children: None    Years of education: None    Highest education level: None   Occupational History    None   Social Needs    Financial resource strain: None    Food insecurity     Worry: None     Inability: None    Transportation needs     Medical: None     Non-medical: None   Tobacco Use    Smoking status: Never Smoker    Smokeless tobacco: Never Used   Substance and Sexual Activity    Alcohol use: No     Alcohol/week: 0.0 standard drinks     Comment: no alcohol since 2011    Drug use: No    Sexual activity: None   Lifestyle    Physical activity     Days per week: None     Minutes per session: None    Stress: None   Relationships    Social connections     Talks on phone: None     Gets together: None     Attends Evangelical service: None     Active member of club or organization: None     Attends meetings of clubs or organizations: nursing note reviewed. Constitutional:       Appearance: Normal appearance. She is normal weight. Cardiovascular:      Rate and Rhythm: Normal rate and regular rhythm. Pulses: Normal pulses. Heart sounds: Normal heart sounds. Pulmonary:      Effort: Pulmonary effort is normal.      Breath sounds: Normal breath sounds. Abdominal:      General: There is distension (mild). Tenderness: There is abdominal tenderness in the left lower quadrant. There is no guarding or rebound. Skin:     General: Skin is warm. Neurological:      Mental Status: She is alert. Assessment & Plan     Diagnosis Orders   1. Abdominal pain, unspecified abdominal location  Ambulatory referral to Gastroenterology    CBC Auto Differential    Comprehensive Metabolic Panel    Lipase    Amylase   2. Anxiety  hydrOXYzine (ATARAX) 25 MG tablet       Orders Placed This Encounter   Procedures    CBC Auto Differential     Standing Status:   Future     Standing Expiration Date:   4/13/2022    Comprehensive Metabolic Panel     Standing Status:   Future     Standing Expiration Date:   4/13/2022    Lipase     Standing Status:   Future     Standing Expiration Date:   4/13/2022    Amylase     Standing Status:   Future     Standing Expiration Date:   4/13/2022    Ambulatory referral to Gastroenterology     Referral Priority:   Urgent     Referral Type:   Eval and Treat     Referral Reason:   Specialty Services Required     Number of Visits Requested:   1       Orders Placed This Encounter   Medications    hydrOXYzine (ATARAX) 25 MG tablet     Sig: Take 1 tablet by mouth nightly     Dispense:  30 tablet     Refill:  0     Side effects, adverse effects of the medication prescribed today, as well as treatment plan/ rationale and result expectations have been discussed with the patient who expresses understanding and desires to proceed. Close follow up to evaluate treatment results and for coordination of care.   I have reviewed the patient's medical history in detail and updated the computerized patient record. As always, patient is advised that if symptoms worsen in any way they will proceed to the nearest emergency room. Fu within a couple of weeks.     ORTIZ Glover - CNP

## 2021-04-14 ENCOUNTER — HOSPITAL ENCOUNTER (OUTPATIENT)
Dept: CT IMAGING | Age: 51
Discharge: HOME | End: 2021-04-16
Payer: MEDICARE

## 2021-04-14 VITALS — BODY MASS INDEX: 19.51 KG/M2 | HEIGHT: 62 IN | WEIGHT: 106 LBS

## 2021-04-14 DIAGNOSIS — R10.9 ABDOMINAL PAIN, UNSPECIFIED ABDOMINAL LOCATION: ICD-10-CM

## 2021-04-14 DIAGNOSIS — R74.8 ELEVATED PANCREATIC ENZYME: ICD-10-CM

## 2021-04-14 DIAGNOSIS — R74.8 ELEVATED PANCREATIC ENZYME: Primary | ICD-10-CM

## 2021-04-14 PROCEDURE — 6360000004 HC RX CONTRAST MEDICATION: Performed by: NURSE PRACTITIONER

## 2021-04-14 PROCEDURE — 74177 CT ABD & PELVIS W/CONTRAST: CPT

## 2021-04-14 RX ORDER — SODIUM CHLORIDE 0.9 % (FLUSH) 0.9 %
10 SYRINGE (ML) INJECTION ONCE
Status: DISCONTINUED | OUTPATIENT
Start: 2021-04-14 | End: 2021-04-17 | Stop reason: HOSPADM

## 2021-04-14 RX ADMIN — IOPAMIDOL 100 ML: 612 INJECTION, SOLUTION INTRAVENOUS at 16:28

## 2021-04-25 DIAGNOSIS — G89.29 CHRONIC NONINTRACTABLE HEADACHE, UNSPECIFIED HEADACHE TYPE: ICD-10-CM

## 2021-04-25 DIAGNOSIS — R51.9 CHRONIC NONINTRACTABLE HEADACHE, UNSPECIFIED HEADACHE TYPE: ICD-10-CM

## 2021-04-25 RX ORDER — TOPIRAMATE 100 MG/1
100 TABLET, FILM COATED ORAL 2 TIMES DAILY
Qty: 60 TABLET | Refills: 5 | OUTPATIENT
Start: 2021-04-25

## 2021-04-25 RX ORDER — TOPIRAMATE 100 MG/1
TABLET, FILM COATED ORAL
Qty: 60 TABLET | Refills: 0 | Status: SHIPPED | OUTPATIENT
Start: 2021-04-25 | End: 2021-05-27 | Stop reason: SDUPTHER

## 2021-05-04 ENCOUNTER — OFFICE VISIT (OUTPATIENT)
Dept: GASTROENTEROLOGY | Age: 51
End: 2021-05-04
Payer: MEDICARE

## 2021-05-04 VITALS
BODY MASS INDEX: 20.61 KG/M2 | DIASTOLIC BLOOD PRESSURE: 60 MMHG | OXYGEN SATURATION: 98 % | WEIGHT: 112 LBS | HEART RATE: 90 BPM | HEIGHT: 62 IN | RESPIRATION RATE: 16 BRPM | SYSTOLIC BLOOD PRESSURE: 110 MMHG

## 2021-05-04 DIAGNOSIS — R10.13 EPIGASTRIC PAIN: ICD-10-CM

## 2021-05-04 DIAGNOSIS — K92.1 MELENA: ICD-10-CM

## 2021-05-04 DIAGNOSIS — Z87.19 HISTORY OF PANCREATITIS: Primary | ICD-10-CM

## 2021-05-04 DIAGNOSIS — F98.8 ATTENTION DEFICIT DISORDER, UNSPECIFIED HYPERACTIVITY PRESENCE: ICD-10-CM

## 2021-05-04 PROCEDURE — 1036F TOBACCO NON-USER: CPT | Performed by: INTERNAL MEDICINE

## 2021-05-04 PROCEDURE — 99214 OFFICE O/P EST MOD 30 MIN: CPT | Performed by: INTERNAL MEDICINE

## 2021-05-04 PROCEDURE — G8427 DOCREV CUR MEDS BY ELIG CLIN: HCPCS | Performed by: INTERNAL MEDICINE

## 2021-05-04 PROCEDURE — G8420 CALC BMI NORM PARAMETERS: HCPCS | Performed by: INTERNAL MEDICINE

## 2021-05-04 RX ORDER — DEXTROAMPHETAMINE SACCHARATE, AMPHETAMINE ASPARTATE MONOHYDRATE, DEXTROAMPHETAMINE SULFATE AND AMPHETAMINE SULFATE 7.5; 7.5; 7.5; 7.5 MG/1; MG/1; MG/1; MG/1
30 CAPSULE, EXTENDED RELEASE ORAL 2 TIMES DAILY
Qty: 60 CAPSULE | Refills: 0 | Status: SHIPPED | OUTPATIENT
Start: 2021-05-04 | End: 2021-06-02 | Stop reason: SDUPTHER

## 2021-05-04 NOTE — PROGRESS NOTES
Gastroenterology Clinic Follow up Visit    Luis Ford  [id number]  Chief Complaint   Patient presents with    Consultation     HPI: 52 y.o. female made this appointment with recurrence of GI symptoms which include abdominal pain over the last 3 to 4 weeks, reports that starting rather abruptly, sharp pain radiating to the back. She also reports having nausea and dry heaving mainly in the mornings. Additionally reports oily gray-colored stools and dark black stools intermittently. She also reports having some weight loss approximately 6 pounds, she appears to have gained 3 pounds of that back over the last week. She also has had some elevation in the amylase levels from baseline 30 to 152. Previous relevant GI history includes history of intussusception, history of stomach AVM w/Dr. Addis Gonzales @ [facility] 20. History of pancreatic duct stone, s/p ERCP with Dr. Keith Mccann at [facility]. Unclear history of chronic pancreatitis, patient on Creon. HPI from last GI clinic visit on 9/26/2019  summarized below:  Longstanding hx of abdominal sx  Chronic constipation  Hx. Of endoscopic intervention for pancreatic duct stones in the past at [facility] w/Dr. Keith Mccann  Pt on creon and Linzess    Visit from 5/31/21 summarized below:  LUQ pain, bloating, nausea after meals. Hx. Pancreatitis and ?exocine pancreatic insufficiency. Constipation, Linzess/Lactulose-add probiotics  Gerd-antireflux measures, Daily PPI    Review of Systems   All other systems reviewed and are negative. Past medical history, past surgical history, medication list, social and familyhistory reviewed    Blood pressure 110/60, pulse 90, resp. rate 16, height 5' 2\" (1.575 m), weight 112 lb (50.8 kg), SpO2 98 %, not currently breastfeeding. Physical Exam  Constitutional:       General: She is not in acute distress. Appearance: Normal appearance. She is well-developed.       Comments: Thin, tanned, anxious appearing   Eyes: General: No scleral icterus. Cardiovascular:      Rate and Rhythm: Normal rate and regular rhythm. Pulmonary:      Effort: Pulmonary effort is normal.      Breath sounds: Normal breath sounds. Abdominal:      General: Bowel sounds are normal. There is no distension. Palpations: Abdomen is soft. There is no mass. Tenderness: There is abdominal tenderness in the epigastric area. There is no guarding or rebound. Musculoskeletal: Normal range of motion. Lymphadenopathy:      Cervical: No cervical adenopathy. Neurological:      Mental Status: She is alert and oriented to person, place, and time. Psychiatric:         Behavior: Behavior normal.         Thought Content: Thought content normal.         Judgment: Judgment normal.       Laboratory, Pathology, Radiology reviewed in detail with relevantimportant investigations summarized below:    Recent Labs     04/13/21  1640   WBC 6.4   HGB 13.0   HCT 39.1   MCV 89.6        Lab Results   Component Value Date    ALT 13 04/13/2021    AST 13 04/13/2021    ALKPHOS 82 04/13/2021    BILITOT <0.2 04/13/2021     Assessment and Plan:  Delmar Lopez 52 y.o. female presents to the GI clinic with new onset of epigastric pain radiating to the back, concern for recurrence of pancreatic symptoms. Patient with history of pancreatic duct stone in the past s/p ERCP with removal at a outside hospital.  Patient on Creon. Clinical history suggestive of exocrine pancreatic insufficiency. Patient concerned regarding recurrence of stone. Patient additionally reports intermittent melena. Diagnosis Orders   1. History of pancreatitis  Pancreatic elastase, fecal   2. Epigastric pain  EUS   3.  Melena       -Proceed with EGD and EUS to evaluate for presence of peptic ulcer disease and/or pancreatic pathology.  -Check stool studies for elastase, qualitative fat analysis    Follow-up to be scheduled based on endoscopic evaluation and findings    Hanny Damon MD StaffGastroenterH[facility] Hospital    Please note this report has been partially produced using speech recognition software and may cause/contain errors related to that system including grammar, punctuation and spelling as well as words andphrases that may seem inappropriate. If there are questions or concerns please feel free to contact me to clarify.

## 2021-05-06 ENCOUNTER — HOSPITAL ENCOUNTER (OUTPATIENT)
Age: 51
Setting detail: OUTPATIENT SURGERY
Discharge: HOME | End: 2021-05-06
Attending: INTERNAL MEDICINE | Admitting: INTERNAL MEDICINE
Payer: MEDICARE

## 2021-05-06 ENCOUNTER — ANESTHESIA (OUTPATIENT)
Dept: ENDOSCOPY | Age: 51
End: 2021-05-06
Payer: MEDICARE

## 2021-05-06 ENCOUNTER — ANCILLARY PROCEDURE (OUTPATIENT)
Dept: ENDOSCOPY | Age: 51
End: 2021-05-06
Attending: INTERNAL MEDICINE
Payer: MEDICARE

## 2021-05-06 ENCOUNTER — ANESTHESIA EVENT (OUTPATIENT)
Dept: ENDOSCOPY | Age: 51
End: 2021-05-06
Payer: MEDICARE

## 2021-05-06 VITALS
DIASTOLIC BLOOD PRESSURE: 55 MMHG | RESPIRATION RATE: 14 BRPM | OXYGEN SATURATION: 99 % | SYSTOLIC BLOOD PRESSURE: 91 MMHG

## 2021-05-06 VITALS
BODY MASS INDEX: 20.61 KG/M2 | TEMPERATURE: 97.5 F | DIASTOLIC BLOOD PRESSURE: 65 MMHG | OXYGEN SATURATION: 99 % | WEIGHT: 112 LBS | HEIGHT: 62 IN | HEART RATE: 66 BPM | RESPIRATION RATE: 16 BRPM | SYSTOLIC BLOOD PRESSURE: 111 MMHG

## 2021-05-06 DIAGNOSIS — Z87.19 HISTORY OF PANCREATITIS: Primary | ICD-10-CM

## 2021-05-06 PROCEDURE — 3609018500 HC EGD US SCOPE W/ADJACENT STRUCTURES: Performed by: INTERNAL MEDICINE

## 2021-05-06 PROCEDURE — 6370000000 HC RX 637 (ALT 250 FOR IP): Performed by: INTERNAL MEDICINE

## 2021-05-06 PROCEDURE — 43237 ENDOSCOPIC US EXAM ESOPH: CPT | Performed by: INTERNAL MEDICINE

## 2021-05-06 PROCEDURE — 3700000000 HC ANESTHESIA ATTENDED CARE: Performed by: INTERNAL MEDICINE

## 2021-05-06 PROCEDURE — 2580000003 HC RX 258: Performed by: NURSE ANESTHETIST, CERTIFIED REGISTERED

## 2021-05-06 PROCEDURE — 2580000003 HC RX 258

## 2021-05-06 PROCEDURE — C1753 CATH, INTRAVAS ULTRASOUND: HCPCS | Performed by: INTERNAL MEDICINE

## 2021-05-06 PROCEDURE — 7100000010 HC PHASE II RECOVERY - FIRST 15 MIN: Performed by: INTERNAL MEDICINE

## 2021-05-06 PROCEDURE — 3700000001 HC ADD 15 MINUTES (ANESTHESIA): Performed by: INTERNAL MEDICINE

## 2021-05-06 PROCEDURE — 2500000003 HC RX 250 WO HCPCS: Performed by: NURSE ANESTHETIST, CERTIFIED REGISTERED

## 2021-05-06 PROCEDURE — 2580000003 HC RX 258: Performed by: INTERNAL MEDICINE

## 2021-05-06 PROCEDURE — 2709999900 HC NON-CHARGEABLE SUPPLY: Performed by: INTERNAL MEDICINE

## 2021-05-06 RX ORDER — MAGNESIUM HYDROXIDE 1200 MG/15ML
LIQUID ORAL PRN
Status: DISCONTINUED | OUTPATIENT
Start: 2021-05-06 | End: 2021-05-06 | Stop reason: ALTCHOICE

## 2021-05-06 RX ORDER — SIMETHICONE 20 MG/.3ML
EMULSION ORAL PRN
Status: DISCONTINUED | OUTPATIENT
Start: 2021-05-06 | End: 2021-05-06 | Stop reason: ALTCHOICE

## 2021-05-06 RX ORDER — SODIUM CHLORIDE 9 MG/ML
INJECTION, SOLUTION INTRAVENOUS CONTINUOUS PRN
Status: DISCONTINUED | OUTPATIENT
Start: 2021-05-06 | End: 2021-05-06 | Stop reason: SDUPTHER

## 2021-05-06 RX ORDER — 0.9 % SODIUM CHLORIDE 0.9 %
500 INTRAVENOUS SOLUTION INTRAVENOUS ONCE
Status: COMPLETED | OUTPATIENT
Start: 2021-05-06 | End: 2021-05-06

## 2021-05-06 RX ORDER — SODIUM CHLORIDE 9 MG/ML
INJECTION, SOLUTION INTRAVENOUS
Status: COMPLETED
Start: 2021-05-06 | End: 2021-05-06

## 2021-05-06 RX ORDER — ONDANSETRON 2 MG/ML
4 INJECTION INTRAMUSCULAR; INTRAVENOUS
Status: DISCONTINUED | OUTPATIENT
Start: 2021-05-06 | End: 2021-05-06 | Stop reason: HOSPADM

## 2021-05-06 RX ORDER — AMOXICILLIN 875 MG/1
875 TABLET, COATED ORAL 2 TIMES DAILY
COMMUNITY
End: 2022-02-02

## 2021-05-06 RX ORDER — PROPOFOL 10 MG/ML
INJECTION, EMULSION INTRAVENOUS CONTINUOUS PRN
Status: DISCONTINUED | OUTPATIENT
Start: 2021-05-06 | End: 2021-05-06 | Stop reason: SDUPTHER

## 2021-05-06 RX ORDER — LIDOCAINE HYDROCHLORIDE 20 MG/ML
INJECTION, SOLUTION INFILTRATION; PERINEURAL PRN
Status: DISCONTINUED | OUTPATIENT
Start: 2021-05-06 | End: 2021-05-06 | Stop reason: SDUPTHER

## 2021-05-06 RX ADMIN — SODIUM CHLORIDE 500 ML: 9 INJECTION, SOLUTION INTRAVENOUS at 07:53

## 2021-05-06 RX ADMIN — PROPOFOL 120 MCG/KG/MIN: 10 INJECTION, EMULSION INTRAVENOUS at 08:08

## 2021-05-06 RX ADMIN — LIDOCAINE HYDROCHLORIDE 40 MG: 20 INJECTION, SOLUTION INFILTRATION; PERINEURAL at 08:08

## 2021-05-06 RX ADMIN — Medication 500 ML: at 07:53

## 2021-05-06 RX ADMIN — SODIUM CHLORIDE: 9 INJECTION, SOLUTION INTRAVENOUS at 07:56

## 2021-05-06 ASSESSMENT — PULMONARY FUNCTION TESTS
PIF_VALUE: 0

## 2021-05-06 ASSESSMENT — PAIN DESCRIPTION - DESCRIPTORS: DESCRIPTORS: STABBING

## 2021-05-06 ASSESSMENT — PAIN SCALES - GENERAL: PAINLEVEL_OUTOF10: 0

## 2021-05-06 ASSESSMENT — PAIN - FUNCTIONAL ASSESSMENT: PAIN_FUNCTIONAL_ASSESSMENT: 0-10

## 2021-05-06 NOTE — ANESTHESIA POSTPROCEDURE EVALUATION
Department of Anesthesiology  Postprocedure Note    Patient: Leni Phalen  MRN: [id number]  YOB: 1970  Date of evaluation: 5/6/2021  Time:  8:41 AM     Procedure Summary     Date: 05/06/21 Room / Location: [address] [place] [address]    Anesthesia Start: 0803 Anesthesia Stop:     Procedure: ENDOSCOPIC ULTRASOUND with EGD (N/A ) Diagnosis: (epigastric pain R10.13)    Surgeons: Marylin Mckinney MD Responsible Provider: ORTIZ Kim CRNA    Anesthesia Type: MAC ASA Status: 2          Anesthesia Type: No value filed. Hossein Phase I:      Hossein Phase II:      Last vitals: Reviewed and per EMR flowsheets.        Anesthesia Post Evaluation    Patient location during evaluation: PACU  Patient participation: complete - patient participated  Level of consciousness: awake and alert  Pain score: 0  Airway patency: patent  Nausea & Vomiting: no nausea and no vomiting  Complications: no  Cardiovascular status: blood pressure returned to baseline and hemodynamically stable  Respiratory status: acceptable  Hydration status: euvolemic

## 2021-05-06 NOTE — H&P
Patient Name: Carmelo Preston  : 1970  MRN: [id number]  DATE: 21      ENDOSCOPY  History and Physical    Procedure:    [] Diagnostic Colonoscopy       [] Screening Colonoscopy  [] EGD      [] ERCP      [x] EUS       [] Other    [x] Previous office notes/History and Physical reviewed from the patients chart. Please see EMR for further details of HPI. I have examined the patient's status immediately prior to the procedure and:      Indications/HPI:    [x]Abdominal Pain   []Cancer- GI/Lung  []Fhx of colon CA  []History of Polyps   []Dicks   []Melena  []Abnormal Imaging   []Dysphagia    []Persistent Pneumonia  []Anemia   []Food Impaction  []History of Polyps  []GI Bleed   []Pulmonary nodule/Mass  []Change in bowel habits  []Heartburn/Reflux  []Rectal Bleed (BRBPR)  []Chest Pain - Non Cardiac  []Heme (+) Stool  []Ulcers  []Constipation   []Hemoptysis   []Varices  []Diarrhea   []Hypoxemia  []Nausea/Vomiting   []Screening   []Crohns/Colitis  [x]Other: History of pancreatic duct stones,    Anesthesia:   [x] MAC [] Moderate Sedation   [] General   [] None     ROS: 12 pt Review of Symptoms was negative unless mentioned above    Medications:   Prior to Admission medications    Medication Sig Start Date End Date Taking? Authorizing Provider   amoxicillin (AMOXIL) 875 MG tablet Take 875 mg by mouth 2 times daily   Yes Historical Provider, MD   amphetamine-dextroamphetamine (ADDERALL XR) 30 MG extended release capsule Take 1 capsule by mouth 2 times daily for 30 days.  5/4/21 6/3/21 Yes ORTIZ Bloom CNP   topiramate (TOPAMAX) 100 MG tablet TAKE ONE TABLET BY MOUTH TWO TIMES A DAY 21  Yes ORTIZ Bloom CNP   estradiol (ESTRACE) 1 MG tablet Take 1 tablet by mouth daily 3/23/21  Yes ORTIZ Bloom CNP   loratadine (CLARITIN) 10 MG tablet Take 1 tablet by mouth daily 21  Yes ORTIZ Matthew NP   valACYclovir (VALTREX) 500 MG tablet Take 1 tablet by mouth daily 2/15/21 Yes ORTIZ Michele CNP   prochlorperazine (COMPAZINE) 10 MG tablet Take 1 tablet by mouth every 6 hours as needed (nausea) 1/12/21  Yes ORTIZ Michele CNP   metroNIDAZOLE (METROGEL) 0.75 % gel Apply topically 2 times daily.  1/12/21  Yes ORTIZ Michele CNP   buPROPion (WELLBUTRIN XL) 300 MG extended release tablet Take 1 tablet by mouth every morning 12/21/20  Yes ORTIZ Michele CNP   furosemide (LASIX) 20 MG tablet Take 1 tablet by mouth 2 times daily TAKE ONE TABLET BY MOUTH TWO TIMES A DAY 12/18/20  Yes ORTIZ Michele CNP   promethazine (PHENERGAN) 25 MG tablet Take 1 tablet by mouth every 8 hours as needed for Nausea 11/2/20  Yes ORTIZ Michele CNP   NEXIUM 40 MG delayed release capsule TAKE ONE CAPSULE BY MOUTH EVERY DAY 11/2/20  Yes ORTIZ Michele CNP   traZODone (DESYREL) 150 MG tablet Take 1 tablet by mouth nightly 10/4/20  Yes ORTIZ Michele CNP   Magnesium 400 MG TABS Take 1 tablet by mouth daily 10/3/20  Yes Guerline Jimenez PA-C   TRULANCE 3 MG TABS Take 3 mg by mouth daily 3/10/20  Yes Historical Provider, MD   sucralfate (CARAFATE) 1 GM tablet Take by mouth daily 3/10/20  Yes Historical Provider, MD   metoclopramide (REGLAN) 10 MG tablet Take 10 mg by mouth as needed 2/27/20  Yes Historical Provider, MD   amLODIPine (NORVASC) 2.5 MG tablet Take 2.5 mg by mouth daily 2/4/20  Yes Historical Provider, MD   ondansetron (ZOFRAN) 4 MG tablet Take 1 tablet by mouth every 8 hours as needed for Nausea or Vomiting 2/25/20  Yes ORTIZ Michele CNP   moxifloxacin (VIGAMOX) 0.5 % ophthalmic solution 1 drop   Yes Historical Provider, MD   potassium chloride (KLOR-CON M) 10 MEQ extended release tablet Take 1 tablet by mouth daily 7/17/19  Yes ORTIZ Michele CNP   montelukast (SINGULAIR) 10 MG tablet Take 1 tablet by mouth daily 7/3/19  Yes ORTIZ Michele CNP   ZOLMitriptan (ZOMIG) 5 MG tablet TAKE 1 TABLET BY MOUTH AT ONSET OF MIGRAINE. MAY REPEAT ONCE AFTER 2 HOURS.  MAX 10 MG (2 TABLETS) PER DAY 6/4/18  Yes Historical Provider, MD   levothyroxine (SYNTHROID) 25 MCG tablet Take one daily 4/29/18  Yes Darryle Burner, APRN - CNP   hydroxychloroquine (PLAQUENIL) 200 MG tablet Take 300 mg by mouth daily    Yes Historical Provider, MD   Pancrelipase, Lip-Prot-Amyl, (CREON) 23805 UNITS CPEP 2 caps tid 9/23/16  Yes Lida Ross MD   hydrOXYzine (ATARAX) 25 MG tablet Take 1 tablet by mouth nightly  Patient taking differently: Take 25 mg by mouth nightly Indications: has not taken this  4/13/21 5/13/21  Darryle Burner, APRN - CNP   liothyronine (CYTOMEL) 25 MCG tablet Take 1 tablet by mouth daily 4/8/21   Darryle Burner, APRN - CNP   hydrocortisone (CORTEF) 5 MG tablet Take 5 mg by mouth daily 2/24/21   Historical Provider, MD   topiramate (TOPAMAX) 100 MG tablet Take 1 tablet by mouth 2 times daily 10/11/20   Darryle Burner, APRN - CNP   furosemide (LASIX) 20 MG tablet Take 1 tablet by mouth 2 times daily TAKE ONE TABLET BY MOUTH TWO TIMES A DAY 8/4/20   Darryle Burner, APRN - CNP   Hyoscyamine Sulfate SL (LEVSIN/SL) 0.125 MG SUBL Place 125 mcg under the tongue every 4 hours as needed (pain) 2/28/20   ORTIZ Cueva CNP   azelastine (ASTELIN) 0.1 % nasal spray 1 spray by Nasal route 11/27/19   Historical Provider, MD   fluticasone (FLONASE) 50 MCG/ACT nasal spray USE 1 SPRAY NASALLY DAILY 5/15/19   Historical Provider, MD       Allergies: No Known Allergies     History of allergic reaction to anesthesia:  No    Past Medical History:  Past Medical History:   Diagnosis Date    Acquired hypothyroidism 9/26/2016    Acute pancreatitis     ADD (attention deficit disorder) 2/12/2015    Anxiety     Depression 12/9/2015    Endometrial cyst of ovary 5/10/14    RT ovary, ruptured    GERD (gastroesophageal reflux disease) 9/26/2016    Medullary sponge kidney of both kidneys 5/22/2018    Sjogren's disease ([facility] [address]     Stress     Swelling        Past Surgical History:  Past Surgical History:   Procedure Laterality Date    CHOLECYSTECTOMY  2011    HYSTERECTOMY  2014    sept    OVARY REMOVAL Left Jan 2014    UPPER GASTROINTESTINAL ENDOSCOPY  09/16/2016    EGD W/BX       Social History:  Social History     Tobacco Use    Smoking status: Never Smoker    Smokeless tobacco: Never Used   Substance Use Topics    Alcohol use: No     Alcohol/week: 0.0 standard drinks     Comment: no alcohol since 2011    Drug use: No       Vital Signs:   Vitals:    05/06/21 0723   BP: (!) 112/58   Pulse: 72   Resp: 16   Temp: 97.5 °F (36.4 °C)   SpO2: 100%        Physical Exam:  Cardiac:  [x]WNL []Comments:  Pulmonary:  [x]WNL   []Comments:   Neuro/Mental Status:  [x]WNL  []Comments:  Abdominal:  [x]WNL    []Comments:  Other:   []WNL  []Comments:    Informed Consent:  The risks and benefits of the procedure have been discussed with either the patient or if they cannot consent, their representative. Assessment:  Patient examined and appropriate for planned sedation and procedure. Plan:  Proceed with planned sedation and procedure as above. The patient was counseled at length about risks of micheal COVID-19 in the perioperative and any recovery window from the procedure. The patient was made aware that micheal COVID-19 may worsen their prognosis for recovery from their procedure and lend to a higher morbidity and-all mortality risk. The patient was given the option of postponing the procedure all material risks, benefits, and alternatives were discussed. The patient does wish to proceed with the procedure at this time.     Merary Pagan MD  8:01 AM

## 2021-05-06 NOTE — ANESTHESIA PRE PROCEDURE
150 MG tablet Take 1 tablet by mouth nightly 10/4/20  Yes ORTIZ Rao CNP   Magnesium 400 MG TABS Take 1 tablet by mouth daily 10/3/20  Yes Bradford Murphy PA-C   TRULANCE 3 MG TABS Take 3 mg by mouth daily 3/10/20  Yes Historical Provider, MD   sucralfate (CARAFATE) 1 GM tablet Take by mouth daily 3/10/20  Yes Historical Provider, MD   metoclopramide (REGLAN) 10 MG tablet Take 10 mg by mouth as needed 2/27/20  Yes Historical Provider, MD   amLODIPine (NORVASC) 2.5 MG tablet Take 2.5 mg by mouth daily 2/4/20  Yes Historical Provider, MD   ondansetron (ZOFRAN) 4 MG tablet Take 1 tablet by mouth every 8 hours as needed for Nausea or Vomiting 2/25/20  Yes ORTIZ Rao CNP   moxifloxacin (VIGAMOX) 0.5 % ophthalmic solution 1 drop   Yes Historical Provider, MD   potassium chloride (KLOR-CON M) 10 MEQ extended release tablet Take 1 tablet by mouth daily 7/17/19  Yes ORTIZ Rao CNP   montelukast (SINGULAIR) 10 MG tablet Take 1 tablet by mouth daily 7/3/19  Yes ORTIZ Rao CNP   ZOLMitriptan (ZOMIG) 5 MG tablet TAKE 1 TABLET BY MOUTH AT ONSET OF MIGRAINE. MAY REPEAT ONCE AFTER 2 HOURS.  MAX 10 MG (2 TABLETS) PER DAY 6/4/18  Yes Historical Provider, MD   levothyroxine (SYNTHROID) 25 MCG tablet Take one daily 4/29/18  Yes ORTIZ Rao CNP   hydroxychloroquine (PLAQUENIL) 200 MG tablet Take 300 mg by mouth daily    Yes Historical Provider, MD   Pancrelipase, Lip-Prot-Amyl, (CREON) 92576 UNITS CPEP 2 caps tid 9/23/16  Yes Maddi Hagan MD   hydrOXYzine (ATARAX) 25 MG tablet Take 1 tablet by mouth nightly  Patient taking differently: Take 25 mg by mouth nightly Indications: has not taken this  4/13/21 5/13/21  ORTIZ aRo CNP   liothyronine (CYTOMEL) 25 MCG tablet Take 1 tablet by mouth daily 4/8/21   Gabriela Ibarra, APRN - CNP   hydrocortisone (CORTEF) 5 MG tablet Take 5 mg by mouth daily 2/24/21   Historical Provider, MD   topiramate (TOPAMAX) 100 MG tablet Take 1 tablet by mouth 2 times daily 10/11/20   ORTIZ Nichole CNP   furosemide (LASIX) 20 MG tablet Take 1 tablet by mouth 2 times daily TAKE ONE TABLET BY MOUTH TWO TIMES A DAY 8/4/20   ORTIZ Nichole CNP   Hyoscyamine Sulfate SL (LEVSIN/SL) 0.125 MG SUBL Place 125 mcg under the tongue every 4 hours as needed (pain) 2/28/20   ORTIZ Rose CNP   azelastine (ASTELIN) 0.1 % nasal spray 1 spray by Nasal route 11/27/19   Historical Provider, MD   fluticasone (FLONASE) 50 MCG/ACT nasal spray USE 1 SPRAY NASALLY DAILY 5/15/19   Historical Provider, MD       Current medications:    Current Facility-Administered Medications   Medication Dose Route Frequency Provider Last Rate Last Admin    0.9 % sodium chloride bolus  500 mL Intravenous Once Elizabeth Rai  mL/hr at 05/06/21 0753 500 mL at 05/06/21 0753       Allergies:  No Known Allergies    Problem List:    Patient Active Problem List   Diagnosis Code    ADD (attention deficit disorder) F98.8    Anxiety F41.9    Depression F32.9    Acquired hypothyroidism E03.9    GERD (gastroesophageal reflux disease) K21.9    Pancreatitis K85.90    Other chest pain R07.89    Left lower quadrant pain R10.32    Sjogren's syndrome ([facility] [address] M35.00    Diverticulitis of large intestine without perforation or abscess without bleeding K57.32    ASCUS favoring benign [id number]    Endometriosis N80.9    Fibroids D21.9    S/P endometrial ablation Z98.890    Transfusion history Z92.89    Cellulitis, face L03. 80    Abnormal MRI, liver R93.2    Acute recurrent pancreatitis K85.90    At risk of fracture due to osteoporosis M81.0, Z91.89    Calcinosis E83.59    Chronic headaches R51.9, G89.29    Conductive hearing loss in left ear H90.12    Edema R60.9    Iron overload E83.19    Mass of left side of neck R22.1    Medullary sponge kidney of both kidneys G75.3    Metabolic acidosis A01.0    Microscopic hematuria R31.29    Nausea R11.0    Renal tubular acidosis type I N25.89    Rheumatoid arthritis (HCC) M06.9    Tension type headache G44.209    Weight loss, abnormal R63.4    Major depressive disorder, single episode, 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[facility] [address] F32.4    Chronic pancreatitis ([facility] [address] K86.1    Neutropenia ([facility] [address] D70.9    Hereditary hemochromatosis ([facility] [address] E83.110       Past Medical History:        Diagnosis Date    Acquired hypothyroidism 9/26/2016    Acute pancreatitis     ADD (attention deficit disorder) 2/12/2015    Anxiety     Depression 12/9/2015    Endometrial cyst of ovary 5/10/14    RT ovary, ruptured    GERD (gastroesophageal reflux disease) 9/26/2016    Medullary sponge kidney of both kidneys 5/22/2018    Sjogren's disease ([facility] [address]     Stress     Swelling        Past Surgical History:        Procedure Laterality Date    CHOLECYSTECTOMY  2011    HYSTERECTOMY  2014    sept    OVARY REMOVAL Left Jan 2014    UPPER GASTROINTESTINAL ENDOSCOPY  09/16/2016    EGD W/BX       Social History:    Social History     Tobacco Use    Smoking status: Never Smoker    Smokeless tobacco: Never Used   Substance Use Topics    Alcohol use: No     Alcohol/week: 0.0 standard drinks     Comment: no alcohol since 2011                                Counseling given: Not Answered      Vital Signs (Current):   Vitals:    05/06/21 0723   BP: (!) 112/58   Pulse: 72   Resp: 16   Temp: 36.4 °C (97.5 °F)   TempSrc: Temporal   SpO2: 100%   Weight: 112 lb (50.8 kg)   Height: 5' 2\" (1.575 m)                                              BP Readings from Last 3 Encounters:   05/06/21 (!) 112/58   05/04/21 110/60   04/13/21 98/60       NPO Status: Time of last liquid consumption: 2130                        Time of last solid consumption: 2100                        Date of last liquid consumption: 05/05/21                        Date of last solid food consumption: 05/05/21    BMI:   Wt Readings from Last 3 Encounters:   05/06/21 112 lb (50.8 kg)   05/04/21 112 lb (50.8 kg)   04/14/21 106 lb (48.1 kg)     Body mass index is 20.49 kg/m². CBC:   Lab Results   Component Value Date    WBC 6.4 04/13/2021    RBC 4.36 04/13/2021    HGB 13.0 04/13/2021    HCT 39.1 04/13/2021    MCV 89.6 04/13/2021    RDW 13.8 04/13/2021     04/13/2021       CMP:   Lab Results   Component Value Date     04/13/2021    K 3.2 04/13/2021     04/13/2021    CO2 20 04/13/2021    BUN 16 04/13/2021    CREATININE 0.79 04/13/2021    GFRAA >60.0 04/13/2021    AGRATIO 1.5 04/05/2019    LABGLOM >60.0 04/13/2021    GLUCOSE 117 04/13/2021    GLUCOSE 75 01/12/2021    PROT 6.9 04/13/2021    CALCIUM 8.5 04/13/2021    BILITOT <0.2 04/13/2021    ALKPHOS 82 04/13/2021    AST 13 04/13/2021    ALT 13 04/13/2021       POC Tests: No results for input(s): POCGLU, POCNA, POCK, POCCL, POCBUN, POCHEMO, POCHCT in the last 72 hours.     Coags:   Lab Results   Component Value Date    PROTIME 11.8 07/07/2020    INR 1.0 07/07/2020    APTT 27.1 09/11/2019       HCG (If Applicable): No results found for: PREGTESTUR, PREGSERUM, HCG, HCGQUANT     ABGs: No results found for: PHART, PO2ART, FTZ4LTV, PGO8HWZ, BEART, I1LXOGKD     Type & Screen (If Applicable):  No results found for: LABABO, LABRH    Drug/Infectious Status (If Applicable):  No results found for: HIV, HEPCAB    COVID-19 Screening (If Applicable):   Lab Results   Component Value Date    COVID19 NOT DETECTED 02/23/2021           Anesthesia Evaluation  Patient summary reviewed and Nursing notes reviewed  Airway: Mallampati: II  TM distance: >3 FB   Neck ROM: full  Mouth opening: > = 3 FB Dental: normal exam         Pulmonary:Negative Pulmonary ROS and normal exam                               Cardiovascular:Negative CV ROS                      Neuro/Psych:   (+) headaches:, psychiatric history:            GI/Hepatic/Renal:   (+) GERD:,           Endo/Other:    (+) hypothyroidism: arthritis:., .                 Abdominal:           Vascular: Anesthesia Plan      MAC     ASA 2             Anesthetic plan and risks discussed with patient. Plan discussed with CRNA.                   ORTIZ Hatfield - CRNA   5/6/2021

## 2021-05-18 ENCOUNTER — OFFICE VISIT (OUTPATIENT)
Dept: FAMILY MEDICINE CLINIC | Age: 51
End: 2021-05-18
Payer: MEDICARE

## 2021-05-18 VITALS
TEMPERATURE: 97.1 F | HEIGHT: 62 IN | DIASTOLIC BLOOD PRESSURE: 68 MMHG | OXYGEN SATURATION: 98 % | HEART RATE: 85 BPM | BODY MASS INDEX: 19.73 KG/M2 | WEIGHT: 107.2 LBS | SYSTOLIC BLOOD PRESSURE: 104 MMHG

## 2021-05-18 DIAGNOSIS — R10.9 ABDOMINAL PAIN, UNSPECIFIED ABDOMINAL LOCATION: ICD-10-CM

## 2021-05-18 DIAGNOSIS — M89.8X9 BONE PAIN: ICD-10-CM

## 2021-05-18 DIAGNOSIS — R07.81 RIB PAIN: ICD-10-CM

## 2021-05-18 DIAGNOSIS — D22.9 SUSPICIOUS NEVUS: Primary | ICD-10-CM

## 2021-05-18 PROCEDURE — 1036F TOBACCO NON-USER: CPT | Performed by: NURSE PRACTITIONER

## 2021-05-18 PROCEDURE — 99214 OFFICE O/P EST MOD 30 MIN: CPT | Performed by: NURSE PRACTITIONER

## 2021-05-18 PROCEDURE — G8427 DOCREV CUR MEDS BY ELIG CLIN: HCPCS | Performed by: NURSE PRACTITIONER

## 2021-05-18 PROCEDURE — G8420 CALC BMI NORM PARAMETERS: HCPCS | Performed by: NURSE PRACTITIONER

## 2021-05-18 SDOH — ECONOMIC STABILITY: TRANSPORTATION INSECURITY
IN THE PAST 12 MONTHS, HAS THE LACK OF TRANSPORTATION KEPT YOU FROM MEDICAL APPOINTMENTS OR FROM GETTING MEDICATIONS?: NO

## 2021-05-18 SDOH — ECONOMIC STABILITY: FOOD INSECURITY: WITHIN THE PAST 12 MONTHS, THE FOOD YOU BOUGHT JUST DIDN'T LAST AND YOU DIDN'T HAVE MONEY TO GET MORE.: NEVER TRUE

## 2021-05-18 SDOH — ECONOMIC STABILITY: TRANSPORTATION INSECURITY
IN THE PAST 12 MONTHS, HAS LACK OF TRANSPORTATION KEPT YOU FROM MEETINGS, WORK, OR FROM GETTING THINGS NEEDED FOR DAILY LIVING?: NO

## 2021-05-18 SDOH — ECONOMIC STABILITY: FOOD INSECURITY: WITHIN THE PAST 12 MONTHS, YOU WORRIED THAT YOUR FOOD WOULD RUN OUT BEFORE YOU GOT MONEY TO BUY MORE.: NEVER TRUE

## 2021-05-18 ASSESSMENT — SOCIAL DETERMINANTS OF HEALTH (SDOH): HOW HARD IS IT FOR YOU TO PAY FOR THE VERY BASICS LIKE FOOD, HOUSING, MEDICAL CARE, AND HEATING?: NOT HARD AT ALL

## 2021-05-18 NOTE — PROGRESS NOTES
Subjective  Chief Complaint   Patient presents with    Results     recent Ct    Abdominal Pain     no improvement       HPI    Here still struggling with recurrent LUQ pain into rib area. This has been an ongoing issues. Has seen GI specialist most recently and felt that recent CT showed some irregularity in regards to the rib area. This has not been mentioned on previous reports from radiology. GI questioning whether she should have it addended. Struggling with widespread bone pain as well. Of note, pt states that she is getting genetic testing from GI. That has been ordered. This is due to a constellation of symptoms over time. They are also considering a PET scan in the future.        Patient Active Problem List    Diagnosis Date Noted    Neutropenia ([address] 01/12/2021    Hereditary hemochromatosis ([address] 01/12/2021    Chronic pancreatitis ([address] 08/11/2020    Major depressive disorder, single episode, 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[address] 01/15/2019    Abnormal MRI, liver 05/22/2018    Acute recurrent pancreatitis 05/22/2018    At risk of fracture due to osteoporosis 05/22/2018    Calcinosis 05/22/2018    Chronic headaches 05/22/2018    Conductive hearing loss in left ear 05/22/2018    Edema 05/22/2018    Iron overload 05/22/2018    Mass of left side of neck 05/22/2018    Medullary sponge kidney of both kidneys 23/68/5806    Metabolic acidosis 53/21/3152    Microscopic hematuria 05/22/2018    Nausea 05/22/2018    Renal tubular acidosis type I 05/22/2018    Rheumatoid arthritis ([address] 05/22/2018    Tension type headache 05/22/2018    Weight loss, abnormal 05/22/2018    Cellulitis, face 04/06/2018    Other chest pain 11/01/2017    Left lower quadrant pain 11/01/2017    Sjogren's syndrome ([address] 11/01/2017    Diverticulitis of large intestine without perforation or abscess without bleeding 11/01/2017    Fibroids 11/01/2017    Transfusion history 11/01/2017    Pancreatitis 05/13/2017    Acquired hypothyroidism 09/26/2016    GERD (gastroesophageal reflux disease) 09/26/2016    Anxiety 12/09/2015    Depression 12/09/2015    ADD (attention deficit disorder) 02/12/2015    Endometriosis 09/09/2014    ASCUS favoring benign 05/01/2013    S/P endometrial ablation 05/01/2013     Past Medical History:   Diagnosis Date    Acquired hypothyroidism 9/26/2016    Acute pancreatitis     ADD (attention deficit disorder) 2/12/2015    Anxiety     Depression 12/9/2015    Endometrial cyst of ovary 5/10/14    RT ovary, ruptured    GERD (gastroesophageal reflux disease) 9/26/2016    Medullary sponge kidney of both kidneys 5/22/2018    Sjogren's disease ([address]     Stress     Swelling      Past Surgical History:   Procedure Laterality Date    CHOLECYSTECTOMY  2011    HYSTERECTOMY  2014    sept    OVARY REMOVAL Left Jan 2014    UPPER GASTROINTESTINAL ENDOSCOPY  09/16/2016    EGD W/BX    UPPER GASTROINTESTINAL ENDOSCOPY N/A 5/6/2021    ENDOSCOPIC ULTRASOUND with EGD performed by Anita Winter MD at [facility]     Family History   Problem Relation Age of Onset    Heart Disease Father 48    Cancer Maternal Grandmother         breast    Heart Disease Other         mom and dad's side     Social History     Socioeconomic History    Marital status: Single     Spouse name: None    Number of children: None    Years of education: None    Highest education level: None   Occupational History    None   Tobacco Use    Smoking status: Never Smoker    Smokeless tobacco: Never Used   Vaping Use    Vaping Use: Never used   Substance and Sexual Activity    Alcohol use: No     Alcohol/week: 0.0 standard drinks     Comment: no alcohol since 2011    Drug use: No    Sexual activity: None   Other Topics Concern    None   Social History Narrative    ** Merged History Encounter **          Social Determinants of Health     Financial Resource Strain: Low Risk     Difficulty of Paying Living Expenses: Not hard at all   Food Insecurity: No Food Insecurity    Worried About [facility] in the Last Year: Never true    [place] of Food in the Last Year: Never true   Transportation Needs: No Transportation Needs    Lack of Transportation (Medical): No    Lack of Transportation (Non-Medical): No   Physical Activity:     Days of Exercise per Week:     Minutes of Exercise per Session:    Stress:     Feeling of Stress :    Social Connections:     Frequency of Communication with Friends and Family:     Frequency of Social Gatherings with Friends and Family:     Attends Sikh Services:     Active Member of Clubs or Organizations:     Attends Club or Organization Meetings:     Marital Status:    Intimate Partner Violence:     Fear of Current or Ex-Partner:     Emotionally Abused:     Physically Abused:     Sexually Abused:      Current Outpatient Medications on File Prior to Visit   Medication Sig Dispense Refill    traZODone (DESYREL) 150 MG tablet TAKE ONE TABLET BY MOUTH nightly 30 tablet 0    amoxicillin (AMOXIL) 875 MG tablet Take 875 mg by mouth 2 times daily      amphetamine-dextroamphetamine (ADDERALL XR) 30 MG extended release capsule Take 1 capsule by mouth 2 times daily for 30 days. 60 capsule 0    topiramate (TOPAMAX) 100 MG tablet TAKE ONE TABLET BY MOUTH TWO TIMES A DAY 60 tablet 0    liothyronine (CYTOMEL) 25 MCG tablet Take 1 tablet by mouth daily 90 tablet 0    estradiol (ESTRACE) 1 MG tablet Take 1 tablet by mouth daily 30 tablet 5    hydrocortisone (CORTEF) 5 MG tablet Take 5 mg by mouth daily      loratadine (CLARITIN) 10 MG tablet Take 1 tablet by mouth daily 30 tablet 5    valACYclovir (VALTREX) 500 MG tablet Take 1 tablet by mouth daily 30 tablet 5    prochlorperazine (COMPAZINE) 10 MG tablet Take 1 tablet by mouth every 6 hours as needed (nausea) 30 tablet 2    metroNIDAZOLE (METROGEL) 0.75 % gel Apply topically 2 times daily.  45 g 1    buPROPion (WELLBUTRIN XL) 300 daily TAKE ONE TABLET BY MOUTH TWO TIMES A DAY 60 tablet 5     No current facility-administered medications on file prior to visit. No Known Allergies    Review of Systems   Constitutional: Positive for fatigue. Negative for unexpected weight change. Respiratory: Negative for cough and shortness of breath. Cardiovascular: Positive for chest pain. Gastrointestinal: Positive for abdominal pain. Objective  Vitals:    05/18/21 1411   BP: 104/68   Pulse: 85   Temp: 97.1 °F (36.2 °C)   SpO2: 98%   Weight: 107 lb 3.2 oz (48.6 kg)   Height: 5' 2\" (1.575 m)     Physical Exam  Vitals and nursing note reviewed. Constitutional:       Appearance: Normal appearance. HENT:      Head: Normocephalic. Mouth/Throat:      Mouth: Mucous membranes are moist.      Pharynx: Oropharynx is clear. Eyes:      Extraocular Movements: Extraocular movements intact. Conjunctiva/sclera: Conjunctivae normal.      Pupils: Pupils are equal, round, and reactive to light. Cardiovascular:      Rate and Rhythm: Normal rate and regular rhythm. Pulses: Normal pulses. Heart sounds: Normal heart sounds. Pulmonary:      Effort: Pulmonary effort is normal.      Breath sounds: Normal breath sounds. Chest:       Abdominal:      Palpations: Abdomen is soft. Tenderness: There is abdominal tenderness. Comments: Small \"lumps\" palpated under the skin that are fluctuant   Skin:     General: Skin is warm. Neurological:      General: No focal deficit present. Mental Status: She is alert and oriented to person, place, and time. Mental status is at baseline. Psychiatric:         Mood and Affect: Mood is anxious. Assessment & Plan     Diagnosis Orders   1. Suspicious nevus  Mer Rodriguez MD, Dermatology, [place]   2. Bone pain  NM BONE SCAN WHOLE BODY   3. Rib pain     4.  Abdominal pain, unspecified abdominal location         Orders Placed This Encounter   Procedures    NM

## 2021-05-19 ASSESSMENT — ENCOUNTER SYMPTOMS
SHORTNESS OF BREATH: 0
COUGH: 0
ABDOMINAL PAIN: 1

## 2021-05-21 ENCOUNTER — TELEPHONE (OUTPATIENT)
Dept: FAMILY MEDICINE CLINIC | Age: 51
End: 2021-05-21

## 2021-05-21 NOTE — TELEPHONE ENCOUNTER
Pt calling stating that the nexium needs a PA pt was told by pharmacy that they have sent something to us on the PA. Please advise. Pt phone number is [phone]. Pt has been out of this for a week now.

## 2021-05-25 ENCOUNTER — HOSPITAL ENCOUNTER (OUTPATIENT)
Dept: NUCLEAR MEDICINE | Age: 51
Discharge: HOME | End: 2021-05-27
Payer: MEDICARE

## 2021-05-25 DIAGNOSIS — M89.8X9 BONE PAIN: ICD-10-CM

## 2021-05-25 PROCEDURE — 3430000000 HC RX DIAGNOSTIC RADIOPHARMACEUTICAL: Performed by: NURSE PRACTITIONER

## 2021-05-25 PROCEDURE — 78306 BONE IMAGING WHOLE BODY: CPT

## 2021-05-25 PROCEDURE — A9503 TC99M MEDRONATE: HCPCS | Performed by: NURSE PRACTITIONER

## 2021-05-25 RX ORDER — TC 99M MEDRONATE 20 MG/10ML
25 INJECTION, POWDER, LYOPHILIZED, FOR SOLUTION INTRAVENOUS
Status: COMPLETED | OUTPATIENT
Start: 2021-05-25 | End: 2021-05-25

## 2021-05-25 RX ADMIN — TC 99M MEDRONATE 29.7 MILLICURIE: 20 INJECTION, POWDER, LYOPHILIZED, FOR SOLUTION INTRAVENOUS at 07:40

## 2021-06-01 ENCOUNTER — TELEPHONE (OUTPATIENT)
Dept: FAMILY MEDICINE CLINIC | Age: 51
End: 2021-06-01

## 2021-06-01 NOTE — TELEPHONE ENCOUNTER
Pt calling back to let us know she cancelled her appt because she saw a different dermatologist, Dr Sin Jin and did a full evaluation.     She will let us know if she still needs the referral with Dr. Lyn Bagley,

## 2021-06-02 DIAGNOSIS — F98.8 ATTENTION DEFICIT DISORDER, UNSPECIFIED HYPERACTIVITY PRESENCE: ICD-10-CM

## 2021-06-02 RX ORDER — DEXTROAMPHETAMINE SACCHARATE, AMPHETAMINE ASPARTATE MONOHYDRATE, DEXTROAMPHETAMINE SULFATE AND AMPHETAMINE SULFATE 7.5; 7.5; 7.5; 7.5 MG/1; MG/1; MG/1; MG/1
30 CAPSULE, EXTENDED RELEASE ORAL 2 TIMES DAILY
Qty: 60 CAPSULE | Refills: 0 | Status: SHIPPED | OUTPATIENT
Start: 2021-06-02 | End: 2021-06-29 | Stop reason: SDUPTHER

## 2021-06-04 DIAGNOSIS — Z87.19 HISTORY OF PANCREATITIS: ICD-10-CM

## 2021-06-08 ENCOUNTER — VIRTUAL VISIT (OUTPATIENT)
Dept: FAMILY MEDICINE CLINIC | Age: 51
End: 2021-06-08
Payer: MEDICARE

## 2021-06-08 DIAGNOSIS — B02.9 HERPES ZOSTER WITHOUT COMPLICATION: ICD-10-CM

## 2021-06-08 DIAGNOSIS — F41.9 ANXIETY: ICD-10-CM

## 2021-06-08 DIAGNOSIS — M35.00 SJOGREN'S SYNDROME, WITH UNSPECIFIED ORGAN INVOLVEMENT (HCC): Primary | ICD-10-CM

## 2021-06-08 DIAGNOSIS — R14.0 ABDOMINAL DISTENTION: ICD-10-CM

## 2021-06-08 DIAGNOSIS — R82.90 ABNORMAL URINALYSIS: ICD-10-CM

## 2021-06-08 DIAGNOSIS — M89.8X9 BONE PAIN: ICD-10-CM

## 2021-06-08 LAB — PANCREATIC ELASTASE, FECAL: >800 UG/G

## 2021-06-08 PROCEDURE — 99214 OFFICE O/P EST MOD 30 MIN: CPT | Performed by: NURSE PRACTITIONER

## 2021-06-08 PROCEDURE — G8428 CUR MEDS NOT DOCUMENT: HCPCS | Performed by: NURSE PRACTITIONER

## 2021-06-08 RX ORDER — CYCLOBENZAPRINE HCL 5 MG
2.5-5 TABLET ORAL 2 TIMES DAILY PRN
COMMUNITY
Start: 2021-06-02 | End: 2022-09-03 | Stop reason: ALTCHOICE

## 2021-06-08 RX ORDER — TOPIRAMATE 50 MG/1
TABLET, FILM COATED ORAL
COMMUNITY
Start: 2021-06-02 | End: 2021-12-14

## 2021-06-08 RX ORDER — NARATRIPTAN 2.5 MG/1
TABLET ORAL
COMMUNITY
Start: 2021-06-02

## 2021-06-08 RX ORDER — DEXTROMETHORPHAN HYDROBROMIDE AND PROMETHAZINE HYDROCHLORIDE 15; 6.25 MG/5ML; MG/5ML
SYRUP ORAL
COMMUNITY
Start: 2021-02-28 | End: 2022-02-02

## 2021-06-08 ASSESSMENT — ENCOUNTER SYMPTOMS
ABDOMINAL DISTENTION: 1
NAUSEA: 1
COUGH: 0
SHORTNESS OF BREATH: 0
ABDOMINAL PAIN: 1

## 2021-06-08 NOTE — PROGRESS NOTES
2021    TELEHEALTH EVALUATION -- Audio/Visual (During DQYLX-[id number] public health emergency)    Due to COVID 19 outbreak, patient's office visit was converted to a virtual visit. Patient was contacted and agreed to proceed with a virtual visit via Doxy. me  The risks and benefits of converting to a virtual visit were discussed in light of the current infectious disease epidemic. Patient also understood that insurance coverage and co-pays are up to their individual insurance plans. HPI:    Luis Ford (:  1970) has requested an audio/video evaluation for the following concern(s):    Has some shingles above the right eye. More frequent HA's recently. Noticed abnormal urine in recent testing with [facility]. Asking what should be done. Started on Amerge for the HA's through neurology   Started on flexeril more often for tension to try and help. Going to try and botox approved. Did stool kit recently  Had a lot of abdominal discomfort during that time. Very gassy, sticky stool. Review of Systems   Respiratory: Negative for cough and shortness of breath. Cardiovascular: Negative for chest pain. Gastrointestinal: Positive for abdominal distention, abdominal pain and nausea. Prior to Visit Medications    Medication Sig Taking? Authorizing Provider   cyclobenzaprine (FLEXERIL) 5 MG tablet Take 2.5-5 mg by mouth 2 times daily as needed Yes Historical Provider, MD   naratriptan (AMERGE) 2.5 MG tablet Take one at onset of severe headache/migraine may repeat x 1 after 4 hours if needed. Maximum 2/day and 2 days/week. Yes Historical Provider, MD   topiramate (TOPAMAX) 50 MG tablet  Yes Historical Provider, MD   amphetamine-dextroamphetamine (ADDERALL XR) 30 MG extended release capsule Take 1 capsule by mouth 2 times daily for 30 days.  Yes Haven Sep APRN - CNP   topiramate (TOPAMAX) 100 MG tablet TAKE ONE TABLET BY MOUTH TWO TIMES A DAY Yes Havenval Joe APRN - CNP   NEXIUM 40 MG delayed MD   moxifloxacin (VIGAMOX) 0.5 % ophthalmic solution 1 drop Yes Historical Provider, MD   potassium chloride (KLOR-CON M) 10 MEQ extended release tablet Take 1 tablet by mouth daily Yes ORTIZ Jesus CNP   montelukast (SINGULAIR) 10 MG tablet Take 1 tablet by mouth daily Yes ORTIZ Jesus CNP   fluticasone (FLONASE) 50 MCG/ACT nasal spray USE 1 SPRAY NASALLY DAILY Yes Historical Provider, MD   ZOLMitriptan (ZOMIG) 5 MG tablet TAKE 1 TABLET BY MOUTH AT ONSET OF MIGRAINE. MAY REPEAT ONCE AFTER 2 HOURS. MAX 10 MG (2 TABLETS) PER DAY Yes Historical Provider, MD   levothyroxine (SYNTHROID) 25 MCG tablet Take one daily Yes ORTIZ Jesus CNP   hydroxychloroquine (PLAQUENIL) 200 MG tablet Take 300 mg by mouth daily  Yes Historical Provider, MD   promethazine-dextromethorphan (PROMETHAZINE-DM) 6.25-15 MG/5ML syrup TAKE 5 MLS BY MOUTH AT BEDTIME FOR 7 DAYS  Patient not taking: Reported on 6/8/2021  Historical Provider, MD   amoxicillin (AMOXIL) 875 MG tablet Take 875 mg by mouth 2 times daily  Patient not taking: Reported on 6/8/2021  Historical Provider, MD   NEXIUM 40 MG delayed release capsule TAKE ONE CAPSULE BY MOUTH EVERY DAY  ORTIZ Jesus CNP   topiramate (TOPAMAX) 100 MG tablet Take 1 tablet by mouth 2 times daily  ORTIZ Jesus CNP   furosemide (LASIX) 20 MG tablet Take 1 tablet by mouth 2 times daily TAKE ONE TABLET BY MOUTH TWO TIMES A DAY  ORTIZ Jesus CNP   Hyoscyamine Sulfate SL (LEVSIN/SL) 0.125 MG SUBL Place 125 mcg under the tongue every 4 hours as needed (pain)  Patient not taking: Reported on 6/8/2021  ORTIZ Ramirez CNP   Pancrelipase, Lip-Prot-Amyl, (CREON) 67690 UNITS CPEP 2 caps tid  Patient not taking: Reported on 6/8/2021  Roula Foster MD       Social History     Tobacco Use    Smoking status: Never Smoker    Smokeless tobacco: Never Used   Vaping Use    Vaping Use: Never used   Substance Use Topics    Alcohol use:  No [] Depressed appearing  [] Confused appearing      [] Poor short term memory  [] Poor long term memory    [] OTHER:      Due to this being a TeleHealth encounter, evaluation of the following organ systems is limited: Vitals/Constitutional/EENT/Resp/CV/GI//MS/Neuro/Skin/Heme-Lymph-Imm. ASSESSMENT/PLAN:  1. Abnormal urinalysis    - Culture, Urine; Future    2. Bone pain      3. Herpes zoster without complication  - increase valtrex to 1g daily during outbreak    4. Sjogren's syndrome, with unspecified organ involvement ([facility] [address]  - cont to follow up with rheumatology     5. Abdominal distention  - cont fu with GI    6. Anxiety      Side effects, adverse effects of the medication prescribed today, as well as treatment plan/ rationale and result expectations have been discussed with the patient who expresses understanding and desires to proceed. Close follow up to evaluate treatment results and for coordination of care. I have reviewed the patient's medical history in detail and updated the computerized patient record. As always, patient is advised that if symptoms worsen in any way they will proceed to the nearest emergency room. FU in 1 mos. An  electronic signature was used to authenticate this note. --ORTIZ Candelario - CNP on 6/8/2021 at 12:27 PM        Pursuant to the emergency declaration under the [facility], [facility] waiver authority and the [employer] Act, this Virtual  Visit was conducted, with patient's consent, to reduce the patient's risk of exposure to COVID-19 and provide continuity of care for an established patient. Services were provided through a video synchronous discussion virtually to substitute for in-person clinic visit.

## 2021-06-11 ENCOUNTER — TELEPHONE (OUTPATIENT)
Dept: GASTROENTEROLOGY | Age: 51
End: 2021-06-11

## 2021-06-11 NOTE — TELEPHONE ENCOUNTER
----- Message from Chilo Segura MD sent at 6/11/2021  8:43 AM EDT -----  Please inform patient stool elastase is within the normal range, this suggests no pancreatic insufficiency.

## 2021-06-11 NOTE — TELEPHONE ENCOUNTER
Ms. Gus Torres called back. Informed her that stool elastase is within normal limits, no suggestion of pancreatic insufficiency. Patient still reports constellation of GI symptoms over the phone. Will have her schedule follow up in office to re-evaluate her symptoms and treatment options. Patient is agreeable and states understanding.

## 2021-06-22 NOTE — TELEPHONE ENCOUNTER
Yesterday afternoon she noticed that she had a little bit of swelling on the inner side of knee. Noticed there was some vericose veins and more bruising per say. Said that she kept it elevated and seem to help. Once she stood up she felt a tugging in that spot. She put support hose on for the swelling. She took the support hose off today and says she can not point her toes without a lot of pain. She denies any injury to the area.     Shes looking for suggestions and hates going anywhere without your recommendation

## 2021-06-23 ENCOUNTER — HOSPITAL ENCOUNTER (EMERGENCY)
Age: 51
Discharge: HOME | End: 2021-06-24
Payer: MEDICARE

## 2021-06-23 VITALS
HEIGHT: 62 IN | WEIGHT: 106 LBS | OXYGEN SATURATION: 100 % | SYSTOLIC BLOOD PRESSURE: 106 MMHG | DIASTOLIC BLOOD PRESSURE: 76 MMHG | RESPIRATION RATE: 20 BRPM | TEMPERATURE: 97.8 F | BODY MASS INDEX: 19.51 KG/M2 | HEART RATE: 80 BPM

## 2021-06-23 DIAGNOSIS — M25.561 ACUTE PAIN OF RIGHT KNEE: Primary | ICD-10-CM

## 2021-06-23 PROCEDURE — 99284 EMERGENCY DEPT VISIT MOD MDM: CPT

## 2021-06-23 ASSESSMENT — PAIN DESCRIPTION - PAIN TYPE: TYPE: ACUTE PAIN

## 2021-06-23 ASSESSMENT — PAIN DESCRIPTION - ORIENTATION: ORIENTATION: RIGHT

## 2021-06-23 ASSESSMENT — PAIN SCALES - GENERAL: PAINLEVEL_OUTOF10: 5

## 2021-06-23 ASSESSMENT — PAIN DESCRIPTION - LOCATION: LOCATION: LEG

## 2021-06-23 ASSESSMENT — PAIN DESCRIPTION - DESCRIPTORS: DESCRIPTORS: THROBBING

## 2021-06-24 ENCOUNTER — APPOINTMENT (OUTPATIENT)
Dept: GENERAL RADIOLOGY | Age: 51
End: 2021-06-24
Payer: MEDICARE

## 2021-06-24 ENCOUNTER — APPOINTMENT (OUTPATIENT)
Dept: ULTRASOUND IMAGING | Age: 51
End: 2021-06-24
Payer: MEDICARE

## 2021-06-24 LAB
ALBUMIN SERPL-MCNC: 4.3 G/DL (ref 3.5–4.6)
ALP BLD-CCNC: 71 U/L (ref 40–130)
ALT SERPL-CCNC: 12 U/L (ref 0–33)
ANION GAP SERPL CALCULATED.3IONS-SCNC: 9 MEQ/L (ref 9–15)
AST SERPL-CCNC: 10 U/L (ref 0–35)
BASOPHILS ABSOLUTE: 0.1 K/UL (ref 0–0.2)
BASOPHILS RELATIVE PERCENT: 1 %
BILIRUB SERPL-MCNC: <0.2 MG/DL (ref 0.2–0.7)
BUN BLDV-MCNC: 23 MG/DL (ref 6–20)
CALCIUM SERPL-MCNC: 9.7 MG/DL (ref 8.5–9.9)
CHLORIDE BLD-SCNC: 103 MEQ/L (ref 95–107)
CO2: 27 MEQ/L (ref 20–31)
CREAT SERPL-MCNC: 0.69 MG/DL (ref 0.5–0.9)
EOSINOPHILS ABSOLUTE: 0.1 K/UL (ref 0–0.7)
EOSINOPHILS RELATIVE PERCENT: 1.5 %
GFR AFRICAN AMERICAN: >60
GFR NON-AFRICAN AMERICAN: >60
GLOBULIN: 2.6 G/DL (ref 2.3–3.5)
GLUCOSE BLD-MCNC: 113 MG/DL (ref 70–99)
HCT VFR BLD CALC: 34.9 % (ref 37–47)
HEMOGLOBIN: 11.8 G/DL (ref 12–16)
LYMPHOCYTES ABSOLUTE: 2.5 K/UL (ref 1–4.8)
LYMPHOCYTES RELATIVE PERCENT: 40.2 %
MAGNESIUM: 1.9 MG/DL (ref 1.7–2.4)
MCH RBC QN AUTO: 29.9 PG (ref 27–31.3)
MCHC RBC AUTO-ENTMCNC: 33.9 % (ref 33–37)
MCV RBC AUTO: 88.3 FL (ref 82–100)
MONOCYTES ABSOLUTE: 0.5 K/UL (ref 0.2–0.8)
MONOCYTES RELATIVE PERCENT: 7.8 %
NEUTROPHILS ABSOLUTE: 3 K/UL (ref 1.4–6.5)
NEUTROPHILS RELATIVE PERCENT: 49.5 %
PDW BLD-RTO: 12.9 % (ref 11.5–14.5)
PLATELET # BLD: 293 K/UL (ref 130–400)
POTASSIUM SERPL-SCNC: 3.2 MEQ/L (ref 3.4–4.9)
RBC # BLD: 3.95 M/UL (ref 4.2–5.4)
SODIUM BLD-SCNC: 139 MEQ/L (ref 135–144)
TOTAL PROTEIN: 6.9 G/DL (ref 6.3–8)
WBC # BLD: 6.1 K/UL (ref 4.8–10.8)

## 2021-06-24 PROCEDURE — 2580000003 HC RX 258: Performed by: PERSONAL EMERGENCY RESPONSE ATTENDANT

## 2021-06-24 PROCEDURE — 85025 COMPLETE CBC W/AUTO DIFF WBC: CPT

## 2021-06-24 PROCEDURE — 96374 THER/PROPH/DIAG INJ IV PUSH: CPT

## 2021-06-24 PROCEDURE — 93971 EXTREMITY STUDY: CPT

## 2021-06-24 PROCEDURE — 80053 COMPREHEN METABOLIC PANEL: CPT

## 2021-06-24 PROCEDURE — 36415 COLL VENOUS BLD VENIPUNCTURE: CPT

## 2021-06-24 PROCEDURE — 73564 X-RAY EXAM KNEE 4 OR MORE: CPT

## 2021-06-24 PROCEDURE — 6360000002 HC RX W HCPCS: Performed by: PERSONAL EMERGENCY RESPONSE ATTENDANT

## 2021-06-24 PROCEDURE — 6370000000 HC RX 637 (ALT 250 FOR IP): Performed by: PERSONAL EMERGENCY RESPONSE ATTENDANT

## 2021-06-24 PROCEDURE — 83735 ASSAY OF MAGNESIUM: CPT

## 2021-06-24 RX ORDER — 0.9 % SODIUM CHLORIDE 0.9 %
1000 INTRAVENOUS SOLUTION INTRAVENOUS ONCE
Status: COMPLETED | OUTPATIENT
Start: 2021-06-24 | End: 2021-06-24

## 2021-06-24 RX ORDER — ASPIRIN 81 MG/1
81 TABLET, CHEWABLE ORAL DAILY
Qty: 14 TABLET | Refills: 0 | Status: SHIPPED | OUTPATIENT
Start: 2021-06-24

## 2021-06-24 RX ORDER — POTASSIUM CHLORIDE 20 MEQ/1
40 TABLET, EXTENDED RELEASE ORAL ONCE
Status: COMPLETED | OUTPATIENT
Start: 2021-06-24 | End: 2021-06-24

## 2021-06-24 RX ORDER — ASPIRIN 81 MG/1
81 TABLET, CHEWABLE ORAL ONCE
Status: COMPLETED | OUTPATIENT
Start: 2021-06-24 | End: 2021-06-24

## 2021-06-24 RX ORDER — KETOROLAC TROMETHAMINE 30 MG/ML
30 INJECTION, SOLUTION INTRAMUSCULAR; INTRAVENOUS ONCE
Status: COMPLETED | OUTPATIENT
Start: 2021-06-24 | End: 2021-06-24

## 2021-06-24 RX ADMIN — KETOROLAC TROMETHAMINE 30 MG: 30 INJECTION, SOLUTION INTRAMUSCULAR; INTRAVENOUS at 00:27

## 2021-06-24 RX ADMIN — SODIUM CHLORIDE 1000 ML: 9 INJECTION, SOLUTION INTRAVENOUS at 00:27

## 2021-06-24 RX ADMIN — POTASSIUM CHLORIDE 40 MEQ: 1500 TABLET, EXTENDED RELEASE ORAL at 01:38

## 2021-06-24 RX ADMIN — ASPIRIN 81 MG: 81 TABLET, CHEWABLE ORAL at 01:59

## 2021-06-24 ASSESSMENT — ENCOUNTER SYMPTOMS
ABDOMINAL PAIN: 0
SHORTNESS OF BREATH: 0
NAUSEA: 0
RHINORRHEA: 0
VOMITING: 0
COUGH: 0
SORE THROAT: 0
COLOR CHANGE: 0
BLOOD IN STOOL: 0
DIARRHEA: 0

## 2021-06-24 ASSESSMENT — PAIN SCALES - GENERAL: PAINLEVEL_OUTOF10: 5

## 2021-06-24 NOTE — ED PROVIDER NOTES
[facility] ED  eMERGENCY dEPARTMENT eNCOUnter      Pt Name: Itzel Yi  MRN: [id number]  [place] 1970  Date of evaluation: 6/23/2021  Provider: TRUDY Ribera      HISTORY OF PRESENT ILLNESS    Itzel Yi is a 52 y.o. female with PMHx of medullary sponge kidney, hypothyroidism, GERD, depression, ADD, key pancreatitis, anxiety, Sjogren's disease presents to the emergency department with right leg pain. Pt says 3 days ago while at work she noticed more prominent right lower extremity varicose veins with medial right knee pain. Since then pain has been worsening, and she feels her medial knee and thigh are more swollen and feel tight. She has had subjective fevers. Driving and pushing down on car petals worsen pain. She denies any injuries or activities causing her pain. She denies upper respiratory symptoms, cough, chest pain, shortness of breath, nausea, vomiting, diarrhea. No recent travels, no recent hospitalizations or surgeries, no history of cancer. No blood thinners. HPI    Nursing Notes were reviewed. REVIEW OF SYSTEMS       Review of Systems   Constitutional: Negative for appetite change, chills and fever. HENT: Negative for congestion, rhinorrhea and sore throat. Respiratory: Negative for cough and shortness of breath. Cardiovascular: Negative for chest pain. Gastrointestinal: Negative for abdominal pain, blood in stool, diarrhea, nausea and vomiting. Genitourinary: Negative for difficulty urinating. Musculoskeletal: Positive for arthralgias and myalgias. Negative for joint swelling and neck stiffness. Skin: Negative for color change and rash. Neurological: Negative for dizziness, syncope, weakness, light-headedness, numbness and headaches. All other systems reviewed and are negative.             PAST MEDICAL HISTORY     Past Medical History:   Diagnosis Date    Acquired hypothyroidism 9/26/2016    Acute pancreatitis     ADD (attention deficit disorder) 2/12/2015    Anxiety     Depression 12/9/2015    Endometrial cyst of ovary 5/10/14    RT ovary, ruptured    GERD (gastroesophageal reflux disease) 9/26/2016    Medullary sponge kidney of both kidneys 5/22/2018    Sjogren's disease ([facility] [address]     Stress     Swelling          SURGICAL HISTORY       Past Surgical History:   Procedure Laterality Date    CHOLECYSTECTOMY  2011    HYSTERECTOMY  2014    sept    OVARY REMOVAL Left Jan 2014    UPPER GASTROINTESTINAL ENDOSCOPY  09/16/2016    EGD W/BX    UPPER GASTROINTESTINAL ENDOSCOPY N/A 5/6/2021    ENDOSCOPIC ULTRASOUND with EGD performed by Laurie Holder MD at [address] [place] [address]       Previous Medications    AMLODIPINE (NORVASC) 2.5 MG TABLET    Take 2.5 mg by mouth daily    AMOXICILLIN (AMOXIL) 875 MG TABLET    Take 875 mg by mouth 2 times daily     AMPHETAMINE-DEXTROAMPHETAMINE (ADDERALL XR) 30 MG EXTENDED RELEASE CAPSULE    Take 1 capsule by mouth 2 times daily for 30 days.     AZELASTINE (ASTELIN) 0.1 % NASAL SPRAY    1 spray by Nasal route    BUPROPION (WELLBUTRIN XL) 300 MG EXTENDED RELEASE TABLET    Take 1 tablet by mouth every morning    CYCLOBENZAPRINE (FLEXERIL) 5 MG TABLET    Take 2.5-5 mg by mouth 2 times daily as needed    ESOMEPRAZOLE (NEXIUM) 40 MG DELAYED RELEASE CAPSULE    TAKE ONE CAPSULE BY MOUTH EVERY DAY    ESTRADIOL (ESTRACE) 1 MG TABLET    Take 1 tablet by mouth daily    FLUTICASONE (FLONASE) 50 MCG/ACT NASAL SPRAY    USE 1 SPRAY NASALLY DAILY    FUROSEMIDE (LASIX) 20 MG TABLET    Take 1 tablet by mouth 2 times daily TAKE ONE TABLET BY MOUTH TWO TIMES A DAY    HYDROCORTISONE (CORTEF) 5 MG TABLET    Take 5 mg by mouth daily    HYDROXYCHLOROQUINE (PLAQUENIL) 200 MG TABLET    Take 300 mg by mouth daily     HYOSCYAMINE SULFATE SL (LEVSIN/SL) 0.125 MG SUBL    Place 125 mcg under the tongue every 4 hours as needed (pain)    LEVOTHYROXINE (SYNTHROID) 25 MCG TABLET    Take one daily    LIOTHYRONINE (CYTOMEL) 25 MCG TABLET    Take 1 tablet by mouth daily    LORATADINE (CLARITIN) 10 MG TABLET    Take 1 tablet by mouth daily    MAGNESIUM 400 MG TABS    Take 1 tablet by mouth daily    METOCLOPRAMIDE (REGLAN) 10 MG TABLET    Take 10 mg by mouth as needed    METRONIDAZOLE (METROGEL) 0.75 % GEL    Apply topically 2 times daily. MONTELUKAST (SINGULAIR) 10 MG TABLET    Take 1 tablet by mouth daily    MOXIFLOXACIN (VIGAMOX) 0.5 % OPHTHALMIC SOLUTION    1 drop    NARATRIPTAN (AMERGE) 2.5 MG TABLET    Take one at onset of severe headache/migraine may repeat x 1 after 4 hours if needed. Maximum 2/day and 2 days/week. ONDANSETRON (ZOFRAN) 4 MG TABLET    Take 1 tablet by mouth every 8 hours as needed for Nausea or Vomiting    PANCRELIPASE, LIP-PROT-AMYL, (CREON) 82724 UNITS CPEP    2 caps tid    POTASSIUM CHLORIDE (KLOR-CON M) 10 MEQ EXTENDED RELEASE TABLET    Take 1 tablet by mouth daily    PROCHLORPERAZINE (COMPAZINE) 10 MG TABLET    Take 1 tablet by mouth every 6 hours as needed (nausea)    PROMETHAZINE (PHENERGAN) 25 MG TABLET    Take 1 tablet by mouth every 8 hours as needed for Nausea    PROMETHAZINE-DEXTROMETHORPHAN (PROMETHAZINE-DM) 6.25-15 MG/5ML SYRUP    TAKE 5 MLS BY MOUTH AT BEDTIME FOR 7 DAYS    SUCRALFATE (CARAFATE) 1 GM TABLET    Take by mouth daily    TOPIRAMATE (TOPAMAX) 100 MG TABLET    TAKE ONE TABLET BY MOUTH TWO TIMES A DAY    TOPIRAMATE (TOPAMAX) 50 MG TABLET        TRAZODONE (DESYREL) 150 MG TABLET    TAKE ONE TABLET BY MOUTH nightly    TRULANCE 3 MG TABS    Take 3 mg by mouth daily    VALACYCLOVIR (VALTREX) 500 MG TABLET    Take 1 tablet by mouth daily    ZOLMITRIPTAN (ZOMIG) 5 MG TABLET    TAKE 1 TABLET BY MOUTH AT ONSET OF MIGRAINE. MAY REPEAT ONCE AFTER 2 HOURS. MAX 10 MG (2 TABLETS) PER DAY       ALLERGIES     Patient has no known allergies.     FAMILY HISTORY       Family History   Problem Relation Age of Onset    Heart Disease Father 48    Cancer Maternal Grandmother         breast    Heart Disease Other         mom and dad's side          SOCIAL HISTORY       Social History     Socioeconomic History    Marital status: Single     Spouse name: None    Number of children: None    Years of education: None    Highest education level: None   Occupational History    None   Tobacco Use    Smoking status: Never Smoker    Smokeless tobacco: Never Used   Vaping Use    Vaping Use: Never used   Substance and Sexual Activity    Alcohol use: No     Alcohol/week: 0.0 standard drinks     Comment: no alcohol since 2011    Drug use: No    Sexual activity: None   Other Topics Concern    None   Social History Narrative    ** Merged History Encounter **          Social Determinants of Health     Financial Resource Strain: Low Risk     Difficulty of Paying Living Expenses: Not hard at all   Food Insecurity: No Food Insecurity    Worried About Running Out of Food in the Last Year: Never true    [place] of Food in the Last Year: Never true   Transportation Needs: No Transportation Needs    Lack of Transportation (Medical): No    Lack of Transportation (Non-Medical): No   Physical Activity:     Days of Exercise per Week:     Minutes of Exercise per Session:    Stress:     Feeling of Stress :    Social Connections:     Frequency of Communication with Friends and Family:     Frequency of Social Gatherings with Friends and Family:     Attends Latter-day Services:     Active Member of Clubs or Organizations:     Attends Club or Organization Meetings:     Marital Status:    Intimate Partner Violence:     Fear of Current or Ex-Partner:     Emotionally Abused:     Physically Abused:     Sexually Abused:          PHYSICAL EXAM         ED Triage Vitals [06/23/21 2357]   BP Temp Temp Source Pulse Resp SpO2 Height Weight   106/76 97.8 °F (36.6 °C) Oral 80 20 100 % 5' 2\" (1.575 m) 106 lb (48.1 kg)       Physical Exam  Constitutional:       Appearance: She is well-developed.    HENT:      Head: Normocephalic and atraumatic. Eyes:      Conjunctiva/sclera: Conjunctivae normal.      Pupils: Pupils are equal, round, and reactive to light. Neck:      Trachea: No tracheal deviation. Cardiovascular:      Heart sounds: Normal heart sounds. Pulmonary:      Effort: Pulmonary effort is normal. No respiratory distress. Breath sounds: Normal breath sounds. No stridor. Abdominal:      General: Bowel sounds are normal. There is no distension. Palpations: Abdomen is soft. There is no mass. Tenderness: There is no abdominal tenderness. There is no guarding or rebound. Musculoskeletal:         General: Tenderness present. Normal range of motion. Cervical back: Normal range of motion and neck supple. Comments: Mildly tender to palpate in left medial knee, soft tissues, and left thigh. No calf pain. Patient does have bluish bruise distal of medial knee with minimal tenderness to palpation. Patient does have full knee range of motion without obvious pain but states feels more swollen and stiff. MSP intact distally, legs warm, pink, cap refill less than 2 seconds   Skin:     General: Skin is warm and dry. Capillary Refill: Capillary refill takes less than 2 seconds. Findings: No rash. Neurological:      Mental Status: She is alert and oriented to person, place, and time. Deep Tendon Reflexes: Reflexes are normal and symmetric. Psychiatric:         Behavior: Behavior normal.         Thought Content:  Thought content normal.         Judgment: Judgment normal.         DIAGNOSTIC RESULTS     EKG:All EKG's are interpreted by the Emergency Department Physician who either signs or Co-signs this chart in the absence of a cardiologist.        RADIOLOGY:   Non-plain film images such as CT, Ultrasound and MRI are read by theradiologist. Plain radiographic images are visualized and preliminarily interpreted by the emergency physician with the below findings:    Interpretation per theRadiologist below, if available at the time of this note:    US DUP LOWER EXTREMITY RIGHT SINDHU    (Results Pending)   XR KNEE RIGHT (MIN 4 VIEWS)    (Results Pending)           LABS:  Labs Reviewed   COMPREHENSIVE METABOLIC PANEL - Abnormal; Notable for the following components:       Result Value    Potassium 3.2 (*)     Glucose 113 (*)     BUN 23 (*)     All other components within normal limits   CBC WITH AUTO DIFFERENTIAL - Abnormal; Notable for the following components:    RBC 3.95 (*)     Hemoglobin 11.8 (*)     Hematocrit 34.9 (*)     All other components within normal limits   MAGNESIUM       All other labs were within normal range or not returned as of this dictation. EMERGENCY DEPARTMENT COURSE and DIFFERENTIAL DIAGNOSIS/MDM:   Vitals:    Vitals:    06/23/21 2357   BP: 106/76   Pulse: 80   Resp: 20   Temp: 97.8 °F (36.6 °C)   TempSrc: Oral   SpO2: 100%   Weight: 106 lb (48.1 kg)   Height: 5' 2\" (1.575 m)         MDM    Potassium 3.2, BUN 23. Pt given potassium 40meq, 1L IVF and toradol. X-ray shows no acute fractures or significant arthritis. Ultrasound shows no DVT. Patient symptoms may be knee strain or musculoskeletal in nature. Patient is now saying yesterday she had a weird pulling sensation in her posterior head, her daughter states that she was repeating things at home twice, and then she started noticing right arm pain, swelling, weakness. She denies slurred speech or facial droop. No obvious weakness noted. I informed patient this could be concerning for stroke and she states that she does think she stress. She has refused CAT scan of the head. She states she will follow-up with her family doctor tomorrow morning would be able to get her in. She will be placed on aspirin and aware to return for worsening symptoms. She was also given orthopedic follow-up. Neurological exam negative today. Standard anticipatory guidance given to patient upon discharge.  Have given them a specific time frame in which to follow-up and who to follow-up with. I have also advised them that they should return to the emergency department if they get worse, or not getting better or develop any new or concerning symptoms. Patient demonstrates understanding. CRITICAL CARE TIME   Total Critical Caretime was 0 minutes, excluding separately reportable procedures. There was a high probability of clinically significant/life threatening deterioration in the patient's condition which required my urgent intervention. Procedures    FINAL IMPRESSION      1. Acute pain of right knee          DISPOSITION/PLAN   DISPOSITION        PATIENT REFERRED TO:  ORTIZ Rangel - CNP  [address], [address]  [facility]  [phone]    Call today        DISCHARGE MEDICATIONS:  New Prescriptions    ASPIRIN (ASPIRIN CHILDRENS) 81 MG CHEWABLE TABLET    Take 1 tablet by mouth daily          (Please notethat portions of this note were completed with a voice recognition program.  Efforts were made to edit the dictations but occasionally words are mis-transcribed. )    TRUDY Quispe (electronically signed)  Emergency Physician Assistant           Jose Miguel Redmond, [address]  06/24/21 2803

## 2021-06-24 NOTE — ED TRIAGE NOTES
Patient c/o right leg pain x3 days which is worsening.  Skin cool to touch, slightly swollen, no fevers or reddness noted

## 2021-06-24 NOTE — ED NOTES
Patient discharged home without any questions or concerns.        Bayron Mukherjee RN  06/24/21 8625

## 2021-06-25 ENCOUNTER — OFFICE VISIT (OUTPATIENT)
Dept: ORTHOPEDIC SURGERY | Age: 51
End: 2021-06-25
Payer: MEDICARE

## 2021-06-25 VITALS
OXYGEN SATURATION: 99 % | HEIGHT: 62 IN | TEMPERATURE: 97.4 F | HEART RATE: 93 BPM | WEIGHT: 106 LBS | BODY MASS INDEX: 19.51 KG/M2

## 2021-06-25 DIAGNOSIS — M76.891 HAMSTRING TENDONITIS OF RIGHT THIGH: Primary | ICD-10-CM

## 2021-06-25 PROCEDURE — 99202 OFFICE O/P NEW SF 15 MIN: CPT | Performed by: ORTHOPAEDIC SURGERY

## 2021-06-25 PROCEDURE — 1036F TOBACCO NON-USER: CPT | Performed by: ORTHOPAEDIC SURGERY

## 2021-06-25 PROCEDURE — G8427 DOCREV CUR MEDS BY ELIG CLIN: HCPCS | Performed by: ORTHOPAEDIC SURGERY

## 2021-06-25 PROCEDURE — G8420 CALC BMI NORM PARAMETERS: HCPCS | Performed by: ORTHOPAEDIC SURGERY

## 2021-06-25 RX ORDER — METHYLPREDNISOLONE 4 MG/1
TABLET ORAL
Qty: 42 TABLET | Refills: 0 | Status: SHIPPED | OUTPATIENT
Start: 2021-06-25 | End: 2022-02-02

## 2021-06-25 NOTE — PROGRESS NOTES
Subjective:      Patient ID: Ronda Gallardo is a 52 y.o. female who presents today for:  Chief Complaint   Patient presents with   [facility] ED Follow-up     right knee pain. She is swollen behind her knee. No injury that she knows. The pain goes all the way up her calf. She feels very \"off\"       HPI  60-year-old woman who presents today for evaluation of medial right knee pain and swelling. Is been present for approximately a week. She has a history of chronic pancreatitis and Sjogren's syndrome. She is followed for her Sjogren's by Dr. Irwin Ho. She is on Plaquenil. For a week she has had nontraumatic pain and swelling about the medial aspect of her right knee. She is tried Voltaren gel, ice, heat, Tylenol without relief. She was in the emergency room 6/23/2021. She has some discomfort extending into the proximal medial calf. She notes no redness. No pain radiating down her leg.   No numbness    Past Medical History:   Diagnosis Date    Acquired hypothyroidism 9/26/2016    Acute pancreatitis     ADD (attention deficit disorder) 2/12/2015    Anxiety     Depression 12/9/2015    Endometrial cyst of ovary 5/10/14    RT ovary, ruptured    GERD (gastroesophageal reflux disease) 9/26/2016    Medullary sponge kidney of both kidneys 5/22/2018    Sjogren's disease ([address]     Stress     Swelling      Past Surgical History:   Procedure Laterality Date    CHOLECYSTECTOMY  2011    HYSTERECTOMY  2014    sept    OVARY REMOVAL Left Jan 2014    UPPER GASTROINTESTINAL ENDOSCOPY  09/16/2016    EGD W/BX    UPPER GASTROINTESTINAL ENDOSCOPY N/A 5/6/2021    ENDOSCOPIC ULTRASOUND with EGD performed by [facility]i [facility] at [facility] Marital status: Single     Spouse name: Not on file    Number of children: Not on file    Years of education: Not on file    Highest education level: Not on file   Occupational History    Not on file   Tobacco Use    Smoking status: Never Smoker    Smokeless tobacco: Never Used   Vaping Use    Vaping Use: Never used   Substance and Sexual Activity    Alcohol use: No     Alcohol/week: 0.0 standard drinks     Comment: no alcohol since 2011    Drug use: No    Sexual activity: Not on file   Other Topics Concern    Not on file   Social History Narrative    ** Merged History Encounter **          Social Determinants of Health     Financial Resource Strain: Low Risk     Difficulty of Paying Living Expenses: Not hard at all   Food Insecurity: No Food Insecurity    Worried About [address] in the Last Year: Never true    [facility] [employer] in the Last Year: Never true   Transportation Needs: No Transportation Needs    Lack of Transportation (Medical): No    Lack of Transportation (Non-Medical): No   Physical Activity:     Days of Exercise per Week:     Minutes of Exercise per Session:    Stress:     Feeling of Stress :    Social Connections:     Frequency of Communication with Friends and Family:     Frequency of Social Gatherings with Friends and Family:     Attends Oriental orthodox Services:     Active Member of Clubs or Organizations:     Attends Club or Organization Meetings:     Marital Status:    Intimate Partner Violence:     Fear of Current or Ex-Partner:     Emotionally Abused:     Physically Abused:     Sexually Abused:      Family History   Problem Relation Age of Onset    Heart Disease Father 48    Cancer Maternal Grandmother         breast    Heart Disease Other         mom and dad's side     No Known Allergies      Review of Systems  See HPI    Objective:   Pulse 93   Temp 97.4 °F (36.3 °C) (Temporal)   Ht 5' 2\" (1.575 m)   Wt 106 lb (48.1 kg)   SpO2 99%   BMI 19.39 kg/m²     Physical Exam :  Right knee motion is intact. There is no palpable effusion of the right knee. There is no evidence of pes anserine bursitis right knee. No popliteal fullness.   When the medial hamstring is palpated, there is

## 2021-06-28 ENCOUNTER — VIRTUAL VISIT (OUTPATIENT)
Dept: FAMILY MEDICINE CLINIC | Age: 51
End: 2021-06-28
Payer: MEDICARE

## 2021-06-28 DIAGNOSIS — J02.9 ACUTE PHARYNGITIS, UNSPECIFIED ETIOLOGY: Primary | ICD-10-CM

## 2021-06-28 PROCEDURE — 99213 OFFICE O/P EST LOW 20 MIN: CPT | Performed by: STUDENT IN AN ORGANIZED HEALTH CARE EDUCATION/TRAINING PROGRAM

## 2021-06-28 PROCEDURE — G8428 CUR MEDS NOT DOCUMENT: HCPCS | Performed by: STUDENT IN AN ORGANIZED HEALTH CARE EDUCATION/TRAINING PROGRAM

## 2021-06-28 PROCEDURE — G8420 CALC BMI NORM PARAMETERS: HCPCS | Performed by: STUDENT IN AN ORGANIZED HEALTH CARE EDUCATION/TRAINING PROGRAM

## 2021-06-28 PROCEDURE — 1036F TOBACCO NON-USER: CPT | Performed by: STUDENT IN AN ORGANIZED HEALTH CARE EDUCATION/TRAINING PROGRAM

## 2021-06-28 RX ORDER — DOXYCYCLINE HYCLATE 100 MG
100 TABLET ORAL 2 TIMES DAILY
Qty: 20 TABLET | Refills: 0 | Status: SHIPPED | OUTPATIENT
Start: 2021-06-28 | End: 2021-07-08

## 2021-06-28 RX ORDER — AMOXICILLIN 500 MG/1
500 CAPSULE ORAL 2 TIMES DAILY
Qty: 20 CAPSULE | Refills: 0 | Status: CANCELLED | OUTPATIENT
Start: 2021-06-28 | End: 2021-07-08

## 2021-06-28 ASSESSMENT — ENCOUNTER SYMPTOMS
COUGH: 0
RHINORRHEA: 1
SINUS PRESSURE: 0
ABDOMINAL PAIN: 0
SHORTNESS OF BREATH: 0
SORE THROAT: 1
VOMITING: 0

## 2021-06-28 NOTE — PROGRESS NOTES
tablet Take 1 tablet by mouth 2 times daily for 10 days Yes Bob Davis DO   methylPREDNISolone (MEDROL DOSEPACK) 4 MG tablet On days 1-6 take as directed on package for first 6-day course. On days 7-12 take as directed on (second) package for (second) 6-day course. Yes Trae Maradiaga MD   aspirin (ASPIRIN CHILDRENS) 81 MG chewable tablet Take 1 tablet by mouth daily Yes TRUDY Hudson   buPROPion (WELLBUTRIN XL) 300 MG extended release tablet Take 1 tablet by mouth every morning Yes ORTIZ Schilling CNP   esomeprazole (NEXIUM) 40 MG delayed release capsule TAKE ONE CAPSULE BY MOUTH EVERY DAY Yes ORTIZ Schilling CNP   cyclobenzaprine (FLEXERIL) 5 MG tablet Take 2.5-5 mg by mouth 2 times daily as needed Yes Historical Provider, MD   naratriptan (AMERGE) 2.5 MG tablet Take one at onset of severe headache/migraine may repeat x 1 after 4 hours if needed. Maximum 2/day and 2 days/week. Yes Historical Provider, MD   topiramate (TOPAMAX) 50 MG tablet  Yes Historical Provider, MD   promethazine-dextromethorphan (PROMETHAZINE-DM) 6.25-15 MG/5ML syrup TAKE 5 MLS BY MOUTH AT BEDTIME FOR 7 DAYS Yes Historical Provider, MD   amphetamine-dextroamphetamine (ADDERALL XR) 30 MG extended release capsule Take 1 capsule by mouth 2 times daily for 30 days.  Yes ORTIZ Schilling CNP   topiramate (TOPAMAX) 100 MG tablet TAKE ONE TABLET BY MOUTH TWO TIMES A DAY Yes ORTIZ Schilling CNP   traZODone (DESYREL) 150 MG tablet TAKE ONE TABLET BY MOUTH nightly Yes ORTIZ Schilling CNP   amoxicillin (AMOXIL) 875 MG tablet Take 875 mg by mouth 2 times daily  Yes Historical Provider, MD   liothyronine (CYTOMEL) 25 MCG tablet Take 1 tablet by mouth daily Yes ORTIZ Schilling CNP   estradiol (ESTRACE) 1 MG tablet Take 1 tablet by mouth daily Yes ORTIZ Schilling CNP   hydrocortisone (CORTEF) 5 MG tablet Take 5 mg by mouth daily Yes Historical Provider, MD   loratadine (CLARITIN) 10 MG tablet Take 1 tablet by mouth daily Yes ORTIZ Melendez NP   valACYclovir (VALTREX) 500 MG tablet Take 1 tablet by mouth daily Yes ORTIZ Fortune CNP   prochlorperazine (COMPAZINE) 10 MG tablet Take 1 tablet by mouth every 6 hours as needed (nausea) Yes ORTIZ Fortune CNP   metroNIDAZOLE (METROGEL) 0.75 % gel Apply topically 2 times daily.  Yes ORTIZ Fortune CNP   furosemide (LASIX) 20 MG tablet Take 1 tablet by mouth 2 times daily TAKE ONE TABLET BY MOUTH TWO TIMES A DAY Yes ORTIZ Fortune CNP   promethazine (PHENERGAN) 25 MG tablet Take 1 tablet by mouth every 8 hours as needed for Nausea Yes ORTIZ Fortune CNP   Magnesium 400 MG TABS Take 1 tablet by mouth daily Yes Lolita Garay PA-C   TRULANCE 3 MG TABS Take 3 mg by mouth daily Yes Historical Provider, MD   sucralfate (CARAFATE) 1 GM tablet Take by mouth daily Yes Historical Provider, MD   metoclopramide (REGLAN) 10 MG tablet Take 10 mg by mouth as needed Yes Historical Provider, MD   Hyoscyamine Sulfate SL (LEVSIN/SL) 0.125 MG SUBL Place 125 mcg under the tongue every 4 hours as needed (pain) Yes ORTIZ Perry CNP   amLODIPine (NORVASC) 2.5 MG tablet Take 2.5 mg by mouth daily Yes Historical Provider, MD   ondansetron (ZOFRAN) 4 MG tablet Take 1 tablet by mouth every 8 hours as needed for Nausea or Vomiting Yes ORTIZ Fortune CNP   azelastine (ASTELIN) 0.1 % nasal spray 1 spray by Nasal route Yes Historical Provider, MD   moxifloxacin (VIGAMOX) 0.5 % ophthalmic solution 1 drop Yes Historical Provider, MD   potassium chloride (KLOR-CON M) 10 MEQ extended release tablet Take 1 tablet by mouth daily Yes ORTIZ Fortune CNP   montelukast (SINGULAIR) 10 MG tablet Take 1 tablet by mouth daily Yes ORTIZ Fortune CNP   fluticasone (FLONASE) 50 MCG/ACT nasal spray USE 1 SPRAY NASALLY DAILY Yes Historical Provider, MD   ZOLMitriptan (ZOMIG) 5 MG tablet TAKE 1 TABLET BY MOUTH AT ONSET OF MIGRAINE. MAY REPEAT ONCE AFTER 2 HOURS.  MAX 10 MG (2 TABLETS) PER DAY Yes Historical Provider, MD   levothyroxine (SYNTHROID) 25 MCG tablet Take one daily Yes ORTIZ Dutton CNP   hydroxychloroquine (PLAQUENIL) 200 MG tablet Take 300 mg by mouth daily  Yes Historical Provider, MD   Pancrelipase, Lip-Prot-Amyl, (CREON) 23048 UNITS CPEP 2 caps tid Yes Madison Sandoval MD   NEXIUM 40 MG delayed release capsule TAKE ONE CAPSULE BY MOUTH EVERY DAY  ORTIZ Dutton CNP   topiramate (TOPAMAX) 100 MG tablet Take 1 tablet by mouth 2 times daily  ORTIZ Dutton CNP   furosemide (LASIX) 20 MG tablet Take 1 tablet by mouth 2 times daily TAKE ONE TABLET BY MOUTH TWO TIMES A DAY  ORTIZ Dutton CNP       Social History     Tobacco Use    Smoking status: Never Smoker    Smokeless tobacco: Never Used   Vaping Use    Vaping Use: Never used   Substance Use Topics    Alcohol use: No     Alcohol/week: 0.0 standard drinks     Comment: no alcohol since 2011    Drug use: No        No Known Allergies,   Past Medical History:   Diagnosis Date    Acquired hypothyroidism 9/26/2016    Acute pancreatitis     ADD (attention deficit disorder) 2/12/2015    Anxiety     Depression 12/9/2015    Endometrial cyst of ovary 5/10/14    RT ovary, ruptured    GERD (gastroesophageal reflux disease) 9/26/2016    Medullary sponge kidney of both kidneys 5/22/2018    Sjogren's disease ([facility] [address]     Stress     Swelling    ,   Past Surgical History:   Procedure Laterality Date    CHOLECYSTECTOMY  2011    HYSTERECTOMY  2014    sept    OVARY REMOVAL Left Jan 2014    UPPER GASTROINTESTINAL ENDOSCOPY  09/16/2016    EGD W/BX    UPPER GASTROINTESTINAL ENDOSCOPY N/A 5/6/2021    ENDOSCOPIC ULTRASOUND with EGD performed by Al[facility] R[facility]ie MD at [facility]   ,   Social History     Tobacco Use    Smoking status: Never Smoker    Smokeless tobacco: Never Used   Vaping Use    Vaping Use: Never used   Substance Use Topics    Alcohol use: No     Alcohol/week: 0.0 standard drinks     Comment: no alcohol since 2011    Drug use: No   ,   Family History   Problem Relation Age of Onset    Heart Disease Father 48    Cancer Maternal Grandmother         breast    Heart Disease Other         mom and dad's side   ,   Immunization History   Administered Date(s) Administered    COVID-19, Moderna, PF, 100mcg/0.5mL 02/03/2021, 03/03/2021    Pneumococcal Polysaccharide ([id number]) 11/15/2018    Tdap (Boostrix, Adacel) 04/01/2019       PHYSICAL EXAMINATION:  [ INSTRUCTIONS:  \"[x]\" Indicates a positive item  \"[]\" Indicates a negative item  -- DELETE ALL ITEMS NOT EXAMINED]  [x] Alert  [] Oriented to person/place/time    [x] No apparent distress  [] Toxic appearing    [] Face flushed appearing [x] Sclera clear  [] Lips are cyanotic      [x] Breathing appears normal  [] Appears tachypneic      [] Rash on visible skin    [x] Cranial Nerves II-XII grossly intact    [x] Motor grossly intact in visible upper extremities    [] Motor grossly intact in visible lower extremities    [x] Normal Mood  [] Anxious appearing    [] Depressed appearing  [] Confused appearing      [] Poor short term memory  [] Poor long term memory    [] OTHER:      Due to this being a TeleHealth encounter, evaluation of the following organ systems is limited: Vitals/Constitutional/EENT/Resp/CV/GI//MS/Neuro/Skin/Heme-Lymph-Imm. ASSESSMENT/PLAN:  1.  Acute pharyngitis, unspecified etiology  -Did take 3 days worth of amoxicillin that she had at home and symptoms are not improving  -Continues to have postnasal drainage, productive cough, sinus congestion, ear pain, sore throat  -She did have a steroid pack sent to her pharmacy by Ortho but has not picked it up yet  -We will switch to doxycycline at this time and encourage patient to also  Medrol pack for the sore throat  -She is to follow-up in the office if her symptoms worsen or do not improve over the next few days  -ER precautions given    - doxycycline hyclate (VIBRA-TABS) 100 MG tablet; Take 1 tablet by mouth 2 times daily for 10 days  Dispense: 20 tablet; Refill: 0      Return if symptoms worsen or fail to improve. An  electronic signature was used to authenticate this note. --Seth Causey DO on 6/28/2021 at 12:34 PM        Pursuant to the emergency declaration under the [address] [place] [address], [facility] waiver authority and the [employer] and [employer] Act, this Virtual  Visit was conducted, with patient's consent, to reduce the patient's risk of exposure to COVID-19 and provide continuity of care for an established patient. Services were provided through a video synchronous discussion virtually to substitute for in-person clinic visit.

## 2021-06-29 DIAGNOSIS — F98.8 ATTENTION DEFICIT DISORDER, UNSPECIFIED HYPERACTIVITY PRESENCE: ICD-10-CM

## 2021-06-30 RX ORDER — DEXTROAMPHETAMINE SACCHARATE, AMPHETAMINE ASPARTATE MONOHYDRATE, DEXTROAMPHETAMINE SULFATE AND AMPHETAMINE SULFATE 7.5; 7.5; 7.5; 7.5 MG/1; MG/1; MG/1; MG/1
30 CAPSULE, EXTENDED RELEASE ORAL 2 TIMES DAILY
Qty: 60 CAPSULE | Refills: 0 | Status: SHIPPED | OUTPATIENT
Start: 2021-06-30 | End: 2021-07-29 | Stop reason: SDUPTHER

## 2021-06-30 NOTE — TELEPHONE ENCOUNTER
Future Appointments     Provider   7/2/2021 ORTIZ Cochran CNP   Department: [facility] Gastroenterology   7/19/2021 Karissa Jay MD   Department: [facility] Primary Care   Appt Notes: skin check    8/10/2021 ORTIZ Fortune CNP   Department: [facility] Care   Appt Notes: AWV,six month follow up   Recent Visits                    06/08/2021 Sjogren's syndrome, with unspecified organ involvement [facility] Prashanth Cerda, APRN - CNP   05/18/2021 Suspicious nevus   [id number] [facility], APRN - CNP   05/04/2021 History of pancreatitis   [facility] Lara Marinelli MD

## 2021-07-06 DIAGNOSIS — E03.9 HYPOTHYROIDISM, UNSPECIFIED TYPE: ICD-10-CM

## 2021-07-06 RX ORDER — LIOTHYRONINE SODIUM 25 UG/1
25 TABLET ORAL DAILY
Qty: 90 TABLET | Refills: 0 | Status: SHIPPED | OUTPATIENT
Start: 2021-07-06

## 2021-07-07 ENCOUNTER — APPOINTMENT (OUTPATIENT)
Dept: GENERAL RADIOLOGY | Age: 51
End: 2021-07-07
Payer: MEDICARE

## 2021-07-07 ENCOUNTER — HOSPITAL ENCOUNTER (EMERGENCY)
Age: 51
Discharge: HOME | End: 2021-07-08
Attending: EMERGENCY MEDICINE
Payer: MEDICARE

## 2021-07-07 ENCOUNTER — APPOINTMENT (OUTPATIENT)
Dept: CT IMAGING | Age: 51
End: 2021-07-07
Payer: MEDICARE

## 2021-07-07 DIAGNOSIS — F32.A DEPRESSION, UNSPECIFIED DEPRESSION TYPE: Primary | ICD-10-CM

## 2021-07-07 DIAGNOSIS — R10.10 PAIN OF UPPER ABDOMEN: ICD-10-CM

## 2021-07-07 LAB
ALBUMIN SERPL-MCNC: 4.4 G/DL (ref 3.5–4.6)
ALP BLD-CCNC: 70 U/L (ref 40–130)
ALT SERPL-CCNC: 13 U/L (ref 0–33)
AMYLASE: 84 U/L (ref 22–93)
ANION GAP SERPL CALCULATED.3IONS-SCNC: 10 MEQ/L (ref 9–15)
AST SERPL-CCNC: 10 U/L (ref 0–35)
BASOPHILS ABSOLUTE: 0.1 K/UL (ref 0–0.1)
BASOPHILS RELATIVE PERCENT: 0.9 % (ref 0.1–1.2)
BILIRUB SERPL-MCNC: <0.2 MG/DL (ref 0.2–0.7)
BUN BLDV-MCNC: 24 MG/DL (ref 6–20)
CALCIUM SERPL-MCNC: 9.2 MG/DL (ref 8.5–9.9)
CHLORIDE BLD-SCNC: 103 MEQ/L (ref 95–107)
CO2: 26 MEQ/L (ref 20–31)
CREAT SERPL-MCNC: 0.72 MG/DL (ref 0.5–0.9)
EOSINOPHILS ABSOLUTE: 0.1 K/UL (ref 0–0.4)
EOSINOPHILS RELATIVE PERCENT: 1.2 % (ref 0.7–5.8)
GFR AFRICAN AMERICAN: >60
GFR NON-AFRICAN AMERICAN: >60
GLOBULIN: 2.1 G/DL (ref 2.3–3.5)
GLUCOSE BLD-MCNC: 97 MG/DL (ref 70–99)
HCT VFR BLD CALC: 34.2 % (ref 37–47)
HEMOGLOBIN: 11.3 G/DL (ref 11.2–15.7)
IMMATURE GRANULOCYTES #: 0 K/UL
IMMATURE GRANULOCYTES %: 0.4 %
LIPASE: 70 U/L (ref 12–95)
LYMPHOCYTES ABSOLUTE: 2.4 K/UL (ref 1.2–3.7)
LYMPHOCYTES RELATIVE PERCENT: 43.1 %
MCH RBC QN AUTO: 29.4 PG (ref 25.6–32.2)
MCHC RBC AUTO-ENTMCNC: 33 % (ref 32.2–35.5)
MCV RBC AUTO: 88.8 FL (ref 79.4–94.8)
MONOCYTES ABSOLUTE: 0.4 K/UL (ref 0.2–0.9)
MONOCYTES RELATIVE PERCENT: 6.9 % (ref 4.7–12.5)
NEUTROPHILS ABSOLUTE: 2.7 K/UL (ref 1.6–6.1)
NEUTROPHILS RELATIVE PERCENT: 47.5 % (ref 34–71.1)
PDW BLD-RTO: 12 % (ref 11.7–14.4)
PLATELET # BLD: 274 K/UL (ref 182–369)
POTASSIUM SERPL-SCNC: 4.2 MEQ/L (ref 3.4–4.9)
RBC # BLD: 3.85 M/UL (ref 3.93–5.22)
SODIUM BLD-SCNC: 139 MEQ/L (ref 135–144)
TOTAL PROTEIN: 6.5 G/DL (ref 6.3–8)
WBC # BLD: 5.6 K/UL (ref 4–10)

## 2021-07-07 PROCEDURE — 80053 COMPREHEN METABOLIC PANEL: CPT

## 2021-07-07 PROCEDURE — 6360000004 HC RX CONTRAST MEDICATION: Performed by: EMERGENCY MEDICINE

## 2021-07-07 PROCEDURE — 83690 ASSAY OF LIPASE: CPT

## 2021-07-07 PROCEDURE — 6360000002 HC RX W HCPCS: Performed by: EMERGENCY MEDICINE

## 2021-07-07 PROCEDURE — 96374 THER/PROPH/DIAG INJ IV PUSH: CPT

## 2021-07-07 PROCEDURE — 36415 COLL VENOUS BLD VENIPUNCTURE: CPT

## 2021-07-07 PROCEDURE — 82150 ASSAY OF AMYLASE: CPT

## 2021-07-07 PROCEDURE — 74177 CT ABD & PELVIS W/CONTRAST: CPT

## 2021-07-07 PROCEDURE — 93005 ELECTROCARDIOGRAM TRACING: CPT

## 2021-07-07 PROCEDURE — 96375 TX/PRO/DX INJ NEW DRUG ADDON: CPT

## 2021-07-07 PROCEDURE — 85025 COMPLETE CBC W/AUTO DIFF WBC: CPT

## 2021-07-07 PROCEDURE — 2580000003 HC RX 258: Performed by: EMERGENCY MEDICINE

## 2021-07-07 PROCEDURE — 99283 EMERGENCY DEPT VISIT LOW MDM: CPT

## 2021-07-07 PROCEDURE — 71046 X-RAY EXAM CHEST 2 VIEWS: CPT

## 2021-07-07 RX ORDER — SODIUM CHLORIDE 0.9 % (FLUSH) 0.9 %
3 SYRINGE (ML) INJECTION EVERY 8 HOURS
Status: DISCONTINUED | OUTPATIENT
Start: 2021-07-07 | End: 2021-07-08 | Stop reason: HOSPADM

## 2021-07-07 RX ORDER — ONDANSETRON 2 MG/ML
4 INJECTION INTRAMUSCULAR; INTRAVENOUS ONCE
Status: COMPLETED | OUTPATIENT
Start: 2021-07-07 | End: 2021-07-07

## 2021-07-07 RX ORDER — MORPHINE SULFATE 2 MG/ML
2 INJECTION, SOLUTION INTRAMUSCULAR; INTRAVENOUS ONCE
Status: COMPLETED | OUTPATIENT
Start: 2021-07-07 | End: 2021-07-07

## 2021-07-07 RX ADMIN — Medication 3 ML: at 22:46

## 2021-07-07 RX ADMIN — IOPAMIDOL 100 ML: 755 INJECTION, SOLUTION INTRAVENOUS at 23:18

## 2021-07-07 RX ADMIN — ONDANSETRON 4 MG: 2 INJECTION INTRAMUSCULAR; INTRAVENOUS at 22:41

## 2021-07-07 RX ADMIN — MORPHINE SULFATE 2 MG: 2 INJECTION, SOLUTION INTRAMUSCULAR; INTRAVENOUS at 22:38

## 2021-07-07 ASSESSMENT — ENCOUNTER SYMPTOMS
EYE DISCHARGE: 0
EYE REDNESS: 0
ABDOMINAL PAIN: 1
SHORTNESS OF BREATH: 0
SORE THROAT: 0
BACK PAIN: 0
COUGH: 0
VOMITING: 0
NAUSEA: 1

## 2021-07-07 ASSESSMENT — PAIN SCALES - GENERAL
PAINLEVEL_OUTOF10: 8
PAINLEVEL_OUTOF10: 8

## 2021-07-07 ASSESSMENT — PAIN DESCRIPTION - DESCRIPTORS: DESCRIPTORS: DISCOMFORT;ACHING;SHOOTING

## 2021-07-07 ASSESSMENT — PAIN DESCRIPTION - LOCATION: LOCATION: ABDOMEN

## 2021-07-07 ASSESSMENT — PAIN DESCRIPTION - PAIN TYPE: TYPE: ACUTE PAIN

## 2021-07-08 VITALS
SYSTOLIC BLOOD PRESSURE: 90 MMHG | BODY MASS INDEX: 20.2 KG/M2 | TEMPERATURE: 98 F | DIASTOLIC BLOOD PRESSURE: 49 MMHG | OXYGEN SATURATION: 98 % | RESPIRATION RATE: 16 BRPM | HEART RATE: 91 BPM | WEIGHT: 107 LBS | HEIGHT: 61 IN

## 2021-07-08 LAB
EKG ATRIAL RATE: 86 BPM
EKG P AXIS: 66 DEGREES
EKG P-R INTERVAL: 142 MS
EKG Q-T INTERVAL: 360 MS
EKG QRS DURATION: 80 MS
EKG QTC CALCULATION (BAZETT): 430 MS
EKG R AXIS: 42 DEGREES
EKG T AXIS: 57 DEGREES
EKG VENTRICULAR RATE: 86 BPM

## 2021-07-08 PROCEDURE — 93010 ELECTROCARDIOGRAM REPORT: CPT | Performed by: INTERNAL MEDICINE

## 2021-07-08 NOTE — ED NOTES
Patients son not able to pick patient up. She is aware she needs to stay until four hours post opiate. Beryle He.  Virl Schilder, [address]  07/08/21 1756

## 2021-07-08 NOTE — ED NOTES
Patient awaiting ride home at this time. Arthur Joseph.  Heide Orellana, [facility]  07/08/21 0128

## 2021-07-08 NOTE — ED TRIAGE NOTES
Patient arrives to ER with complaints of abdominal pain and nausea over the last week. States she has a history of pancreatitis. Also states she has been having joint pain.

## 2021-07-08 NOTE — ED PROVIDER NOTES
[facility] ED  EMERGENCY DEPARTMENT ENCOUNTER      Pt Name: Kaylyn Lerma  MRN: [id number]  [place] 1970  Date of evaluation: 7/7/2021  Provider: Fabiola Cuevas DO        HISTORY OF PRESENT ILLNESS    Kaylyn Lerma is a 52 y.o. female per chart review has ah/o ADD, acute pancreatitis, anxiety, depression, endometrial cyst of the ovary, GERD, swelling and stress. Presents with abdominal pain. The history is provided by the patient. Abdominal Pain  Pain location:  Epigastric  Pain quality: aching    Pain radiates to:  Does not radiate  Pain severity:  Severe  Onset quality:  Gradual  Timing:  Constant  Progression:  Worsening  Chronicity:  Chronic  Context: recent illness    Relieved by:  Nothing  Worsened by:  Nothing  Ineffective treatments:  None tried  Associated symptoms: chest pain and nausea    Associated symptoms: no chills, no cough, no dysuria, no fever, no shortness of breath, no sore throat and no vomiting             REVIEW OF SYSTEMS       Review of Systems   Constitutional: Negative for chills and fever. HENT: Negative for ear pain and sore throat. Eyes: Negative for discharge and redness. Respiratory: Negative for cough and shortness of breath. Cardiovascular: Positive for chest pain. Negative for palpitations. Gastrointestinal: Positive for abdominal pain and nausea. Negative for vomiting. Genitourinary: Negative for difficulty urinating and dysuria. Musculoskeletal: Negative for back pain and neck pain. Skin: Negative for rash and wound. Neurological: Negative for dizziness and syncope. Psychiatric/Behavioral: Negative for confusion. The patient is not nervous/anxious. All other systems reviewed and are negative. Except as noted above the remainder of the review of systems was reviewed and negative.        PAST MEDICAL HISTORY     Past Medical History:   Diagnosis Date    Acquired hypothyroidism 9/26/2016    Acute pancreatitis     ADD (attention deficit disorder) 2/12/2015    Anxiety     Depression 12/9/2015    Endometrial cyst of ovary 5/10/14    RT ovary, ruptured    GERD (gastroesophageal reflux disease) 9/26/2016    Medullary sponge kidney of both kidneys 5/22/2018    Sjogren's disease ([facility] [address]     Stress     Swelling          SURGICAL HISTORY       Past Surgical History:   Procedure Laterality Date    CHOLECYSTECTOMY  2011    HYSTERECTOMY  2014    sept    OVARY REMOVAL Left Jan 2014    UPPER GASTROINTESTINAL ENDOSCOPY  09/16/2016    EGD W/BX    UPPER GASTROINTESTINAL ENDOSCOPY N/A 5/6/2021    ENDOSCOPIC ULTRASOUND with EGD performed by Al Carr MD at [address] [place] [address]       Discharge Medication List as of 7/8/2021 12:46 AM      CONTINUE these medications which have NOT CHANGED    Details   liothyronine (CYTOMEL) 25 MCG tablet Take 1 tablet by mouth daily, Disp-90 tablet, R-0Normal      amphetamine-dextroamphetamine (ADDERALL XR) 30 MG extended release capsule Take 1 capsule by mouth 2 times daily for 30 days. , Disp-60 capsule, R-0Normal      aspirin (ASPIRIN CHILDRENS) 81 MG chewable tablet Take 1 tablet by mouth daily, Disp-14 tablet, R-0Print      buPROPion (WELLBUTRIN XL) 300 MG extended release tablet Take 1 tablet by mouth every morning, Disp-90 tablet, R-1Normal      esomeprazole (NEXIUM) 40 MG delayed release capsule TAKE ONE CAPSULE BY MOUTH EVERY DAY, Disp-90 capsule, R-1Normal      cyclobenzaprine (FLEXERIL) 5 MG tablet Take 2.5-5 mg by mouth 2 times daily as neededHistorical Med      naratriptan (AMERGE) 2.5 MG tablet Take one at onset of severe headache/migraine may repeat x 1 after 4 hours if needed. Maximum 2/day and 2 days/week. Historical Med      !! topiramate (TOPAMAX) 100 MG tablet TAKE ONE TABLET BY MOUTH TWO TIMES A DAY, Disp-60 tablet, R-5Normal      traZODone (DESYREL) 150 MG tablet TAKE ONE TABLET BY MOUTH nightly, Disp-30 tablet, R-0Normal      hydrocortisone (CORTEF) 5 MG tablet Take 5 mg by mouth dailyHistorical Med      loratadine (CLARITIN) 10 MG tablet Take 1 tablet by mouth daily, Disp-30 tablet, R-5Normal      valACYclovir (VALTREX) 500 MG tablet Take 1 tablet by mouth daily, Disp-30 tablet, R-5Normal      prochlorperazine (COMPAZINE) 10 MG tablet Take 1 tablet by mouth every 6 hours as needed (nausea), Disp-30 tablet, R-2Normal      metroNIDAZOLE (METROGEL) 0.75 % gel Apply topically 2 times daily. , Disp-45 g, R-1, Normal      furosemide (LASIX) 20 MG tablet Take 1 tablet by mouth 2 times daily TAKE ONE TABLET BY MOUTH TWO TIMES A DAY, Disp-60 tablet, R-5Normal      promethazine (PHENERGAN) 25 MG tablet Take 1 tablet by mouth every 8 hours as needed for Nausea, Disp-40 tablet,R-0Normal      Magnesium 400 MG TABS Take 1 tablet by mouth daily, Disp-30 tablet,R-0Print      TRULANCE 3 MG TABS Take 3 mg by mouth daily, DAWHistorical Med      sucralfate (CARAFATE) 1 GM tablet Take by mouth dailyHistorical Med      Hyoscyamine Sulfate SL (LEVSIN/SL) 0.125 MG SUBL Place 125 mcg under the tongue every 4 hours as needed (pain), Disp-120 each, R-3Normal      amLODIPine (NORVASC) 2.5 MG tablet Take 2.5 mg by mouth dailyHistorical Med      ondansetron (ZOFRAN) 4 MG tablet Take 1 tablet by mouth every 8 hours as needed for Nausea or Vomiting, Disp-30 tablet, R-1Normal      azelastine (ASTELIN) 0.1 % nasal spray 1 spray by Nasal routeHistorical Med      moxifloxacin (VIGAMOX) 0.5 % ophthalmic solution 1 dropHistorical Med      potassium chloride (KLOR-CON M) 10 MEQ extended release tablet Take 1 tablet by mouth daily, Disp-30 tablet, R-5Normal      levothyroxine (SYNTHROID) 25 MCG tablet Take one daily, Disp-90 tablet, R-1Normal      hydroxychloroquine (PLAQUENIL) 200 MG tablet Take 300 mg by mouth daily       Pancrelipase, Lip-Prot-Amyl, (CREON) 38857 UNITS CPEP 2 caps tid, Disp-180 capsule, R-03      doxycycline hyclate (VIBRA-TABS) 100 MG tablet Take 1 tablet by mouth 2 times daily for 10 days, Disp-20 tablet, R-0Normal      methylPREDNISolone (MEDROL DOSEPACK) 4 MG tablet On days 1-6 take as directed on package for first 6-day course. On days 7-12 take as directed on (second) package for (second) 6-day course., Disp-42 tablet, R-0Normal      !! topiramate (TOPAMAX) 50 MG tablet Historical Med      promethazine-dextromethorphan (PROMETHAZINE-DM) 6.25-15 MG/5ML syrup TAKE 5 MLS BY MOUTH AT BEDTIME FOR 7 DAYSHistorical Med      amoxicillin (AMOXIL) 875 MG tablet Take 875 mg by mouth 2 times daily Historical Med      estradiol (ESTRACE) 1 MG tablet Take 1 tablet by mouth daily, Disp-30 tablet, R-5Normal      metoclopramide (REGLAN) 10 MG tablet Take 10 mg by mouth as neededHistorical Med      montelukast (SINGULAIR) 10 MG tablet Take 1 tablet by mouth daily, Disp-30 tablet, R-3Normal      fluticasone (FLONASE) 50 MCG/ACT nasal spray USE 1 SPRAY NASALLY DAILY, R-3Historical Med      ZOLMitriptan (ZOMIG) 5 MG tablet TAKE 1 TABLET BY MOUTH AT ONSET OF MIGRAINE. MAY REPEAT ONCE AFTER 2 HOURS. MAX 10 MG (2 TABLETS) PER DAY, R-11Historical Med       !! - Potential duplicate medications found. Please discuss with provider. ALLERGIES     Patient has no known allergies.     FAMILY HISTORY       Family History   Problem Relation Age of Onset    Heart Disease Father 48    Cancer Maternal Grandmother         breast    Heart Disease Other         mom and dad's side          SOCIAL HISTORY       Social History     Socioeconomic History    Marital status: Single     Spouse name: None    Number of children: None    Years of education: None    Highest education level: None   Occupational History    None   Tobacco Use    Smoking status: Never Smoker    Smokeless tobacco: Never Used   Vaping Use    Vaping Use: Never used   Substance and Sexual Activity    Alcohol use: No     Alcohol/week: 0.0 standard drinks     Comment: no alcohol since 2011    Drug use: No    Sexual activity: None   Other Topics Concern    None   Social History Narrative    ** Merged History Encounter **          Social Determinants of Health     Financial Resource Strain: Low Risk     Difficulty of Paying Living Expenses: Not hard at all   Food Insecurity: No Food Insecurity    Worried About Running Out of Food in the Last Year: Never true    [place] of Food in the Last Year: Never true   Transportation Needs: No Transportation Needs    Lack of Transportation (Medical): No    Lack of Transportation (Non-Medical): No   Physical Activity:     Days of Exercise per Week:     Minutes of Exercise per Session:    Stress:     Feeling of Stress :    Social Connections:     Frequency of Communication with Friends and Family:     Frequency of Social Gatherings with Friends and Family:     Attends [employer] Services:     Active Member of Clubs or Organizations:     Attends Club or Organization Meetings:     Marital Status:    Intimate Partner Violence:     Fear of Current or Ex-Partner:     Emotionally Abused:     Physically Abused:     Sexually Abused:          PHYSICAL EXAM       ED Triage Vitals [07/07/21 2136]   BP Temp Temp Source Pulse Resp SpO2 Height Weight   107/60 98 °F (36.7 °C) Oral 91 16 96 % 5' 1\" (1.549 m) 107 lb (48.5 kg)       Physical Exam  Vitals and nursing note reviewed. Constitutional:       Appearance: Normal appearance. HENT:      Head: Normocephalic and atraumatic. Right Ear: Tympanic membrane normal.      Left Ear: Tympanic membrane normal.      Nose: Nose normal.      Mouth/Throat:      Mouth: Mucous membranes are moist.      Pharynx: Oropharynx is clear. Eyes:      General: Lids are normal.      Extraocular Movements: Extraocular movements intact. Conjunctiva/sclera: Conjunctivae normal.      Pupils: Pupils are equal, round, and reactive to light. Cardiovascular:      Rate and Rhythm: Normal rate and regular rhythm. Pulses: Normal pulses. Heart sounds: Normal heart sounds.    Pulmonary: Effort: Pulmonary effort is normal.      Breath sounds: Normal breath sounds. Abdominal:      General: Abdomen is flat. Bowel sounds are normal.      Palpations: Abdomen is soft. Tenderness: There is abdominal tenderness in the epigastric area. There is no guarding or rebound. Musculoskeletal:         General: Normal range of motion. Cervical back: Full passive range of motion without pain, normal range of motion and neck supple. Skin:     General: Skin is warm. Capillary Refill: Capillary refill takes less than 2 seconds. Neurological:      General: No focal deficit present. Mental Status: She is alert and oriented to person, place, and time. Deep Tendon Reflexes: Reflexes are normal and symmetric. Psychiatric:         Attention and Perception: Attention and perception normal.         Mood and Affect: Mood normal.         Behavior: Behavior normal. Behavior is cooperative. LABS:  Labs Reviewed   COMPREHENSIVE METABOLIC PANEL - Abnormal; Notable for the following components:       Result Value    BUN 24 (*)     Globulin 2.1 (*)     All other components within normal limits   CBC WITH AUTO DIFFERENTIAL - Abnormal; Notable for the following components:    RBC 3.85 (*)     Hematocrit 34.2 (*)     All other components within normal limits   LIPASE   AMYLASE   URINE RT REFLEX TO CULTURE         MDM:   Vitals:    Vitals:    07/07/21 2136 07/07/21 2230 07/08/21 0100   BP: 107/60 91/63 (!) 90/49   Pulse: 91     Resp: 16     Temp: 98 °F (36.7 °C)     TempSrc: Oral     SpO2: 96% 98%    Weight: 107 lb (48.5 kg)     Height: 5' 1\" (1.549 m)         MDM  Number of Diagnoses or Management Options  Pain of upper abdomen  Diagnosis management comments: Patient presents with abdominal pain. On exam she is very anxious. She states she thinks this is her pancreatitis flaring up. Labs, IVF, CXR, EKG ordered. Ct ab/pelvis: stat rad read: no acute changes.   The patient will be discharged home.  She feels much better and will follow up with her primary care doctor in 2 days. EKG Interpretation    Interpreted by emergency department physician    Rhythm: normal sinus   Rate: normal  Axis: normal  Ectopy: none  Conduction: normal  ST Segments: nonspecific changes  T Waves: non specific changes  Q Waves: none    Clinical Impression: non-specific EKG    RICH WREN DO     The lab results, radiology and test results were reviewed with the patient and family. The patient will follow up in 2 days with their primary care doctor. If their symptoms change or get worse they will return to the ER. CRITICAL CARE TIME   Total CriticalCare time was 0 minutes, excluding separately reportable procedures. There was a high probability of clinically significant/life threatening deterioration in the patient's condition which required my urgent intervention. PROCEDURES:  Unlessotherwise noted below, none     Procedures      FINAL IMPRESSION      1. Depression, unspecified depression type    2.  Pain of upper abdomen          DISPOSITION/PLAN   DISPOSITION            Gina Shaffer DO (electronically signed)  Attending Emergency Physician          Woody Rachel DO  07/11/21 5946

## 2021-07-29 DIAGNOSIS — F98.8 ATTENTION DEFICIT DISORDER, UNSPECIFIED HYPERACTIVITY PRESENCE: ICD-10-CM

## 2021-07-29 RX ORDER — DEXTROAMPHETAMINE SACCHARATE, AMPHETAMINE ASPARTATE MONOHYDRATE, DEXTROAMPHETAMINE SULFATE AND AMPHETAMINE SULFATE 7.5; 7.5; 7.5; 7.5 MG/1; MG/1; MG/1; MG/1
30 CAPSULE, EXTENDED RELEASE ORAL 2 TIMES DAILY
Qty: 60 CAPSULE | Refills: 0 | Status: SHIPPED | OUTPATIENT
Start: 2021-07-29 | End: 2021-08-27 | Stop reason: SDUPTHER

## 2021-08-02 RX ORDER — FUROSEMIDE 20 MG/1
20 TABLET ORAL 2 TIMES DAILY
Qty: 60 TABLET | Refills: 0 | Status: SHIPPED | OUTPATIENT
Start: 2021-08-02 | End: 2021-08-27 | Stop reason: SDUPTHER

## 2021-08-03 ASSESSMENT — PATIENT HEALTH QUESTIONNAIRE - PHQ9
2. FEELING DOWN, DEPRESSED OR HOPELESS: 0
1. LITTLE INTEREST OR PLEASURE IN DOING THINGS: 0
SUM OF ALL RESPONSES TO PHQ QUESTIONS 1-9: 0
SUM OF ALL RESPONSES TO PHQ QUESTIONS 1-9: 0
SUM OF ALL RESPONSES TO PHQ9 QUESTIONS 1 & 2: 0
SUM OF ALL RESPONSES TO PHQ QUESTIONS 1-9: 0

## 2021-08-03 ASSESSMENT — LIFESTYLE VARIABLES: HOW OFTEN DO YOU HAVE A DRINK CONTAINING ALCOHOL: 0

## 2021-08-05 ASSESSMENT — LIFESTYLE VARIABLES
AUDIT-C TOTAL SCORE: INCOMPLETE
AUDIT TOTAL SCORE: INCOMPLETE
HOW OFTEN DO YOU HAVE A DRINK CONTAINING ALCOHOL: NEVER

## 2021-08-24 ENCOUNTER — OFFICE VISIT (OUTPATIENT)
Dept: FAMILY MEDICINE CLINIC | Age: 51
End: 2021-08-24
Payer: MEDICARE

## 2021-08-24 VITALS
DIASTOLIC BLOOD PRESSURE: 74 MMHG | HEIGHT: 62 IN | SYSTOLIC BLOOD PRESSURE: 102 MMHG | HEART RATE: 74 BPM | OXYGEN SATURATION: 99 % | BODY MASS INDEX: 20.24 KG/M2 | WEIGHT: 110 LBS

## 2021-08-24 DIAGNOSIS — Z00.00 ROUTINE GENERAL MEDICAL EXAMINATION AT A HEALTH CARE FACILITY: Primary | ICD-10-CM

## 2021-08-24 DIAGNOSIS — R10.84 GENERALIZED ABDOMINAL PAIN: ICD-10-CM

## 2021-08-24 PROCEDURE — G0438 PPPS, INITIAL VISIT: HCPCS | Performed by: NURSE PRACTITIONER

## 2021-08-24 ASSESSMENT — PATIENT HEALTH QUESTIONNAIRE - PHQ9
1. LITTLE INTEREST OR PLEASURE IN DOING THINGS: 0
2. FEELING DOWN, DEPRESSED OR HOPELESS: 0
SUM OF ALL RESPONSES TO PHQ QUESTIONS 1-9: 0
SUM OF ALL RESPONSES TO PHQ9 QUESTIONS 1 & 2: 0

## 2021-08-24 NOTE — PROGRESS NOTES
Medicare Annual Wellness Visit  Name: Britany Flannery Date: 2021   MRN: [id number] Sex: Female   Age: 52 y.o. Ethnicity: Non- / Non    : 1970 Race: White (non-)      Verónica Lagos is here for Medicare AWV, 6 Month Follow-Up, Abdominal Pain, and Health Maintenance (pt states she has colonscopy scheduled in september. )    Screenings for behavioral, psychosocial and functional/safety risks, and cognitive dysfunction are all negative except as indicated below. These results, as well as other patient data from the [address] form, are documented in Flowsheets linked to this Encounter. Pt c/o stomach pain for past week. States that it is significant on the LUQ and LLQ. Nothing makes it better or worse. No Known Allergies      Prior to Visit Medications    Medication Sig Taking? Authorizing Provider   furosemide (LASIX) 20 MG tablet Take 1 tablet by mouth 2 times daily TAKE ONE TABLET BY MOUTH TWO TIMES A DAY Yes ORTIZ Lancaster CNP   amphetamine-dextroamphetamine (ADDERALL XR) 30 MG extended release capsule Take 1 capsule by mouth 2 times daily for 30 days. Yes ORTIZ Lancaster CNP   liothyronine (CYTOMEL) 25 MCG tablet Take 1 tablet by mouth daily Yes ORTIZ Lancaster CNP   aspirin (ASPIRIN CHILDRENS) 81 MG chewable tablet Take 1 tablet by mouth daily Yes TRUDY Tripp   buPROPion (WELLBUTRIN XL) 300 MG extended release tablet Take 1 tablet by mouth every morning Yes ORTIZ Lancaster CNP   esomeprazole (NEXIUM) 40 MG delayed release capsule TAKE ONE CAPSULE BY MOUTH EVERY DAY Yes ORTIZ Lancaster CNP   cyclobenzaprine (FLEXERIL) 5 MG tablet Take 2.5-5 mg by mouth 2 times daily as needed Yes Historical Provider, MD   naratriptan (AMERGE) 2.5 MG tablet Take one at onset of severe headache/migraine may repeat x 1 after 4 hours if needed. Maximum 2/day and 2 days/week.  Yes Historical Provider, MD   topiramate (TOPAMAX) 50 MG tablet  Yes Historical Provider, MD   promethazine-dextromethorphan (PROMETHAZINE-DM) 6.25-15 MG/5ML syrup TAKE 5 MLS BY MOUTH AT BEDTIME FOR 7 DAYS Yes Historical Provider, MD   topiramate (TOPAMAX) 100 MG tablet TAKE ONE TABLET BY MOUTH TWO TIMES A DAY Yes ORTIZ Ovalle CNP   traZODone (DESYREL) 150 MG tablet TAKE ONE TABLET BY MOUTH nightly Yes ORTIZ Ovalle CNP   estradiol (ESTRACE) 1 MG tablet Take 1 tablet by mouth daily Yes ORTIZ Ovalle CNP   hydrocortisone (CORTEF) 5 MG tablet Take 5 mg by mouth daily Yes Historical Provider, MD   loratadine (CLARITIN) 10 MG tablet Take 1 tablet by mouth daily Yes ORTIZ Deal NP   valACYclovir (VALTREX) 500 MG tablet Take 1 tablet by mouth daily Yes ORTIZ Ovalle CNP   prochlorperazine (COMPAZINE) 10 MG tablet Take 1 tablet by mouth every 6 hours as needed (nausea) Yes ORTIZ Ovalle CNP   metroNIDAZOLE (METROGEL) 0.75 % gel Apply topically 2 times daily.  Yes ORTIZ Ovalle CNP   promethazine (PHENERGAN) 25 MG tablet Take 1 tablet by mouth every 8 hours as needed for Nausea Yes ORTIZ Ovalle CNP   Magnesium 400 MG TABS Take 1 tablet by mouth daily Yes Britton Olson PA-C   TRULANCE 3 MG TABS Take 3 mg by mouth daily Yes Historical Provider, MD   sucralfate (CARAFATE) 1 GM tablet Take by mouth daily Yes Historical Provider, MD   metoclopramide (REGLAN) 10 MG tablet Take 10 mg by mouth as needed Yes Historical Provider, MD   Hyoscyamine Sulfate SL (LEVSIN/SL) 0.125 MG SUBL Place 125 mcg under the tongue every 4 hours as needed (pain) Yes Leland De Paz SopORTIZ harden CNP   amLODIPine (NORVASC) 2.5 MG tablet Take 2.5 mg by mouth daily Yes Historical Provider, MD   ondansetron (ZOFRAN) 4 MG tablet Take 1 tablet by mouth every 8 hours as needed for Nausea or Vomiting Yes ORTIZ Ovalle CNP   azelastine (ASTELIN) 0.1 % nasal spray 1 spray by Nasal route Yes Historical Provider, MD kidneys 5/22/2018    Sjogren's disease ([address]     Stress     Swelling        Past Surgical History:   Procedure Laterality Date    CHOLECYSTECTOMY  2011    HYSTERECTOMY  2014    sept    OVARY REMOVAL Left Jan 2014    UPPER GASTROINTESTINAL ENDOSCOPY  09/16/2016    EGD W/BX    UPPER GASTROINTESTINAL ENDOSCOPY N/A 5/6/2021    ENDOSCOPIC ULTRASOUND with EGD performed by Hossein Lamar MD at [facility]         Family History   Problem Relation Age of Onset    Heart Disease Father 48    Cancer Maternal Grandmother         breast    Heart Disease Other         mom and dad's side       CareTeam (Including outside providers/suppliers regularly involved in providing care):   Patient Care Team:  ORTIZ Leroy CNP as PCP - General (Nurse Practitioner)  ORTIZ Leroy CNP as PCP - [facility]  Aniya Duran MD (General Surgery)    Wt Readings from Last 3 Encounters:   08/24/21 110 lb (49.9 kg)   07/07/21 107 lb (48.5 kg)   06/25/21 106 lb (48.1 kg)     Vitals:    08/24/21 1041   BP: 102/74   Pulse: 74   SpO2: 99%   Weight: 110 lb (49.9 kg)   Height: 5' 2\" (1.575 m)     Body mass index is 20.12 kg/m². Based upon direct observation of the patient, evaluation of cognition reveals recent and remote memory intact.     General Appearance: alert and oriented to person, place and time, well developed and well- nourished, in no acute distress  Skin: warm and dry, no rash or erythema  Head: normocephalic and atraumatic  Eyes: pupils equal, round, and reactive to light, extraocular eye movements intact, conjunctivae normal  ENT: tympanic membrane, external ear and ear canal normal bilaterally, nose without deformity, nasal mucosa and turbinates normal without polyps  Neck: supple and non-tender without mass, no thyromegaly or thyroid nodules, no cervical lymphadenopathy  Pulmonary/Chest: clear to auscultation bilaterally- no wheezes, rales or rhonchi, normal air movement, no respiratory distress  Cardiovascular: normal rate, regular rhythm, normal S1 and S2, no murmurs, rubs, clicks, or gallops, distal pulses intact, no carotid bruits  Abdomen: soft,tender (LLQ/LUW, non-distended, normal bowel sounds, no masses or organomegaly  Extremities: no cyanosis, clubbing or edema  Musculoskeletal: normal range of motion, no joint swelling, deformity or tenderness  Neurologic: reflexes normal and symmetric, no cranial nerve deficit, gait, coordination and speech normal    Patient's complete Health Risk Assessment and screening values have been reviewed and are found in Flowsheets. The following problems were reviewed today and where indicated follow up appointments were made and/or referrals ordered. Positive Risk Factor Screenings with Interventions:            General Health and ACP:  General  In general, how would you say your health is?: Fair  In the past 7 days, have you experienced any of the following?  New or Increased Pain, New or Increased Fatigue, Loneliness, Social Isolation, Stress or Anger?: (!) New or Increased Pain, New or Increased Fatigue  Do you get the social and emotional support that you need?: Yes  Do you have a Living Will?: (!) No  Advance Directives     Power of  Living Will ACP-Advance Directive ACP-Power of     Not on File Filed on 05/07/21 Filed Not on File      General Health Risk Interventions:  · No Living Will: Advance Care Planning addressed with patient today    Health Habits/Nutrition:  Health Habits/Nutrition  Do you exercise for at least 20 minutes 2-3 times per week?: (!) No  Have you lost any weight without trying in the past 3 months?: No  Do you eat only one meal per day?: No  Have you seen the dentist within the past year?: Appointment is scheduled  Body mass index: 20.12  Health Habits/Nutrition Interventions:  · Inadequate physical activity:  patient is not ready to increase his/her physical activity level at this time    Hearing/Vision:  No exam data present  Hearing/Vision  Do you or your family notice any trouble with your hearing that hasn't been managed with hearing aids?: (!) Yes  Do you have difficulty driving, watching TV, or doing any of your daily activities because of your eyesight?: No  Have you had an eye exam within the past year?: Yes  Hearing/Vision Interventions:  · Hearing concerns:  patient declines any further evaluation/treatment for hearing issues-has seen specialist     ADL:  ADLs  In the past 7 days, did you need help from others to perform any of the following everyday activities? Eating, dressing, grooming, bathing, toileting, or walking/balance?: (!) Walking/Balance  In the past 7 days, did you need help from others to take care of any of the following?  Laundry, housekeeping, banking/finances, shopping, telephone use, food preparation, transportation, or taking medications?: (!) Laundry, Housekeeping  ADL Interventions:  · Patient declines any further evaluation/treatment for this issue    Personalized Preventive Plan   Current Health Maintenance Status  Immunization History   Administered Date(s) Administered    COVID-19, Moderna, PF, 100mcg/0.5mL 02/03/2021, 03/03/2021    Pneumococcal Polysaccharide ([id number]) 11/15/2018    Tdap (Boostrix, Adacel) 04/01/2019        Health Maintenance   Topic Date Due    Hepatitis C screen  Never done    HIV screen  Never done    Hepatitis B vaccine (1 of 3 - Risk 3-dose series) Never done    Cervical cancer screen  Never done    Colon cancer screen colonoscopy  Never done    Annual Wellness Visit (AWV)  Never done    Flu vaccine (1) 09/01/2021    TSH testing  01/12/2022    Potassium monitoring  07/07/2022    Creatinine monitoring  07/07/2022    Lipid screen  01/12/2026    DTaP/Tdap/Td vaccine (2 - Td or Tdap) 04/01/2029    Pneumococcal 0-64 years Vaccine (2 of 2 - PPSV23) 12/28/2037    COVID-19 Vaccine  Completed    Hepatitis A vaccine  Aged Out    Hib vaccine  Aged C/ Sean Richard 19 Meningococcal (ACWY) vaccine  Aged Out     Recommendations for Preventive Services Due: see orders and patient instructions/AVS.  . Recommended screening schedule for the next 5-10 years is provided to the patient in written form: see Patient Instructions/AVS.    Amena Both was seen today for medicare awv, 6 month follow-up, abdominal pain and health maintenance. Diagnoses and all orders for this visit:    Routine general medical examination at a health care facility    Generalized abdominal pain  -     Comprehensive Metabolic Panel; Future  -     Amylase; Future  -     Lipase; Future  -     CBC Auto Differential; Future  -     XR ABDOMEN (KUB) (SINGLE AP VIEW);  Future

## 2021-08-24 NOTE — PATIENT INSTRUCTIONS
Personalized Preventive Plan for Verónica Lagos - 8/24/2021  Medicare offers a range of preventive health benefits. Some of the tests and screenings are paid in full while other may be subject to a deductible, co-insurance, and/or copay. Some of these benefits include a comprehensive review of your medical history including lifestyle, illnesses that may run in your family, and various assessments and screenings as appropriate. After reviewing your medical record and screening and assessments performed today your provider may have ordered immunizations, labs, imaging, and/or referrals for you. A list of these orders (if applicable) as well as your Preventive Care list are included within your After Visit Summary for your review. Other Preventive Recommendations:    · A preventive eye exam performed by an eye specialist is recommended every 1-2 years to screen for glaucoma; cataracts, macular degeneration, and other eye disorders. · A preventive dental visit is recommended every 6 months. · Try to get at least 150 minutes of exercise per week or 10,000 steps per day on a pedometer . · Order or download the FREE \"Exercise & Physical Activity: Your Everyday Guide\" from [employer]. Call 6-601.846.4595 or search [employer] online. · You need 8329-1977 mg of calcium and 3870-3259 IU of vitamin D per day. It is possible to meet your calcium requirement with diet alone, but a vitamin D supplement is usually necessary to meet this goal.  · When exposed to the sun, use a sunscreen that protects against both UVA and UVB radiation with an SPF of 30 or greater. Reapply every 2 to 3 hours or after sweating, drying off with a towel, or swimming. · Always wear a seat belt when traveling in a car. Always wear a helmet when riding a bicycle or motorcycle.

## 2021-08-25 DIAGNOSIS — R10.9 ABDOMINAL PAIN, UNSPECIFIED ABDOMINAL LOCATION: Primary | ICD-10-CM

## 2021-08-27 DIAGNOSIS — F98.8 ATTENTION DEFICIT DISORDER, UNSPECIFIED HYPERACTIVITY PRESENCE: ICD-10-CM

## 2021-08-27 DIAGNOSIS — J02.9 ACUTE PHARYNGITIS, UNSPECIFIED ETIOLOGY: ICD-10-CM

## 2021-08-27 NOTE — TELEPHONE ENCOUNTER
Future Appointments     Provider   8/31/2021 [facility] ROOM 1   Department: [facility] Imaging CT Scan   Appt Notes: EPIC/ SW PATIENT    8/30/2022 ORTIZ Valladares CNP   Department: [facility]   Appt Notes: awv   Recent Visits    08/24/2021 Routine general medical examination at a health care facility   [facility], ORTIZ - KRIS

## 2021-08-28 ENCOUNTER — APPOINTMENT (OUTPATIENT)
Dept: CT IMAGING | Age: 51
End: 2021-08-28
Payer: MEDICARE

## 2021-08-28 ENCOUNTER — HOSPITAL ENCOUNTER (EMERGENCY)
Age: 51
Discharge: HOME | End: 2021-08-28
Attending: EMERGENCY MEDICINE
Payer: MEDICARE

## 2021-08-28 VITALS
HEIGHT: 62 IN | RESPIRATION RATE: 18 BRPM | TEMPERATURE: 98.2 F | DIASTOLIC BLOOD PRESSURE: 68 MMHG | BODY MASS INDEX: 20.24 KG/M2 | OXYGEN SATURATION: 100 % | WEIGHT: 110 LBS | SYSTOLIC BLOOD PRESSURE: 104 MMHG | HEART RATE: 74 BPM

## 2021-08-28 DIAGNOSIS — I87.2 EDEMA OF LOWER LEG DUE TO PERIPHERAL VENOUS INSUFFICIENCY: ICD-10-CM

## 2021-08-28 DIAGNOSIS — R60.0 EDEMA OF LOWER LEG DUE TO PERIPHERAL VENOUS INSUFFICIENCY: ICD-10-CM

## 2021-08-28 DIAGNOSIS — K52.9 ENTEROCOLITIS: Primary | ICD-10-CM

## 2021-08-28 LAB
ALBUMIN SERPL-MCNC: 4.7 G/DL (ref 3.5–4.6)
ALP BLD-CCNC: 84 U/L (ref 40–130)
ALT SERPL-CCNC: 16 U/L (ref 0–33)
AMYLASE: 70 U/L (ref 22–93)
ANION GAP SERPL CALCULATED.3IONS-SCNC: 13 MEQ/L (ref 9–15)
APTT: 27.3 SEC (ref 24.4–36.8)
AST SERPL-CCNC: 14 U/L (ref 0–35)
BACTERIA: ABNORMAL /HPF
BASOPHILS ABSOLUTE: 0.1 K/UL (ref 0–0.1)
BASOPHILS RELATIVE PERCENT: 1.2 % (ref 0.1–1.2)
BILIRUB SERPL-MCNC: <0.2 MG/DL (ref 0.2–0.7)
BILIRUBIN URINE: NEGATIVE
BLOOD, URINE: NORMAL
BUN BLDV-MCNC: 15 MG/DL (ref 6–20)
CALCIUM SERPL-MCNC: 10.1 MG/DL (ref 8.5–9.9)
CHLORIDE BLD-SCNC: 101 MEQ/L (ref 95–107)
CLARITY: CLEAR
CO2: 24 MEQ/L (ref 20–31)
COLOR: NORMAL
CREAT SERPL-MCNC: 0.83 MG/DL (ref 0.5–0.9)
D DIMER: 0.3 MG/L FEU (ref 0–0.5)
EOSINOPHILS ABSOLUTE: 0.2 K/UL (ref 0–0.4)
EOSINOPHILS RELATIVE PERCENT: 2.5 % (ref 0.7–5.8)
EPITHELIAL CELLS, UA: ABNORMAL /HPF
GFR AFRICAN AMERICAN: >60
GFR NON-AFRICAN AMERICAN: >60
GLOBULIN: 3 G/DL (ref 2.3–3.5)
GLUCOSE BLD-MCNC: 87 MG/DL (ref 70–99)
GLUCOSE URINE: NEGATIVE MG/DL
HCT VFR BLD CALC: 34.2 % (ref 37–47)
HEMOGLOBIN: 11.2 G/DL (ref 11.2–15.7)
IMMATURE GRANULOCYTES #: 0 K/UL
IMMATURE GRANULOCYTES %: 0.3 %
INR BLD: 0.9
KETONES, URINE: NEGATIVE MG/DL
LEUKOCYTE ESTERASE, URINE: NORMAL
LIPASE: 42 U/L (ref 12–95)
LYMPHOCYTES ABSOLUTE: 2.2 K/UL (ref 1.2–3.7)
LYMPHOCYTES RELATIVE PERCENT: 36.3 %
MCH RBC QN AUTO: 29 PG (ref 25.6–32.2)
MCHC RBC AUTO-ENTMCNC: 32.7 % (ref 32.2–35.5)
MCV RBC AUTO: 88.6 FL (ref 79.4–94.8)
MONOCYTES ABSOLUTE: 0.4 K/UL (ref 0.2–0.9)
MONOCYTES RELATIVE PERCENT: 6.5 % (ref 4.7–12.5)
NEUTROPHILS ABSOLUTE: 3.2 K/UL (ref 1.6–6.1)
NEUTROPHILS RELATIVE PERCENT: 53.2 % (ref 34–71.1)
NITRITE, URINE: NEGATIVE
PDW BLD-RTO: 11.9 % (ref 11.7–14.4)
PH UA: 7 (ref 5–9)
PLATELET # BLD: 272 K/UL (ref 182–369)
POTASSIUM SERPL-SCNC: 3.6 MEQ/L (ref 3.4–4.9)
PROTEIN UA: NEGATIVE MG/DL
PROTHROMBIN TIME: 12.3 SEC (ref 12.3–14.9)
RBC # BLD: 3.86 M/UL (ref 3.93–5.22)
RBC UA: ABNORMAL /HPF (ref 0–2)
SODIUM BLD-SCNC: 138 MEQ/L (ref 135–144)
SPECIFIC GRAVITY UA: 1.01 (ref 1–1.03)
TOTAL PROTEIN: 7.7 G/DL (ref 6.3–8)
URINE REFLEX TO CULTURE: NORMAL
UROBILINOGEN, URINE: 0.2 E.U./DL
WBC # BLD: 6 K/UL (ref 4–10)
WBC UA: ABNORMAL /HPF (ref 0–5)

## 2021-08-28 PROCEDURE — 96374 THER/PROPH/DIAG INJ IV PUSH: CPT

## 2021-08-28 PROCEDURE — 85025 COMPLETE CBC W/AUTO DIFF WBC: CPT

## 2021-08-28 PROCEDURE — 36415 COLL VENOUS BLD VENIPUNCTURE: CPT

## 2021-08-28 PROCEDURE — 85610 PROTHROMBIN TIME: CPT

## 2021-08-28 PROCEDURE — 6360000004 HC RX CONTRAST MEDICATION: Performed by: EMERGENCY MEDICINE

## 2021-08-28 PROCEDURE — 83690 ASSAY OF LIPASE: CPT

## 2021-08-28 PROCEDURE — 85730 THROMBOPLASTIN TIME PARTIAL: CPT

## 2021-08-28 PROCEDURE — 80053 COMPREHEN METABOLIC PANEL: CPT

## 2021-08-28 PROCEDURE — 82150 ASSAY OF AMYLASE: CPT

## 2021-08-28 PROCEDURE — 85379 FIBRIN DEGRADATION QUANT: CPT

## 2021-08-28 PROCEDURE — 99285 EMERGENCY DEPT VISIT HI MDM: CPT

## 2021-08-28 PROCEDURE — 81001 URINALYSIS AUTO W/SCOPE: CPT

## 2021-08-28 PROCEDURE — 74177 CT ABD & PELVIS W/CONTRAST: CPT

## 2021-08-28 PROCEDURE — 2580000003 HC RX 258: Performed by: EMERGENCY MEDICINE

## 2021-08-28 PROCEDURE — 6360000002 HC RX W HCPCS: Performed by: EMERGENCY MEDICINE

## 2021-08-28 RX ORDER — KETOROLAC TROMETHAMINE 30 MG/ML
30 INJECTION, SOLUTION INTRAMUSCULAR; INTRAVENOUS ONCE
Status: COMPLETED | OUTPATIENT
Start: 2021-08-28 | End: 2021-08-28

## 2021-08-28 RX ORDER — CIPROFLOXACIN 500 MG/1
500 TABLET, FILM COATED ORAL 2 TIMES DAILY
Qty: 20 TABLET | Refills: 0 | Status: SHIPPED | OUTPATIENT
Start: 2021-08-28 | End: 2021-09-07

## 2021-08-28 RX ORDER — METRONIDAZOLE 500 MG/1
500 TABLET ORAL 3 TIMES DAILY
Qty: 30 TABLET | Refills: 0 | Status: SHIPPED | OUTPATIENT
Start: 2021-08-28 | End: 2021-09-07

## 2021-08-28 RX ORDER — 0.9 % SODIUM CHLORIDE 0.9 %
1000 INTRAVENOUS SOLUTION INTRAVENOUS ONCE
Status: COMPLETED | OUTPATIENT
Start: 2021-08-28 | End: 2021-08-28

## 2021-08-28 RX ORDER — KETOROLAC TROMETHAMINE 10 MG/1
10 TABLET, FILM COATED ORAL EVERY 6 HOURS PRN
Qty: 20 TABLET | Refills: 0 | Status: SHIPPED | OUTPATIENT
Start: 2021-08-28 | End: 2022-02-02

## 2021-08-28 RX ADMIN — SODIUM CHLORIDE 1000 ML: 9 INJECTION, SOLUTION INTRAVENOUS at 19:00

## 2021-08-28 RX ADMIN — KETOROLAC TROMETHAMINE 30 MG: 30 INJECTION, SOLUTION INTRAMUSCULAR; INTRAVENOUS at 19:00

## 2021-08-28 RX ADMIN — IOPAMIDOL 100 ML: 755 INJECTION, SOLUTION INTRAVENOUS at 19:58

## 2021-08-28 ASSESSMENT — PAIN SCALES - GENERAL
PAINLEVEL_OUTOF10: 5
PAINLEVEL_OUTOF10: 7

## 2021-08-28 ASSESSMENT — PAIN DESCRIPTION - PROGRESSION
CLINICAL_PROGRESSION: NOT CHANGED
CLINICAL_PROGRESSION: GRADUALLY IMPROVING

## 2021-08-28 ASSESSMENT — PAIN DESCRIPTION - LOCATION
LOCATION: ABDOMEN;LEG
LOCATION: ABDOMEN;BACK
LOCATION: ABDOMEN

## 2021-08-28 ASSESSMENT — ENCOUNTER SYMPTOMS
RHINORRHEA: 0
SINUS PRESSURE: 0
APNEA: 0
CONSTIPATION: 0
NAUSEA: 0
DIARRHEA: 0
SORE THROAT: 0
PHOTOPHOBIA: 0
SHORTNESS OF BREATH: 0
ABDOMINAL PAIN: 1
BACK PAIN: 0
COUGH: 0
EYE PAIN: 0
COLOR CHANGE: 0
ABDOMINAL DISTENTION: 0
WHEEZING: 0
VOMITING: 0

## 2021-08-28 ASSESSMENT — PAIN DESCRIPTION - ORIENTATION
ORIENTATION: LEFT
ORIENTATION: LEFT

## 2021-08-28 ASSESSMENT — PAIN DESCRIPTION - PAIN TYPE
TYPE: ACUTE PAIN
TYPE: ACUTE PAIN

## 2021-08-28 ASSESSMENT — PAIN DESCRIPTION - DESCRIPTORS: DESCRIPTORS: STABBING

## 2021-08-28 NOTE — ED TRIAGE NOTES
Patient reports left lower leg pain x1 week and left sided abdominal pain x1 week. Reports nausea without vomiting. A/0 x3, ambulatory, resps even and unlabored on room air.

## 2021-08-28 NOTE — ED PROVIDER NOTES
[address] [facility] ED  eMERGENCY dEPARTMENT eNCOUnter      Pt Name: Umang Amaya  MRN: [id number]  [place] 1970  Date of evaluation: 8/28/2021  Provider: Juan Carlos Bond MD    [address] [place] [address]       Chief Complaint   Patient presents with    Abdominal Pain     Left side x1 week    Leg Pain     Left leg x1 week          HISTORY OF PRESENT ILLNESS   (Location/Symptom, Timing/Onset,Context/Setting, Quality, Duration, Modifying Factors, Severity)  Note limiting factors. Umang Amaya is a 52 y.o. female who presents to the emergency department with complaint of left upper quadrant abdominal pain, mid abdominal pain, swelling left leg, bruising all over. Symptoms for the last 1 week. Was seen at her family doctor's a KUB was normal.  Was scheduled for CT scan next Tuesday [facility] [place].  No fever or chills. No nausea or vomiting. No diarrhea or constipation. History of Sjogren's syndrome on Plaquenil. Other comorbid conditions include attention deficit disorder, anxiety, depression, hypothyroidism, gastroesophageal reflux disease, pancreatitis, endometriosis, fibroids, osteoporosis, chronic headaches and conductive hearing loss of left ear. Her pain is sharp and 7 in a scale of 1-10. Pain began gradually. No history of blood clots. No history of cigarette smoking or contraceptive use. HPI    Nursing Notes were reviewed. REVIEW OF SYSTEMS    (2-9 systems for level 4, 10 or more for level 5)     Review of Systems   Constitutional: Negative. Negative for activity change, appetite change, chills, fatigue and fever. HENT: Negative for congestion, ear discharge, ear pain, hearing loss, rhinorrhea, sinus pressure and sore throat. Eyes: Negative for photophobia, pain and visual disturbance. Respiratory: Negative for apnea, cough, shortness of breath and wheezing. Cardiovascular: Positive for leg swelling. Negative for chest pain and palpitations.    Gastrointestinal: Positive for abdominal pain. Negative for abdominal distention, constipation, diarrhea, nausea and vomiting. Endocrine: Negative for cold intolerance, heat intolerance and polyuria. Genitourinary: Negative for dysuria, flank pain, frequency and urgency. Musculoskeletal: Negative for arthralgias, back pain, gait problem, myalgias and neck stiffness. Skin: Negative for color change, pallor and rash. Allergic/Immunologic: Negative for food allergies and immunocompromised state. Neurological: Negative for dizziness, tremors, syncope, weakness, light-headedness and headaches. Psychiatric/Behavioral: Negative for agitation, confusion and hallucinations. All other systems reviewed and are negative. Except as noted above the remainder of the review of systems was reviewed and negative.        PAST MEDICAL HISTORY     Past Medical History:   Diagnosis Date    Acquired hypothyroidism 9/26/2016    Acute pancreatitis     ADD (attention deficit disorder) 2/12/2015    Anxiety     Depression 12/9/2015    Endometrial cyst of ovary 5/10/14    RT ovary, ruptured    GERD (gastroesophageal reflux disease) 9/26/2016    Medullary sponge kidney of both kidneys 5/22/2018    Sjogren's disease ([facility] [address]     Stress     Swelling          SURGICAL HISTORY       Past Surgical History:   Procedure Laterality Date    CHOLECYSTECTOMY  2011    HYSTERECTOMY  2014    sept    OVARY REMOVAL Left Jan 2014    UPPER GASTROINTESTINAL ENDOSCOPY  09/16/2016    EGD W/BX    UPPER GASTROINTESTINAL ENDOSCOPY N/A 5/6/2021    ENDOSCOPIC ULTRASOUND with EGD performed by Jojo Johns MD at [address] [place] [address]       Discharge Medication List as of 8/28/2021  8:53 PM      CONTINUE these medications which have NOT CHANGED    Details   furosemide (LASIX) 20 MG tablet Take 1 tablet by mouth 2 times daily TAKE ONE TABLET BY MOUTH TWO TIMES A DAY, Disp-60 tablet, R-0Normal      amphetamine-dextroamphetamine (ADDERALL XR) 30 MG extended release capsule Take 1 capsule by mouth 2 times daily for 30 days. , Disp-60 capsule, R-0Normal      liothyronine (CYTOMEL) 25 MCG tablet Take 1 tablet by mouth daily, Disp-90 tablet, R-0Normal      buPROPion (WELLBUTRIN XL) 300 MG extended release tablet Take 1 tablet by mouth every morning, Disp-90 tablet, R-1Normal      esomeprazole (NEXIUM) 40 MG delayed release capsule TAKE ONE CAPSULE BY MOUTH EVERY DAY, Disp-90 capsule, R-1Normal      naratriptan (AMERGE) 2.5 MG tablet Take one at onset of severe headache/migraine may repeat x 1 after 4 hours if needed. Maximum 2/day and 2 days/week. Historical Med      !! topiramate (TOPAMAX) 50 MG tablet Historical Med      promethazine-dextromethorphan (PROMETHAZINE-DM) 6.25-15 MG/5ML syrup TAKE 5 MLS BY MOUTH AT BEDTIME FOR 7 DAYSHistorical Med      !! topiramate (TOPAMAX) 100 MG tablet TAKE ONE TABLET BY MOUTH TWO TIMES A DAY, Disp-60 tablet, R-5Normal      traZODone (DESYREL) 150 MG tablet TAKE ONE TABLET BY MOUTH nightly, Disp-30 tablet, R-0Normal      hydrocortisone (CORTEF) 5 MG tablet Take 5 mg by mouth dailyHistorical Med      loratadine (CLARITIN) 10 MG tablet Take 1 tablet by mouth daily, Disp-30 tablet, R-5Normal      valACYclovir (VALTREX) 500 MG tablet Take 1 tablet by mouth daily, Disp-30 tablet, R-5Normal      prochlorperazine (COMPAZINE) 10 MG tablet Take 1 tablet by mouth every 6 hours as needed (nausea), Disp-30 tablet, R-2Normal      metroNIDAZOLE (METROGEL) 0.75 % gel Apply topically 2 times daily. , Disp-45 g, R-1, Normal      promethazine (PHENERGAN) 25 MG tablet Take 1 tablet by mouth every 8 hours as needed for Nausea, Disp-40 tablet,R-0Normal      Magnesium 400 MG TABS Take 1 tablet by mouth daily, Disp-30 tablet,R-0Print      TRULANCE 3 MG TABS Take 3 mg by mouth daily, DAWHistorical Med      sucralfate (CARAFATE) 1 GM tablet Take by mouth dailyHistorical Med      metoclopramide (REGLAN) 10 MG tablet Take 10 mg by mouth as neededHistorical Med amLODIPine (NORVASC) 2.5 MG tablet Take 2.5 mg by mouth dailyHistorical Med      ondansetron (ZOFRAN) 4 MG tablet Take 1 tablet by mouth every 8 hours as needed for Nausea or Vomiting, Disp-30 tablet, R-1Normal      azelastine (ASTELIN) 0.1 % nasal spray 1 spray by Nasal routeHistorical Med      moxifloxacin (VIGAMOX) 0.5 % ophthalmic solution 1 dropHistorical Med      potassium chloride (KLOR-CON M) 10 MEQ extended release tablet Take 1 tablet by mouth daily, Disp-30 tablet, R-5Normal      fluticasone (FLONASE) 50 MCG/ACT nasal spray USE 1 SPRAY NASALLY DAILY, R-3Historical Med      levothyroxine (SYNTHROID) 25 MCG tablet Take one daily, Disp-90 tablet, R-1Normal      hydroxychloroquine (PLAQUENIL) 200 MG tablet Take 300 mg by mouth daily       Pancrelipase, Lip-Prot-Amyl, (CREON) 21921 UNITS CPEP 2 caps tid, Disp-180 capsule, R-03      methylPREDNISolone (MEDROL DOSEPACK) 4 MG tablet On days 1-6 take as directed on package for first 6-day course. On days 7-12 take as directed on (second) package for (second) 6-day course., Disp-42 tablet, R-0Normal      aspirin (ASPIRIN CHILDRENS) 81 MG chewable tablet Take 1 tablet by mouth daily, Disp-14 tablet, R-0Print      cyclobenzaprine (FLEXERIL) 5 MG tablet Take 2.5-5 mg by mouth 2 times daily as neededHistorical Med      amoxicillin (AMOXIL) 875 MG tablet Take 875 mg by mouth 2 times daily Historical Med      estradiol (ESTRACE) 1 MG tablet Take 1 tablet by mouth daily, Disp-30 tablet, R-5Normal      Hyoscyamine Sulfate SL (LEVSIN/SL) 0.125 MG SUBL Place 125 mcg under the tongue every 4 hours as needed (pain), Disp-120 each, R-3Normal      montelukast (SINGULAIR) 10 MG tablet Take 1 tablet by mouth daily, Disp-30 tablet, R-3Normal      ZOLMitriptan (ZOMIG) 5 MG tablet TAKE 1 TABLET BY MOUTH AT ONSET OF MIGRAINE. MAY REPEAT ONCE AFTER 2 HOURS. MAX 10 MG (2 TABLETS) PER DAY, R-11Historical Med       !! - Potential duplicate medications found.  Please discuss with provider. ALLERGIES     Patient has no known allergies. FAMILY HISTORY       Family History   Problem Relation Age of Onset    Heart Disease Father 48    Cancer Maternal Grandmother         breast    Heart Disease Other         mom and dad's side          SOCIAL HISTORY       Social History     Socioeconomic History    Marital status: Single     Spouse name: None    Number of children: None    Years of education: None    Highest education level: None   Occupational History    None   Tobacco Use    Smoking status: Never Smoker    Smokeless tobacco: Never Used   Vaping Use    Vaping Use: Never used   Substance and Sexual Activity    Alcohol use: No     Alcohol/week: 0.0 standard drinks     Comment: no alcohol since 2011    Drug use: No    Sexual activity: None   Other Topics Concern    None   Social History Narrative    ** Merged History Encounter **          Social Determinants of Health     Financial Resource Strain: Low Risk     Difficulty of Paying Living Expenses: Not hard at all   Food Insecurity: No Food Insecurity    Worried About Running Out of Food in the Last Year: Never true    [place] of Food in the Last Year: Never true   Transportation Needs: No Transportation Needs    Lack of Transportation (Medical): No    Lack of Transportation (Non-Medical):  No   Physical Activity:     Days of Exercise per Week:     Minutes of Exercise per Session:    Stress:     Feeling of Stress :    Social Connections:     Frequency of Communication with Friends and Family:     Frequency of Social Gatherings with Friends and Family:     Attends [employer] Services:     Active Member of Clubs or Organizations:     Attends Club or Organization Meetings:     Marital Status:    Intimate Partner Violence:     Fear of Current or Ex-Partner:     Emotionally Abused:     Physically Abused:     Sexually Abused:        SCREENINGS    Polo Coma Scale  Eye Opening: Spontaneous  Best Verbal Response: Oriented  Best Motor Response: Obeys commands  Randolph Coma Scale Score: 15        PHYSICAL EXAM    (up to 7 for level 4, 8 or more for level 5)     ED Triage Vitals [08/28/21 1809]   BP Temp Temp Source Pulse Resp SpO2 Height Weight   (!) 157/97 98.2 °F (36.8 °C) Oral 95 17 99 % 5' 2\" (1.575 m) 110 lb (49.9 kg)       Physical Exam  Vitals and nursing note reviewed. Constitutional:       General: She is not in acute distress. Appearance: She is well-developed and normal weight. She is not ill-appearing, toxic-appearing or diaphoretic. HENT:      Head: Normocephalic and atraumatic. Nose: Nose normal.      Mouth/Throat:      Pharynx: No pharyngeal swelling or oropharyngeal exudate. Eyes:      General: No scleral icterus. Right eye: No discharge. Left eye: No discharge. Extraocular Movements: Extraocular movements intact. Conjunctiva/sclera: Conjunctivae normal.      Pupils: Pupils are equal, round, and reactive to light. Neck:      Thyroid: No thyromegaly. Vascular: No JVD. Trachea: No tracheal deviation. Cardiovascular:      Rate and Rhythm: Normal rate and regular rhythm. Heart sounds: Normal heart sounds. No murmur heard. No friction rub. No gallop. Pulmonary:      Effort: Pulmonary effort is normal. No respiratory distress. Breath sounds: Normal breath sounds. No stridor. No wheezing, rhonchi or rales. Chest:      Chest wall: No tenderness. Abdominal:      General: Abdomen is flat. Bowel sounds are normal. There is no distension or abdominal bruit. There are no signs of injury. Palpations: Abdomen is soft. There is no shifting dullness, fluid wave, hepatomegaly, splenomegaly, mass or pulsatile mass. Tenderness: There is no abdominal tenderness. There is no right CVA tenderness, left CVA tenderness, guarding or rebound. Negative signs include Ro's sign, Rovsing's sign, McBurney's sign, psoas sign and obturator sign. Hernia: No hernia is present. There is no hernia in the umbilical area, ventral area, left inguinal area, right femoral area, left femoral area or right inguinal area. Musculoskeletal:         General: No tenderness or deformity. Normal range of motion. Cervical back: Normal range of motion and neck supple. Lymphadenopathy:      Cervical: No cervical adenopathy. Skin:     General: Skin is warm and dry. Capillary Refill: Capillary refill takes less than 2 seconds. Coloration: Skin is not cyanotic, jaundiced, mottled or pale. Findings: No erythema or rash. Neurological:      General: No focal deficit present. Mental Status: She is alert and oriented to person, place, and time. Cranial Nerves: No cranial nerve deficit. Motor: No weakness or abnormal muscle tone. Coordination: Coordination normal.      Deep Tendon Reflexes: Reflexes are normal and symmetric. Reflexes normal.   Psychiatric:         Mood and Affect: Mood normal. Mood is not anxious or depressed. Behavior: Behavior normal.         Thought Content:  Thought content normal.         Judgment: Judgment normal.         DIAGNOSTIC RESULTS     EKG: All EKG's are interpreted by the Emergency Department Physician who either signs or Co-signs this chart in the absence of a cardiologist.        RADIOLOGY:   Non-plain film images such as CT, Ultrasound and MRI are read by the radiologist. Vermell Gum radiographicimages are visualized and preliminarily interpreted by the emergency physician with the below findings:        Interpretation per the Radiologist below, if available at the time of this note:    CT ABDOMEN PELVIS W IV CONTRAST Additional Contrast? None    (Results Pending)         ED BEDSIDE ULTRASOUND:   Performed by ED Physician - none    LABS:  Labs Reviewed   COMPREHENSIVE METABOLIC PANEL - Abnormal; Notable for the following components:       Result Value    Calcium 10.1 (*)     Albumin 4.7 (*)     All 8/28/2021  8:53 PM      START taking these medications    Details   ciprofloxacin (CIPRO) 500 MG tablet Take 1 tablet by mouth 2 times daily for 10 days, Disp-20 tablet, R-0Normal      metroNIDAZOLE (FLAGYL) 500 MG tablet Take 1 tablet by mouth 3 times daily for 10 days, Disp-30 tablet, R-0Normal      ketorolac (TORADOL) 10 MG tablet Take 1 tablet by mouth every 6 hours as needed for Pain, Disp-20 tablet, R-0Normal                (Please note that portions of this note were completed with a voice recognitionprogram.  Efforts were made to edit the dictations but occasionally words are mis-transcribed.)    Oksana Pallas, MD (electronically signed)  Attending Emergency Physician          Oksana Pallas, MD  08/29/21 MD [place]  08/29/21 0244

## 2021-08-29 RX ORDER — LORATADINE 10 MG/1
10 TABLET ORAL DAILY
Qty: 30 TABLET | Refills: 5 | Status: SHIPPED | OUTPATIENT
Start: 2021-08-29 | End: 2021-12-28

## 2021-08-30 RX ORDER — DEXTROAMPHETAMINE SACCHARATE, AMPHETAMINE ASPARTATE MONOHYDRATE, DEXTROAMPHETAMINE SULFATE AND AMPHETAMINE SULFATE 7.5; 7.5; 7.5; 7.5 MG/1; MG/1; MG/1; MG/1
30 CAPSULE, EXTENDED RELEASE ORAL 2 TIMES DAILY
Qty: 60 CAPSULE | Refills: 0 | Status: SHIPPED | OUTPATIENT
Start: 2021-08-30 | End: 2021-09-29 | Stop reason: SDUPTHER

## 2021-08-30 RX ORDER — FUROSEMIDE 20 MG/1
20 TABLET ORAL 2 TIMES DAILY
Qty: 60 TABLET | Refills: 0 | Status: SHIPPED | OUTPATIENT
Start: 2021-08-30 | End: 2021-10-08 | Stop reason: SDUPTHER

## 2021-08-31 ENCOUNTER — OFFICE VISIT (OUTPATIENT)
Dept: FAMILY MEDICINE CLINIC | Age: 51
End: 2021-08-31
Payer: MEDICARE

## 2021-08-31 VITALS
HEIGHT: 62 IN | DIASTOLIC BLOOD PRESSURE: 80 MMHG | SYSTOLIC BLOOD PRESSURE: 110 MMHG | WEIGHT: 107 LBS | HEART RATE: 76 BPM | BODY MASS INDEX: 19.69 KG/M2 | OXYGEN SATURATION: 98 %

## 2021-08-31 DIAGNOSIS — R16.0 HEPATOMEGALY: Primary | ICD-10-CM

## 2021-08-31 DIAGNOSIS — R10.11 RUQ ABDOMINAL PAIN: ICD-10-CM

## 2021-08-31 PROCEDURE — G8420 CALC BMI NORM PARAMETERS: HCPCS | Performed by: NURSE PRACTITIONER

## 2021-08-31 PROCEDURE — 1036F TOBACCO NON-USER: CPT | Performed by: NURSE PRACTITIONER

## 2021-08-31 PROCEDURE — 99214 OFFICE O/P EST MOD 30 MIN: CPT | Performed by: NURSE PRACTITIONER

## 2021-08-31 PROCEDURE — G8427 DOCREV CUR MEDS BY ELIG CLIN: HCPCS | Performed by: NURSE PRACTITIONER

## 2021-08-31 ASSESSMENT — ENCOUNTER SYMPTOMS
DIARRHEA: 1
ABDOMINAL PAIN: 1
ABDOMINAL DISTENTION: 1
SHORTNESS OF BREATH: 0
COUGH: 0

## 2021-08-31 NOTE — PROGRESS NOTES
Subjective  Chief Complaint   Patient presents with    ED Follow-up     pt states still having abdominal pain and diarrhea.  Health Maintenance     pt states colonoscopy scheduled in october         HPI     Recently seen in office and then went to ER for abdominal pain. Found to have potential entercolitis as well as enlarged liver. Given flagyl and cipro. Pt has colonoscopy scheduled with her GI in October. Lab Results   Component Value Date    WBC 6.0 08/28/2021    HGB 11.2 08/28/2021    HCT 34.2 (L) 08/28/2021     08/28/2021    CHOL 219 (H) 01/12/2021    TRIG 62 01/12/2021    HDL 81 (H) 01/12/2021    ALT 16 08/28/2021    AST 14 08/28/2021     08/28/2021    K 3.6 08/28/2021     08/28/2021    CREATININE 0.83 08/28/2021    BUN 15 08/28/2021    CO2 24 08/28/2021    TSH 0.02 (L) 04/05/2019    INR 0.9 08/28/2021    LABA1C 5.1 01/31/2021     Pt still struggling with lower abdominal discomfort. Still having frequent BM's. Distention some better since taking the medications.      Patient Active Problem List    Diagnosis Date Noted    Neutropenia ([address] 01/12/2021    Hereditary hemochromatosis ([address] 01/12/2021    Chronic pancreatitis ([address] 08/11/2020    Major depressive disorder, single episode, 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[address] 01/15/2019    Abnormal MRI, liver 05/22/2018    Acute recurrent pancreatitis 05/22/2018    At risk of fracture due to osteoporosis 05/22/2018    Calcinosis 05/22/2018    Chronic headaches 05/22/2018    Conductive hearing loss in left ear 05/22/2018    Edema 05/22/2018    Iron overload 05/22/2018    Mass of left side of neck 05/22/2018    Medullary sponge kidney of both kidneys 67/44/1075    Metabolic acidosis 75/43/9438    Microscopic hematuria 05/22/2018    Nausea 05/22/2018    Renal tubular acidosis type I 05/22/2018    Rheumatoid arthritis ([address] 05/22/2018    Tension type headache 05/22/2018    Weight loss, abnormal 05/22/2018    Cellulitis, face 04/06/2018    Other chest pain 11/01/2017    Left lower quadrant pain 11/01/2017    Sjogren's syndrome ([address] 11/01/2017    Diverticulitis of large intestine without perforation or abscess without bleeding 11/01/2017    Fibroids 11/01/2017    Transfusion history 11/01/2017    Pancreatitis 05/13/2017    Acquired hypothyroidism 09/26/2016    GERD (gastroesophageal reflux disease) 09/26/2016    Anxiety 12/09/2015    Depression 12/09/2015    ADD (attention deficit disorder) 02/12/2015    Endometriosis 09/09/2014    ASCUS favoring benign 05/01/2013    S/P endometrial ablation 05/01/2013     Past Medical History:   Diagnosis Date    Acquired hypothyroidism 9/26/2016    Acute pancreatitis     ADD (attention deficit disorder) 2/12/2015    Anxiety     Depression 12/9/2015    Endometrial cyst of ovary 5/10/14    RT ovary, ruptured    GERD (gastroesophageal reflux disease) 9/26/2016    Medullary sponge kidney of both kidneys 5/22/2018    Sjogren's disease ([address]     Stress     Swelling      Past Surgical History:   Procedure Laterality Date    CHOLECYSTECTOMY  2011    HYSTERECTOMY  2014    sept    OVARY REMOVAL Left Jan 2014    UPPER GASTROINTESTINAL ENDOSCOPY  09/16/2016    EGD W/BX    UPPER GASTROINTESTINAL ENDOSCOPY N/A 5/6/2021    ENDOSCOPIC ULTRASOUND with EGD performed by Al Carr MD at [facility]     Family History   Problem Relation Age of Onset    Heart Disease Father 48    Cancer Maternal Grandmother         breast    Heart Disease Other         mom and dad's side     Social History     Socioeconomic History    Marital status: Single     Spouse name: None    Number of children: None    Years of education: None    Highest education level: None   Occupational History    None   Tobacco Use    Smoking status: Never Smoker    Smokeless tobacco: Never Used   Vaping Use    Vaping Use: Never used   Substance and Sexual Activity    Alcohol use: No     Alcohol/week: 0.0 tablet Take 1 tablet by mouth daily 90 tablet 0    methylPREDNISolone (MEDROL DOSEPACK) 4 MG tablet On days 1-6 take as directed on package for first 6-day course. On days 7-12 take as directed on (second) package for (second) 6-day course. 42 tablet 0    aspirin (ASPIRIN CHILDRENS) 81 MG chewable tablet Take 1 tablet by mouth daily 14 tablet 0    buPROPion (WELLBUTRIN XL) 300 MG extended release tablet Take 1 tablet by mouth every morning 90 tablet 1    esomeprazole (NEXIUM) 40 MG delayed release capsule TAKE ONE CAPSULE BY MOUTH EVERY DAY 90 capsule 1    cyclobenzaprine (FLEXERIL) 5 MG tablet Take 2.5-5 mg by mouth 2 times daily as needed      naratriptan (AMERGE) 2.5 MG tablet Take one at onset of severe headache/migraine may repeat x 1 after 4 hours if needed. Maximum 2/day and 2 days/week.  topiramate (TOPAMAX) 50 MG tablet       promethazine-dextromethorphan (PROMETHAZINE-DM) 6.25-15 MG/5ML syrup TAKE 5 MLS BY MOUTH AT BEDTIME FOR 7 DAYS      topiramate (TOPAMAX) 100 MG tablet TAKE ONE TABLET BY MOUTH TWO TIMES A DAY 60 tablet 5    traZODone (DESYREL) 150 MG tablet TAKE ONE TABLET BY MOUTH nightly 30 tablet 0    amoxicillin (AMOXIL) 875 MG tablet Take 875 mg by mouth 2 times daily       estradiol (ESTRACE) 1 MG tablet Take 1 tablet by mouth daily 30 tablet 5    hydrocortisone (CORTEF) 5 MG tablet Take 5 mg by mouth daily      valACYclovir (VALTREX) 500 MG tablet Take 1 tablet by mouth daily 30 tablet 5    prochlorperazine (COMPAZINE) 10 MG tablet Take 1 tablet by mouth every 6 hours as needed (nausea) 30 tablet 2    metroNIDAZOLE (METROGEL) 0.75 % gel Apply topically 2 times daily.  45 g 1    promethazine (PHENERGAN) 25 MG tablet Take 1 tablet by mouth every 8 hours as needed for Nausea 40 tablet 0    Magnesium 400 MG TABS Take 1 tablet by mouth daily 30 tablet 0    TRULANCE 3 MG TABS Take 3 mg by mouth daily      sucralfate (CARAFATE) 1 GM tablet Take by mouth daily      metoclopramide (REGLAN) 10 MG tablet Take 10 mg by mouth as needed      Hyoscyamine Sulfate SL (LEVSIN/SL) 0.125 MG SUBL Place 125 mcg under the tongue every 4 hours as needed (pain) 120 each 3    amLODIPine (NORVASC) 2.5 MG tablet Take 2.5 mg by mouth daily      ondansetron (ZOFRAN) 4 MG tablet Take 1 tablet by mouth every 8 hours as needed for Nausea or Vomiting 30 tablet 1    azelastine (ASTELIN) 0.1 % nasal spray 1 spray by Nasal route      moxifloxacin (VIGAMOX) 0.5 % ophthalmic solution 1 drop      potassium chloride (KLOR-CON M) 10 MEQ extended release tablet Take 1 tablet by mouth daily 30 tablet 5    montelukast (SINGULAIR) 10 MG tablet Take 1 tablet by mouth daily 30 tablet 3    fluticasone (FLONASE) 50 MCG/ACT nasal spray USE 1 SPRAY NASALLY DAILY  3    ZOLMitriptan (ZOMIG) 5 MG tablet TAKE 1 TABLET BY MOUTH AT ONSET OF MIGRAINE. MAY REPEAT ONCE AFTER 2 HOURS. MAX 10 MG (2 TABLETS) PER DAY  11    levothyroxine (SYNTHROID) 25 MCG tablet Take one daily 90 tablet 1    hydroxychloroquine (PLAQUENIL) 200 MG tablet Take 300 mg by mouth daily       Pancrelipase, Lip-Prot-Amyl, (CREON) 34759 UNITS CPEP 2 caps tid 180 capsule 03    [DISCONTINUED] NEXIUM 40 MG delayed release capsule TAKE ONE CAPSULE BY MOUTH EVERY DAY 30 capsule 5    [DISCONTINUED] topiramate (TOPAMAX) 100 MG tablet Take 1 tablet by mouth 2 times daily 60 tablet 5    [DISCONTINUED] furosemide (LASIX) 20 MG tablet Take 1 tablet by mouth 2 times daily TAKE ONE TABLET BY MOUTH TWO TIMES A DAY 60 tablet 5     No current facility-administered medications on file prior to visit. No Known Allergies    Review of Systems   Constitutional: Positive for fatigue. Negative for fever. Respiratory: Negative for cough and shortness of breath. Cardiovascular: Negative for chest pain. Gastrointestinal: Positive for abdominal distention, abdominal pain and diarrhea.        Objective  Vitals:    08/31/21 1259   BP: 110/80   Pulse: 76   SpO2: 98%   Weight: 107 lb (48.5 kg)   Height: 5' 2\" (1.575 m)     Physical Exam  Vitals and nursing note reviewed. Constitutional:       Appearance: Normal appearance. She is normal weight. HENT:      Head: Normocephalic. Nose: Nose normal.      Mouth/Throat:      Mouth: Mucous membranes are moist.      Pharynx: Oropharynx is clear. Eyes:      Extraocular Movements: Extraocular movements intact. Conjunctiva/sclera: Conjunctivae normal.      Pupils: Pupils are equal, round, and reactive to light. Cardiovascular:      Rate and Rhythm: Normal rate and regular rhythm. Pulses: Normal pulses. Heart sounds: Normal heart sounds. Pulmonary:      Effort: Pulmonary effort is normal.      Breath sounds: Normal breath sounds. Abdominal:      Palpations: Abdomen is soft. Tenderness: There is abdominal tenderness (RUQ, LLQ). There is no rebound. Hernia: No hernia is present. Skin:     General: Skin is warm. Neurological:      General: No focal deficit present. Mental Status: She is alert and oriented to person, place, and time. Mental status is at baseline. Psychiatric:         Mood and Affect: Mood normal.         Behavior: Behavior normal.         Thought Content: Thought content normal.         Judgment: Judgment normal.       Assessment & Plan     Diagnosis Orders   1. [address], [place], Gastroenterology, [place]   2.  RUQ abdominal pain  [address], [place], Gastroenterology, [place]       Orders Placed This Encounter   Procedures   [address], [place], Gastroenterology, [place]     Referral Priority:   Routine     Referral Type:   Eval and Treat     Referral Reason:   Specialty Services Required     Referred to Provider:   Taras Avila MD     Requested Specialty:   Gastroenterology     Number of Visits Requested:   1     Side effects, adverse effects of the medication prescribed today, as well as treatment plan/ rationale and result expectations have been discussed with the patient who expresses understanding and desires to proceed. Close follow up to evaluate treatment results and for coordination of care. I have reviewed the patient's medical history in detail and updated the computerized patient record. As always, patient is advised that if symptoms worsen in any way they will proceed to the nearest emergency room. Finish up medications given to pt from ER. Fu with GI for colonoscopy.      Mirtha Mendez, APRN - CNP

## 2021-09-01 DIAGNOSIS — G47.00 INSOMNIA, UNSPECIFIED TYPE: Primary | ICD-10-CM

## 2021-09-01 RX ORDER — MIRTAZAPINE 30 MG/1
30 TABLET, FILM COATED ORAL NIGHTLY
Qty: 30 TABLET | Refills: 5 | Status: SHIPPED | OUTPATIENT
Start: 2021-09-01 | End: 2022-02-02

## 2021-09-09 ENCOUNTER — PATIENT MESSAGE (OUTPATIENT)
Dept: FAMILY MEDICINE CLINIC | Age: 51
End: 2021-09-09

## 2021-09-09 NOTE — TELEPHONE ENCOUNTER
From: Juan Brannon  To: Corrina Canada, ORTIZ - CNP  Sent: 9/9/2021 12:09 PM EDT  Subject: Non-Urgent Medical Question    [place]   I had genetic test  Im on cipro and flagyl but I have a uti or something, Im miserable

## 2021-09-19 DIAGNOSIS — Z78.0 POST-MENOPAUSAL: ICD-10-CM

## 2021-09-21 RX ORDER — ESTRADIOL 1 MG/1
TABLET ORAL
Qty: 30 TABLET | Refills: 0 | Status: SHIPPED | OUTPATIENT
Start: 2021-09-21 | End: 2021-10-28

## 2021-09-28 ENCOUNTER — HOSPITAL ENCOUNTER (OUTPATIENT)
Age: 51
Discharge: HOME | End: 2021-09-30
Payer: MEDICARE

## 2021-09-28 ENCOUNTER — OFFICE VISIT (OUTPATIENT)
Dept: FAMILY MEDICINE CLINIC | Age: 51
End: 2021-09-28
Payer: MEDICARE

## 2021-09-28 ENCOUNTER — HOSPITAL ENCOUNTER (OUTPATIENT)
Dept: ULTRASOUND IMAGING | Age: 51
Discharge: HOME | End: 2021-09-30
Payer: MEDICARE

## 2021-09-28 VITALS
OXYGEN SATURATION: 97 % | DIASTOLIC BLOOD PRESSURE: 66 MMHG | BODY MASS INDEX: 19.69 KG/M2 | HEART RATE: 88 BPM | SYSTOLIC BLOOD PRESSURE: 118 MMHG | TEMPERATURE: 97.4 F | HEIGHT: 62 IN | WEIGHT: 107 LBS

## 2021-09-28 DIAGNOSIS — M79.662 PAIN AND SWELLING OF LEFT LOWER LEG: Primary | ICD-10-CM

## 2021-09-28 DIAGNOSIS — M79.662 PAIN AND SWELLING OF LEFT LOWER LEG: ICD-10-CM

## 2021-09-28 DIAGNOSIS — M79.89 PAIN AND SWELLING OF LEFT LOWER LEG: Primary | ICD-10-CM

## 2021-09-28 DIAGNOSIS — M79.89 PAIN AND SWELLING OF LEFT LOWER LEG: ICD-10-CM

## 2021-09-28 PROCEDURE — 1036F TOBACCO NON-USER: CPT | Performed by: NURSE PRACTITIONER

## 2021-09-28 PROCEDURE — 93971 EXTREMITY STUDY: CPT

## 2021-09-28 PROCEDURE — G8427 DOCREV CUR MEDS BY ELIG CLIN: HCPCS | Performed by: NURSE PRACTITIONER

## 2021-09-28 PROCEDURE — 99214 OFFICE O/P EST MOD 30 MIN: CPT | Performed by: NURSE PRACTITIONER

## 2021-09-28 PROCEDURE — G8420 CALC BMI NORM PARAMETERS: HCPCS | Performed by: NURSE PRACTITIONER

## 2021-09-28 RX ORDER — TRAMADOL HYDROCHLORIDE 50 MG/1
50 TABLET ORAL EVERY 6 HOURS PRN
Qty: 12 TABLET | Refills: 0 | Status: SHIPPED | OUTPATIENT
Start: 2021-09-28 | End: 2021-10-01

## 2021-09-28 NOTE — PROGRESS NOTES
Subjective:      Patient ID: Iris Riley is a 52 y.o. female who presents today for:  Chief Complaint   Patient presents with    Leg Swelling     Patient complaining of left leg swelling on back of leg aspecially behind knee        HPI        This is a new problem   It started yesterday   Couldn't sleep last night   Felt a sharp pain almost knee the knee and behind it, even running up the leg toward the thigh  The knee and up  It hurts bad   She feels the leg in general is swollen compared to the other one   She didn't do anything- no injury  About sep 18th was hospitalized for 6 days for kidney related issues   The left leg   She did not have any injury   Sob but sometimes chronic due to so many chronic problm               Past Medical History:   Diagnosis Date    Acquired hypothyroidism 9/26/2016    Acute pancreatitis     ADD (attention deficit disorder) 2/12/2015    Anxiety     Depression 12/9/2015    Endometrial cyst of ovary 5/10/14    RT ovary, ruptured    GERD (gastroesophageal reflux disease) 9/26/2016    Medullary sponge kidney of both kidneys 5/22/2018    Sjogren's disease ([address]     Stress     Swelling      Past Surgical History:   Procedure Laterality Date    CHOLECYSTECTOMY  2011    HYSTERECTOMY  2014    sept    OVARY REMOVAL Left Jan 2014    UPPER GASTROINTESTINAL ENDOSCOPY  09/16/2016    EGD W/BX    UPPER GASTROINTESTINAL ENDOSCOPY N/A 5/6/2021    ENDOSCOPIC ULTRASOUND with [facility]GD [facility]  History     Socioeconomic History    Marital status: Single     Spouse name: Not on file    Number of children: Not on file    Years of education: Not on file    Highest education level: Not on file   Occupational History    Not on file   Tobacco Use    Smoking status: Never Smoker    Smokeless tobacco: Never Used   Vaping Use    Vaping Use: Never used   Substance and Sexual Activity    Alcohol use: No     Alcohol/week: 0.0 standard drinks Comment: no alcohol since 2011    Drug use: No    Sexual activity: Not on file   Other Topics Concern    Not on file   Social History Narrative    ** Merged History Encounter **          Social Determinants of Health     Financial Resource Strain: Low Risk     Difficulty of Paying Living Expenses: Not hard at all   Food Insecurity: No Food Insecurity    Worried About Running Out of Food in the Last Year: Never true    [place] of Food in the Last Year: Never true   Transportation Needs: No Transportation Needs    Lack of Transportation (Medical): No    Lack of Transportation (Non-Medical): No   Physical Activity:     Days of Exercise per Week:     Minutes of Exercise per Session:    Stress:     Feeling of Stress :    Social Connections:     Frequency of Communication with Friends and Family:     Frequency of Social Gatherings with Friends and Family:     Attends Yazdanism Services:     Active Member of Clubs or Organizations:     Attends Club or Organization Meetings:     Marital Status:    Intimate Partner Violence:     Fear of Current or Ex-Partner:     Emotionally Abused:     Physically Abused:     Sexually Abused:      Family History   Problem Relation Age of Onset    Heart Disease Father 48    Cancer Maternal Grandmother         breast    Heart Disease Other         mom and dad's side     No Known Allergies  Current Outpatient Medications   Medication Sig Dispense Refill    traMADol (ULTRAM) 50 MG tablet Take 1 tablet by mouth every 6 hours as needed for Pain for up to 3 days. Intended supply: 3 days.  Take lowest dose possible to manage pain 12 tablet 0    estradiol (ESTRACE) 1 MG tablet TAKE ONE TABLET BY MOUTH DAILY 30 tablet 0    mirtazapine (REMERON) 30 MG tablet Take 1 tablet by mouth nightly 30 tablet 5    furosemide (LASIX) 20 MG tablet Take 1 tablet by mouth 2 times daily TAKE ONE TABLET BY MOUTH TWO TIMES A DAY 60 tablet 0    loratadine (CLARITIN) 10 MG tablet Take 1 tablet by mouth daily 30 tablet 5    ketorolac (TORADOL) 10 MG tablet Take 1 tablet by mouth every 6 hours as needed for Pain 20 tablet 0    liothyronine (CYTOMEL) 25 MCG tablet Take 1 tablet by mouth daily 90 tablet 0    methylPREDNISolone (MEDROL DOSEPACK) 4 MG tablet On days 1-6 take as directed on package for first 6-day course. On days 7-12 take as directed on (second) package for (second) 6-day course. 42 tablet 0    aspirin (ASPIRIN CHILDRENS) 81 MG chewable tablet Take 1 tablet by mouth daily 14 tablet 0    buPROPion (WELLBUTRIN XL) 300 MG extended release tablet Take 1 tablet by mouth every morning 90 tablet 1    esomeprazole (NEXIUM) 40 MG delayed release capsule TAKE ONE CAPSULE BY MOUTH EVERY DAY 90 capsule 1    cyclobenzaprine (FLEXERIL) 5 MG tablet Take 2.5-5 mg by mouth 2 times daily as needed      naratriptan (AMERGE) 2.5 MG tablet Take one at onset of severe headache/migraine may repeat x 1 after 4 hours if needed. Maximum 2/day and 2 days/week.  topiramate (TOPAMAX) 50 MG tablet       promethazine-dextromethorphan (PROMETHAZINE-DM) 6.25-15 MG/5ML syrup TAKE 5 MLS BY MOUTH AT BEDTIME FOR 7 DAYS      topiramate (TOPAMAX) 100 MG tablet TAKE ONE TABLET BY MOUTH TWO TIMES A DAY 60 tablet 5    amoxicillin (AMOXIL) 875 MG tablet Take 875 mg by mouth 2 times daily       hydrocortisone (CORTEF) 5 MG tablet Take 5 mg by mouth daily      valACYclovir (VALTREX) 500 MG tablet Take 1 tablet by mouth daily 30 tablet 5    prochlorperazine (COMPAZINE) 10 MG tablet Take 1 tablet by mouth every 6 hours as needed (nausea) 30 tablet 2    metroNIDAZOLE (METROGEL) 0.75 % gel Apply topically 2 times daily.  45 g 1    promethazine (PHENERGAN) 25 MG tablet Take 1 tablet by mouth every 8 hours as needed for Nausea 40 tablet 0    Magnesium 400 MG TABS Take 1 tablet by mouth daily 30 tablet 0    TRULANCE 3 MG TABS Take 3 mg by mouth daily      sucralfate (CARAFATE) 1 GM tablet Take by mouth daily      Objective:   /66 (Site: Right Upper Arm, Position: Sitting, Cuff Size: Large Adult)   Pulse 88   Temp 97.4 °F (36.3 °C) (Temporal)   Ht 5' 2\" (1.575 m)   Wt 107 lb (48.5 kg)   SpO2 97%   BMI 19.57 kg/m²     Physical Exam  Vitals reviewed. Constitutional:       Appearance: Normal appearance. HENT:      Head: Normocephalic and atraumatic. Nose: Nose normal.      Mouth/Throat:      Lips: Pink. Eyes:      General: Lids are normal.      Conjunctiva/sclera: Conjunctivae normal.   Cardiovascular:      Rate and Rhythm: Normal rate and regular rhythm. Heart sounds: Normal heart sounds, S1 normal and S2 normal.   Pulmonary:      Effort: Pulmonary effort is normal. No respiratory distress. Breath sounds: Normal breath sounds. No wheezing or rhonchi. Abdominal:      Tenderness: There is no abdominal tenderness. There is no right CVA tenderness, left CVA tenderness or guarding. Musculoskeletal:      Cervical back: Normal range of motion. Left knee: Swelling present. No erythema or ecchymosis. Normal range of motion. Tenderness present. Normal alignment. Normal pulse. Left lower leg: Normal. No swelling or lacerations. No edema. Legs:       Comments: Generalized discomfort, really does not appear swollen in general , does feel swollen behind the knee, leg is warm, good pulse   Skin:     General: Skin is warm and dry. Findings: No rash. Neurological:      General: No focal deficit present. Mental Status: She is alert and oriented to person, place, and time. Psychiatric:         Mood and Affect: Mood is anxious. Behavior: Behavior normal. Behavior is cooperative. Assessment:       Diagnosis Orders   1. Pain and swelling of left lower leg  US DUP LOWER EXTREMITY LEFT SINDHU    traMADol (ULTRAM) 50 MG tablet    US DUP LOWER EXTREMITY LEFT ARTERIES     No results found for this visit on 09/28/21. Plan:    Will rule out DVT especially due to recent hospitalization. Assessment & Plan   Erna Monge was seen today for leg swelling. Diagnoses and all orders for this visit:    Pain and swelling of left lower leg  -     US DUP LOWER EXTREMITY LEFT SINDHU; Future  -     traMADol (ULTRAM) 50 MG tablet; Take 1 tablet by mouth every 6 hours as needed for Pain for up to 3 days. Intended supply: 3 days. Take lowest dose possible to manage pain  -     US DUP LOWER EXTREMITY LEFT ARTERIES; Future      Orders Placed This Encounter   Procedures    US DUP LOWER EXTREMITY LEFT SINDHU     Standing Status:   Future     Number of Occurrences:   1     Standing Expiration Date:   10/28/2021     Order Specific Question:   Reason for exam:     Answer:   rule out DVT/bakers cyst    US DUP LOWER EXTREMITY LEFT ARTERIES     Standing Status:   Future     Standing Expiration Date:   9/30/2022     Order Specific Question:   Reason for exam:     Answer:   ongoing pain behind the knee and up. DVT negative     Orders Placed This Encounter   Medications    traMADol (ULTRAM) 50 MG tablet     Sig: Take 1 tablet by mouth every 6 hours as needed for Pain for up to 3 days. Intended supply: 3 days. Take lowest dose possible to manage pain     Dispense:  12 tablet     Refill:  0     Reduce doses taken as pain becomes manageable     There are no discontinued medications. Return if symptoms worsen or fail to improve, for Follow up with PCP. Reviewed with the patient/family: current clinical status & medications. Side effects of the medication prescribed today, as well as treatment plan/rationale and result expectations have been discussed with the patient/family who expresses understanding. Patient will be discharged home in stable condition. Follow up with PCP to evaluate treatment results or return if symptoms worsen or fail to improve. Discussed signs and symptoms which require immediate follow-up in ED/call to 911. Understanding verbalized.      I have reviewed the patient's medical history in detail and updated the computerized patient record.     ORTIZ Georges - CNP

## 2021-09-28 NOTE — PATIENT INSTRUCTIONS
Patient Education        Leg Pain: Care Instructions  Your Care Instructions  Many things can cause leg pain. Too much exercise or overuse can cause a muscle cramp (or charley horse). You can get leg cramps from not eating a balanced diet that has enough potassium, calcium, and other minerals. If you do not drink enough fluids or are taking certain medicines, you may develop leg cramps. Other causes of leg pain include injuries, blood flow problems, nerve damage, and twisted and enlarged veins (varicose veins). You can usually ease pain with self-care. Your doctor may recommend that you rest your leg and keep it elevated. Follow-up care is a key part of your treatment and safety. Be sure to make and go to all appointments, and call your doctor if you are having problems. It's also a good idea to know your test results and keep a list of the medicines you take. How can you care for yourself at home? · Take pain medicines exactly as directed. ? If the doctor gave you a prescription medicine for pain, take it as prescribed. ? If you are not taking a prescription pain medicine, ask your doctor if you can take an over-the-counter medicine. · Take any other medicines exactly as prescribed. Call your doctor if you think you are having a problem with your medicine. · Rest your leg while you have pain, and avoid standing for long periods of time. · Prop up your leg at or above the level of your heart when possible. · Make sure you are eating a balanced diet that is rich in calcium, potassium, and magnesium, especially if you are pregnant. · If directed by your doctor, put ice or a cold pack on the area for 10 to 20 minutes at a time. Put a thin cloth between the ice and your skin. · Your leg may be in a splint, a brace, or an elastic bandage, and you may have crutches to help you walk. Follow your doctor's directions about how long to wear supports and how to use the crutches. When should you call for help? Call 911 anytime you think you may need emergency care. For example, call if:    · You have sudden chest pain and shortness of breath, or you cough up blood.     · Your leg is cool or pale or changes color. Call your doctor now or seek immediate medical care if:    · You have increasing or severe pain.     · Your leg suddenly feels weak and you cannot move it.     · You have signs of a blood clot, such as:  ? Pain in your calf, back of the knee, thigh, or groin. ? Redness and swelling in your leg or groin.     · You have signs of infection, such as:  ? Increased pain, swelling, warmth, or redness. ? Red streaks leading from the sore area. ? Pus draining from a place on your leg. ? A fever.     · You cannot bear weight on your leg. Watch closely for changes in your health, and be sure to contact your doctor if:    · You do not get better as expected. Where can you learn more? Go to https://[employer].Kaiam. org and sign in to your EverTune account. Enter U735 in the An[employer] box to learn more about \"Leg Pain: Care Instructions. \"     If you do not have an account, please click on the \"Sign Up Now\" link. Current as of: July 1, 2021               Content Version: 13.0  © 5744-9584 Healthwise, Incorporated. Care instructions adapted under license by [facility] [place] ([facility]). If you have questions about a medical condition or this instruction, always ask your healthcare professional. [place] [address] any warranty or liability for your use of this information. Patient Education        Learning About Deep Vein Thrombosis  What is a deep vein thrombosis (DVT)? A deep vein thrombosis (DVT) is a blood clot (thrombus) in a deep vein, usually in the legs. A DVT can be dangerous because it can break loose and travel through the bloodstream to the lungs. There it can block blood flow in the lungs (pulmonary embolism). This can be life-threatening.   What are the symptoms? DVT often doesn't cause symptoms. Or it may cause only minor ones. When symptoms happen, they include:  · Swelling in the affected area of the leg or arm. · Redness and warmth in the affected area. · Pain or tenderness. You may have pain only when you touch the affected area or when you stand or walk. Sometimes a pulmonary embolism is the first sign that you have DVT. If your doctor thinks you may have DVT, you will probably have an ultrasound test. You may have other tests as well. What causes deep vein thrombosis (DVT)? Causes of a blood clot in a deep vein (DVT) include:  · Slowed blood flow. This can happen when you're not active for long periods of time. For example, clots can form if you are paralyzed, are confined to bed, or must sit while on a long flight or car trip. · Abnormal clotting problems that make the blood clot too easily or too quickly. This may be caused by certain health problems, such as cancer or a genetic clotting disorder. Pregnancy, hormonal birth control, and hormone therapy can also make blood more likely to clot. · Surgery or an injury to the blood vessels. Blood is more likely to clot in veins shortly after they are injured. How can you prevent DVT? · Exercise your lower leg muscles to help blood flow in your legs. Point your toes up toward your head so the calves of your legs are stretched, then relax and repeat. This is a good exercise to do when you are sitting for long periods of time. · Get out of bed as soon as you can after an illness or surgery. If you need to stay in bed, do the leg exercise noted above every hour when you are awake. · Use special stockings called compression stockings. These stockings are tight at the feet with a gradually looser fit on the leg. Many doctors recommend that you wear compression stockings during a journey longer than 8 hours. · Take breaks when you are on long trips. Stop the car and walk around.  On long airplane flights, walk up and down the aisle hourly, and flex and point your feet every 20 minutes while sitting. · Take blood-thinning medicines after some types of surgery if your doctor recommends it. Blood thinners also may be used if you are likely to develop clots. How is DVT treated? Treatment for DVT usually involves taking blood thinners. They prevent blood clots by increasing the time it takes a blood clot to form. They also help prevent existing blood clots from getting larger. Your doctor also may suggest that you prop up or elevate your leg or arm when possible, take several walks a day, and wear compression stockings. These measures may help reduce the pain and swelling that can happen with DVT. Follow-up care is a key part of your treatment and safety. Be sure to make and go to all appointments, and call your doctor if you are having problems. It's also a good idea to know your test results and keep a list of the medicines you take. Where can you learn more? Go to https://Lytro.Source[employer] Systems. org and sign in to your JumpSoft account. Enter V591 in the Whi box to learn more about \"Learning About Deep Vein Thrombosis. \"     If you do not have an account, please click on the \"Sign Up Now\" link. Current as of: July 6, 2021               Content Version: 13.0  © 2006-2021 Healthwise, Incorporated. Care instructions adapted under license by [facility] ([facility]). If you have questions about a medical condition or this instruction, always ask your healthcare professional. [place] [address] any warranty or liability for your use of this information.

## 2021-09-29 DIAGNOSIS — F98.8 ATTENTION DEFICIT DISORDER, UNSPECIFIED HYPERACTIVITY PRESENCE: ICD-10-CM

## 2021-09-30 RX ORDER — DEXTROAMPHETAMINE SACCHARATE, AMPHETAMINE ASPARTATE MONOHYDRATE, DEXTROAMPHETAMINE SULFATE AND AMPHETAMINE SULFATE 7.5; 7.5; 7.5; 7.5 MG/1; MG/1; MG/1; MG/1
30 CAPSULE, EXTENDED RELEASE ORAL 2 TIMES DAILY
Qty: 60 CAPSULE | Refills: 0 | Status: SHIPPED | OUTPATIENT
Start: 2021-09-30 | End: 2021-10-29 | Stop reason: SDUPTHER

## 2021-09-30 ASSESSMENT — ENCOUNTER SYMPTOMS
VOMITING: 0
SHORTNESS OF BREATH: 1
RHINORRHEA: 0
DIARRHEA: 0
WHEEZING: 0
COUGH: 0
SORE THROAT: 0
NAUSEA: 0

## 2021-10-05 LAB
SARS-COV-2, PCR: NOT DETECTED
SPECIMEN SOURCE: NORMAL

## 2021-10-08 NOTE — TELEPHONE ENCOUNTER
Future Appointments     Provider Department   8/30/2022 Beena Marcano, APRN - [facility]   Appt Notes: awv   Recent Visits    09/28/2021 Pain and swelling of left lower leg   [facility] [address] MValentino Liang, APRN - CNP   08/31/2021 Hepatomegaly   [address] [place] [address], APRN - CNP   08/24/2021 Routine general medical examination at a health care facility   [address] [place] [address], APRN - CNP

## 2021-10-10 RX ORDER — FUROSEMIDE 20 MG/1
20 TABLET ORAL 2 TIMES DAILY
Qty: 60 TABLET | Refills: 0 | Status: SHIPPED | OUTPATIENT
Start: 2021-10-10 | End: 2021-11-09 | Stop reason: SDUPTHER

## 2021-10-29 DIAGNOSIS — F98.8 ATTENTION DEFICIT DISORDER, UNSPECIFIED HYPERACTIVITY PRESENCE: ICD-10-CM

## 2021-11-01 RX ORDER — DEXTROAMPHETAMINE SACCHARATE, AMPHETAMINE ASPARTATE MONOHYDRATE, DEXTROAMPHETAMINE SULFATE AND AMPHETAMINE SULFATE 7.5; 7.5; 7.5; 7.5 MG/1; MG/1; MG/1; MG/1
30 CAPSULE, EXTENDED RELEASE ORAL 2 TIMES DAILY
Qty: 60 CAPSULE | Refills: 0 | Status: SHIPPED | OUTPATIENT
Start: 2021-11-01 | End: 2021-12-03 | Stop reason: SDUPTHER

## 2021-11-09 RX ORDER — FUROSEMIDE 20 MG/1
20 TABLET ORAL 2 TIMES DAILY
Qty: 60 TABLET | Refills: 0 | Status: SHIPPED | OUTPATIENT
Start: 2021-11-09 | End: 2021-12-03 | Stop reason: SDUPTHER

## 2021-12-03 ENCOUNTER — VIRTUAL VISIT (OUTPATIENT)
Dept: FAMILY MEDICINE CLINIC | Age: 51
End: 2021-12-03
Payer: MEDICARE

## 2021-12-03 DIAGNOSIS — Z20.828 EXPOSURE TO THE FLU: ICD-10-CM

## 2021-12-03 DIAGNOSIS — Z76.0 MEDICATION REFILL: ICD-10-CM

## 2021-12-03 DIAGNOSIS — R50.9 FEVER, UNSPECIFIED FEVER CAUSE: ICD-10-CM

## 2021-12-03 DIAGNOSIS — F98.8 ATTENTION DEFICIT DISORDER, UNSPECIFIED HYPERACTIVITY PRESENCE: ICD-10-CM

## 2021-12-03 DIAGNOSIS — J06.9 URI WITH COUGH AND CONGESTION: Primary | ICD-10-CM

## 2021-12-03 LAB
INFLUENZA A ANTIBODY: NORMAL
INFLUENZA B ANTIBODY: NORMAL
Lab: NORMAL
PERFORMING INSTRUMENT: NORMAL
QC PASS/FAIL: NORMAL
SARS-COV-2, POC: NORMAL

## 2021-12-03 PROCEDURE — 87426 SARSCOV CORONAVIRUS AG IA: CPT | Performed by: NURSE PRACTITIONER

## 2021-12-03 PROCEDURE — 99213 OFFICE O/P EST LOW 20 MIN: CPT | Performed by: NURSE PRACTITIONER

## 2021-12-03 PROCEDURE — 87804 INFLUENZA ASSAY W/OPTIC: CPT | Performed by: NURSE PRACTITIONER

## 2021-12-03 PROCEDURE — G8427 DOCREV CUR MEDS BY ELIG CLIN: HCPCS | Performed by: NURSE PRACTITIONER

## 2021-12-03 RX ORDER — CEFDINIR 300 MG/1
300 CAPSULE ORAL 2 TIMES DAILY
Qty: 20 CAPSULE | Refills: 0 | Status: SHIPPED | OUTPATIENT
Start: 2021-12-03 | End: 2021-12-13

## 2021-12-03 RX ORDER — DEXTROAMPHETAMINE SACCHARATE, AMPHETAMINE ASPARTATE MONOHYDRATE, DEXTROAMPHETAMINE SULFATE AND AMPHETAMINE SULFATE 7.5; 7.5; 7.5; 7.5 MG/1; MG/1; MG/1; MG/1
30 CAPSULE, EXTENDED RELEASE ORAL 2 TIMES DAILY
Qty: 60 CAPSULE | Refills: 0 | Status: SHIPPED | OUTPATIENT
Start: 2021-12-03 | End: 2021-12-28 | Stop reason: SDUPTHER

## 2021-12-03 RX ORDER — FUROSEMIDE 20 MG/1
20 TABLET ORAL 2 TIMES DAILY
Qty: 60 TABLET | Refills: 0 | Status: SHIPPED | OUTPATIENT
Start: 2021-12-03 | End: 2021-12-28 | Stop reason: SDUPTHER

## 2021-12-03 RX ORDER — OSELTAMIVIR PHOSPHATE 75 MG/1
75 CAPSULE ORAL 2 TIMES DAILY
Qty: 10 CAPSULE | Refills: 0 | Status: SHIPPED | OUTPATIENT
Start: 2021-12-03 | End: 2021-12-08

## 2021-12-03 ASSESSMENT — ENCOUNTER SYMPTOMS
SINUS PRESSURE: 0
WHEEZING: 0
RHINORRHEA: 1
VOMITING: 0
SORE THROAT: 1
ABDOMINAL PAIN: 0
COUGH: 1
CHEST TIGHTNESS: 0
DIARRHEA: 0
NAUSEA: 1
SHORTNESS OF BREATH: 0

## 2021-12-03 NOTE — PROGRESS NOTES
TELEHEALTH EVALUATION -- Audio/Visual (During GRQDV-08 public health emergency)    -   Aniya Wilson is a 52 y.o. female being evaluated by a Virtual Visit (video visit) encounter to address concerns as mentioned above. A caregiver was present when appropriate. Due to this being a TeleHealth encounter (During VIBCC-57 public health emergency), evaluation of the following organ systems was limited: Vitals/Constitutional/EENT/Resp/CV/GI//MS/Neuro/Skin/Heme-Lymph-Imm. Pursuant to the emergency declaration under the [address] [place] [address], [address] [place] [address] authority and the [employer] Act, this Virtual Visit was conducted with patient's (and/or legal guardian's) consent, to reduce the patient's risk of exposure to COVID-19 and provide necessary medical care. The patient (and/or legal guardian) has also been advised to contact this office for worsening conditions or problems, and seek emergency medical treatment and/or call 911 if deemed necessary. Patient was contacted and agreed to proceed with a virtual visit via Telephone Visit  The risks and benefits of converting to a virtual visit were discussed in light of the current infectious disease epidemic. Patient also understood that insurance coverage and co-pays are up to their individual insurance plans. Patient was located at their home. Provider was located at their office. 12/3/2021  Aniya Wilson (:  1970) has requested an audio/video evaluation for the following concern(s):    HPI  VV audio for viral testing   Started to feel unwell on   Cough, h/a, sore throat, body aches, nasal congestion/drainage & nausea   No emesis or diarrhea   Sweating and chills   Fatigued   Tmax 100.3F  No appetite   Hydrating   Her son diagnosed with influenza A.  Had been around him for holiday    Yest evening COVID-19 home test negative       Med refills-lasix and adderall Scheduled appt for pt at end of month with PCP            Review of Systems   Constitutional: Positive for activity change, appetite change, chills, diaphoresis and fatigue. Fever: Tmax 100.3F. HENT: Positive for congestion, rhinorrhea and sore throat. Negative for ear pain and sinus pressure. Respiratory: Positive for cough. Negative for chest tightness, shortness of breath and wheezing. Cardiovascular: Negative for chest pain and palpitations. Gastrointestinal: Positive for nausea. Negative for abdominal pain, diarrhea and vomiting. Musculoskeletal: Positive for myalgias. Skin: Negative for rash. Neurological: Positive for dizziness and headaches. Negative for light-headedness. Psychiatric/Behavioral: Negative for sleep disturbance. Prior to Visit Medications    Medication Sig Taking? Authorizing Provider   amphetamine-dextroamphetamine (ADDERALL XR) 30 MG extended release capsule Take 1 capsule by mouth 2 times daily for 30 days.  Yes Jefferson Ronny, APRN - NP   furosemide (LASIX) 20 MG tablet Take 1 tablet by mouth 2 times daily TAKE ONE TABLET BY MOUTH TWO TIMES A DAY Yes Jefferson Ronny, APRN - NP   oseltamivir (TAMIFLU) 75 MG capsule Take 1 capsule by mouth 2 times daily for 5 days Yes Jefferson Ronny, APRN - NP   cefdinir (OMNICEF) 300 MG capsule Take 1 capsule by mouth 2 times daily for 10 days Yes Jefferson Ronny, APRN - NP   traZODone (DESYREL) 150 MG tablet take one tablet by mouth nightly  Edna Gear, APRN - CNP   estradiol (ESTRACE) 1 MG tablet TAKE ONE TABLET BY MOUTH DAILY  Edna Gear, APRN - CNP   mirtazapine (REMERON) 30 MG tablet Take 1 tablet by mouth nightly  Edna Gear, APRN - CNP   loratadine (CLARITIN) 10 MG tablet Take 1 tablet by mouth daily  Edna Gear, APRN - CNP   ketorolac (TORADOL) 10 MG tablet Take 1 tablet by mouth every 6 hours as needed for Pain  Mihai Thompson MD   liothyronine (CYTOMEL) 25 MCG tablet Take 1 tablet by mouth daily  ORTIZ Britt CNP   methylPREDNISolone (MEDROL DOSEPACK) 4 MG tablet On days 1-6 take as directed on package for first 6-day course. On days 7-12 take as directed on (second) package for (second) 6-day course. Andrew Hughes MD   aspirin (ASPIRIN CHILDRENS) 81 MG chewable tablet Take 1 tablet by mouth daily  TRUDY Edwards   buPROPion (WELLBUTRIN XL) 300 MG extended release tablet Take 1 tablet by mouth every morning  ORTIZ Britt CNP   esomeprazole (NEXIUM) 40 MG delayed release capsule TAKE ONE CAPSULE BY MOUTH EVERY DAY  ORTIZ Britt CNP   cyclobenzaprine (FLEXERIL) 5 MG tablet Take 2.5-5 mg by mouth 2 times daily as needed  Historical Provider, MD   naratriptan (AMERGE) 2.5 MG tablet Take one at onset of severe headache/migraine may repeat x 1 after 4 hours if needed. Maximum 2/day and 2 days/week. Historical Provider, MD   topiramate (TOPAMAX) 50 MG tablet   Historical Provider, MD   promethazine-dextromethorphan (PROMETHAZINE-DM) 6.25-15 MG/5ML syrup TAKE 5 MLS BY MOUTH AT BEDTIME FOR 7 DAYS  Historical Provider, MD   topiramate (TOPAMAX) 100 MG tablet TAKE ONE TABLET BY MOUTH TWO TIMES A DAY  ORTIZ Britt CNP   amoxicillin (AMOXIL) 875 MG tablet Take 875 mg by mouth 2 times daily   Historical Provider, MD   hydrocortisone (CORTEF) 5 MG tablet Take 5 mg by mouth daily  Historical Provider, MD   valACYclovir (VALTREX) 500 MG tablet Take 1 tablet by mouth daily  ORTIZ Britt CNP   prochlorperazine (COMPAZINE) 10 MG tablet Take 1 tablet by mouth every 6 hours as needed (nausea)  ORTIZ Britt CNP   metroNIDAZOLE (METROGEL) 0.75 % gel Apply topically 2 times daily.   ORTIZ Britt CNP   promethazine (PHENERGAN) 25 MG tablet Take 1 tablet by mouth every 8 hours as needed for Nausea  ORTIZ Britt CNP   NEXIUM 40 MG delayed release capsule TAKE ONE CAPSULE BY MOUTH EVERY DAY  ORTIZ Britt CNP   topiramate (TOPAMAX) 100 MG tablet Take 1 tablet by mouth 2 times daily  ORTIZ Britt CNP   Magnesium 400 MG TABS Take 1 tablet by mouth daily  Giles Joaquin PA-C   furosemide (LASIX) 20 MG tablet Take 1 tablet by mouth 2 times daily TAKE ONE TABLET BY MOUTH TWO TIMES A DAY  ORTIZ Britt CNP   TRULANCE 3 MG TABS Take 3 mg by mouth daily  Historical Provider, MD   sucralfate (CARAFATE) 1 GM tablet Take by mouth daily  Historical Provider, MD   metoclopramide (REGLAN) 10 MG tablet Take 10 mg by mouth as needed  Historical Provider, MD   Hyoscyamine Sulfate SL (LEVSIN/SL) 0.125 MG SUBL Place 125 mcg under the tongue every 4 hours as needed (pain)  ORTIZ Eugene CNP   amLODIPine (NORVASC) 2.5 MG tablet Take 2.5 mg by mouth daily  Historical Provider, MD   ondansetron (ZOFRAN) 4 MG tablet Take 1 tablet by mouth every 8 hours as needed for Nausea or Vomiting  ORTIZ Britt CNP   azelastine (ASTELIN) 0.1 % nasal spray 1 spray by Nasal route  Historical Provider, MD   moxifloxacin (VIGAMOX) 0.5 % ophthalmic solution 1 drop  Historical Provider, MD   potassium chloride (KLOR-CON M) 10 MEQ extended release tablet Take 1 tablet by mouth daily  ORTIZ Britt CNP   montelukast (SINGULAIR) 10 MG tablet Take 1 tablet by mouth daily  ORTIZ Britt CNP   fluticasone (FLONASE) 50 MCG/ACT nasal spray USE 1 SPRAY NASALLY DAILY  Historical Provider, MD   ZOLMitriptan (ZOMIG) 5 MG tablet TAKE 1 TABLET BY MOUTH AT ONSET OF MIGRAINE. MAY REPEAT ONCE AFTER 2 HOURS.  MAX 10 MG (2 TABLETS) PER DAY  Historical Provider, MD   levothyroxine (SYNTHROID) 25 MCG tablet Take one daily  ORTIZ Britt CNP   hydroxychloroquine (PLAQUENIL) 200 MG tablet Take 300 mg by mouth daily   Historical Provider, MD   Pancrelipase, Lip-Prot-Amyl, (CREON) [address]9545 UNITS CPEP 2 caps tid  Aparna Marks MD       Past Medical History:   Diagnosis Date    Acquired hypothyroidism 9/26/2016    Acute pancreatitis     ADD (attention deficit disorder) 2/12/2015    Anxiety     Depression 12/9/2015    Endometrial cyst of ovary 5/10/14    RT ovary, ruptured    GERD (gastroesophageal reflux disease) 9/26/2016    Medullary sponge kidney of both kidneys 5/22/2018    Sjogren's disease ([facility] [address]     Stress     Swelling      Past Surgical History:   Procedure Laterality Date    CHOLECYSTECTOMY  2011    HYSTERECTOMY  2014    sept    OVARY REMOVAL Left Jan 2014    UPPER GASTROINTESTINAL ENDOSCOPY  09/16/2016    EGD W/BX    UPPER GASTROINTESTINAL ENDOSCOPY N/A 5/6/2021    ENDOSCOPIC ULTRASOUND with EGD performed by Brinda [facility]rez MD at [facility] Marital status: Single     Spouse name: Not on file    Number of children: Not on file    Years of education: Not on file    Highest education level: Not on file   Occupational History    Not on file   Tobacco Use    Smoking status: Never Smoker    Smokeless tobacco: Never Used   Vaping Use    Vaping Use: Never used   Substance and Sexual Activity    Alcohol use: No     Alcohol/week: 0.0 standard drinks     Comment: no alcohol since 2011    Drug use: No    Sexual activity: Not on file   Other Topics Concern    Not on file   Social History Narrative    ** Merged History Encounter **          Social Determinants of Health     Financial Resource Strain: Low Risk     Difficulty of Paying Living Expenses: Not hard at all   Food Insecurity: No Food Insecurity    Worried About Running Out of Food in the Last Year: Never true    [place] of Food in the Last Year: Never true   Transportation Needs: No Transportation Needs    Lack of Transportation (Medical): No    Lack of Transportation (Non-Medical):  No   Physical Activity:     Days of Exercise per Week: Not on file    Minutes of Exercise per Session: Not on file   Stress:     Feeling of Stress : Not on file   Social Connections:     Frequency of Communication with Friends and Family: Not on file    Frequency of Social Gatherings with Friends and Family: Not on file    Attends [employer]ist Services: Not on file    Active Member of Clubs or Organizations: Not on file    Attends Club or Organization Meetings: Not on file    Marital Status: Not on file   Intimate Partner Violence:     Fear of Current or Ex-Partner: Not on file    Emotionally Abused: Not on file    Physically Abused: Not on file    Sexually Abused: Not on file   Housing Stability:     Unable to Pay for Housing in the Last Year: Not on file    Number of [place] in the Last Year: Not on file    Unstable Housing in the Last Year: Not on file     Family History   Problem Relation Age of Onset    Heart Disease Father 48    Cancer Maternal Grandmother         breast    Heart Disease Other         mom and dad's side     No Known Allergies         PMH, Surgical Hx, Family Hx, and Social Hx reviewed and updated. PHYSICAL EXAMINATION: N/A. VV Audio    Oriented and conversant   No audible distress   No cough throughout visit                    Other pertinent observable physical exam findings-   Results for orders placed or performed in visit on 12/03/21   POCT Influenza A/B   Result Value Ref Range    Influenza A Ab neg     Influenza B Ab neg    POCT COVID-19, Antigen   Result Value Ref Range    SARS-COV-2, POC Not-Detected Not Detected    Lot Number [id number]     QC Pass/Fail p     Performing Instrument BD Veritor        ASSESSMENT/PLAN:  Assessment & Plan   [place] Bar was seen today for cough, headache, pharyngitis, generalized body aches, fatigue and other. Diagnoses and all orders for this visit:    URI with cough and congestion  -     POCT Influenza A/B  -     POCT COVID-19, Antigen  -     cefdinir (OMNICEF) 300 MG capsule; Take 1 capsule by mouth 2 times daily for 10 days    Exposure to the flu  -     POCT Influenza A/B  -     oseltamivir (TAMIFLU) 75 MG capsule;  Take 1 capsule by mouth 2 times daily for 5 days    Fever, unspecified fever cause  -     POCT Influenza A/B  -     POCT COVID-19, Antigen    Medication refill  -     amphetamine-dextroamphetamine (ADDERALL XR) 30 MG extended release capsule; Take 1 capsule by mouth 2 times daily for 30 days. -     furosemide (LASIX) 20 MG tablet; Take 1 tablet by mouth 2 times daily TAKE ONE TABLET BY MOUTH TWO TIMES A DAY    Attention deficit disorder, unspecified hyperactivity presence  -     amphetamine-dextroamphetamine (ADDERALL XR) 30 MG extended release capsule; Take 1 capsule by mouth 2 times daily for 30 days. Orders Placed This Encounter   Procedures    POCT Influenza A/B    POCT COVID-19, Antigen     Order Specific Question:   Is this test for diagnosis or screening? Answer:   Diagnosis of ill patient     Order Specific Question:   Symptomatic for COVID-19 as defined by CDC? Answer:   Yes     Order Specific Question:   Date of Symptom Onset     Answer:   11/27/2021     Order Specific Question:   Hospitalized for COVID-19? Answer:   No     Order Specific Question:   Admitted to ICU for COVID-19? Answer:   No     Order Specific Question:   Employed in healthcare setting? Answer:   No     Order Specific Question:   Resident in a congregate (group) care setting? Answer:   No     Order Specific Question:   Pregnant? Answer:   No     Order Specific Question:   Previously tested for COVID-19? Answer:   Yes     Orders Placed This Encounter   Medications    amphetamine-dextroamphetamine (ADDERALL XR) 30 MG extended release capsule     Sig: Take 1 capsule by mouth 2 times daily for 30 days.      Dispense:  60 capsule     Refill:  0    furosemide (LASIX) 20 MG tablet     Sig: Take 1 tablet by mouth 2 times daily TAKE ONE TABLET BY MOUTH TWO TIMES A DAY     Dispense:  60 tablet     Refill:  0    oseltamivir (TAMIFLU) 75 MG capsule     Sig: Take 1 capsule by mouth 2 times daily for 5 days Dispense:  10 capsule     Refill:  0    cefdinir (OMNICEF) 300 MG capsule     Sig: Take 1 capsule by mouth 2 times daily for 10 days     Dispense:  20 capsule     Refill:  0           Return if symptoms worsen or fail to improve, for follow up with PCP. Reviewed with the patient: current clinical status & medications. Side effects, adverse effects of the medications prescribed today, as well as treatment plan/rationale and result expectations have been discussed with the patient who expressed understanding. Treatment includes supportive care with rest, hydration, and medications for symptom management as ordered. A prescription for Tamiflu has been sent to the pharmacy based on exposure and subjective data. The purpose of Tamiflu is to attempt to shorten the duration and severity of illness. This is a contagious illness which is transmitted through the air. Make sure to cover your cough and practice good hand hygiene. Stay at home until fever has resolved. Close follow up to evaluate treatment results and for coordination of care. I have reviewed the patient's medical history in detail and updated the computerized patient record. Patient identification was verified at the start of the visit: Yes  Total time spent on this encounter: 20 minutes  >50% of 20 minutes was spent spent on counseling, answering questions, instructions on meds & testing & coordinating the care based on my plan and assessment as noted. --ORTIZ Funk NP on 12/4/2021 at 11:55 PM    An electronic signature was used to authenticate this note.

## 2021-12-06 RX ORDER — ESOMEPRAZOLE MAGNESIUM 40 MG/1
CAPSULE, DELAYED RELEASE ORAL
Qty: 90 CAPSULE | Refills: 0 | Status: SHIPPED | OUTPATIENT
Start: 2021-12-06 | End: 2022-03-24

## 2021-12-08 ENCOUNTER — HOSPITAL ENCOUNTER (EMERGENCY)
Age: 51
Discharge: HOME | End: 2021-12-08
Attending: STUDENT IN AN ORGANIZED HEALTH CARE EDUCATION/TRAINING PROGRAM
Payer: MEDICARE

## 2021-12-08 ENCOUNTER — APPOINTMENT (OUTPATIENT)
Dept: GENERAL RADIOLOGY | Age: 51
End: 2021-12-08
Payer: MEDICARE

## 2021-12-08 VITALS
OXYGEN SATURATION: 99 % | DIASTOLIC BLOOD PRESSURE: 84 MMHG | WEIGHT: 105 LBS | TEMPERATURE: 97.8 F | RESPIRATION RATE: 18 BRPM | HEART RATE: 86 BPM | BODY MASS INDEX: 20.62 KG/M2 | SYSTOLIC BLOOD PRESSURE: 127 MMHG | HEIGHT: 60 IN

## 2021-12-08 DIAGNOSIS — R07.9 CHEST PAIN, UNSPECIFIED TYPE: Primary | ICD-10-CM

## 2021-12-08 DIAGNOSIS — E87.6 HYPOKALEMIA: ICD-10-CM

## 2021-12-08 DIAGNOSIS — Z87.09 HISTORY OF URI (UPPER RESPIRATORY INFECTION): ICD-10-CM

## 2021-12-08 DIAGNOSIS — M54.6 ACUTE LEFT-SIDED THORACIC BACK PAIN: ICD-10-CM

## 2021-12-08 LAB
ALBUMIN SERPL-MCNC: 4.8 G/DL (ref 3.5–4.6)
ALP BLD-CCNC: 79 U/L (ref 40–130)
ALT SERPL-CCNC: 13 U/L (ref 0–33)
ANION GAP SERPL CALCULATED.3IONS-SCNC: 12 MEQ/L (ref 9–15)
AST SERPL-CCNC: 13 U/L (ref 0–35)
BASOPHILS ABSOLUTE: 0.1 K/UL (ref 0–0.2)
BASOPHILS RELATIVE PERCENT: 1.4 %
BILIRUB SERPL-MCNC: 0.3 MG/DL (ref 0.2–0.7)
BUN BLDV-MCNC: 19 MG/DL (ref 6–20)
CALCIUM SERPL-MCNC: 9.4 MG/DL (ref 8.5–9.9)
CHLORIDE BLD-SCNC: 95 MEQ/L (ref 95–107)
CO2: 24 MEQ/L (ref 20–31)
CREAT SERPL-MCNC: 0.71 MG/DL (ref 0.5–0.9)
EOSINOPHILS ABSOLUTE: 0.1 K/UL (ref 0–0.7)
EOSINOPHILS RELATIVE PERCENT: 1 %
GFR AFRICAN AMERICAN: >60
GFR NON-AFRICAN AMERICAN: >60
GLOBULIN: 2.5 G/DL (ref 2.3–3.5)
GLUCOSE BLD-MCNC: 86 MG/DL (ref 70–99)
HCT VFR BLD CALC: 36.2 % (ref 37–47)
HEMOGLOBIN: 12.1 G/DL (ref 12–16)
LIPASE: 40 U/L (ref 12–95)
LYMPHOCYTES ABSOLUTE: 2.2 K/UL (ref 1–4.8)
LYMPHOCYTES RELATIVE PERCENT: 38.5 %
MAGNESIUM: 1.9 MG/DL (ref 1.7–2.4)
MCH RBC QN AUTO: 29.6 PG (ref 27–31.3)
MCHC RBC AUTO-ENTMCNC: 33.4 % (ref 33–37)
MCV RBC AUTO: 88.5 FL (ref 82–100)
MONOCYTES ABSOLUTE: 0.4 K/UL (ref 0.2–0.8)
MONOCYTES RELATIVE PERCENT: 7 %
NEUTROPHILS ABSOLUTE: 3 K/UL (ref 1.4–6.5)
NEUTROPHILS RELATIVE PERCENT: 52.1 %
PDW BLD-RTO: 12.7 % (ref 11.5–14.5)
PLATELET # BLD: 271 K/UL (ref 130–400)
POTASSIUM SERPL-SCNC: 3.1 MEQ/L (ref 3.4–4.9)
RBC # BLD: 4.09 M/UL (ref 4.2–5.4)
SODIUM BLD-SCNC: 131 MEQ/L (ref 135–144)
TOTAL CK: 81 U/L (ref 0–170)
TOTAL PROTEIN: 7.3 G/DL (ref 6.3–8)
TROPONIN: <0.01 NG/ML (ref 0–0.01)
TSH SERPL DL<=0.05 MIU/L-ACNC: 3.57 UIU/ML (ref 0.44–3.86)
WBC # BLD: 5.8 K/UL (ref 4.8–10.8)

## 2021-12-08 PROCEDURE — 71046 X-RAY EXAM CHEST 2 VIEWS: CPT

## 2021-12-08 PROCEDURE — 83690 ASSAY OF LIPASE: CPT

## 2021-12-08 PROCEDURE — 83735 ASSAY OF MAGNESIUM: CPT

## 2021-12-08 PROCEDURE — 84484 ASSAY OF TROPONIN QUANT: CPT

## 2021-12-08 PROCEDURE — 99284 EMERGENCY DEPT VISIT MOD MDM: CPT

## 2021-12-08 PROCEDURE — 85025 COMPLETE CBC W/AUTO DIFF WBC: CPT

## 2021-12-08 PROCEDURE — 84443 ASSAY THYROID STIM HORMONE: CPT

## 2021-12-08 PROCEDURE — 82550 ASSAY OF CK (CPK): CPT

## 2021-12-08 PROCEDURE — 80053 COMPREHEN METABOLIC PANEL: CPT

## 2021-12-08 PROCEDURE — 93005 ELECTROCARDIOGRAM TRACING: CPT | Performed by: PHYSICIAN ASSISTANT

## 2021-12-08 RX ORDER — POTASSIUM CHLORIDE 750 MG/1
10 TABLET, EXTENDED RELEASE ORAL DAILY
Qty: 30 TABLET | Refills: 0 | Status: SHIPPED | OUTPATIENT
Start: 2021-12-08

## 2021-12-08 ASSESSMENT — PAIN DESCRIPTION - FREQUENCY: FREQUENCY: INTERMITTENT

## 2021-12-08 ASSESSMENT — ENCOUNTER SYMPTOMS
APNEA: 0
VOMITING: 0
SHORTNESS OF BREATH: 0
CHEST TIGHTNESS: 0
ANAL BLEEDING: 0
COUGH: 0
WHEEZING: 0
BACK PAIN: 1
ABDOMINAL DISTENTION: 0
NAUSEA: 0
VOICE CHANGE: 0
SORE THROAT: 0

## 2021-12-08 ASSESSMENT — PAIN DESCRIPTION - PAIN TYPE: TYPE: ACUTE PAIN

## 2021-12-08 ASSESSMENT — PAIN DESCRIPTION - ONSET: ONSET: ON-GOING

## 2021-12-08 ASSESSMENT — PAIN SCALES - GENERAL: PAINLEVEL_OUTOF10: 7

## 2021-12-08 NOTE — ED TRIAGE NOTES
Pt to ed from home via triage with reports of back pain, sudden onset at 1030 while ambulating. Pt reports that pain is radiating to left side of neck and left arm. Patient denies PT respirations even and unlabored. Cap refil less than 3 seconds. Skin WDI. Respirations even and unlabored. No signs of acute distress noted.

## 2021-12-08 NOTE — ED PROVIDER NOTES
[address] [facility] ED  eMERGENCY dEPARTMENT eNCOUnter      Pt Name: Melvin Rodriguez  MRN: [id number]  [place] 1970  Date of evaluation: 12/8/2021  Provider: Shaunna Donald Dr       Chief Complaint   Patient presents with    Back Pain     radiating to neck and left arm         HISTORY OF PRESENT ILLNESS   (Location/Symptom, Timing/Onset,Context/Setting, Quality, Duration, Modifying Factors, Severity)  Note limiting factors. Melvin Rodriguez is a 52 y.o. female who presents to the emergency department  Back pain, neck pain, left arm and chest pain which started at 1000am while walking down [address]. She does have chronic pancreatitis and has seen cardiology for stress test and echo 2 months ago. Was recently treated for uri on 3 dec 2021. Pt denies fever/cough/congestionn/v/d/sob/rectal bleeding/hemoptysis. Symptoms mild to moderate in severity nothing improves or worsens sx. HPI    NursingNotes were reviewed. REVIEW OF SYSTEMS    (2-9 systems for level 4, 10 or more for level 5)     Review of Systems   Constitutional: Negative for activity change, appetite change, fever and unexpected weight change. HENT: Negative for congestion, ear discharge, ear pain, nosebleeds, sore throat and voice change. Eyes: Negative for visual disturbance. Respiratory: Negative for apnea, cough, chest tightness, shortness of breath and wheezing. Cardiovascular: Positive for chest pain and leg swelling. Gastrointestinal: Negative for abdominal distention, anal bleeding, nausea and vomiting. Genitourinary: Negative for dysuria and hematuria. Musculoskeletal: Positive for back pain and neck pain. Negative for joint swelling and neck stiffness. Skin: Negative for pallor. Neurological: Negative for dizziness, weakness and numbness. All other systems reviewed and are negative. Except as noted above the remainder of the review of systems was reviewed and negative.        PAST MEDICAL HISTORY     Past Medical History:   Diagnosis Date    Acquired hypothyroidism 9/26/2016    Acute pancreatitis     ADD (attention deficit disorder) 2/12/2015    Anxiety     Depression 12/9/2015    Endometrial cyst of ovary 5/10/14    RT ovary, ruptured    GERD (gastroesophageal reflux disease) 9/26/2016    Medullary sponge kidney of both kidneys 5/22/2018    [facility]jogren's disease ([facility] [address]     Stress     Swelling          SURGICALHISTORY       Past Surgical History:   Procedure Laterality Date    CHOLECYSTECTOMY  2011    HYSTERECTOMY  2014    sept    OVARY REMOVAL Left Jan 2014    UPPER GASTROINTESTINAL ENDOSCOPY  09/16/2016    EGD W/BX    UPPER GASTROINTESTINAL ENDOSCOPY N/A 5/6/2021    ENDOSCOPIC ULTRASOUND with EGD performed by Marii Scott MD at [address] [place] [address]       Previous Medications    AMLODIPINE (NORVASC) 2.5 MG TABLET    Take 2.5 mg by mouth daily    AMOXICILLIN (AMOXIL) 875 MG TABLET    Take 875 mg by mouth 2 times daily     AMPHETAMINE-DEXTROAMPHETAMINE (ADDERALL XR) 30 MG EXTENDED RELEASE CAPSULE    Take 1 capsule by mouth 2 times daily for 30 days.     ASPIRIN (ASPIRIN CHILDRENS) 81 MG CHEWABLE TABLET    Take 1 tablet by mouth daily    AZELASTINE (ASTELIN) 0.1 % NASAL SPRAY    1 spray by Nasal route    BUPROPION (WELLBUTRIN XL) 300 MG EXTENDED RELEASE TABLET    Take 1 tablet by mouth every morning    CEFDINIR (OMNICEF) 300 MG CAPSULE    Take 1 capsule by mouth 2 times daily for 10 days    CYCLOBENZAPRINE (FLEXERIL) 5 MG TABLET    Take 2.5-5 mg by mouth 2 times daily as needed    ESOMEPRAZOLE (NEXIUM) 40 MG DELAYED RELEASE CAPSULE    TAKE ONE CAPSULE BY MOUTH EVERY DAY    ESTRADIOL (ESTRACE) 1 MG TABLET    TAKE ONE TABLET BY MOUTH DAILY    FLUTICASONE (FLONASE) 50 MCG/ACT NASAL SPRAY    USE 1 SPRAY NASALLY DAILY    FUROSEMIDE (LASIX) 20 MG TABLET    Take 1 tablet by mouth 2 times daily TAKE ONE TABLET BY MOUTH TWO TIMES A DAY    HYDROCORTISONE (CORTEF) 5 MG TABLET Take 5 mg by mouth daily    HYDROXYCHLOROQUINE (PLAQUENIL) 200 MG TABLET    Take 300 mg by mouth daily     HYOSCYAMINE SULFATE SL (LEVSIN/SL) 0.125 MG SUBL    Place 125 mcg under the tongue every 4 hours as needed (pain)    KETOROLAC (TORADOL) 10 MG TABLET    Take 1 tablet by mouth every 6 hours as needed for Pain    LEVOTHYROXINE (SYNTHROID) 25 MCG TABLET    Take one daily    LIOTHYRONINE (CYTOMEL) 25 MCG TABLET    Take 1 tablet by mouth daily    LORATADINE (CLARITIN) 10 MG TABLET    Take 1 tablet by mouth daily    MAGNESIUM 400 MG TABS    Take 1 tablet by mouth daily    METHYLPREDNISOLONE (MEDROL DOSEPACK) 4 MG TABLET    On days 1-6 take as directed on package for first 6-day course. On days 7-12 take as directed on (second) package for (second) 6-day course. METOCLOPRAMIDE (REGLAN) 10 MG TABLET    Take 10 mg by mouth as needed    METRONIDAZOLE (METROGEL) 0.75 % GEL    Apply topically 2 times daily. MIRTAZAPINE (REMERON) 30 MG TABLET    Take 1 tablet by mouth nightly    MONTELUKAST (SINGULAIR) 10 MG TABLET    Take 1 tablet by mouth daily    MOXIFLOXACIN (VIGAMOX) 0.5 % OPHTHALMIC SOLUTION    1 drop    NARATRIPTAN (AMERGE) 2.5 MG TABLET    Take one at onset of severe headache/migraine may repeat x 1 after 4 hours if needed. Maximum 2/day and 2 days/week.     ONDANSETRON (ZOFRAN) 4 MG TABLET    Take 1 tablet by mouth every 8 hours as needed for Nausea or Vomiting    OSELTAMIVIR (TAMIFLU) 75 MG CAPSULE    Take 1 capsule by mouth 2 times daily for 5 days    PANCRELIPASE, LIP-PROT-AMYL, (CREON) 00474 UNITS CPEP    2 caps tid    POTASSIUM CHLORIDE (KLOR-CON M) 10 MEQ EXTENDED RELEASE TABLET    Take 1 tablet by mouth daily    PROCHLORPERAZINE (COMPAZINE) 10 MG TABLET    Take 1 tablet by mouth every 6 hours as needed (nausea)    PROMETHAZINE (PHENERGAN) 25 MG TABLET    Take 1 tablet by mouth every 8 hours as needed for Nausea    PROMETHAZINE-DEXTROMETHORPHAN (PROMETHAZINE-DM) 6.25-15 MG/5ML SYRUP    TAKE 5 MLS BY MOUTH AT BEDTIME FOR 7 DAYS    SUCRALFATE (CARAFATE) 1 GM TABLET    Take by mouth daily    TOPIRAMATE (TOPAMAX) 100 MG TABLET    TAKE ONE TABLET BY MOUTH TWO TIMES A DAY    TOPIRAMATE (TOPAMAX) 50 MG TABLET        TRAZODONE (DESYREL) 150 MG TABLET    Take 1 tablet by mouth nightly take one tablet by mouth nightly    TRULANCE 3 MG TABS    Take 3 mg by mouth daily    VALACYCLOVIR (VALTREX) 500 MG TABLET    Take 1 tablet by mouth daily    ZOLMITRIPTAN (ZOMIG) 5 MG TABLET    TAKE 1 TABLET BY MOUTH AT ONSET OF MIGRAINE. MAY REPEAT ONCE AFTER 2 HOURS. MAX 10 MG (2 TABLETS) PER DAY            Patient has no known allergies. FAMILY HISTORY       Family History   Problem Relation Age of Onset    Heart Disease Father 48    Cancer Maternal Grandmother         breast    Heart Disease Other         mom and dad's side          SOCIAL HISTORY       Social History     Socioeconomic History    Marital status: Single     Spouse name: None    Number of children: None    Years of education: None    Highest education level: None   Occupational History    None   Tobacco Use    Smoking status: Never Smoker    Smokeless tobacco: Never Used   Vaping Use    Vaping Use: Never used   Substance and Sexual Activity    Alcohol use: No     Alcohol/week: 0.0 standard drinks     Comment: no alcohol since 2011    Drug use: No    Sexual activity: None   Other Topics Concern    None   Social History Narrative    ** Merged History Encounter **          Social Determinants of Health     Financial Resource Strain: Low Risk     Difficulty of Paying Living Expenses: Not hard at all   Food Insecurity: No Food Insecurity    Worried About Running Out of Food in the Last Year: Never true    [place] of Food in the Last Year: Never true   Transportation Needs: No Transportation Needs    Lack of Transportation (Medical): No    Lack of Transportation (Non-Medical):  No   Physical Activity:     Days of Exercise per Week: Not on file    Minutes of Exercise per Session: Not on file   Stress:     Feeling of Stress : Not on file   Social Connections:     Frequency of Communication with Friends and Family: Not on file    Frequency of Social Gatherings with Friends and Family: Not on file    Attends Jew Services: Not on file    Active Member of [address] [place] [address] or Organizations: Not on file    Attends Club or Organization Meetings: Not on file    Marital Status: Not on file   Intimate Partner Violence:     Fear of Current or Ex-Partner: Not on file    Emotionally Abused: Not on file    Physically Abused: Not on file    Sexually Abused: Not on file   Housing Stability:     Unable to Pay for Housing in the Last Year: Not on file    Number of [place] in the Last Year: Not on file    Unstable Housing in the Last Year: Not on file       SCREENINGS   Ebola Virus Disease (EVD) Screening   Temp: 97.8 °F (36.6 °C)  CIWA Assessment  BP: 127/84  Pulse: 86    PHYSICAL EXAM    (up to 7 for level 4, 8 or more for level 5)     ED Triage Vitals [12/08/21 1415]   BP Temp Temp Source Pulse Resp SpO2 Height Weight   127/84 97.8 °F (36.6 °C) Oral 86 18 99 % 5' (1.524 m) 105 lb (47.6 kg)       Physical Exam  Vitals and nursing note reviewed. Constitutional:       General: She is not in acute distress. Appearance: She is well-developed. HENT:      Head: Normocephalic and atraumatic. Right Ear: External ear normal.      Left Ear: External ear normal.      Nose: Nose normal.      Mouth/Throat:      Mouth: Mucous membranes are moist.      Pharynx: No oropharyngeal exudate or posterior oropharyngeal erythema. Eyes:      General:         Right eye: No discharge. Left eye: No discharge. Extraocular Movements: Extraocular movements intact. Pupils: Pupils are equal, round, and reactive to light. Cardiovascular:      Rate and Rhythm: Normal rate and regular rhythm. Pulses: Normal pulses. Heart sounds: Normal heart sounds. Pulmonary:      Effort: Pulmonary effort is normal. No respiratory distress. Breath sounds: Normal breath sounds. No stridor. No wheezing, rhonchi or rales. Chest:      Chest wall: No tenderness. Abdominal:      General: Bowel sounds are normal. There is no distension. Palpations: Abdomen is soft. Tenderness: There is no abdominal tenderness. Musculoskeletal:         General: Normal range of motion. Cervical back: Normal range of motion and neck supple. Right lower leg: Edema present. Left lower leg: Edema present. Comments: Slight bilat edema   Skin:     General: Skin is warm. Findings: No erythema. Neurological:      Mental Status: She is alert and oriented to person, place, and time. Gait: Gait normal.   Psychiatric:         Mood and Affect: Mood normal.         RESULTS     EKG: All EKG's are interpreted by the Emergency Department Physician who either signs or Co-signsthis chart in the absence of a cardiologist.    ekg nsr rate 81, pr 150 ms, qrs 96 ms, qt 390 ms, prt axes positive neg st elevation    RADIOLOGY:   Non-plain filmimages such as CT, Ultrasound and MRI are read by the radiologist. Plain radiographic images are visualized and preliminarily interpreted by the emergency physician with the below findings:          Interpretation per the Radiologist below, if available at the time ofthis note:    XR CHEST (2 VW)   Final Result      No acute cardiopulmonary abnormality.             ED BEDSIDE ULTRASOUND:   Performed by ED Physician - none    LABS:  Labs Reviewed   COMPREHENSIVE METABOLIC PANEL - Abnormal; Notable for the following components:       Result Value    Sodium 131 (*)     Potassium 3.1 (*)     Albumin 4.8 (*)     All other components within normal limits   CBC WITH AUTO DIFFERENTIAL - Abnormal; Notable for the following components:    RBC 4.09 (*)     Hematocrit 36.2 (*)     All other components within normal limits   MAGNESIUM   TROPONIN LIPASE   CK   TSH WITHOUT REFLEX       All other labs were within normal range or not returned as of this dictation. EMERGENCY DEPARTMENT COURSE and DIFFERENTIAL DIAGNOSIS/MDM:   Vitals:    Vitals:    12/08/21 1415   BP: 127/84   Pulse: 86   Resp: 18   Temp: 97.8 °F (36.6 °C)   TempSrc: Oral   SpO2: 99%   Weight: 105 lb (47.6 kg)   Height: 5' (1.524 m)            MDM  Number of Diagnoses or Management Options  Acute left-sided thoracic back pain  Chest pain, unspecified type  Diagnosis management comments: Negative stress test 8/2021. Pt sees cardiology and is currently on omnicef for uri. Negative troponin . negative xray dw dr Jagjit Christy and will dc to home with close follow up. Pt has asa listed in meds currently and will continue. pulse 86 / 99 % ra. Amount and/or Complexity of Data Reviewed  Clinical lab tests: reviewed and ordered        CRITICAL CARE TIME       CONSULTS:  None    PROCEDURES:  Unless otherwise noted below, none     Procedures    FINAL IMPRESSION      1. Chest pain, unspecified type    2. Acute left-sided thoracic back pain    3.  History of URI (upper respiratory infection)          DISPOSITION/PLAN   DISPOSITION        PATIENT REFERRED TO:  Your cardiologist    Call in 1 day      Hugo Navarrete, [address], [address]  [phone]    Call in 1 day      [facility]) ED  [address] [address]  [phone]    If symptoms worsen      DISCHARGE MEDICATIONS:  New Prescriptions    No medications on file          (Please note that portions of this note were completed with a voice recognition program.  Efforts were made to edit the dictations but occasionally words are mis-transcribed.)    Rola Cordero PA-C (electronically signed)  Attending Emergency Physician       Rola Cordero PA-C  12/08/21 5282

## 2021-12-09 ENCOUNTER — OFFICE VISIT (OUTPATIENT)
Dept: FAMILY MEDICINE CLINIC | Age: 51
End: 2021-12-09
Payer: MEDICARE

## 2021-12-09 VITALS
HEIGHT: 60 IN | OXYGEN SATURATION: 100 % | SYSTOLIC BLOOD PRESSURE: 124 MMHG | BODY MASS INDEX: 20.62 KG/M2 | DIASTOLIC BLOOD PRESSURE: 82 MMHG | TEMPERATURE: 97.8 F | HEART RATE: 84 BPM | WEIGHT: 105 LBS

## 2021-12-09 DIAGNOSIS — J02.9 SORE THROAT: ICD-10-CM

## 2021-12-09 DIAGNOSIS — J02.9 SORE THROAT: Primary | ICD-10-CM

## 2021-12-09 DIAGNOSIS — H66.002 NON-RECURRENT ACUTE SUPPURATIVE OTITIS MEDIA OF LEFT EAR WITHOUT SPONTANEOUS RUPTURE OF TYMPANIC MEMBRANE: ICD-10-CM

## 2021-12-09 DIAGNOSIS — M54.2 NECK PAIN: ICD-10-CM

## 2021-12-09 LAB
EKG ATRIAL RATE: 81 BPM
EKG P AXIS: 72 DEGREES
EKG P-R INTERVAL: 150 MS
EKG Q-T INTERVAL: 390 MS
EKG QRS DURATION: 96 MS
EKG QTC CALCULATION (BAZETT): 453 MS
EKG R AXIS: 48 DEGREES
EKG T AXIS: 54 DEGREES
EKG VENTRICULAR RATE: 81 BPM
Lab: NORMAL
PERFORMING INSTRUMENT: NORMAL
QC PASS/FAIL: NORMAL
SARS-COV-2, POC: NORMAL

## 2021-12-09 PROCEDURE — G8484 FLU IMMUNIZE NO ADMIN: HCPCS | Performed by: PHYSICIAN ASSISTANT

## 2021-12-09 PROCEDURE — 93010 ELECTROCARDIOGRAM REPORT: CPT | Performed by: INTERNAL MEDICINE

## 2021-12-09 PROCEDURE — 87426 SARSCOV CORONAVIRUS AG IA: CPT | Performed by: PHYSICIAN ASSISTANT

## 2021-12-09 PROCEDURE — G8420 CALC BMI NORM PARAMETERS: HCPCS | Performed by: PHYSICIAN ASSISTANT

## 2021-12-09 PROCEDURE — 1036F TOBACCO NON-USER: CPT | Performed by: PHYSICIAN ASSISTANT

## 2021-12-09 PROCEDURE — G8427 DOCREV CUR MEDS BY ELIG CLIN: HCPCS | Performed by: PHYSICIAN ASSISTANT

## 2021-12-09 PROCEDURE — 99213 OFFICE O/P EST LOW 20 MIN: CPT | Performed by: PHYSICIAN ASSISTANT

## 2021-12-09 RX ORDER — AMOXICILLIN 875 MG/1
875 TABLET, COATED ORAL 2 TIMES DAILY
Qty: 20 TABLET | Refills: 0 | Status: SHIPPED | OUTPATIENT
Start: 2021-12-09 | End: 2021-12-19

## 2021-12-09 ASSESSMENT — ENCOUNTER SYMPTOMS
ABDOMINAL PAIN: 0
DIARRHEA: 0
SINUS PRESSURE: 0
CHEST TIGHTNESS: 0
SHORTNESS OF BREATH: 0
VOMITING: 0
COUGH: 0
TROUBLE SWALLOWING: 0
BACK PAIN: 0

## 2021-12-09 ASSESSMENT — PATIENT HEALTH QUESTIONNAIRE - PHQ9
SUM OF ALL RESPONSES TO PHQ QUESTIONS 1-9: 0
SUM OF ALL RESPONSES TO PHQ QUESTIONS 1-9: 0
2. FEELING DOWN, DEPRESSED OR HOPELESS: 0
1. LITTLE INTEREST OR PLEASURE IN DOING THINGS: 0
SUM OF ALL RESPONSES TO PHQ QUESTIONS 1-9: 0
SUM OF ALL RESPONSES TO PHQ9 QUESTIONS 1 & 2: 0

## 2021-12-09 NOTE — PROGRESS NOTES
Determinants of Health     Financial Resource Strain: Low Risk     Difficulty of Paying Living Expenses: Not hard at all   Food Insecurity: No Food Insecurity    Worried About Running Out of Food in the Last Year: Never true    [place] of Food in the Last Year: Never true   Transportation Needs: No Transportation Needs    Lack of Transportation (Medical): No    Lack of Transportation (Non-Medical):  No   Physical Activity:     Days of Exercise per Week: Not on file    Minutes of Exercise per Session: Not on file   Stress:     Feeling of Stress : Not on file   Social Connections:     Frequency of Communication with Friends and Family: Not on file    Frequency of Social Gatherings with Friends and Family: Not on file    Attends Christianity Services: Not on file    Active Member of [address] [place] [address] [employer] or Organizations: Not on file    Attends Club or Organization Meetings: Not on file    Marital Status: Not on file   Intimate Partner Violence:     Fear of Current or Ex-Partner: Not on file    Emotionally Abused: Not on file    Physically Abused: Not on file    Sexually Abused: Not on file   Housing Stability:     Unable to Pay for Housing in the Last Year: Not on file    Number of [place] in the Last Year: Not on file    Unstable Housing in the Last Year: Not on file     Family History   Problem Relation Age of Onset    Heart Disease Father 48    Cancer Maternal Grandmother         breast    Heart Disease Other         mom and dad's side     No Known Allergies  Current Outpatient Medications   Medication Sig Dispense Refill    amoxicillin (AMOXIL) 875 MG tablet Take 1 tablet by mouth 2 times daily for 10 days 20 tablet 0    potassium chloride (KLOR-CON M) 10 MEQ extended release tablet Take 1 tablet by mouth daily 30 tablet 0    traZODone (DESYREL) 150 MG tablet Take 1 tablet by mouth nightly take one tablet by mouth nightly 30 tablet 5    esomeprazole (NEXIUM) 40 MG delayed release capsule TAKE ONE CAPSULE BY MOUTH EVERY DAY 90 capsule 0    amphetamine-dextroamphetamine (ADDERALL XR) 30 MG extended release capsule Take 1 capsule by mouth 2 times daily for 30 days. 60 capsule 0    furosemide (LASIX) 20 MG tablet Take 1 tablet by mouth 2 times daily TAKE ONE TABLET BY MOUTH TWO TIMES A DAY 60 tablet 0    cefdinir (OMNICEF) 300 MG capsule Take 1 capsule by mouth 2 times daily for 10 days 20 capsule 0    estradiol (ESTRACE) 1 MG tablet TAKE ONE TABLET BY MOUTH DAILY 30 tablet 0    mirtazapine (REMERON) 30 MG tablet Take 1 tablet by mouth nightly 30 tablet 5    loratadine (CLARITIN) 10 MG tablet Take 1 tablet by mouth daily 30 tablet 5    ketorolac (TORADOL) 10 MG tablet Take 1 tablet by mouth every 6 hours as needed for Pain 20 tablet 0    liothyronine (CYTOMEL) 25 MCG tablet Take 1 tablet by mouth daily 90 tablet 0    methylPREDNISolone (MEDROL DOSEPACK) 4 MG tablet On days 1-6 take as directed on package for first 6-day course. On days 7-12 take as directed on (second) package for (second) 6-day course. 42 tablet 0    aspirin (ASPIRIN CHILDRENS) 81 MG chewable tablet Take 1 tablet by mouth daily 14 tablet 0    buPROPion (WELLBUTRIN XL) 300 MG extended release tablet Take 1 tablet by mouth every morning 90 tablet 1    cyclobenzaprine (FLEXERIL) 5 MG tablet Take 2.5-5 mg by mouth 2 times daily as needed      naratriptan (AMERGE) 2.5 MG tablet Take one at onset of severe headache/migraine may repeat x 1 after 4 hours if needed. Maximum 2/day and 2 days/week.       topiramate (TOPAMAX) 50 MG tablet       promethazine-dextromethorphan (PROMETHAZINE-DM) 6.25-15 MG/5ML syrup TAKE 5 MLS BY MOUTH AT BEDTIME FOR 7 DAYS      topiramate (TOPAMAX) 100 MG tablet TAKE ONE TABLET BY MOUTH TWO TIMES A DAY 60 tablet 5    amoxicillin (AMOXIL) 875 MG tablet Take 875 mg by mouth 2 times daily       hydrocortisone (CORTEF) 5 MG tablet Take 5 mg by mouth daily      valACYclovir (VALTREX) 500 MG tablet Take 1 tablet by mouth daily 30 tablet 5    prochlorperazine (COMPAZINE) 10 MG tablet Take 1 tablet by mouth every 6 hours as needed (nausea) 30 tablet 2    metroNIDAZOLE (METROGEL) 0.75 % gel Apply topically 2 times daily. 45 g 1    promethazine (PHENERGAN) 25 MG tablet Take 1 tablet by mouth every 8 hours as needed for Nausea 40 tablet 0    Magnesium 400 MG TABS Take 1 tablet by mouth daily 30 tablet 0    TRULANCE 3 MG TABS Take 3 mg by mouth daily      sucralfate (CARAFATE) 1 GM tablet Take by mouth daily      metoclopramide (REGLAN) 10 MG tablet Take 10 mg by mouth as needed      Hyoscyamine Sulfate SL (LEVSIN/SL) 0.125 MG SUBL Place 125 mcg under the tongue every 4 hours as needed (pain) 120 each 3    amLODIPine (NORVASC) 2.5 MG tablet Take 2.5 mg by mouth daily      ondansetron (ZOFRAN) 4 MG tablet Take 1 tablet by mouth every 8 hours as needed for Nausea or Vomiting 30 tablet 1    azelastine (ASTELIN) 0.1 % nasal spray 1 spray by Nasal route      moxifloxacin (VIGAMOX) 0.5 % ophthalmic solution 1 drop      montelukast (SINGULAIR) 10 MG tablet Take 1 tablet by mouth daily 30 tablet 3    fluticasone (FLONASE) 50 MCG/ACT nasal spray USE 1 SPRAY NASALLY DAILY  3    ZOLMitriptan (ZOMIG) 5 MG tablet TAKE 1 TABLET BY MOUTH AT ONSET OF MIGRAINE. MAY REPEAT ONCE AFTER 2 HOURS. MAX 10 MG (2 TABLETS) PER DAY  11    levothyroxine (SYNTHROID) 25 MCG tablet Take one daily 90 tablet 1    hydroxychloroquine (PLAQUENIL) 200 MG tablet Take 300 mg by mouth daily       Pancrelipase, Lip-Prot-Amyl, (CREON) 16245 UNITS CPEP 2 caps tid 180 capsule 03     No current facility-administered medications for this visit. Review of Systems   Constitutional: Positive for chills. Negative for activity change, appetite change, fever and unexpected weight change. HENT: Positive for ear pain (left). Negative for drooling, nosebleeds, sinus pressure and trouble swallowing.     Respiratory: Negative for cough, chest tightness and JVD.      Trachea: No tracheal deviation. Comments: No meningismus. Cardiovascular:      Rate and Rhythm: Normal rate and regular rhythm. Heart sounds: Normal heart sounds. Heart sounds not distant. No murmur heard. No friction rub. No gallop. Pulmonary:      Effort: Pulmonary effort is normal. No respiratory distress. Breath sounds: Normal breath sounds. No stridor. No wheezing, rhonchi or rales. Chest:      Chest wall: No tenderness. Abdominal:      General: Abdomen is flat. Bowel sounds are normal. There is no distension. Palpations: Abdomen is soft. There is no mass. Tenderness: There is no abdominal tenderness. There is no right CVA tenderness, left CVA tenderness, guarding or rebound. Hernia: No hernia is present. Musculoskeletal:         General: No swelling, tenderness, deformity or signs of injury. Normal range of motion. Cervical back: Normal range of motion and neck supple. No rigidity. Lymphadenopathy:      Head:      Right side of head: No submental adenopathy. Left side of head: No submental adenopathy. Cervical: Cervical adenopathy present. Left cervical: Superficial cervical adenopathy, deep cervical adenopathy and posterior cervical adenopathy present. Skin:     General: Skin is warm and dry. Capillary Refill: Capillary refill takes less than 2 seconds. Coloration: Skin is not jaundiced or pale. Findings: No bruising, erythema, lesion or rash. Neurological:      General: No focal deficit present. Mental Status: She is alert and oriented to person, place, and time. Mental status is at baseline. Cranial Nerves: No cranial nerve deficit. Sensory: No sensory deficit. Motor: No weakness. Coordination: Coordination normal.      Deep Tendon Reflexes: Reflexes are normal and symmetric. Psychiatric:         Mood and Affect: Mood normal.         Behavior: Behavior normal. Behavior is cooperative. Thought Content: Thought content normal.         Judgment: Judgment normal.         Assessment:       Diagnosis Orders   1. Sore throat  POCT COVID-19, Antigen    Culture, Throat    amoxicillin (AMOXIL) 875 MG tablet    Amb External Referral To ENT   2. Neck pain  Amb External Referral To ENT   3. Non-recurrent acute suppurative otitis media of left ear without spontaneous rupture of tympanic membrane  amoxicillin (AMOXIL) 875 MG tablet     No results found for this visit on 12/09/21. Plan:     Assessment & Plan   Aurelio Mcdowell was seen today for neck pain. Diagnoses and all orders for this visit:    Sore throat  -     POCT COVID-19, Antigen  -     Culture, Throat; Future  -     amoxicillin (AMOXIL) 875 MG tablet; Take 1 tablet by mouth 2 times daily for 10 days  -     Amb External Referral To ENT    Neck pain  -     Amb External Referral To ENT    Non-recurrent acute suppurative otitis media of left ear without spontaneous rupture of tympanic membrane  -     amoxicillin (AMOXIL) 875 MG tablet; Take 1 tablet by mouth 2 times daily for 10 days      Orders Placed This Encounter   Procedures    Culture, Throat     Standing Status:   Future     Standing Expiration Date:   12/9/2022    Amb External Referral To ENT     Referral Priority:   Routine     Referral Type:   Eval and Treat     Referral Reason:   Specialty Services Required     Referred to Provider:   Sandra Mullins MD     Requested Specialty:   Otolaryngology     Number of Visits Requested:   1    POCT COVID-19, Antigen     Order Specific Question:   Is this test for diagnosis or screening? Answer:   Diagnosis of ill patient     Order Specific Question:   Symptomatic for COVID-19 as defined by CDC? Answer:   Yes     Order Specific Question:   Date of Symptom Onset     Answer:   12/7/2021     Order Specific Question:   Hospitalized for COVID-19? Answer:   No     Order Specific Question:   Admitted to ICU for COVID-19?      Answer:   No     Order Specific Question:   Employed in healthcare setting? Answer:   Unknown     Order Specific Question:   Resident in a congregate (group) care setting? Answer:   Unknown     Order Specific Question:   Pregnant? Answer:   No     Order Specific Question:   Previously tested for COVID-19? Answer:   Yes     Orders Placed This Encounter   Medications    amoxicillin (AMOXIL) 875 MG tablet     Sig: Take 1 tablet by mouth 2 times daily for 10 days     Dispense:  20 tablet     Refill:  0     There are no discontinued medications. Return if symptoms worsen or fail to improve. Reviewed with the patient/family: current clinical status & medications. Side effects of the medication prescribed today, as well as treatment plan/rationale and result expectations have been discussed with the patient/family who expresses understanding. Patient will be discharged home in stable condition. Follow up with PCP to evaluate treatment results or return if symptoms worsen or fail to improve. Discussed signs and symptoms which require immediate follow-up in ED/call to 911. Understanding verbalized. I have reviewed the patient's medical history in detail and updated the computerized patient record.     TRUDY Valencia

## 2021-12-12 LAB — THROAT CULTURE: NORMAL

## 2021-12-14 DIAGNOSIS — G89.29 CHRONIC NONINTRACTABLE HEADACHE, UNSPECIFIED HEADACHE TYPE: ICD-10-CM

## 2021-12-14 DIAGNOSIS — R51.9 CHRONIC NONINTRACTABLE HEADACHE, UNSPECIFIED HEADACHE TYPE: ICD-10-CM

## 2021-12-14 RX ORDER — TOPIRAMATE 100 MG/1
TABLET, FILM COATED ORAL
Qty: 60 TABLET | Refills: 0 | Status: SHIPPED | OUTPATIENT
Start: 2021-12-14 | End: 2022-03-03 | Stop reason: SDUPTHER

## 2021-12-16 DIAGNOSIS — F41.9 ANXIETY: ICD-10-CM

## 2021-12-16 RX ORDER — BUPROPION HYDROCHLORIDE 300 MG/1
TABLET ORAL
Qty: 90 TABLET | Refills: 0 | Status: SHIPPED | OUTPATIENT
Start: 2021-12-16 | End: 2022-03-11 | Stop reason: SDUPTHER

## 2021-12-16 NOTE — TELEPHONE ENCOUNTER
Requesting medication refill.  Please approve or deny this request.    Rx requested:  Requested Prescriptions     Pending Prescriptions Disp Refills    buPROPion (WELLBUTRIN XL) 300 MG extended release tablet [Pharmacy Med Name: buPROPion HCl ER (XL) Oral Tablet Extended Release 24 Hour 300 MG] 90 tablet 0     Sig: TAKE ONE TABLET BY MOUTH EVERY MORNING       Last Office Visit:   8/31/2021    Next Visit Date:  Future Appointments   Date Time Provider Angela Hernandez   12/28/2021 11:30 AM ORTIZ Nelson CNP [facility] [place]   8/30/2022 10:30 AM ORTIZ Nelson CNP [facility] [place]

## 2021-12-28 ENCOUNTER — VIRTUAL VISIT (OUTPATIENT)
Dept: FAMILY MEDICINE CLINIC | Age: 51
End: 2021-12-28
Payer: MEDICARE

## 2021-12-28 DIAGNOSIS — R59.1 LYMPHADENOPATHY: Primary | ICD-10-CM

## 2021-12-28 DIAGNOSIS — Z76.0 MEDICATION REFILL: ICD-10-CM

## 2021-12-28 DIAGNOSIS — R05.9 COUGH: ICD-10-CM

## 2021-12-28 DIAGNOSIS — R53.83 FATIGUE, UNSPECIFIED TYPE: ICD-10-CM

## 2021-12-28 DIAGNOSIS — I50.9 CONGESTIVE HEART FAILURE, UNSPECIFIED HF CHRONICITY, UNSPECIFIED HEART FAILURE TYPE (HCC): ICD-10-CM

## 2021-12-28 DIAGNOSIS — F98.8 ATTENTION DEFICIT DISORDER, UNSPECIFIED HYPERACTIVITY PRESENCE: ICD-10-CM

## 2021-12-28 PROCEDURE — G8427 DOCREV CUR MEDS BY ELIG CLIN: HCPCS | Performed by: NURSE PRACTITIONER

## 2021-12-28 PROCEDURE — 99214 OFFICE O/P EST MOD 30 MIN: CPT | Performed by: NURSE PRACTITIONER

## 2021-12-28 RX ORDER — DEXTROAMPHETAMINE SACCHARATE, AMPHETAMINE ASPARTATE MONOHYDRATE, DEXTROAMPHETAMINE SULFATE AND AMPHETAMINE SULFATE 7.5; 7.5; 7.5; 7.5 MG/1; MG/1; MG/1; MG/1
30 CAPSULE, EXTENDED RELEASE ORAL 2 TIMES DAILY
Qty: 60 CAPSULE | Refills: 0 | Status: SHIPPED | OUTPATIENT
Start: 2021-12-28 | End: 2022-02-02 | Stop reason: SDUPTHER

## 2021-12-28 RX ORDER — LORATADINE 10 MG/1
10 TABLET ORAL DAILY
Qty: 30 TABLET | Refills: 5 | Status: SHIPPED | OUTPATIENT
Start: 2021-12-28

## 2021-12-28 RX ORDER — FLUDROCORTISONE ACETATE 0.1 MG/1
TABLET ORAL
COMMUNITY
Start: 2021-12-14 | End: 2022-05-31

## 2021-12-28 RX ORDER — FUROSEMIDE 20 MG/1
20 TABLET ORAL 2 TIMES DAILY
Qty: 60 TABLET | Refills: 0 | Status: SHIPPED | OUTPATIENT
Start: 2021-12-28 | End: 2022-01-21 | Stop reason: SDUPTHER

## 2021-12-28 ASSESSMENT — ENCOUNTER SYMPTOMS
SHORTNESS OF BREATH: 0
COUGH: 1

## 2021-12-28 NOTE — PROGRESS NOTES
2021    TELEHEALTH EVALUATION -- Audio/Visual (During FKGLV-26 public health emergency)    Due to COVID 19 outbreak, patient's office visit was converted to a virtual visit. Patient was contacted and agreed to proceed with a virtual visit via Doxy. me  The risks and benefits of converting to a virtual visit were discussed in light of the current infectious disease epidemic. Patient also understood that insurance coverage and co-pays are up to their individual insurance plans. HPI:    Israel Hoskins (:  1970) has requested an audio/video evaluation for the following concern(s):    Has been extremely tired. Coughing a lot. Sleeping more than she has \"ever slept\"  A lot of productive sputum  No facial pressure. A lot of bloody noses x 3 mos. Has appt with ENT coming up. Recent bw reviewed:     Lab Results   Component Value Date    WBC 5.8 2021    HGB 12.1 2021    HCT 36.2 (L) 2021     2021    CHOL 219 (H) 2021    TRIG 62 2021    HDL 81 (H) 2021    ALT 13 2021    AST 13 2021     (L) 2021    K 3.1 (L) 2021    CL 95 2021    CREATININE 0.71 2021    BUN 19 2021    CO2 24 2021    TSH 3.570 2021    INR 0.9 2021    LABA1C 5.1 2021         Review of Systems   Constitutional: Positive for fatigue. Respiratory: Positive for cough. Negative for shortness of breath. Cardiovascular: Negative for chest pain. Prior to Visit Medications    Medication Sig Taking? Authorizing Provider   fludrocortisone (FLORINEF) 0.1 MG tablet TAKE TWO TABLETS BY MOUTH TWICE DAILY Yes Historical Provider, MD   loratadine (CLARITIN) 10 MG tablet Take 1 tablet by mouth daily Yes ORTIZ Garcia - CNP   amphetamine-dextroamphetamine (ADDERALL XR) 30 MG extended release capsule Take 1 capsule by mouth 2 times daily for 30 days.  Yes ORTIZ Garcia - CNP   furosemide (LASIX) 20 MG tablet Take 1 tablet by mouth 2 times daily TAKE ONE TABLET BY MOUTH TWO TIMES A DAY Yes ORTIZ Mendez CNP   buPROPion (WELLBUTRIN XL) 300 MG extended release tablet TAKE ONE TABLET BY MOUTH EVERY MORNING Yes ORTIZ Mendez CNP   topiramate (TOPAMAX) 100 MG tablet TAKE ONE TABLET BY MOUTH TWO TIMES A DAY Yes ORTIZ Mendez CNP   potassium chloride (KLOR-CON M) 10 MEQ extended release tablet Take 1 tablet by mouth daily Yes Naila Meraz PA-C   traZODone (DESYREL) 150 MG tablet Take 1 tablet by mouth nightly take one tablet by mouth nightly Yes ORTIZ Mendez CNP   esomeprazole (NEXIUM) 40 MG delayed release capsule TAKE ONE CAPSULE BY MOUTH EVERY DAY Yes ORTIZ Mendez CNP   estradiol (ESTRACE) 1 MG tablet TAKE ONE TABLET BY MOUTH DAILY Yes ORTIZ Mendez CNP   mirtazapine (REMERON) 30 MG tablet Take 1 tablet by mouth nightly Yes ORTIZ Mendez CNP   ketorolac (TORADOL) 10 MG tablet Take 1 tablet by mouth every 6 hours as needed for Pain Yes Zhou Bernal MD   liothyronine (CYTOMEL) 25 MCG tablet Take 1 tablet by mouth daily Yes ORTIZ Mendez CNP   methylPREDNISolone (MEDROL DOSEPACK) 4 MG tablet On days 1-6 take as directed on package for first 6-day course. On days 7-12 take as directed on (second) package for (second) 6-day course. Yes Darby Patrick MD   aspirin (ASPIRIN CHILDRENS) 81 MG chewable tablet Take 1 tablet by mouth daily Yes TRUDY Davis   cyclobenzaprine (FLEXERIL) 5 MG tablet Take 2.5-5 mg by mouth 2 times daily as needed Yes Historical Provider, MD   naratriptan (AMERGE) 2.5 MG tablet Take one at onset of severe headache/migraine may repeat x 1 after 4 hours if needed. Maximum 2/day and 2 days/week.  Yes Historical Provider, MD   promethazine-dextromethorphan (PROMETHAZINE-DM) 6.25-15 MG/5ML syrup TAKE 5 MLS BY MOUTH AT BEDTIME FOR 7 DAYS Yes Historical Provider, MD   hydrocortisone (CORTEF) 5 MG tablet Take 5 mg by mouth daily Yes Historical Provider, MD   valACYclovir (VALTREX) 500 MG tablet Take 1 tablet by mouth daily Yes ORTIZ Gupta CNP   prochlorperazine (COMPAZINE) 10 MG tablet Take 1 tablet by mouth every 6 hours as needed (nausea) Yes ORTIZ Gupta CNP   metroNIDAZOLE (METROGEL) 0.75 % gel Apply topically 2 times daily. Yes Durga ORTIZ Preston CNP   promethazine (PHENERGAN) 25 MG tablet Take 1 tablet by mouth every 8 hours as needed for Nausea Yes ORTIZ Gupta CNP   Magnesium 400 MG TABS Take 1 tablet by mouth daily Yes Jack Cao PA-C   TRULANCE 3 MG TABS Take 3 mg by mouth daily Yes Historical Provider, MD   sucralfate (CARAFATE) 1 GM tablet Take by mouth daily Yes Historical Provider, MD   metoclopramide (REGLAN) 10 MG tablet Take 10 mg by mouth as needed Yes Historical Provider, MD   Hyoscyamine Sulfate SL (LEVSIN/SL) 0.125 MG SUBL Place 125 mcg under the tongue every 4 hours as needed (pain) Yes ORTIZ Barillas CNP   amLODIPine (NORVASC) 2.5 MG tablet Take 2.5 mg by mouth daily Yes Historical Provider, MD   ondansetron (ZOFRAN) 4 MG tablet Take 1 tablet by mouth every 8 hours as needed for Nausea or Vomiting Yes ORTIZ Gupta CNP   azelastine (ASTELIN) 0.1 % nasal spray 1 spray by Nasal route Yes Historical Provider, MD   moxifloxacin (VIGAMOX) 0.5 % ophthalmic solution 1 drop Yes Historical Provider, MD   montelukast (SINGULAIR) 10 MG tablet Take 1 tablet by mouth daily Yes ORTIZ Gupta CNP   fluticasone (FLONASE) 50 MCG/ACT nasal spray USE 1 SPRAY NASALLY DAILY Yes Historical Provider, MD   ZOLMitriptan (ZOMIG) 5 MG tablet TAKE 1 TABLET BY MOUTH AT ONSET OF MIGRAINE. MAY REPEAT ONCE AFTER 2 HOURS.  MAX 10 MG (2 TABLETS) PER DAY Yes Historical Provider, MD   levothyroxine (SYNTHROID) 25 MCG tablet Take one daily Yes ORTIZ Gupta CNP   hydroxychloroquine (PLAQUENIL) 200 MG tablet Take 300 mg by mouth daily  Yes Historical Provider, MD of Onset    Heart Disease Father 48    Cancer Maternal Grandmother         breast    Heart Disease Other         mom and dad's side       PHYSICAL EXAMINATION:  [ INSTRUCTIONS:  \"[x]\" Indicates a positive item  \"[]\" Indicates a negative item  -- DELETE ALL ITEMS NOT EXAMINED]  [x] Alert  [x] Oriented to person/place/time    [x] No apparent distress  [] Toxic appearing    [] Face flushed appearing [] Sclera clear  [] Lips are cyanotic      [x] Breathing appears normal  [] Appears tachypneic      [] Rash on visible skin    [] Cranial Nerves II-XII grossly intact    [] Motor grossly intact in visible upper extremities    [] Motor grossly intact in visible lower extremities    [x] Normal Mood  [] Anxious appearing    [] Depressed appearing  [] Confused appearing      [] Poor short term memory  [] Poor long term memory    [] OTHER:      Due to this being a TeleHealth encounter, evaluation of the following organ systems is limited: Vitals/Constitutional/EENT/Resp/CV/GI//MS/Neuro/Skin/Heme-Lymph-Imm. ASSESSMENT/PLAN:  1. Lymphadenopathy    - Nikunj Adilson Virus (Ebv) Antibody Panel I; Future    2. Cough    - Brain Natriuretic Peptide; Future  - loratadine (CLARITIN) 10 MG tablet; Take 1 tablet by mouth daily  Dispense: 30 tablet; Refill: 5    3. Fatigue, unspecified type    - Brain Natriuretic Peptide; Future    4. Congestive heart failure, unspecified HF chronicity, unspecified [facility] failure type ([facility]    - Brain Natriuretic Peptide; Future    5. Medication refill    - amphetamine-dextroamphetamine (ADDERALL XR) 30 MG extended release capsule; Take 1 capsule by mouth 2 times daily for 30 days. Dispense: 60 capsule; Refill: 0  - furosemide (LASIX) 20 MG tablet; Take 1 tablet by mouth 2 times daily TAKE ONE TABLET BY MOUTH TWO TIMES A DAY  Dispense: 60 tablet; Refill: 0    6.  Attention deficit disorder, unspecified hyperactivity presence    - amphetamine-dextroamphetamine (ADDERALL XR) 30 MG extended release capsule; Take 1 capsule by mouth 2 times daily for 30 days. Dispense: 60 capsule; Refill: 0    Side effects, adverse effects of the medication prescribed today, as well as treatment plan/ rationale and result expectations have been discussed with the patient who expresses understanding and desires to proceed. Close follow up to evaluate treatment results and for coordination of care. I have reviewed the patient's medical history in detail and updated the computerized patient record. As always, patient is advised that if symptoms worsen in any way they will proceed to the nearest emergency room. Fu in 3 mos. An  electronic signature was used to authenticate this note. --ORTIZ Donald - CNP on 12/28/2021 at 12:01 PM        Pursuant to the emergency declaration under the [facility], [facility] waiver authority and the [employer] Act, this Virtual  Visit was conducted, with patient's consent, to reduce the patient's risk of exposure to COVID-19 and provide continuity of care for an established patient. Services were provided through a video synchronous discussion virtually to substitute for in-person clinic visit.

## 2021-12-30 ENCOUNTER — VIRTUAL VISIT (OUTPATIENT)
Dept: FAMILY MEDICINE CLINIC | Age: 51
End: 2021-12-30
Payer: MEDICARE

## 2021-12-30 DIAGNOSIS — Z20.822 SUSPECTED COVID-19 VIRUS INFECTION: Primary | ICD-10-CM

## 2021-12-30 DIAGNOSIS — R59.1 LYMPHADENOPATHY: ICD-10-CM

## 2021-12-30 DIAGNOSIS — J02.9 ACUTE PHARYNGITIS, UNSPECIFIED ETIOLOGY: ICD-10-CM

## 2021-12-30 LAB
Lab: NORMAL
PERFORMING INSTRUMENT: NORMAL
QC PASS/FAIL: NORMAL
S PYO AG THROAT QL: NORMAL
SARS-COV-2, POC: NORMAL

## 2021-12-30 PROCEDURE — 87880 STREP A ASSAY W/OPTIC: CPT | Performed by: STUDENT IN AN ORGANIZED HEALTH CARE EDUCATION/TRAINING PROGRAM

## 2021-12-30 PROCEDURE — G8484 FLU IMMUNIZE NO ADMIN: HCPCS | Performed by: STUDENT IN AN ORGANIZED HEALTH CARE EDUCATION/TRAINING PROGRAM

## 2021-12-30 PROCEDURE — 87426 SARSCOV CORONAVIRUS AG IA: CPT | Performed by: STUDENT IN AN ORGANIZED HEALTH CARE EDUCATION/TRAINING PROGRAM

## 2021-12-30 PROCEDURE — G8427 DOCREV CUR MEDS BY ELIG CLIN: HCPCS | Performed by: STUDENT IN AN ORGANIZED HEALTH CARE EDUCATION/TRAINING PROGRAM

## 2021-12-30 PROCEDURE — 1036F TOBACCO NON-USER: CPT | Performed by: STUDENT IN AN ORGANIZED HEALTH CARE EDUCATION/TRAINING PROGRAM

## 2021-12-30 PROCEDURE — 99213 OFFICE O/P EST LOW 20 MIN: CPT | Performed by: STUDENT IN AN ORGANIZED HEALTH CARE EDUCATION/TRAINING PROGRAM

## 2021-12-30 PROCEDURE — G8420 CALC BMI NORM PARAMETERS: HCPCS | Performed by: STUDENT IN AN ORGANIZED HEALTH CARE EDUCATION/TRAINING PROGRAM

## 2021-12-30 ASSESSMENT — ENCOUNTER SYMPTOMS
ABDOMINAL PAIN: 0
SINUS PAIN: 1
VOMITING: 0
WHEEZING: 0
COUGH: 1
RHINORRHEA: 1
SORE THROAT: 1
SHORTNESS OF BREATH: 0
SINUS PRESSURE: 1

## 2021-12-30 NOTE — PROGRESS NOTES
2021    TELEHEALTH EVALUATION -- Audio/Visual (During WKVZI-15 public health emergency)    Due to COVID 19 outbreak, patient's office visit was converted to a virtual visit. Patient was contacted and agreed to proceed with a virtual visit via [employer]. me  The risks and benefits of converting to a virtual visit were discussed in light of the current infectious disease epidemic. Patient also understood that insurance coverage and co-pays are up to their individual insurance plans. HPI:  Rebecca Walker (:  1970) has requested an audio/video evaluation for the following concern(s):  Chief Complaint   Patient presents with    Cough     x 3 days - night before last     Arm Pain     left arm swollen Lymph nodes      URI  Symptoms started three days ago  Endorsing cough, sore throat, chills, muscle aches, fatigue, LN swelling (left armpit)  Denied fevers, SOB, GI symptoms, loss of smell/taste  OCT: tylenol, Mucinex  Recent sick contacts: none  Home test yesterday - negative    COVID vaccines: had first two doses, due for booster    Review of Systems   Constitutional: Negative for chills and fever. HENT: Positive for congestion, rhinorrhea, sinus pressure, sinus pain and sore throat. Negative for postnasal drip. Respiratory: Positive for cough. Negative for shortness of breath and wheezing. Cardiovascular: Negative for chest pain and palpitations. Gastrointestinal: Negative for abdominal pain and vomiting. Musculoskeletal: Negative for arthralgias and myalgias. Skin: Negative for rash and wound. Neurological: Negative for speech difficulty and light-headedness. Psychiatric/Behavioral: Negative for suicidal ideas. The patient is not nervous/anxious. Prior to Visit Medications    Medication Sig Taking?  Authorizing Provider   fludrocortisone (FLORINEF) 0.1 MG tablet TAKE TWO TABLETS BY MOUTH TWICE DAILY Yes Historical Provider, MD   loratadine (CLARITIN) 10 MG tablet Take 1 tablet by mouth days/week. Yes Historical Provider, MD   promethazine-dextromethorphan (PROMETHAZINE-DM) 6.25-15 MG/5ML syrup TAKE 5 MLS BY MOUTH AT BEDTIME FOR 7 DAYS Yes Historical Provider, MD   amoxicillin (AMOXIL) 875 MG tablet Take 875 mg by mouth 2 times daily  Yes Historical Provider, MD   hydrocortisone (CORTEF) 5 MG tablet Take 5 mg by mouth daily Yes Historical Provider, MD   valACYclovir (VALTREX) 500 MG tablet Take 1 tablet by mouth daily Yes ORTIZ Ennis CNP   prochlorperazine (COMPAZINE) 10 MG tablet Take 1 tablet by mouth every 6 hours as needed (nausea) Yes ORTIZ Ennis CNP   metroNIDAZOLE (METROGEL) 0.75 % gel Apply topically 2 times daily.  Yes ORTIZ Ennis CNP   promethazine (PHENERGAN) 25 MG tablet Take 1 tablet by mouth every 8 hours as needed for Nausea Yes ORTIZ Ennis CNP   Magnesium 400 MG TABS Take 1 tablet by mouth daily Yes Harpal Montoya PA-C   TRULANCE 3 MG TABS Take 3 mg by mouth daily Yes Historical Provider, MD   sucralfate (CARAFATE) 1 GM tablet Take by mouth daily Yes Historical Provider, MD   metoclopramide (REGLAN) 10 MG tablet Take 10 mg by mouth as needed Yes Historical Provider, MD   Hyoscyamine Sulfate SL (LEVSIN/SL) 0.125 MG SUBL Place 125 mcg under the tongue every 4 hours as needed (pain) Yes ORTIZ Prajapati CNP   amLODIPine (NORVASC) 2.5 MG tablet Take 2.5 mg by mouth daily Yes Historical Provider, MD   ondansetron (ZOFRAN) 4 MG tablet Take 1 tablet by mouth every 8 hours as needed for Nausea or Vomiting Yes ORTIZ Ennis CNP   azelastine (ASTELIN) 0.1 % nasal spray 1 spray by Nasal route Yes Historical Provider, MD   moxifloxacin (VIGAMOX) 0.5 % ophthalmic solution 1 drop Yes Historical Provider, MD   montelukast (SINGULAIR) 10 MG tablet Take 1 tablet by mouth daily Yes ORTIZ Ennis CNP   fluticasone (FLONASE) 50 MCG/ACT nasal spray USE 1 SPRAY NASALLY DAILY Yes Historical Provider, MD   ZOLMitriptan (ZOMIG) 5 MG tablet TAKE 1 TABLET BY MOUTH AT ONSET OF MIGRAINE. MAY REPEAT ONCE AFTER 2 HOURS.  MAX 10 MG (2 TABLETS) PER DAY Yes Historical Provider, MD   levothyroxine (SYNTHROID) 25 MCG tablet Take one daily Yes ORTIZ Epps CNP   hydroxychloroquine (PLAQUENIL) 200 MG tablet Take 300 mg by mouth daily  Yes Historical Provider, MD   Pancrelipase, Lip-Prot-Amyl, (CREON) 48252 UNITS CPEP 2 caps tid Yes Renee Garcia MD   NEXIUM 40 MG delayed release capsule TAKE ONE CAPSULE BY MOUTH EVERY DAY  ORTIZ Epps CNP   topiramate (TOPAMAX) 100 MG tablet Take 1 tablet by mouth 2 times daily  ORTIZ Epps CNP   furosemide (LASIX) 20 MG tablet Take 1 tablet by mouth 2 times daily TAKE ONE TABLET BY MOUTH TWO TIMES A DAY  ORTIZ Epps CNP       Social History     Tobacco Use    Smoking status: Never Smoker    Smokeless tobacco: Never Used   Vaping Use    Vaping Use: Never used   Substance Use Topics    Alcohol use: No     Alcohol/week: 0.0 standard drinks     Comment: no alcohol since 2011    Drug use: No        No Known Allergies,   Past Medical History:   Diagnosis Date    Acquired hypothyroidism 9/26/2016    Acute pancreatitis     ADD (attention deficit disorder) 2/12/2015    Anxiety     Depression 12/9/2015    Endometrial cyst of ovary 5/10/14    RT ovary, ruptured    GERD (gastroesophageal reflux disease) 9/26/2016    Medullary sponge kidney of both kidneys 5/22/2018    Sjogren's disease ([address]     Stress     Swelling    ,   Past Surgical History:   Procedure Laterality Date    CHOLECYSTECTOMY  2011    HYSTERECTOMY  2014    sept    OVARY REMOVAL Left Jan 2014    UPPER GASTROINTESTINAL ENDOSCOPY  09/16/2016    EGD W/BX    UPPER GASTROINTESTINAL ENDOSCOPY N/A 5/6/2021    ENDOSCOPIC ULTRASOUND with EGD performed [facility] at [facility]   ,   Social History     Tobacco Use    Smoking status: Never Smoker    Smokeless tobacco: Never Used   Vaping Use    Vaping Use: Never used   Substance Use Topics    Alcohol use: No     Alcohol/week: 0.0 standard drinks     Comment: no alcohol since 2011    Drug use: No   ,   Family History   Problem Relation Age of Onset    Heart Disease Father 48    Cancer Maternal Grandmother         breast    Heart Disease Other         mom and dad's side   ,   Immunization History   Administered Date(s) Administered    COVID-19, [place], Primary or Immunocompromised, PF, 100mcg/0.5mL 02/03/2021, 03/03/2021    Pneumococcal Polysaccharide ([id number]) 11/15/2018    Tdap (Boostrix, Adacel) 04/01/2019   ,   Health Maintenance   Topic Date Due    Hepatitis C screen  Never done    HIV screen  Never done    Hepatitis B vaccine (1 of 3 - Risk 3-dose series) Never done    Flu vaccine (1) Never done    COVID-19 Vaccine (3 - Booster for Moderna series) 09/03/2021    TSH testing  12/08/2022    Potassium monitoring  12/08/2022    Creatinine monitoring  12/08/2022    Lipid screen  01/12/2026    Colon cancer screen colonoscopy  12/07/2027    DTaP/Tdap/Td vaccine (2 - Td or Tdap) 04/01/2029    Pneumococcal 0-64 years Vaccine (2 of 2 - PPSV23) 12/28/2037    Hepatitis A vaccine  Aged Out    Hib vaccine  Aged Out    Meningococcal (ACWY) vaccine  Aged Out       PHYSICAL EXAMINATION:  [ INSTRUCTIONS:  \"[x]\" Indicates a positive item  \"[]\" Indicates a negative item  -- DELETE ALL ITEMS NOT EXAMINED]  [x] Alert  [] Oriented to person/place/time    [x] No apparent distress  [] Toxic appearing    [] Face flushed appearing [x] Sclera clear  [] Lips are cyanotic      [x] Breathing appears normal  [] Appears tachypneic      [] Rash on visible skin    [x] Cranial Nerves II-XII grossly intact    [x] Motor grossly intact in visible upper extremities    [] Motor grossly intact in visible lower extremities    [x] Normal Mood  [] Anxious appearing    [] Depressed appearing  [] Confused appearing      [] Poor short term memory  [] Poor long term memory    [] OTHER:      Due to this being a TeleHealth encounter, evaluation of the following organ systems is limited: Vitals/Constitutional/EENT/Resp/CV/GI//MS/Neuro/Skin/Heme-Lymph-Imm. ASSESSMENT/PLAN:  1. Suspected COVID-19 virus infection  Will get rapid strep and COVID   If negative can do 10mg dexamethasone due to severe sore throat    - POCT COVID-19, Antigen  - POCT rapid strep A    2. Lymphadenopathy  - Discussed likely due to current viral infection  - Recommended NSAIDs and warm compresses  - Reevaluate if they do not improve after illness resolves    Return today (on 12/30/2021) for instructed go to covid clinic drive through. An  electronic signature was used to authenticate this note. --Anne Rogel, DO on 12/30/2021 at 3:12 PM        Pursuant to the emergency declaration under the [facility], [facility] waiver authority and the [employer] and [employer] Act, this Virtual  Visit was conducted, with patient's consent, to reduce the patient's risk of exposure to COVID-19 and provide continuity of care for an established patient. Services were provided through a video synchronous discussion virtually to substitute for in-person clinic visit.

## 2022-01-03 DIAGNOSIS — U07.1 LAB TEST POSITIVE FOR DETECTION OF COVID-19 VIRUS: Primary | ICD-10-CM

## 2022-01-03 RX ORDER — BENZONATATE 100 MG/1
100-200 CAPSULE ORAL 3 TIMES DAILY PRN
Qty: 21 CAPSULE | Refills: 0 | Status: SHIPPED | OUTPATIENT
Start: 2022-01-03 | End: 2022-01-10

## 2022-01-08 PROBLEM — J02.9 SORE THROAT: Status: RESOLVED | Noted: 2021-12-09 | Resolved: 2022-01-08

## 2022-01-21 DIAGNOSIS — Z76.0 MEDICATION REFILL: ICD-10-CM

## 2022-01-23 RX ORDER — FUROSEMIDE 20 MG/1
20 TABLET ORAL 2 TIMES DAILY
Qty: 60 TABLET | Refills: 0 | Status: SHIPPED | OUTPATIENT
Start: 2022-01-23 | End: 2022-05-17 | Stop reason: SDUPTHER

## 2022-02-01 DIAGNOSIS — R53.83 FATIGUE, UNSPECIFIED TYPE: ICD-10-CM

## 2022-02-01 DIAGNOSIS — R59.1 LYMPHADENOPATHY: ICD-10-CM

## 2022-02-01 DIAGNOSIS — R05.9 COUGH: ICD-10-CM

## 2022-02-01 DIAGNOSIS — I50.9 CONGESTIVE HEART FAILURE, UNSPECIFIED HF CHRONICITY, UNSPECIFIED HEART FAILURE TYPE (HCC): ICD-10-CM

## 2022-02-01 LAB — PRO-BNP: 103 PG/ML

## 2022-02-02 ENCOUNTER — VIRTUAL VISIT (OUTPATIENT)
Dept: FAMILY MEDICINE CLINIC | Age: 52
End: 2022-02-02
Payer: MEDICARE

## 2022-02-02 DIAGNOSIS — Z76.0 MEDICATION REFILL: ICD-10-CM

## 2022-02-02 DIAGNOSIS — R53.83 FATIGUE, UNSPECIFIED TYPE: ICD-10-CM

## 2022-02-02 DIAGNOSIS — K52.9 COLITIS: Primary | ICD-10-CM

## 2022-02-02 DIAGNOSIS — F98.8 ATTENTION DEFICIT DISORDER, UNSPECIFIED HYPERACTIVITY PRESENCE: ICD-10-CM

## 2022-02-02 DIAGNOSIS — R59.1 LYMPHADENOPATHY: ICD-10-CM

## 2022-02-02 DIAGNOSIS — U09.9 LONG COVID: ICD-10-CM

## 2022-02-02 PROCEDURE — G8427 DOCREV CUR MEDS BY ELIG CLIN: HCPCS | Performed by: NURSE PRACTITIONER

## 2022-02-02 PROCEDURE — 99214 OFFICE O/P EST MOD 30 MIN: CPT | Performed by: NURSE PRACTITIONER

## 2022-02-02 RX ORDER — TOPIRAMATE 50 MG/1
TABLET, FILM COATED ORAL
COMMUNITY
Start: 2022-01-12 | End: 2022-03-03

## 2022-02-02 RX ORDER — DEXTROAMPHETAMINE SACCHARATE, AMPHETAMINE ASPARTATE MONOHYDRATE, DEXTROAMPHETAMINE SULFATE AND AMPHETAMINE SULFATE 7.5; 7.5; 7.5; 7.5 MG/1; MG/1; MG/1; MG/1
30 CAPSULE, EXTENDED RELEASE ORAL 2 TIMES DAILY
Qty: 60 CAPSULE | Refills: 0 | Status: SHIPPED | OUTPATIENT
Start: 2022-02-02 | End: 2022-03-03 | Stop reason: SDUPTHER

## 2022-02-02 RX ORDER — CIPROFLOXACIN 500 MG/1
500 TABLET, FILM COATED ORAL 2 TIMES DAILY
Qty: 14 TABLET | Refills: 0 | Status: SHIPPED | OUTPATIENT
Start: 2022-02-02 | End: 2022-02-09

## 2022-02-02 ASSESSMENT — ENCOUNTER SYMPTOMS
SHORTNESS OF BREATH: 0
COUGH: 1
DIARRHEA: 1
ABDOMINAL PAIN: 1

## 2022-02-02 NOTE — PROGRESS NOTES
2022    TELEHEALTH EVALUATION -- Audio/Visual (During OUBQN-93 public health emergency)    Due to COVID 19 outbreak, patient's office visit was converted to a virtual visit. Patient was contacted and agreed to proceed with a virtual visit via [employer]. me  The risks and benefits of converting to a virtual visit were discussed in light of the current infectious disease epidemic. Patient also understood that insurance coverage and co-pays are up to their individual insurance plans. HPI:    Aric Mooney (:  1970) has requested an audio/video evaluation for the following concern(s):    Tested positive for covid on . Very tired still. Mild intermittent cough. Having constipation intermittent with diarrhea. Had swelling in lower abdomen at one point. States that she had episode of mucous in the stool. Does have hx of colitis issues. Ankles swollen. Denies  Fevers. Has been seeing numerous specialists for various symptoms. Review of Systems   Constitutional: Positive for fatigue. Negative for fever. Respiratory: Positive for cough. Negative for shortness of breath. Cardiovascular: Negative for chest pain. Gastrointestinal: Positive for abdominal pain and diarrhea. Prior to Visit Medications    Medication Sig Taking? Authorizing Provider   amphetamine-dextroamphetamine (ADDERALL XR) 30 MG extended release capsule Take 1 capsule by mouth 2 times daily for 30 days.  Yes ORTIZ Robbins CNP   topiramate (TOPAMAX) 50 MG tablet TAKE ONE TABLET BY MOUTH TWO TIMES A DAY Yes Historical Provider, MD   ciprofloxacin (CIPRO) 500 MG tablet Take 1 tablet by mouth 2 times daily for 7 days Yes ORTIZ Robbins CNP   furosemide (LASIX) 20 MG tablet Take 1 tablet by mouth 2 times daily TAKE ONE TABLET BY MOUTH TWO TIMES A DAY Yes ORTIZ Robbins CNP   fludrocortisone (FLORINEF) 0.1 MG tablet TAKE TWO TABLETS BY MOUTH TWICE DAILY Yes Historical Provider, MD loratadine (CLARITIN) 10 MG tablet Take 1 tablet by mouth daily Yes ORTIZ Wade CNP   buPROPion (WELLBUTRIN XL) 300 MG extended release tablet TAKE ONE TABLET BY MOUTH EVERY MORNING Yes ORTIZ Wade CNP   topiramate (TOPAMAX) 100 MG tablet TAKE ONE TABLET BY MOUTH TWO TIMES A DAY Yes ORTIZ Wade CNP   potassium chloride (KLOR-CON M) 10 MEQ extended release tablet Take 1 tablet by mouth daily Yes Braden Salomon PA-C   traZODone (DESYREL) 150 MG tablet Take 1 tablet by mouth nightly take one tablet by mouth nightly Yes ORTIZ Wade CNP   esomeprazole (NEXIUM) 40 MG delayed release capsule TAKE ONE CAPSULE BY MOUTH EVERY DAY Yes ORTIZ Wade CNP   liothyronine (CYTOMEL) 25 MCG tablet Take 1 tablet by mouth daily Yes ORTIZ Wade CNP   aspirin (ASPIRIN CHILDRENS) 81 MG chewable tablet Take 1 tablet by mouth daily Yes TRUDY Bautista   cyclobenzaprine (FLEXERIL) 5 MG tablet Take 2.5-5 mg by mouth 2 times daily as needed Yes Historical Provider, MD   naratriptan (AMERGE) 2.5 MG tablet Take one at onset of severe headache/migraine may repeat x 1 after 4 hours if needed. Maximum 2/day and 2 days/week.  Yes Historical Provider, MD   hydrocortisone (CORTEF) 5 MG tablet Take 5 mg by mouth daily Yes Historical Provider, MD   valACYclovir (VALTREX) 500 MG tablet Take 1 tablet by mouth daily Yes ORTIZ Wade CNP   prochlorperazine (COMPAZINE) 10 MG tablet Take 1 tablet by mouth every 6 hours as needed (nausea) Yes ORTIZ Wade CNP   promethazine (PHENERGAN) 25 MG tablet Take 1 tablet by mouth every 8 hours as needed for Nausea Yes ORTIZ Wade CNP   Magnesium 400 MG TABS Take 1 tablet by mouth daily Yes Marcia Marie PA-C   TRULANCE 3 MG TABS Take 3 mg by mouth daily Yes Historical Provider, MD   metoclopramide (REGLAN) 10 MG tablet Take 10 mg by mouth as needed Yes Historical Provider, MD   Hyoscyamine Sulfate SL (LEVSIN/SL) 0.125 MG SUBL Place 125 mcg under the tongue every 4 hours as needed (pain) Yes ORTIZ Foster CNP   amLODIPine (NORVASC) 2.5 MG tablet Take 2.5 mg by mouth daily Yes Historical Provider, MD   ondansetron (ZOFRAN) 4 MG tablet Take 1 tablet by mouth every 8 hours as needed for Nausea or Vomiting Yes ORTIZ Almonte CNP   azelastine (ASTELIN) 0.1 % nasal spray 1 spray by Nasal route Yes Historical Provider, MD   moxifloxacin (VIGAMOX) 0.5 % ophthalmic solution 1 drop Yes Historical Provider, MD   fluticasone (FLONASE) 50 MCG/ACT nasal spray USE 1 SPRAY NASALLY DAILY Yes Historical Provider, MD   ZOLMitriptan (ZOMIG) 5 MG tablet TAKE 1 TABLET BY MOUTH AT ONSET OF MIGRAINE. MAY REPEAT ONCE AFTER 2 HOURS.  MAX 10 MG (2 TABLETS) PER DAY Yes Historical Provider, MD   levothyroxine (SYNTHROID) 25 MCG tablet Take one daily Yes ORTIZ Almonte CNP   hydroxychloroquine (PLAQUENIL) 200 MG tablet Take 300 mg by mouth daily  Yes Historical Provider, MD   Pancrelipase, Lip-Prot-Amyl, (CREON) 63248 UNITS CPEP 2 caps tid Yes Eladio Julien MD   NEXIUM 40 MG delayed release capsule TAKE ONE CAPSULE BY MOUTH EVERY DAY  ORTIZ Almonte CNP   topiramate (TOPAMAX) 100 MG tablet Take 1 tablet by mouth 2 times daily  ORTIZ Almonte CNP   furosemide (LASIX) 20 MG tablet Take 1 tablet by mouth 2 times daily TAKE ONE TABLET BY MOUTH TWO TIMES A DAY  ORTIZ Almonte CNP       Social History     Tobacco Use    Smoking status: Never Smoker    Smokeless tobacco: Never Used   Vaping Use    Vaping Use: Never used   Substance Use Topics    Alcohol use: No     Alcohol/week: 0.0 standard drinks     Comment: no alcohol since 2011    Drug use: No        No Known Allergies,   Past Medical History:   Diagnosis Date    Acquired hypothyroidism 9/26/2016    Acute pancreatitis     ADD (attention deficit disorder) 2/12/2015    Anxiety     Depression 12/9/2015    Endometrial cyst of ovary 5/10/14    RT ovary, ruptured    GERD (gastroesophageal reflux disease) 9/26/2016    Medullary sponge kidney of both kidneys 5/22/2018    Sjogren's disease ([address]     Stress     Swelling    ,   Past Surgical History:   Procedure Laterality Date    CHOLECYSTECTOMY  2011    HYSTERECTOMY  2014    sept    OVARY REMOVAL Left Jan 2014    UPPER GASTROINTESTINAL ENDOSCOPY  09/16/2016    EGD W/BX    UPPER GASTROINTESTINAL ENDOSCOPY N/A 5/6/2021    ENDOSCOPIC ULTRASOUND with EGD performed [facility] Lalo [facility] at [facility]   ,   Social History     Tobacco Use    Smoking status: Never Smoker    Smokeless tobacco: Never Used   Vaping Use    Vaping Use: Never used   Substance Use Topics    Alcohol use: No     Alcohol/week: 0.0 standard drinks     Comment: no alcohol since 2011    Drug use: No   ,   Family History   Problem Relation Age of Onset    Heart Disease Father 48    Cancer Maternal Grandmother         breast    Heart Disease Other         mom and dad's side       PHYSICAL EXAMINATION:  [ INSTRUCTIONS:  \"[x]\" Indicates a positive item  \"[]\" Indicates a negative item  -- DELETE ALL ITEMS NOT EXAMINED]  [x] Alert  [x] Oriented to person/place/time    [x] No apparent distress  [] Toxic appearing    [] Face flushed appearing [] Sclera clear  [] Lips are cyanotic      [x] Breathing appears normal  [] Appears tachypneic      [] Rash on visible skin    [] Cranial Nerves II-XII grossly intact    [] Motor grossly intact in visible upper extremities    [] Motor grossly intact in visible lower extremities    [x] Normal Mood  [] Anxious appearing    [] Depressed appearing  [] Confused appearing      [] Poor short term memory  [] Poor long term memory    [] OTHER:      Due to this being a TeleHealth encounter, evaluation of the following organ systems is limited: Vitals/Constitutional/EENT/Resp/CV/GI//MS/Neuro/Skin/Heme-Lymph-Imm. ASSESSMENT/PLAN:  1. Colitis    - ciprofloxacin (CIPRO) 500 MG tablet;  Take 1 tablet by mouth 2 times daily for 7 days  Dispense: 14 tablet; Refill: 0    2. Long COVID  - continue to rest as needed. Eat well. Hydrate. 3. Lymphadenopathy  - discussed potential referral to general surgery. Possibly related to covid. 4. Fatigue, unspecified type      Side effects, adverse effects of the medication prescribed today, as well as treatment plan/ rationale and result expectations have been discussed with the patient who expresses understanding and desires to proceed. Close follow up to evaluate treatment results and for coordination of care. I have reviewed the patient's medical history in detail and updated the computerized patient record. As always, patient is advised that if symptoms worsen in any way they will proceed to the nearest emergency room. No follow-ups on file. An  electronic signature was used to authenticate this note. --ORTIZ Hutton - CNP on 2/2/2022 at 1:34 PM        Pursuant to the emergency declaration under the [facility], [facility] waiver authority and the [employer] Act, this Virtual  Visit was conducted, with patient's consent, to reduce the patient's risk of exposure to COVID-19 and provide continuity of care for an established patient. Services were provided through a video synchronous discussion virtually to substitute for in-person clinic visit.

## 2022-02-03 ENCOUNTER — TELEPHONE (OUTPATIENT)
Dept: FAMILY MEDICINE CLINIC | Age: 52
End: 2022-02-03

## 2022-02-03 ENCOUNTER — HOSPITAL ENCOUNTER (OUTPATIENT)
Dept: GENERAL RADIOLOGY | Age: 52
Discharge: HOME | End: 2022-02-05
Payer: MEDICARE

## 2022-02-03 DIAGNOSIS — R07.9 CHEST PAIN, UNSPECIFIED TYPE: ICD-10-CM

## 2022-02-03 PROCEDURE — 71046 X-RAY EXAM CHEST 2 VIEWS: CPT

## 2022-02-04 LAB
EPSTEIN BARR VIRUS NUCLEAR AB IGG: 162 U/ML (ref 0–21.9)
EPSTEIN-BARR EARLY ANTIGEN ANTIBODY: 30.4 U/ML (ref 0–10.9)
EPSTEIN-BARR VCA IGG: 111 U/ML (ref 0–21.9)
EPSTEIN-BARR VCA IGM: <10 U/ML (ref 0–43.9)

## 2022-02-11 ENCOUNTER — VIRTUAL VISIT (OUTPATIENT)
Dept: FAMILY MEDICINE CLINIC | Age: 52
End: 2022-02-11
Payer: MEDICARE

## 2022-02-11 DIAGNOSIS — M35.00 SJOGREN'S SYNDROME, WITH UNSPECIFIED ORGAN INVOLVEMENT (HCC): ICD-10-CM

## 2022-02-11 DIAGNOSIS — J02.9 ACUTE PHARYNGITIS, UNSPECIFIED ETIOLOGY: Primary | ICD-10-CM

## 2022-02-11 DIAGNOSIS — D70.9 NEUTROPENIA, UNSPECIFIED TYPE (HCC): ICD-10-CM

## 2022-02-11 DIAGNOSIS — K86.1 CHRONIC PANCREATITIS, UNSPECIFIED PANCREATITIS TYPE (HCC): ICD-10-CM

## 2022-02-11 DIAGNOSIS — F32.4 MAJOR DEPRESSIVE DISORDER, SINGLE EPISODE, IN PARTIAL REMISSION (HCC): ICD-10-CM

## 2022-02-11 DIAGNOSIS — I73.9 CLAUDICATION OF BOTH LOWER EXTREMITIES (HCC): ICD-10-CM

## 2022-02-11 DIAGNOSIS — E83.110 HEREDITARY HEMOCHROMATOSIS (HCC): ICD-10-CM

## 2022-02-11 DIAGNOSIS — I50.9 CONGESTIVE HEART FAILURE, UNSPECIFIED HF CHRONICITY, UNSPECIFIED HEART FAILURE TYPE (HCC): ICD-10-CM

## 2022-02-11 PROCEDURE — 99441 PR PHYS/QHP TELEPHONE EVALUATION 5-10 MIN: CPT

## 2022-02-11 RX ORDER — AMOXICILLIN 875 MG/1
875 TABLET, COATED ORAL 2 TIMES DAILY
Qty: 20 TABLET | Refills: 0 | Status: SHIPPED | OUTPATIENT
Start: 2022-02-11 | End: 2022-02-21

## 2022-02-11 NOTE — PATIENT INSTRUCTIONS
Patient Education        Strep Throat: Care Instructions  Your Care Instructions     Strep throat is a bacterial infection that causes sudden, severe sore throat and fever. Strep throat, which is caused by bacteria called streptococcus, is treated with antibiotics. Sometimes a strep test is necessary to tell if the sore throat is caused by strep bacteria. Treatment can help ease symptoms and may prevent future problems. Follow-up care is a key part of your treatment and safety. Be sure to make and go to all appointments, and call your doctor if you are having problems. It's also a good idea to know your test results and keep a list of the medicines you take. How can you care for yourself at home? · Take your antibiotics as directed. Do not stop taking them just because you feel better. You need to take the full course of antibiotics. · Strep throat can spread to others until 24 hours after you begin taking antibiotics. During this time, avoid contact with other people at work, school, or home, especially infants and children. Do not sneeze or cough on others, and wash your hands often. Keep your drinking glass and eating utensils separate from those of others. Wash these items well in hot, soapy water. · Gargle with warm salt water at least once each hour to help reduce swelling and make your throat feel better. Use 1 teaspoon of salt mixed in 8 fluid ounces of warm water. · Take an over-the-counter pain medication, such as acetaminophen (Tylenol), ibuprofen (Advil, Motrin), or naproxen (Aleve). Read and follow all instructions on the label. · Try an over-the-counter anesthetic throat spray or throat lozenges, which may help relieve throat pain. · Drink plenty of fluids. Fluids may help soothe an irritated throat. Hot fluids, such as tea or soup, may help your throat feel better. · Eat soft solids and drink plenty of clear liquids.  Flavored ice pops, ice cream, scrambled eggs, sherbet, and gelatin dessert (such as Jell-O) may also soothe the throat. · Get lots of rest.  · Do not smoke, and avoid secondhand smoke. If you need help quitting, talk to your doctor about stop-smoking programs and medicines. These can increase your chances of quitting for good. · Use a vaporizer or humidifier to add moisture to the air in your bedroom. Follow the directions for cleaning the machine. When should you call for help? Call your doctor now or seek immediate medical care if:    · You have a new or higher fever.     · You have a fever with a stiff neck or severe headache.     · You have new or worse trouble swallowing.     · Your sore throat gets much worse on one side.     · Your pain becomes much worse on one side of your throat. Watch closely for changes in your health, and be sure to contact your doctor if:    · You are not getting better after 2 days (48 hours).     · You do not get better as expected. Where can you learn more? Go to [email or web address]. org and sign in to your [employer] account. Enter K625 in the [employer] box to learn more about \"Strep Throat: Care Instructions. \"     If you do not have an account, please click on the \"Sign Up Now\" link. Current as of: September 8, 2021               Content Version: 13.1  © 9964-3371 [employer]. Care instructions adapted under license by [facility] ([facility]). If you have questions about a medical condition or this instruction, always ask your healthcare professional. [place] [address] any warranty or liability for your use of this information.

## 2022-02-11 NOTE — PROGRESS NOTES
2022    TELEHEALTH EVALUATION -- Audio/Visual (During XFRWY-15 public health emergency)    Due to COVID 19 outbreak, patient's office visit was converted to a virtual visit. Patient was contacted and agreed to proceed with a virtual visit via Telephone Visit  The risks and benefits of converting to a virtual visit were discussed in light of the current infectious disease epidemic. Patient also understood that insurance coverage and co-pays are up to their individual insurance plans. HPI:    Boo Chapin (:  1970) has requested an audio/video evaluation for the following concern(s):  Chief Complaint   Patient presents with    Other     sore throat on the left side, fever, chills, headache, fatigue. Patient reporting left side sore throat fever chills headache beginning 2 days ago she reports that she feels lumps in her neck.     Patient Active Problem List   Diagnosis    ADD (attention deficit disorder)    Anxiety    Depression    Acquired hypothyroidism    GERD (gastroesophageal reflux disease)    Pancreatitis    Other chest pain    Left lower quadrant pain    Sjogren's syndrome ([address]    Diverticulitis of large intestine without perforation or abscess without bleeding    ASCUS favoring benign    Endometriosis    Fibroids    S/P endometrial ablation    Transfusion history    Cellulitis, face    Abnormal MRI, liver    Acute recurrent pancreatitis    At risk of fracture due to osteoporosis    Calcinosis    Chronic headaches    Conductive hearing loss in left ear    Edema    Iron overload    Mass of left side of neck    Medullary sponge kidney of both kidneys    Metabolic acidosis    Microscopic hematuria    Nausea    Renal tubular acidosis type I    Rheumatoid arthritis (HCC)    Tension type headache    Weight loss, abnormal    Major depressive disorder, single episode, 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HCC)    Chronic pancreatitis (HCC)    Neutropenia ([address]    [facility]red[facility] hemochromatosis ([facility]    Neck pain    Non-recurrent acute suppurative otitis media of left ear without spontaneous rupture of tympanic membrane    Congestive heart failure, unspecified HF chronicity, unspecified heart failure type ([address]    Claudication of both lower extremities ([address]         Review of Systems      PAST MEDICAL HISTORY         Diagnosis Date    Acquired hypothyroidism 9/26/2016    Acute pancreatitis     ADD (attention deficit disorder) 2/12/2015    Anxiety     Congestive heart failure, unspecified HF chronicity, unspecified heart failure type ([address] 2/11/2022    Depression 12/9/2015    Endometrial cyst of ovary 5/10/14    RT ovary, ruptured    GERD (gastroesophageal reflux disease) 9/26/2016    Medullary sponge kidney of both kidneys 5/22/2018    Sjogren's disease ([address]     Stress     Swelling      SURGICAL HISTORY     Patient  has a past surgical history that includes Upper gastrointestinal endoscopy (09/16/2016); Ovary removal (Left, Jan 2014); Cholecystectomy (2011); Hysterectomy (2014); and Upper gastrointestinal endoscopy (N/A, 5/6/2021). CURRENT MEDICATIONS       Previous Medications    AMLODIPINE (NORVASC) 2.5 MG TABLET    Take 2.5 mg by mouth daily    AMPHETAMINE-DEXTROAMPHETAMINE (ADDERALL XR) 30 MG EXTENDED RELEASE CAPSULE    Take 1 capsule by mouth 2 times daily for 30 days.     ASPIRIN (ASPIRIN CHILDRENS) 81 MG CHEWABLE TABLET    Take 1 tablet by mouth daily    AZELASTINE (ASTELIN) 0.1 % NASAL SPRAY    1 spray by Nasal route    BUPROPION (WELLBUTRIN XL) 300 MG EXTENDED RELEASE TABLET    TAKE ONE TABLET BY MOUTH EVERY MORNING    CYCLOBENZAPRINE (FLEXERIL) 5 MG TABLET    Take 2.5-5 mg by mouth 2 times daily as needed    ESOMEPRAZOLE (NEXIUM) 40 MG DELAYED RELEASE CAPSULE    TAKE ONE CAPSULE BY MOUTH EVERY DAY    FLUDROCORTISONE (FLORINEF) 0.1 MG TABLET    TAKE TWO TABLETS BY MOUTH TWICE DAILY    FLUTICASONE (FLONASE) 50 MCG/ACT NASAL SPRAY    USE 1 SPRAY NASALLY DAILY    FUROSEMIDE (LASIX) 20 MG TABLET    Take 1 tablet by mouth 2 times daily TAKE ONE TABLET BY MOUTH TWO TIMES A DAY    HYDROCORTISONE (CORTEF) 5 MG TABLET    Take 5 mg by mouth daily    HYDROXYCHLOROQUINE (PLAQUENIL) 200 MG TABLET    Take 300 mg by mouth daily     HYOSCYAMINE SULFATE SL (LEVSIN/SL) 0.125 MG SUBL    Place 125 mcg under the tongue every 4 hours as needed (pain)    LEVOTHYROXINE (SYNTHROID) 25 MCG TABLET    Take one daily    LIOTHYRONINE (CYTOMEL) 25 MCG TABLET    Take 1 tablet by mouth daily    LORATADINE (CLARITIN) 10 MG TABLET    Take 1 tablet by mouth daily    MAGNESIUM 400 MG TABS    Take 1 tablet by mouth daily    METOCLOPRAMIDE (REGLAN) 10 MG TABLET    Take 10 mg by mouth as needed    MOXIFLOXACIN (VIGAMOX) 0.5 % OPHTHALMIC SOLUTION    1 drop    NARATRIPTAN (AMERGE) 2.5 MG TABLET    Take one at onset of severe headache/migraine may repeat x 1 after 4 hours if needed. Maximum 2/day and 2 days/week. ONDANSETRON (ZOFRAN) 4 MG TABLET    Take 1 tablet by mouth every 8 hours as needed for Nausea or Vomiting    PANCRELIPASE, LIP-PROT-AMYL, (CREON) 11447 UNITS CPEP    2 caps tid    POTASSIUM CHLORIDE (KLOR-CON M) 10 MEQ EXTENDED RELEASE TABLET    Take 1 tablet by mouth daily    PROCHLORPERAZINE (COMPAZINE) 10 MG TABLET    Take 1 tablet by mouth every 6 hours as needed (nausea)    PROMETHAZINE (PHENERGAN) 25 MG TABLET    Take 1 tablet by mouth every 8 hours as needed for Nausea    TOPIRAMATE (TOPAMAX) 100 MG TABLET    TAKE ONE TABLET BY MOUTH TWO TIMES A DAY    TOPIRAMATE (TOPAMAX) 50 MG TABLET    TAKE ONE TABLET BY MOUTH TWO TIMES A DAY    TRAZODONE (DESYREL) 150 MG TABLET    Take 1 tablet by mouth nightly take one tablet by mouth nightly    TRULANCE 3 MG TABS    Take 3 mg by mouth daily    VALACYCLOVIR (VALTREX) 500 MG TABLET    Take 1 tablet by mouth daily    ZOLMITRIPTAN (ZOMIG) 5 MG TABLET    TAKE 1 TABLET BY MOUTH AT ONSET OF MIGRAINE. MAY REPEAT ONCE AFTER 2 HOURS.  MAX 10 MG (2 TABLETS) PER DAY     ALLERGIES     Patient is has No Known Allergies. FAMILY HISTORY     Patient'sfamily history includes Cancer in her maternal grandmother; Heart Disease in an other family member; Heart Disease (age of onset: 48) in her father. HISTORY     Patient  reports that she has never smoked. She has never used smokeless tobacco. She reports that she does not drink alcohol and does not use drugs. PHYSICAL EXAMINATION:  Patient-Reported Vitals 2/11/2022   Patient-Reported Weight 105 lb   Patient-Reported Height 5 2   Patient-Reported Systolic -   Patient-Reported Diastolic -   Patient-Reported Pulse -   Patient-Reported Temperature 101.2   Patient-Reported SpO2 -         No exam  Phone visit  Patient in no significant distress, conversant    Due to this being a TeleHealth encounter, evaluation of the following organ systems is limited: Vitals/Constitutional/EENT/Resp/CV/GI//MS/Neuro/Skin/Heme-Lymph-Imm. Lab Results   Component Value Date    WBC 5.8 12/08/2021    HGB 12.1 12/08/2021    HCT 36.2 (L) 12/08/2021     12/08/2021    CHOL 219 (H) 01/12/2021    TRIG 62 01/12/2021    HDL 81 (H) 01/12/2021    ALT 13 12/08/2021    AST 13 12/08/2021     (L) 12/08/2021    K 3.1 (L) 12/08/2021    CL 95 12/08/2021    CREATININE 0.71 12/08/2021    BUN 19 12/08/2021    CO2 24 12/08/2021    TSH 3.570 12/08/2021    INR 0.9 08/28/2021    LABA1C 5.1 01/31/2021       10 minute call    ASSESSMENT/PLAN:     Diagnosis Orders   1. Acute pharyngitis, unspecified etiology     2. Congestive heart failure, unspecified HF chronicity, unspecified heart failure type ([address]     3. Claudication of both lower extremities ([address]     4. Neutropenia, unspecified type ([address]     5. Sjogren's syndrome, with unspecified organ involvement ([address]     6. Major depressive disorder, single episode, 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[address]     7. Chronic pancreatitis, unspecified pancreatitis type ([address]     8.  Hereditary hemochromatosis ([facility] 46-year-old female reporting left side sore throat fever chills headache beginning 2 days ago she reports that she feels lumps in her neck. Patient is speaking in full sentences reports she is able to eat and drink. Also reports seeing white spots in the back of her throat. Symptoms are consistent with strep pharyngitis. Amoxicillin ordered    Return if symptoms worsen or fail to improve, for Follow up with PCP. An  electronic signature was used to authenticate this note. --ORTIZ Hawkins CNP on 2/11/2022 at 10:20 AM        Pursuant to the emergency declaration under the [facility], [facility] waiver authority and the [employer] Act, this Virtual  Visit was conducted, with patient's consent, to reduce the patient's risk of exposure to COVID-19 and provide continuity of care for an established patient.

## 2022-03-03 DIAGNOSIS — R51.9 CHRONIC NONINTRACTABLE HEADACHE, UNSPECIFIED HEADACHE TYPE: ICD-10-CM

## 2022-03-03 DIAGNOSIS — Z76.0 MEDICATION REFILL: ICD-10-CM

## 2022-03-03 DIAGNOSIS — F98.8 ATTENTION DEFICIT DISORDER, UNSPECIFIED HYPERACTIVITY PRESENCE: ICD-10-CM

## 2022-03-03 DIAGNOSIS — G89.29 CHRONIC NONINTRACTABLE HEADACHE, UNSPECIFIED HEADACHE TYPE: ICD-10-CM

## 2022-03-03 RX ORDER — TOPIRAMATE 100 MG/1
TABLET, FILM COATED ORAL
Qty: 60 TABLET | Refills: 5 | Status: SHIPPED | OUTPATIENT
Start: 2022-03-03

## 2022-03-03 RX ORDER — DEXTROAMPHETAMINE SACCHARATE, AMPHETAMINE ASPARTATE MONOHYDRATE, DEXTROAMPHETAMINE SULFATE AND AMPHETAMINE SULFATE 7.5; 7.5; 7.5; 7.5 MG/1; MG/1; MG/1; MG/1
30 CAPSULE, EXTENDED RELEASE ORAL 2 TIMES DAILY
Qty: 60 CAPSULE | Refills: 0 | Status: SHIPPED | OUTPATIENT
Start: 2022-03-03 | End: 2022-04-22 | Stop reason: SDUPTHER

## 2022-03-09 ENCOUNTER — HOSPITAL ENCOUNTER (OUTPATIENT)
Dept: GENERAL RADIOLOGY | Age: 52
Discharge: HOME | End: 2022-03-11
Payer: MEDICARE

## 2022-03-09 ENCOUNTER — OFFICE VISIT (OUTPATIENT)
Dept: FAMILY MEDICINE CLINIC | Age: 52
End: 2022-03-09
Payer: MEDICARE

## 2022-03-09 VITALS
DIASTOLIC BLOOD PRESSURE: 78 MMHG | RESPIRATION RATE: 12 BRPM | HEART RATE: 89 BPM | SYSTOLIC BLOOD PRESSURE: 124 MMHG | BODY MASS INDEX: 19.88 KG/M2 | WEIGHT: 108 LBS | HEIGHT: 62 IN

## 2022-03-09 DIAGNOSIS — M54.41 ACUTE LEFT-SIDED LOW BACK PAIN WITH BILATERAL SCIATICA: ICD-10-CM

## 2022-03-09 DIAGNOSIS — M54.42 ACUTE LEFT-SIDED LOW BACK PAIN WITH BILATERAL SCIATICA: ICD-10-CM

## 2022-03-09 DIAGNOSIS — M54.42 ACUTE LEFT-SIDED LOW BACK PAIN WITH BILATERAL SCIATICA: Primary | ICD-10-CM

## 2022-03-09 DIAGNOSIS — M54.41 ACUTE LEFT-SIDED LOW BACK PAIN WITH BILATERAL SCIATICA: Primary | ICD-10-CM

## 2022-03-09 PROCEDURE — G8420 CALC BMI NORM PARAMETERS: HCPCS | Performed by: PHYSICIAN ASSISTANT

## 2022-03-09 PROCEDURE — 72110 X-RAY EXAM L-2 SPINE 4/>VWS: CPT

## 2022-03-09 PROCEDURE — G8427 DOCREV CUR MEDS BY ELIG CLIN: HCPCS | Performed by: PHYSICIAN ASSISTANT

## 2022-03-09 PROCEDURE — G8484 FLU IMMUNIZE NO ADMIN: HCPCS | Performed by: PHYSICIAN ASSISTANT

## 2022-03-09 PROCEDURE — 99213 OFFICE O/P EST LOW 20 MIN: CPT | Performed by: PHYSICIAN ASSISTANT

## 2022-03-09 PROCEDURE — 1036F TOBACCO NON-USER: CPT | Performed by: PHYSICIAN ASSISTANT

## 2022-03-09 RX ORDER — CYCLOBENZAPRINE HCL 10 MG
10 TABLET ORAL 3 TIMES DAILY PRN
Qty: 30 TABLET | Refills: 0 | Status: SHIPPED | OUTPATIENT
Start: 2022-03-09 | End: 2022-03-19

## 2022-03-09 RX ORDER — KETOROLAC TROMETHAMINE 30 MG/ML
60 INJECTION, SOLUTION INTRAMUSCULAR; INTRAVENOUS ONCE
Qty: 2 ML | Refills: 0
Start: 2022-03-09 | End: 2022-05-31

## 2022-03-09 RX ORDER — PREDNISONE 10 MG/1
TABLET ORAL
Qty: 45 TABLET | Refills: 0 | Status: SHIPPED | OUTPATIENT
Start: 2022-03-09 | End: 2022-05-31 | Stop reason: ALTCHOICE

## 2022-03-09 ASSESSMENT — PATIENT HEALTH QUESTIONNAIRE - PHQ9
SUM OF ALL RESPONSES TO PHQ QUESTIONS 1-9: 0
SUM OF ALL RESPONSES TO PHQ9 QUESTIONS 1 & 2: 0
2. FEELING DOWN, DEPRESSED OR HOPELESS: 0
1. LITTLE INTEREST OR PLEASURE IN DOING THINGS: 0
SUM OF ALL RESPONSES TO PHQ QUESTIONS 1-9: 0

## 2022-03-09 ASSESSMENT — ENCOUNTER SYMPTOMS
TROUBLE SWALLOWING: 0
SINUS PRESSURE: 0
BACK PAIN: 1
VOMITING: 0
DIARRHEA: 0
CHEST TIGHTNESS: 0
ABDOMINAL PAIN: 0
SHORTNESS OF BREATH: 0
COUGH: 0

## 2022-03-09 NOTE — PROGRESS NOTES
Subjective:      Patient ID: Boo Chapin is a 54 y.o. female who presents today for:  Chief Complaint   Patient presents with    Back Pain       HPI  54year old female who presents with complaints of left sided low back pain radiating down her left leg. States that it started earlier today.      Past Medical History:   Diagnosis Date    Acquired hypothyroidism 9/26/2016    Acute left-sided low back pain with bilateral sciatica 3/9/2022    Acute pancreatitis     ADD (attention deficit disorder) 2/12/2015    Anxiety     Congestive heart failure, unspecified HF chronicity, unspecified heart failure type ([facility] 2/11/2022    Depression 12/9/2015    Endometrial cyst of ovary 5/10/14    RT ovary, ruptured    GERD (gastroesophageal reflux disease) 9/26/2016    Medullary sponge kidney of both kidneys 5/22/2018    Sjogren's disease ([facility]     Stress     Swelling      Past Surgical History:   Procedure Laterality Date    CHOLECYSTECTOMY  2011    HYSTERECTOMY  2014    sept    OVARY REMOVAL Left Jan 2014    UPPER GASTROINTESTINAL ENDOSCOPY  09/16/2016    EGD W/BX    UPPER GASTROINTESTINAL ENDOSCOPY N/A 5/6/2021    ENDOSCOPIC ULTRASOUND with EGD performed by Bhargav Broussard MD at [facility] Marital status: Single     Spouse name: Not on file    Number of children: Not on file    Years of education: Not on file    Highest education level: Not on file   Occupational History    Not on file   Tobacco Use    Smoking status: Never Smoker    Smokeless tobacco: Never Used   Vaping Use    Vaping Use: Never used   Substance and Sexual Activity    Alcohol use: No     Alcohol/week: 0.0 standard drinks     Comment: no alcohol since 2011    Drug use: No    Sexual activity: Not on file   Other Topics Concern    Not on file   Social History Narrative    ** Merged History Encounter **          Social Determinants of Health     Financial Resource Strain: Low Risk  Difficulty of Paying Living Expenses: Not hard at all   Food Insecurity: No Food Insecurity    Worried About Running Out of Food in the Last Year: Never true    Ran Out of Food in the Last Year: Never true   Transportation Needs: No Transportation Needs    Lack of Transportation (Medical): No    Lack of Transportation (Non-Medical):  No   Physical Activity:     Days of Exercise per Week: Not on file    Minutes of Exercise per Session: Not on file   Stress:     Feeling of Stress : Not on file   Social Connections:     Frequency of Communication with Friends and Family: Not on file    Frequency of Social Gatherings with Friends and Family: Not on file    Attends Anabaptism Services: Not on file    Active Member of [address] [place] [address] [employer]form[employer] or Organizations: Not on file    Attends Club or Organization Meetings: Not on file    Marital Status: Not on file   Intimate Partner Violence:     Fear of Current or Ex-Partner: Not on file    Emotionally Abused: Not on file    Physically Abused: Not on file    Sexually Abused: Not on file   Housing Stability:     Unable to Pay for Housing in the Last Year: Not on file    Number of [place] in the Last Year: Not on file    Unstable Housing in the Last Year: Not on file     Family History   Problem Relation Age of Onset    Heart Disease Father 48    Cancer Maternal Grandmother         breast    Heart Disease Other         mom and dad's side     No Known Allergies  Current Outpatient Medications   Medication Sig Dispense Refill    predniSONE (DELTASONE) 10 MG tablet Take 5 tabs daily x 3 days, 4 tabs daily x 3 days, 3 tabs daily x 3 days, 2 tabs daily x 3 days, 1 tab daily x 3 days 45 tablet 0    cyclobenzaprine (FLEXERIL) 10 MG tablet Take 1 tablet by mouth 3 times daily as needed for Muscle spasms 30 tablet 0    ketorolac (TORADOL) 60 MG/2ML injection Inject 2 mLs into the muscle once for 1 dose 2 mL 0    topiramate (TOPAMAX) 100 MG tablet TAKE ONE TABLET BY MOUTH TWO TIMES A DAY 60 tablet 5    amphetamine-dextroamphetamine (ADDERALL XR) 30 MG extended release capsule Take 1 capsule by mouth 2 times daily for 30 days. 60 capsule 0    furosemide (LASIX) 20 MG tablet Take 1 tablet by mouth 2 times daily TAKE ONE TABLET BY MOUTH TWO TIMES A DAY 60 tablet 0    fludrocortisone (FLORINEF) 0.1 MG tablet TAKE TWO TABLETS BY MOUTH TWICE DAILY      loratadine (CLARITIN) 10 MG tablet Take 1 tablet by mouth daily 30 tablet 5    buPROPion (WELLBUTRIN XL) 300 MG extended release tablet TAKE ONE TABLET BY MOUTH EVERY MORNING 90 tablet 0    potassium chloride (KLOR-CON M) 10 MEQ extended release tablet Take 1 tablet by mouth daily 30 tablet 0    traZODone (DESYREL) 150 MG tablet Take 1 tablet by mouth nightly take one tablet by mouth nightly 30 tablet 5    esomeprazole (NEXIUM) 40 MG delayed release capsule TAKE ONE CAPSULE BY MOUTH EVERY DAY 90 capsule 0    liothyronine (CYTOMEL) 25 MCG tablet Take 1 tablet by mouth daily 90 tablet 0    aspirin (ASPIRIN CHILDRENS) 81 MG chewable tablet Take 1 tablet by mouth daily 14 tablet 0    cyclobenzaprine (FLEXERIL) 5 MG tablet Take 2.5-5 mg by mouth 2 times daily as needed      naratriptan (AMERGE) 2.5 MG tablet Take one at onset of severe headache/migraine may repeat x 1 after 4 hours if needed. Maximum 2/day and 2 days/week.       hydrocortisone (CORTEF) 5 MG tablet Take 5 mg by mouth daily      valACYclovir (VALTREX) 500 MG tablet Take 1 tablet by mouth daily 30 tablet 5    prochlorperazine (COMPAZINE) 10 MG tablet Take 1 tablet by mouth every 6 hours as needed (nausea) 30 tablet 2    promethazine (PHENERGAN) 25 MG tablet Take 1 tablet by mouth every 8 hours as needed for Nausea 40 tablet 0    Magnesium 400 MG TABS Take 1 tablet by mouth daily 30 tablet 0    TRULANCE 3 MG TABS Take 3 mg by mouth daily      metoclopramide (REGLAN) 10 MG tablet Take 10 mg by mouth as needed      Hyoscyamine Sulfate SL (LEVSIN/SL) 0.125 MG SUBL Place 125 mcg under the tongue every 4 hours as needed (pain) 120 each 3    amLODIPine (NORVASC) 2.5 MG tablet Take 2.5 mg by mouth daily      ondansetron (ZOFRAN) 4 MG tablet Take 1 tablet by mouth every 8 hours as needed for Nausea or Vomiting 30 tablet 1    azelastine (ASTELIN) 0.1 % nasal spray 1 spray by Nasal route      moxifloxacin (VIGAMOX) 0.5 % ophthalmic solution 1 drop      fluticasone (FLONASE) 50 MCG/ACT nasal spray USE 1 SPRAY NASALLY DAILY  3    ZOLMitriptan (ZOMIG) 5 MG tablet TAKE 1 TABLET BY MOUTH AT ONSET OF MIGRAINE. MAY REPEAT ONCE AFTER 2 HOURS. MAX 10 MG (2 TABLETS) PER DAY  11    levothyroxine (SYNTHROID) 25 MCG tablet Take one daily 90 tablet 1    hydroxychloroquine (PLAQUENIL) 200 MG tablet Take 300 mg by mouth daily       Pancrelipase, Lip-Prot-Amyl, (CREON) 54053 UNITS CPEP 2 caps tid 180 capsule 03     No current facility-administered medications for this visit. Review of Systems   Constitutional: Negative for activity change, appetite change, chills, fever and unexpected weight change. HENT: Negative for drooling, ear pain, nosebleeds, sinus pressure and trouble swallowing. Respiratory: Negative for cough, chest tightness and shortness of breath. Cardiovascular: Negative for chest pain and leg swelling. Gastrointestinal: Negative for abdominal pain, diarrhea and vomiting. Endocrine: Negative for polydipsia and polyphagia. Genitourinary: Negative for dysuria, flank pain and frequency. Musculoskeletal: Positive for back pain (left low back radiating down leg). Negative for myalgias. Skin: Negative for pallor and rash. Neurological: Negative for syncope, weakness and headaches. Hematological: Does not bruise/bleed easily. Psychiatric/Behavioral: Negative for agitation, behavioral problems and confusion. All other systems reviewed and are negative.       Objective:   /78   Pulse 89   Resp 12   Ht 5' 2\" (1.575 m)   Wt 108 lb (49 kg)   BMI 19.75 kg/m²     Physical Exam  Vitals and nursing note reviewed. Constitutional:       General: She is awake. She is not in acute distress. Appearance: Normal appearance. She is well-developed and normal weight. She is not ill-appearing, toxic-appearing or diaphoretic. Comments: No photophobia. No phonophobia. HENT:      Head: Normocephalic and atraumatic. No Gardner's sign. Right Ear: External ear normal.      Left Ear: External ear normal.      Nose: Nose normal. No congestion or rhinorrhea. Mouth/Throat:      Mouth: Mucous membranes are moist.      Pharynx: Oropharynx is clear. No oropharyngeal exudate or posterior oropharyngeal erythema. Eyes:      General: No scleral icterus. Right eye: No foreign body or discharge. Left eye: No discharge. Extraocular Movements: Extraocular movements intact. Conjunctiva/sclera: Conjunctivae normal.      Left eye: No exudate. Pupils: Pupils are equal, round, and reactive to light. Neck:      Vascular: No JVD. Trachea: No tracheal deviation. Comments: No meningismus. Cardiovascular:      Rate and Rhythm: Normal rate and regular rhythm. Heart sounds: Normal heart sounds. Heart sounds not distant. No murmur heard. No friction rub. No gallop. Pulmonary:      Effort: Pulmonary effort is normal. No respiratory distress. Breath sounds: Normal breath sounds. No stridor. No wheezing, rhonchi or rales. Chest:      Chest wall: No tenderness. Abdominal:      General: Abdomen is flat. Bowel sounds are normal. There is no distension. Palpations: Abdomen is soft. There is no mass. Tenderness: There is no abdominal tenderness. There is no right CVA tenderness, left CVA tenderness, guarding or rebound. Hernia: No hernia is present. Musculoskeletal:         General: No swelling, tenderness, deformity or signs of injury. Normal range of motion.       Cervical back: Normal range of motion and neck supple. No rigidity. Lymphadenopathy:      Head:      Right side of head: No submental adenopathy. Left side of head: No submental adenopathy. Skin:     General: Skin is warm and dry. Capillary Refill: Capillary refill takes less than 2 seconds. Coloration: Skin is not jaundiced or pale. Findings: No bruising, erythema, lesion or rash. Neurological:      General: No focal deficit present. Mental Status: She is alert and oriented to person, place, and time. Mental status is at baseline. Cranial Nerves: No cranial nerve deficit. Sensory: No sensory deficit. Motor: No weakness. Coordination: Coordination normal.      Deep Tendon Reflexes: Reflexes are normal and symmetric. Psychiatric:         Mood and Affect: Mood normal.         Behavior: Behavior normal. Behavior is cooperative. Thought Content: Thought content normal.         Judgment: Judgment normal.         Assessment:       Diagnosis Orders   1. Acute left-sided low back pain with bilateral sciatica  predniSONE (DELTASONE) 10 MG tablet    cyclobenzaprine (FLEXERIL) 10 MG tablet    ketorolac (TORADOL) 60 MG/2ML injection    XR LUMBAR SPINE (MIN 4 VIEWS)     No results found for this visit on 03/09/22. Plan:     Assessment & Plan   Angel Putnam was seen today for back pain. Diagnoses and all orders for this visit:    Acute left-sided low back pain with bilateral sciatica  -     predniSONE (DELTASONE) 10 MG tablet; Take 5 tabs daily x 3 days, 4 tabs daily x 3 days, 3 tabs daily x 3 days, 2 tabs daily x 3 days, 1 tab daily x 3 days  -     cyclobenzaprine (FLEXERIL) 10 MG tablet; Take 1 tablet by mouth 3 times daily as needed for Muscle spasms  -     ketorolac (TORADOL) 60 MG/2ML injection; Inject 2 mLs into the muscle once for 1 dose  -     XR LUMBAR SPINE (MIN 4 VIEWS);  Future      Orders Placed This Encounter   Procedures    XR LUMBAR SPINE (MIN 4 VIEWS)     Standing Status:   Future     Standing Expiration Date:   3/9/2023     Order Specific Question:   Reason for exam:     Answer:   r/o DJD     Orders Placed This Encounter   Medications    predniSONE (DELTASONE) 10 MG tablet     Sig: Take 5 tabs daily x 3 days, 4 tabs daily x 3 days, 3 tabs daily x 3 days, 2 tabs daily x 3 days, 1 tab daily x 3 days     Dispense:  45 tablet     Refill:  0    cyclobenzaprine (FLEXERIL) 10 MG tablet     Sig: Take 1 tablet by mouth 3 times daily as needed for Muscle spasms     Dispense:  30 tablet     Refill:  0    ketorolac (TORADOL) 60 MG/2ML injection     Sig: Inject 2 mLs into the muscle once for 1 dose     Dispense:  2 mL     Refill:  0     There are no discontinued medications. Return if symptoms worsen or fail to improve. Reviewed with the patient/family: current clinical status & medications. Side effects of the medication prescribed today, as well as treatment plan/rationale and result expectations have been discussed with the patient/family who expresses understanding. Patient will be discharged home in stable condition. Follow up with PCP to evaluate treatment results or return if symptoms worsen or fail to improve. Discussed signs and symptoms which require immediate follow-up in ED/call to 911. Understanding verbalized. I have reviewed the patient's medical history in detail and updated the computerized patient record.     TRUDY Fox

## 2022-03-09 NOTE — PATIENT INSTRUCTIONS
Patient Education        Learning About Low Back Pain  What is low back pain? Low back pain is pain that can occur anywhere below the ribs and above the legs. It is very common. Almost everyone has it at one time or another. Low back pain can be:  · Acute. This is new pain that can last a few days to a few weeks--at the most a few months. · Chronic. This pain can last for more than a few months. Sometimes it can last for years. What are some myths about low back pain? Here are some common myths about low back pain--and the facts:  Myth: \"I need to rest my back when I have back pain. \"   Fact: Staying active won't hurt you. It may help you get better faster. Myth: \"I need prescription pain medicine. \"   Fact: It's best to try to let time and being active heal your back. Opioid pain medicines--such as hydrocodone or oxycodone--usually don't work any better than over-the-counter medicines like ibuprofen or naproxen. And opioids can cause serious problems like opioid use disorder or overdose. Moderate to severe opioid use disorder is sometimes called addiction. Myth: \"I need a test like an X-ray or an MRI to diagnose my low back pain. \"   Fact: Getting a test right away won't help you get better faster. And it could lead you down a treatment path you may not need, since most people get better on their own. What causes low back pain? In most cases, there isn't a clear cause. This can be frustrating, because your back hurts and there's no obvious reason. Your back pain can be caused by:  Overuse or muscle strain. This can happen from playing sports, lifting heavy things, or not being physically fit. A herniated disc. This is a problem with the cushion between the bones in your back. Arthritis. With age, you may have changes in your bones that can narrow the space around your nerves. Other causes. In rare cases, the cause is a serious illness like an infection or cancer.  But there are usually other symptoms include walking or doing back exercises. · Physical therapy. · Medicines. Some of these medicines are also used for other problems, like depression. · Pain management. Your doctor may have you see a pain specialist.  · Counseling. Having chronic pain can be hard. It may help to talk to someone who can help you cope with your pain. Surgery isn't needed for most people. But it may help some types of low back pain. Follow-up care is a key part of your treatment and safety. Be sure to make and go to all appointments, and call your doctor if you are having problems. It's also a good idea to know your test results and keep a list of the medicines you take. When should you call for help? Call 911 anytime you think you may need emergency care. For example, call if:  · You can't move a leg at all. Call your doctor now or seek immediate medical care if:  · You have new or worse symptoms in your legs, belly, or buttocks. Symptoms may include:  ? Numbness or tingling. ? Weakness. ? Pain. · You lose bladder or bowel control. Watch closely for changes in your health, and be sure to contact your doctor if:  · Along with the back pain, you have a fever, lose weight, or don't feel well. · You do not get better as expected. Where can you learn more? Go to https://woodpellets.[employer]. org and sign in to your [employer] account. Enter A007 in the [facility] box to learn more about \"Learning About Low Back Pain. \"     If you do not have an account, please click on the \"Sign Up Now\" link. Current as of: July 1, 2021               Content Version: 13.1  © 1811-5882 [employer], Incorporated. Care instructions adapted under license by [facility] [place] ([facility]). If you have questions about a medical condition or this instruction, always ask your healthcare professional. [address] any warranty or liability for your use of this information.

## 2022-03-11 DIAGNOSIS — F41.9 ANXIETY: ICD-10-CM

## 2022-03-11 NOTE — TELEPHONE ENCOUNTER
Future Appointments    Encounter Information    Provider Department Appt Notes   8/30/2022 ORTIZ Lay CNP [facility]       Past Visits    Date Provider Specialty Visit Type Primary Dx   03/09/2022 TRUDY Farrell [facility] Office Visit Acute left-sided low back pain with bilateral sciatica   02/11/2022 ORTIZ Maldonado CNP Family Medicine Virtual Visit Acute pharyngitis, unspecified etiology   02/02/2022 ORTIZ Lay CNP Family Medicine Virtual Visit Colitis   12/30/2021 Sunni Butcher DO Family Medicine Virtual Visit Suspected COVID-19 virus infection   12/28/2021 Christie Torres

## 2022-03-14 RX ORDER — BUPROPION HYDROCHLORIDE 300 MG/1
300 TABLET ORAL EVERY MORNING
Qty: 90 TABLET | Refills: 5 | Status: SHIPPED | OUTPATIENT
Start: 2022-03-14 | End: 2022-08-24 | Stop reason: SDUPTHER

## 2022-03-24 RX ORDER — ESOMEPRAZOLE MAGNESIUM 40 MG/1
CAPSULE, DELAYED RELEASE ORAL
Qty: 90 CAPSULE | Refills: 0 | Status: SHIPPED | OUTPATIENT
Start: 2022-03-24 | End: 2022-03-24 | Stop reason: SDUPTHER

## 2022-03-24 NOTE — TELEPHONE ENCOUNTER
Appointments    Encounter Information    Provider Department Appt Notes   8/30/2022 ORTIZ Gillespie CNP [facility] Primary [facility]       Past Visits    Date Provider Specialty Visit Type Primary Dx   03/09/2022 TRUDY Camp [facility] Office Visit Acute left-sided low back pain with bilateral sciatica   02/11/2022 Jane Fink, ORTIZ Calderon CNP Family Medicine Virtual Visit Acute pharyngitis, unspecified etiology   02/02/2022 ORTIZ Gillespie CNP Family Medicine Virtual Visit Colitis   12/30/2021 Myra Chahal DO Family Medicine Virtual Visit Suspected COVID-19 virus infection   12/28/2021

## 2022-04-07 ENCOUNTER — OFFICE VISIT (OUTPATIENT)
Dept: FAMILY MEDICINE CLINIC | Age: 52
End: 2022-04-07
Payer: MEDICARE

## 2022-04-07 VITALS
TEMPERATURE: 97.5 F | DIASTOLIC BLOOD PRESSURE: 72 MMHG | OXYGEN SATURATION: 99 % | BODY MASS INDEX: 19.8 KG/M2 | HEART RATE: 82 BPM | WEIGHT: 107.6 LBS | SYSTOLIC BLOOD PRESSURE: 120 MMHG | HEIGHT: 62 IN

## 2022-04-07 DIAGNOSIS — D72.829 LEUKOCYTOSIS, UNSPECIFIED TYPE: ICD-10-CM

## 2022-04-07 DIAGNOSIS — R53.83 FATIGUE, UNSPECIFIED TYPE: ICD-10-CM

## 2022-04-07 DIAGNOSIS — N64.4 BREAST PAIN, LEFT: Primary | ICD-10-CM

## 2022-04-07 DIAGNOSIS — R10.9 STOMACH CRAMPS: ICD-10-CM

## 2022-04-07 DIAGNOSIS — R19.7 DIARRHEA, UNSPECIFIED TYPE: ICD-10-CM

## 2022-04-07 PROCEDURE — 99214 OFFICE O/P EST MOD 30 MIN: CPT | Performed by: NURSE PRACTITIONER

## 2022-04-07 PROCEDURE — G8420 CALC BMI NORM PARAMETERS: HCPCS | Performed by: NURSE PRACTITIONER

## 2022-04-07 PROCEDURE — G8427 DOCREV CUR MEDS BY ELIG CLIN: HCPCS | Performed by: NURSE PRACTITIONER

## 2022-04-07 PROCEDURE — 1036F TOBACCO NON-USER: CPT | Performed by: NURSE PRACTITIONER

## 2022-04-07 ASSESSMENT — ENCOUNTER SYMPTOMS
ABDOMINAL DISTENTION: 0
DIARRHEA: 1
NAUSEA: 0
ABDOMINAL PAIN: 1
COUGH: 0
SHORTNESS OF BREATH: 0

## 2022-04-07 NOTE — PROGRESS NOTES
Subjective  Chief Complaint   Patient presents with    Follow-Up from Hospital     patient is here for her hospital follow up from nausea and abdominal pain        HPI     Went to ER recently. Has had green, mucous stools recent. Was having intermittent abdominal pain as well. Stools have slowed down. Pt has had intermittent nausea and emesis. Taking zofran and levsin     Also having arthralgia. Widespread, coning and going     Reports fatigue with these episodes. Having pain in the left breast into the axilla. Had screening mamm. They recommended diagnostic. They are unable to do for a month. Asking if we can get it sooner.     Patient Active Problem List    Diagnosis Date Noted    Acute left-sided low back pain with bilateral sciatica 03/09/2022    Congestive heart failure, unspecified HF chronicity, unspecified heart failure type ([address] 02/11/2022    Claudication of both lower extremities ([address] 02/11/2022    Neck pain 12/09/2021    Non-recurrent acute suppurative otitis media of left ear without spontaneous rupture of tympanic membrane 12/09/2021    Neutropenia ([address] 01/12/2021    Hereditary hemochromatosis ([address] 01/12/2021    Chronic pancreatitis ([address] 08/11/2020    Major depressive disorder, single episode, 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[address] 01/15/2019    Abnormal MRI, liver 05/22/2018    Acute recurrent pancreatitis 05/22/2018    At risk of fracture due to osteoporosis 05/22/2018    Calcinosis 05/22/2018    Chronic headaches 05/22/2018    Conductive hearing loss in left ear 05/22/2018    Edema 05/22/2018    Iron overload 05/22/2018    Mass of left side of neck 05/22/2018    Medullary sponge kidney of both kidneys 03/30/6115    Metabolic acidosis 12/98/7122    Microscopic hematuria 05/22/2018    Nausea 05/22/2018    Renal tubular acidosis type I 05/22/2018    Rheumatoid arthritis ([address] 05/22/2018    Tension type headache 05/22/2018    Weight loss, abnormal 05/22/2018    Cellulitis, face 04/06/2018    Other chest pain 11/01/2017    Left lower quadrant pain 11/01/2017    Sjogren's syndrome ([address] 11/01/2017    Diverticulitis of large intestine without perforation or abscess without bleeding 11/01/2017    Fibroids 11/01/2017    Transfusion history 11/01/2017    Pancreatitis 05/13/2017    Acquired hypothyroidism 09/26/2016    GERD (gastroesophageal reflux disease) 09/26/2016    Anxiety 12/09/2015    Depression 12/09/2015    ADD (attention deficit disorder) 02/12/2015    Endometriosis 09/09/2014    ASCUS favoring benign 05/01/2013    S/P endometrial ablation 05/01/2013     Past Medical History:   Diagnosis Date    Acquired hypothyroidism 9/26/2016    Acute left-sided low back pain with bilateral sciatica 3/9/2022    Acute pancreatitis     ADD (attention deficit disorder) 2/12/2015    Anxiety     Congestive heart failure, unspecified HF chronicity, unspecified heart failure type ([address] 2/11/2022    Depression 12/9/2015    Endometrial cyst of ovary 5/10/14    RT ovary, ruptured    GERD (gastroesophageal reflux disease) 9/26/2016    Medullary sponge kidney of both kidneys 5/22/2018    Sjogren's disease ([address]     Stress     Swelling      Past Surgical History:   Procedure Laterality Date    CHOLECYSTECTOMY  2011    HYSTERECTOMY  2014    sept    OVARY REMOVAL Left Jan 2014    UPPER GASTROINTESTINAL ENDOSCOPY  09/16/2016    EGD W/BX    UPPER GASTROINTESTINAL ENDOSCOPY N/A 5/6/2021    ENDOSCOPIC ULTRASOUND with EGD performed by Brandee Mccain MD at [facility]     Family History   Problem Relation Age of Onset    Heart Disease Father 48    Cancer Maternal Grandmother         breast    Heart Disease Other         mom and dad's side     Social History     Socioeconomic History    Marital status: Single     Spouse name: Not on file    Number of children: Not on file    Years of education: Not on file    Highest education level: Not on file   Occupational History    Not on file   Tobacco Use    Smoking status: Never Smoker    Smokeless tobacco: Never Used   Vaping Use    Vaping Use: Never used   Substance and Sexual Activity    Alcohol use: No     Alcohol/week: 0.0 standard drinks     Comment: no alcohol since 2011    Drug use: No    Sexual activity: Not on file   Other Topics Concern    Not on file   Social History Narrative    ** Merged History Encounter **          Social Determinants of Health     Financial Resource Strain: Low Risk     Difficulty of Paying Living Expenses: Not hard at all   Food Insecurity: No Food Insecurity    Worried About Running Out of Food in the Last Year: Never true    [address] in the Last Year: Never true   Transportation Needs: No Transportation Needs    Lack of Transportation (Medical): No    Lack of Transportation (Non-Medical):  No   Physical Activity:     Days of Exercise per Week: Not on file    Minutes of Exercise per Session: Not on file   Stress:     Feeling of Stress : Not on file   Social Connections:     Frequency of Communication with Friends and Family: Not on file    Frequency of Social Gatherings with Friends and Family: Not on file    Attends Oriental orthodox Services: Not on file    Active Member of [address] [place] [address] or Organizations: Not on file    Attends Club or Organization Meetings: Not on file    Marital Status: Not on file   Intimate Partner Violence:     Fear of Current or Ex-Partner: Not on file    Emotionally Abused: Not on file    Physically Abused: Not on file    Sexually Abused: Not on file   Housing Stability:     Unable to Pay for Housing in the Last Year: Not on file    Number of [place] in the Last Year: Not on file    Unstable Housing in the Last Year: Not on file     Current Outpatient Medications on File Prior to Visit   Medication Sig Dispense Refill    NEXIUM 40 MG delayed release capsule TAKE ONE CAPSULE BY MOUTH EVERY DAY 90 capsule 0    buPROPion (WELLBUTRIN XL) 300 MG extended release tablet Take 1 tablet by mouth every morning TAKE ONE TABLET BY MOUTH EVERY MORNING 90 tablet 5    predniSONE (DELTASONE) 10 MG tablet Take 5 tabs daily x 3 days, 4 tabs daily x 3 days, 3 tabs daily x 3 days, 2 tabs daily x 3 days, 1 tab daily x 3 days 45 tablet 0    topiramate (TOPAMAX) 100 MG tablet TAKE ONE TABLET BY MOUTH TWO TIMES A DAY 60 tablet 5    amphetamine-dextroamphetamine (ADDERALL XR) 30 MG extended release capsule Take 1 capsule by mouth 2 times daily for 30 days. 60 capsule 0    furosemide (LASIX) 20 MG tablet Take 1 tablet by mouth 2 times daily TAKE ONE TABLET BY MOUTH TWO TIMES A DAY 60 tablet 0    fludrocortisone (FLORINEF) 0.1 MG tablet TAKE TWO TABLETS BY MOUTH TWICE DAILY      loratadine (CLARITIN) 10 MG tablet Take 1 tablet by mouth daily 30 tablet 5    potassium chloride (KLOR-CON M) 10 MEQ extended release tablet Take 1 tablet by mouth daily 30 tablet 0    traZODone (DESYREL) 150 MG tablet Take 1 tablet by mouth nightly take one tablet by mouth nightly 30 tablet 5    liothyronine (CYTOMEL) 25 MCG tablet Take 1 tablet by mouth daily 90 tablet 0    aspirin (ASPIRIN CHILDRENS) 81 MG chewable tablet Take 1 tablet by mouth daily 14 tablet 0    cyclobenzaprine (FLEXERIL) 5 MG tablet Take 2.5-5 mg by mouth 2 times daily as needed      naratriptan (AMERGE) 2.5 MG tablet Take one at onset of severe headache/migraine may repeat x 1 after 4 hours if needed. Maximum 2/day and 2 days/week.       hydrocortisone (CORTEF) 5 MG tablet Take 5 mg by mouth daily      valACYclovir (VALTREX) 500 MG tablet Take 1 tablet by mouth daily 30 tablet 5    prochlorperazine (COMPAZINE) 10 MG tablet Take 1 tablet by mouth every 6 hours as needed (nausea) 30 tablet 2    promethazine (PHENERGAN) 25 MG tablet Take 1 tablet by mouth every 8 hours as needed for Nausea 40 tablet 0    Magnesium 400 MG TABS Take 1 tablet by mouth daily 30 tablet 0    TRULANCE 3 MG TABS Take 3 mg by mouth daily      metoclopramide (REGLAN) 10 MG tablet Take 10 mg by mouth as needed      amLODIPine (NORVASC) 2.5 MG tablet Take 2.5 mg by mouth daily      ondansetron (ZOFRAN) 4 MG tablet Take 1 tablet by mouth every 8 hours as needed for Nausea or Vomiting 30 tablet 1    azelastine (ASTELIN) 0.1 % nasal spray 1 spray by Nasal route      moxifloxacin (VIGAMOX) 0.5 % ophthalmic solution 1 drop      fluticasone (FLONASE) 50 MCG/ACT nasal spray USE 1 SPRAY NASALLY DAILY  3    ZOLMitriptan (ZOMIG) 5 MG tablet TAKE 1 TABLET BY MOUTH AT ONSET OF MIGRAINE. MAY REPEAT ONCE AFTER 2 HOURS. MAX 10 MG (2 TABLETS) PER DAY  11    levothyroxine (SYNTHROID) 25 MCG tablet Take one daily 90 tablet 1    hydroxychloroquine (PLAQUENIL) 200 MG tablet Take 300 mg by mouth daily       Pancrelipase, Lip-Prot-Amyl, (CREON) 84475 UNITS CPEP 2 caps tid 180 capsule 03    ketorolac (TORADOL) 60 MG/2ML injection Inject 2 mLs into the muscle once for 1 dose 2 mL 0    [DISCONTINUED] NEXIUM 40 MG delayed release capsule TAKE ONE CAPSULE BY MOUTH EVERY DAY 30 capsule 5    [DISCONTINUED] topiramate (TOPAMAX) 100 MG tablet Take 1 tablet by mouth 2 times daily 60 tablet 5    [DISCONTINUED] furosemide (LASIX) 20 MG tablet Take 1 tablet by mouth 2 times daily TAKE ONE TABLET BY MOUTH TWO TIMES A DAY 60 tablet 5     No current facility-administered medications on file prior to visit. No Known Allergies    Review of Systems   Constitutional: Negative for fatigue. Respiratory: Negative for cough and shortness of breath. Cardiovascular: Positive for chest pain. Gastrointestinal: Positive for abdominal pain and diarrhea. Negative for abdominal distention and nausea.        Objective  Vitals:    04/07/22 0827   BP: 120/72   Site: Right Upper Arm   Position: Sitting   Cuff Size: Medium Adult   Pulse: 82   Temp: 97.5 °F (36.4 °C)   TempSrc: Temporal   SpO2: 99%   Weight: 107 lb 9.6 oz (48.8 kg)   Height: 5' 2\" (1.575 m)     Physical Exam  Vitals and nursing note reviewed. Constitutional:       Appearance: Normal appearance. HENT:      Head: Normocephalic. Nose: Nose normal.      Mouth/Throat:      Mouth: Mucous membranes are moist.      Pharynx: Oropharynx is clear. Eyes:      Extraocular Movements: Extraocular movements intact. Conjunctiva/sclera: Conjunctivae normal.      Pupils: Pupils are equal, round, and reactive to light. Cardiovascular:      Rate and Rhythm: Normal rate and regular rhythm. Pulses: Normal pulses. Heart sounds: Normal heart sounds. Pulmonary:      Effort: Pulmonary effort is normal.      Breath sounds: Normal breath sounds. Chest:       Abdominal:      Palpations: Abdomen is soft. Tenderness: There is abdominal tenderness (generalized). Musculoskeletal:      Cervical back: Neck supple. Skin:     General: Skin is warm. Neurological:      General: No focal deficit present. Mental Status: She is alert and oriented to person, place, and time. Mental status is at baseline. Psychiatric:         Mood and Affect: Mood normal.         Behavior: Behavior normal.         Thought Content: Thought content normal.         Judgment: Judgment normal.         Assessment & Plan     Diagnosis Orders   1. Breast pain, left  SHAUN DIGITAL DIAGNOSTIC W OR WO CAD LEFT   2. Leukocytosis, unspecified type  CBC with Auto Differential   3. Fatigue, unspecified type  Comprehensive Metabolic Panel   4. Stomach cramps  hyoscyamine (LEVSIN/SL) 125 MCG sublingual tablet    Gastrointestinal Panel by DNA    Clostridium Difficile Toxin/Antigen    XR ABDOMEN (KUB) (SINGLE AP VIEW)   5.  Diarrhea, unspecified type  Gastrointestinal Panel by DNA    Clostridium Difficile Toxin/Antigen    XR ABDOMEN (KUB) (SINGLE AP VIEW)       Orders Placed This Encounter   Procedures    Gastrointestinal Panel by DNA     Standing Status:   Future     Standing Expiration Date:   4/7/2023    Clostridium Difficile Toxin/Antigen     Standing Status: Future     Standing Expiration Date:   4/7/2023    SHAUN DIGITAL DIAGNOSTIC W OR WO CAD LEFT     US if needed     Standing Status:   Future     Standing Expiration Date:   6/7/2023     Order Specific Question:   Reason for exam:     Answer:   breast pain    XR ABDOMEN (KUB) (SINGLE AP VIEW)     Standing Status:   Future     Number of Occurrences:   1     Standing Expiration Date:   4/7/2023     Order Specific Question:   Reason for exam:     Answer:   abdominal pain    Comprehensive Metabolic Panel     Standing Status:   Future     Standing Expiration Date:   4/7/2023    CBC with Auto Differential     Standing Status:   Future     Standing Expiration Date:   4/7/2023       Orders Placed This Encounter   Medications    hyoscyamine (LEVSIN/SL) 125 MCG sublingual tablet     Sig: Place 1 tablet under the tongue every 4 hours as needed for Cramping     Dispense:  90 tablet     Refill:  3       Medications Discontinued During This Encounter   Medication Reason    Hyoscyamine Sulfate SL (LEVSIN/SL) 0.125 MG SUBL Therapy completed      FU after testing    Side effects, adverse effects of the medication prescribed today, as well as treatment plan/ rationale and result expectations have been discussed with the patient who expresses understanding and desires to proceed. Close follow up to evaluate treatment results and for coordination of care. I have reviewed the patient's medical history in detail and updated the computerized patient record. As always, patient is advised that if symptoms worsen in any way they will proceed to the nearest emergency room.           ORTIZ Valladares - CNP

## 2022-04-08 ENCOUNTER — HOSPITAL ENCOUNTER (OUTPATIENT)
Dept: LAB | Age: 52
Discharge: HOME | End: 2022-04-08
Payer: MEDICARE

## 2022-04-08 LAB
ALBUMIN SERPL-MCNC: 4.7 G/DL (ref 3.5–4.6)
ALP BLD-CCNC: 81 U/L (ref 40–130)
ALT SERPL-CCNC: 14 U/L (ref 0–33)
AMYLASE: 64 U/L (ref 22–93)
ANION GAP SERPL CALCULATED.3IONS-SCNC: 17 MEQ/L (ref 9–15)
AST SERPL-CCNC: 16 U/L (ref 0–35)
BASOPHILS ABSOLUTE: 0 K/UL (ref 0–0.2)
BASOPHILS RELATIVE PERCENT: 1 %
BILIRUB SERPL-MCNC: 0.3 MG/DL (ref 0.2–0.7)
BUN BLDV-MCNC: 13 MG/DL (ref 6–20)
CALCIUM SERPL-MCNC: 9.5 MG/DL (ref 8.5–9.9)
CHLORIDE BLD-SCNC: 100 MEQ/L (ref 95–107)
CO2: 23 MEQ/L (ref 20–31)
CREAT SERPL-MCNC: 0.63 MG/DL (ref 0.5–0.9)
EOSINOPHILS ABSOLUTE: 0.1 K/UL (ref 0–0.7)
EOSINOPHILS RELATIVE PERCENT: 2.3 %
GFR AFRICAN AMERICAN: >60
GFR NON-AFRICAN AMERICAN: >60
GLOBULIN: 2.7 G/DL (ref 2.3–3.5)
GLUCOSE BLD-MCNC: 83 MG/DL (ref 70–99)
HCT VFR BLD CALC: 37.3 % (ref 37–47)
HEMOGLOBIN: 12.7 G/DL (ref 12–16)
LIPASE: 33 U/L (ref 12–95)
LYMPHOCYTES ABSOLUTE: 2.1 K/UL (ref 1–4.8)
LYMPHOCYTES RELATIVE PERCENT: 41.8 %
MCH RBC QN AUTO: 29.7 PG (ref 27–31.3)
MCHC RBC AUTO-ENTMCNC: 34 % (ref 33–37)
MCV RBC AUTO: 87.2 FL (ref 82–100)
MONOCYTES ABSOLUTE: 0.4 K/UL (ref 0.2–0.8)
MONOCYTES RELATIVE PERCENT: 7.7 %
NEUTROPHILS ABSOLUTE: 2.3 K/UL (ref 1.4–6.5)
NEUTROPHILS RELATIVE PERCENT: 47.2 %
PDW BLD-RTO: 13.2 % (ref 11.5–14.5)
PLATELET # BLD: 306 K/UL (ref 130–400)
POTASSIUM SERPL-SCNC: 3.5 MEQ/L (ref 3.4–4.9)
RBC # BLD: 4.27 M/UL (ref 4.2–5.4)
SODIUM BLD-SCNC: 140 MEQ/L (ref 135–144)
TOTAL PROTEIN: 7.4 G/DL (ref 6.3–8)
WBC # BLD: 4.9 K/UL (ref 4.8–10.8)

## 2022-04-08 PROCEDURE — 85025 COMPLETE CBC W/AUTO DIFF WBC: CPT

## 2022-04-08 PROCEDURE — 83690 ASSAY OF LIPASE: CPT

## 2022-04-08 PROCEDURE — 80053 COMPREHEN METABOLIC PANEL: CPT

## 2022-04-08 PROCEDURE — 82150 ASSAY OF AMYLASE: CPT

## 2022-04-11 ENCOUNTER — TELEPHONE (OUTPATIENT)
Dept: FAMILY MEDICINE CLINIC | Age: 52
End: 2022-04-11

## 2022-04-11 DIAGNOSIS — R92.8 ABNORMAL MAMMOGRAM: Primary | ICD-10-CM

## 2022-04-11 NOTE — TELEPHONE ENCOUNTER
[facility] mammogram dept calling. States current mammogram order in chart needs changed. States patient came back as a category 0 from clinic  Requesting diagnosis be - abnormal mammogram   and  US if needed.

## 2022-04-12 ENCOUNTER — OFFICE VISIT (OUTPATIENT)
Dept: FAMILY MEDICINE CLINIC | Age: 52
End: 2022-04-12
Payer: MEDICARE

## 2022-04-12 VITALS
BODY MASS INDEX: 19.69 KG/M2 | OXYGEN SATURATION: 100 % | HEART RATE: 83 BPM | WEIGHT: 107 LBS | HEIGHT: 62 IN | SYSTOLIC BLOOD PRESSURE: 96 MMHG | DIASTOLIC BLOOD PRESSURE: 64 MMHG

## 2022-04-12 DIAGNOSIS — M89.8X5 PAIN IN RIGHT FEMUR: Primary | ICD-10-CM

## 2022-04-12 DIAGNOSIS — K59.00 CONSTIPATION, UNSPECIFIED CONSTIPATION TYPE: ICD-10-CM

## 2022-04-12 PROCEDURE — G8427 DOCREV CUR MEDS BY ELIG CLIN: HCPCS | Performed by: NURSE PRACTITIONER

## 2022-04-12 PROCEDURE — G8420 CALC BMI NORM PARAMETERS: HCPCS | Performed by: NURSE PRACTITIONER

## 2022-04-12 PROCEDURE — 99214 OFFICE O/P EST MOD 30 MIN: CPT | Performed by: NURSE PRACTITIONER

## 2022-04-12 PROCEDURE — 1036F TOBACCO NON-USER: CPT | Performed by: NURSE PRACTITIONER

## 2022-04-12 RX ORDER — POLYETHYLENE GLYCOL 3350, SODIUM SULFATE ANHYDROUS, SODIUM BICARBONATE, SODIUM CHLORIDE, POTASSIUM CHLORIDE 236; 22.74; 6.74; 5.86; 2.97 G/4L; G/4L; G/4L; G/4L; G/4L
240 POWDER, FOR SOLUTION ORAL ONCE
Qty: 240 ML | Refills: 0 | Status: SHIPPED | OUTPATIENT
Start: 2022-04-12 | End: 2022-04-12

## 2022-04-12 RX ORDER — METHYLPREDNISOLONE 4 MG/1
TABLET ORAL
Qty: 21 TABLET | Refills: 0 | Status: SHIPPED | OUTPATIENT
Start: 2022-04-12 | End: 2022-04-18

## 2022-04-12 ASSESSMENT — ENCOUNTER SYMPTOMS: RESPIRATORY NEGATIVE: 1

## 2022-04-12 NOTE — PROGRESS NOTES
Subjective  Chief Complaint   Patient presents with    Hip Pain     pt states right hip pain, swelling. pt states its been getting worse since last thursday, hurts to walk. HPI     Here today for right hip pain and swelling. Went to the ED yesterday where an ultrasound of the area was completed with no abnormal findings. She states she slept with a heating pack last night with minimal relief. Reports feeling bloated and constipated with excess gas. Has tried colace, glycerin suppositories, miralax, lactose with no improvement.      Patient Active Problem List    Diagnosis Date Noted    Acute left-sided low back pain with bilateral sciatica 03/09/2022    Congestive heart failure, unspecified HF chronicity, unspecified heart failure type ([address] 02/11/2022    Claudication of both lower extremities ([address] 02/11/2022    Neck pain 12/09/2021    Non-recurrent acute suppurative otitis media of left ear without spontaneous rupture of tympanic membrane 12/09/2021    Neutropenia ([address] 01/12/2021    Hereditary hemochromatosis ([address] 01/12/2021    Chronic pancreatitis ([address] 08/11/2020    Major depressive disorder, single episode, 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[address] 01/15/2019    Abnormal MRI, liver 05/22/2018    Acute recurrent pancreatitis 05/22/2018    At risk of fracture due to osteoporosis 05/22/2018    Calcinosis 05/22/2018    Chronic headaches 05/22/2018    Conductive hearing loss in left ear 05/22/2018    Edema 05/22/2018    Iron overload 05/22/2018    Mass of left side of neck 05/22/2018    Medullary sponge kidney of both kidneys 13/97/1590    Metabolic acidosis 49/27/9835    Microscopic hematuria 05/22/2018    Nausea 05/22/2018    Renal tubular acidosis type I 05/22/2018    Rheumatoid arthritis ([address] 05/22/2018    Tension type headache 05/22/2018    Weight loss, abnormal 05/22/2018    Cellulitis, face 04/06/2018    Other chest pain 11/01/2017    Left lower quadrant pain 11/01/2017    Sjogren's syndrome ([facility] 11/01/2017    Diverticulitis of large intestine without perforation or abscess without bleeding 11/01/2017    Fibroids 11/01/2017    Transfusion history 11/01/2017    Pancreatitis 05/13/2017    Acquired hypothyroidism 09/26/2016    GERD (gastroesophageal reflux disease) 09/26/2016    Anxiety 12/09/2015    Depression 12/09/2015    ADD (attention deficit disorder) 02/12/2015    Endometriosis 09/09/2014    ASCUS favoring benign 05/01/2013    S/P endometrial ablation 05/01/2013     Past Medical History:   Diagnosis Date    Acquired hypothyroidism 9/26/2016    Acute left-sided low back pain with bilateral sciatica 3/9/2022    Acute pancreatitis     ADD (attention deficit disorder) 2/12/2015    Anxiety     Congestive heart failure, unspecified HF chronicity, unspecified heart failure type ([address] 2/11/2022    Depression 12/9/2015    Endometrial cyst of ovary 5/10/14    RT ovary, ruptured    GERD (gastroesophageal reflux disease) 9/26/2016    Medullary sponge kidney of both kidneys 5/22/2018    Sjogren's disease ([address]     Stress     Swelling      Past Surgical History:   Procedure Laterality Date    CHOLECYSTECTOMY  2011    HYSTERECTOMY  2014    sept    OVARY REMOVAL Left Jan 2014    UPPER GASTROINTESTINAL ENDOSCOPY  09/16/2016    EGD W/BX    UPPER GASTROINTESTINAL ENDOSCOPY N/A 5/6/2021    ENDOSCOPIC ULTRASOUND with EGD performed by Rosy Chase MD at [facility]     Family History   Problem Relation Age of Onset    Heart Disease Father 48    Cancer Maternal Grandmother         breast    Heart Disease Other         mom and dad's side     Social History     Socioeconomic History    Marital status: Single     Spouse name: None    Number of children: None    Years of education: None    Highest education level: None   Occupational History    None   Tobacco Use    Smoking status: Never Smoker    Smokeless tobacco: Never Used   Vaping Use    Vaping Use: Never used   Substance and Sexual Activity    Alcohol use: No     Alcohol/week: 0.0 standard drinks     Comment: no alcohol since 2011    Drug use: No    Sexual activity: None   Other Topics Concern    None   Social History Narrative    ** Merged History Encounter **          Social Determinants of Health     Financial Resource Strain: Low Risk     Difficulty of Paying Living Expenses: Not hard at all   Food Insecurity: No Food Insecurity    Worried About Running Out of Food in the Last Year: Never true    [address] in the Last Year: Never true   Transportation Needs: No Transportation Needs    Lack of Transportation (Medical): No    Lack of Transportation (Non-Medical):  No   Physical Activity:     Days of Exercise per Week: Not on file    Minutes of Exercise per Session: Not on file   Stress:     Feeling of Stress : Not on file   Social Connections:     Frequency of Communication with Friends and Family: Not on file    Frequency of Social Gatherings with Friends and Family: Not on file    Attends [employer] Services: Not on file    Active Member of [address] [place] [address] or Organizations: Not on file    Attends Club or Organization Meetings: Not on file    Marital Status: Not on file   Intimate Partner Violence:     Fear of Current or Ex-Partner: Not on file    Emotionally Abused: Not on file    Physically Abused: Not on file    Sexually Abused: Not on file   Housing Stability:     Unable to Pay for Housing in the Last Year: Not on file    Number of [place] in the Last Year: Not on file    Unstable Housing in the Last Year: Not on file     Current Outpatient Medications on File Prior to Visit   Medication Sig Dispense Refill    hyoscyamine (LEVSIN/SL) 125 MCG sublingual tablet Place 1 tablet under the tongue every 4 hours as needed for Cramping 90 tablet 3    NEXIUM 40 MG delayed release capsule TAKE ONE CAPSULE BY MOUTH EVERY DAY 90 capsule 0    buPROPion (WELLBUTRIN XL) 300 MG extended release tablet Take 1 tablet by mouth every morning TAKE ONE TABLET BY MOUTH EVERY MORNING 90 tablet 5    predniSONE (DELTASONE) 10 MG tablet Take 5 tabs daily x 3 days, 4 tabs daily x 3 days, 3 tabs daily x 3 days, 2 tabs daily x 3 days, 1 tab daily x 3 days 45 tablet 0    topiramate (TOPAMAX) 100 MG tablet TAKE ONE TABLET BY MOUTH TWO TIMES A DAY 60 tablet 5    furosemide (LASIX) 20 MG tablet Take 1 tablet by mouth 2 times daily TAKE ONE TABLET BY MOUTH TWO TIMES A DAY 60 tablet 0    fludrocortisone (FLORINEF) 0.1 MG tablet TAKE TWO TABLETS BY MOUTH TWICE DAILY      loratadine (CLARITIN) 10 MG tablet Take 1 tablet by mouth daily 30 tablet 5    potassium chloride (KLOR-CON M) 10 MEQ extended release tablet Take 1 tablet by mouth daily 30 tablet 0    traZODone (DESYREL) 150 MG tablet Take 1 tablet by mouth nightly take one tablet by mouth nightly 30 tablet 5    liothyronine (CYTOMEL) 25 MCG tablet Take 1 tablet by mouth daily 90 tablet 0    aspirin (ASPIRIN CHILDRENS) 81 MG chewable tablet Take 1 tablet by mouth daily 14 tablet 0    cyclobenzaprine (FLEXERIL) 5 MG tablet Take 2.5-5 mg by mouth 2 times daily as needed      naratriptan (AMERGE) 2.5 MG tablet Take one at onset of severe headache/migraine may repeat x 1 after 4 hours if needed. Maximum 2/day and 2 days/week.       hydrocortisone (CORTEF) 5 MG tablet Take 5 mg by mouth daily      valACYclovir (VALTREX) 500 MG tablet Take 1 tablet by mouth daily 30 tablet 5    prochlorperazine (COMPAZINE) 10 MG tablet Take 1 tablet by mouth every 6 hours as needed (nausea) 30 tablet 2    promethazine (PHENERGAN) 25 MG tablet Take 1 tablet by mouth every 8 hours as needed for Nausea 40 tablet 0    Magnesium 400 MG TABS Take 1 tablet by mouth daily 30 tablet 0    TRULANCE 3 MG TABS Take 3 mg by mouth daily      metoclopramide (REGLAN) 10 MG tablet Take 10 mg by mouth as needed      amLODIPine (NORVASC) 2.5 MG tablet Take 2.5 mg by mouth daily      ondansetron (ZOFRAN) 4 MG tablet Take 1 tablet by mouth every 8 hours as needed for Nausea or Vomiting 30 tablet 1    azelastine (ASTELIN) 0.1 % nasal spray 1 spray by Nasal route      moxifloxacin (VIGAMOX) 0.5 % ophthalmic solution 1 drop      fluticasone (FLONASE) 50 MCG/ACT nasal spray USE 1 SPRAY NASALLY DAILY  3    ZOLMitriptan (ZOMIG) 5 MG tablet TAKE 1 TABLET BY MOUTH AT ONSET OF MIGRAINE. MAY REPEAT ONCE AFTER 2 HOURS. MAX 10 MG (2 TABLETS) PER DAY  11    levothyroxine (SYNTHROID) 25 MCG tablet Take one daily 90 tablet 1    hydroxychloroquine (PLAQUENIL) 200 MG tablet Take 300 mg by mouth daily       Pancrelipase, Lip-Prot-Amyl, (CREON) 83706 UNITS CPEP 2 caps tid 180 capsule 03    ketorolac (TORADOL) 60 MG/2ML injection Inject 2 mLs into the muscle once for 1 dose 2 mL 0    amphetamine-dextroamphetamine (ADDERALL XR) 30 MG extended release capsule Take 1 capsule by mouth 2 times daily for 30 days. 60 capsule 0    [DISCONTINUED] NEXIUM 40 MG delayed release capsule TAKE ONE CAPSULE BY MOUTH EVERY DAY 30 capsule 5    [DISCONTINUED] topiramate (TOPAMAX) 100 MG tablet Take 1 tablet by mouth 2 times daily 60 tablet 5    [DISCONTINUED] furosemide (LASIX) 20 MG tablet Take 1 tablet by mouth 2 times daily TAKE ONE TABLET BY MOUTH TWO TIMES A DAY 60 tablet 5     No current facility-administered medications on file prior to visit. No Known Allergies    Review of Systems   Constitutional: Negative. Respiratory: Negative. Cardiovascular: Negative. Musculoskeletal: Positive for myalgias. Objective  Vitals:    04/12/22 0740   BP: 96/64   Pulse: 83   SpO2: 100%   Weight: 107 lb (48.5 kg)   Height: 5' 2\" (1.575 m)     Physical Exam  Constitutional:       Appearance: Normal appearance. HENT:      Head: Normocephalic and atraumatic. Eyes:      Extraocular Movements: Extraocular movements intact. Cardiovascular:      Rate and Rhythm: Normal rate and regular rhythm.       Pulses: Normal pulses. Heart sounds: Normal heart sounds. Pulmonary:      Effort: Pulmonary effort is normal.      Breath sounds: Normal breath sounds. Musculoskeletal:         General: Swelling and tenderness present. Cervical back: Normal range of motion and neck supple. Legs:    Skin:     General: Skin is warm and dry. Neurological:      General: No focal deficit present. Mental Status: She is alert and oriented to person, place, and time. Psychiatric:         Mood and Affect: Mood normal.         Behavior: Behavior normal.       Assessment & Plan     Diagnosis Orders   1. Pain in right femur  XR FEMUR RIGHT (MIN 2 VIEWS)    methylPREDNISolone (MEDROL DOSEPACK) 4 MG tablet   2. Constipation, unspecified constipation type  polyethylene glycol (GOLYTELY) 236 g solution       Orders Placed This Encounter   Procedures    XR FEMUR RIGHT (MIN 2 VIEWS)     Standing Status:   Future     Standing Expiration Date:   4/12/2023     Order Specific Question:   Reason for exam:     Answer:   right femur pain, swelling       Orders Placed This Encounter   Medications    methylPREDNISolone (MEDROL DOSEPACK) 4 MG tablet     Sig: Take by mouth. Dispense:  21 tablet     Refill:  0    polyethylene glycol (GOLYTELY) 236 g solution     Sig: Take 240 mLs by mouth once for 1 dose     Dispense:  240 mL     Refill:  0       Return in about 3 weeks (around 5/3/2022), or if symptoms worsen or fail to improve. Side effects, adverse effects of the medication prescribed today, as well as treatment plan/ rationale and result expectations have been discussed with the patient who expresses understanding and desires to proceed. Close follow up to evaluate treatment results and for coordination of care. I have reviewed the patient's medical history in detail and updated the computerized patient record. As always, patient is advised that if symptoms worsen in any way they will proceed to the nearest emergency room. Raffy Arora, APRN - CNP

## 2022-04-13 RX ORDER — MIDODRINE HYDROCHLORIDE 10 MG/1
10 TABLET ORAL PRN
COMMUNITY
Start: 2022-04-12 | End: 2022-08-01

## 2022-04-15 ENCOUNTER — HOSPITAL ENCOUNTER (OUTPATIENT)
Dept: WOMENS IMAGING | Age: 52
Discharge: HOME | End: 2022-04-17
Payer: MEDICARE

## 2022-04-15 ENCOUNTER — HOSPITAL ENCOUNTER (OUTPATIENT)
Dept: ULTRASOUND IMAGING | Age: 52
Discharge: HOME | End: 2022-04-17
Payer: MEDICARE

## 2022-04-15 DIAGNOSIS — N64.4 BREAST PAIN, LEFT: ICD-10-CM

## 2022-04-15 DIAGNOSIS — R92.8 ABNORMAL MAMMOGRAM: ICD-10-CM

## 2022-04-15 PROCEDURE — G0279 TOMOSYNTHESIS, MAMMO: HCPCS

## 2022-04-15 PROCEDURE — 76642 ULTRASOUND BREAST LIMITED: CPT

## 2022-04-22 DIAGNOSIS — F98.8 ATTENTION DEFICIT DISORDER, UNSPECIFIED HYPERACTIVITY PRESENCE: ICD-10-CM

## 2022-04-22 DIAGNOSIS — Z76.0 MEDICATION REFILL: ICD-10-CM

## 2022-04-23 RX ORDER — DEXTROAMPHETAMINE SACCHARATE, AMPHETAMINE ASPARTATE MONOHYDRATE, DEXTROAMPHETAMINE SULFATE AND AMPHETAMINE SULFATE 7.5; 7.5; 7.5; 7.5 MG/1; MG/1; MG/1; MG/1
30 CAPSULE, EXTENDED RELEASE ORAL 2 TIMES DAILY
Qty: 60 CAPSULE | Refills: 0 | OUTPATIENT
Start: 2022-04-23 | End: 2022-05-23

## 2022-04-23 NOTE — TELEPHONE ENCOUNTER
Future Appointments    Encounter Information    Provider Department Appt Notes   4/27/2022 Phill [facility] two week follow up   Needs A1C done   8/30/2022 ORTIZ Angeles - [address] [facility]       Past Visits    Date Provider Specialty Visit Type Primary Dx   04/12/2022 ORTIZ Angeles - CNP Family Medicine Office Visit Pain in right femur

## 2022-04-25 RX ORDER — DEXTROAMPHETAMINE SACCHARATE, AMPHETAMINE ASPARTATE MONOHYDRATE, DEXTROAMPHETAMINE SULFATE AND AMPHETAMINE SULFATE 7.5; 7.5; 7.5; 7.5 MG/1; MG/1; MG/1; MG/1
30 CAPSULE, EXTENDED RELEASE ORAL 2 TIMES DAILY
Qty: 60 CAPSULE | Refills: 0 | Status: SHIPPED | OUTPATIENT
Start: 2022-04-25 | End: 2022-05-17 | Stop reason: SDUPTHER

## 2022-05-10 ENCOUNTER — TELEMEDICINE (OUTPATIENT)
Dept: FAMILY MEDICINE CLINIC | Age: 52
End: 2022-05-10
Payer: MEDICARE

## 2022-05-10 DIAGNOSIS — E16.2 HYPOGLYCEMIA: ICD-10-CM

## 2022-05-10 DIAGNOSIS — R05.9 COUGH: Primary | ICD-10-CM

## 2022-05-10 PROCEDURE — 1036F TOBACCO NON-USER: CPT | Performed by: NURSE PRACTITIONER

## 2022-05-10 PROCEDURE — G8427 DOCREV CUR MEDS BY ELIG CLIN: HCPCS | Performed by: NURSE PRACTITIONER

## 2022-05-10 PROCEDURE — G8420 CALC BMI NORM PARAMETERS: HCPCS | Performed by: NURSE PRACTITIONER

## 2022-05-10 PROCEDURE — 99213 OFFICE O/P EST LOW 20 MIN: CPT | Performed by: NURSE PRACTITIONER

## 2022-05-10 RX ORDER — DOXYCYCLINE HYCLATE 100 MG
100 TABLET ORAL 2 TIMES DAILY
Qty: 14 TABLET | Refills: 0 | Status: SHIPPED | OUTPATIENT
Start: 2022-05-10 | End: 2022-05-17

## 2022-05-10 RX ORDER — METHYLPREDNISOLONE 4 MG/1
TABLET ORAL
Qty: 21 TABLET | Refills: 0 | Status: SHIPPED | OUTPATIENT
Start: 2022-05-10 | End: 2022-05-16

## 2022-05-10 ASSESSMENT — ENCOUNTER SYMPTOMS
SHORTNESS OF BREATH: 1
COUGH: 1
CHEST TIGHTNESS: 1
SORE THROAT: 1

## 2022-05-10 NOTE — PROGRESS NOTES
5/10/2022    TELEHEALTH EVALUATION -- Audio/Visual (During TNMOL-91 public health emergency)    Due to Augusto 19 outbreak, patient's office visit was converted to a virtual visit. Patient was contacted and agreed to proceed with a virtual visit via [employer]. me  The risks and benefits of converting to a virtual visit were discussed in light of the current infectious disease epidemic. Patient also understood that insurance coverage and co-pays are up to their individual insurance plans. HPI:    Odalys Ramirez (:  1970) has requested an audio/video evaluation for the following concern(s):      Has had a cold for 9 days. Pain in the chest.   Very bad cough. Worse at night. A little sob. Had been more nasally. Back feels heavy. Scratchy throat improved. No major sinus pain or pressure. Review of Systems   Constitutional: Positive for fatigue. HENT: Positive for congestion and sore throat. Respiratory: Positive for cough, chest tightness and shortness of breath. Cardiovascular: Positive for chest pain. Prior to Visit Medications    Medication Sig Taking? Authorizing Provider   doxycycline hyclate (VIBRA-TABS) 100 MG tablet Take 1 tablet by mouth 2 times daily for 7 days Yes ORTIZ Arreguin CNP   methylPREDNISolone (MEDROL DOSEPACK) 4 MG tablet Take by mouth. Yes ORTIZ Arreguin CNP   amphetamine-dextroamphetamine (ADDERALL XR) 30 MG extended release capsule Take 1 capsule by mouth 2 times daily for 30 days.  Yes Lynette Chou,    midodrine (PROAMATINE) 10 MG tablet Take 10 mg by mouth as needed Yes Historical Provider, MD   hyoscyamine (LEVSIN/SL) 125 MCG sublingual tablet Place 1 tablet under the tongue every 4 hours as needed for Cramping Yes ORTIZ Arreguin CNP   NEXIUM 40 MG delayed release capsule TAKE ONE CAPSULE BY MOUTH EVERY DAY Yes ORTIZ Arreguin CNP   buPROPion (WELLBUTRIN XL) 300 MG extended release tablet Take 1 tablet by mouth every morning TAKE ONE TABLET BY MOUTH EVERY MORNING Yes ORTIZ Queen CNP   predniSONE (DELTASONE) 10 MG tablet Take 5 tabs daily x 3 days, 4 tabs daily x 3 days, 3 tabs daily x 3 days, 2 tabs daily x 3 days, 1 tab daily x 3 days Yes TRUDY Huerta   topiramate (TOPAMAX) 100 MG tablet TAKE ONE TABLET BY MOUTH TWO TIMES A DAY Yes ORTIZ Queen CNP   furosemide (LASIX) 20 MG tablet Take 1 tablet by mouth 2 times daily TAKE ONE TABLET BY MOUTH TWO TIMES A DAY Yes ORTIZ Queen CNP   fludrocortisone (FLORINEF) 0.1 MG tablet TAKE TWO TABLETS BY MOUTH TWICE DAILY Yes Historical Provider, MD   loratadine (CLARITIN) 10 MG tablet Take 1 tablet by mouth daily Yes ORTIZ Queen CNP   potassium chloride (KLOR-CON M) 10 MEQ extended release tablet Take 1 tablet by mouth daily Yes Camilo eLmus PA-C   traZODone (DESYREL) 150 MG tablet Take 1 tablet by mouth nightly take one tablet by mouth nightly Yes ORTIZ Queen CNP   liothyronine (CYTOMEL) 25 MCG tablet Take 1 tablet by mouth daily Yes ORTIZ Queen CNP   aspirin (ASPIRIN CHILDRENS) 81 MG chewable tablet Take 1 tablet by mouth daily Yes TRUDY Wilson   cyclobenzaprine (FLEXERIL) 5 MG tablet Take 2.5-5 mg by mouth 2 times daily as needed Yes Historical Provider, MD   naratriptan (AMERGE) 2.5 MG tablet Take one at onset of severe headache/migraine may repeat x 1 after 4 hours if needed. Maximum 2/day and 2 days/week.  Yes Historical Provider, MD   hydrocortisone (CORTEF) 5 MG tablet Take 5 mg by mouth daily Yes Historical Provider, MD   valACYclovir (VALTREX) 500 MG tablet Take 1 tablet by mouth daily Yes ORTIZ Queen CNP   prochlorperazine (COMPAZINE) 10 MG tablet Take 1 tablet by mouth every 6 hours as needed (nausea) Yes ORTIZ Queen CNP   promethazine (PHENERGAN) 25 MG tablet Take 1 tablet by mouth every 8 hours as needed for Nausea Yes Joselo Ape, APRN - CNP   Magnesium 400 MG TABS Take 1 tablet by mouth daily Yes Naila Taylor PA-C   TRULANCE 3 MG TABS Take 3 mg by mouth daily Yes Historical Provider, MD   metoclopramide (REGLAN) 10 MG tablet Take 10 mg by mouth as needed Yes Historical Provider, MD   amLODIPine (NORVASC) 2.5 MG tablet Take 2.5 mg by mouth daily Yes Historical Provider, MD   ondansetron (ZOFRAN) 4 MG tablet Take 1 tablet by mouth every 8 hours as needed for Nausea or Vomiting Yes Saskia Query, APRN - CNP   azelastine (ASTELIN) 0.1 % nasal spray 1 spray by Nasal route Yes Historical Provider, MD   moxifloxacin (VIGAMOX) 0.5 % ophthalmic solution 1 drop Yes Historical Provider, MD   fluticasone (FLONASE) 50 MCG/ACT nasal spray USE 1 SPRAY NASALLY DAILY Yes Historical Provider, MD   ZOLMitriptan (ZOMIG) 5 MG tablet TAKE 1 TABLET BY MOUTH AT ONSET OF MIGRAINE. MAY REPEAT ONCE AFTER 2 HOURS.  MAX 10 MG (2 TABLETS) PER DAY Yes Historical Provider, MD   levothyroxine (SYNTHROID) 25 MCG tablet Take one daily Yes Saskia Gabriel, APRN - CNP   hydroxychloroquine (PLAQUENIL) 200 MG tablet Take 300 mg by mouth daily  Yes Historical Provider, MD   Pancrelipase, Lip-Prot-Amyl, (CREON) 91012 UNITS CPEP 2 caps tid Yes Han Kurtz MD   ketorolac (TORADOL) 60 MG/2ML injection Inject 2 mLs into the muscle once for 1 dose  [place], PA   NEXIUM 40 MG delayed release capsule TAKE ONE CAPSULE BY MOUTH EVERY DAY  Thomlucie Query, APRN - CNP   topiramate (TOPAMAX) 100 MG tablet Take 1 tablet by mouth 2 times daily  Saskia Query, APRN - CNP   furosemide (LASIX) 20 MG tablet Take 1 tablet by mouth 2 times daily TAKE ONE TABLET BY MOUTH TWO TIMES A DAY  Saskia Query, APRN - CNP       Social History     Tobacco Use    Smoking status: Never Smoker    Smokeless tobacco: Never Used   Vaping Use    Vaping Use: Never used   Substance Use Topics    Alcohol use: No     Alcohol/week: 0.0 standard drinks     Comment: no alcohol since 2011    Drug use: No        No Known Allergies,   Past Medical History:   Diagnosis Date    Acquired hypothyroidism 9/26/2016    Acute left-sided low back pain with bilateral sciatica 3/9/2022    Acute pancreatitis     ADD (attention deficit disorder) 2/12/2015    Anxiety     Congestive heart failure, unspecified HF chronicity, unspecified heart failure type ([facility] 2/11/2022    Depression 12/9/2015    Endometrial cyst of ovary 5/10/14    RT ovary, ruptured    GERD (gastroesophageal reflux disease) 9/26/2016    Medullary sponge kidney of both kidneys 5/22/2018    Sjogren's disease ([facility]     Stress     Swelling    ,   Past Surgical History:   Procedure Laterality Date    BREAST BIOPSY Left 2015? ?    lump removed (benign) (Dr. Judith Garber)   [address] Left 2015??     Dr Judtih Garber (benign)    BREAST LUMPECTOMY      CHOLECYSTECTOMY  2011    HYSTERECTOMY  2014 sept (total)    OVARY REMOVAL Left Jan 2014    UPPER GASTROINTESTINAL ENDOSCOPY  09/16/2016    EGD W/BX    UPPER GASTROINTESTINAL ENDOSCOPY N/A 5/6/2021    ENDOSCOPIC ULTRASOUND with EGD performed by Mignon Kelly MD at [facility]   ,   Social History     Tobacco Use    Smoking status: Never Smoker    Smokeless tobacco: Never Used   Vaping Use    Vaping Use: Never used   Substance Use Topics    Alcohol use: No     Alcohol/week: 0.0 standard drinks     Comment: no alcohol since 2011    Drug use: No   ,   Family History   Problem Relation Age of Onset    Heart Disease Father 48    Cancer Maternal Grandmother         breast    Breast Cancer Maternal Grandmother         in her 63's    Heart Disease Other         mom and dad's side    Breast Cancer Paternal Grandmother         in her 63's    Breast Cancer Paternal Aunt         in her 29's    Breast Cancer Paternal Aunt         in her 63's       PHYSICAL EXAMINATION:  [ INSTRUCTIONS:  \"[x]\" Indicates a positive item  \"[]\" Indicates a negative item  -- DELETE ALL ITEMS NOT EXAMINED]  [x] Alert  [x] Oriented to person/place/time    [x] No apparent distress  [] Toxic appearing    [] Face flushed appearing [] Sclera clear  [] Lips are cyanotic      [x] Breathing appears normal  [] Appears tachypneic      [] Rash on visible skin    [] Cranial Nerves II-XII grossly intact    [] Motor grossly intact in visible upper extremities    [] Motor grossly intact in visible lower extremities    [x] Normal Mood  [] Anxious appearing    [] Depressed appearing  [] Confused appearing      [] Poor short term memory  [] Poor long term memory    [] OTHER:      Due to this being a TeleHealth encounter, evaluation of the following organ systems is limited: Vitals/Constitutional/EENT/Resp/CV/GI//MS/Neuro/Skin/Heme-Lymph-Imm. ASSESSMENT/PLAN:  1. Cough    - doxycycline hyclate (VIBRA-TABS) 100 MG tablet; Take 1 tablet by mouth 2 times daily for 7 days  Dispense: 14 tablet; Refill: 0  - methylPREDNISolone (MEDROL DOSEPACK) 4 MG tablet; Take by mouth. Dispense: 21 tablet; Refill: 0    2. Hypoglycemia    - Hemoglobin A1C; Future      Side effects, adverse effects of the medication prescribed today, as well as treatment plan/ rationale and result expectations have been discussed with the patient who expresses understanding and desires to proceed. Close follow up to evaluate treatment results and for coordination of care. I have reviewed the patient's medical history in detail and updated the computerized patient record. As always, patient is advised that if symptoms worsen in any way they will proceed to the nearest emergency room. An  electronic signature was used to authenticate this note.     --ORTIZ Keane - CNP on 5/10/2022 at 12:49 PM        Pursuant to the emergency declaration under the [facility] waiver authority and the [employer] and [employer] Act, this Virtual  Visit was conducted, with patient's consent, to reduce the patient's risk of exposure to COVID-19 and provide continuity of care for an established patient. Services were provided through a video synchronous discussion virtually to substitute for in-person clinic visit.

## 2022-05-11 DIAGNOSIS — R53.83 FATIGUE, UNSPECIFIED TYPE: ICD-10-CM

## 2022-05-11 DIAGNOSIS — D72.829 LEUKOCYTOSIS, UNSPECIFIED TYPE: ICD-10-CM

## 2022-05-11 DIAGNOSIS — K86.1 CHRONIC PANCREATITIS, UNSPECIFIED PANCREATITIS TYPE (HCC): ICD-10-CM

## 2022-05-11 DIAGNOSIS — E16.2 HYPOGLYCEMIA: ICD-10-CM

## 2022-05-11 DIAGNOSIS — R07.9 CHEST PAIN, UNSPECIFIED TYPE: ICD-10-CM

## 2022-05-11 LAB
ALBUMIN SERPL-MCNC: 4.6 G/DL (ref 3.5–4.6)
ALP BLD-CCNC: 95 U/L (ref 40–130)
ALT SERPL-CCNC: 15 U/L (ref 0–33)
AMYLASE: 61 U/L (ref 22–93)
ANION GAP SERPL CALCULATED.3IONS-SCNC: 17 MEQ/L (ref 9–15)
AST SERPL-CCNC: 18 U/L (ref 0–35)
BASOPHILS ABSOLUTE: 0.1 K/UL (ref 0–0.2)
BASOPHILS RELATIVE PERCENT: 1.2 %
BILIRUB SERPL-MCNC: 0.3 MG/DL (ref 0.2–0.7)
BUN BLDV-MCNC: 14 MG/DL (ref 6–20)
CALCIUM SERPL-MCNC: 9.5 MG/DL (ref 8.5–9.9)
CHLORIDE BLD-SCNC: 99 MEQ/L (ref 95–107)
CO2: 21 MEQ/L (ref 20–31)
CREAT SERPL-MCNC: 0.76 MG/DL (ref 0.5–0.9)
D DIMER: 0.33 MG/L FEU (ref 0–0.5)
EOSINOPHILS ABSOLUTE: 0 K/UL (ref 0–0.7)
EOSINOPHILS RELATIVE PERCENT: 1 %
GFR AFRICAN AMERICAN: >60
GFR NON-AFRICAN AMERICAN: >60
GLOBULIN: 2.5 G/DL (ref 2.3–3.5)
GLUCOSE BLD-MCNC: 90 MG/DL (ref 70–99)
HBA1C MFR BLD: 4.9 % (ref 4.8–5.9)
HCT VFR BLD CALC: 37.3 % (ref 37–47)
HEMOGLOBIN: 12.1 G/DL (ref 12–16)
LIPASE: 23 U/L (ref 12–95)
LYMPHOCYTES ABSOLUTE: 2 K/UL (ref 1–4.8)
LYMPHOCYTES RELATIVE PERCENT: 39 %
MCH RBC QN AUTO: 28.8 PG (ref 27–31.3)
MCHC RBC AUTO-ENTMCNC: 32.3 % (ref 33–37)
MCV RBC AUTO: 89.2 FL (ref 82–100)
MONOCYTES ABSOLUTE: 0.3 K/UL (ref 0.2–0.8)
MONOCYTES RELATIVE PERCENT: 6.3 %
NEUTROPHILS ABSOLUTE: 2.6 K/UL (ref 1.4–6.5)
NEUTROPHILS RELATIVE PERCENT: 52.5 %
PDW BLD-RTO: 12.8 % (ref 11.5–14.5)
PLATELET # BLD: 314 K/UL (ref 130–400)
POTASSIUM SERPL-SCNC: 3.4 MEQ/L (ref 3.4–4.9)
RBC # BLD: 4.18 M/UL (ref 4.2–5.4)
SODIUM BLD-SCNC: 137 MEQ/L (ref 135–144)
TOTAL PROTEIN: 7.1 G/DL (ref 6.3–8)
WBC # BLD: 5 K/UL (ref 4.8–10.8)

## 2022-05-17 DIAGNOSIS — Z76.0 MEDICATION REFILL: ICD-10-CM

## 2022-05-17 DIAGNOSIS — F98.8 ATTENTION DEFICIT DISORDER, UNSPECIFIED HYPERACTIVITY PRESENCE: ICD-10-CM

## 2022-05-17 RX ORDER — FUROSEMIDE 20 MG/1
20 TABLET ORAL 2 TIMES DAILY
Qty: 60 TABLET | Refills: 0 | Status: SHIPPED | OUTPATIENT
Start: 2022-05-17 | End: 2022-06-20

## 2022-05-17 NOTE — TELEPHONE ENCOUNTER
furosemide (LASIX) 20 MG tablet ORTIZ Ovalle CNP]                     Future Appointments    Encounter Information    Provider Department Appt Notes   8/30/2022 ORTIZ Ovalle [facility] [facility]     Past Visits    Date Provider Specialty Visit Type Primary Dx   05/10/2022 ORTIZ Ovalle CNP Family Medicine Telemedicine Cough   04/12/2022 ORTIZ Ovalle CNP Family Medicine Office Visit Pain in right femur   04/07/2022 ORTIZ Ovalle CNP Family Medicine Office Visit Breast pain, left   03/09/2022 Juan Krishnamurthy, [place] [facility] Office Visit Acute left-sided low back pain with bilateral sciatica   02/11/2022 ORTIZ Lang CNP Family Medicine Virtual Visit Acute pharyngitis, unspecified

## 2022-05-17 NOTE — TELEPHONE ENCOUNTER
furosemide (LASIX) 20 MG tablet Hesham Toro, ORTIZ - KRIS]                     Future Appointments    Encounter Information    Provider Department Appt Notes   8/30/2022 ORTIZ Valladares - [facility]     Past Visits    Date Provider Specialty Visit Type Primary Dx   05/10/2022 ORTIZ Valladares CNP Family Medicine Telemedicine Cough   04/12/2022 ORTIZ Valladares - CNP Family Medicine Office Visit Pain in right femur   04/07/2022 ORTIZ Valladares - CNP Family Medicine Office Visit Breast pain, left   03/09/2022 Joe Rm, [address] [facility] Office Visit Acute left-sided low back pain with bilateral sciatica   02/11/2022 Rubin Bowen, ORTIZ - CNP Family Medicine Virtual Visit Acute pharyngitis, unspecified

## 2022-05-19 RX ORDER — DEXTROAMPHETAMINE SACCHARATE, AMPHETAMINE ASPARTATE MONOHYDRATE, DEXTROAMPHETAMINE SULFATE AND AMPHETAMINE SULFATE 7.5; 7.5; 7.5; 7.5 MG/1; MG/1; MG/1; MG/1
30 CAPSULE, EXTENDED RELEASE ORAL 2 TIMES DAILY
Qty: 60 CAPSULE | Refills: 0 | Status: SHIPPED | OUTPATIENT
Start: 2022-05-19 | End: 2022-06-21 | Stop reason: SDUPTHER

## 2022-05-26 ENCOUNTER — CARE COORDINATION (OUTPATIENT)
Dept: CARE COORDINATION | Age: 52
End: 2022-05-26

## 2022-05-26 NOTE — CARE COORDINATION
Attempted to contact patient for care coordination discussion  Unable to reach patient by phone. Message left regarding purpose of call. Number provided and call back requested.

## 2022-05-27 ENCOUNTER — CARE COORDINATION (OUTPATIENT)
Dept: CARE COORDINATION | Age: 52
End: 2022-05-27

## 2022-05-27 SDOH — ECONOMIC STABILITY: HOUSING INSECURITY
IN THE LAST 12 MONTHS, WAS THERE A TIME WHEN YOU DID NOT HAVE A STEADY PLACE TO SLEEP OR SLEPT IN A SHELTER (INCLUDING NOW)?: NO

## 2022-05-27 SDOH — HEALTH STABILITY: PHYSICAL HEALTH: ON AVERAGE, HOW MANY DAYS PER WEEK DO YOU ENGAGE IN MODERATE TO STRENUOUS EXERCISE (LIKE A BRISK WALK)?: 3 DAYS

## 2022-05-27 SDOH — ECONOMIC STABILITY: INCOME INSECURITY: IN THE LAST 12 MONTHS, WAS THERE A TIME WHEN YOU WERE NOT ABLE TO PAY THE MORTGAGE OR RENT ON TIME?: NO

## 2022-05-27 SDOH — HEALTH STABILITY: PHYSICAL HEALTH: ON AVERAGE, HOW MANY MINUTES DO YOU ENGAGE IN EXERCISE AT THIS LEVEL?: 10 MIN

## 2022-05-27 ASSESSMENT — LIFESTYLE VARIABLES: HOW OFTEN DO YOU HAVE A DRINK CONTAINING ALCOHOL: NEVER

## 2022-05-27 ASSESSMENT — SOCIAL DETERMINANTS OF HEALTH (SDOH)
DO YOU BELONG TO ANY CLUBS OR ORGANIZATIONS SUCH AS CHURCH GROUPS UNIONS, FRATERNAL OR ATHLETIC GROUPS, OR SCHOOL GROUPS?: NO
HOW OFTEN DO YOU GET TOGETHER WITH FRIENDS OR RELATIVES?: NEVER
IN A TYPICAL WEEK, HOW MANY TIMES DO YOU TALK ON THE PHONE WITH FAMILY, FRIENDS, OR NEIGHBORS?: TWICE A WEEK
ARE YOU MARRIED, WIDOWED, DIVORCED, SEPARATED, NEVER MARRIED, OR LIVING WITH A PARTNER?: NEVER MARRIED
HOW OFTEN DO YOU ATTENT MEETINGS OF THE CLUB OR ORGANIZATION YOU BELONG TO?: NEVER

## 2022-05-27 ASSESSMENT — PATIENT HEALTH QUESTIONNAIRE - PHQ9
SUM OF ALL RESPONSES TO PHQ QUESTIONS 1-9: 10

## 2022-05-27 NOTE — LETTER
5/27/2022      Malissa Severin  [address]  [facility] [place] [address]      Dear Malissa Severin,    My name is Jade Monae RN and I am a registered nurse who partners with ORTZI Layne CNP to improve patients' health. ORTIZ Layne CNP believes you would benefit from working with me. As a member of your health care team, I would work with other providers involved in your care, offer education for your specific health conditions, and connect you with additional resources as needed. I will collaborate with ORTIZ Layne CNP to support you in following your treatment plan. The additional support I provide is no additional cost to you. My primary focus is to help you achieve specific goals and improve your health. We are committed to walk with you on this journey and look forward to working with you. Please call me to further discuss your healthcare needs. I am available by phone or for appointments at the office. You can reach me at [phone]. In good health,     Yoana Gastelum RN, BSN    Jade Monae, RN            ENC: CHF zone tool

## 2022-05-27 NOTE — CARE COORDINATION
Ambulatory Care Coordination Note  5/27/2022  CM Risk Score: 10  Charlson 10 Year Mortality Risk Score: 23%     ACC: Tasneem Lundy, DARWIN    Summary Note:     I called [facility] to begin care coordination enrollment as discussed earlier. She tells me that she had a mammogram followed by ultrasound which was negative. She states that now she has developed a firm non mobile deep mass in the right arm pit over the last week. I will follow up with Judith Beltran to see if she feels a follow up with Dr Rivera Liu should be considered. She adds that she is having kidney issues and her nephrologist has recommended follow up as well. We have reviewed allergies, current medical history, falls screening, initiated CC protocol, depression screening, SDOH review, travel screening and ACP review. She does not have her medications with her and we will review these during our next encounter. PHQ-9 = 10 which is moderately depressed. She has had her COVID vaccines and one booster she did have cold type symptoms last week and completed a in home COVID test which was negative. She does not have Advanced Directives and is not interested in completing these at this time. Lab Results     None          Care Coordination Interventions    Referral from Primary Care Provider: No  Suggested Interventions and Community Resources         Goals Addressed    None         Prior to Admission medications    Medication Sig Start Date End Date Taking? Authorizing Provider   amphetamine-dextroamphetamine (ADDERALL XR) 30 MG extended release capsule Take 1 capsule by mouth 2 times daily for 30 days.  5/19/22 6/18/22  ORTIZ Caraballo CNP   furosemide (LASIX) 20 MG tablet Take 1 tablet by mouth 2 times daily TAKE ONE TABLET BY MOUTH TWO TIMES A DAY 5/17/22   ORTIZ Caraballo CNP   midodrine (PROAMATINE) 10 MG tablet Take 10 mg by mouth as needed 4/12/22   Historical Provider, MD   hyoscyamine (LEVSIN/SL) 125 MCG sublingual tablet Place 1 tablet under the tongue every 4 hours as needed for Cramping 4/7/22   ORTIZ Og CNP   NEXIUM 40 MG delayed release capsule TAKE ONE CAPSULE BY MOUTH EVERY DAY 3/24/22   ORTIZ Og CNP   buPROPion (WELLBUTRIN XL) 300 MG extended release tablet Take 1 tablet by mouth every morning TAKE ONE TABLET BY MOUTH EVERY MORNING 3/14/22   ORTIZ Og CNP   predniSONE (DELTASONE) 10 MG tablet Take 5 tabs daily x 3 days, 4 tabs daily x 3 days, 3 tabs daily x 3 days, 2 tabs daily x 3 days, 1 tab daily x 3 days 3/9/22   TRUDY Dailey   ketorolac (TORADOL) 60 MG/2ML injection Inject 2 mLs into the muscle once for 1 dose 3/9/22 3/9/22  TRUDY Dailey   topiramate (TOPAMAX) 100 MG tablet TAKE ONE TABLET BY MOUTH TWO TIMES A DAY 3/3/22   ORTIZ Og CNP   fludrocortisone (FLORINEF) 0.1 MG tablet TAKE TWO TABLETS BY MOUTH TWICE DAILY 12/14/21   Historical Provider, MD   loratadine (CLARITIN) 10 MG tablet Take 1 tablet by mouth daily 12/28/21   ORTIZ Og CNP   potassium chloride (KLOR-CON M) 10 MEQ extended release tablet Take 1 tablet by mouth daily 12/8/21   Peter Tejada PA-C   traZODone (DESYREL) 150 MG tablet Take 1 tablet by mouth nightly take one tablet by mouth nightly 12/7/21   ORTIZ Og CNP   liothyronine (CYTOMEL) 25 MCG tablet Take 1 tablet by mouth daily 7/6/21   ORTIZ Og CNP   aspirin (ASPIRIN CHILDRENS) 81 MG chewable tablet Take 1 tablet by mouth daily 6/24/21   TRUDY Rodriguez   cyclobenzaprine (FLEXERIL) 5 MG tablet Take 2.5-5 mg by mouth 2 times daily as needed 6/2/21   Historical Provider, MD   naratriptan (AMERGE) 2.5 MG tablet Take one at onset of severe headache/migraine may repeat x 1 after 4 hours if needed. Maximum 2/day and 2 days/week.  6/2/21   Historical Provider, MD   hydrocortisone (CORTEF) 5 MG tablet Take 5 mg by mouth daily 2/24/21   Historical Provider, MD   valACYclovir (VALTREX) 500 MG tablet Take 1 tablet by mouth daily 2/15/21   ORTIZ Ovalle CNP   prochlorperazine (COMPAZINE) 10 MG tablet Take 1 tablet by mouth every 6 hours as needed (nausea) 1/12/21   ORTIZ Ovalle CNP   promethazine (PHENERGAN) 25 MG tablet Take 1 tablet by mouth every 8 hours as needed for Nausea 11/2/20   ORTIZ Ovalle CNP   NEXIUM 40 MG delayed release capsule TAKE ONE CAPSULE BY MOUTH EVERY DAY 11/2/20   ORTIZ Ovalle CNP   topiramate (TOPAMAX) 100 MG tablet Take 1 tablet by mouth 2 times daily 10/11/20   ORTIZ Ovalle CNP   Magnesium 400 MG TABS Take 1 tablet by mouth daily 10/3/20   Britton Olson PA-C   furosemide (LASIX) 20 MG tablet Take 1 tablet by mouth 2 times daily TAKE ONE TABLET BY MOUTH TWO TIMES A DAY 8/4/20   ORTIZ Ovalle CNP   TRULANCE 3 MG TABS Take 3 mg by mouth daily 3/10/20   Historical Provider, MD   metoclopramide (REGLAN) 10 MG tablet Take 10 mg by mouth as needed 2/27/20   Historical Provider, MD   amLODIPine (NORVASC) 2.5 MG tablet Take 2.5 mg by mouth daily 2/4/20   Historical Provider, MD   ondansetron (ZOFRAN) 4 MG tablet Take 1 tablet by mouth every 8 hours as needed for Nausea or Vomiting 2/25/20   ORTIZ Ovalle CNP   azelastine (ASTELIN) 0.1 % nasal spray 1 spray by Nasal route 11/27/19   Historical Provider, MD   moxifloxacin (VIGAMOX) 0.5 % ophthalmic solution 1 drop    Historical Provider, MD   fluticasone (FLONASE) 50 MCG/ACT nasal spray USE 1 SPRAY NASALLY DAILY 5/15/19   Historical Provider, MD   ZOLMitriptan (ZOMIG) 5 MG tablet TAKE 1 TABLET BY MOUTH AT ONSET OF MIGRAINE. MAY REPEAT ONCE AFTER 2 HOURS.  MAX 10 MG (2 TABLETS) PER DAY 6/4/18   Historical Provider, MD   levothyroxine (SYNTHROID) 25 MCG tablet Take one daily 4/29/18   Akbar Hurry, APRN - CNP   hydroxychloroquine (PLAQUENIL) 200 MG tablet Take 300 mg by mouth daily     Historical Provider, MD   Pancrelipase, Lip-Prot-Amyl, (CREON) 94988 UNITS CPEP 2 caps chele 9/23/16   Chana Meehan MD       Future Appointments   Date Time Provider Angela Hernandez   8/30/2022 10:30 AM ORTIZ Khan - CNP [facility], [facility].

## 2022-05-27 NOTE — CARE COORDINATION
I reviewed message left by Miguel Goldstein regarding lack of contact  I have reivewed my phone log and it appears I dialed a wrong number/  I spoke with her about care coordination   I have worked with her in the past.  She feels that she has had significant changes and would like the additional help and support  She is currently at a provider office with her daughter and I will call back this afternoon.

## 2022-05-31 ENCOUNTER — OFFICE VISIT (OUTPATIENT)
Dept: FAMILY MEDICINE CLINIC | Age: 52
End: 2022-05-31
Payer: MEDICARE

## 2022-05-31 VITALS
TEMPERATURE: 97.5 F | OXYGEN SATURATION: 100 % | BODY MASS INDEX: 19.39 KG/M2 | SYSTOLIC BLOOD PRESSURE: 100 MMHG | DIASTOLIC BLOOD PRESSURE: 68 MMHG | WEIGHT: 106 LBS | HEART RATE: 84 BPM

## 2022-05-31 DIAGNOSIS — F41.9 ANXIETY: ICD-10-CM

## 2022-05-31 DIAGNOSIS — R59.0 LYMPHADENOPATHY, AXILLARY: Primary | ICD-10-CM

## 2022-05-31 PROCEDURE — G8420 CALC BMI NORM PARAMETERS: HCPCS | Performed by: STUDENT IN AN ORGANIZED HEALTH CARE EDUCATION/TRAINING PROGRAM

## 2022-05-31 PROCEDURE — G8427 DOCREV CUR MEDS BY ELIG CLIN: HCPCS | Performed by: STUDENT IN AN ORGANIZED HEALTH CARE EDUCATION/TRAINING PROGRAM

## 2022-05-31 PROCEDURE — 99213 OFFICE O/P EST LOW 20 MIN: CPT | Performed by: STUDENT IN AN ORGANIZED HEALTH CARE EDUCATION/TRAINING PROGRAM

## 2022-05-31 PROCEDURE — 1036F TOBACCO NON-USER: CPT | Performed by: STUDENT IN AN ORGANIZED HEALTH CARE EDUCATION/TRAINING PROGRAM

## 2022-05-31 RX ORDER — LORAZEPAM 0.5 MG/1
0.5 TABLET ORAL EVERY 8 HOURS PRN
Qty: 30 TABLET | Refills: 0 | Status: SHIPPED | OUTPATIENT
Start: 2022-05-31 | End: 2022-06-15

## 2022-05-31 RX ORDER — LIDOCAINE 50 MG/G
PATCH TOPICAL
COMMUNITY
Start: 2022-05-17

## 2022-05-31 SDOH — ECONOMIC STABILITY: FOOD INSECURITY: WITHIN THE PAST 12 MONTHS, THE FOOD YOU BOUGHT JUST DIDN'T LAST AND YOU DIDN'T HAVE MONEY TO GET MORE.: NEVER TRUE

## 2022-05-31 SDOH — ECONOMIC STABILITY: FOOD INSECURITY: WITHIN THE PAST 12 MONTHS, YOU WORRIED THAT YOUR FOOD WOULD RUN OUT BEFORE YOU GOT MONEY TO BUY MORE.: NEVER TRUE

## 2022-05-31 ASSESSMENT — ENCOUNTER SYMPTOMS
COUGH: 0
SORE THROAT: 0
SHORTNESS OF BREATH: 0
VOMITING: 0
WHEEZING: 0
SINUS PRESSURE: 0
ABDOMINAL PAIN: 0

## 2022-05-31 ASSESSMENT — SOCIAL DETERMINANTS OF HEALTH (SDOH): HOW HARD IS IT FOR YOU TO PAY FOR THE VERY BASICS LIKE FOOD, HOUSING, MEDICAL CARE, AND HEATING?: NOT HARD AT ALL

## 2022-05-31 NOTE — PROGRESS NOTES
2022    Mauri Dee (:  1970) is a 54 y.o. female, here for evaluation of the following medical concerns:  Chief Complaint   Patient presents with    Lymphadenopathy     Both armpits.  Fatigue     HPI  Axillary LN swelling  Started a few weeks ago  Very fatigued recently  Endorses chills, decreased appetite, easily fatigued  No fevers or night sweats  She has been very anxious recently and does not feel herself      Review of Systems   Constitutional: Positive for appetite change and fatigue. Negative for chills and fever. HENT: Negative for congestion, sinus pressure and sore throat. Respiratory: Negative for cough, shortness of breath and wheezing. Cardiovascular: Negative for chest pain and palpitations. Gastrointestinal: Negative for abdominal pain and vomiting. Musculoskeletal: Negative for arthralgias and myalgias. Skin: Negative for rash and wound. Neurological: Negative for speech difficulty and light-headedness. Hematological: Positive for adenopathy. Psychiatric/Behavioral: Negative for suicidal ideas. The patient is nervous/anxious. Prior to Visit Medications    Medication Sig Taking? Authorizing Provider   lidocaine (LIDODERM) 5 % apply one patch as directed once daily to affected area remove patch after twelve hours. Yes Historical Provider, MD   LORazepam (ATIVAN) 0.5 MG tablet Take 1 tablet by mouth every 8 hours as needed for Anxiety for up to 15 days. Yes Dima Jones DO   amphetamine-dextroamphetamine (ADDERALL XR) 30 MG extended release capsule Take 1 capsule by mouth 2 times daily for 30 days.  Yes Page Roots, APRN - CNP   furosemide (LASIX) 20 MG tablet Take 1 tablet by mouth 2 times daily TAKE ONE TABLET BY MOUTH TWO TIMES A DAY Yes Page Roots, APRN - CNP   midodrine (PROAMATINE) 10 MG tablet Take 10 mg by mouth as needed Yes Historical Provider, MD   hyoscyamine (LEVSIN/SL) 125 MCG sublingual tablet Place 1 tablet under the tongue every 4 hours as needed for Cramping Yes ORTIZ Khan CNP   NEXIUM 40 MG delayed release capsule TAKE ONE CAPSULE BY MOUTH EVERY DAY Yes ORTIZ Khan CNP   buPROPion (WELLBUTRIN XL) 300 MG extended release tablet Take 1 tablet by mouth every morning TAKE ONE TABLET BY MOUTH EVERY MORNING Yes ORTIZ Khan CNP   topiramate (TOPAMAX) 100 MG tablet TAKE ONE TABLET BY MOUTH TWO TIMES A DAY Yes ORTIZ Khan CNP   loratadine (CLARITIN) 10 MG tablet Take 1 tablet by mouth daily Yes ORTIZ Khan CNP   potassium chloride (KLOR-CON M) 10 MEQ extended release tablet Take 1 tablet by mouth daily Yes Freida Lino PA-C   traZODone (DESYREL) 150 MG tablet Take 1 tablet by mouth nightly take one tablet by mouth nightly Yes ORTIZ Khan CNP   liothyronine (CYTOMEL) 25 MCG tablet Take 1 tablet by mouth daily Yes ORTIZ Khan CNP   aspirin (ASPIRIN CHILDRENS) 81 MG chewable tablet Take 1 tablet by mouth daily Yes TRUDY Su   cyclobenzaprine (FLEXERIL) 5 MG tablet Take 2.5-5 mg by mouth 2 times daily as needed Yes Historical Provider, MD   naratriptan (AMERGE) 2.5 MG tablet Take one at onset of severe headache/migraine may repeat x 1 after 4 hours if needed. Maximum 2/day and 2 days/week.  Yes Historical Provider, MD   hydrocortisone (CORTEF) 5 MG tablet Take 5 mg by mouth daily Yes Historical Provider, MD   valACYclovir (VALTREX) 500 MG tablet Take 1 tablet by mouth daily Yes ORTIZ Khan CNP   prochlorperazine (COMPAZINE) 10 MG tablet Take 1 tablet by mouth every 6 hours as needed (nausea) Yes ORTIZ Khan CNP   promethazine (PHENERGAN) 25 MG tablet Take 1 tablet by mouth every 8 hours as needed for Nausea Yes ORTIZ Khan CNP   Magnesium 400 MG TABS Take 1 tablet by mouth daily Yes Tahira Mariano PA-C   TRULANCE 3 MG TABS Take 3 mg by mouth daily Yes Historical Provider, MD   metoclopramide (REGLAN) 10 MG tablet Take 10 mg by mouth as needed Yes Historical Provider, MD   ondansetron (ZOFRAN) 4 MG tablet Take 1 tablet by mouth every 8 hours as needed for Nausea or Vomiting Yes ORTIZ Tubbs CNP   azelastine (ASTELIN) 0.1 % nasal spray 1 spray by Nasal route Yes Historical Provider, MD   moxifloxacin (VIGAMOX) 0.5 % ophthalmic solution 1 drop Yes Historical Provider, MD   fluticasone (FLONASE) 50 MCG/ACT nasal spray USE 1 SPRAY NASALLY DAILY Yes Historical Provider, MD   ZOLMitriptan (ZOMIG) 5 MG tablet TAKE 1 TABLET BY MOUTH AT ONSET OF MIGRAINE. MAY REPEAT ONCE AFTER 2 HOURS.  MAX 10 MG (2 TABLETS) PER DAY Yes Historical Provider, MD   levothyroxine (SYNTHROID) 25 MCG tablet Take one daily Yes ORTIZ Tubbs CNP   hydroxychloroquine (PLAQUENIL) 200 MG tablet Take 300 mg by mouth daily  Yes Historical Provider, MD   Pancrelipase, Lip-Prot-Amyl, (CREON) 15129 UNITS CPEP 2 caps tid Yes Tim Berry MD   NEXIUM 40 MG delayed release capsule TAKE ONE CAPSULE BY MOUTH EVERY DAY  ORTIZ Tubbs CNP   topiramate (TOPAMAX) 100 MG tablet Take 1 tablet by mouth 2 times daily  ORTIZ Tubbs CNP   furosemide (LASIX) 20 MG tablet Take 1 tablet by mouth 2 times daily TAKE ONE TABLET BY MOUTH TWO TIMES A DAY  ORTIZ Tubbs CNP        Medications Discontinued During This Encounter   Medication Reason    ketorolac (TORADOL) 60 MG/2ML injection Patient Choice    predniSONE (DELTASONE) 10 MG tablet Therapy completed    fludrocortisone (FLORINEF) 0.1 MG tablet Patient Choice    amLODIPine (NORVASC) 2.5 MG tablet Patient Choice       No Known Allergies    Past Medical History:   Diagnosis Date    Acquired hypothyroidism 9/26/2016    Acute left-sided low back pain with bilateral sciatica 3/9/2022    Acute pancreatitis     ADD (attention deficit disorder) 2/12/2015    Anxiety     Congestive heart failure, unspecified HF chronicity, [facility] 2/11/2022    Depression 12/9/2015  Endometrial cyst of ovary 5/10/14    RT ovary, ruptured    GERD (gastroesophageal reflux disease) 9/26/2016    Medullary sponge kidney of both kidneys 5/22/2018    Sjogren's disease ([facility]     Stress     Swelling        Past Surgical History:   Procedure Laterality Date    BREAST BIOPSY Left 2015? ?    lump removed (benign) (Dr. Celia Sparks)   [address] Left 2015?? Dr Celia Sparks (benign)    BREAST LUMPECTOMY      CHOLECYSTECTOMY  2011    HYSTERECTOMY  2014 sept (total)    OVARY REMOVAL Left Jan 2014    UPPER GASTROINTESTINAL ENDOSCOPY  09/16/2016    EGD W/BX    UPPER GASTROINTESTINAL ENDOSCOPY N/A 5/6/2021    ENDOSCOPIC ULTRASOUND with EGD performed by Jocy Gunderson MD at [address] [place] [address] Marital status: Single     Spouse name: Not on file    Number of children: Not on file    Years of education: Not on file    Highest education level: Not on file   Occupational History    Not on file   Tobacco Use    Smoking status: Never Smoker    Smokeless tobacco: Never Used   Vaping Use    Vaping Use: Never used   Substance and Sexual Activity    Alcohol use: No     Alcohol/week: 0.0 standard drinks     Comment: no alcohol since 2011    Drug use: No    Sexual activity: Not on file   Other Topics Concern    Not on file   Social History Narrative    ** Merged History Encounter **          Social Determinants of Health     Financial Resource Strain: Low Risk     Difficulty of Paying Living Expenses: Not hard at all   Food Insecurity: No Food Insecurity    Worried About [facility] in the Last Year: Never true    [place] of Food in the Last Year: Never true   Transportation Needs: No Transportation Needs    Lack of Transportation (Medical): No    Lack of Transportation (Non-Medical):  No   Physical Activity: Insufficiently Active    Days of Exercise per Week: 3 days    Minutes of Exercise per Session: 10 min   Stress: Stress Concern Present    Feeling of Stress : Rather much   Social Connections: Unknown    Frequency of Communication with Friends and Family: Twice a week    Frequency of Social Gatherings with Friends and Family: Never    Attends Rastafari Services: Not on file    Active Member of Clubs or Organizations: No    Attends Club or Organization Meetings: Never    Marital Status: Never    Intimate Partner Violence:     Fear of Current or Ex-Partner: Not on file    Emotionally Abused: Not on file    Physically Abused: Not on file    Sexually Abused: Not on file   Housing Stability: Unknown    Unable to Pay for Housing in the Last Year: No    Number of [place] in the Last Year: Not on file    Unstable Housing in the Last Year: No        Family History   Problem Relation Age of Onset    Heart Disease Father 48    Cancer Maternal Grandmother         breast    Breast Cancer Maternal Grandmother         in her 63's    Heart Disease Other         mom and dad's side    Breast Cancer Paternal Grandmother         in her 63's    Breast Cancer Paternal Aunt         in her 29's    Breast Cancer Paternal Aunt         in her 63's       Vitals:    05/31/22 1602   BP: 100/68   Pulse: 84   Temp: 97.5 °F (36.4 °C)   SpO2: 100%   Weight: 106 lb (48.1 kg)       Estimated body mass index is 19.39 kg/m² as calculated from the following:    Height as of 4/12/22: 5' 2\" (1.575 m). Weight as of this encounter: 106 lb (48.1 kg). No results for input(s): WBC, RBC, HGB, HCT, MCV, MCH, MCHC, RDW, PLT, MPV in the last 72 hours. No results for input(s): NA, K, CL, CO2, BUN, CREATININE, GLUCOSE, CALCIUM, PROT, LABALBU, BILITOT, ALKPHOS, AST, ALT in the last 72 hours. Lab Results   Component Value Date    LABA1C 4.9 05/11/2022       XR CHEST STANDARD (2 VW)    Result Date: 5/11/2022  No acute cardiopulmonary process       Physical Exam  Constitutional:       General: She is not in acute distress.      Appearance: Normal appearance. HENT:      Head: Normocephalic and atraumatic. Eyes:      Extraocular Movements: Extraocular movements intact. Conjunctiva/sclera: Conjunctivae normal.   Chest:   Breasts:      Right: Axillary adenopathy present. Left: Axillary adenopathy present. Musculoskeletal:         General: No deformity. Normal range of motion. Lymphadenopathy:      Upper Body:      Right upper body: Axillary adenopathy present. Left upper body: Axillary adenopathy present. Skin:     Findings: No lesion or rash. Neurological:      General: No focal deficit present. Mental Status: She is alert. Mental status is at baseline. Psychiatric:         Mood and Affect: Mood normal.         Behavior: Behavior normal.         Thought Content: Thought content normal.         ASSESSMENT/PLAN:  1. Lymphadenopathy, axillary  Recent labs from 5/11/2022 with normal CBC  Mammogram 4/15/2022 normal  We will get ultrasound axillary region with possible lymph node biopsy    - US BIOPSY LYMPH NODE; Future    2. Anxiety  Patient visibly anxious on exam  States she is barely been able to sleep as she is not feeling herself and is worried that something serious is going on with the lymph node swelling  Will do short course of Ativan for anxiety    - LORazepam (ATIVAN) 0.5 MG tablet; Take 1 tablet by mouth every 8 hours as needed for Anxiety for up to 15 days. Dispense: 30 tablet;  Refill: 0      Medications Discontinued During This Encounter   Medication Reason    ketorolac (TORADOL) 60 MG/2ML injection Patient Choice    predniSONE (DELTASONE) 10 MG tablet Therapy completed    fludrocortisone (FLORINEF) 0.1 MG tablet Patient Choice    amLODIPine (NORVASC) 2.5 MG tablet Patient Choice       ---------------------------------------------------------------------  Side effects, adverse effects of the medication prescribed today, as well as treatment plan/ rationale and result expectations have been discussed with the patient who expresses understanding and desires to proceed. Close follow up to evaluate treatment results and for coordination of care. I have reviewed the patient's medical history in detail and updated the computerized patient record. As always, patient is advised that if symptoms worsen in any way they will proceed to the nearest emergency room. --------------------------------------------------------------------    Return if symptoms worsen or fail to improve. An  electronic signature was used to authenticate this note.     --Doni Flores DO on 5/31/2022 at 4:33 PM

## 2022-06-03 ENCOUNTER — CARE COORDINATION (OUTPATIENT)
Dept: CARE COORDINATION | Age: 52
End: 2022-06-03

## 2022-06-03 DIAGNOSIS — R59.0 LYMPHADENOPATHY, AXILLARY: Primary | ICD-10-CM

## 2022-06-03 NOTE — CARE COORDINATION
Ambulatory Care Coordination Note  6/3/2022  CM Risk Score: 6  Charlson 10 Year Mortality Risk Score: 23%     ACC: Alan Dennis, RN    Summary Note:    I called to continue care coordination enrollment. Abrahan Rose began the discussion about issues with scheduling for her consult/possible biopsy with Dr Salina Seaman. She adds that this has finally been resolved as of this morning. She continues to have swelling in the axillae along with pain with movement of her arm  She adds that she has concerns over her mother not doing well and will be leaving the state to be with her  She also tells me that Dr Gill Pruitt did prescribe ativan for her and she is taking this \"faithfully\"  She feels she may be getting some relief and rest with this medication  However, she continues to remain very anxious due to uncertainty and lack of knowing. I explained that this should be resolved once she has the answers that she needs  She adds that she continues to have temple swelling and irregularity in her bowel movements   She is working on follow up for these issues  She is not currently at home for a medication review  I have asked her to call me back once she is home so that I can complete a reconciliation of her medications. Lab Results     None          Care Coordination Interventions    Referral from Primary Care Provider: No  Suggested Interventions and Community Resources  Disease Association: In Process  Pharmacist: Declined  Registered Dietician: Declined  Zone Management Tools: In Process  Other Services or Interventions: Pharmacy, Dietician referral declined. Walk in Clinic hours and usage discussed CHF zone tool education inititated. Goals Addressed    None         Prior to Admission medications    Medication Sig Start Date End Date Taking? Authorizing Provider   lidocaine (LIDODERM) 5 % apply one patch as directed once daily to affected area remove patch after twelve hours.  5/17/22   Historical Provider, MD   LORazepam (ATIVAN) 0.5 MG tablet Take 1 tablet by mouth every 8 hours as needed for Anxiety for up to 15 days. 5/31/22 6/15/22  Tyler Cornoy DO   amphetamine-dextroamphetamine (ADDERALL XR) 30 MG extended release capsule Take 1 capsule by mouth 2 times daily for 30 days. 5/19/22 6/18/22  Thomes Query, APRN - CNP   furosemide (LASIX) 20 MG tablet Take 1 tablet by mouth 2 times daily TAKE ONE TABLET BY MOUTH TWO TIMES A DAY 5/17/22   Thomes Query, APRN - CNP   midodrine (PROAMATINE) 10 MG tablet Take 10 mg by mouth as needed 4/12/22   Historical Provider, MD   hyoscyamine (LEVSIN/SL) 125 MCG sublingual tablet Place 1 tablet under the tongue every 4 hours as needed for Cramping 4/7/22   Thomes Query, APRN - CNP   NEXIUM 40 MG delayed release capsule TAKE ONE CAPSULE BY MOUTH EVERY DAY 3/24/22   Thomes Query, APRN - CNP   buPROPion (WELLBUTRIN XL) 300 MG extended release tablet Take 1 tablet by mouth every morning TAKE ONE TABLET BY MOUTH EVERY MORNING 3/14/22   Thomes Query, APRN - CNP   topiramate (TOPAMAX) 100 MG tablet TAKE ONE TABLET BY MOUTH TWO TIMES A DAY 3/3/22   Thomes Query, APRN - CNP   loratadine (CLARITIN) 10 MG tablet Take 1 tablet by mouth daily 12/28/21   Thomes Query, APRN - CNP   potassium chloride (KLOR-CON M) 10 MEQ extended release tablet Take 1 tablet by mouth daily 12/8/21   Lisa Herzog PA-C   traZODone (DESYREL) 150 MG tablet Take 1 tablet by mouth nightly take one tablet by mouth nightly 12/7/21   Thomes Query, APRN - CNP   liothyronine (CYTOMEL) 25 MCG tablet Take 1 tablet by mouth daily 7/6/21   Thomes Query, APRN - CNP   aspirin (ASPIRIN CHILDRENS) 81 MG chewable tablet Take 1 tablet by mouth daily 6/24/21   TRUDY Samaniego   cyclobenzaprine (FLEXERIL) 5 MG tablet Take 2.5-5 mg by mouth 2 times daily as needed 6/2/21   Historical Provider, MD   naratriptan (AMERGE) 2.5 MG tablet Take one at onset of severe headache/migraine may repeat x 1 after 4 hours if needed. Maximum 2/day and 2 days/week. 6/2/21   Historical Provider, MD   hydrocortisone (CORTEF) 5 MG tablet Take 5 mg by mouth daily 2/24/21   Historical Provider, MD   valACYclovir (VALTREX) 500 MG tablet Take 1 tablet by mouth daily 2/15/21   ORTIZ Leroy CNP   prochlorperazine (COMPAZINE) 10 MG tablet Take 1 tablet by mouth every 6 hours as needed (nausea) 1/12/21   ORTIZ Leroy CNP   promethazine (PHENERGAN) 25 MG tablet Take 1 tablet by mouth every 8 hours as needed for Nausea 11/2/20   ORTIZ Leroy CNP   NEXIUM 40 MG delayed release capsule TAKE ONE CAPSULE BY MOUTH EVERY DAY 11/2/20   ORTIZ Leroy CNP   topiramate (TOPAMAX) 100 MG tablet Take 1 tablet by mouth 2 times daily 10/11/20   ORTIZ Leroy CNP   Magnesium 400 MG TABS Take 1 tablet by mouth daily 10/3/20   Sia Jaramillo PA-C   furosemide (LASIX) 20 MG tablet Take 1 tablet by mouth 2 times daily TAKE ONE TABLET BY MOUTH TWO TIMES A DAY 8/4/20   ORTIZ Leroy CNP   TRULANCE 3 MG TABS Take 3 mg by mouth daily 3/10/20   Historical Provider, MD   metoclopramide (REGLAN) 10 MG tablet Take 10 mg by mouth as needed 2/27/20   Historical Provider, MD   ondansetron (ZOFRAN) 4 MG tablet Take 1 tablet by mouth every 8 hours as needed for Nausea or Vomiting 2/25/20   ORTIZ Leroy CNP   azelastine (ASTELIN) 0.1 % nasal spray 1 spray by Nasal route 11/27/19   Historical Provider, MD   moxifloxacin (VIGAMOX) 0.5 % ophthalmic solution 1 drop    Historical Provider, MD   fluticasone (FLONASE) 50 MCG/ACT nasal spray USE 1 SPRAY NASALLY DAILY 5/15/19   Historical Provider, MD   ZOLMitriptan (ZOMIG) 5 MG tablet TAKE 1 TABLET BY MOUTH AT ONSET OF MIGRAINE. MAY REPEAT ONCE AFTER 2 HOURS.  MAX 10 MG (2 TABLETS) PER DAY 6/4/18   Historical Provider, MD   levothyroxine (SYNTHROID) 25 MCG tablet Take one daily 4/29/18   ORTIZ Leroy CNP   hydroxychloroquine (PLAQUENIL) 200 MG tablet Take 300 mg by mouth daily Historical Provider, MD   Pancrelipase, Lip-Prot-Amyl, (CREON) 92109 UNITS CPEP 2 caps tid 9/23/16   Miryam Bautista MD       Future Appointments   Date Time Provider Angela Chapmanisti   6/7/2022  1:00 PM [facility], APRN - CNP MLOX R[facility] AT [place]   8/30/2022 10:30 AM [facility] AT [place]

## 2022-06-07 DIAGNOSIS — R59.0 AXILLARY LYMPHADENOPATHY: Primary | ICD-10-CM

## 2022-06-10 ENCOUNTER — CARE COORDINATION (OUTPATIENT)
Dept: CARE COORDINATION | Age: 52
End: 2022-06-10

## 2022-06-10 NOTE — CARE COORDINATION
Ambulatory Care Coordination Note  6/10/2022  CM Risk Score: 10  Charlson 10 Year Mortality Risk Score: 23%     ACC: Satish Butts, DARWIN    Summary Note:     I called to check in on Tenna Necessary  She is currently in [place] with her mother for a .  She states that she is having terrible pain in her neck and shoulder area  She feels the swelling and pain are getting worse. I have reviewed the notes and made her aware that she can try motrin or aleve along with a heating pad. She does not feel this is helping adding that she is unable to turn her neck   We did speak about going to an urgent care near her. She is considering this option  She adds that she was not able to see Dr Nicky Bermeo due to flight changes  I did explain that there are orders for ultrasound of the area and she is asking for this to be scheduled  I will follow up with her once she returns home. Lab Results     None          Care Coordination Interventions    Referral from Primary Care Provider: No  Suggested Interventions and Community Resources  Disease Association: In Process  Pharmacist: Declined  Registered Dietician: Declined  Zone Management Tools: In Process  Other Services or Interventions: Pharmacy, Dietician referral declined. Walk in Clinic hours and usage discussed CHF zone tool education inititated. Goals Addressed    None         Prior to Admission medications    Medication Sig Start Date End Date Taking? Authorizing Provider   lidocaine (LIDODERM) 5 % apply one patch as directed once daily to affected area remove patch after twelve hours. 22   Historical Provider, MD   LORazepam (ATIVAN) 0.5 MG tablet Take 1 tablet by mouth every 8 hours as needed for Anxiety for up to 15 days. 5/31/22 6/15/22  Holly Vázquez DO   amphetamine-dextroamphetamine (ADDERALL XR) 30 MG extended release capsule Take 1 capsule by mouth 2 times daily for 30 days.  22  Mario Martinez APRN - CNP   furosemide (LASIX) 20 MG tablet Take 1 tablet by mouth 2 times daily TAKE ONE TABLET BY MOUTH TWO TIMES A DAY 5/17/22   Ankita Hunger, APRN - CNP   midodrine (PROAMATINE) 10 MG tablet Take 10 mg by mouth as needed 4/12/22   Historical Provider, MD   hyoscyamine (LEVSIN/SL) 125 MCG sublingual tablet Place 1 tablet under the tongue every 4 hours as needed for Cramping 4/7/22   Ankita Hunger, APRN - CNP   NEXIUM 40 MG delayed release capsule TAKE ONE CAPSULE BY MOUTH EVERY DAY 3/24/22   Ankita Hunger, APRN - CNP   buPROPion (WELLBUTRIN XL) 300 MG extended release tablet Take 1 tablet by mouth every morning TAKE ONE TABLET BY MOUTH EVERY MORNING 3/14/22   Ankita Hunger, APRN - CNP   topiramate (TOPAMAX) 100 MG tablet TAKE ONE TABLET BY MOUTH TWO TIMES A DAY 3/3/22   Ankita Hunger, APRN - CNP   loratadine (CLARITIN) 10 MG tablet Take 1 tablet by mouth daily 12/28/21   Ankita Hunger, APRN - CNP   potassium chloride (KLOR-CON M) 10 MEQ extended release tablet Take 1 tablet by mouth daily 12/8/21   Elba Bowie PA-C   traZODone (DESYREL) 150 MG tablet Take 1 tablet by mouth nightly take one tablet by mouth nightly 12/7/21   Ankita Hunger, APRN - CNP   liothyronine (CYTOMEL) 25 MCG tablet Take 1 tablet by mouth daily 7/6/21   Ankita Hunger, APRN - CNP   aspirin (ASPIRIN CHILDRENS) 81 MG chewable tablet Take 1 tablet by mouth daily 6/24/21   TRUDY Valenzuela   cyclobenzaprine (FLEXERIL) 5 MG tablet Take 2.5-5 mg by mouth 2 times daily as needed 6/2/21   Historical Provider, MD   naratriptan (AMERGE) 2.5 MG tablet Take one at onset of severe headache/migraine may repeat x 1 after 4 hours if needed. Maximum 2/day and 2 days/week.  6/2/21   Historical Provider, MD   hydrocortisone (CORTEF) 5 MG tablet Take 5 mg by mouth daily 2/24/21   Historical Provider, MD   valACYclovir (VALTREX) 500 MG tablet Take 1 tablet by mouth daily 2/15/21   Ankita Hunger, APRN - CNP   prochlorperazine (COMPAZINE) 10 MG tablet Take 1 tablet by mouth every 6 hours as needed (nausea) 1/12/21   ORTIZ Ovalle CNP   promethazine (PHENERGAN) 25 MG tablet Take 1 tablet by mouth every 8 hours as needed for Nausea 11/2/20   ORTIZ Ovalle CNP   NEXIUM 40 MG delayed release capsule TAKE ONE CAPSULE BY MOUTH EVERY DAY 11/2/20   ORTIZ Ovalle CNP   topiramate (TOPAMAX) 100 MG tablet Take 1 tablet by mouth 2 times daily 10/11/20   ORTIZ Ovalle CNP   Magnesium 400 MG TABS Take 1 tablet by mouth daily 10/3/20   Britton Olson PA-C   furosemide (LASIX) 20 MG tablet Take 1 tablet by mouth 2 times daily TAKE ONE TABLET BY MOUTH TWO TIMES A DAY 8/4/20   ORTIZ Ovalle CNP   TRULANCE 3 MG TABS Take 3 mg by mouth daily 3/10/20   Historical Provider, MD   metoclopramide (REGLAN) 10 MG tablet Take 10 mg by mouth as needed 2/27/20   Historical Provider, MD   ondansetron (ZOFRAN) 4 MG tablet Take 1 tablet by mouth every 8 hours as needed for Nausea or Vomiting 2/25/20   ORTIZ Ovalle CNP   azelastine (ASTELIN) 0.1 % nasal spray 1 spray by Nasal route 11/27/19   Historical Provider, MD   moxifloxacin (VIGAMOX) 0.5 % ophthalmic solution 1 drop    Historical Provider, MD   fluticasone (FLONASE) 50 MCG/ACT nasal spray USE 1 SPRAY NASALLY DAILY 5/15/19   Historical Provider, MD   ZOLMitriptan (ZOMIG) 5 MG tablet TAKE 1 TABLET BY MOUTH AT ONSET OF MIGRAINE. MAY REPEAT ONCE AFTER 2 HOURS.  MAX 10 MG (2 TABLETS) PER DAY 6/4/18   Historical Provider, MD   levothyroxine (SYNTHROID) 25 MCG tablet Take one daily 4/29/18   ORTIZ Ovalle CNP   hydroxychloroquine (PLAQUENIL) 200 MG tablet Take 300 mg by mouth daily     Historical Provider, MD   Pancrelipase, Lip-Prot-Amyl, (CREON) 19891 UNITS CPEP 2 caps tid 9/23/16   Tee Monzon MD       Future Appointments   Date Time Provider Angela Finni   8/30/2022 10:30 AM ORTIZ Ovalle - CNP [facility] EMERGENCY [facility] AT [place]

## 2022-06-16 ENCOUNTER — CARE COORDINATION (OUTPATIENT)
Dept: CARE COORDINATION | Age: 52
End: 2022-06-16

## 2022-06-16 DIAGNOSIS — G47.00 INSOMNIA, UNSPECIFIED TYPE: ICD-10-CM

## 2022-06-16 RX ORDER — TRAZODONE HYDROCHLORIDE 150 MG/1
TABLET ORAL
Qty: 30 TABLET | Refills: 5 | Status: SHIPPED | OUTPATIENT
Start: 2022-06-16

## 2022-06-19 DIAGNOSIS — Z76.0 MEDICATION REFILL: ICD-10-CM

## 2022-06-20 RX ORDER — FUROSEMIDE 20 MG/1
TABLET ORAL
Qty: 60 TABLET | Refills: 0 | Status: SHIPPED | OUTPATIENT
Start: 2022-06-20 | End: 2022-08-03

## 2022-06-21 ENCOUNTER — CARE COORDINATION (OUTPATIENT)
Dept: CARE COORDINATION | Age: 52
End: 2022-06-21

## 2022-06-21 DIAGNOSIS — F98.8 ATTENTION DEFICIT DISORDER, UNSPECIFIED HYPERACTIVITY PRESENCE: ICD-10-CM

## 2022-06-21 DIAGNOSIS — Z76.0 MEDICATION REFILL: ICD-10-CM

## 2022-06-21 RX ORDER — ESOMEPRAZOLE MAGNESIUM 40 MG/1
CAPSULE, DELAYED RELEASE ORAL
Qty: 90 CAPSULE | Refills: 0 | Status: SHIPPED | OUTPATIENT
Start: 2022-06-21 | End: 2022-08-09 | Stop reason: SDUPTHER

## 2022-06-21 NOTE — CARE COORDINATION
Ambulatory Care Coordination Note  6/21/2022  CM Risk Score: 10  Charlson 10 Year Mortality Risk Score: 23%     ACC: Satish Butts RN    Summary Note:     Bowen called to make me aware that she is having multiple changes with her health. She feels the biggest issue is around her bowels  She tells me that she has not had a good solid bowel movement since her trip to [place] at the beginning of the month. She normally has irregularity and she does take laxatives  She tells me that nothing is working. She adds that what is coming out is soft irregular shaped small \"dumpling type\" pieces of stool which began today. She states that she stool is brown but there is \"bright red blood in the middle of the stool that she can see. She adds that she has a lot of \"distention\" and  back pain. Her weight had been up 5 pounds but after having some bowel movement her weight is down 3 pounds  She adds that she does have ankle swelling   She denies any issues with nausea or vomiting. She denies any SOB, chest pain or palpitations  She is drinking fluids well but eating very lightly  She does have a call out to her gastroenterologist Dr Serjio Chaparro at [facility]  I have asked her to wait until she hears back from Dr Serjio Chaparro   If he does not return her call or if her symptoms worsening I discussed going to the ER     I will follow up with Bowen in the am.    Lab Results     None          Care Coordination Interventions    Referral from Primary Care Provider: No  Suggested Interventions and Community Resources  Disease Association: In Process  Pharmacist: Declined  Registered Dietician: Declined  Zone Management Tools: In Process  Other Services or Interventions: Pharmacy, Dietician referral declined. Walk in Clinic hours and usage discussed CHF zone tool education inititated. Goals Addressed    None         Prior to Admission medications    Medication Sig Start Date End Date Taking?  Authorizing Provider   esomeprazole ([address]) 40 MG delayed release capsule TAKE 1 CAPSULE BY MOUTH DAILY 6/21/22   Caitie Score, APRN - CNP   furosemide (LASIX) 20 MG tablet take 1 tablet by mouth 2 times daily 6/20/22   Caitie Score, APRN - CNP   traZODone (DESYREL) 150 MG tablet TAKE ONE TABLET BY MOUTH NIGHTLY 6/16/22   Caitie Score, APRN - CNP   lidocaine (LIDODERM) 5 % apply one patch as directed once daily to affected area remove patch after twelve hours. 5/17/22   Historical Provider, MD   amphetamine-dextroamphetamine (ADDERALL XR) 30 MG extended release capsule Take 1 capsule by mouth 2 times daily for 30 days. 5/19/22 6/18/22  Caitie Score, APRN - CNP   midodrine (PROAMATINE) 10 MG tablet Take 10 mg by mouth as needed 4/12/22   Historical Provider, MD   hyoscyamine (LEVSIN/SL) 125 MCG sublingual tablet Place 1 tablet under the tongue every 4 hours as needed for Cramping 4/7/22   Caitie Score, APRN - CNP   buPROPion (WELLBUTRIN XL) 300 MG extended release tablet Take 1 tablet by mouth every morning TAKE ONE TABLET BY MOUTH EVERY MORNING 3/14/22   Caitie Score, APRN - CNP   topiramate (TOPAMAX) 100 MG tablet TAKE ONE TABLET BY MOUTH TWO TIMES A DAY 3/3/22   Caitie Score, APRN - CNP   loratadine (CLARITIN) 10 MG tablet Take 1 tablet by mouth daily 12/28/21   Caitie Score, APRN - CNP   potassium chloride (KLOR-CON M) 10 MEQ extended release tablet Take 1 tablet by mouth daily 12/8/21   Marlo Merritt PA-C   liothyronine (CYTOMEL) 25 MCG tablet Take 1 tablet by mouth daily 7/6/21   Caitie Score, APRN - CNP   aspirin (ASPIRIN CHILDRENS) 81 MG chewable tablet Take 1 tablet by mouth daily 6/24/21   TRUDY Martínez   cyclobenzaprine (FLEXERIL) 5 MG tablet Take 2.5-5 mg by mouth 2 times daily as needed 6/2/21   Historical Provider, MD   naratriptan (AMERGE) 2.5 MG tablet Take one at onset of severe headache/migraine may repeat x 1 after 4 hours if needed. Maximum 2/day and 2 days/week.  6/2/21   Historical Provider, MD   hydrocortisone (CORTEF) 5 MG tablet Take 5 mg by mouth daily 2/24/21   Historical Provider, MD   valACYclovir (VALTREX) 500 MG tablet Take 1 tablet by mouth daily 2/15/21   ORTIZ Lay CNP   prochlorperazine (COMPAZINE) 10 MG tablet Take 1 tablet by mouth every 6 hours as needed (nausea) 1/12/21   ORTIZ Lay CNP   promethazine (PHENERGAN) 25 MG tablet Take 1 tablet by mouth every 8 hours as needed for Nausea 11/2/20   ORTIZ Lay CNP   NEXIUM 40 MG delayed release capsule TAKE ONE CAPSULE BY MOUTH EVERY DAY 11/2/20   ORTIZ Lay CNP   topiramate (TOPAMAX) 100 MG tablet Take 1 tablet by mouth 2 times daily 10/11/20   ORTIZ Lay CNP   Magnesium 400 MG TABS Take 1 tablet by mouth daily 10/3/20   Eliezer Flynn PA-C   furosemide (LASIX) 20 MG tablet Take 1 tablet by mouth 2 times daily TAKE ONE TABLET BY MOUTH TWO TIMES A DAY 8/4/20   ORTIZ Lay CNP   TRULANCE 3 MG TABS Take 3 mg by mouth daily 3/10/20   Historical Provider, MD   metoclopramide (REGLAN) 10 MG tablet Take 10 mg by mouth as needed 2/27/20   Historical Provider, MD   ondansetron (ZOFRAN) 4 MG tablet Take 1 tablet by mouth every 8 hours as needed for Nausea or Vomiting 2/25/20   ORTIZ Lay CNP   azelastine (ASTELIN) 0.1 % nasal spray 1 spray by Nasal route 11/27/19   Historical Provider, MD   moxifloxacin (VIGAMOX) 0.5 % ophthalmic solution 1 drop    Historical Provider, MD   fluticasone (FLONASE) 50 MCG/ACT nasal spray USE 1 SPRAY NASALLY DAILY 5/15/19   Historical Provider, MD   ZOLMitriptan (ZOMIG) 5 MG tablet TAKE 1 TABLET BY MOUTH AT ONSET OF MIGRAINE. MAY REPEAT ONCE AFTER 2 HOURS.  MAX 10 MG (2 TABLETS) PER DAY 6/4/18   Historical Provider, MD   levothyroxine (SYNTHROID) 25 MCG tablet Take one daily 4/29/18   ORTIZ Lay CNP   hydroxychloroquine (PLAQUENIL) 200 MG tablet Take 300 mg by mouth daily     Historical Provider, MD   Pancrelipase, Lip-Prot-Amyl, (CREON) 74500 UNITS CPEP 2 caps tid 9/23/16   Lizzy Viera MD       Future Appointments   Date Time Provider Angela Hernandez   6/29/2022  4:00 PM ORTIZ Leroy [facility]   7/12/2022  8:00 AM DR Miranda Diop   7/12/2022  8:30 AM ORTIZ Barrow CNP MLOX  [facility]   8/30/2022 10:30 AM ORTIZ Leroy  [facility]

## 2022-06-22 ENCOUNTER — CARE COORDINATION (OUTPATIENT)
Dept: CARE COORDINATION | Age: 52
End: 2022-06-22

## 2022-06-22 RX ORDER — DEXTROAMPHETAMINE SACCHARATE, AMPHETAMINE ASPARTATE MONOHYDRATE, DEXTROAMPHETAMINE SULFATE AND AMPHETAMINE SULFATE 7.5; 7.5; 7.5; 7.5 MG/1; MG/1; MG/1; MG/1
30 CAPSULE, EXTENDED RELEASE ORAL 2 TIMES DAILY
Qty: 60 CAPSULE | Refills: 0 | Status: SHIPPED | OUTPATIENT
Start: 2022-06-22 | End: 2022-07-21 | Stop reason: SDUPTHER

## 2022-06-22 NOTE — CARE COORDINATION
Attempted to contact patient for care coordination follow up regarding constipation and CHF  Unable to reach patient by phone. Message left regarding purpose of call. Number provided and call back requested.

## 2022-06-23 ENCOUNTER — CARE COORDINATION (OUTPATIENT)
Dept: CARE COORDINATION | Age: 52
End: 2022-06-23

## 2022-06-23 NOTE — CARE COORDINATION
Ambulatory Care Coordination Note  6/23/2022  CM Risk Score: 10  Charlson 10 Year Mortality Risk Score: 23%     ACC: Suellen Mayfield, RN    Summary Note:     Erna Monge did call me with an update and she states that she is \"not feeling any worse. \"  She has left messages with her gastroenterology specialists at [facility] and she is waiting for a call back   She adds that she will keep me up to date with any plans or decisions regarding her bowel issues  I called leaving a message   I will try to follow up with her over the weekend. Lab Results     None          Care Coordination Interventions    Referral from Primary Care Provider: No  Suggested Interventions and Community Resources  Disease Association: In Process  Pharmacist: Declined  Registered Dietician: Declined  Zone Management Tools: In Process  Other Services or Interventions: Pharmacy, Dietician referral declined. Walk in Clinic hours and usage discussed CHF zone tool education inititated. Goals Addressed    None         Prior to Admission medications    Medication Sig Start Date End Date Taking? Authorizing Provider   amphetamine-dextroamphetamine (ADDERALL XR) 30 MG extended release capsule Take 1 capsule by mouth 2 times daily for 30 days. 6/22/22 7/22/22  Ankita Hunger, APRN - CNP   esomeprazole (NEXIUM) 40 MG delayed release capsule TAKE 1 CAPSULE BY MOUTH DAILY 6/21/22   Ankita Hunger, APRN - CNP   furosemide (LASIX) 20 MG tablet take 1 tablet by mouth 2 times daily 6/20/22   Ankita Hunger, APRN - CNP   traZODone (DESYREL) 150 MG tablet TAKE ONE TABLET BY MOUTH NIGHTLY 6/16/22   Ankita Hunger, APRN - CNP   lidocaine (LIDODERM) 5 % apply one patch as directed once daily to affected area remove patch after twelve hours.  5/17/22   Historical Provider, MD   midodrine (PROAMATINE) 10 MG tablet Take 10 mg by mouth as needed 4/12/22   Historical Provider, MD   hyoscyamine (LEVSIN/SL) 125 MCG sublingual tablet Place 1 tablet under the tongue every 4 hours as needed for Cramping 4/7/22   ORTIZ Hoffman CNP   buPROPion (WELLBUTRIN XL) 300 MG extended release tablet Take 1 tablet by mouth every morning TAKE ONE TABLET BY MOUTH EVERY MORNING 3/14/22   ORTIZ Hoffman CNP   topiramate (TOPAMAX) 100 MG tablet TAKE ONE TABLET BY MOUTH TWO TIMES A DAY 3/3/22   ORTIZ Hoffman CNP   loratadine (CLARITIN) 10 MG tablet Take 1 tablet by mouth daily 12/28/21   ORTIZ Hoffman CNP   potassium chloride (KLOR-CON M) 10 MEQ extended release tablet Take 1 tablet by mouth daily 12/8/21   Darrin Jamison PA-C   liothyronine (CYTOMEL) 25 MCG tablet Take 1 tablet by mouth daily 7/6/21   ORTIZ Hoffman CNP   aspirin (ASPIRIN CHILDRENS) 81 MG chewable tablet Take 1 tablet by mouth daily 6/24/21   TRUDY Gutierrez   cyclobenzaprine (FLEXERIL) 5 MG tablet Take 2.5-5 mg by mouth 2 times daily as needed 6/2/21   Historical Provider, MD   naratriptan (AMERGE) 2.5 MG tablet Take one at onset of severe headache/migraine may repeat x 1 after 4 hours if needed. Maximum 2/day and 2 days/week.  6/2/21   Historical Provider, MD   hydrocortisone (CORTEF) 5 MG tablet Take 5 mg by mouth daily 2/24/21   Historical Provider, MD   valACYclovir (VALTREX) 500 MG tablet Take 1 tablet by mouth daily 2/15/21   ORTIZ Hoffman CNP   prochlorperazine (COMPAZINE) 10 MG tablet Take 1 tablet by mouth every 6 hours as needed (nausea) 1/12/21   ORTIZ Hoffman CNP   promethazine (PHENERGAN) 25 MG tablet Take 1 tablet by mouth every 8 hours as needed for Nausea 11/2/20   ORTIZ Hoffman CNP   NEXIUM 40 MG delayed release capsule TAKE ONE CAPSULE BY MOUTH EVERY DAY 11/2/20   ORTIZ Hoffman CNP   topiramate (TOPAMAX) 100 MG tablet Take 1 tablet by mouth 2 times daily 10/11/20   ORTIZ Hoffman CNP   Magnesium 400 MG TABS Take 1 tablet by mouth daily 10/3/20   Yadira Zheng PA-C   furosemide (LASIX) 20 MG tablet Take 1 tablet by mouth 2 times daily TAKE ONE TABLET BY MOUTH TWO TIMES A DAY 8/4/20   ORTIZ Daniels CNP   TRULANCE 3 MG TABS Take 3 mg by mouth daily 3/10/20   Historical Provider, MD   metoclopramide (REGLAN) 10 MG tablet Take 10 mg by mouth as needed 2/27/20   Historical Provider, MD   ondansetron (ZOFRAN) 4 MG tablet Take 1 tablet by mouth every 8 hours as needed for Nausea or Vomiting 2/25/20   ORTIZ Daniels CNP   azelastine (ASTELIN) 0.1 % nasal spray 1 spray by Nasal route 11/27/19   Historical Provider, MD   moxifloxacin (VIGAMOX) 0.5 % ophthalmic solution 1 drop    Historical Provider, MD   fluticasone (FLONASE) 50 MCG/ACT nasal spray USE 1 SPRAY NASALLY DAILY 5/15/19   Historical Provider, MD   ZOLMitriptan (ZOMIG) 5 MG tablet TAKE 1 TABLET BY MOUTH AT ONSET OF MIGRAINE. MAY REPEAT ONCE AFTER 2 HOURS.  MAX 10 MG (2 TABLETS) PER DAY 6/4/18   Historical Provider, MD   levothyroxine (SYNTHROID) 25 MCG tablet Take one daily 4/29/18   ORTIZ Daniels CNP   hydroxychloroquine (PLAQUENIL) 200 MG tablet Take 300 mg by mouth daily     Historical Provider, MD   Pancrelipase, Lip-Prot-Amyl, (CREON) 14865 UNITS CPEP 2 caps tid 9/23/16   Shashi Sandra MD       Future Appointments   Date Time Provider Angela Hernandez   6/29/2022  4:00 PM ORTIZ Daniels CNP [facility] [place]   7/12/2022  8:00 AM DR Miranda [address]   7/12/2022  8:30 AM [address] [place] [address], APRN - CNP MLOX RVI [facility] EMERGENCY MEDICAL [place] AT [place]   8/30/2022 10:30 AM ORTIZ Daniels CNP [facility] EMERGENCY [facility] AT [place]

## 2022-06-27 ENCOUNTER — CARE COORDINATION (OUTPATIENT)
Dept: CARE COORDINATION | Age: 52
End: 2022-06-27

## 2022-06-27 NOTE — CARE COORDINATION
Heart Failure Education outreach Date/Time: 2022 12:49 PM    Ambulatory Care Manager (ACM) contacted the patient by telephone to perform Ambulatory Care Coordination. Verified name and  with patient as identifiers. Provided introduction to self, and explanation of the Ambulatory Care Manager's role. ACM reviewed that a Health Healthy tips for the Summer packet has been sent to [employer]. ACM reviewed fluid restriction with the patient. Instructed patient to call their PCP if they have a weight gain of 3 lbs in 2 days or 5 lbs in a week. Patient reminded that there is a physician on call 24 hours a day / 7 days a week should the patient have questions or concerns. The patient verbalized understanding. Maico Gillette has a good understanding of CHF along with zone tools  I have asked her to call me with any questions or concerns.

## 2022-06-27 NOTE — CARE COORDINATION
Ambulatory Care Coordination Note  6/27/2022  CM Risk Score: 10  Charlson 10 Year Mortality Risk Score: 23%     ACC: Bandar Arias, DARWIN    Summary Note:     Alexei Priec tells me that she just got out of the ER   She states that over the weekend she just became increasingly weak, she was shaky   She adds that she continued to have issues constipation which included liquid diarrhea which was black and had a \"bad odor. \"  She says that she was told that Dr Jeannette Zheng will be back in the office today. She also has a critically low potassium of 2.9 mmol/l  She is taking supplemental potassium for this   I will check with Shirlene Prince on when she would like a repeat potassium level  She is to continue taking her lasix 20 mg daily  We did speak about potassium rich foods and she does have a good understanding. She also has a couple of ultrasounds/biopsies scheduled for her lymph node mass  The radiology department at [facility] is able to get her in this week and she will most likely have it done there as she has been waiting to get this issue taken care of for about a month. PLAN:  Alexei Price will continue to monitor her symptoms for any changes or worsening  She will call myself or the office with any worsening of symptoms for recommendations  She will follow up with Dr Jeannette Zheng regarding stools/constipation\  I will update Shirlene Prince and obtain her input on follow up regarding labs  Lab Results     None          Care Coordination Interventions    Referral from Primary Care Provider: No  Suggested Interventions and Community Resources  Disease Association: In Process  Pharmacist: Declined  Registered Dietician: Declined  Zone Management Tools: In Process  Other Services or Interventions: Pharmacy, Dietician referral declined. Walk in Clinic hours and usage discussed CHF zone tool education inititated. Goals Addressed    None         Prior to Admission medications    Medication Sig Start Date End Date Taking?  Authorizing Provider amphetamine-dextroamphetamine (ADDERALL XR) 30 MG extended release capsule Take 1 capsule by mouth 2 times daily for 30 days. 6/22/22 7/22/22  Thomes Query, APRN - CNP   esomeprazole (NEXIUM) 40 MG delayed release capsule TAKE 1 CAPSULE BY MOUTH DAILY 6/21/22   Thomes Query, APRN - CNP   furosemide (LASIX) 20 MG tablet take 1 tablet by mouth 2 times daily 6/20/22   Thomes Query, APRN - CNP   traZODone (DESYREL) 150 MG tablet TAKE ONE TABLET BY MOUTH NIGHTLY 6/16/22   Thomes Query, APRN - CNP   lidocaine (LIDODERM) 5 % apply one patch as directed once daily to affected area remove patch after twelve hours. 5/17/22   Historical Provider, MD   midodrine (PROAMATINE) 10 MG tablet Take 10 mg by mouth as needed 4/12/22   Historical Provider, MD   hyoscyamine (LEVSIN/SL) 125 MCG sublingual tablet Place 1 tablet under the tongue every 4 hours as needed for Cramping 4/7/22   Thomes Query, APRN - CNP   buPROPion (WELLBUTRIN XL) 300 MG extended release tablet Take 1 tablet by mouth every morning TAKE ONE TABLET BY MOUTH EVERY MORNING 3/14/22   Thomes Query, APRN - CNP   topiramate (TOPAMAX) 100 MG tablet TAKE ONE TABLET BY MOUTH TWO TIMES A DAY 3/3/22   Thomes Query, APRN - CNP   loratadine (CLARITIN) 10 MG tablet Take 1 tablet by mouth daily 12/28/21   Thomes Query, APRN - CNP   potassium chloride (KLOR-CON M) 10 MEQ extended release tablet Take 1 tablet by mouth daily 12/8/21   Lisa Herzog PA-C   liothyronine (CYTOMEL) 25 MCG tablet Take 1 tablet by mouth daily 7/6/21   Thomes Query, APRN - CNP   aspirin (ASPIRIN CHILDRENS) 81 MG chewable tablet Take 1 tablet by mouth daily 6/24/21   TRUDY Samaniego   cyclobenzaprine (FLEXERIL) 5 MG tablet Take 2.5-5 mg by mouth 2 times daily as needed 6/2/21   Historical Provider, MD   naratriptan (AMERGE) 2.5 MG tablet Take one at onset of severe headache/migraine may repeat x 1 after 4 hours if needed. Maximum 2/day and 2 days/week.  6/2/21   Historical Provider, MD   hydrocortisone (CORTEF) 5 MG tablet Take 5 mg by mouth daily 2/24/21   Historical Provider, MD   valACYclovir (VALTREX) 500 MG tablet Take 1 tablet by mouth daily 2/15/21   ORTIZ Angeles - KRIS   prochlorperazine (COMPAZINE) 10 MG tablet Take 1 tablet by mouth every 6 hours as needed (nausea) 1/12/21   ORTIZ Angeles - CNP   promethazine (PHENERGAN) 25 MG tablet Take 1 tablet by mouth every 8 hours as needed for Nausea 11/2/20   Eljane Esters APRN - CNP   NEXIUM 40 MG delayed release capsule TAKE ONE CAPSULE BY MOUTH EVERY DAY 11/2/20   Eljane Feliciano APRSHIVAM - CNP   topiramate (TOPAMAX) 100 MG tablet Take 1 tablet by mouth 2 times daily 10/11/20   ORTIZ Angeles - CNP   Magnesium 400 MG TABS Take 1 tablet by mouth daily 10/3/20   Bernabe Bazzi PA-C   furosemide (LASIX) 20 MG tablet Take 1 tablet by mouth 2 times daily TAKE ONE TABLET BY MOUTH TWO TIMES A DAY 8/4/20   ORTIZ Angeles - CNP   TRULANCE 3 MG TABS Take 3 mg by mouth daily 3/10/20   Historical Provider, MD   metoclopramide (REGLAN) 10 MG tablet Take 10 mg by mouth as needed 2/27/20   Historical Provider, MD   ondansetron (ZOFRAN) 4 MG tablet Take 1 tablet by mouth every 8 hours as needed for Nausea or Vomiting 2/25/20   Munir Feliciano APRSHIVAM - CNP   azelastine (ASTELIN) 0.1 % nasal spray 1 spray by Nasal route 11/27/19   Historical Provider, MD   moxifloxacin (VIGAMOX) 0.5 % ophthalmic solution 1 drop    Historical Provider, MD   fluticasone (FLONASE) 50 MCG/ACT nasal spray USE 1 SPRAY NASALLY DAILY 5/15/19   Historical Provider, MD   ZOLMitriptan (ZOMIG) 5 MG tablet TAKE 1 TABLET BY MOUTH AT ONSET OF MIGRAINE. MAY REPEAT ONCE AFTER 2 HOURS.  MAX 10 MG (2 TABLETS) PER DAY 6/4/18   Historical Provider, MD   levothyroxine (SYNTHROID) 25 MCG tablet Take one daily 4/29/18   Munir Feliciano APRN - KRIS   hydroxychloroquine (PLAQUENIL) 200 MG tablet Take 300 mg by mouth daily     Historical Provider, MD   Pancrelipase, Lip-Prot-Amyl, (CREON) 49084 UNITS CPEP 2 caps tid 9/23/16   Chaitanya Tejeda MD       Future Appointments   Date Time Provider Angela Mary   6/29/2022  4:00 PM Winda Wilson, APRN - CNP [facility] [place]   7/12/2022  8:00 AM DR Miranda L[address]   7/12/2022  8:30 AM ORTIZ Barrow CNP [facility] [place]   8/30/2022 10:30 AM Winda Wilson, APRN - CNP [facility] [place]

## 2022-06-29 ENCOUNTER — OFFICE VISIT (OUTPATIENT)
Dept: FAMILY MEDICINE CLINIC | Age: 52
End: 2022-06-29
Payer: MEDICARE

## 2022-06-29 VITALS
OXYGEN SATURATION: 98 % | BODY MASS INDEX: 19.88 KG/M2 | WEIGHT: 108 LBS | HEIGHT: 62 IN | DIASTOLIC BLOOD PRESSURE: 72 MMHG | HEART RATE: 90 BPM | SYSTOLIC BLOOD PRESSURE: 104 MMHG

## 2022-06-29 DIAGNOSIS — M54.2 NECK PAIN: ICD-10-CM

## 2022-06-29 DIAGNOSIS — R59.1 LYMPHADENOPATHY: Primary | ICD-10-CM

## 2022-06-29 DIAGNOSIS — R11.0 NAUSEA: ICD-10-CM

## 2022-06-29 PROCEDURE — G8427 DOCREV CUR MEDS BY ELIG CLIN: HCPCS | Performed by: NURSE PRACTITIONER

## 2022-06-29 PROCEDURE — G8420 CALC BMI NORM PARAMETERS: HCPCS | Performed by: NURSE PRACTITIONER

## 2022-06-29 PROCEDURE — 99214 OFFICE O/P EST MOD 30 MIN: CPT | Performed by: NURSE PRACTITIONER

## 2022-06-29 PROCEDURE — 1036F TOBACCO NON-USER: CPT | Performed by: NURSE PRACTITIONER

## 2022-06-29 RX ORDER — PROMETHAZINE HYDROCHLORIDE 25 MG/1
25 TABLET ORAL 3 TIMES DAILY PRN
Qty: 21 TABLET | Refills: 0 | Status: SHIPPED | OUTPATIENT
Start: 2022-06-29 | End: 2022-07-06

## 2022-06-29 ASSESSMENT — ENCOUNTER SYMPTOMS
CONSTIPATION: 0
DIARRHEA: 0
SHORTNESS OF BREATH: 0
COUGH: 0

## 2022-06-29 NOTE — PROGRESS NOTES
erSubjective  Chief Complaint   Patient presents with    Follow-up     pt states f/u from hospital, neck pain and feels like something is tugging. HPI     Went to ER a couple of times this past week. States that she is having a lot of neck pain on the right side. She states that at times she is in tears. Has been ongoing for several months. Pt also reports feeling fatigued and run down. Potassium level was critically low upon arrival at the ED. Was having heart palpitations    Pt also c/o persistent nausea.      Lab Results   Component Value Date    WBC 7.7 06/27/2022    HGB 12.2 06/27/2022    HCT 36.5 06/27/2022     06/27/2022    CHOL 219 (H) 01/12/2021    TRIG 62 01/12/2021    HDL 81 (H) 01/12/2021    ALT 12 06/27/2022    AST 12 06/27/2022     06/27/2022    K 2.9 (LL) 06/27/2022     06/27/2022    CREATININE 0.71 06/27/2022    BUN 14 05/11/2022    CO2 21 05/11/2022    TSH 3.570 12/08/2021    INR 0.9 08/28/2021    LABA1C 4.9 05/11/2022         Patient Active Problem List    Diagnosis Date Noted    Acute left-sided low back pain with bilateral sciatica 03/09/2022    Congestive heart failure, unspecified HF chronicity, unspecified heart failure type ([address] 02/11/2022    Claudication of both lower extremities ([address] 02/11/2022    Neck pain 12/09/2021    Non-recurrent acute suppurative otitis media of left ear without spontaneous rupture of tympanic membrane 12/09/2021    Neutropenia ([address] 01/12/2021    Hereditary hemochromatosis ([address] 01/12/2021    Chronic pancreatitis ([address] 08/11/2020    Major depressive disorder, single episode, 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[address] 01/15/2019    Abnormal MRI, liver 05/22/2018    Acute recurrent pancreatitis 05/22/2018    At risk of fracture due to osteoporosis 05/22/2018    Calcinosis 05/22/2018    Chronic headaches 05/22/2018    Conductive hearing loss in left ear 05/22/2018    Edema 05/22/2018    Iron overload 05/22/2018    Mass of left side of neck 05/22/2018    Medullary sponge kidney of both kidneys 09/86/8868    Metabolic acidosis 24/19/0261    Microscopic hematuria 05/22/2018    Nausea 05/22/2018    Renal tubular acidosis type I 05/22/2018    Rheumatoid arthritis ([address] 05/22/2018    Tension type headache 05/22/2018    Weight loss, abnormal 05/22/2018    Cellulitis, face 04/06/2018    Other chest pain 11/01/2017    Left lower quadrant pain 11/01/2017    Sjogren's syndrome ([address] 11/01/2017    Diverticulitis of large intestine without perforation or abscess without bleeding 11/01/2017    Fibroids 11/01/2017    Transfusion history 11/01/2017    Pancreatitis 05/13/2017    Acquired hypothyroidism 09/26/2016    GERD (gastroesophageal reflux disease) 09/26/2016    Anxiety 12/09/2015    Depression 12/09/2015    ADD (attention deficit disorder) 02/12/2015    Endometriosis 09/09/2014    ASCUS favoring benign 05/01/2013    S/P endometrial ablation 05/01/2013     Past Medical History:   Diagnosis Date    Acquired hypothyroidism 9/26/2016    Acute left-sided low back pain with bilateral sciatica 3/9/2022    Acute pancreatitis     ADD (attention deficit disorder) 2/12/2015    Anxiety     Congestive heart failure, unspecified HF chronicity, unspecified heart failure type ([address] 2/11/2022    Depression 12/9/2015    Endometrial cyst of ovary 5/10/14    RT ovary, ruptured    GERD (gastroesophageal reflux disease) 9/26/2016    Medullary sponge kidney of both kidneys 5/22/2018    Sjogren's disease ([address]     Stress     Swelling      Past Surgical History:   Procedure Laterality Date    BREAST BIOPSY Left 2015? ?    lump removed (benign) (Dr. Fuentes Dunham)   [address] Left 2015??     Dr Fuentes Dunham (benign)    BREAST LUMPECTOMY      CHOLECYSTECTOMY  2011    HYSTERECTOMY (CERVIX STATUS UNKNOWN)  2014 sept (total)    OVARY REMOVAL Left Jan 2014    UPPER GASTROINTESTINAL ENDOSCOPY  09/16/2016    EGD W/BX    UPPER file    Active Member of Clubs or Organizations: No    Attends Club or Organization Meetings: Never    Marital Status: Never    Intimate Partner Violence:     Fear of Current or Ex-Partner: Not on file    Emotionally Abused: Not on file    Physically Abused: Not on file    Sexually Abused: Not on file   Housing Stability: Unknown    Unable to Pay for Housing in the Last Year: No    Number of [place] in the Last Year: Not on file    Unstable Housing in the Last Year: No     Current Outpatient Medications on File Prior to Visit   Medication Sig Dispense Refill    amphetamine-dextroamphetamine (ADDERALL XR) 30 MG extended release capsule Take 1 capsule by mouth 2 times daily for 30 days. 60 capsule 0    esomeprazole (NEXIUM) 40 MG delayed release capsule TAKE 1 CAPSULE BY MOUTH DAILY 90 capsule 0    furosemide (LASIX) 20 MG tablet take 1 tablet by mouth 2 times daily 60 tablet 0    traZODone (DESYREL) 150 MG tablet TAKE ONE TABLET BY MOUTH NIGHTLY 30 tablet 5    lidocaine (LIDODERM) 5 % apply one patch as directed once daily to affected area remove patch after twelve hours.       midodrine (PROAMATINE) 10 MG tablet Take 10 mg by mouth as needed      hyoscyamine (LEVSIN/SL) 125 MCG sublingual tablet Place 1 tablet under the tongue every 4 hours as needed for Cramping 90 tablet 3    buPROPion (WELLBUTRIN XL) 300 MG extended release tablet Take 1 tablet by mouth every morning TAKE ONE TABLET BY MOUTH EVERY MORNING 90 tablet 5    topiramate (TOPAMAX) 100 MG tablet TAKE ONE TABLET BY MOUTH TWO TIMES A DAY 60 tablet 5    loratadine (CLARITIN) 10 MG tablet Take 1 tablet by mouth daily 30 tablet 5    potassium chloride (KLOR-CON M) 10 MEQ extended release tablet Take 1 tablet by mouth daily 30 tablet 0    liothyronine (CYTOMEL) 25 MCG tablet Take 1 tablet by mouth daily 90 tablet 0    aspirin (ASPIRIN CHILDRENS) 81 MG chewable tablet Take 1 tablet by mouth daily 14 tablet 0    cyclobenzaprine (FLEXERIL) 5 MG tablet Take 2.5-5 mg by mouth 2 times daily as needed      naratriptan (AMERGE) 2.5 MG tablet Take one at onset of severe headache/migraine may repeat x 1 after 4 hours if needed. Maximum 2/day and 2 days/week.  hydrocortisone (CORTEF) 5 MG tablet Take 5 mg by mouth daily      valACYclovir (VALTREX) 500 MG tablet Take 1 tablet by mouth daily 30 tablet 5    Magnesium 400 MG TABS Take 1 tablet by mouth daily 30 tablet 0    TRULANCE 3 MG TABS Take 3 mg by mouth daily      ondansetron (ZOFRAN) 4 MG tablet Take 1 tablet by mouth every 8 hours as needed for Nausea or Vomiting 30 tablet 1    azelastine (ASTELIN) 0.1 % nasal spray 1 spray by Nasal route      moxifloxacin (VIGAMOX) 0.5 % ophthalmic solution 1 drop      fluticasone (FLONASE) 50 MCG/ACT nasal spray USE 1 SPRAY NASALLY DAILY  3    ZOLMitriptan (ZOMIG) 5 MG tablet TAKE 1 TABLET BY MOUTH AT ONSET OF MIGRAINE. MAY REPEAT ONCE AFTER 2 HOURS. MAX 10 MG (2 TABLETS) PER DAY  11    levothyroxine (SYNTHROID) 25 MCG tablet Take one daily 90 tablet 1    hydroxychloroquine (PLAQUENIL) 200 MG tablet Take 300 mg by mouth daily       Pancrelipase, Lip-Prot-Amyl, (CREON) 09584 UNITS CPEP 2 caps tid 180 capsule 03    [DISCONTINUED] NEXIUM 40 MG delayed release capsule TAKE ONE CAPSULE BY MOUTH EVERY DAY 30 capsule 5    [DISCONTINUED] topiramate (TOPAMAX) 100 MG tablet Take 1 tablet by mouth 2 times daily 60 tablet 5    [DISCONTINUED] furosemide (LASIX) 20 MG tablet Take 1 tablet by mouth 2 times daily TAKE ONE TABLET BY MOUTH TWO TIMES A DAY 60 tablet 5     No current facility-administered medications on file prior to visit. No Known Allergies    Review of Systems   Constitutional: Positive for fatigue. Respiratory: Negative for cough and shortness of breath. Cardiovascular: Negative for chest pain. Gastrointestinal: Negative for constipation and diarrhea. Musculoskeletal: Positive for neck pain.        Objective  Vitals: 06/29/22 1600   BP: 104/72   Pulse: 90   SpO2: 98%   Weight: 108 lb (49 kg)   Height: 5' 2\" (1.575 m)     Physical Exam  Vitals and nursing note reviewed. Constitutional:       Appearance: Normal appearance. She is normal weight. HENT:      Head: Normocephalic. Mouth/Throat:      Mouth: Mucous membranes are moist.      Pharynx: Oropharynx is clear. Eyes:      Extraocular Movements: Extraocular movements intact. Conjunctiva/sclera: Conjunctivae normal.      Pupils: Pupils are equal, round, and reactive to light. Neck:     Cardiovascular:      Rate and Rhythm: Normal rate and regular rhythm. Pulses: Normal pulses. Heart sounds: Normal heart sounds. Pulmonary:      Effort: Pulmonary effort is normal.      Breath sounds: Normal breath sounds. Musculoskeletal:      Cervical back: Neck supple. Skin:     General: Skin is warm. Neurological:      General: No focal deficit present. Mental Status: She is alert and oriented to person, place, and time. Mental status is at baseline. Psychiatric:         Mood and Affect: Mood normal.         Behavior: Behavior normal.         Thought Content: Thought content normal.         Judgment: Judgment normal.           Assessment & Plan     Diagnosis Orders   1. Lymphadenopathy  CT SOFT TISSUE NECK W WO CONTRAST   2.  Neck pain  CT SOFT TISSUE NECK W WO CONTRAST   3. Nausea  promethazine (PHENERGAN) 25 MG tablet       Orders Placed This Encounter   Procedures    CT SOFT TISSUE NECK W WO CONTRAST     Standing Status:   Future     Standing Expiration Date:   6/29/2023     Order Specific Question:   STAT Creatinine as needed:     Answer:   No     Order Specific Question:   Reason for exam:     Answer:   neck pain, lymphadenopathy       Orders Placed This Encounter   Medications    promethazine (PHENERGAN) 25 MG tablet     Sig: Take 1 tablet by mouth 3 times daily as needed for Nausea     Dispense:  21 tablet     Refill:  0       Side effects, adverse effects of the medication prescribed today, as well as treatment plan/ rationale and result expectations have been discussed with the patient who expresses understanding and desires to proceed. Close follow up to evaluate treatment results and for coordination of care. I have reviewed the patient's medical history in detail and updated the computerized patient record. As always, patient is advised that if symptoms worsen in any way they will proceed to the nearest emergency room. FU after imaging. No follow-ups on file.     Joesph Wilson, APRN - CNP

## 2022-07-06 ENCOUNTER — CARE COORDINATION (OUTPATIENT)
Dept: CARE COORDINATION | Age: 52
End: 2022-07-06

## 2022-07-06 NOTE — CARE COORDINATION
Attempted to contact patient for care coordination regarding CHF and masses. Unable to reach patient by phone. Message left regarding purpose of call. Number provided and call back requested.

## 2022-07-07 ENCOUNTER — HOSPITAL ENCOUNTER (OUTPATIENT)
Dept: CT IMAGING | Age: 52
Discharge: HOME | End: 2022-07-09
Payer: MEDICARE

## 2022-07-07 DIAGNOSIS — R59.1 LYMPHADENOPATHY: ICD-10-CM

## 2022-07-07 DIAGNOSIS — M54.2 NECK PAIN: ICD-10-CM

## 2022-07-07 PROCEDURE — 6360000004 HC RX CONTRAST MEDICATION: Performed by: NURSE PRACTITIONER

## 2022-07-07 PROCEDURE — 70491 CT SOFT TISSUE NECK W/DYE: CPT

## 2022-07-07 RX ADMIN — IOPAMIDOL 100 ML: 612 INJECTION, SOLUTION INTRAVENOUS at 07:59

## 2022-07-15 ENCOUNTER — CARE COORDINATION (OUTPATIENT)
Dept: CARE COORDINATION | Age: 52
End: 2022-07-15

## 2022-07-15 NOTE — CARE COORDINATION
Attempted to contact patient for care coordination follow up regarding CHF  Unable to reach patient by phone. Message left regarding purpose of call. Number provided and call back requested.

## 2022-07-21 ENCOUNTER — PATIENT MESSAGE (OUTPATIENT)
Dept: FAMILY MEDICINE CLINIC | Age: 52
End: 2022-07-21

## 2022-07-21 DIAGNOSIS — F98.8 ATTENTION DEFICIT DISORDER, UNSPECIFIED HYPERACTIVITY PRESENCE: ICD-10-CM

## 2022-07-21 DIAGNOSIS — Z76.0 MEDICATION REFILL: ICD-10-CM

## 2022-07-21 RX ORDER — DEXTROAMPHETAMINE SACCHARATE, AMPHETAMINE ASPARTATE MONOHYDRATE, DEXTROAMPHETAMINE SULFATE AND AMPHETAMINE SULFATE 7.5; 7.5; 7.5; 7.5 MG/1; MG/1; MG/1; MG/1
30 CAPSULE, EXTENDED RELEASE ORAL 2 TIMES DAILY
Qty: 60 CAPSULE | Refills: 0 | Status: SHIPPED | OUTPATIENT
Start: 2022-07-21 | End: 2022-07-22 | Stop reason: SDUPTHER

## 2022-07-21 NOTE — TELEPHONE ENCOUNTER
Provider Department Appt Notes   8/30/2022 ORTIZ Cespedes CNP [facility] [place]       Past Visits    Date Provider Specialty Visit Type Primary Dx   06/29/2022 ORTIZ Cespedes CNP Family Medicine Office Visit Lymphadenopathy   05/31/2022 Leonor Bennett DO Family Medicine Office Visit Lymphadenopathy, axillary

## 2022-07-22 ENCOUNTER — CARE COORDINATION (OUTPATIENT)
Dept: CARE COORDINATION | Age: 52
End: 2022-07-22

## 2022-07-22 RX ORDER — DEXTROAMPHETAMINE SACCHARATE, AMPHETAMINE ASPARTATE MONOHYDRATE, DEXTROAMPHETAMINE SULFATE AND AMPHETAMINE SULFATE 7.5; 7.5; 7.5; 7.5 MG/1; MG/1; MG/1; MG/1
30 CAPSULE, EXTENDED RELEASE ORAL 2 TIMES DAILY
Qty: 60 CAPSULE | Refills: 0 | Status: SHIPPED | OUTPATIENT
Start: 2022-07-22 | End: 2022-08-24 | Stop reason: SDUPTHER

## 2022-07-22 NOTE — CARE COORDINATION
Ambulatory Care Coordination Note  7/22/2022    ACC: Shirley Nino, RN    Summary Note:     Jennifer Delgadillo says that she has been sick for about a week  She tells me that she has fatigue, low grade fever, sore throat that will not go away, cough, left ear pain,and cough. She did complete a home COVID test which has been negative both times. She is trying OTC medications without relief  She attempted to do a virtual visit however, when she got on the phone with the provider he told her that he was in [facility] and could not help her. I suggested that she go to the walk in clinic but she says that she has too much going on at home and with not feeling well she does not want to leave the house  She adds that she did go to get her axillae biopsies completed but once got there she was told that the nodes were high normal in size and they did not need to be biopsied. She would like a second opinion as she continues to have pain with the swollen nodes. CHF  She does have selling in her ankles the left more than the right. She adds that she has \"some SOB' but she feels that this might be related to her cold type symptoms  She denies any chest pain, lightheadedness or dizziness. She is taking her diuretics as prescribed. PLAN:  Jennifer Delgadillo will go to the [facility] if her symptoms worsen or do not improve. Jennifer Delgadillo will take all medications as prescribed  I will follow up with Dr Angela Diana regarding URI symptoms to obtain her input and advise. Lab Results       None            Care Coordination Interventions    Referral from Primary Care Provider: No  Suggested Interventions and Community Resources  Disease Association: In Process  Pharmacist: Declined  Registered Dietician: Declined  Zone Management Tools: In Process  Other Services or Interventions: Pharmacy, Dietician referral declined. Walk in Clinic hours and usage discussed CHF zone tool education inititated.           Goals Addressed    None         Prior to Admission medications    Medication Sig Start Date End Date Taking? Authorizing Provider   amphetamine-dextroamphetamine (ADDERALL XR) 30 MG extended release capsule Take 1 capsule by mouth in the morning and 1 capsule in the evening. Do all this for 30 days. 7/22/22 8/21/22  Jovani Rose,    esomeprazole (NEXIUM) 40 MG delayed release capsule TAKE 1 CAPSULE BY MOUTH DAILY 6/21/22   Saintclair Leeks, APRN - CNP   furosemide (LASIX) 20 MG tablet take 1 tablet by mouth 2 times daily 6/20/22   Saintclair Leeks, APRN - CNP   traZODone (DESYREL) 150 MG tablet TAKE ONE TABLET BY MOUTH NIGHTLY 6/16/22   Saintclair Leeks, APRN - CNP   lidocaine (LIDODERM) 5 % apply one patch as directed once daily to affected area remove patch after twelve hours.  5/17/22   Historical Provider, MD   midodrine (PROAMATINE) 10 MG tablet Take 10 mg by mouth as needed 4/12/22   Historical Provider, MD   hyoscyamine (LEVSIN/SL) 125 MCG sublingual tablet Place 1 tablet under the tongue every 4 hours as needed for Cramping 4/7/22   Saintclair Leeks, APRN - CNP   buPROPion (WELLBUTRIN XL) 300 MG extended release tablet Take 1 tablet by mouth every morning TAKE ONE TABLET BY MOUTH EVERY MORNING 3/14/22   Saintclair Leeks, APRN - CNP   topiramate (TOPAMAX) 100 MG tablet TAKE ONE TABLET BY MOUTH TWO TIMES A DAY 3/3/22   Saintclair Leeks, APRN - CNP   loratadine (CLARITIN) 10 MG tablet Take 1 tablet by mouth daily 12/28/21   Saintclair Leeks, APRN - CNP   potassium chloride (KLOR-CON M) 10 MEQ extended release tablet Take 1 tablet by mouth daily 12/8/21   Lisa Cuevas PA-C   liothyronine (CYTOMEL) 25 MCG tablet Take 1 tablet by mouth daily 7/6/21   Saintclair Leeks, APRN - CNP   aspirin (ASPIRIN CHILDRENS) 81 MG chewable tablet Take 1 tablet by mouth daily 6/24/21   TRUDY Zelaya   cyclobenzaprine (FLEXERIL) 5 MG tablet Take 2.5-5 mg by mouth 2 times daily as needed 6/2/21   Historical Provider, MD   naratriptan (AMERGE) 2.5 MG tablet Take one at onset of severe headache/migraine may repeat x 1 after 4 hours if needed. Maximum 2/day and 2 days/week. 6/2/21   Historical Provider, MD   hydrocortisone (CORTEF) 5 MG tablet Take 5 mg by mouth daily 2/24/21   Historical Provider, MD   valACYclovir (VALTREX) 500 MG tablet Take 1 tablet by mouth daily 2/15/21   ORTIZ Goldberg CNP   NEXIUM 40 MG delayed release capsule TAKE ONE CAPSULE BY MOUTH EVERY DAY 11/2/20   ORTIZ Goldberg CNP   topiramate (TOPAMAX) 100 MG tablet Take 1 tablet by mouth 2 times daily 10/11/20   ORTIZ Goldberg CNP   Magnesium 400 MG TABS Take 1 tablet by mouth daily 10/3/20   Melvin Hernandez PA-C   furosemide (LASIX) 20 MG tablet Take 1 tablet by mouth 2 times daily TAKE ONE TABLET BY MOUTH TWO TIMES A DAY 8/4/20   ORTIZ Goldberg CNP   TRULANCE 3 MG TABS Take 3 mg by mouth daily 3/10/20   Historical Provider, MD   ondansetron (ZOFRAN) 4 MG tablet Take 1 tablet by mouth every 8 hours as needed for Nausea or Vomiting 2/25/20   ORTIZ Goldberg CNP   azelastine (ASTELIN) 0.1 % nasal spray 1 spray by Nasal route 11/27/19   Historical Provider, MD   moxifloxacin (VIGAMOX) 0.5 % ophthalmic solution 1 drop    Historical Provider, MD   fluticasone (FLONASE) 50 MCG/ACT nasal spray USE 1 SPRAY NASALLY DAILY 5/15/19   Historical Provider, MD   ZOLMitriptan (ZOMIG) 5 MG tablet TAKE 1 TABLET BY MOUTH AT ONSET OF MIGRAINE. MAY REPEAT ONCE AFTER 2 HOURS.  MAX 10 MG (2 TABLETS) PER DAY 6/4/18   Historical Provider, MD   levothyroxine (SYNTHROID) 25 MCG tablet Take one daily 4/29/18   ORTIZ Goldberg CNP   hydroxychloroquine (PLAQUENIL) 200 MG tablet Take 300 mg by mouth daily     Historical Provider, MD   Pancrelipase, Lip-Prot-Amyl, (CREON) 47674 UNITS CPEP 2 caps tid 9/23/16   Merline Hurtado MD       Future Appointments   Date Time Provider Angela Hernandez   8/30/2022 10:30 AM [facility] AT [place]    and   Congestive Heart Failure Assessment    Are you currently restricting fluids?: No Restriction  Do you understand a low sodium diet?: Yes  Do you understand how to read food labels?: Yes     No patient-reported symptoms      Symptoms:  CHF associated fatigue: Pos, CHF associated leg swelling: Pos      Symptom course: worsening  Patient-reported weight (lb): 109  Salt intake watch compared to last visit: stable

## 2022-07-26 ENCOUNTER — TELEMEDICINE (OUTPATIENT)
Dept: FAMILY MEDICINE CLINIC | Age: 52
End: 2022-07-26
Payer: MEDICARE

## 2022-07-26 DIAGNOSIS — J02.9 ACUTE PHARYNGITIS, UNSPECIFIED ETIOLOGY: Primary | ICD-10-CM

## 2022-07-26 PROCEDURE — 99213 OFFICE O/P EST LOW 20 MIN: CPT | Performed by: STUDENT IN AN ORGANIZED HEALTH CARE EDUCATION/TRAINING PROGRAM

## 2022-07-26 PROCEDURE — 1036F TOBACCO NON-USER: CPT | Performed by: STUDENT IN AN ORGANIZED HEALTH CARE EDUCATION/TRAINING PROGRAM

## 2022-07-26 PROCEDURE — G8420 CALC BMI NORM PARAMETERS: HCPCS | Performed by: STUDENT IN AN ORGANIZED HEALTH CARE EDUCATION/TRAINING PROGRAM

## 2022-07-26 PROCEDURE — G8427 DOCREV CUR MEDS BY ELIG CLIN: HCPCS | Performed by: STUDENT IN AN ORGANIZED HEALTH CARE EDUCATION/TRAINING PROGRAM

## 2022-07-26 RX ORDER — AMOXICILLIN AND CLAVULANATE POTASSIUM 875; 125 MG/1; MG/1
1 TABLET, FILM COATED ORAL 2 TIMES DAILY
Qty: 20 TABLET | Refills: 0 | Status: ON HOLD | OUTPATIENT
Start: 2022-07-26 | End: 2022-08-01 | Stop reason: HOSPADM

## 2022-07-26 RX ORDER — PRUCALOPRIDE 2 MG/1
TABLET, FILM COATED ORAL
COMMUNITY
Start: 2022-07-04

## 2022-07-26 ASSESSMENT — ENCOUNTER SYMPTOMS
SHORTNESS OF BREATH: 0
SINUS PAIN: 0
SORE THROAT: 1
ABDOMINAL PAIN: 0
COUGH: 0
SINUS PRESSURE: 0
VOMITING: 0

## 2022-07-26 NOTE — PROGRESS NOTES
2022    TELEHEALTH EVALUATION -- Audio/Visual (During ABCEK- public health emergency)    Due to Arina Barber 19 outbreak, patient's office visit was converted to a virtual visit. Patient was contacted and agreed to proceed with a virtual visit via [employer]. me  The risks and benefits of converting to a virtual visit were discussed in light of the current infectious disease epidemic. Patient also understood that insurance coverage and co-pays are up to their individual insurance plans. HPI:  Gilford Alexandria (:  1970) has requested an audio/video evaluation for the following concern(s):  Chief Complaint   Patient presents with    Pharyngitis     X2 weeks Pt has been taking sudafed, nyquil/dayquil, and tylenol. 2 at home covid tests that were both negative. Cough    Fatigue     URI  Symptoms started 2 weeks ago  Endorsing sore throat, cough, fatigue  Denied fevers, chills, wheezing, shortness of breath, GI symptoms, muscle aches  Has tried Sudafed, NyQuil/DayQuil, Tylenol  No recent sick contacts  COVID testing negative x2     Internal Administration   First Dose COVID-19, MODERNA BLUE border, Primary or Immunocompromised, (age 12y+), IM, 100 mcg/0.5mL  2021   Second Dose COVID-19, MODERNA BLUE border, Primary or Immunocompromised, (age 12y+), IM, 100 mcg/0.5mL   2021       Last COVID Lab SARS-CoV-2 (no units)   Date Value   2021 Not Detected     SARS-CoV-2, PCR (no units)   Date Value   2022 NOT DETECTED     SARS-COV-2, POC (no units)   Date Value   2021 Not-Detected          Review of Systems   Constitutional:  Positive for fatigue and fever. Negative for chills. HENT:  Positive for sore throat. Negative for congestion, sinus pressure and sinus pain. Respiratory:  Negative for cough and shortness of breath. Cardiovascular:  Negative for chest pain and palpitations. Gastrointestinal:  Negative for abdominal pain and vomiting.    Musculoskeletal:  Negative for arthralgias and myalgias. Skin:  Negative for rash and wound. Neurological:  Negative for speech difficulty and light-headedness. Psychiatric/Behavioral:  Negative for suicidal ideas. The patient is not nervous/anxious. Prior to Visit Medications    Medication Sig Taking? Authorizing Provider   MOTEGRITY 2 MG TABS TAKE ONE TABLET BY MOUTH EVERY DAY Yes Historical Provider, MD   amoxicillin-clavulanate (AUGMENTIN) 875-125 MG per tablet Take 1 tablet by mouth in the morning and 1 tablet before bedtime. Do all this for 10 days. Yes Susan Smoker, DO   amphetamine-dextroamphetamine (ADDERALL XR) 30 MG extended release capsule Take 1 capsule by mouth in the morning and 1 capsule in the evening. Do all this for 30 days. Yes Susan Smoker, DO   esomeprazole (NEXIUM) 40 MG delayed release capsule TAKE 1 CAPSULE BY MOUTH DAILY Yes ORTIZ Erickson CNP   furosemide (LASIX) 20 MG tablet take 1 tablet by mouth 2 times daily Yes ORTIZ Erickson CNP   traZODone (DESYREL) 150 MG tablet TAKE ONE TABLET BY MOUTH NIGHTLY Yes ORTIZ Erickson CNP   lidocaine (LIDODERM) 5 % apply one patch as directed once daily to affected area remove patch after twelve hours.  Yes Historical Provider, MD   midodrine (PROAMATINE) 10 MG tablet Take 10 mg by mouth as needed Yes Historical Provider, MD   hyoscyamine (LEVSIN/SL) 125 MCG sublingual tablet Place 1 tablet under the tongue every 4 hours as needed for Cramping Yes ORTIZ Erickson CNP   buPROPion (WELLBUTRIN XL) 300 MG extended release tablet Take 1 tablet by mouth every morning TAKE ONE TABLET BY MOUTH EVERY MORNING Yes ORTIZ Erickson CNP   topiramate (TOPAMAX) 100 MG tablet TAKE ONE TABLET BY MOUTH TWO TIMES A DAY Yes ORTIZ Erickson CNP   loratadine (CLARITIN) 10 MG tablet Take 1 tablet by mouth daily Yes ORTIZ Erickson CNP   potassium chloride (KLOR-CON M) 10 MEQ extended release tablet Take 1 tablet by mouth daily Yes Avtar Orellana Herminio Jones PA-C   liothyronine (CYTOMEL) 25 MCG tablet Take 1 tablet by mouth daily Yes ORTIZ Epps CNP   aspirin (ASPIRIN CHILDRENS) 81 MG chewable tablet Take 1 tablet by mouth daily Yes TRUDY Hyatt   cyclobenzaprine (FLEXERIL) 5 MG tablet Take 2.5-5 mg by mouth 2 times daily as needed Yes Historical Provider, MD   naratriptan (AMERGE) 2.5 MG tablet Take one at onset of severe headache/migraine may repeat x 1 after 4 hours if needed. Maximum 2/day and 2 days/week. Yes Historical Provider, MD   hydrocortisone (CORTEF) 5 MG tablet Take 5 mg by mouth daily Yes Historical Provider, MD   valACYclovir (VALTREX) 500 MG tablet Take 1 tablet by mouth daily Yes ORTIZ Epps CNP   NEXIUM 40 MG delayed release capsule TAKE ONE CAPSULE BY MOUTH EVERY DAY Yes ORTIZ Epps CNP   topiramate (TOPAMAX) 100 MG tablet Take 1 tablet by mouth 2 times daily Yes ORTIZ Epps CNP   Magnesium 400 MG TABS Take 1 tablet by mouth daily Yes Dangelo Bermudez PA-C   furosemide (LASIX) 20 MG tablet Take 1 tablet by mouth 2 times daily TAKE ONE TABLET BY MOUTH TWO TIMES A DAY Yes ORTIZ Epps CNP   TRULANCE 3 MG TABS Take 3 mg by mouth daily Yes Historical Provider, MD   ondansetron (ZOFRAN) 4 MG tablet Take 1 tablet by mouth every 8 hours as needed for Nausea or Vomiting Yes ORTIZ Epps CNP   azelastine (ASTELIN) 0.1 % nasal spray 1 spray by Nasal route Yes Historical Provider, MD   moxifloxacin (VIGAMOX) 0.5 % ophthalmic solution 1 drop Yes Historical Provider, MD   fluticasone (FLONASE) 50 MCG/ACT nasal spray USE 1 SPRAY NASALLY DAILY Yes Historical Provider, MD   ZOLMitriptan (ZOMIG) 5 MG tablet TAKE 1 TABLET BY MOUTH AT ONSET OF MIGRAINE. MAY REPEAT ONCE AFTER 2 HOURS.  MAX 10 MG (2 TABLETS) PER DAY Yes Historical Provider, MD   levothyroxine (SYNTHROID) 25 MCG tablet Take one daily Yes ORTIZ Epps CNP   hydroxychloroquine (PLAQUENIL) 200 MG tablet Take 300 mg by mouth daily  Yes Historical Provider, MD   Pancrelipase, Lip-Prot-Amyl, (CREON) 86188 UNITS CPEP 2 caps tid Yes Magalys Avila MD       Social History     Tobacco Use    Smoking status: Never    Smokeless tobacco: Never   Vaping Use    Vaping Use: Never used   Substance Use Topics    Alcohol use: No     Alcohol/week: 0.0 standard drinks     Comment: no alcohol since 2011    Drug use: No        No Known Allergies,   Past Medical History:   Diagnosis Date    Acquired hypothyroidism 9/26/2016    Acute left-sided low back pain with bilateral sciatica 3/9/2022    Acute pancreatitis     ADD (attention deficit disorder) 2/12/2015    Anxiety     Congestive heart failure, unspecified HF chronicity, unspecified heart failure type ([address] 2/11/2022    Depression 12/9/2015    Endometrial cyst of ovary 5/10/14    RT ovary, ruptured    GERD (gastroesophageal reflux disease) 9/26/2016    Medullary sponge kidney of both kidneys 5/22/2018    Sjogren's disease ([facility] [address]     Stress     Swelling    ,   Past Surgical History:   Procedure Laterality Date    BREAST BIOPSY Left 2015? ?    lump removed (benign) (Dr. Herminia Mireles)    BREAST CYST EXCISION Left 2015??     Dr [address] (benign)    BREAST LUMPECTOMY      CHOLECYSTECTOMY  2011    HYSTERECTOMY (CERVIX STATUS UNKNOWN)  2014 sept (total)    OVARY REMOVAL Left Jan 2014    UPPER GASTROINTESTINAL ENDOSCOPY  09/16/2016    EGD W/BX    UPPER GASTROINTESTINAL ENDOSCOPY N/A 5/6/2021    ENDOSCOPIC ULTRASOUND with EGD performed by Maricruz Connor MD at [facility]   ,   Social History     Tobacco Use    Smoking status: Never    Smokeless tobacco: Never   Vaping Use    Vaping Use: Never used   Substance Use Topics    Alcohol use: No     Alcohol/week: 0.0 standard drinks     Comment: no alcohol since 2011    Drug use: No   ,   Family History   Problem Relation Age of Onset    Heart Disease Father 48    Cancer Maternal Grandmother         breast    Breast Cancer Maternal Grandmother         in her 63's Heart Disease Other         mom and dad's side    Breast Cancer Paternal Grandmother         in her 63's    Breast Cancer Paternal Aunt         in her 29's    Breast Cancer Paternal Aunt         in her 63's   ,   Immunization History   Administered Date(s) Administered    COVID-19, MODERNA BLUE border, Primary or Immunocompromised, (age 12y+), IM, 100 mcg/0.5mL 02/03/2021, 03/03/2021    Pneumococcal Polysaccharide ([id number]) 11/15/2018    Tdap (Boostrix, Adacel) 04/01/2019   ,   Health Maintenance   Topic Date Due    HIV screen  Never done    Hepatitis C screen  Never done    Hepatitis B vaccine (1 of 3 - Risk 3-dose series) Never done    Pneumococcal 0-64 years Vaccine (2 - PCV) 11/15/2019    COVID-19 Vaccine (3 - Booster for Moderna series) 08/03/2021    Flu vaccine (1) 09/01/2022    Depression Monitoring  05/27/2023    Lipids  01/12/2026    Colorectal Cancer Screen  12/07/2027    DTaP/Tdap/Td vaccine (2 - Td or Tdap) 04/01/2029    Hepatitis A vaccine  Aged Out    Hib vaccine  Aged Out    Meningococcal (ACWY) vaccine  Aged Out       PHYSICAL EXAMINATION:  [ INSTRUCTIONS:  \"[x]\" Indicates a positive item  \"[]\" Indicates a negative item  -- DELETE ALL ITEMS NOT EXAMINED]  [x] Alert  [] Oriented to person/place/time    [x] No apparent distress  [] Toxic appearing    [] Face flushed appearing [x] Sclera clear  [] Lips are cyanotic      [x] Breathing appears normal  [] Appears tachypneic      [] Rash on visible skin    [x] Cranial Nerves II-XII grossly intact    [x] Motor grossly intact in visible upper extremities    [] Motor grossly intact in visible lower extremities    [x] Normal Mood  [] Anxious appearing    [] Depressed appearing  [] Confused appearing      [] Poor short term memory  [] Poor long term memory    [] OTHER:      Due to this being a TeleHealth encounter, evaluation of the following organ systems is limited: Vitals/Constitutional/EENT/Resp/CV/GI//MS/Neuro/Skin/Heme-Lymph-Imm. ASSESSMENT/PLAN:  1. Acute pharyngitis, unspecified etiology  Start antibiotic therapy due to prolonged symptoms    - amoxicillin-clavulanate (AUGMENTIN) 875-125 MG per tablet; Take 1 tablet by mouth in the morning and 1 tablet before bedtime. Do all this for 10 days. Dispense: 20 tablet; Refill: 0    Return if symptoms worsen or fail to improve. An  electronic signature was used to authenticate this note. --Austen Gardner,  on 7/26/2022 at 2:49 PM        Pursuant to the emergency declaration under the [facility] waiver authority and the [employer] Act, this Virtual  Visit was conducted, with patient's consent, to reduce the patient's risk of exposure to COVID-19 and provide continuity of care for an established patient. Services were provided through a video synchronous discussion virtually to substitute for in-person clinic visit.

## 2022-07-29 ENCOUNTER — HOSPITAL ENCOUNTER (EMERGENCY)
Age: 52
Discharge: ANOTHER HOSPITAL | End: 2022-07-30
Attending: EMERGENCY MEDICINE
Payer: MEDICARE

## 2022-07-29 ENCOUNTER — APPOINTMENT (OUTPATIENT)
Dept: CT IMAGING | Age: 52
End: 2022-07-29
Payer: MEDICARE

## 2022-07-29 DIAGNOSIS — K85.00 IDIOPATHIC ACUTE PANCREATITIS WITHOUT INFECTION OR NECROSIS: Primary | ICD-10-CM

## 2022-07-29 DIAGNOSIS — K83.8 PNEUMOBILIA: ICD-10-CM

## 2022-07-29 DIAGNOSIS — K55.1 SMAS (SUPERIOR MESENTERIC ARTERY SYNDROME) (HCC): ICD-10-CM

## 2022-07-29 PROBLEM — K85.90 PANCREATITIS, UNSPECIFIED PANCREATITIS TYPE: Status: ACTIVE | Noted: 2022-07-29

## 2022-07-29 LAB
ALBUMIN SERPL-MCNC: 4.6 G/DL (ref 3.5–4.6)
ALP BLD-CCNC: 115 U/L (ref 40–130)
ALT SERPL-CCNC: 9 U/L (ref 0–33)
ANION GAP SERPL CALCULATED.3IONS-SCNC: 15 MEQ/L (ref 9–15)
AST SERPL-CCNC: 15 U/L (ref 0–35)
BACTERIA: NEGATIVE /HPF
BASOPHILS ABSOLUTE: 0.1 K/UL (ref 0–0.1)
BASOPHILS RELATIVE PERCENT: 1.2 % (ref 0.1–1.2)
BILIRUB SERPL-MCNC: <0.2 MG/DL (ref 0.2–0.7)
BILIRUBIN URINE: NEGATIVE
BLOOD, URINE: NORMAL
BUN BLDV-MCNC: 20 MG/DL (ref 6–20)
CALCIUM SERPL-MCNC: 9.8 MG/DL (ref 8.5–9.9)
CHLORIDE BLD-SCNC: 101 MEQ/L (ref 95–107)
CLARITY: CLEAR
CO2: 21 MEQ/L (ref 20–31)
COLOR: YELLOW
CREAT SERPL-MCNC: 0.65 MG/DL (ref 0.5–0.9)
EOSINOPHILS ABSOLUTE: 0.1 K/UL (ref 0–0.4)
EOSINOPHILS RELATIVE PERCENT: 0.9 % (ref 0.7–5.8)
EPITHELIAL CELLS, UA: ABNORMAL /HPF
GFR AFRICAN AMERICAN: >60
GFR NON-AFRICAN AMERICAN: >60
GLOBULIN: 2.7 G/DL (ref 2.3–3.5)
GLUCOSE BLD-MCNC: 108 MG/DL (ref 70–99)
GLUCOSE URINE: NEGATIVE MG/DL
HCT VFR BLD CALC: 38.2 % (ref 37–47)
HEMOGLOBIN: 12.7 G/DL (ref 11.2–15.7)
IMMATURE GRANULOCYTES #: 0 K/UL
IMMATURE GRANULOCYTES %: 0.3 %
KETONES, URINE: NORMAL MG/DL
LEUKOCYTE ESTERASE, URINE: NEGATIVE
LIPASE: 214 U/L (ref 12–95)
LYMPHOCYTES ABSOLUTE: 2.5 K/UL (ref 1.2–3.7)
LYMPHOCYTES RELATIVE PERCENT: 36.4 %
MAGNESIUM: 2 MG/DL (ref 1.7–2.4)
MCH RBC QN AUTO: 28.9 PG (ref 25.6–32.2)
MCHC RBC AUTO-ENTMCNC: 33.2 % (ref 32.2–35.5)
MCV RBC AUTO: 86.8 FL (ref 79.4–94.8)
MONOCYTES ABSOLUTE: 0.4 K/UL (ref 0.2–0.9)
MONOCYTES RELATIVE PERCENT: 5.1 % (ref 4.7–12.5)
NEUTROPHILS ABSOLUTE: 3.8 K/UL (ref 1.6–6.1)
NEUTROPHILS RELATIVE PERCENT: 56.1 % (ref 34–71.1)
NITRITE, URINE: NEGATIVE
PDW BLD-RTO: 12.1 % (ref 11.7–14.4)
PH UA: 6 (ref 5–9)
PLATELET # BLD: 319 K/UL (ref 182–369)
POTASSIUM SERPL-SCNC: 3.4 MEQ/L (ref 3.4–4.9)
PROTEIN UA: NEGATIVE MG/DL
RBC # BLD: 4.4 M/UL (ref 3.93–5.22)
RBC UA: ABNORMAL /HPF (ref 0–2)
SODIUM BLD-SCNC: 137 MEQ/L (ref 135–144)
SPECIFIC GRAVITY UA: 1.02 (ref 1–1.03)
TOTAL PROTEIN: 7.3 G/DL (ref 6.3–8)
URINE REFLEX TO CULTURE: NORMAL
UROBILINOGEN, URINE: 0.2 E.U./DL
WBC # BLD: 6.8 K/UL (ref 4–10)
WBC UA: ABNORMAL /HPF (ref 0–5)

## 2022-07-29 PROCEDURE — 2580000003 HC RX 258: Performed by: EMERGENCY MEDICINE

## 2022-07-29 PROCEDURE — 85025 COMPLETE CBC W/AUTO DIFF WBC: CPT

## 2022-07-29 PROCEDURE — 6360000004 HC RX CONTRAST MEDICATION: Performed by: EMERGENCY MEDICINE

## 2022-07-29 PROCEDURE — 83735 ASSAY OF MAGNESIUM: CPT

## 2022-07-29 PROCEDURE — 96374 THER/PROPH/DIAG INJ IV PUSH: CPT

## 2022-07-29 PROCEDURE — 80053 COMPREHEN METABOLIC PANEL: CPT

## 2022-07-29 PROCEDURE — 83690 ASSAY OF LIPASE: CPT

## 2022-07-29 PROCEDURE — 99285 EMERGENCY DEPT VISIT HI MDM: CPT

## 2022-07-29 PROCEDURE — 81001 URINALYSIS AUTO W/SCOPE: CPT

## 2022-07-29 PROCEDURE — 96375 TX/PRO/DX INJ NEW DRUG ADDON: CPT

## 2022-07-29 PROCEDURE — 74177 CT ABD & PELVIS W/CONTRAST: CPT

## 2022-07-29 PROCEDURE — 6360000002 HC RX W HCPCS: Performed by: EMERGENCY MEDICINE

## 2022-07-29 RX ORDER — ONDANSETRON 2 MG/ML
4 INJECTION INTRAMUSCULAR; INTRAVENOUS ONCE
Status: COMPLETED | OUTPATIENT
Start: 2022-07-29 | End: 2022-07-29

## 2022-07-29 RX ORDER — KETOROLAC TROMETHAMINE 30 MG/ML
30 INJECTION, SOLUTION INTRAMUSCULAR; INTRAVENOUS ONCE
Status: COMPLETED | OUTPATIENT
Start: 2022-07-29 | End: 2022-07-29

## 2022-07-29 RX ORDER — 0.9 % SODIUM CHLORIDE 0.9 %
1000 INTRAVENOUS SOLUTION INTRAVENOUS ONCE
Status: COMPLETED | OUTPATIENT
Start: 2022-07-29 | End: 2022-07-29

## 2022-07-29 RX ORDER — MORPHINE SULFATE 4 MG/ML
4 INJECTION, SOLUTION INTRAMUSCULAR; INTRAVENOUS ONCE
Status: COMPLETED | OUTPATIENT
Start: 2022-07-29 | End: 2022-07-29

## 2022-07-29 RX ADMIN — KETOROLAC TROMETHAMINE 30 MG: 30 INJECTION, SOLUTION INTRAMUSCULAR; INTRAVENOUS at 21:32

## 2022-07-29 RX ADMIN — HYDROMORPHONE HYDROCHLORIDE 1 MG: 1 INJECTION, SOLUTION INTRAMUSCULAR; INTRAVENOUS; SUBCUTANEOUS at 23:31

## 2022-07-29 RX ADMIN — MORPHINE SULFATE 4 MG: 4 INJECTION, SOLUTION INTRAMUSCULAR; INTRAVENOUS at 22:47

## 2022-07-29 RX ADMIN — SODIUM CHLORIDE 1000 ML: 9 INJECTION, SOLUTION INTRAVENOUS at 21:32

## 2022-07-29 RX ADMIN — ONDANSETRON 4 MG: 2 INJECTION INTRAMUSCULAR; INTRAVENOUS at 21:32

## 2022-07-29 RX ADMIN — IOPAMIDOL 100 ML: 755 INJECTION, SOLUTION INTRAVENOUS at 21:58

## 2022-07-29 ASSESSMENT — ENCOUNTER SYMPTOMS
VOMITING: 0
CONSTIPATION: 0
EYE PAIN: 0
WHEEZING: 0
SINUS PRESSURE: 0
NAUSEA: 1
DIARRHEA: 0
PHOTOPHOBIA: 0
BACK PAIN: 0
SHORTNESS OF BREATH: 0
APNEA: 0
COUGH: 0
RHINORRHEA: 0
COLOR CHANGE: 0
ABDOMINAL PAIN: 1
ABDOMINAL DISTENTION: 0
SORE THROAT: 0

## 2022-07-29 ASSESSMENT — PAIN DESCRIPTION - ORIENTATION: ORIENTATION: RIGHT

## 2022-07-29 ASSESSMENT — PAIN SCALES - GENERAL
PAINLEVEL_OUTOF10: 8
PAINLEVEL_OUTOF10: 10

## 2022-07-29 ASSESSMENT — PAIN DESCRIPTION - LOCATION: LOCATION: FLANK

## 2022-07-29 ASSESSMENT — PAIN DESCRIPTION - ONSET: ONSET: PROGRESSIVE

## 2022-07-29 ASSESSMENT — PAIN DESCRIPTION - FREQUENCY: FREQUENCY: CONTINUOUS

## 2022-07-30 ENCOUNTER — APPOINTMENT (OUTPATIENT)
Dept: GENERAL RADIOLOGY | Age: 52
DRG: 439 | End: 2022-07-30
Attending: INTERNAL MEDICINE
Payer: MEDICARE

## 2022-07-30 ENCOUNTER — HOSPITAL ENCOUNTER (INPATIENT)
Age: 52
LOS: 2 days | Discharge: HOME | DRG: 439 | End: 2022-08-01
Attending: INTERNAL MEDICINE | Admitting: INTERNAL MEDICINE
Payer: MEDICARE

## 2022-07-30 VITALS
OXYGEN SATURATION: 100 % | HEART RATE: 78 BPM | BODY MASS INDEX: 20.06 KG/M2 | TEMPERATURE: 97.7 F | WEIGHT: 109 LBS | DIASTOLIC BLOOD PRESSURE: 59 MMHG | RESPIRATION RATE: 16 BRPM | SYSTOLIC BLOOD PRESSURE: 116 MMHG | HEIGHT: 62 IN

## 2022-07-30 PROBLEM — K85.90 ACUTE PANCREATITIS WITHOUT INFECTION OR NECROSIS, UNSPECIFIED PANCREATITIS TYPE: Status: ACTIVE | Noted: 2022-07-30

## 2022-07-30 PROBLEM — R10.9 ABDOMINAL PAIN: Status: ACTIVE | Noted: 2022-07-30

## 2022-07-30 LAB
ALBUMIN SERPL-MCNC: 4.1 G/DL (ref 3.5–4.6)
ALP BLD-CCNC: 97 U/L (ref 40–130)
ALT SERPL-CCNC: 10 U/L (ref 0–33)
ANION GAP SERPL CALCULATED.3IONS-SCNC: 11 MEQ/L (ref 9–15)
AST SERPL-CCNC: 12 U/L (ref 0–35)
BASOPHILS ABSOLUTE: 0.1 K/UL (ref 0–0.2)
BASOPHILS RELATIVE PERCENT: 1 %
BILIRUB SERPL-MCNC: <0.2 MG/DL (ref 0.2–0.7)
BUN BLDV-MCNC: 17 MG/DL (ref 6–20)
CALCIUM SERPL-MCNC: 9.3 MG/DL (ref 8.5–9.9)
CHLORIDE BLD-SCNC: 108 MEQ/L (ref 95–107)
CHOLESTEROL, TOTAL: 204 MG/DL (ref 0–199)
CO2: 21 MEQ/L (ref 20–31)
CREAT SERPL-MCNC: 0.58 MG/DL (ref 0.5–0.9)
EOSINOPHILS ABSOLUTE: 0.1 K/UL (ref 0–0.7)
EOSINOPHILS RELATIVE PERCENT: 2.2 %
GFR AFRICAN AMERICAN: >60
GFR NON-AFRICAN AMERICAN: >60
GLOBULIN: 2.3 G/DL (ref 2.3–3.5)
GLUCOSE BLD-MCNC: 92 MG/DL (ref 70–99)
HCT VFR BLD CALC: 35.8 % (ref 37–47)
HDLC SERPL-MCNC: 69 MG/DL (ref 40–59)
HEMOGLOBIN: 11.9 G/DL (ref 12–16)
LDL CHOLESTEROL CALCULATED: 108 MG/DL (ref 0–129)
LYMPHOCYTES ABSOLUTE: 2.3 K/UL (ref 1–4.8)
LYMPHOCYTES RELATIVE PERCENT: 39 %
MAGNESIUM: 2.1 MG/DL (ref 1.7–2.4)
MCH RBC QN AUTO: 29.4 PG (ref 27–31.3)
MCHC RBC AUTO-ENTMCNC: 33.2 % (ref 33–37)
MCV RBC AUTO: 88.4 FL (ref 82–100)
MONOCYTES ABSOLUTE: 0.4 K/UL (ref 0.2–0.8)
MONOCYTES RELATIVE PERCENT: 6.4 %
NEUTROPHILS ABSOLUTE: 3.1 K/UL (ref 1.4–6.5)
NEUTROPHILS RELATIVE PERCENT: 51.4 %
PDW BLD-RTO: 13 % (ref 11.5–14.5)
PLATELET # BLD: 270 K/UL (ref 130–400)
POTASSIUM SERPL-SCNC: 3.5 MEQ/L (ref 3.4–4.9)
RBC # BLD: 4.05 M/UL (ref 4.2–5.4)
SODIUM BLD-SCNC: 140 MEQ/L (ref 135–144)
TOTAL PROTEIN: 6.4 G/DL (ref 6.3–8)
TRIGL SERPL-MCNC: 133 MG/DL (ref 0–150)
WBC # BLD: 5.9 K/UL (ref 4.8–10.8)

## 2022-07-30 PROCEDURE — 6370000000 HC RX 637 (ALT 250 FOR IP): Performed by: SURGERY

## 2022-07-30 PROCEDURE — 6360000002 HC RX W HCPCS: Performed by: INTERNAL MEDICINE

## 2022-07-30 PROCEDURE — 80061 LIPID PANEL: CPT

## 2022-07-30 PROCEDURE — 83735 ASSAY OF MAGNESIUM: CPT

## 2022-07-30 PROCEDURE — 99222 1ST HOSP IP/OBS MODERATE 55: CPT | Performed by: INTERNAL MEDICINE

## 2022-07-30 PROCEDURE — 99222 1ST HOSP IP/OBS MODERATE 55: CPT | Performed by: SURGERY

## 2022-07-30 PROCEDURE — 6360000002 HC RX W HCPCS: Performed by: NURSE PRACTITIONER

## 2022-07-30 PROCEDURE — 6370000000 HC RX 637 (ALT 250 FOR IP): Performed by: NURSE PRACTITIONER

## 2022-07-30 PROCEDURE — 1210000000 HC MED SURG R&B

## 2022-07-30 PROCEDURE — 74240 X-RAY XM UPR GI TRC 1CNTRST: CPT

## 2022-07-30 PROCEDURE — 36415 COLL VENOUS BLD VENIPUNCTURE: CPT

## 2022-07-30 PROCEDURE — 85025 COMPLETE CBC W/AUTO DIFF WBC: CPT

## 2022-07-30 PROCEDURE — 2580000003 HC RX 258: Performed by: NURSE PRACTITIONER

## 2022-07-30 PROCEDURE — 80053 COMPREHEN METABOLIC PANEL: CPT

## 2022-07-30 PROCEDURE — 6370000000 HC RX 637 (ALT 250 FOR IP): Performed by: INTERNAL MEDICINE

## 2022-07-30 PROCEDURE — 2500000003 HC RX 250 WO HCPCS: Performed by: SURGERY

## 2022-07-30 RX ORDER — KETOROLAC TROMETHAMINE 15 MG/ML
15 INJECTION, SOLUTION INTRAMUSCULAR; INTRAVENOUS EVERY 6 HOURS PRN
Status: DISCONTINUED | OUTPATIENT
Start: 2022-07-30 | End: 2022-08-01 | Stop reason: HOSPADM

## 2022-07-30 RX ORDER — BUPROPION HYDROCHLORIDE 300 MG/1
300 TABLET ORAL EVERY MORNING
Status: DISCONTINUED | OUTPATIENT
Start: 2022-07-30 | End: 2022-08-01 | Stop reason: HOSPADM

## 2022-07-30 RX ORDER — ACETAMINOPHEN, ASPIRIN AND CAFFEINE 250; 250; 65 MG/1; MG/1; MG/1
1 TABLET, FILM COATED ORAL ONCE
Status: COMPLETED | OUTPATIENT
Start: 2022-07-30 | End: 2022-07-30

## 2022-07-30 RX ORDER — DOCUSATE SODIUM 100 MG/1
100 CAPSULE, LIQUID FILLED ORAL DAILY
Status: DISCONTINUED | OUTPATIENT
Start: 2022-07-30 | End: 2022-08-01 | Stop reason: HOSPADM

## 2022-07-30 RX ORDER — ONDANSETRON 4 MG/1
4 TABLET, ORALLY DISINTEGRATING ORAL EVERY 8 HOURS PRN
Status: DISCONTINUED | OUTPATIENT
Start: 2022-07-30 | End: 2022-08-01 | Stop reason: HOSPADM

## 2022-07-30 RX ORDER — AZELASTINE 1 MG/ML
1 SPRAY, METERED NASAL 2 TIMES DAILY
Status: DISCONTINUED | OUTPATIENT
Start: 2022-07-30 | End: 2022-08-01 | Stop reason: HOSPADM

## 2022-07-30 RX ORDER — LEVOTHYROXINE SODIUM 0.03 MG/1
25 TABLET ORAL DAILY
Status: DISCONTINUED | OUTPATIENT
Start: 2022-07-30 | End: 2022-08-01 | Stop reason: HOSPADM

## 2022-07-30 RX ORDER — CETIRIZINE HYDROCHLORIDE 10 MG/1
10 TABLET ORAL DAILY
Status: DISCONTINUED | OUTPATIENT
Start: 2022-07-30 | End: 2022-08-01 | Stop reason: HOSPADM

## 2022-07-30 RX ORDER — ACETAMINOPHEN 325 MG/1
650 TABLET ORAL EVERY 6 HOURS PRN
Status: DISCONTINUED | OUTPATIENT
Start: 2022-07-30 | End: 2022-08-01 | Stop reason: HOSPADM

## 2022-07-30 RX ORDER — DEXTROAMPHETAMINE SACCHARATE, AMPHETAMINE ASPARTATE MONOHYDRATE, DEXTROAMPHETAMINE SULFATE AND AMPHETAMINE SULFATE 2.5; 2.5; 2.5; 2.5 MG/1; MG/1; MG/1; MG/1
30 CAPSULE, EXTENDED RELEASE ORAL 2 TIMES DAILY
Status: DISCONTINUED | OUTPATIENT
Start: 2022-07-30 | End: 2022-08-01 | Stop reason: HOSPADM

## 2022-07-30 RX ORDER — ENOXAPARIN SODIUM 100 MG/ML
40 INJECTION SUBCUTANEOUS DAILY
Status: DISCONTINUED | OUTPATIENT
Start: 2022-07-30 | End: 2022-08-01 | Stop reason: HOSPADM

## 2022-07-30 RX ORDER — ASPIRIN 81 MG/1
81 TABLET, CHEWABLE ORAL DAILY
Status: DISCONTINUED | OUTPATIENT
Start: 2022-07-30 | End: 2022-08-01 | Stop reason: HOSPADM

## 2022-07-30 RX ORDER — MORPHINE SULFATE 2 MG/ML
2 INJECTION, SOLUTION INTRAMUSCULAR; INTRAVENOUS EVERY 4 HOURS PRN
Status: DISCONTINUED | OUTPATIENT
Start: 2022-07-30 | End: 2022-08-01 | Stop reason: HOSPADM

## 2022-07-30 RX ORDER — VALACYCLOVIR HYDROCHLORIDE 1 G/1
500 TABLET, FILM COATED ORAL DAILY
Status: DISCONTINUED | OUTPATIENT
Start: 2022-07-30 | End: 2022-08-01 | Stop reason: HOSPADM

## 2022-07-30 RX ORDER — FUROSEMIDE 20 MG/1
20 TABLET ORAL DAILY
Status: DISCONTINUED | OUTPATIENT
Start: 2022-07-30 | End: 2022-08-01 | Stop reason: HOSPADM

## 2022-07-30 RX ORDER — LORATADINE 10 MG/1
10 TABLET ORAL DAILY
Status: DISCONTINUED | OUTPATIENT
Start: 2022-07-30 | End: 2022-07-30

## 2022-07-30 RX ORDER — ACETAMINOPHEN 650 MG/1
650 SUPPOSITORY RECTAL EVERY 6 HOURS PRN
Status: DISCONTINUED | OUTPATIENT
Start: 2022-07-30 | End: 2022-08-01 | Stop reason: HOSPADM

## 2022-07-30 RX ORDER — SODIUM CHLORIDE 0.9 % (FLUSH) 0.9 %
5-40 SYRINGE (ML) INJECTION PRN
Status: DISCONTINUED | OUTPATIENT
Start: 2022-07-30 | End: 2022-08-01 | Stop reason: HOSPADM

## 2022-07-30 RX ORDER — LIDOCAINE 4 G/G
1 PATCH TOPICAL DAILY
Status: DISCONTINUED | OUTPATIENT
Start: 2022-07-30 | End: 2022-08-01 | Stop reason: HOSPADM

## 2022-07-30 RX ORDER — FLUTICASONE PROPIONATE 50 MCG
1 SPRAY, SUSPENSION (ML) NASAL DAILY
Status: DISCONTINUED | OUTPATIENT
Start: 2022-07-30 | End: 2022-08-01 | Stop reason: HOSPADM

## 2022-07-30 RX ORDER — SODIUM CHLORIDE, SODIUM LACTATE, POTASSIUM CHLORIDE, CALCIUM CHLORIDE 600; 310; 30; 20 MG/100ML; MG/100ML; MG/100ML; MG/100ML
INJECTION, SOLUTION INTRAVENOUS CONTINUOUS
Status: DISCONTINUED | OUTPATIENT
Start: 2022-07-30 | End: 2022-08-01 | Stop reason: HOSPADM

## 2022-07-30 RX ORDER — PANTOPRAZOLE SODIUM 40 MG/1
40 TABLET, DELAYED RELEASE ORAL
Refills: 0 | Status: DISCONTINUED | OUTPATIENT
Start: 2022-07-30 | End: 2022-08-01 | Stop reason: HOSPADM

## 2022-07-30 RX ORDER — TOPIRAMATE 25 MG/1
100 TABLET ORAL 2 TIMES DAILY
Status: DISCONTINUED | OUTPATIENT
Start: 2022-07-30 | End: 2022-08-01 | Stop reason: HOSPADM

## 2022-07-30 RX ORDER — TRAZODONE HYDROCHLORIDE 50 MG/1
150 TABLET ORAL NIGHTLY
Status: DISCONTINUED | OUTPATIENT
Start: 2022-07-30 | End: 2022-08-01 | Stop reason: HOSPADM

## 2022-07-30 RX ORDER — MOXIFLOXACIN 5 MG/ML
1 SOLUTION/ DROPS OPHTHALMIC 3 TIMES DAILY
Status: DISCONTINUED | OUTPATIENT
Start: 2022-07-30 | End: 2022-08-01 | Stop reason: HOSPADM

## 2022-07-30 RX ORDER — HYDROCORTISONE 5 MG/1
5 TABLET ORAL DAILY
Status: DISCONTINUED | OUTPATIENT
Start: 2022-07-30 | End: 2022-08-01 | Stop reason: HOSPADM

## 2022-07-30 RX ORDER — ONDANSETRON 2 MG/ML
4 INJECTION INTRAMUSCULAR; INTRAVENOUS EVERY 6 HOURS PRN
Status: DISCONTINUED | OUTPATIENT
Start: 2022-07-30 | End: 2022-08-01 | Stop reason: HOSPADM

## 2022-07-30 RX ORDER — SODIUM CHLORIDE 0.9 % (FLUSH) 0.9 %
5-40 SYRINGE (ML) INJECTION EVERY 12 HOURS SCHEDULED
Status: DISCONTINUED | OUTPATIENT
Start: 2022-07-30 | End: 2022-08-01 | Stop reason: HOSPADM

## 2022-07-30 RX ORDER — POLYETHYLENE GLYCOL 3350 17 G/17G
17 POWDER, FOR SOLUTION ORAL DAILY PRN
Status: DISCONTINUED | OUTPATIENT
Start: 2022-07-30 | End: 2022-08-01 | Stop reason: HOSPADM

## 2022-07-30 RX ORDER — HYDROXYCHLOROQUINE SULFATE 200 MG/1
300 TABLET, FILM COATED ORAL DAILY
Status: DISCONTINUED | OUTPATIENT
Start: 2022-07-30 | End: 2022-08-01 | Stop reason: HOSPADM

## 2022-07-30 RX ORDER — SODIUM CHLORIDE 9 MG/ML
INJECTION, SOLUTION INTRAVENOUS PRN
Status: DISCONTINUED | OUTPATIENT
Start: 2022-07-30 | End: 2022-08-01 | Stop reason: HOSPADM

## 2022-07-30 RX ADMIN — KETOROLAC TROMETHAMINE 15 MG: 15 INJECTION, SOLUTION INTRAMUSCULAR; INTRAVENOUS at 04:00

## 2022-07-30 RX ADMIN — ASPIRIN 81 MG: 81 TABLET, CHEWABLE ORAL at 09:28

## 2022-07-30 RX ADMIN — FUROSEMIDE 20 MG: 20 TABLET ORAL at 09:28

## 2022-07-30 RX ADMIN — MORPHINE SULFATE 2 MG: 2 INJECTION, SOLUTION INTRAMUSCULAR; INTRAVENOUS at 11:27

## 2022-07-30 RX ADMIN — BUPROPION HYDROCHLORIDE 300 MG: 300 TABLET, FILM COATED, EXTENDED RELEASE ORAL at 09:28

## 2022-07-30 RX ADMIN — ANTACID/ANTIFLATULENT 1 EACH: 380; 550; 10; 10 GRANULE, EFFERVESCENT ORAL at 12:45

## 2022-07-30 RX ADMIN — DEXTROAMPHETAMINE SACCHARATE, AMPHETAMINE ASPARTATE MONOHYDRATE, DEXTROAMPHETAMINE SULFATE, AMPHETAMINE SULFATE ER 30 MG: 2.5; 2.5; 2.5; 2.5 CAPSULE ORAL at 18:18

## 2022-07-30 RX ADMIN — HYDROCORTISONE 5 MG: 5 TABLET ORAL at 09:28

## 2022-07-30 RX ADMIN — SODIUM CHLORIDE, POTASSIUM CHLORIDE, SODIUM LACTATE AND CALCIUM CHLORIDE: 600; 310; 30; 20 INJECTION, SOLUTION INTRAVENOUS at 03:50

## 2022-07-30 RX ADMIN — TOPIRAMATE 100 MG: 25 TABLET, FILM COATED ORAL at 21:18

## 2022-07-30 RX ADMIN — KETOROLAC TROMETHAMINE 15 MG: 15 INJECTION, SOLUTION INTRAMUSCULAR; INTRAVENOUS at 11:27

## 2022-07-30 RX ADMIN — CETIRIZINE HYDROCHLORIDE 10 MG: 10 TABLET, FILM COATED ORAL at 09:28

## 2022-07-30 RX ADMIN — LEVOTHYROXINE SODIUM 25 MCG: 25 TABLET ORAL at 06:41

## 2022-07-30 RX ADMIN — PANTOPRAZOLE SODIUM 40 MG: 40 TABLET, DELAYED RELEASE ORAL at 06:41

## 2022-07-30 RX ADMIN — TOPIRAMATE 100 MG: 25 TABLET, FILM COATED ORAL at 09:28

## 2022-07-30 RX ADMIN — ACETAMINOPHEN, ASPIRIN, CAFFEINE 1 TABLET: 250; 65; 250 TABLET, FILM COATED ORAL at 18:22

## 2022-07-30 RX ADMIN — BARIUM SULFATE 176 G: 960 POWDER, FOR SUSPENSION ORAL at 12:45

## 2022-07-30 RX ADMIN — SODIUM CHLORIDE, POTASSIUM CHLORIDE, SODIUM LACTATE AND CALCIUM CHLORIDE: 600; 310; 30; 20 INJECTION, SOLUTION INTRAVENOUS at 15:37

## 2022-07-30 RX ADMIN — KETOROLAC TROMETHAMINE 15 MG: 15 INJECTION, SOLUTION INTRAMUSCULAR; INTRAVENOUS at 18:19

## 2022-07-30 RX ADMIN — DEXTROAMPHETAMINE SACCHARATE, AMPHETAMINE ASPARTATE MONOHYDRATE, DEXTROAMPHETAMINE SULFATE, AMPHETAMINE SULFATE ER 30 MG: 2.5; 2.5; 2.5; 2.5 CAPSULE ORAL at 09:28

## 2022-07-30 RX ADMIN — DOCUSATE SODIUM 100 MG: 100 CAPSULE, LIQUID FILLED ORAL at 11:26

## 2022-07-30 RX ADMIN — Medication 10 ML: at 09:29

## 2022-07-30 RX ADMIN — ENOXAPARIN SODIUM 40 MG: 100 INJECTION SUBCUTANEOUS at 21:18

## 2022-07-30 RX ADMIN — TRAZODONE HYDROCHLORIDE 150 MG: 50 TABLET ORAL at 21:18

## 2022-07-30 RX ADMIN — HYDROXYCHLOROQUINE SULFATE 300 MG: 200 TABLET ORAL at 09:28

## 2022-07-30 RX ADMIN — MORPHINE SULFATE 2 MG: 2 INJECTION, SOLUTION INTRAMUSCULAR; INTRAVENOUS at 22:47

## 2022-07-30 RX ADMIN — HYDROMORPHONE HYDROCHLORIDE 0.5 MG: 1 INJECTION, SOLUTION INTRAMUSCULAR; INTRAVENOUS; SUBCUTANEOUS at 04:42

## 2022-07-30 RX ADMIN — MORPHINE SULFATE 2 MG: 2 INJECTION, SOLUTION INTRAMUSCULAR; INTRAVENOUS at 15:38

## 2022-07-30 ASSESSMENT — ENCOUNTER SYMPTOMS
SHORTNESS OF BREATH: 0
ABDOMINAL PAIN: 1
VOMITING: 0
EYES NEGATIVE: 1
SORE THROAT: 0
BACK PAIN: 0
WHEEZING: 0
NAUSEA: 1
ALLERGIC/IMMUNOLOGIC NEGATIVE: 1
RHINORRHEA: 0
PHOTOPHOBIA: 0

## 2022-07-30 ASSESSMENT — PAIN SCALES - GENERAL
PAINLEVEL_OUTOF10: 8
PAINLEVEL_OUTOF10: 0
PAINLEVEL_OUTOF10: 8
PAINLEVEL_OUTOF10: 10
PAINLEVEL_OUTOF10: 10
PAINLEVEL_OUTOF10: 8

## 2022-07-30 NOTE — CARE COORDINATION
07/30/22    From: Home with daughter/son, requires some help from kids. Admit:R upper epigastric pain     PMH: CHF, Acute Pancreatitis, Sjogren's disease    Anticipated Discharge Disposition:Home    Patient Mobility or PT/OT ordered:  Consults: General surgery, GI     Clinical: NPO    Barriers to Discharge:US ABD     Assessments: CMI and DCCOP Done.

## 2022-07-30 NOTE — PROGRESS NOTES
Assessment complete. Pain complains of 10/10 pain in her abdomen. Dilaudid given per order.  Electronically signed by Claus Doty RN on 7/30/2022 at 5:53 AM

## 2022-07-30 NOTE — H&P
(total)    OVARY REMOVAL Left Jan 2014    UPPER GASTROINTESTINAL ENDOSCOPY  09/16/2016    EGD W/BX    UPPER GASTROINTESTINAL ENDOSCOPY N/A 5/6/2021    ENDOSCOPIC ULTRASOUND with EGD performed by Adriano Shell MD at [address] [facility]:    Family History   Problem Relation Age of Onset    Heart Disease Father 48    Cancer Maternal Grandmother         breast    Breast Cancer Maternal Grandmother         in her 63's    Heart Disease Other         mom and dad's side    Breast Cancer Paternal Grandmother         in her 63's    Breast Cancer Paternal Aunt         in her 29's    Breast Cancer Paternal Aunt         in her 63's     SOCIAL HISTORY:    Social History     Socioeconomic History    Marital status: Single     Spouse name: Not on file    Number of children: Not on file    Years of education: Not on file    Highest education level: Not on file   Occupational History    Not on file   Tobacco Use    Smoking status: Never    Smokeless tobacco: Never   Vaping Use    Vaping Use: Never used   Substance and Sexual Activity    Alcohol use: No     Alcohol/week: 0.0 standard drinks     Comment: no alcohol since 2011    Drug use: No    Sexual activity: Not on file   Other Topics Concern    Not on file   Social History Narrative    ** Merged History Encounter **          Social Determinants of Health     Financial Resource Strain: Low Risk     Difficulty of Paying Living Expenses: Not hard at all   Food Insecurity: No Food Insecurity    Worried About Running Out of Food in the Last Year: Never true    [address] in the Last Year: Never true   Transportation Needs: No Transportation Needs    Lack of Transportation (Medical): No    Lack of Transportation (Non-Medical):  No   Physical Activity: Insufficiently Active    Days of Exercise per Week: 3 days    Minutes of Exercise per Session: 10 min   Stress: Stress Concern Present    Feeling of Stress : Rather much   Social Connections: Unknown    Frequency of Communication with Friends and Family: Twice a week    Frequency of Social Gatherings with Friends and Family: Never    Attends Sikh Services: Not on file    Active Member of Clubs or Organizations: No    Attends Club or Organization Meetings: Never    Marital Status: Never    Intimate Partner Violence: Not on file   Housing Stability: Unknown    Unable to Pay for Housing in the Last Year: No    Number of Places Lived in the Last Year: Not on file    Unstable Housing in the Last Year: No     MEDICATIONS:   Prior to Admission medications    Medication Sig Start Date End Date Taking? Authorizing Provider   MOTEGRITY 2 MG TABS TAKE ONE TABLET BY MOUTH EVERY DAY 7/4/22   Historical Provider, MD   amoxicillin-clavulanate (AUGMENTIN) 875-125 MG per tablet Take 1 tablet by mouth in the morning and 1 tablet before bedtime. Do all this for 10 days. 7/26/22 8/5/22  Miya Espinosa DO   amphetamine-dextroamphetamine (ADDERALL XR) 30 MG extended release capsule Take 1 capsule by mouth in the morning and 1 capsule in the evening. Do all this for 30 days. 7/22/22 8/21/22  Miya Espinosa DO   esomeprazole (NEXIUM) 40 MG delayed release capsule TAKE 1 CAPSULE BY MOUTH DAILY 6/21/22   ORTIZ Terrazas CNP   furosemide (LASIX) 20 MG tablet take 1 tablet by mouth 2 times daily 6/20/22   ORTIZ Terrazas CNP   traZODone (DESYREL) 150 MG tablet TAKE ONE TABLET BY MOUTH NIGHTLY 6/16/22   ORTIZ Terrazas CNP   lidocaine (LIDODERM) 5 % apply one patch as directed once daily to affected area remove patch after twelve hours.  5/17/22   Historical Provider, MD   midodrine (PROAMATINE) 10 MG tablet Take 10 mg by mouth as needed 4/12/22   Historical Provider, MD   hyoscyamine (LEVSIN/SL) 125 MCG sublingual tablet Place 1 tablet under the tongue every 4 hours as needed for Cramping 4/7/22   ORTIZ Terrazas CNP   buPROPion (WELLBUTRIN XL) 300 MG extended release tablet Take 1 tablet by mouth every morning TAKE ONE TABLET BY MOUTH EVERY MORNING 3/14/22   ORTIZ Kilgore CNP   topiramate (TOPAMAX) 100 MG tablet TAKE ONE TABLET BY MOUTH TWO TIMES A DAY 3/3/22   ORTIZ Kilgore CNP   loratadine (CLARITIN) 10 MG tablet Take 1 tablet by mouth daily 12/28/21   ORTIZ Kilgore CNP   potassium chloride (KLOR-CON M) 10 MEQ extended release tablet Take 1 tablet by mouth daily 12/8/21   Nighat Velarde PA-C   liothyronine (CYTOMEL) 25 MCG tablet Take 1 tablet by mouth daily 7/6/21   OTRIZ Kilgore CNP   aspirin (ASPIRIN CHILDRENS) 81 MG chewable tablet Take 1 tablet by mouth daily 6/24/21   TRUDY Elmore   cyclobenzaprine (FLEXERIL) 5 MG tablet Take 2.5-5 mg by mouth 2 times daily as needed 6/2/21   Historical Provider, MD   naratriptan (AMERGE) 2.5 MG tablet Take one at onset of severe headache/migraine may repeat x 1 after 4 hours if needed. Maximum 2/day and 2 days/week.  6/2/21   Historical Provider, MD   hydrocortisone (CORTEF) 5 MG tablet Take 5 mg by mouth daily 2/24/21   Historical Provider, MD   valACYclovir (VALTREX) 500 MG tablet Take 1 tablet by mouth daily 2/15/21   ORTIZ Kilgore CNP   NEXIUM 40 MG delayed release capsule TAKE ONE CAPSULE BY MOUTH EVERY DAY 11/2/20   ORTIZ Kilgore CNP   topiramate (TOPAMAX) 100 MG tablet Take 1 tablet by mouth 2 times daily 10/11/20   ORTIZ Kilgore CNP   Magnesium 400 MG TABS Take 1 tablet by mouth daily 10/3/20   Maribel Toro PA-C   furosemide (LASIX) 20 MG tablet Take 1 tablet by mouth 2 times daily TAKE ONE TABLET BY MOUTH TWO TIMES A DAY 8/4/20   ORTIZ Kilgore CNP   TRULANCE 3 MG TABS Take 3 mg by mouth daily 3/10/20   Historical Provider, MD   ondansetron (ZOFRAN) 4 MG tablet Take 1 tablet by mouth every 8 hours as needed for Nausea or Vomiting 2/25/20   Cindi Harvey, APRN - CNP   azelastine (ASTELIN) 0.1 % nasal spray 1 spray by Nasal route 11/27/19   Historical Provider, MD   moxifloxacin (Dangelo Velarde) 0.5 % ophthalmic solution 1 drop    Historical Provider, MD   fluticasone (FLONASE) 50 MCG/ACT nasal spray USE 1 SPRAY NASALLY DAILY 5/15/19   Historical Provider, MD   ZOLMitriptan (ZOMIG) 5 MG tablet TAKE 1 TABLET BY MOUTH AT ONSET OF MIGRAINE. MAY REPEAT ONCE AFTER 2 HOURS. MAX 10 MG (2 TABLETS) PER DAY 6/4/18   Historical Provider, MD   levothyroxine (SYNTHROID) 25 MCG tablet Take one daily 4/29/18   ORTIZ Rebolledo CNP   hydroxychloroquine (PLAQUENIL) 200 MG tablet Take 300 mg by mouth daily     Historical Provider, MD   Pancrelipase, Lip-Prot-Amyl, (CREON) 14054 UNITS CPEP 2 caps tid 9/23/16   Stefania Meza MD       ALLERGIES: Patient has no known allergies. REVIEW OF SYSTEM:   Review of Systems   Constitutional:  Positive for appetite change. Negative for fatigue, fever and unexpected weight change. HENT:  Negative for congestion, rhinorrhea and sore throat. Eyes: Negative. Negative for photophobia and visual disturbance. Respiratory:  Negative for shortness of breath and wheezing. Cardiovascular:  Negative for chest pain. Gastrointestinal:  Positive for abdominal pain and nausea. Negative for vomiting. Endocrine: Negative. Negative for polydipsia, polyphagia and polyuria. Genitourinary:  Negative for difficulty urinating, dysuria and pelvic pain. Musculoskeletal:  Negative for back pain. Skin: Negative. Negative for rash. Allergic/Immunologic: Negative. Neurological: Negative. Negative for dizziness, speech difficulty and weakness. Hematological: Negative. Psychiatric/Behavioral: Negative. Negative for behavioral problems and confusion. OBJECTIVE  PHYSICAL EXAM: Ht 5' 2\" (1.575 m)   Wt 122 lb 5.7 oz (55.5 kg)   BMI 22.38 kg/m²     Physical Exam  Vitals and nursing note reviewed. Constitutional:       General: She is not in acute distress. Appearance: She is well-developed. She is not ill-appearing or toxic-appearing.    HENT:      Head: Normocephalic and atraumatic. Nose: Nose normal.      Mouth/Throat:      Mouth: Mucous membranes are moist.   Eyes:      Pupils: Pupils are equal, round, and reactive to light. Cardiovascular:      Rate and Rhythm: Normal rate and regular rhythm. Pulses: Normal pulses. Heart sounds: Normal heart sounds. Pulmonary:      Effort: Pulmonary effort is normal. No respiratory distress. Breath sounds: Normal breath sounds. No wheezing or rales. Abdominal:      General: Bowel sounds are normal. There is no distension. Palpations: Abdomen is soft. There is no hepatomegaly or mass. Tenderness: There is abdominal tenderness in the right upper quadrant. There is no guarding or rebound. Hernia: No hernia is present. Musculoskeletal:         General: Normal range of motion. Cervical back: Normal range of motion. Skin:     General: Skin is warm and dry. Capillary Refill: Capillary refill takes less than 2 seconds. Neurological:      Mental Status: She is alert and oriented to person, place, and time. Psychiatric:         Mood and Affect: Mood normal.         DATA:     Diagnostic tests reviewed for today's visit:    Most recent labs and imaging results reviewed.      LABS:    Recent Results (from the past 24 hour(s))   Comprehensive Metabolic Panel    Collection Time: 07/29/22  9:28 PM   Result Value Ref Range    Sodium 137 135 - 144 mEq/L    Potassium 3.4 3.4 - 4.9 mEq/L    Chloride 101 95 - 107 mEq/L    CO2 21 20 - 31 mEq/L    Anion Gap 15 9 - 15 mEq/L    Glucose 108 (H) 70 - 99 mg/dL    BUN 20 6 - 20 mg/dL    Creatinine 0.65 0.50 - 0.90 mg/dL    GFR Non-African American >60.0 >60    GFR  >60.0 >60    Calcium 9.8 8.5 - 9.9 mg/dL    Total Protein 7.3 6.3 - 8.0 g/dL    Albumin 4.6 3.5 - 4.6 g/dL    Total Bilirubin <0.2 0.2 - 0.7 mg/dL    Alkaline Phosphatase 115 40 - 130 U/L    ALT 9 0 - 33 U/L    AST 15 0 - 35 U/L    Globulin 2.7 2.3 - 3.5 g/dL   CBC with Auto Differential Collection Time: 07/29/22  9:28 PM   Result Value Ref Range    WBC 6.8 4.0 - 10.0 K/uL    RBC 4.40 3.93 - 5.22 M/uL    Hemoglobin 12.7 11.2 - 15.7 g/dL    Hematocrit 38.2 37.0 - 47.0 %    MCV 86.8 79.4 - 94.8 fL    MCH 28.9 25.6 - 32.2 pg    MCHC 33.2 32.2 - 35.5 %    RDW 12.1 11.7 - 14.4 %    Platelets 707 439 - 746 K/uL    Neutrophils % 56.1 34.0 - 71.1 %    Immature Granulocytes % 0.3 %    Lymphocytes % 36.4 %    Monocytes % 5.1 4.7 - 12.5 %    Eosinophils % 0.9 0.7 - 5.8 %    Basophils % 1.2 0.1 - 1.2 %    Neutrophils Absolute 3.8 1.6 - 6.1 K/uL    Immature Granulocytes # 0.0 K/uL    Lymphocytes Absolute 2.5 1.2 - 3.7 K/uL    Monocytes Absolute 0.4 0.2 - 0.9 K/uL    Eosinophils Absolute 0.1 0.0 - 0.4 K/uL    Basophils Absolute 0.1 0.0 - 0.1 K/uL   Lipase    Collection Time: 07/29/22  9:28 PM   Result Value Ref Range    Lipase 214 (H) 12 - 95 U/L   Magnesium    Collection Time: 07/29/22  9:28 PM   Result Value Ref Range    Magnesium 2.0 1.7 - 2.4 mg/dL   Urinalysis with Reflex to Culture    Collection Time: 07/29/22  9:33 PM    Specimen: Urine   Result Value Ref Range    Color, UA Yellow Straw/Yellow    Clarity, UA Clear Clear    Glucose, Ur Negative Negative mg/dL    Bilirubin Urine Negative Negative    Ketones, Urine Trace Negative mg/dL    Specific Gravity, UA 1.025 1.005 - 1.030    Blood, Urine Small Negative    pH, UA 6.0 5.0 - 9.0    Protein, UA Negative Negative mg/dL    Urobilinogen, Urine 0.2 <2.0 E.U./dL    Nitrite, Urine Negative Negative    Leukocyte Esterase, Urine Negative Negative    Urine Reflex to Culture Not Indicated    Microscopic Urinalysis    Collection Time: 07/29/22  9:33 PM   Result Value Ref Range    WBC, UA 0-2 0 - 5 /HPF    RBC, UA 3-5 (A) 0 - 2 /HPF    Epithelial Cells, UA 0-2 /HPF    Bacteria, UA Negative Negative /HPF       IMAGING:   No results found.      VTE Prophylaxis: low molecular weight heparin -  start     ASSESSMENT AND PLAN    Principal Problem:  Acute pancreatitis: RUQ ABD pain. Lipase to 214. Patient afebrile. 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 Will consult GI. We will administer LR to 50 an hour pending GI recommendations. We will keep patient n.p.o. except for ice chips sips with meds    Active Problems:  ADD: PT on home meds to control. Will resume home meds, as tolerated. GERD: PT on home meds to control. Will resume home meds, as tolerated. Migraines: PT on home meds to control. Will resume home meds, as tolerated. Hypothyroidism: PT on home meds to control. Will resume home meds, as tolerated. Rheumatoid arthritis: PT on home meds to control. Will resume home meds, as tolerated.       Plan of care discussed with: patient    SIGNATURE: ORTIZ Alvarado - CNP  DATE: July 30, 2022  TIME: 3:43 AM     Martha Rangel MD - supervising physician

## 2022-07-30 NOTE — PROGRESS NOTES
Shift assessment complete. VSS. Pt is AxOx4. Meds given per mar. Pt complains of caffeine headache. Message sent to Dr. Basim Malloy regarding Excedrin. Denies any needs at this time. Call light within reach. Will continue to monitor.      Electronically signed by Felipe Locke RN on 7/30/2022 at 9:18 AM

## 2022-07-30 NOTE — PROGRESS NOTES
DVT / VTE PROPHYLAXIS EVALUATION    Estimated Creatinine Clearance: 83 mL/min (based on SCr of 0.65 mg/dL). Recent Labs     07/29/22  2128   BUN 20   CREATININE 0.65      HGB 12.7   HCT 38.2     ADMITTING DX OR CHIEF COMPLAINT? Acute pancreatitis  WARFARIN? DOAC'S? no  ANY APPARENT BLEEDING? no  SCHEDULED SURGERY?  unknown     Current order:  Enoxaparin 40 mg SUBQ once daily      Plan:  No intervention recommended, continue current VTE prophylaxis as ordered     Patient Weight (kg)      50.9 and below .9 101-150.9 151-174.9 175 or greater   Estimated   CrCl  (ml/min) 30 or greater []   30 mg   SUBQ daily   [x]   40 mg   SUBQ daily []  30 mg SUBQ   BID  []  40 mg   SUBQ   BID []  60mg SUBQ BID    15-29.9 []  UFH 5000   units SUBQ BID []  30 mg   SUBQ daily [] 30 mg SUBQ   daily []  40 mg SUBQ   daily [] 60 mg SUBQ   daily    Less than 15 or dialysis []  UFH 5000   units SUBQ BID [] UFH 5000 units SUBQ TID []  UFH 7500   units   SUBQ TID         CHEYANNE Gillespie [facility] [place] PharmD

## 2022-07-30 NOTE — ED PROVIDER NOTES
[address] [place] [address] ED  eMERGENCY dEPARTMENT eNCOUnter      Pt Name: Aniya Wilson  MRN: [id number]  [place] 1970  Date of evaluation: 7/29/2022  Provider: Refugio Way MD    CHIEF COMPLAINT       Chief Complaint   Patient presents with    Flank Pain     Right side, radiating to back         HISTORY OF PRESENT ILLNESS   (Location/Symptom, Timing/Onset,Context/Setting, Quality, Duration, Modifying Factors, Severity)  Note limiting factors. Aniya Wilson is a 54 y.o. female who presents to the emergency department with complaint of right upper abdomen pain with radiation to the back since Wednesday. Pain is 8 to a scale of 1-10 and worse with deep breaths. Patient had a cholecystectomy back in 2011. Pain is accompanied with nausea but no vomiting. No diarrhea or constipation. Denies alcohol abuse or drug use. No fever or chills. No other systemic symptoms. Comorbid conditions include Sjogren's syndrome, diverticulosis, attention deficit disorder, anxiety and depression, thyroidism, history of esophageal reflux disease, pancreatitis, endometriosis, fibroids, liver enlargement, acute recurrent pancreatitis. HPI    Nursing Notes were reviewed. REVIEW OF SYSTEMS    (2-9 systems for level 4, 10 or more for level 5)     Review of Systems   Constitutional: Negative. Negative for activity change, appetite change, chills, fatigue and fever. HENT:  Negative for congestion, ear discharge, ear pain, hearing loss, rhinorrhea, sinus pressure and sore throat. Eyes:  Negative for photophobia, pain and visual disturbance. Respiratory:  Negative for apnea, cough, shortness of breath and wheezing. Cardiovascular:  Negative for chest pain, palpitations and leg swelling. Gastrointestinal:  Positive for abdominal pain and nausea. Negative for abdominal distention, constipation, diarrhea and vomiting. Endocrine: Negative for cold intolerance, heat intolerance and polyuria.    Genitourinary:  Negative for dysuria, flank pain, frequency and urgency. Musculoskeletal:  Negative for arthralgias, back pain, gait problem, myalgias and neck stiffness. Skin:  Negative for color change, pallor and rash. Allergic/Immunologic: Negative for food allergies and immunocompromised state. Neurological:  Negative for dizziness, tremors, syncope, weakness, light-headedness and headaches. Psychiatric/Behavioral:  Negative for agitation, confusion and hallucinations. The patient is nervous/anxious. All other systems reviewed and are negative. Except as noted above the remainder of the review of systems was reviewed and negative. PAST MEDICAL HISTORY     Past Medical History:   Diagnosis Date    Acquired hypothyroidism 9/26/2016    Acute left-sided low back pain with bilateral sciatica 3/9/2022    Acute pancreatitis     ADD (attention deficit disorder) 2/12/2015    Anxiety     Congestive heart failure, unspecified HF chronicity, unspecified heart failure type ([facility] [address] 2/11/2022    Depression 12/9/2015    Endometrial cyst of ovary 5/10/14    RT ovary, ruptured    GERD (gastroesophageal reflux disease) 9/26/2016    Medullary sponge kidney of both kidneys 5/22/2018    Sjogren's disease ([facility] [address]     Stress     Swelling          SURGICAL HISTORY       Past Surgical History:   Procedure Laterality Date    BREAST BIOPSY Left 2015? ?    lump removed (benign) (Dr. Jesusita Lopez)    BREAST CYST EXCISION Left 2015??     Dr Jesusita Lopez (benign)    BREAST LUMPECTOMY      CHOLECYSTECTOMY  2011    HYSTERECTOMY (CERVIX STATUS UNKNOWN)  2014 sept (total)    OVARY REMOVAL Left Jan 2014    UPPER GASTROINTESTINAL ENDOSCOPY  09/16/2016    EGD W/BX    UPPER GASTROINTESTINAL ENDOSCOPY N/A 5/6/2021    ENDOSCOPIC ULTRASOUND with EGD performed by Britany Wilson MD at [facility] 8/1/2022    EGD DIAGNOSTIC ONLY performed by Brent Phalen, MD at [facility] N/A 8/5/2022 EGD ESOPHAGOGASTRODUODENOSCOPY performed by Odilon Charles MD at [facility]  8/5/2022    ENDOSCOPIC ULTRASOUND performed by Odilon Charles MD at [address] [facility]       Discharge Medication List as of 7/30/2022 12:59 AM        CONTINUE these medications which have NOT CHANGED    Details   MOTEGRITY 2 MG TABS TAKE ONE TABLET BY MOUTH EVERY DAY, DAWHistorical Med      amoxicillin-clavulanate (AUGMENTIN) 875-125 MG per tablet Take 1 tablet by mouth in the morning and 1 tablet before bedtime. Do all this for 10 days. , Disp-20 tablet, R-0Normal      amphetamine-dextroamphetamine (ADDERALL XR) 30 MG extended release capsule Take 1 capsule by mouth in the morning and 1 capsule in the evening. Do all this for 30 days. , Disp-60 capsule, R-0Normal      esomeprazole (NEXIUM) 40 MG delayed release capsule TAKE 1 CAPSULE BY MOUTH DAILY, Disp-90 capsule, R-0Normal      furosemide (LASIX) 20 MG tablet take 1 tablet by mouth 2 times daily, Disp-60 tablet, R-0Normal      traZODone (DESYREL) 150 MG tablet TAKE ONE TABLET BY MOUTH NIGHTLY, Disp-30 tablet, R-5Normal      lidocaine (LIDODERM) 5 % apply one patch as directed once daily to affected area remove patch after twelve hours. Historical Med      midodrine (PROAMATINE) 10 MG tablet Take 10 mg by mouth as neededHistorical Med      hyoscyamine (LEVSIN/SL) 125 MCG sublingual tablet Place 1 tablet under the tongue every 4 hours as needed for Cramping, Disp-90 tablet, R-3Normal      buPROPion (WELLBUTRIN XL) 300 MG extended release tablet Take 1 tablet by mouth every morning TAKE ONE TABLET BY MOUTH EVERY MORNING, Disp-90 tablet, R-5Normal      topiramate (TOPAMAX) 100 MG tablet TAKE ONE TABLET BY MOUTH TWO TIMES A DAY, Disp-60 tablet, R-5Normal      loratadine (CLARITIN) 10 MG tablet Take 1 tablet by mouth daily, Disp-30 tablet, R-5Normal      potassium chloride (KLOR-CON M) 10 MEQ extended release tablet Take 1 tablet by mouth daily, Disp-30 tablet, R-0Print      liothyronine (CYTOMEL) 25 MCG tablet Take 1 tablet by mouth daily, Disp-90 tablet, R-0Normal      aspirin (ASPIRIN CHILDRENS) 81 MG chewable tablet Take 1 tablet by mouth daily, Disp-14 tablet, R-0Print      cyclobenzaprine (FLEXERIL) 5 MG tablet Take 2.5-5 mg by mouth 2 times daily as neededHistorical Med      naratriptan (AMERGE) 2.5 MG tablet Take one at onset of severe headache/migraine may repeat x 1 after 4 hours if needed. Maximum 2/day and 2 days/week. Historical Med      hydrocortisone (CORTEF) 5 MG tablet Take 5 mg by mouth dailyHistorical Med      valACYclovir (VALTREX) 500 MG tablet Take 1 tablet by mouth daily, Disp-30 tablet, R-5Normal      Magnesium 400 MG TABS Take 1 tablet by mouth daily, Disp-30 tablet,R-0Print      TRULANCE 3 MG TABS Take 3 mg by mouth daily, DAWHistorical Med      ondansetron (ZOFRAN) 4 MG tablet Take 1 tablet by mouth every 8 hours as needed for Nausea or Vomiting, Disp-30 tablet, R-1Normal      azelastine (ASTELIN) 0.1 % nasal spray 1 spray by Nasal routeHistorical Med      moxifloxacin (VIGAMOX) 0.5 % ophthalmic solution 1 dropHistorical Med      fluticasone (FLONASE) 50 MCG/ACT nasal spray USE 1 SPRAY NASALLY DAILY, R-3Historical Med      ZOLMitriptan (ZOMIG) 5 MG tablet TAKE 1 TABLET BY MOUTH AT ONSET OF MIGRAINE. MAY REPEAT ONCE AFTER 2 HOURS. MAX 10 MG (2 TABLETS) PER DAY, R-11Historical Med      levothyroxine (SYNTHROID) 25 MCG tablet Take one daily, Disp-90 tablet, R-1Normal      hydroxychloroquine (PLAQUENIL) 200 MG tablet Take 300 mg by mouth daily       Pancrelipase, Lip-Prot-Amyl, (CREON) 36541 UNITS CPEP 2 caps tid, Disp-180 capsule, R-03             ALLERGIES     Patient has no known allergies.     FAMILY HISTORY       Family History   Problem Relation Age of Onset    Heart Disease Father 48    Breast Cancer Paternal Aunt         in her 29's    Breast Cancer Paternal Aunt         in her 63's    Cancer Maternal Grandmother         breast    Breast Cancer Maternal Grandmother         in her 63's    Breast Cancer Paternal Grandmother         in her 63's    Heart Disease Other         mom and dad's side    Colon Cancer Neg Hx           SOCIAL HISTORY       Social History     Socioeconomic History    Marital status: Single     Spouse name: None    Number of children: None    Years of education: None    Highest education level: None   Tobacco Use    Smoking status: Never    Smokeless tobacco: Never   Vaping Use    Vaping Use: Never used   Substance and Sexual Activity    Alcohol use: No     Alcohol/week: 0.0 standard drinks     Comment: no alcohol since 2011    Drug use: No   Social History Narrative    ** Merged History Encounter **          Social Determinants of Health     Financial Resource Strain: Low Risk     Difficulty of Paying Living Expenses: Not hard at all   Food Insecurity: No Food Insecurity    Worried About Running Out of Food in the Last Year: Never true    Ran Out of Food in the Last Year: Never true   Transportation Needs: No Transportation Needs    Lack of Transportation (Medical): No    Lack of Transportation (Non-Medical):  No   Physical Activity: Insufficiently Active    Days of Exercise per Week: 3 days    Minutes of Exercise per Session: 10 min   Stress: Stress Concern Present    Feeling of Stress : Rather much   Social Connections: Unknown    Frequency of Communication with Friends and Family: Twice a week    Frequency of Social Gatherings with Friends and Family: Never    Active Member of Clubs or Organizations: No    Attends Club or Organization Meetings: Never    Marital Status: Never    Housing Stability: Unknown    Unable to Pay for Housing in the Last Year: No    Unstable Housing in the Last Year: No       SCREENINGS    Apex Coma Scale  Eye Opening: Spontaneous  Best Verbal Response: Oriented  Best Motor Response: Obeys commands  Polo Coma Scale Score: 15        PHYSICAL EXAM    (up to 7 for level 4, 8 or more for level 5)     ED Triage Vitals [07/29/22 2109]   BP Temp Temp Source Heart Rate Resp SpO2 Height Weight   115/63 97.7 °F (36.5 °C) Oral 92 16 100 % 5' 2\" (1.575 m) 109 lb (49.4 kg)       Physical Exam  Vitals and nursing note reviewed. Constitutional:       General: She is not in acute distress. Appearance: Normal appearance. She is well-developed and normal weight. She is not ill-appearing, toxic-appearing or diaphoretic. HENT:      Head: Normocephalic and atraumatic. Nose: Nose normal. No congestion or rhinorrhea. Mouth/Throat:      Mouth: Mucous membranes are moist.      Pharynx: Oropharynx is clear. No oropharyngeal exudate or posterior oropharyngeal erythema. Eyes:      General: No scleral icterus. Right eye: No discharge. Left eye: No discharge. Extraocular Movements: Extraocular movements intact. Conjunctiva/sclera: Conjunctivae normal.      Pupils: Pupils are equal, round, and reactive to light. Neck:      Thyroid: No thyromegaly. Vascular: No carotid bruit or JVD. Trachea: No tracheal deviation. Cardiovascular:      Rate and Rhythm: Normal rate and regular rhythm. Pulses: Normal pulses. Heart sounds: Normal heart sounds. No murmur heard. No friction rub. No gallop. Pulmonary:      Effort: Pulmonary effort is normal. No respiratory distress. Breath sounds: Normal breath sounds. No stridor. No wheezing, rhonchi or rales. Chest:      Chest wall: No tenderness. Abdominal:      General: Abdomen is flat. Bowel sounds are normal. There is no distension. Palpations: Abdomen is soft. There is no mass. Tenderness: There is no abdominal tenderness. There is no right CVA tenderness, left CVA tenderness, guarding or rebound. Hernia: No hernia is present. Musculoskeletal:         General: No swelling, tenderness, deformity or signs of injury. Normal range of motion.       Cervical back: Normal range of motion and neck supple. No rigidity or tenderness. Right lower leg: No edema. Left lower leg: No edema. Lymphadenopathy:      Cervical: No cervical adenopathy. Skin:     General: Skin is warm and dry. Capillary Refill: Capillary refill takes less than 2 seconds. Coloration: Skin is not jaundiced or pale. Findings: No bruising, erythema, lesion or rash. Neurological:      General: No focal deficit present. Mental Status: She is alert and oriented to person, place, and time. Mental status is at baseline. Cranial Nerves: No cranial nerve deficit. Sensory: No sensory deficit. Motor: No weakness or abnormal muscle tone. Coordination: Coordination normal.      Gait: Gait normal.      Deep Tendon Reflexes: Reflexes are normal and symmetric. Reflexes normal.   Psychiatric:         Behavior: Behavior normal.         Thought Content: Thought content normal.         Judgment: Judgment normal.      Comments: Very anxious         DIAGNOSTIC RESULTS     EKG: All EKG's are interpreted by the Emergency Department Physician who either signs or Co-signs this chart in the absence of a cardiologist.        RADIOLOGY:   Non-plain film images such as CT, Ultrasound and MRI are read by the radiologist. Elvira Bellow radiographicimages are visualized and preliminarily interpreted by the emergency physician with the below findings:        Interpretation per the Radiologist below, if available at the time of this note:    CT ABDOMEN PELVIS W IV CONTRAST Additional Contrast? None   Final Result      Hepatic steatosis. Cholecystectomy. Hysterectomy. All CT scans at this facility use dose modulation, iterative reconstruction, and/or weight based dosing when appropriate to reduce radiation dose to as low as reasonably achievable.                      ED BEDSIDE ULTRASOUND:   Performed by ED Physician - none    LABS:  Labs Reviewed   COMPREHENSIVE METABOLIC PANEL - Abnormal; Notable for the following components:       Result Value    Glucose 108 (*)     All other components within normal limits   LIPASE - Abnormal; Notable for the following components:    Lipase 214 (*)     All other components within normal limits   MICROSCOPIC URINALYSIS - Abnormal; Notable for the following components:    RBC, UA 3-5 (*)     All other components within normal limits   CBC WITH AUTO DIFFERENTIAL   MAGNESIUM   URINALYSIS WITH REFLEX TO CULTURE       All other labs were within normal range or not returned as of this dictation. EMERGENCY DEPARTMENT COURSE and DIFFERENTIALDIAGNOSIS/MDM:   Vitals:    Vitals:    07/29/22 2109 07/30/22 0045   BP: 115/63 (!) 116/59   Pulse: 92 78   Resp: 16    Temp: 97.7 °F (36.5 °C)    TempSrc: Oral    SpO2: 100% 100%   Weight: 109 lb (49.4 kg)    Height: 5' 2\" (1.575 m)          MDM     Amount and/or Complexity of Data Reviewed  Clinical lab tests: reviewed and ordered  Tests in the radiology section of CPT®: ordered and reviewed    Risk of Complications, Morbidity, and/or Mortality  Presenting problems: moderate  Diagnostic procedures: moderate  Management options: moderate    Patient Progress  Patient progress: improved      CRITICAL CARE TIME   Total Critical Care time was  minutes, excluding separately reportable procedures. There was a high probability of clinically significant/life threatening deterioration in the patient's condition which required my urgentintervention. CONSULTS:  None    PROCEDURES:  Unless otherwise noted below, none     Procedures    FINAL IMPRESSION      1. Idiopathic acute pancreatitis without infection or necrosis    2. SMAS (superior mesenteric artery syndrome) ([facility] [address]    3. Pneumobilia          DISPOSITION/PLAN   DISPOSITION Decision To Transfer 07/29/2022 11:24:53 PM      PATIENT REFERRED TO:  No follow-up provider specified.     DISCHARGE MEDICATIONS:  Discharge Medication List as of 7/30/2022 12:59 AM             (Please note that portions of this note were completed with a voice recognitionprogram.  Efforts were made to edit the dictations but occasionally words are mis-transcribed.)    Sumeet Guillermo MD (electronically signed)  Attending Emergency Physician          Sumeet Guillermo MD  07/29/22 2370       Sumeet Guillermo MD  08/07/22 1079

## 2022-07-30 NOTE — ED NOTES
GI would like the patient to be transferred to [facility].  BRANDYN oliva hospitalist.      Chadwick Powell, RN  07/29/22 4998

## 2022-07-30 NOTE — PROGRESS NOTES
Presents with abdominal pain. Currently no imaging, lab, or examination findings that   are consistent with acute pancreatitis. Nevertheless, will trial IV fluids and some bowel rest.  On examination pain seems to be most consistent in the right lower rib and costochondral area radiating towards the back. We will follow-up with gastroenterology. Additionally, patient expressed concern regarding possible narrowing of duodenum and therefore general surgery will complete a small bowel follow-through examination; although as per our discussion the likelihood is low.

## 2022-07-30 NOTE — ED TRIAGE NOTES
Pt presents to ED with pain to R upper epigastric, under rib cage since Wednesday. Pt has a hx of abdominal pain and surgeries, and has had her gallbladder removed. Pt denies fever or chills, no changes to BM or urine. Pt able to keep minimal food and fluids down.

## 2022-07-30 NOTE — CONSULTS
Inpatient consult to GI  Consult performed by: Hannah Ball MD  Consult ordered by: Arron Cowden, APRN - CNP        Patient Name: Marcel Delacruz Date: 2022  1:21 AM  MR #: [id number]  : 1970    Attending Physician: Ochoa Hudson MD  Reason for consult: pancreatitis    History of Presenting Illness:      Georgia Clakr is a 54 y.o. female on hospital day 0 with a history of hypothyroidism, anxiety, CHF, GERD,'s RA, Sjogren's syndrome, attention deficit disorder, and acute pancreatitis. Past surgical history significant for breast lumpectomy, hysterectomy, cholecystectomy. Family history is negative for GI malignancies. Social history denies nicotine, EtOH or illicit drug use. History Obtained From:  patient, electronic medical record  GI consult for pancreatitis-admitted with acute pancreatitis, symptoms started Wednesday and include right upper quadrant pain with radiation to her back associated with nausea and anorexia, no fever, no vomiting, no diarrhea. Reports a hx of chronic pancreatitis and is on Creon, has been followed by Dr Ariela Bryson in the outpatient setting and  GI. Patient is post cholecystectomy for cholelithiasis with history of CBD stone requiring ERCP, no history of EtOH use, on arrival was noted to have elevated lipase at 214, otherwise blood work is unremarkable, mild normocytic anemia with Hgb 11.9, no leukocytosis, normal triglycerides. CT abd pelvis/ Us abdomen are pending.      History:      Past Medical History:   Diagnosis Date    Acquired hypothyroidism 2016    Acute left-sided low back pain with bilateral sciatica 3/9/2022    Acute pancreatitis     ADD (attention deficit disorder) 2015    Anxiety     Congestive heart failure, unspecified HF chronicity, unspecified heart failure type ([facility] [address] 2022    Depression 2015    Endometrial cyst of ovary 5/10/14    RT ovary, ruptured    GERD (gastroesophageal reflux disease) 2016    Medullary sponge kidney of both kidneys 5/22/2018    Sjogren's disease ([address]     Stress     Swelling      Past Surgical History:   Procedure Laterality Date    BREAST BIOPSY Left 2015? ?    lump removed (benign) (Dr. Rabia Burris)   [address] Left 2015??     Dr Rabia Burris (benign)    BREAST LUMPECTOMY      CHOLECYSTECTOMY  2011    HYSTERECTOMY (CERVIX STATUS UNKNOWN)  2014 sept (total)    OVARY REMOVAL Left Jan 2014    UPPER GASTROINTESTINAL ENDOSCOPY  09/16/2016    EGD W/BX    UPPER GASTROINTESTINAL ENDOSCOPY N/A 5/6/2021    ENDOSCOPIC ULTRASOUND with EGD performed by Renu Mayers MD at [facility]     Family History  Family History   Problem Relation Age of Onset    Heart Disease Father 48    Cancer Maternal Grandmother         breast    Breast Cancer Maternal Grandmother         in her 63's    Heart Disease Other         mom and dad's side    Breast Cancer Paternal Grandmother         in her 63's    Breast Cancer Paternal Aunt         in her 29's    Breast Cancer Paternal Aunt         in her 63's     [] Unable to obtain due to ventilated and/ or neurologic status  Social History     Socioeconomic History    Marital status: Single     Spouse name: Not on file    Number of children: Not on file    Years of education: Not on file    Highest education level: Not on file   Occupational History    Not on file   Tobacco Use    Smoking status: Never    Smokeless tobacco: Never   Vaping Use    Vaping Use: Never used   Substance and Sexual Activity    Alcohol use: No     Alcohol/week: 0.0 standard drinks     Comment: no alcohol since 2011    Drug use: No    Sexual activity: Not on file   Other Topics Concern    Not on file   Social History Narrative    ** Merged History Encounter **          Social Determinants of Health     Financial Resource Strain: Low Risk     Difficulty of Paying Living Expenses: Not hard at all   Food Insecurity: No Food Insecurity    Worried About Running Out of Food in the Last Year: Never true    [address] in the Last Year: Never true   Transportation Needs: No Transportation Needs    Lack of Transportation (Medical): No    Lack of Transportation (Non-Medical): No   Physical Activity: Insufficiently Active    Days of Exercise per Week: 3 days    Minutes of Exercise per Session: 10 min   Stress: Stress Concern Present    Feeling of Stress : Rather much   Social Connections: Unknown    Frequency of Communication with Friends and Family: Twice a week    Frequency of Social Gatherings with Friends and Family: Never    Attends [employer] Services: Not on file    Active Member of Clubs or Organizations: No    Attends Club or Organization Meetings: Never    Marital Status: Never    Intimate Partner Violence: Not on file   Housing Stability: Unknown    Unable to Pay for Housing in the Last Year: No    Number of [place] in the Last Year: Not on file    Unstable Housing in the Last Year: No      [] Unable to obtain due to ventilated and/ or neurologic status    Home Medications:      Medications Prior to Admission: MOTEGRITY 2 MG TABS, TAKE ONE TABLET BY MOUTH EVERY DAY  amoxicillin-clavulanate (AUGMENTIN) 875-125 MG per tablet, Take 1 tablet by mouth in the morning and 1 tablet before bedtime. Do all this for 10 days. amphetamine-dextroamphetamine (ADDERALL XR) 30 MG extended release capsule, Take 1 capsule by mouth in the morning and 1 capsule in the evening. Do all this for 30 days. esomeprazole (NEXIUM) 40 MG delayed release capsule, TAKE 1 CAPSULE BY MOUTH DAILY  furosemide (LASIX) 20 MG tablet, take 1 tablet by mouth 2 times daily  traZODone (DESYREL) 150 MG tablet, TAKE ONE TABLET BY MOUTH NIGHTLY  lidocaine (LIDODERM) 5 %, apply one patch as directed once daily to affected area remove patch after twelve hours.   midodrine (PROAMATINE) 10 MG tablet, Take 10 mg by mouth as needed  hyoscyamine (LEVSIN/SL) 125 MCG sublingual tablet, Place 1 tablet under the tongue every 4 hours as needed for Cramping  buPROPion (WELLBUTRIN XL) 300 MG extended release tablet, Take 1 tablet by mouth every morning TAKE ONE TABLET BY MOUTH EVERY MORNING  topiramate (TOPAMAX) 100 MG tablet, TAKE ONE TABLET BY MOUTH TWO TIMES A DAY  loratadine (CLARITIN) 10 MG tablet, Take 1 tablet by mouth daily  potassium chloride (KLOR-CON M) 10 MEQ extended release tablet, Take 1 tablet by mouth daily  liothyronine (CYTOMEL) 25 MCG tablet, Take 1 tablet by mouth daily  aspirin (ASPIRIN CHILDRENS) 81 MG chewable tablet, Take 1 tablet by mouth daily  cyclobenzaprine (FLEXERIL) 5 MG tablet, Take 2.5-5 mg by mouth 2 times daily as needed  naratriptan (AMERGE) 2.5 MG tablet, Take one at onset of severe headache/migraine may repeat x 1 after 4 hours if needed. Maximum 2/day and 2 days/week.  hydrocortisone (CORTEF) 5 MG tablet, Take 5 mg by mouth daily  valACYclovir (VALTREX) 500 MG tablet, Take 1 tablet by mouth daily  Magnesium 400 MG TABS, Take 1 tablet by mouth daily  TRULANCE 3 MG TABS, Take 3 mg by mouth daily  ondansetron (ZOFRAN) 4 MG tablet, Take 1 tablet by mouth every 8 hours as needed for Nausea or Vomiting  azelastine (ASTELIN) 0.1 % nasal spray, 1 spray by Nasal route  moxifloxacin (VIGAMOX) 0.5 % ophthalmic solution, 1 drop  fluticasone (FLONASE) 50 MCG/ACT nasal spray, USE 1 SPRAY NASALLY DAILY  ZOLMitriptan (ZOMIG) 5 MG tablet, TAKE 1 TABLET BY MOUTH AT ONSET OF MIGRAINE. MAY REPEAT ONCE AFTER 2 HOURS.  MAX 10 MG (2 TABLETS) PER DAY  levothyroxine (SYNTHROID) 25 MCG tablet, Take one daily  hydroxychloroquine (PLAQUENIL) 200 MG tablet, Take 300 mg by mouth daily   Pancrelipase, Lip-Prot-Amyl, (CREON) 73849 UNITS CPEP, 2 caps tid    Current Hospital Medications:   Scheduled Meds:   sodium chloride flush  5-40 mL IntraVENous 2 times per day    enoxaparin  40 mg SubCUTAneous Daily    amphetamine-dextroamphetamine  30 mg Oral BID    aspirin  81 mg Oral Daily    azelastine  1 spray Each Nostril BID    buPROPion  300 mg Oral QAM    pantoprazole  40 mg Oral QAM AC    fluticasone  1 spray Each Nostril Daily    furosemide  20 mg Oral Daily    hydrocortisone  5 mg Oral Daily    hydroxychloroquine  300 mg Oral Daily    levothyroxine  25 mcg Oral Daily    lidocaine  1 patch TransDERmal Daily    Prucalopride Succinate  1 tablet Oral Daily    moxifloxacin  1 drop Both Eyes TID    topiramate  100 mg Oral BID    traZODone  150 mg Oral Nightly    valACYclovir  500 mg Oral Daily    cetirizine  10 mg Oral Daily     Continuous Infusions:   sodium chloride      lactated ringers 200 mL/hr at 07/30/22 0350     PRN Meds:.sodium chloride flush, sodium chloride, ondansetron **OR** ondansetron, polyethylene glycol, acetaminophen **OR** acetaminophen, ketorolac, HYDROmorphone   sodium chloride      lactated ringers 200 mL/hr at 07/30/22 0350      Allergies:   No Known Allergies   Review of Systems:       [x] CV, Resp, Neuro, , and all other systems reviewed and negative other than listed in HPI. Objective Findings:     Vitals:   Vitals:    07/30/22 0115   BP: 116/70   Pulse: 75   Resp: 18   Temp: 97.5 °F (36.4 °C)   TempSrc: Oral   Weight: 122 lb 5.7 oz (55.5 kg)   Height: 5' 2\" (1.575 m)        Physical Examination:  General: alert  HEENT: Normocephalic, no scleral icterus. Neck: No JVD. Heart: Regular, no murmur, no rub/gallop. No RV heave. Lungs: Clear to ascultation, no rales/wheezing/rhonchi. Good chest wall excursion. Abdomen: Appearance:, Distension , Soft , RUQ tenderness , Scars , Bowel sounds present  Extremities: No clubbing/cyanosis, no edema. Skin: Warm, dry, normal turgor, no rash, no bruise, no petichiae. Neuro: No myoclonus or tremor.    Psych: Normal affect    Results/ Medications reviewed 7/30/2022, 7:41 AM     Laboratory, Microbiology, Pathology, Radiology, Cardiology, Medications and Transcriptions reviewed  Scheduled Meds:   sodium chloride flush  5-40 mL These measured up to 1.6 cm in greatest length, but less than 7 mm in short axis. All lymph nodes demonstrate a normal fatty hilum without cortical thickening. There is no abnormal appearing lymph node to target for biopsy. IMPRESSION: Bilateral axillary sonogram shows normal appearing lymph nodes. No abnormal lymph node is identified for a biopsy target. Electronically signed by: Cezar Cespedes MD    CT SOFT TISSUE NECK W CONTRAST    Result Date: 7/7/2022  EXAMINATION: CT SOFT TISSUE NECK W CONTRAST DATE AND TIME:7/7/2022 7:55 AM CLINICAL HISTORY: Acute neck pain. R59.1 Lymphadenopathy ICD10 COMPARISON: None TECHNIQUE: Sequential axial CT images were obtained from the skull base to the thoracic inlet. Following the administration of 100 cc of nonionic IV contrast.  All CT scans at this facility use dose modulation, iterative reconstruction, and/or weight based dosing when appropriate to reduce radiation dose to as low as reasonably achievable. FINDINGS  Visceral mucosa: Soft tissues of nasopharynx and oropharynx  within normal limits. Parotid and deep spaces: Parotid, submandibular glands and remaining deep spaces of the neck  are unremarkable. Epiglottis and aryepiglottic folds are normal. Infrahyoid neck structures  within normal limits. Adenopathy: Scattered lymph nodes in the cervical region are of normal size and configuration. Areas of palpable concern in the right neck correspond to a 6 x 4 mm right posterior cervical lymph node and to portions of the right external jugular vein. The largest node is in the right upper jugular chain measuring approximately 1.5 x 0.5 cm in its longitudinal and short axis diameters which is within normal limits. There are no enlarged or morphologically abnormal-appearing lymph nodes  Vascular structures:Vascular structures are opacified and appear normal.  Sinuses: Visualized lung apices clear.  Visualized paranasal sinuses and mastoids are clear. Bony structures  unremarkable. NEGATIVE CT OF THE NECK. NO CERVICAL LYMPHADENOPATHY. Endoscopic hx:  EUS Dr Jacqueline Ratliff 5/6/21  Normal upper endoscopy and EUS examination   EGD   Impression:   55 y/o female admitted with acute on chronic pancreatitis, symptoms started  3 days ago and include right upper quadrant pain with radiation to her back associated with nausea and anorexia, no fever, no vomiting, no diarrhea. Reports a hx of chronic pancreatitis and is on Creon, has been followed by Dr Jacqueline Ratliff in the outpatient setting and  GI. Patient is post cholecystectomy for cholelithiasis with history of CBD stone requiring ERCP, no history of EtOH use, on arrival was noted to have elevated lipase at 214, otherwise blood work is unremarkable, mild normocytic anemia with Hgb 11.9, no leukocytosis, normal triglycerides. CT abd pelvis/ Us abdomen are pending. Plan:   -Continue course of care  -Okay for clear liquids   -continue  hour  - pain management  -await CT and US imaging  -EGD pending clinical course  -Pending clinical course will consider EUS  -Parenteral nutrition may be considered if patient is unable to tolerate p.o. Comments: Thank you for allowing us to participate in the care of this patient. Will continue to follow. Please call if questions or concerns arise. Electronically signed by Johnny Isaac MD on 7/30/2022 at 7:41 AM    Please note this report has been partially produced using speech recognition software and may cause contain errors related to that system including grammar, punctuation and spelling as well as words and phrases that may seem inappropriate. If there are questions or concerns please feel free to contact me to clarify.

## 2022-07-30 NOTE — CONSULTS
Pt Name: Renuka Leonard  MRN: [id number]  [place]: 1970  Date of evaluation: 7/30/2022  Primary Care Physician: ORTIZ Moeller CNP  Patient evaluated at the request of  Dr. Lee Alvarado  Reason for evaluation: chronic abdominal pain, pancreatitis    IMPRESSIONS:  History of recurrent pancreatitis  History of chronic recurrent abdominal pain  Right upper quadrant acute abdominal pain unclear etiology, possibly related to pancreatitis  Doubtful that she has superior mesenteric artery syndrome which is a rare very rare medical condition  No evidence of acute abdomen or obstruction      PLANS  Upper GI with small bowel follow-through to evaluate for upper small bowel narrowing or obstruction  Agree with GI consult  If there is a question after her GI series then a CT angiography of the abdomen or MRI can be done. Keep n.p.o. for now      SUBJECTIVE:  History of Chief Complaint:    Dm Peralta is a 54 y. o.female who presents with a 3-day history of right upper quadrant sharp abdominal pain that is radiating to the flank and the back. She has associated nausea but no vomiting. She has anorexia. She denies any constipation or diarrhea. She has a history of pancreatitis that was felt to be due to gallstones. She has had a previous cholecystectomy and also ERCP. She currently has a lipase of 214 on admission but the remainder of her liver function tests are normal.  She has a hemoglobin of 11.9. She has a CT scan of the abdomen and pelvis that are essentially normal.  There is no evidence of obstruction or masses. The patient is very distressed about her pain that she describes as 8 out of 10 without help from the medication. I have discussed the case with Dr Lee Alvarado. An abdominal ultrasound has also been ordered. .    Past Medical History   has a past medical history of Acquired hypothyroidism, Acute left-sided low back pain with bilateral sciatica, Acute pancreatitis, ADD (attention deficit disorder), Anxiety, Congestive heart failure, unspecified HF chronicity, unspecified heart failure type ([facility] [address] Depression, Endometrial cyst of ovary, GERD (gastroesophageal reflux disease), Medullary sponge kidney of both kidneys, Sjogren's disease ([facility] [address] Stress, and Swelling. Past Surgical History   has a past surgical history that includes Upper gastrointestinal endoscopy (09/16/2016); Ovary removal (Left, Jan 2014); Cholecystectomy (2011); Upper gastrointestinal endoscopy (N/A, 5/6/2021); Breast biopsy (Left, 2015? ? ); Breast cyst excision (Left, 2015? ?); Hysterectomy (2014); and Breast lumpectomy. Medications  Prior to Admission medications    Medication Sig Start Date End Date Taking? Authorizing Provider   MOTEGRITY 2 MG TABS TAKE ONE TABLET BY MOUTH EVERY DAY 7/4/22   Historical Provider, MD   amoxicillin-clavulanate (AUGMENTIN) 875-125 MG per tablet Take 1 tablet by mouth in the morning and 1 tablet before bedtime. Do all this for 10 days. 7/26/22 8/5/22  Dirk Patel, DO   amphetamine-dextroamphetamine (ADDERALL XR) 30 MG extended release capsule Take 1 capsule by mouth in the morning and 1 capsule in the evening. Do all this for 30 days. 7/22/22 8/21/22  Dirk Patel, DO   esomeprazole (NEXIUM) 40 MG delayed release capsule TAKE 1 CAPSULE BY MOUTH DAILY 6/21/22   ORTIZ Verdin CNP   furosemide (LASIX) 20 MG tablet take 1 tablet by mouth 2 times daily 6/20/22   ORTIZ Verdin CNP   traZODone (DESYREL) 150 MG tablet TAKE ONE TABLET BY MOUTH NIGHTLY 6/16/22   ORTIZ Verdin CNP   lidocaine (LIDODERM) 5 % apply one patch as directed once daily to affected area remove patch after twelve hours.  5/17/22   Historical Provider, MD   midodrine (PROAMATINE) 10 MG tablet Take 10 mg by mouth as needed 4/12/22   Historical Provider, MD   hyoscyamine (LEVSIN/SL) 125 MCG sublingual tablet Place 1 tablet under the tongue every 4 hours as needed for Cramping 4/7/22   ORTIZ Verdin CNP   buPROPion (WELLBUTRIN XL) 300 MG extended release tablet Take 1 tablet by mouth every morning TAKE ONE TABLET BY MOUTH EVERY MORNING 3/14/22   ORTIZ Epps CNP   topiramate (TOPAMAX) 100 MG tablet TAKE ONE TABLET BY MOUTH TWO TIMES A DAY 3/3/22   ORTIZ Epps CNP   loratadine (CLARITIN) 10 MG tablet Take 1 tablet by mouth daily 12/28/21   ORTIZ Epps CNP   potassium chloride (KLOR-CON M) 10 MEQ extended release tablet Take 1 tablet by mouth daily 12/8/21   Neri Dyson PA-C   liothyronine (CYTOMEL) 25 MCG tablet Take 1 tablet by mouth daily 7/6/21   ORTIZ Epps CNP   aspirin (ASPIRIN CHILDRENS) 81 MG chewable tablet Take 1 tablet by mouth daily 6/24/21   TRUDY Hyatt   cyclobenzaprine (FLEXERIL) 5 MG tablet Take 2.5-5 mg by mouth 2 times daily as needed 6/2/21   Historical Provider, MD   naratriptan (AMERGE) 2.5 MG tablet Take one at onset of severe headache/migraine may repeat x 1 after 4 hours if needed. Maximum 2/day and 2 days/week.  6/2/21   Historical Provider, MD   hydrocortisone (CORTEF) 5 MG tablet Take 5 mg by mouth daily 2/24/21   Historical Provider, MD   valACYclovir (VALTREX) 500 MG tablet Take 1 tablet by mouth daily 2/15/21   ORTIZ Epps CNP   NEXIUM 40 MG delayed release capsule TAKE ONE CAPSULE BY MOUTH EVERY DAY 11/2/20   ORTIZ Epps CNP   topiramate (TOPAMAX) 100 MG tablet Take 1 tablet by mouth 2 times daily 10/11/20   ORTIZ Epps CNP   Magnesium 400 MG TABS Take 1 tablet by mouth daily 10/3/20   Dangelo Bermudez PA-C   furosemide (LASIX) 20 MG tablet Take 1 tablet by mouth 2 times daily TAKE ONE TABLET BY MOUTH TWO TIMES A DAY 8/4/20   ORTIZ Epps CNP   TRULANCE 3 MG TABS Take 3 mg by mouth daily 3/10/20   Historical Provider, MD   ondansetron (ZOFRAN) 4 MG tablet Take 1 tablet by mouth every 8 hours as needed for Nausea or Vomiting 2/25/20   ORTIZ Epps CNP   azelastine (ASTELIN) 0.1 % nasal spray 1 spray by Nasal route 11/27/19   Historical Provider, MD   moxifloxacin (VIGAMOX) 0.5 % ophthalmic solution 1 drop    Historical Provider, MD   fluticasone (FLONASE) 50 MCG/ACT nasal spray USE 1 SPRAY NASALLY DAILY 5/15/19   Historical Provider, MD   ZOLMitriptan (ZOMIG) 5 MG tablet TAKE 1 TABLET BY MOUTH AT ONSET OF MIGRAINE. MAY REPEAT ONCE AFTER 2 HOURS. MAX 10 MG (2 TABLETS) PER DAY 6/4/18   Historical Provider, MD   levothyroxine (SYNTHROID) 25 MCG tablet Take one daily 4/29/18   Minus ORTIZ Nelson - KRIS   hydroxychloroquine (PLAQUENIL) 200 MG tablet Take 300 mg by mouth daily     Historical Provider, MD   Pancrelipase, Lip-Prot-Amyl, (CREON) 33638 UNITS CPEP 2 caps tid 9/23/16   Edwin Martin MD    Scheduled Meds:   sodium chloride flush  5-40 mL IntraVENous 2 times per day    enoxaparin  40 mg SubCUTAneous Daily    amphetamine-dextroamphetamine  30 mg Oral BID    aspirin  81 mg Oral Daily    azelastine  1 spray Each Nostril BID    buPROPion  300 mg Oral QAM    pantoprazole  40 mg Oral QAM AC    fluticasone  1 spray Each Nostril Daily    furosemide  20 mg Oral Daily    hydrocortisone  5 mg Oral Daily    hydroxychloroquine  300 mg Oral Daily    levothyroxine  25 mcg Oral Daily    lidocaine  1 patch TransDERmal Daily    Prucalopride Succinate  1 tablet Oral Daily    moxifloxacin  1 drop Both Eyes TID    topiramate  100 mg Oral BID    traZODone  150 mg Oral Nightly    valACYclovir  500 mg Oral Daily    cetirizine  10 mg Oral Daily    aspirin-acetaminophen-caffeine  1 tablet Oral Once    docusate sodium  100 mg Oral Daily     Continuous Infusions:   sodium chloride      lactated ringers 200 mL/hr at 07/30/22 0350     PRN Meds:.sodium chloride flush, sodium chloride, ondansetron **OR** ondansetron, polyethylene glycol, acetaminophen **OR** acetaminophen, ketorolac, morphine  Allergies  has No Known Allergies.   Family History  family history includes Breast Cancer in her maternal grandmother, paternal aunt, paternal aunt, and paternal grandmother; Cancer in her maternal grandmother; Heart Disease in an other family member; Heart Disease (age of onset: 48) in her father. Social History   reports that she has never smoked. She has never used smokeless tobacco. She reports that she does not drink alcohol and does not use drugs. Review of Systems:  14 systems were reviewed and negative other than [place]    OBJECTIVE  CURRENT VITALS:  height is 5' 2\" (1.575 m) and weight is 122 lb 5.7 oz (55.5 kg). Her oral temperature is 97.3 °F (36.3 °C). Her blood pressure is 96/72 and her pulse is 63. Her respiration is 18 and oxygen saturation is 100%. Temperature Range (24h):Temp: 97.3 °F (36.3 °C) Temp  Av.5 °F (36.4 °C)  Min: 97.3 °F (36.3 °C)  Max: 97.7 °F (36.5 °C)  BP Range (41A): Systolic (97RJF), HJO:037 , Min:96 , LQS:680     Diastolic (61BVI), XOI:53, Min:59, Max:72    Pulse Range (24h): Pulse  Av  Min: 63  Max: 92  Respiration Range (24h): Resp  Av.3  Min: 16  Max: 18  Current Pulse Ox (24h):  SpO2: 100 %  Pulse Ox Range (24h):  SpO2  Av %  Min: 100 %  Max: 100 %  Oxygen Amount and Delivery:    CONSTITUTIONAL: Alert and oriented times 3, no acute distress and cooperative to examination with proper mood and affect. SKIN: Skin color, texture, turgor normal. No rashes or lesions. , no bruising  LYMPH: no cervical nodes, no inguinal nodes  HEENT: Head is normocephalic, atraumatic. PERRLA, Mucous membranes are moist.   NECK: Supple, symmetrical, trachea midline, no adenopathy, thyroid symmetric, not enlarged and no tenderness, skin normal.  CHEST/LUNGS: chest symmetric with normal A/P diameter, normal respiratory rate and rhythm, lungs clear to auscultation without wheezes, rales or rhonchi. No accessory muscle use. CARDIOVASCULAR: Heart sounds are normal.  Regular rate and rhythm without murmur, gallop or rub. Normal S1 and S2. Carotid and femoral pulses 2+/4 and equal bilaterally.   ABDOMEN: Normal shape..  Normal bowel sounds. No bruits. Soft, nondistended, no masses or organomegaly. no evidence of hernia. Tenderness: RUQ. RECTAL: deferred, not clinically indicated  NEUROLOGIC: There are no focalizing motor or sensory deficits. CN II-XII are grossly intact. Trula Blew EXTREMITIES: no cyanosis, no clubbing, and no edema. PYSCH:  Mood, affect and judgement are normal, alert and oriented x 3  LABS:  Recent Labs     07/29/22 2128 07/29/22 2133 07/30/22  0613   WBC 6.8  --  5.9   HGB 12.7  --  11.9*   HCT 38.2  --  35.8*     --  270     --  140   K 3.4  --  3.5     --  108*   CO2 21  --  21   BUN 20  --  17   CREATININE 0.65  --  0.58   MG 2.0  --  2.1   CALCIUM 9.8  --  9.3   AST 15  --  12   ALT 9  --  10   BILITOT <0.2  --  <0.2   LIPASE 214*  --   --    NITRU  --  Negative  --    COLORU  --  Yellow  --    BACTERIA  --  Negative  --        RADIOLOGY:  I have personally reviewed the following films:  CT scan abd/pelvis: Negative: No evidence of obstruction. No free fluid or free air. Several small hepatic cysts. Absence of gallbladder with no ductal dilatation. No hernias      Thank you for the interesting evaluation. Further recommendations to follow.     Electronically signed by Radha Pelaez MD on 7/30/22 at 3:13 PM EDT

## 2022-07-30 NOTE — ED NOTES
TC returned call connecting Dr. Meme Mcduffie to Dr. Gurinder Bowling.      Chadwick Powell, RN  07/29/22 8233

## 2022-07-31 ENCOUNTER — APPOINTMENT (OUTPATIENT)
Dept: ULTRASOUND IMAGING | Age: 52
DRG: 439 | End: 2022-07-31
Attending: INTERNAL MEDICINE
Payer: MEDICARE

## 2022-07-31 LAB
ALBUMIN SERPL-MCNC: 3.6 G/DL (ref 3.5–4.6)
ALP BLD-CCNC: 89 U/L (ref 40–130)
ALT SERPL-CCNC: 9 U/L (ref 0–33)
ANION GAP SERPL CALCULATED.3IONS-SCNC: 10 MEQ/L (ref 9–15)
AST SERPL-CCNC: 11 U/L (ref 0–35)
BASOPHILS ABSOLUTE: 0.1 K/UL (ref 0–0.2)
BASOPHILS RELATIVE PERCENT: 1.3 %
BILIRUB SERPL-MCNC: <0.2 MG/DL (ref 0.2–0.7)
BUN BLDV-MCNC: 10 MG/DL (ref 6–20)
CALCIUM SERPL-MCNC: 9.1 MG/DL (ref 8.5–9.9)
CHLORIDE BLD-SCNC: 109 MEQ/L (ref 95–107)
CO2: 23 MEQ/L (ref 20–31)
CREAT SERPL-MCNC: 0.64 MG/DL (ref 0.5–0.9)
EOSINOPHILS ABSOLUTE: 0.2 K/UL (ref 0–0.7)
EOSINOPHILS RELATIVE PERCENT: 4 %
GFR AFRICAN AMERICAN: >60
GFR NON-AFRICAN AMERICAN: >60
GLOBULIN: 2.1 G/DL (ref 2.3–3.5)
GLUCOSE BLD-MCNC: 91 MG/DL (ref 70–99)
HCT VFR BLD CALC: 33.4 % (ref 37–47)
HEMOGLOBIN: 11.1 G/DL (ref 12–16)
LYMPHOCYTES ABSOLUTE: 2 K/UL (ref 1–4.8)
LYMPHOCYTES RELATIVE PERCENT: 45.5 %
MAGNESIUM: 1.9 MG/DL (ref 1.7–2.4)
MCH RBC QN AUTO: 29.2 PG (ref 27–31.3)
MCHC RBC AUTO-ENTMCNC: 33.1 % (ref 33–37)
MCV RBC AUTO: 88.2 FL (ref 82–100)
MONOCYTES ABSOLUTE: 0.3 K/UL (ref 0.2–0.8)
MONOCYTES RELATIVE PERCENT: 6.3 %
NEUTROPHILS ABSOLUTE: 1.9 K/UL (ref 1.4–6.5)
NEUTROPHILS RELATIVE PERCENT: 42.9 %
PDW BLD-RTO: 12.9 % (ref 11.5–14.5)
PLATELET # BLD: 260 K/UL (ref 130–400)
POTASSIUM SERPL-SCNC: 3.5 MEQ/L (ref 3.4–4.9)
RBC # BLD: 3.79 M/UL (ref 4.2–5.4)
SODIUM BLD-SCNC: 142 MEQ/L (ref 135–144)
TOTAL PROTEIN: 5.7 G/DL (ref 6.3–8)
WBC # BLD: 4.4 K/UL (ref 4.8–10.8)

## 2022-07-31 PROCEDURE — 76705 ECHO EXAM OF ABDOMEN: CPT

## 2022-07-31 PROCEDURE — 99232 SBSQ HOSP IP/OBS MODERATE 35: CPT | Performed by: NURSE PRACTITIONER

## 2022-07-31 PROCEDURE — 85025 COMPLETE CBC W/AUTO DIFF WBC: CPT

## 2022-07-31 PROCEDURE — 2580000003 HC RX 258: Performed by: NURSE PRACTITIONER

## 2022-07-31 PROCEDURE — 6360000002 HC RX W HCPCS: Performed by: NURSE PRACTITIONER

## 2022-07-31 PROCEDURE — 36415 COLL VENOUS BLD VENIPUNCTURE: CPT

## 2022-07-31 PROCEDURE — 6370000000 HC RX 637 (ALT 250 FOR IP): Performed by: NURSE PRACTITIONER

## 2022-07-31 PROCEDURE — 1210000000 HC MED SURG R&B

## 2022-07-31 PROCEDURE — 83735 ASSAY OF MAGNESIUM: CPT

## 2022-07-31 PROCEDURE — 6360000002 HC RX W HCPCS: Performed by: INTERNAL MEDICINE

## 2022-07-31 PROCEDURE — 6370000000 HC RX 637 (ALT 250 FOR IP): Performed by: INTERNAL MEDICINE

## 2022-07-31 PROCEDURE — 80053 COMPREHEN METABOLIC PANEL: CPT

## 2022-07-31 PROCEDURE — 99231 SBSQ HOSP IP/OBS SF/LOW 25: CPT | Performed by: SURGERY

## 2022-07-31 RX ADMIN — KETOROLAC TROMETHAMINE 15 MG: 15 INJECTION, SOLUTION INTRAMUSCULAR; INTRAVENOUS at 17:42

## 2022-07-31 RX ADMIN — CETIRIZINE HYDROCHLORIDE 10 MG: 10 TABLET, FILM COATED ORAL at 09:56

## 2022-07-31 RX ADMIN — SODIUM CHLORIDE, POTASSIUM CHLORIDE, SODIUM LACTATE AND CALCIUM CHLORIDE: 600; 310; 30; 20 INJECTION, SOLUTION INTRAVENOUS at 17:42

## 2022-07-31 RX ADMIN — TRAZODONE HYDROCHLORIDE 150 MG: 50 TABLET ORAL at 20:01

## 2022-07-31 RX ADMIN — DOCUSATE SODIUM 100 MG: 100 CAPSULE, LIQUID FILLED ORAL at 09:57

## 2022-07-31 RX ADMIN — DEXTROAMPHETAMINE SACCHARATE, AMPHETAMINE ASPARTATE MONOHYDRATE, DEXTROAMPHETAMINE SULFATE, AMPHETAMINE SULFATE ER 30 MG: 2.5; 2.5; 2.5; 2.5 CAPSULE ORAL at 09:56

## 2022-07-31 RX ADMIN — MORPHINE SULFATE 2 MG: 2 INJECTION, SOLUTION INTRAMUSCULAR; INTRAVENOUS at 14:21

## 2022-07-31 RX ADMIN — TOPIRAMATE 100 MG: 25 TABLET, FILM COATED ORAL at 19:39

## 2022-07-31 RX ADMIN — FLUTICASONE PROPIONATE 1 SPRAY: 50 SPRAY, METERED NASAL at 09:57

## 2022-07-31 RX ADMIN — Medication 5 ML: at 19:45

## 2022-07-31 RX ADMIN — HYDROCORTISONE 5 MG: 5 TABLET ORAL at 09:55

## 2022-07-31 RX ADMIN — MORPHINE SULFATE 2 MG: 2 INJECTION, SOLUTION INTRAMUSCULAR; INTRAVENOUS at 06:26

## 2022-07-31 RX ADMIN — TOPIRAMATE 100 MG: 25 TABLET, FILM COATED ORAL at 09:56

## 2022-07-31 RX ADMIN — MORPHINE SULFATE 2 MG: 2 INJECTION, SOLUTION INTRAMUSCULAR; INTRAVENOUS at 19:56

## 2022-07-31 RX ADMIN — ACETAMINOPHEN 650 MG: 325 TABLET, FILM COATED ORAL at 14:25

## 2022-07-31 RX ADMIN — FUROSEMIDE 20 MG: 20 TABLET ORAL at 09:56

## 2022-07-31 RX ADMIN — KETOROLAC TROMETHAMINE 15 MG: 15 INJECTION, SOLUTION INTRAMUSCULAR; INTRAVENOUS at 09:57

## 2022-07-31 RX ADMIN — DEXTROAMPHETAMINE SACCHARATE, AMPHETAMINE ASPARTATE MONOHYDRATE, DEXTROAMPHETAMINE SULFATE, AMPHETAMINE SULFATE ER 30 MG: 2.5; 2.5; 2.5; 2.5 CAPSULE ORAL at 17:42

## 2022-07-31 RX ADMIN — BUPROPION HYDROCHLORIDE 300 MG: 300 TABLET, FILM COATED, EXTENDED RELEASE ORAL at 09:56

## 2022-07-31 RX ADMIN — ASPIRIN 81 MG: 81 TABLET, CHEWABLE ORAL at 09:56

## 2022-07-31 RX ADMIN — HYDROXYCHLOROQUINE SULFATE 300 MG: 200 TABLET ORAL at 09:56

## 2022-07-31 ASSESSMENT — PAIN SCALES - GENERAL
PAINLEVEL_OUTOF10: 8

## 2022-07-31 NOTE — PROGRESS NOTES
Shift assessments completed and documented, see flowsheets. A&OX4. Medications administered per MAR. Pt educated on NPO status after midnight and pt is agreeable, verbalizing understanding. Call light within reach. No further needs verbalized at this time by pt.

## 2022-07-31 NOTE — PROGRESS NOTES
Shift assessment complete VSS. Pt is AxOx4. Meds given per mar. Lungs clear. Bowel sounds hypoactive. Toradol given for pain, 8/10. Advanced to full liquid diet. Call light within reach. Will continue to monitor.     Electronically signed by Estela Slaughter RN on 7/31/2022 at 10:35 AM

## 2022-07-31 NOTE — PROGRESS NOTES
Pt Name: Doris Burton  Medical Record Number: [id number]  Date of Birth 1970   Admit date 7/30/2022  1:21 AM  Today's Date: 7/31/2022     ASSESSMENT  1. Hospital day # 1  2. Chronic abdominal pain with no evidence of intestinal blockage    PLAN  1. Diet as tolerated  2. No surgery anticipated    SUBJECTIVE  Chief complaint: Abdominal pain with occasional nausea  Afebrile, vital signs are stable. She denies any  vomiting, has passed flatus and had a bowel movement. She is tolerating a ADULT DIET; Full Liquid  Diet NPO Exceptions are: Ice Chips, Sips of Water with Meds. Her pain is well controlled on current medications. She has been ambulating in the halls. Upper GI with small bowel follow-through showed no evidence of obstruction      has a past medical history of Acquired hypothyroidism, Acute left-sided low back pain with bilateral sciatica, Acute pancreatitis, ADD (attention deficit disorder), Anxiety, Congestive heart failure, unspecified HF chronicity, unspecified heart failure type ([address] Depression, Endometrial cyst of ovary, GERD (gastroesophageal reflux disease), Medullary sponge kidney of both kidneys, Sjogren's disease ([address] Stress, and Swelling.     CURRENT MEDS  Scheduled Meds:   sodium chloride flush  5-40 mL IntraVENous 2 times per day    enoxaparin  40 mg SubCUTAneous Daily    amphetamine-dextroamphetamine  30 mg Oral BID    aspirin  81 mg Oral Daily    azelastine  1 spray Each Nostril BID    buPROPion  300 mg Oral QAM    pantoprazole  40 mg Oral QAM AC    fluticasone  1 spray Each Nostril Daily    furosemide  20 mg Oral Daily    hydrocortisone  5 mg Oral Daily    hydroxychloroquine  300 mg Oral Daily    levothyroxine  25 mcg Oral Daily    lidocaine  1 patch TransDERmal Daily    Prucalopride Succinate  1 tablet Oral Daily    moxifloxacin  1 drop Both Eyes TID    topiramate  100 mg Oral BID    traZODone  150 mg Oral Nightly    valACYclovir  500 mg Oral Daily    cetirizine  10 mg Oral Daily docusate sodium  100 mg Oral Daily     Continuous Infusions:   sodium chloride      lactated ringers 200 mL/hr at 22 1537     PRN Meds:.sodium chloride flush, sodium chloride, ondansetron **OR** ondansetron, polyethylene glycol, acetaminophen **OR** acetaminophen, ketorolac, morphine    OBJECTIVE  CURRENT VITALS:  height is 5' 2\" (1.575 m) and weight is 122 lb 5.7 oz (55.5 kg). Her oral temperature is 98.1 °F (36.7 °C). Her blood pressure is 101/58 (abnormal) and her pulse is 77. Her respiration is 16 and oxygen saturation is 100%. Temperature Range (24h):Temp: 98.1 °F (36.7 °C) Temp  Av.8 °F (36.6 °C)  Min: 97.7 °F (36.5 °C)  Max: 98.1 °F (36.7 °C)  BP Range (52A): Systolic (22HMC), JXA:628 , Min:101 , XWT:406     Diastolic (84AFW), MAQ:90, Min:58, Max:74    Pulse Range (24h): Pulse  Av  Min: 66  Max: 77  Respiration Range (24h): Resp  Av.3  Min: 16  Max: 18    GENERAL: alert, no distress  LUNGS: clear to ausculation, without wheezes, rales or rhonci  HEART: normal rate and regular rhythm  ABDOMEN: soft, mild right upper quadrant tenderness ,non-distended, bowel sounds present in all 4 quadrants, and no guarding or peritoneal signs  EXTERMITY: no cyanosis, clubbing or edema  In: 4133 [P.O.:1180; I.V.:2953]  Out: -   Date 22 0000 - 22 2359   Shift 7880-6109 5904-4691 9658-0671 24 Hour Total   INTAKE   P.O.(mL/kg/hr) 100(0.2) 480  580   Shift Total(mL/kg) 100(1.8) 480(8.6)  580(10.5)   OUTPUT   Shift Total(mL/kg)       Weight (kg) 55.5 55.5 55.5 55.5       LABS  Recent Labs     2213 22  0617   WBC 6.8 5.9 4.4*   HGB 12.7 11.9* 11.1*   HCT 38.2 35.8* 33.4*    270 260    140 142   K 3.4 3.5 3.5    108* 109*   CO2 21 21 23   BUN 20 17 10   CREATININE 0.65 0.58 0.64   MG 2.0 2.1 1.9   CALCIUM 9.8 9.3 9.1      No results for input(s): PTT, INR in the last 72 hours.     Invalid input(s): PT  Recent Labs     22  8341 07/31/22  0617   AST 15 12 11   ALT 9 10 9   BILITOT <0.2 <0.2 <0.2   LIPASE 214*  --   --        RADIOLOGY  US Extremity Non Vascular Limited    Result Date: 7/8/2022  MRN: [id number] Patient Name: Carlos Bennett: US, BILATERAL EXTREMITY,NONVASCULAR,REAl TIME WITH IMAGE [address];  7/8/2022 3:00 pm  INDICATION: adrenalitis. medullary sponge kidney of both kidneys. acquired complex renal cyst.  lymphadenopathy. weight loss, abnormal.  pain of upper abdomen. .  COMPARISON: None. ACCESSION NUMBER(S): [id number]  ORDERING CLINICIAN: Karena Nunez  FINDINGS: Limited grayscale ultrasound the right and left axilla performed to evaluate for lymphadenopathy. Several normal appearing lymph nodes were seen in each axilla. These measured up to 1.6 cm in greatest length, but less than 7 mm in short axis. All lymph nodes demonstrate a normal fatty hilum without cortical thickening. There is no abnormal appearing lymph node to target for biopsy. IMPRESSION: Bilateral axillary sonogram shows normal appearing lymph nodes. No abnormal lymph node is identified for a biopsy target. Electronically signed by: Whitney Alvarado MD    CT SOFT TISSUE NECK W CONTRAST    Result Date: 7/7/2022  EXAMINATION: CT SOFT TISSUE NECK W CONTRAST DATE AND TIME:7/7/2022 7:55 AM CLINICAL HISTORY: Acute neck pain. R59.1 Lymphadenopathy ICD10 COMPARISON: None TECHNIQUE: Sequential axial CT images were obtained from the skull base to the thoracic inlet. Following the administration of 100 cc of nonionic IV contrast.  All CT scans at this facility use dose modulation, iterative reconstruction, and/or weight based dosing when appropriate to reduce radiation dose to as low as reasonably achievable. FINDINGS  Visceral mucosa: Soft tissues of nasopharynx and oropharynx  within normal limits.                       Parotid and deep spaces: Parotid, submandibular glands and remaining deep spaces of the neck  are unremarkable. Epiglottis and aryepiglottic folds are normal. Infrahyoid neck structures  within normal limits. Adenopathy: Scattered lymph nodes in the cervical region are of normal size and configuration. Areas of palpable concern in the right neck correspond to a 6 x 4 mm right posterior cervical lymph node and to portions of the right external jugular vein. The largest node is in the right upper jugular chain measuring approximately 1.5 x 0.5 cm in its longitudinal and short axis diameters which is within normal limits. There are no enlarged or morphologically abnormal-appearing lymph nodes  Vascular structures:Vascular structures are opacified and appear normal.  Sinuses: Visualized lung apices clear. Visualized paranasal sinuses and mastoids are clear. Bony structures  unremarkable. NEGATIVE CT OF THE NECK. NO CERVICAL LYMPHADENOPATHY. CT ABDOMEN PELVIS W IV CONTRAST Additional Contrast? None    Result Date: 7/30/2022  CT of the Abdomen and Pelvis with intravenous contrast medium History:  Right upper quadrant abdominal pain Technical Factors: CT imaging of the abdomen and pelvis were obtained and formatted as 5 mm contiguous axial images from the domes of the diaphragm to the symphysis pubis. Sagittal and coronal reconstructions were also obtained. Oral contrast medium:  None. Intravenous contrast medium:  Isovue-370, 100 mL. Comparison:  CT abdomen pelvis, August 28, 2021, July 7, 2021. Findings: Lungs:  Lung bases are clear. Liver:  Normal in size, and shape. 1.3 cm cyst, dome right hepatic lobe posteriorly cystic area measuring 2.4 mm anterior left aspect unchanged. Liver decreased in attenuation. Bile Ducts:  Normal in caliber. Gallbladder:  Surgically absent. Pancreas:  Normal without masses, cysts, ductal dilatation or calcification. Spleen:  Normal in size without masses or calcifications. No splenules. Kidneys:  Normal in size and enhancement. No hydronephrosis, masses, or stones. Adrenals:  Normal. Small bowel:  Normal in caliber. Appendix:  Not visualized. Colon:  Normal in caliber. Peritoneum:  No ascites, free air, or fluid collections. Vessels: Aorta normal in course and caliber. Portal vein, splenic vein, superior mesenteric vein are patent. Lymph nodes:  Retroperitoneal:  No enlarged retroperitoneal lymph nodes. Mesenteric:  No enlarged mesenteric lymph nodes. Pelvic: No enlarged pelvic lymph nodes. Ureters: Normal in course and caliber. No calcifications. Bladder: Decompressed. Reproductive organs: Uterus surgically absent. Abdominal Wall:  No hernia identified. No diastasis of rectus musculature. No edema or masses. Bones:  No bone lesions. No degenerative changes. No post operative changes. Hepatic steatosis. Cholecystectomy. Hysterectomy. All CT scans at this facility use dose modulation, iterative reconstruction, and/or weight based dosing when appropriate to reduce radiation dose to as low as reasonably achievable. FL UGI W SMALL BOWEL    Result Date: 7/30/2022  EXAM: FL UGI W SMALL BOWEL  HISTORY: Duodenal narrowing. TECHNIQUE: Multiple fluoroscopic images were obtained of the esophagus, stomach, and duodenum during the oral administration of barium sulfate. Effervescent crystals were administered. This was followed by multiple radiographs of the abdomen to evaluate the small bowel. . Fluoroscopy dose: 21.1 mGy. Images saved: 23. COMPARISON: CT abdomen pelvis July 29, 2022 FINDINGS: Esophageal peristalsis is within normal limits. Esophageal mucosa appears within normal limits however mucosal lesions are best assessed with EGD. No intraluminal filling defect or esophageal stricture. No gastroesophageal reflux was identified during this examination. A hiatal hernia was not identified. The gastroesophageal junction is patent. Mucosal pattern of the stomach is within normal limits. The duodenum is of normal course and caliber.   Ileum and jejunum demonstrated normal

## 2022-07-31 NOTE — CARE COORDINATION
LSW meet with pt at bedside. Discuss DC plan. Pt agree with DC plan home with daughter and son. Denies any needs at this time. LSW will follow for any questions and concerns.      Electronically signed by TARAH Weinstein on 7/31/2022 at 10:07 AM

## 2022-07-31 NOTE — PROGRESS NOTES
Gastroenterology Progress Note    Doris Burton is a 54 y.o. female patient. Hospitalization Day:1    Chief C/O: pancreatitis    SUBJECTIVE: seen and examined no acute events overnight, tolerating clears, is hungry would like to eat. ROS:  Gastrointestinal ROS: abdominal pain managed with current medications, no change in bowel habits, or black or bloody stools    Physical    VITALS:  BP (!) 101/58   Pulse 77   Temp 98.1 °F (36.7 °C) (Oral)   Resp 16   Ht 5' 2\" (1.575 m)   Wt 122 lb 5.7 oz (55.5 kg)   SpO2 100%   BMI 22.38 kg/m²   TEMPERATURE:  Current - Temp: 98.1 °F (36.7 °C); Max - Temp  Av.8 °F (36.6 °C)  Min: 97.7 °F (36.5 °C)  Max: 98.1 °F (36.7 °C)    General:  Alert and oriented,  No apparent distress  Skin- without jaundice  Eyes: anicteric sclera  Cardiac: RRR, Nl s1s2, without murmurs  Lungs CTA Bilaterally, normal effort  Abdomen soft, ND, NT, no HSM, Bowel sounds normal  Ext: without edema  Neuro: no asterixis     Data    Data Review:    Recent Labs     22   WBC 6.8 5.9 4.4*   HGB 12.7 11.9* 11.1*   HCT 38.2 35.8* 33.4*   MCV 86.8 88.4 88.2    270 260     Recent Labs     2213 22  0617    140 142   K 3.4 3.5 3.5    108* 109*   CO2 21 21 23   BUN 20 17 10   CREATININE 0.65 0.58 0.64     Recent Labs     22  06   AST 15 12 11   ALT 9 10 9   BILITOT <0.2 <0.2 <0.2   ALKPHOS 115 97 89     Recent Labs     22   LIPASE 214*     No results for input(s): PROTIME, INR in the last 72 hours. ASSESSMENT:  53 y/o female admitted with ? acute on chronic pancreatitis, symptoms started  3 days ago and include right upper quadrant pain with radiation to her back associated with nausea and anorexia, no fever, no vomiting, no diarrhea. Reports a hx of chronic pancreatitis and is on Creon, has been followed by Dr Lissette Dominguez in the outpatient setting and  GI.   Patient is post cholecystectomy for cholelithiasis with history of CBD stone requiring ERCP, no history of EtOH use, on arrival was noted to have elevated lipase at 214, otherwise blood work is unremarkable, mild normocytic anemia with Hgb 11.9, no leukocytosis, normal triglycerides. CT abd pelvis no evidence of pancreatisis, US abd pending   7/31/22 tolerated clear liquids, negative small bowel follow through, US abdomen pending, wants to eat. PLAN :  -Continue course of care  -Okay for full liquids   -continue  hour  -pain management  -await  US results  -NPO after midnight for EGD tomorrow      Thank you for allowing me to participate in the care of your patient. Please feel free to contact me with any concerns.     Margarette Lentz, APRN - CNP

## 2022-07-31 NOTE — PROGRESS NOTES
Hospitalist Progress Note      Date of Admission: 7/30/2022  Chief Complaint:    No chief complaint on file. Subjective:  No new complaints. No nausea, vomiting, chest pain, or headache  Patient has ruq pain on deep inspiration     Medications:    Infusion Medications    sodium chloride      lactated ringers 200 mL/hr at 07/30/22 1537     Scheduled Medications    sodium chloride flush  5-40 mL IntraVENous 2 times per day    enoxaparin  40 mg SubCUTAneous Daily    amphetamine-dextroamphetamine  30 mg Oral BID    aspirin  81 mg Oral Daily    azelastine  1 spray Each Nostril BID    buPROPion  300 mg Oral QAM    pantoprazole  40 mg Oral QAM AC    fluticasone  1 spray Each Nostril Daily    furosemide  20 mg Oral Daily    hydrocortisone  5 mg Oral Daily    hydroxychloroquine  300 mg Oral Daily    levothyroxine  25 mcg Oral Daily    lidocaine  1 patch TransDERmal Daily    Prucalopride Succinate  1 tablet Oral Daily    moxifloxacin  1 drop Both Eyes TID    topiramate  100 mg Oral BID    traZODone  150 mg Oral Nightly    valACYclovir  500 mg Oral Daily    cetirizine  10 mg Oral Daily    docusate sodium  100 mg Oral Daily     PRN Meds: sodium chloride flush, sodium chloride, ondansetron **OR** ondansetron, polyethylene glycol, acetaminophen **OR** acetaminophen, ketorolac, morphine    Intake/Output Summary (Last 24 hours) at 7/31/2022 1013  Last data filed at 7/31/2022 8845  Gross per 24 hour   Intake 3653 ml   Output --   Net 3653 ml     Exam:  BP (!) 101/58   Pulse 77   Temp 98.1 °F (36.7 °C) (Oral)   Resp 16   Ht 5' 2\" (1.575 m)   Wt 122 lb 5.7 oz (55.5 kg)   SpO2 100%   BMI 22.38 kg/m²   Head: Normocephalic, atraumatic  Sclera clear  Neck JVD flat  Lungs: normal effort of breathing  Right rib pain on palp.      Labs:   Recent Labs     07/29/22 2128 07/30/22  0613 07/31/22  0617   WBC 6.8 5.9 4.4*   HGB 12.7 11.9* 11.1*   HCT 38.2 35.8* 33.4*    270 260     Recent Labs     07/29/22 2128 07/30/22  7390 07/31/22  0617    140 142   K 3.4 3.5 3.5    108* 109*   CO2 21 21 23   BUN 20 17 10   CREATININE 0.65 0.58 0.64   CALCIUM 9.8 9.3 9.1   AST 15 12 11   ALT 9 10 9   BILITOT <0.2 <0.2 <0.2   ALKPHOS 115 97 89     No results for input(s): INR in the last 72 hours. No results for input(s): Yamini Tillman in the last 72 hours. Radiology:  FL UGI W SMALL BOWEL   Final Result      US ABDOMEN LIMITED Specify organ? LIVER, SPLEEN, PANCREAS    (Results Pending)     Assessment/Plan:    Right upper quadrant pain: Broad differential.  Gastroenterology evaluating liver/pancreas pathology. Likely for endoscopy tomorrow. Ultrasound results pending. Small bowel follow-through negative for any dilation/stricture. Essentially normal study as per radiology report. Will await further input from gastroenterology perspective.     35 minutes total care time, >1/2 in unit/floor time and care coordination       Amol Guardado MD ,MD

## 2022-08-01 ENCOUNTER — ANESTHESIA EVENT (OUTPATIENT)
Dept: ENDOSCOPY | Age: 52
DRG: 439 | End: 2022-08-01
Payer: MEDICARE

## 2022-08-01 ENCOUNTER — ANESTHESIA (OUTPATIENT)
Dept: ENDOSCOPY | Age: 52
DRG: 439 | End: 2022-08-01
Payer: MEDICARE

## 2022-08-01 VITALS
DIASTOLIC BLOOD PRESSURE: 70 MMHG | TEMPERATURE: 97.5 F | OXYGEN SATURATION: 100 % | SYSTOLIC BLOOD PRESSURE: 113 MMHG | RESPIRATION RATE: 16 BRPM | WEIGHT: 122 LBS | HEART RATE: 62 BPM | BODY MASS INDEX: 22.45 KG/M2 | HEIGHT: 62 IN

## 2022-08-01 LAB
ALBUMIN SERPL-MCNC: 3.6 G/DL (ref 3.5–4.6)
ALP BLD-CCNC: 84 U/L (ref 40–130)
ALT SERPL-CCNC: 9 U/L (ref 0–33)
ANION GAP SERPL CALCULATED.3IONS-SCNC: 10 MEQ/L (ref 9–15)
AST SERPL-CCNC: 10 U/L (ref 0–35)
BASOPHILS ABSOLUTE: 0.1 K/UL (ref 0–0.2)
BASOPHILS RELATIVE PERCENT: 1.9 %
BILIRUB SERPL-MCNC: <0.2 MG/DL (ref 0.2–0.7)
BUN BLDV-MCNC: 7 MG/DL (ref 6–20)
CALCIUM SERPL-MCNC: 9.1 MG/DL (ref 8.5–9.9)
CHLORIDE BLD-SCNC: 110 MEQ/L (ref 95–107)
CO2: 23 MEQ/L (ref 20–31)
CREAT SERPL-MCNC: 0.64 MG/DL (ref 0.5–0.9)
EOSINOPHILS ABSOLUTE: 0.2 K/UL (ref 0–0.7)
EOSINOPHILS RELATIVE PERCENT: 4.9 %
GFR AFRICAN AMERICAN: >60
GFR NON-AFRICAN AMERICAN: >60
GLOBULIN: 2.1 G/DL (ref 2.3–3.5)
GLUCOSE BLD-MCNC: 91 MG/DL (ref 70–99)
HCT VFR BLD CALC: 32.9 % (ref 37–47)
HEMOGLOBIN: 10.9 G/DL (ref 12–16)
LYMPHOCYTES ABSOLUTE: 1.9 K/UL (ref 1–4.8)
LYMPHOCYTES RELATIVE PERCENT: 41.5 %
MAGNESIUM: 1.8 MG/DL (ref 1.7–2.4)
MCH RBC QN AUTO: 29.4 PG (ref 27–31.3)
MCHC RBC AUTO-ENTMCNC: 33 % (ref 33–37)
MCV RBC AUTO: 89.1 FL (ref 82–100)
MONOCYTES ABSOLUTE: 0.3 K/UL (ref 0.2–0.8)
MONOCYTES RELATIVE PERCENT: 7.1 %
NEUTROPHILS ABSOLUTE: 2 K/UL (ref 1.4–6.5)
NEUTROPHILS RELATIVE PERCENT: 44.6 %
PDW BLD-RTO: 12.7 % (ref 11.5–14.5)
PLATELET # BLD: 248 K/UL (ref 130–400)
POTASSIUM SERPL-SCNC: 3.8 MEQ/L (ref 3.4–4.9)
RBC # BLD: 3.7 M/UL (ref 4.2–5.4)
SODIUM BLD-SCNC: 143 MEQ/L (ref 135–144)
TOTAL PROTEIN: 5.7 G/DL (ref 6.3–8)
WBC # BLD: 4.6 K/UL (ref 4.8–10.8)

## 2022-08-01 PROCEDURE — 85025 COMPLETE CBC W/AUTO DIFF WBC: CPT

## 2022-08-01 PROCEDURE — 6370000000 HC RX 637 (ALT 250 FOR IP): Performed by: NURSE PRACTITIONER

## 2022-08-01 PROCEDURE — 0DJ08ZZ INSPECTION OF UPPER INTESTINAL TRACT, VIA NATURAL OR ARTIFICIAL OPENING ENDOSCOPIC: ICD-10-PCS | Performed by: INTERNAL MEDICINE

## 2022-08-01 PROCEDURE — 6360000002 HC RX W HCPCS: Performed by: INTERNAL MEDICINE

## 2022-08-01 PROCEDURE — 7100000011 HC PHASE II RECOVERY - ADDTL 15 MIN: Performed by: INTERNAL MEDICINE

## 2022-08-01 PROCEDURE — 6370000000 HC RX 637 (ALT 250 FOR IP): Performed by: INTERNAL MEDICINE

## 2022-08-01 PROCEDURE — 7100000010 HC PHASE II RECOVERY - FIRST 15 MIN: Performed by: INTERNAL MEDICINE

## 2022-08-01 PROCEDURE — 3609017100 HC EGD: Performed by: INTERNAL MEDICINE

## 2022-08-01 PROCEDURE — 2500000003 HC RX 250 WO HCPCS: Performed by: NURSE ANESTHETIST, CERTIFIED REGISTERED

## 2022-08-01 PROCEDURE — 2709999900 HC NON-CHARGEABLE SUPPLY: Performed by: INTERNAL MEDICINE

## 2022-08-01 PROCEDURE — 36415 COLL VENOUS BLD VENIPUNCTURE: CPT

## 2022-08-01 PROCEDURE — 6360000002 HC RX W HCPCS: Performed by: NURSE ANESTHETIST, CERTIFIED REGISTERED

## 2022-08-01 PROCEDURE — 83735 ASSAY OF MAGNESIUM: CPT

## 2022-08-01 PROCEDURE — 6360000002 HC RX W HCPCS: Performed by: NURSE PRACTITIONER

## 2022-08-01 PROCEDURE — 3700000000 HC ANESTHESIA ATTENDED CARE: Performed by: INTERNAL MEDICINE

## 2022-08-01 PROCEDURE — 80053 COMPREHEN METABOLIC PANEL: CPT

## 2022-08-01 PROCEDURE — 43235 EGD DIAGNOSTIC BRUSH WASH: CPT | Performed by: INTERNAL MEDICINE

## 2022-08-01 RX ORDER — LIDOCAINE HYDROCHLORIDE 10 MG/ML
INJECTION, SOLUTION INFILTRATION; PERINEURAL PRN
Status: DISCONTINUED | OUTPATIENT
Start: 2022-08-01 | End: 2022-08-01 | Stop reason: SDUPTHER

## 2022-08-01 RX ORDER — PROPOFOL 10 MG/ML
INJECTION, EMULSION INTRAVENOUS PRN
Status: DISCONTINUED | OUTPATIENT
Start: 2022-08-01 | End: 2022-08-01 | Stop reason: SDUPTHER

## 2022-08-01 RX ORDER — IBUPROFEN 400 MG/1
400 TABLET ORAL EVERY 6 HOURS PRN
COMMUNITY
Start: 2022-08-01 | End: 2022-09-03 | Stop reason: ALTCHOICE

## 2022-08-01 RX ADMIN — DEXTROAMPHETAMINE SACCHARATE, AMPHETAMINE ASPARTATE MONOHYDRATE, DEXTROAMPHETAMINE SULFATE, AMPHETAMINE SULFATE ER 30 MG: 2.5; 2.5; 2.5; 2.5 CAPSULE ORAL at 10:25

## 2022-08-01 RX ADMIN — TOPIRAMATE 100 MG: 25 TABLET, FILM COATED ORAL at 10:25

## 2022-08-01 RX ADMIN — HYDROCORTISONE 5 MG: 5 TABLET ORAL at 10:25

## 2022-08-01 RX ADMIN — DOCUSATE SODIUM 100 MG: 100 CAPSULE, LIQUID FILLED ORAL at 10:24

## 2022-08-01 RX ADMIN — FUROSEMIDE 20 MG: 20 TABLET ORAL at 10:24

## 2022-08-01 RX ADMIN — KETOROLAC TROMETHAMINE 15 MG: 15 INJECTION, SOLUTION INTRAMUSCULAR; INTRAVENOUS at 10:26

## 2022-08-01 RX ADMIN — HYDROXYCHLOROQUINE SULFATE 300 MG: 200 TABLET ORAL at 10:25

## 2022-08-01 RX ADMIN — LIDOCAINE HYDROCHLORIDE 40 MG: 10 INJECTION, SOLUTION INFILTRATION; PERINEURAL at 08:55

## 2022-08-01 RX ADMIN — PROPOFOL 100 MG: 10 INJECTION, EMULSION INTRAVENOUS at 08:55

## 2022-08-01 RX ADMIN — MORPHINE SULFATE 2 MG: 2 INJECTION, SOLUTION INTRAMUSCULAR; INTRAVENOUS at 04:26

## 2022-08-01 RX ADMIN — BUPROPION HYDROCHLORIDE 300 MG: 300 TABLET, FILM COATED, EXTENDED RELEASE ORAL at 10:25

## 2022-08-01 RX ADMIN — ASPIRIN 81 MG: 81 TABLET, CHEWABLE ORAL at 10:24

## 2022-08-01 RX ADMIN — CETIRIZINE HYDROCHLORIDE 10 MG: 10 TABLET, FILM COATED ORAL at 10:25

## 2022-08-01 ASSESSMENT — PAIN SCALES - GENERAL
PAINLEVEL_OUTOF10: 7
PAINLEVEL_OUTOF10: 9

## 2022-08-01 ASSESSMENT — PAIN DESCRIPTION - DESCRIPTORS: DESCRIPTORS: ACHING

## 2022-08-01 ASSESSMENT — PAIN - FUNCTIONAL ASSESSMENT: PAIN_FUNCTIONAL_ASSESSMENT: 0-10

## 2022-08-01 NOTE — PROGRESS NOTES
Shift assessment complete, vss, am medications given per mar, pt denies further needs, family at bedside. 1300 Discharge instructions provided to patient. Verbalized understanding of follow up appointments, diet, activity, medications and reasons to return to ED/call physician. All questions answered. Copy of discharge instructions provided. Pt waiting for daughter to , unsure of the time. Denies further needs.  Electronically signed by Ivy Frederick RN on 8/1/2022 at 1:43 PM

## 2022-08-01 NOTE — CARE COORDINATION
This LSW met with patient at bedside. Patient is anticipating discharge home today. Patients daughters are able to assist with care if needed. Patient denies needs. IMM completed.   Electronically signed by MALCOLM Palmer, LSW on 8/1/22 at 11:49 AM EDT

## 2022-08-01 NOTE — PROGRESS NOTES
Shift assessments completed and documented, see flowsheets. A&OX4. Medications administered per MAR. Pt c/o lack of sleep and anxiety, alongside dryness in nose-- Dr Rojas Machuca notified of pt complaints at [address], awaiting response. Call light within reach. No further needs verbalized at this time by pt.

## 2022-08-01 NOTE — ANESTHESIA PRE PROCEDURE
Department of Anesthesiology  Preprocedure Note       Name:  Nathalia Jean   Age:  54 y.o.  :  1970                                          MRN:  [id number]         Date:  2022      Surgeon: Sneha Harvey):  Paula Conway MD    Procedure: Procedure(s):  EGD DIAGNOSTIC ONLY    Medications prior to admission:   Prior to Admission medications    Medication Sig Start Date End Date Taking? Authorizing Provider   MOTEGRITY 2 MG TABS TAKE ONE TABLET BY MOUTH EVERY DAY 22   Historical Provider, MD   amoxicillin-clavulanate (AUGMENTIN) 875-125 MG per tablet Take 1 tablet by mouth in the morning and 1 tablet before bedtime. Do all this for 10 days. 22  Jermain Mariano DO   amphetamine-dextroamphetamine (ADDERALL XR) 30 MG extended release capsule Take 1 capsule by mouth in the morning and 1 capsule in the evening. Do all this for 30 days. 22  Jermain Mariano DO   esomeprazole (NEXIUM) 40 MG delayed release capsule TAKE 1 CAPSULE BY MOUTH DAILY 22   ORTIZ Craig CNP   furosemide (LASIX) 20 MG tablet take 1 tablet by mouth 2 times daily 22   ORTIZ Craig CNP   traZODone (DESYREL) 150 MG tablet TAKE ONE TABLET BY MOUTH NIGHTLY 22   ORTIZ Craig CNP   lidocaine (LIDODERM) 5 % apply one patch as directed once daily to affected area remove patch after twelve hours.  22   Historical Provider, MD   midodrine (PROAMATINE) 10 MG tablet Take 10 mg by mouth as needed 22   Historical Provider, MD   hyoscyamine (LEVSIN/SL) 125 MCG sublingual tablet Place 1 tablet under the tongue every 4 hours as needed for Cramping 22   ORTIZ Craig CNP   buPROPion (WELLBUTRIN XL) 300 MG extended release tablet Take 1 tablet by mouth every morning TAKE ONE TABLET BY MOUTH EVERY MORNING 3/14/22   ORTIZ Craig CNP   topiramate (TOPAMAX) 100 MG tablet TAKE ONE TABLET BY MOUTH TWO TIMES A DAY 3/3/22   ORTIZ Craig CNP   loratadine (CLARITIN) 10 MG tablet Take 1 tablet by mouth daily 12/28/21   ORTIZ Garcia CNP   potassium chloride (KLOR-CON M) 10 MEQ extended release tablet Take 1 tablet by mouth daily 12/8/21   Aaron Chambers PA-C   liothyronine (CYTOMEL) 25 MCG tablet Take 1 tablet by mouth daily 7/6/21   ORTIZ Garcia CNP   aspirin (ASPIRIN CHILDRENS) 81 MG chewable tablet Take 1 tablet by mouth daily 6/24/21   TRUDY Josue   cyclobenzaprine (FLEXERIL) 5 MG tablet Take 2.5-5 mg by mouth 2 times daily as needed 6/2/21   Historical Provider, MD   naratriptan (AMERGE) 2.5 MG tablet Take one at onset of severe headache/migraine may repeat x 1 after 4 hours if needed. Maximum 2/day and 2 days/week.  6/2/21   Historical Provider, MD   hydrocortisone (CORTEF) 5 MG tablet Take 5 mg by mouth daily 2/24/21   Historical Provider, MD   valACYclovir (VALTREX) 500 MG tablet Take 1 tablet by mouth daily 2/15/21   ORTIZ Garcia CNP   NEXIUM 40 MG delayed release capsule TAKE ONE CAPSULE BY MOUTH EVERY DAY 11/2/20   ORTIZ Garcia CNP   topiramate (TOPAMAX) 100 MG tablet Take 1 tablet by mouth 2 times daily 10/11/20   ORTIZ Garcia CNP   Magnesium 400 MG TABS Take 1 tablet by mouth daily 10/3/20   [place] PA-C   furosemide (LASIX) 20 MG tablet Take 1 tablet by mouth 2 times daily TAKE ONE TABLET BY MOUTH TWO TIMES A DAY 8/4/20   ORTIZ Garcia CNP   TRULANCE 3 MG TABS Take 3 mg by mouth daily 3/10/20   Historical Provider, MD   ondansetron (ZOFRAN) 4 MG tablet Take 1 tablet by mouth every 8 hours as needed for Nausea or Vomiting 2/25/20   ORTIZ Garcia CNP   azelastine (ASTELIN) 0.1 % nasal spray 1 spray by Nasal route 11/27/19   Historical Provider, MD   moxifloxacin (VIGAMOX) 0.5 % ophthalmic solution 1 drop    Historical Provider, MD   fluticasone (FLONASE) 50 MCG/ACT nasal spray USE 1 SPRAY NASALLY DAILY 5/15/19   Historical Provider, MD   ZOLMitriptan (ZOMIG) 5 MG tablet release capsule 30 mg  30 mg Oral BID Chad Calloway-Roche, APRN - CNP   30 mg at 07/31/22 1742    aspirin chewable tablet 81 mg  81 mg Oral Daily Nicko Elli-Roche, APRN - CNP   81 mg at 07/31/22 0956    azelastine (ASTELIN) 0.1 % nasal spray 1 spray (Patient Supplied)  1 spray Each Nostril BID Chad Calloway-Roche, APRN - CNP        buPROPion (WELLBUTRIN XL) extended release tablet 300 mg  300 mg Oral QAM Chad Calloway-Roche, APRN - CNP   300 mg at 07/31/22 0956    pantoprazole (PROTONIX) tablet 40 mg  40 mg Oral QAM AC Chad Calloway-Roche, APRN - CNP   40 mg at 07/30/22 0641    fluticasone (FLONASE) 50 MCG/ACT nasal spray 1 spray  1 spray Each Nostril Daily Chad Artis APRN - CNP   1 spray at 07/31/22 0957    furosemide (LASIX) tablet 20 mg  20 mg Oral Daily Nicko Elli-Roche, APRN - CNP   20 mg at 07/31/22 0956    hydrocortisone (CORTEF) tablet 5 mg  5 mg Oral Daily Nicko Elli-Roche, APRN - CNP   5 mg at 07/31/22 0955    hydroxychloroquine (PLAQUENIL) tablet 300 mg  300 mg Oral Daily Nicko Elli-Roche, APRN - CNP   300 mg at 07/31/22 0956    levothyroxine (SYNTHROID) tablet 25 mcg  25 mcg Oral Daily Chad Calloway-Roche, APRN - CNP   25 mcg at 07/30/22 0641    lidocaine 4 % external patch 1 patch  1 patch TransDERmal Daily Chad Calloway-Roche, APRN - CNP   1 patch at 07/31/22 0956    Prucalopride Succinate TABS 1 tablet (Patient Supplied)  1 tablet Oral Daily Chad Calloway-Roche, APRN - CNP        moxifloxacin (VIGAMOX) 0.5 % ophthalmic solution 1 drop (Patient Supplied)  1 drop Both Eyes TID Chad Calloway-Roche, APRN - CNP        topiramate (TOPAMAX) tablet 100 mg  100 mg Oral BID NicolettORTIZ Kelly CNP   100 mg at 07/31/22 1939    traZODone (DESYREL) tablet 150 mg  150 mg Oral Nightly ORTIZ Yu CNP   150 mg at 07/31/22 2001    valACYclovir (VALTREX) tablet 500 mg (Patient Supplied)  500 mg Oral Daily Chad lEli-Roche, APRN - CNP        cetirizine (ZYRTEC) tablet 10 mg  10 mg Oral Daily Niconickieo Elli-Roche APRN - CNP   10 mg at 07/31/22 0956    morphine (PF) injection 2 mg  2 mg IntraVENous Q4H PRN Maria Guadalupe Kong MD   2 mg at 08/01/22 0426    docusate sodium (COLACE) capsule 100 mg  100 mg Oral Daily Maria Guadalupe Kong MD   100 mg at 07/31/22 8302       Allergies:  No Known Allergies    Problem List:    Patient Active Problem List   Diagnosis Code    ADD (attention deficit disorder) F98.8    Anxiety F41.9    Depression F32. A    Acquired hypothyroidism E03.9    GERD (gastroesophageal reflux disease) K21.9    Pancreatitis K85.90    Other chest pain R07.89    Left lower quadrant pain R10.32    Sjogren's syndrome (HCC) M35.00    Diverticulitis of large intestine without perforation or abscess without bleeding K57.32    ASCUS favoring benign [id number]    Endometriosis N80.9    Fibroids D21.9    S/P endometrial ablation Z98.890    Transfusion history Z92.89    Cellulitis, face L03. 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.8    Metabolic acidosis K04.5    Microscopic hematuria R31.29    Nausea R11.0    Renal tubular acidosis type I N25.89    Rheumatoid arthritis (HCC) M06.9    Tension type headache G44.209    Weight loss, abnormal R63.4    Major depressive disorder, single episode, 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    Chronic pancreatitis (HCC) K86.1    Neutropenia (HCC) D70.9    Hereditary hemochromatosis (HCC) E83.110    Neck pain M54.2    Non-recurrent acute suppurative otitis media of left ear without spontaneous rupture of tympanic membrane H66.002    Congestive heart failure, unspecified HF chronicity, unspecified heart failure type ([address] I50.9    Claudication of both lower extremities (HCC) I73.9    Acute left-sided low back pain with bilateral sciatica M54.42, M54.41    Pancreatitis, unspecified pancreatitis type K85.90    Acute pancreatitis without infection or necrosis, unspecified pancreatitis type K85.90    Abdominal pain R10.9       Past Medical History:        Diagnosis Date    Acquired hypothyroidism 9/26/2016    Acute left-sided low back pain with bilateral sciatica 3/9/2022    Acute pancreatitis     ADD (attention deficit disorder) 2/12/2015    Anxiety     Congestive heart failure, unspecified HF chronicity, unspecified heart failure type ([address] 2/11/2022    Depression 12/9/2015    Endometrial cyst of ovary 5/10/14    RT ovary, ruptured    GERD (gastroesophageal reflux disease) 9/26/2016    Medullary sponge kidney of both kidneys 5/22/2018    [facility]ren's disease [facility]     Stress     Swelling        Past Surgical History:        Procedure Laterality Date    BREAST BIOPSY Left 2015? ?    lump removed (benign) (Dr. Herminia Mireles)   [address] Left 2015??     Dr [address] (benign)    BREAST LUMPECTOMY      CHOLECYSTECTOMY  2011    HYSTERECTOMY (CERVIX STATUS UNKNOWN)  2014 sept (total)    OVARY REMOVAL Left Jan 2014    UPPER GASTROINTESTINAL ENDOSCOPY  09/16/2016    EGD W/BX    UPPER GASTROINTESTINAL ENDOSCOPY N/A 5/6/2021    ENDOSCOPIC ULTRASOUND with EGD performed by Hansel Joaquin MD at [facility] History:    Social History     Tobacco Use    Smoking status: Never    Smokeless tobacco: Never   Substance Use Topics    Alcohol use: No     Alcohol/week: 0.0 standard drinks     Comment: no alcohol since 2011                                Counseling given: Not Answered      Vital Signs (Current):   Vitals:    07/31/22 1425 07/31/22 1956 07/31/22 2000 08/01/22 0736   BP: (!) 105/47  115/76 (!) 98/56   Pulse: 81  66 67   Resp: 16 18 18 18   Temp:   36.7 °C (98.1 °F) 36.4 °C (97.5 °F)   TempSrc:   Oral Oral   SpO2:   100% 98%   Weight:       Height:                                                  BP Readings from Last 3 Encounters:   08/01/22 (!) 98/56   07/30/22 (!) 116/59   06/29/22 104/72       NPO Status:                                                                                 BMI:   Wt Readings from Last 3 Encounters:   07/30/22 122 lb 5.7 oz (55.5 kg)   07/29/22 109 lb (49.4 kg)   06/29/22 108 lb (49 kg)     Body mass index is 22.38 kg/m². CBC:   Lab Results   Component Value Date/Time    WBC 4.6 08/01/2022 06:18 AM    RBC 3.70 08/01/2022 06:18 AM    HGB 10.9 08/01/2022 06:18 AM    HCT 32.9 08/01/2022 06:18 AM    MCV 89.1 08/01/2022 06:18 AM    RDW 12.7 08/01/2022 06:18 AM     08/01/2022 06:18 AM       CMP:   Lab Results   Component Value Date/Time     08/01/2022 06:18 AM    K 3.8 08/01/2022 06:18 AM     08/01/2022 06:18 AM    CO2 23 08/01/2022 06:18 AM    BUN 7 08/01/2022 06:18 AM    CREATININE 0.64 08/01/2022 06:18 AM    GFRAA >60.0 08/01/2022 06:18 AM    AGRATIO 1.5 04/05/2019 08:05 AM    LABGLOM >60.0 08/01/2022 06:18 AM    GLUCOSE 91 08/01/2022 06:18 AM    GLUCOSE 97 06/27/2022 06:16 AM    PROT 5.7 08/01/2022 06:18 AM    CALCIUM 9.1 08/01/2022 06:18 AM    BILITOT <0.2 08/01/2022 06:18 AM    ALKPHOS 84 08/01/2022 06:18 AM    AST 10 08/01/2022 06:18 AM    ALT 9 08/01/2022 06:18 AM       POC Tests: No results for input(s): POCGLU, POCNA, POCK, POCCL, POCBUN, POCHEMO, POCHCT in the last 72 hours.     Coags:   Lab Results   Component Value Date/Time    PROTIME 12.3 08/28/2021 07:01 PM    INR 0.9 08/28/2021 07:01 PM    APTT 27.3 08/28/2021 07:01 PM       HCG (If Applicable):   Lab Results   Component Value Date    HCGQUANT <2 06/27/2022        ABGs: No results found for: PHART, PO2ART, RFU1ISK, KPN5BUV, BEART, I2XSDPAV     Type & Screen (If Applicable):  No results found for: LABABO, LABRH    Drug/Infectious Status (If Applicable):  No results found for: HIV, HEPCAB    COVID-19 Screening (If Applicable):   Lab Results   Component Value Date/Time    COVID19 NOT DETECTED 06/27/2022 06:12 AM           Anesthesia Evaluation  Patient summary reviewed and Nursing notes reviewed  Airway: Mallampati: II  TM distance: >3 FB     Mouth opening: > = 3 FB   Dental:          Pulmonary:Negative Pulmonary ROS and normal exam                               Cardiovascular:    (+) CHF:,                   Neuro/Psych:   (+) depression/anxiety             GI/Hepatic/Renal:   (+) GERD:, liver disease:,           Endo/Other:    (+) hypothyroidism::., .                 Abdominal:             Vascular: Other Findings:           Anesthesia Plan      MAC     ASA 3       Induction: intravenous. Anesthetic plan and risks discussed with patient.       Plan discussed with surgical team.                    ORTIZ Andersen - CRNA   8/1/2022

## 2022-08-02 ENCOUNTER — TELEPHONE (OUTPATIENT)
Dept: FAMILY MEDICINE CLINIC | Age: 52
End: 2022-08-02

## 2022-08-02 ENCOUNTER — CARE COORDINATION (OUTPATIENT)
Dept: CASE MANAGEMENT | Age: 52
End: 2022-08-02

## 2022-08-02 NOTE — TELEPHONE ENCOUNTER
----- Message from Claudetta Cliveanny sent at 8/1/2022 11:29 AM EDT -----  Subject: Message to Provider    QUESTIONS  Information for Provider? Rula Sanchez from [facility] [place] calling to schedule hospital   f/u for Pt sees KRIS Hanna.  Unable to reach practice- Please return call to   Rula Sanchez or Suellen Certain to schedule.  ---------------------------------------------------------------------------  --------------  Ky Fraction INFO  [phone]; OK to leave message on voicemail  ---------------------------------------------------------------------------  --------------  SCRIPT ANSWERS  undefined

## 2022-08-02 NOTE — CARE COORDINATION
[facility] Transitions Initial Follow Up Call    Call within 2 business days of discharge: Yes    Patient: Prosper Dorado Patient : 1970   MRN: [id number]  Reason for Admission: acute pancreatitis without infection or necrosis, unspecified pancreatitis type  Discharge Date: 22 RARS: Readmission Risk Score: 10.7      Last Discharge [facility]       Date Complaint Diagnosis Description Type Department Provider    22   Admission (Discharged) Benito Mendez MD          -First attempt to reach the patient for initial Care Transition call post hospital discharge. Message left with CTN's contact information requesting return phone call.               Follow Up  Future Appointments   Date Time Provider Angela Hernandez   2022 10:45 AM Pili Kline MD [address]   2022 10:30 AM ORTIZ Rebolledo - [facility]

## 2022-08-03 ENCOUNTER — CARE COORDINATION (OUTPATIENT)
Dept: CASE MANAGEMENT | Age: 52
End: 2022-08-03

## 2022-08-03 ENCOUNTER — OFFICE VISIT (OUTPATIENT)
Dept: GASTROENTEROLOGY | Age: 52
End: 2022-08-03
Payer: MEDICARE

## 2022-08-03 VITALS
HEART RATE: 92 BPM | WEIGHT: 110 LBS | DIASTOLIC BLOOD PRESSURE: 82 MMHG | OXYGEN SATURATION: 97 % | SYSTOLIC BLOOD PRESSURE: 136 MMHG | HEIGHT: 62 IN | BODY MASS INDEX: 20.24 KG/M2

## 2022-08-03 DIAGNOSIS — Z87.19 HISTORY OF PANCREATITIS: ICD-10-CM

## 2022-08-03 DIAGNOSIS — R11.0 NAUSEA: ICD-10-CM

## 2022-08-03 DIAGNOSIS — R10.13 EPIGASTRIC PAIN: ICD-10-CM

## 2022-08-03 DIAGNOSIS — K21.9 GASTROESOPHAGEAL REFLUX DISEASE, UNSPECIFIED WHETHER ESOPHAGITIS PRESENT: ICD-10-CM

## 2022-08-03 DIAGNOSIS — R10.11 RUQ PAIN: Primary | ICD-10-CM

## 2022-08-03 DIAGNOSIS — Z76.0 MEDICATION REFILL: ICD-10-CM

## 2022-08-03 DIAGNOSIS — D64.9 ANEMIA, UNSPECIFIED TYPE: ICD-10-CM

## 2022-08-03 LAB
HCT VFR BLD CALC: 38.9 % (ref 37–47)
HEMOGLOBIN: 12.9 G/DL (ref 12–16)
LIPASE: 125 U/L (ref 12–95)
MCH RBC QN AUTO: 29.3 PG (ref 27–31.3)
MCHC RBC AUTO-ENTMCNC: 33.1 % (ref 33–37)
MCV RBC AUTO: 88.5 FL (ref 82–100)
PDW BLD-RTO: 12.6 % (ref 11.5–14.5)
PLATELET # BLD: 314 K/UL (ref 130–400)
RBC # BLD: 4.4 M/UL (ref 4.2–5.4)
WBC # BLD: 7.1 K/UL (ref 4.8–10.8)

## 2022-08-03 PROCEDURE — G8420 CALC BMI NORM PARAMETERS: HCPCS | Performed by: NURSE PRACTITIONER

## 2022-08-03 PROCEDURE — 1036F TOBACCO NON-USER: CPT | Performed by: NURSE PRACTITIONER

## 2022-08-03 PROCEDURE — 99214 OFFICE O/P EST MOD 30 MIN: CPT | Performed by: NURSE PRACTITIONER

## 2022-08-03 PROCEDURE — 1111F DSCHRG MED/CURRENT MED MERGE: CPT | Performed by: NURSE PRACTITIONER

## 2022-08-03 PROCEDURE — G8427 DOCREV CUR MEDS BY ELIG CLIN: HCPCS | Performed by: NURSE PRACTITIONER

## 2022-08-03 RX ORDER — FUROSEMIDE 20 MG/1
TABLET ORAL
Qty: 60 TABLET | Refills: 0 | Status: SHIPPED | OUTPATIENT
Start: 2022-08-03 | End: 2022-09-26

## 2022-08-03 RX ORDER — SUCRALFATE 1 G/1
1 TABLET ORAL 4 TIMES DAILY
Qty: 120 TABLET | Refills: 3 | Status: SHIPPED | OUTPATIENT
Start: 2022-08-03

## 2022-08-03 NOTE — CARE COORDINATION
-No return call from patient.  -Noted in EMR patient has PCP visit on 8/11/22.  -Care transition to sign off and will route back to KVNG Cat, as the patient is active with care management.

## 2022-08-03 NOTE — PROGRESS NOTES
Gastroenterology Clinic Follow up Visit    Itzel Marquez  [id number]  Chief Complaint   Patient presents with    Follow-up    Abdominal Pain     HPI: 54 y.o. female following up after last GI clinic on 5/4/2021. S/p recent inpatient hospitalization on 7/30/2022 - 8/1/2022 with epigastric/RUQ pain associated with radiation to her back, nausea, and early satiety. Interval change: She reports prior to admission, the pain began last Tuesday afternoon, states it was a dull epigastric pain, RUQ pain with some burning and felt like a knife is in her back. States the pain is worse with inspiration, bending and with movement. Laying down calms her symptoms. She reports prior Carafate use helped her symptoms. She reports history of gallstones, s/p cholecystectomy and also required ERCP x2 for stone removal.  She additionally reports history of EUS x2 with most recent one being with Dr. Mary Rodríguez in 2021 with no stone identified. She reports loose stools beginning this morning. States she usually alternates between constipation and diarrhea. She reports history of anemia due to heavy menstrual cycles, s/p hysterectomy. She denies heartburn,acidic taste, regurgitation, vomiting, hematemesis, dysphagia, hematochezia, melena or weight loss. Smoking status: denies  Alcohol use: denies  Illicit drug use: denies  NSAID use: denies   She reports possible history of a grandfather with colon cancer. HPI from recent inpatient GI consult on 7/30/2022 summarized below:  Itzel Marquez is a 54 y.o. female on hospital day 0 with a history of hypothyroidism, anxiety, CHF, GERD,'s RA, Sjogren's syndrome, attention deficit disorder, and acute pancreatitis. Past surgical history significant for breast lumpectomy, hysterectomy, cholecystectomy. Family history is negative for GI malignancies. Social history denies nicotine, EtOH or illicit drug use.   History Obtained From:  patient, electronic medical record  GI consult for below:  Longstanding hx of abdominal sx  Chronic constipation  Hx. Of endoscopic intervention for pancreatic duct stones in the past at [facility] w/Dr. Danny Kaba  Pt on creon and Linzess     Visit from 5/31/21 summarized below:  LUQ pain, bloating, nausea after meals. Hx. Pancreatitis and ?exocine pancreatic insufficiency. Constipation, Linzess/Lactulose-add probiotics  Gerd-antireflux measures, Daily PPI    Previous GI work up/Endoscopic investigations:  EGD with Dr. Justo Scott 8/1/2022: Normal esophagus. Esophagogastric landmarks identified. A large amount of food (residue) in the stomach limiting evaluation. Normal examined duodenum. EUS 5/6/2021: Normal upper endoscopy and EUS exam    Colonoscopy in 2017 at [facility]:normal per patient, With random colon biopsies without pathologic diagnosis    Patient reports Colonoscopy 2015 @ [facility] gastro: patient reports adenomatous polyp    Review of Systems   All other systems reviewed and are negative. Past medical history, past surgical history, medication list, social and familyhistory reviewed    Blood pressure 136/82, pulse 92, height 5' 2\" (1.575 m), weight 110 lb (49.9 kg), SpO2 97 %, not currently breastfeeding. Physical Exam  Constitutional:       General: She is not in acute distress. Appearance: Normal appearance. She is normal weight. She is not ill-appearing. HENT:      Head: Normocephalic and atraumatic. Eyes:      General: No scleral icterus. Cardiovascular:      Rate and Rhythm: Normal rate and regular rhythm. Pulses: Normal pulses. Pulmonary:      Effort: Pulmonary effort is normal. No respiratory distress. Breath sounds: Normal breath sounds. Abdominal:      General: Bowel sounds are normal. There is no distension. Palpations: Abdomen is soft. There is no mass. Tenderness: There is no abdominal tenderness. There is no guarding or rebound. Musculoskeletal:         General: Normal range of motion.    Lymphadenopathy: Cervical: No cervical adenopathy. Skin:     General: Skin is warm and dry. Coloration: Skin is not jaundiced. Neurological:      Mental Status: She is alert and oriented to person, place, and time. Psychiatric:         Mood and Affect: Mood normal.         Behavior: Behavior normal.         Thought Content: Thought content normal.         Judgment: Judgment normal.     Laboratory, Pathology, Radiology reviewed in detail with relevantimportant investigations summarized below:    Recent Labs     08/01/22  0618 07/31/22  0617 07/30/22  0613   WBC 4.6* 4.4* 5.9   HGB 10.9* 11.1* 11.9*   HCT 32.9* 33.4* 35.8*   MCV 89.1 88.2 88.4    260 270     Lab Results   Component Value Date    WBC 4.6 (L) 08/01/2022    HGB 10.9 (L) 08/01/2022    HCT 32.9 (L) 08/01/2022    MCV 89.1 08/01/2022     08/01/2022     Lab Results   Component Value Date    ALT 9 08/01/2022    AST 10 08/01/2022    ALKPHOS 84 08/01/2022    BILITOT <0.2 08/01/2022     Component      Latest Ref Rng & Units 6/4/2021           2:10 PM   Pancreatic Elastase, Fecal      >=100 ug/g >800     Component      Latest Ref Rng & Units 7/29/2022           9:28 PM   Lipase      12 - 95 U/L 214 (H)     CT ABDOMEN PELVIS W IV CONTRAST Additional Contrast? None  Result Date: 7/30/2022  Hepatic steatosis. Cholecystectomy. Hysterectomy. FL UGI W SMALL BOWEL  Result Date: 7/30/2022  Normal examination. US ABDOMEN LIMITED Specify organ? LIVER, SPLEEN, PANCREAS  Result Date: 7/31/2022  No acute abnormality. CBD measuring 5 mm. Gallbladder surgically absent. 1.2 cm cyst of right lobe of liver. Assessment and Plan:  Oval Flax 54 y.o. female presents to the GI clinic with longstanding history of epigastric pain radiating to the back.  S/P recent hospitalization for similar/ongoing symptoms with concern for pancreatitis with lipase mildly elevated at 214, however CT negative for inflammation around the pancreas, fluid collections or necrosis, hepatic steatosis noted along with a stable liver cyst. S/P EGD during admit revealing large amount of retained food in the stomach, normal limited study. She was previously on Creon, however with normal pancreatic elastase last year creon was not restarted. Hx of cholelithiasis with cholecystectomy. She has remote history of pancreatic duct stone in the past s/p ERCP with removal at a outside hospital. S/P normal EUS/EGD 5/2021.     1. RUQ pain  2. Epigastric pain  3. Nausea  4. Gastroesophageal reflux disease, unspecified whether esophagitis present  -With history of retained food in the stomach during recent EGD, ? Gastroparesis in differential, however cannot rule out other etiologies (PUD/gastritis) as prior EGD was limited exam.  Other differential included musculoskeletal etiology (worse w/bending/movement)  -Recommend repeat EGD, discussed initiation of liquid diet day prior to procedure  -Gastric emptying study requested  -Continue Nexium 40 mg by mouth daily  - Advised on the correct dose and timing of the PPI, Preferably 1/2 hour before breakfast. If symptoms are worse at night would recommend taking it half an hour before dinner.  - Pathophysiology and etiology of reflux discussed at length, with help of images. - Anti-reflux lifestyle modification discussed at length   --Avoid spicy acidic based foods   --Limit coffee, tea, alcohol use   --Limit the amount of food during meal time, avoid gorging   --Avoid bedtime snacks and eat meals 3 to 4 hrs before lying down   --Stop (or atleast cut down) Smoking. --Elevate the head of the bed with blocks   --Weight reduction advised and discussed at length   - EGD requested to assess/exclude for esophagitis/hiatal hernia. (Procedure risks and instruction discussed). 4. History of pancreatitis  -No evidence of pancreatitis on recent CT, however mild lipase elevation while admitted, will request repeat lipase    5.  Anemia, unspecified type  -Patient with mild normocytic anemia during recent hospital admit, will request iron, vitamin B12, and folate levels to further evaluate. She denies overt GI bleed. -Recheck CBC in 4 weeks  -Discussed with patient recommendation to seek medical care if she notices black tarry stools or bright red blood per rectum, she is agreeable and reports understanding. Return for Follow-up after endoscopy. ORTIZ Montero - CNP   Staff Gastroenterology Nurse Practitioner  [facility]    Please note this report has been partially produced using speech recognition software and contain errors related to that system including grammar, punctuation and spelling as well as words and phrases that may seem inappropriate. If there are questions or concerns please feel free to contact me to clarify.

## 2022-08-03 NOTE — CARE COORDINATION
[facility] Transitions Initial Follow Up Call    Call within 2 business days of discharge: Yes    Patient: Renuka Leonard Patient : 1970   MRN: [id number]  Reason for Admission:  acute pancreatitis without infection or necrosis, unspecified pancreatitis type  Discharge Date: 22 RARS: Readmission Risk Score: 10.7      Last Discharge [facility]       Date Complaint Diagnosis Description Type Department Provider    22   Admission (Discharged) Zari Jolly MD          -VM left by patient yesterday after hours in response to message left by CTN. -CTN returned call to patient. Patient currently at grocery store and will return call to CTN at a later time.             Follow Up  Future Appointments   Date Time Provider Angela Hernandez   2022 10:45 AM Jyotsna Foster MD [address]   2022  4:30 PM ORTIZ Moeller CNP [facility] EMERGENCY [facility] CENTER AT [place]   2022 10:30 AM ORTIZ Moeller CNP Darlis Confer, [place]

## 2022-08-04 LAB
FOLATE: 10.3 NG/ML
IRON SATURATION: 23 % (ref 20–55)
IRON: 64 UG/DL (ref 37–145)
TOTAL IRON BINDING CAPACITY: 274 UG/DL (ref 250–450)
UNSATURATED IRON BINDING CAPACITY: 210 UG/DL (ref 112–347)
VITAMIN B-12: 367 PG/ML (ref 232–1245)

## 2022-08-05 ENCOUNTER — HOSPITAL ENCOUNTER (OUTPATIENT)
Age: 52
Setting detail: OUTPATIENT SURGERY
Discharge: HOME | End: 2022-08-05
Attending: INTERNAL MEDICINE | Admitting: INTERNAL MEDICINE
Payer: MEDICARE

## 2022-08-05 ENCOUNTER — ANESTHESIA EVENT (OUTPATIENT)
Dept: ENDOSCOPY | Age: 52
End: 2022-08-05
Payer: MEDICARE

## 2022-08-05 ENCOUNTER — ANESTHESIA (OUTPATIENT)
Dept: ENDOSCOPY | Age: 52
End: 2022-08-05
Payer: MEDICARE

## 2022-08-05 VITALS
DIASTOLIC BLOOD PRESSURE: 66 MMHG | WEIGHT: 109 LBS | HEIGHT: 62 IN | BODY MASS INDEX: 20.06 KG/M2 | HEART RATE: 66 BPM | SYSTOLIC BLOOD PRESSURE: 114 MMHG | TEMPERATURE: 98.6 F | OXYGEN SATURATION: 100 % | RESPIRATION RATE: 16 BRPM

## 2022-08-05 DIAGNOSIS — R10.12 LUQ PAIN: ICD-10-CM

## 2022-08-05 DIAGNOSIS — R11.0 NAUSEA: ICD-10-CM

## 2022-08-05 DIAGNOSIS — R10.13 EPIGASTRIC PAIN: ICD-10-CM

## 2022-08-05 PROCEDURE — 2580000003 HC RX 258: Performed by: INTERNAL MEDICINE

## 2022-08-05 PROCEDURE — 6370000000 HC RX 637 (ALT 250 FOR IP): Performed by: INTERNAL MEDICINE

## 2022-08-05 PROCEDURE — 43239 EGD BIOPSY SINGLE/MULTIPLE: CPT | Performed by: INTERNAL MEDICINE

## 2022-08-05 PROCEDURE — 7100000011 HC PHASE II RECOVERY - ADDTL 15 MIN: Performed by: INTERNAL MEDICINE

## 2022-08-05 PROCEDURE — 6360000002 HC RX W HCPCS: Performed by: NURSE ANESTHETIST, CERTIFIED REGISTERED

## 2022-08-05 PROCEDURE — 3700000001 HC ADD 15 MINUTES (ANESTHESIA): Performed by: INTERNAL MEDICINE

## 2022-08-05 PROCEDURE — 2500000003 HC RX 250 WO HCPCS: Performed by: NURSE ANESTHETIST, CERTIFIED REGISTERED

## 2022-08-05 PROCEDURE — 88342 IMHCHEM/IMCYTCHM 1ST ANTB: CPT

## 2022-08-05 PROCEDURE — 2709999900 HC NON-CHARGEABLE SUPPLY: Performed by: INTERNAL MEDICINE

## 2022-08-05 PROCEDURE — 3700000000 HC ANESTHESIA ATTENDED CARE: Performed by: INTERNAL MEDICINE

## 2022-08-05 PROCEDURE — 3609018500 HC EGD US SCOPE W/ADJACENT STRUCTURES: Performed by: INTERNAL MEDICINE

## 2022-08-05 PROCEDURE — 88305 TISSUE EXAM BY PATHOLOGIST: CPT

## 2022-08-05 PROCEDURE — 3609017100 HC EGD: Performed by: INTERNAL MEDICINE

## 2022-08-05 PROCEDURE — 2580000003 HC RX 258

## 2022-08-05 PROCEDURE — 43237 ENDOSCOPIC US EXAM ESOPH: CPT | Performed by: INTERNAL MEDICINE

## 2022-08-05 PROCEDURE — 7100000010 HC PHASE II RECOVERY - FIRST 15 MIN: Performed by: INTERNAL MEDICINE

## 2022-08-05 RX ORDER — LIDOCAINE HYDROCHLORIDE 20 MG/ML
INJECTION, SOLUTION EPIDURAL; INFILTRATION; INTRACAUDAL; PERINEURAL PRN
Status: DISCONTINUED | OUTPATIENT
Start: 2022-08-05 | End: 2022-08-05 | Stop reason: SDUPTHER

## 2022-08-05 RX ORDER — SIMETHICONE 20 MG/.3ML
EMULSION ORAL PRN
Status: DISCONTINUED | OUTPATIENT
Start: 2022-08-05 | End: 2022-08-05 | Stop reason: ALTCHOICE

## 2022-08-05 RX ORDER — PROPOFOL 10 MG/ML
INJECTION, EMULSION INTRAVENOUS PRN
Status: DISCONTINUED | OUTPATIENT
Start: 2022-08-05 | End: 2022-08-05 | Stop reason: SDUPTHER

## 2022-08-05 RX ORDER — SODIUM CHLORIDE 9 MG/ML
INJECTION, SOLUTION INTRAVENOUS
Status: DISCONTINUED
Start: 2022-08-05 | End: 2022-08-05 | Stop reason: HOSPADM

## 2022-08-05 RX ORDER — SODIUM CHLORIDE 9 MG/ML
INJECTION, SOLUTION INTRAVENOUS
Status: COMPLETED
Start: 2022-08-05 | End: 2022-08-05

## 2022-08-05 RX ORDER — MAGNESIUM HYDROXIDE 1200 MG/15ML
LIQUID ORAL PRN
Status: DISCONTINUED | OUTPATIENT
Start: 2022-08-05 | End: 2022-08-05 | Stop reason: ALTCHOICE

## 2022-08-05 RX ORDER — SODIUM CHLORIDE 9 MG/ML
INJECTION, SOLUTION INTRAVENOUS CONTINUOUS
Status: DISCONTINUED | OUTPATIENT
Start: 2022-08-05 | End: 2022-08-05 | Stop reason: HOSPADM

## 2022-08-05 RX ADMIN — SODIUM CHLORIDE: 9 INJECTION, SOLUTION INTRAVENOUS at 12:06

## 2022-08-05 RX ADMIN — PROPOFOL 100 MG: 10 INJECTION, EMULSION INTRAVENOUS at 12:41

## 2022-08-05 RX ADMIN — PROPOFOL 50 MG: 10 INJECTION, EMULSION INTRAVENOUS at 12:55

## 2022-08-05 RX ADMIN — LIDOCAINE HYDROCHLORIDE 100 MG: 20 INJECTION, SOLUTION EPIDURAL; INFILTRATION; INTRACAUDAL; PERINEURAL at 12:41

## 2022-08-05 RX ADMIN — PROPOFOL 50 MG: 10 INJECTION, EMULSION INTRAVENOUS at 13:03

## 2022-08-05 RX ADMIN — PROPOFOL 50 MG: 10 INJECTION, EMULSION INTRAVENOUS at 13:07

## 2022-08-05 RX ADMIN — PROPOFOL 50 MG: 10 INJECTION, EMULSION INTRAVENOUS at 12:45

## 2022-08-05 RX ADMIN — PROPOFOL 50 MG: 10 INJECTION, EMULSION INTRAVENOUS at 12:59

## 2022-08-05 RX ADMIN — PROPOFOL 50 MG: 10 INJECTION, EMULSION INTRAVENOUS at 12:48

## 2022-08-05 RX ADMIN — PROPOFOL 50 MG: 10 INJECTION, EMULSION INTRAVENOUS at 12:51

## 2022-08-05 ASSESSMENT — PAIN - FUNCTIONAL ASSESSMENT: PAIN_FUNCTIONAL_ASSESSMENT: 0-10

## 2022-08-05 ASSESSMENT — PAIN DESCRIPTION - DESCRIPTORS: DESCRIPTORS: STABBING

## 2022-08-05 NOTE — ANESTHESIA POSTPROCEDURE EVALUATION
Department of Anesthesiology  Postprocedure Note    Patient: Mariaelena Gordon  MRN: [id number]  YOB: 1970  Date of evaluation: 8/5/2022      Procedure Summary     Date: 08/05/22 Room / Location: [address] [place] [address] Yen Greengard    Anesthesia Start: 5358 Anesthesia Stop: 8982    Procedures:       EGD ESOPHAGOGASTRODUODENOSCOPY      ENDOSCOPIC ULTRASOUND Diagnosis:       LUQ pain      Epigastric pain      Nausea      (LUQ pain [R10.12])      (Epigastric pain [R10.13])      (Nausea [R11.0])    Surgeons: Odilon Charles MD Responsible Provider: Cherri Spurling, APRN - CRNA    Anesthesia Type: MAC ASA Status: 3          Anesthesia Type: No value filed.     Hossein Phase I:      Hossein Phase II:        Anesthesia Post Evaluation    Patient location during evaluation: PACU  Level of consciousness: awake  Pain score: 0  Airway patency: patent  Nausea & Vomiting: no vomiting and no nausea  Complications: no  Cardiovascular status: hemodynamically stable  Respiratory status: acceptable  Hydration status: stable

## 2022-08-05 NOTE — H&P
Patient Name: Mireille Márquez  : 1970  MRN: [id number]  DATE: 22      ENDOSCOPY  History and Physical    Procedure:    [] Diagnostic Colonoscopy       [] Screening Colonoscopy  [x] EGD      [] ERCP      [x] EUS       [] Other    [x] Previous office notes/History and Physical reviewed from the patients chart. Please see EMR for further details of HPI. I have examined the patient's status immediately prior to the procedure and:      Indications/HPI:    []Abdominal Pain   []Cancer- GI/Lung  []Fhx of colon CA  []History of Polyps   []Dicks   []Melena  []Abnormal Imaging   []Dysphagia    []Persistent Pneumonia  []Anemia   []Food Impaction  []History of Polyps  []GI Bleed   []Pulmonary nodule/Mass  []Change in bowel habits  []Heartburn/Reflux  []Rectal Bleed (BRBPR)  []Chest Pain - Non Cardiac  []Heme (+) Stool  []Ulcers  []Constipation   []Hemoptysis   []Varices  []Diarrhea   []Hypoxemia  []Nausea/Vomiting   []Screening   []Crohns/Colitis  []Other:    Anesthesia:   [x] MAC [] Moderate Sedation   [] General   [] None     ROS: 12 pt Review of Symptoms was negative unless mentioned above    Medications:   Prior to Admission medications    Medication Sig Start Date End Date Taking? Authorizing Provider   furosemide (LASIX) 20 MG tablet TAKE ONE TABLET BY MOUTH TWO TIMES A DAY 8/3/22   ORTIZ Terrazas - CNP   sucralfate (CARAFATE) 1 GM tablet Take 1 tablet by mouth in the morning and 1 tablet at noon and 1 tablet in the evening and 1 tablet before bedtime. 8/3/22   ORTIZ Glynn CNP   ibuprofen (ADVIL;MOTRIN) 400 MG tablet Take 1 tablet by mouth every 6 hours as needed for Pain 22   Eri Cornejo MD   MOTEGRITY 2 MG TABS TAKE ONE TABLET BY MOUTH EVERY DAY 22   Historical Provider, MD   amphetamine-dextroamphetamine (ADDERALL XR) 30 MG extended release capsule Take 1 capsule by mouth in the morning and 1 capsule in the evening. Do all this for 30 days.  22  Miya Espinosa, DO esomeprazole (NEXIUM) 40 MG delayed release capsule TAKE 1 CAPSULE BY MOUTH DAILY 6/21/22   ORTIZ Donald CNP   traZODone (DESYREL) 150 MG tablet TAKE ONE TABLET BY MOUTH NIGHTLY 6/16/22   ORTIZ Donald CNP   lidocaine (LIDODERM) 5 % apply one patch as directed once daily to affected area remove patch after twelve hours. 5/17/22   Historical Provider, MD   midodrine (PROAMATINE) 10 MG tablet Take 10 mg by mouth as needed 4/12/22 8/1/22  Historical Provider, MD   hyoscyamine (LEVSIN/SL) 125 MCG sublingual tablet Place 1 tablet under the tongue every 4 hours as needed for Cramping 4/7/22   ORTIZ Donald CNP   buPROPion (WELLBUTRIN XL) 300 MG extended release tablet Take 1 tablet by mouth every morning TAKE ONE TABLET BY MOUTH EVERY MORNING 3/14/22   ORTIZ Donald CNP   topiramate (TOPAMAX) 100 MG tablet TAKE ONE TABLET BY MOUTH TWO TIMES A DAY 3/3/22   ORTIZ Donald CNP   loratadine (CLARITIN) 10 MG tablet Take 1 tablet by mouth daily 12/28/21   ORTIZ Donald CNP   potassium chloride (KLOR-CON M) 10 MEQ extended release tablet Take 1 tablet by mouth daily 12/8/21   Carlyle Mackenzie PA-C   liothyronine (CYTOMEL) 25 MCG tablet Take 1 tablet by mouth daily 7/6/21   ORTIZ Donald CNP   aspirin (ASPIRIN CHILDRENS) 81 MG chewable tablet Take 1 tablet by mouth daily 6/24/21   TRUDY Kovacs   cyclobenzaprine (FLEXERIL) 5 MG tablet Take 2.5-5 mg by mouth 2 times daily as needed 6/2/21   Historical Provider, MD   naratriptan (AMERGE) 2.5 MG tablet Take one at onset of severe headache/migraine may repeat x 1 after 4 hours if needed. Maximum 2/day and 2 days/week.  6/2/21   Historical Provider, MD   hydrocortisone (CORTEF) 5 MG tablet Take 5 mg by mouth daily 2/24/21   Historical Provider, MD   valACYclovir (VALTREX) 500 MG tablet Take 1 tablet by mouth daily 2/15/21   ORTIZ Donald CNP   NEXIUM 40 MG delayed release capsule TAKE ONE CAPSULE BY MOUTH EVERY DAY 11/2/20   Chanelle Faster, APRN - CNP   topiramate (TOPAMAX) 100 MG tablet Take 1 tablet by mouth 2 times daily 10/11/20   Chanelle Faster, APRN - CNP   Magnesium 400 MG TABS Take 1 tablet by mouth daily 10/3/20   Jessie Jean-Baptiste PA-C   furosemide (LASIX) 20 MG tablet Take 1 tablet by mouth 2 times daily TAKE ONE TABLET BY MOUTH TWO TIMES A DAY 8/4/20   Chanelle Faster, APRN - CNP   TRULANCE 3 MG TABS Take 3 mg by mouth daily 3/10/20   Historical Provider, MD   ondansetron (ZOFRAN) 4 MG tablet Take 1 tablet by mouth every 8 hours as needed for Nausea or Vomiting 2/25/20   Chanelle Faster, APRN - CNP   azelastine (ASTELIN) 0.1 % nasal spray 1 spray by Nasal route 11/27/19   Historical Provider, MD   moxifloxacin (VIGAMOX) 0.5 % ophthalmic solution 1 drop    Historical Provider, MD   fluticasone (FLONASE) 50 MCG/ACT nasal spray USE 1 SPRAY NASALLY DAILY 5/15/19   Historical Provider, MD   ZOLMitriptan (ZOMIG) 5 MG tablet TAKE 1 TABLET BY MOUTH AT ONSET OF MIGRAINE. MAY REPEAT ONCE AFTER 2 HOURS.  MAX 10 MG (2 TABLETS) PER DAY 6/4/18   Historical Provider, MD   levothyroxine (SYNTHROID) 25 MCG tablet Take one daily 4/29/18   Chanelle Faster, APRN - KRIS   hydroxychloroquine (PLAQUENIL) 200 MG tablet Take 300 mg by mouth daily     Historical Provider, MD     Allergies: No Known Allergies   History of allergic reaction to anesthesia:  No  Past Medical History:  Past Medical History:   Diagnosis Date    Acquired hypothyroidism 9/26/2016    Acute left-sided low back pain with bilateral sciatica 3/9/2022    Acute pancreatitis     ADD (attention deficit disorder) 2/12/2015    Anxiety     Congestive heart failure, unspecified HF chronicity, unspecified heart failure [facility] 2/11/2022    Depression 12/9/2015    Endometrial cyst of ovary 5/10/14    RT ovary, ruptured    GERD (gastroesophageal reflux disease) 9/26/2016    Medullary sponge kidney of both kidneys 5/22/2018    Sjogren's disease ([facility]     Stress Swelling      Past Surgical History:  Past Surgical History:   Procedure Laterality Date    BREAST BIOPSY Left 2015? ?    lump removed (benign) (Dr. Sosa [facility])    BREAST CYST EXCISION Left 2015?? Dr Sosa [facility] (benign)    BREAST LUMPECTOMY      CHOLECYSTECTOMY  2011    HYSTERECTOMY (CERVIX STATUS UNKNOWN)  2014 sept (total)    OVARY REMOVAL Left Jan 2014    UPPER GASTROINTESTINAL ENDOSCOPY  09/16/2016    EGD W/BX    UPPER GASTROINTESTINAL ENDOSCOPY N/A 5/6/2021    ENDOSCOPIC ULTRASOUND with EGD performed by Samuel Beck MD at [facility] 8/1/2022    EGD DIAGNOSTIC ONLY performed by Pamela Rivera MD at [facility] 8/5/2022    EGD ESOPHAGOGASTRODUODENOSCOPY performed by Samuel Beck MD at [facility]  8/5/2022    ENDOSCOPIC ULTRASOUND performed by Samuel Beck MD at [address] [place] [address] History:  Social History     Tobacco Use    Smoking status: Never    Smokeless tobacco: Never   Vaping Use    Vaping Use: Never used   Substance Use Topics    Alcohol use: No     Alcohol/week: 0.0 standard drinks     Comment: no alcohol since 2011    Drug use: No     Vital Signs:   Vitals:    08/05/22 1335   BP: 114/66   Pulse: 66   Resp: 16   Temp:    SpO2: 100%     Physical Exam:  Cardiac:  [x]WNL []Comments:  Pulmonary:  [x]WNL []Comments:   Neuro/Mental Status:  [x]WNL []Comments:  Abdominal:  [x]WNL []Comments:  Other:   []WNL []Comments:    Informed Consent:  The risks and benefits of the procedure have been discussed with either the patient or if they cannot consent, their representative. Assessment:  Patient examined and appropriate for planned sedation and procedure. Plan:  Proceed with planned sedation and procedure as above. The patient was counseled at length about risks of micheal COVID-19 in the perioperative and any recovery window from the procedure.   The patient was made aware that micheal COVID-19 may worsen their prognosis for recovery from their procedure and lend to a higher morbidity and-all mortality risk. The patient was given the option of postponing the procedure all material risks, benefits, and alternatives were discussed. The patient does wish to proceed with the procedure at this time.     Maricruz Connor MD  4:32 PM

## 2022-08-05 NOTE — ANESTHESIA PRE PROCEDURE
Department of Anesthesiology  Preprocedure Note       Name:  Maci Martinez   Age:  54 y.o.  :  1970                                          MRN:  [id number]         Date:  2022      Surgeon: Henny Lang):  Gualberto Tejada MD    Procedure: Procedure(s):  EGD ESOPHAGOGASTRODUODENOSCOPY    Medications prior to admission:   Prior to Admission medications    Medication Sig Start Date End Date Taking? Authorizing Provider   furosemide (LASIX) 20 MG tablet TAKE ONE TABLET BY MOUTH TWO TIMES A DAY 8/3/22   ORTIZ Perez CNP   sucralfate (CARAFATE) 1 GM tablet Take 1 tablet by mouth in the morning and 1 tablet at noon and 1 tablet in the evening and 1 tablet before bedtime. 8/3/22   ORTIZ Latham CNP   ibuprofen (ADVIL;MOTRIN) 400 MG tablet Take 1 tablet by mouth every 6 hours as needed for Pain 22   Maria Guadalupe Kong MD   MOTEGRITY 2 MG TABS TAKE ONE TABLET BY MOUTH EVERY DAY 22   Historical Provider, MD   amphetamine-dextroamphetamine (ADDERALL XR) 30 MG extended release capsule Take 1 capsule by mouth in the morning and 1 capsule in the evening. Do all this for 30 days. 22  Austen Gardner,    esomeprazole (NEXIUM) 40 MG delayed release capsule TAKE 1 CAPSULE BY MOUTH DAILY 22   ORTIZ Perez CNP   traZODone (DESYREL) 150 MG tablet TAKE ONE TABLET BY MOUTH NIGHTLY 22   ORTIZ Perez CNP   lidocaine (LIDODERM) 5 % apply one patch as directed once daily to affected area remove patch after twelve hours.  22   Historical Provider, MD   midodrine (PROAMATINE) 10 MG tablet Take 10 mg by mouth as needed 22  Historical Provider, MD   hyoscyamine (LEVSIN/SL) 125 MCG sublingual tablet Place 1 tablet under the tongue every 4 hours as needed for Cramping 22   ORTIZ Perez CNP   buPROPion (WELLBUTRIN XL) 300 MG extended release tablet Take 1 tablet by mouth every morning TAKE ONE TABLET BY MOUTH EVERY MORNING 3/14/22   Austen Coronado ORTIZ - CNP   topiramate (TOPAMAX) 100 MG tablet TAKE ONE TABLET BY MOUTH TWO TIMES A DAY 3/3/22   [facility]   loratadine (CLARITIN) 10 MG tablet Take 1 tablet by mouth daily 12/28/21   [facility], ORTIZ - CNP   potassium chloride (KLOR-CON M) 10 MEQ extended release tablet Take 1 tablet by mouth daily 12/8/21   Eric Osorio PA-C   liothyronine (CYTOMEL) 25 MCG tablet Take 1 tablet by mouth daily 7/6/21   [facility], APRN - CNP   aspirin (ASPIRIN CHILDRENS) 81 MG chewable tablet Take 1 tablet by mouth daily 6/24/21   TRUDY Leyva   cyclobenzaprine (FLEXERIL) 5 MG tablet Take 2.5-5 mg by mouth 2 times daily as needed 6/2/21   Historical Provider, MD   naratriptan (AMERGE) 2.5 MG tablet Take one at onset of severe headache/migraine may repeat x 1 after 4 hours if needed. Maximum 2/day and 2 days/week.  6/2/21   Historical Provider, MD   hydrocortisone (CORTEF) 5 MG tablet Take 5 mg by mouth daily 2/24/21   Historical Provider, MD   valACYclovir (VALTREX) 500 MG tablet Take 1 tablet by mouth daily 2/15/21   [facility]   NEXIUM 40 MG delayed release capsule TAKE ONE CAPSULE BY MOUTH EVERY DAY 11/2/20   [facility]   topiramate (TOPAMAX) 100 MG tablet Take 1 tablet by mouth 2 times daily 10/11/20   [facility]   Magnesium 400 MG TABS Take 1 tablet by mouth daily 10/3/20   Huy Moreno PA-C   furosemide (LASIX) 20 MG tablet Take 1 tablet by mouth 2 times daily TAKE ONE TABLET BY MOUTH TWO TIMES A DAY 8/4/20   [facility]   TRULANCE 3 MG TABS Take 3 mg by mouth daily 3/10/20   Historical Provider, MD   ondansetron (ZOFRAN) 4 MG tablet Take 1 tablet by mouth every 8 hours as needed for Nausea or Vomiting 2/25/20   [facility]   azelastine (ASTELIN) 0.1 % nasal spray 1 spray by Nasal route 11/27/19   Historical Provider, MD   moxifloxacin (VIGAMOX) 0.5 % ophthalmic solution 1 drop    Historical Provider, MD fluticasone (FLONASE) 50 MCG/ACT nasal spray USE 1 SPRAY NASALLY DAILY 5/15/19   Historical Provider, MD   ZOLMitriptan (ZOMIG) 5 MG tablet TAKE 1 TABLET BY MOUTH AT ONSET OF MIGRAINE. MAY REPEAT ONCE AFTER 2 HOURS. MAX 10 MG (2 TABLETS) PER DAY 6/4/18   Historical Provider, MD   levothyroxine (SYNTHROID) 25 MCG tablet Take one daily 4/29/18   ORTIZ Perez CNP   hydroxychloroquine (PLAQUENIL) 200 MG tablet Take 300 mg by mouth daily     Historical Provider, MD       Current medications:    Current Facility-Administered Medications   Medication Dose Route Frequency Provider Last Rate Last Admin    0.9 % sodium chloride infusion   IntraVENous Continuous Gualberto Tejada  mL/hr at 08/05/22 1206 New Bag at 08/05/22 1206    sterile water for irrigation    PRN Gualberto Tejada MD   1,000 mL at 08/05/22 1157    simethicone (MYLICON) 40 XE/1.3SU drops    PRN Gualberto Tejada MD   40 mg at 08/05/22 1158       Allergies:  No Known Allergies    Problem List:    Patient Active Problem List   Diagnosis Code    ADD (attention deficit disorder) F98.8    Anxiety F41.9    Depression F32. A    Acquired hypothyroidism E03.9    GERD (gastroesophageal reflux disease) K21.9    Pancreatitis K85.90    Other chest pain R07.89    Left lower quadrant pain R10.32    Sjogren's syndrome (HCC) M35.00    Diverticulitis of large intestine without perforation or abscess without bleeding K57.32    ASCUS favoring benign [id number]    Endometriosis N80.9    Fibroids D21.9    S/P endometrial ablation Z98.890    Transfusion history Z92.89    Cellulitis, face L03. 80    Abnormal MRI, liver R93.2    Acute recurrent pancreatitis K85.90    At risk of fracture due to osteoporosis M81.0, Z91.89    Calcinosis E83.59    Chronic headaches R51.9, G89.29    Conductive hearing loss in left ear H90.12    Edema R60.9    Iron overload E83.19    Mass of left side of neck R22.1    Medullary sponge kidney of both kidneys C33.2    Metabolic acidosis E87.2    Microscopic hematuria R31.29    Nausea R11.0    Renal tubular acidosis type I N25.89    Rheumatoid arthritis ([facility]) M06.9    Tension type headache G44.209    Weight loss, abnormal R63.4    Major depressive disorder, single episode, 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[facility]) F32.4    Chronic pancreatitis ([facility]) K86.1    Neutropenia ([facility]) D70.9    Hereditary hemochromatosis ([facility]) E83.110    Neck pain M54.2    Non-recurrent acute suppurative otitis media of left ear without spontaneous rupture of tympanic membrane H66.002    Congestive heart failure, unspecified HF chronicity, unspecified heart failure type ([facility]) I50.9    Claudication of both lower extremities ([facility]) I73.9    Acute left-sided low back pain with bilateral sciatica M54.42, M54.41    Pancreatitis, unspecified pancreatitis type K85.90    Acute pancreatitis without infection or necrosis, unspecified pancreatitis type K85.90    Abdominal pain R10.9       Past Medical History:        Diagnosis Date    Acquired hypothyroidism 9/26/2016    Acute left-sided low back pain with bilateral sciatica 3/9/2022    Acute pancreatitis     ADD (attention deficit disorder) 2/12/2015    Anxiety     Congestive heart failure, unspecified HF chronicity, unspecified heart failure type ([facility] [address] 2/11/2022    Depression 12/9/2015    Endometrial cyst of ovary 5/10/14    RT ovary, ruptured    GERD (gastroesophageal reflux disease) 9/26/2016    Medullary sponge kidney of both kidneys 5/22/2018    Sjogren's disease ([facility] [address]     Stress     Swelling        Past Surgical History:        Procedure Laterality Date    BREAST BIOPSY Left 2015? ?    lump removed (benign) (Dr. Timmy Shirley)   [address] Left 2015??     Dr Timmy Shirley (benign)    BREAST LUMPECTOMY      CHOLECYSTECTOMY  2011    HYSTERECTOMY (CERVIX STATUS UNKNOWN)  2014 sept (total)    OVARY REMOVAL Left Jan 2014    UPPER GASTROINTESTINAL ENDOSCOPY  09/16/2016    EGD W/BX    UPPER GASTROINTESTINAL ENDOSCOPY N/A 5/6/2021    ENDOSCOPIC ULTRASOUND with EGD performed by Shaina Thomas MD at [address] [place] [address] 8/1/2022    EGD DIAGNOSTIC ONLY performed by Steven Rizvi MD at [facility]       Social History:    Social History     Tobacco Use    Smoking status: Never    Smokeless tobacco: Never   Substance Use Topics    Alcohol use: No     Alcohol/week: 0.0 standard drinks     Comment: no alcohol since 2011                                Counseling given: Not Answered      Vital Signs (Current):   Vitals:    08/05/22 1149   BP: (!) 123/59   Pulse: 72   Resp: 18   Temp: 37 °C (98.6 °F)   TempSrc: Temporal   SpO2: 100%   Weight: 109 lb (49.4 kg)   Height: 5' 2\" (1.575 m)                                              BP Readings from Last 3 Encounters:   08/05/22 (!) 123/59   08/03/22 136/82   08/01/22 113/70       NPO Status: Time of last liquid consumption: 2300                        Time of last solid consumption: 2130                        Date of last liquid consumption: 08/04/22                        Date of last solid food consumption: 08/04/22    BMI:   Wt Readings from Last 3 Encounters:   08/05/22 109 lb (49.4 kg)   08/03/22 110 lb (49.9 kg)   08/01/22 122 lb (55.3 kg)     Body mass index is 19.94 kg/m².     CBC:   Lab Results   Component Value Date/Time    WBC 7.1 08/03/2022 03:24 PM    RBC 4.40 08/03/2022 03:24 PM    HGB 12.9 08/03/2022 03:24 PM    HCT 38.9 08/03/2022 03:24 PM    MCV 88.5 08/03/2022 03:24 PM    RDW 12.6 08/03/2022 03:24 PM     08/03/2022 03:24 PM       CMP:   Lab Results   Component Value Date/Time     08/01/2022 06:18 AM    K 3.8 08/01/2022 06:18 AM     08/01/2022 06:18 AM    CO2 23 08/01/2022 06:18 AM    BUN 7 08/01/2022 06:18 AM    CREATININE 0.64 08/01/2022 06:18 AM    GFRAA >60.0 08/01/2022 06:18 AM    AGRATIO 1.5 04/05/2019 08:05 AM    LABGLOM >60.0 08/01/2022 06:18 AM    GLUCOSE 91 08/01/2022 06:18 AM    GLUCOSE 97 06/27/2022 06:16 AM    PROT 5.7 08/01/2022 06:18 AM    CALCIUM 9.1 08/01/2022 06:18 AM    BILITOT <0.2 08/01/2022 06:18 AM    ALKPHOS 84 08/01/2022 06:18 AM    AST 10 08/01/2022 06:18 AM    ALT 9 08/01/2022 06:18 AM       POC Tests: No results for input(s): POCGLU, POCNA, POCK, POCCL, POCBUN, POCHEMO, POCHCT in the last 72 hours. Coags:   Lab Results   Component Value Date/Time    PROTIME 12.3 08/28/2021 07:01 PM    INR 0.9 08/28/2021 07:01 PM    APTT 27.3 08/28/2021 07:01 PM       HCG (If Applicable):   Lab Results   Component Value Date    HCGQUANT <2 06/27/2022        ABGs: No results found for: PHART, PO2ART, SBB4AKB, ULR5AOL, BEART, S7WERJMI     Type & Screen (If Applicable):  No results found for: LABABO, LABRH    Drug/Infectious Status (If Applicable):  No results found for: HIV, HEPCAB    COVID-19 Screening (If Applicable):   Lab Results   Component Value Date/Time    COVID19 NOT DETECTED 06/27/2022 06:12 AM           Anesthesia Evaluation  Patient summary reviewed  Airway: Mallampati: II  TM distance: >3 FB   Neck ROM: full  Mouth opening: > = 3 FB   Dental:          Pulmonary:Negative Pulmonary ROS and normal exam                               Cardiovascular:    (+) CHF:,                   Neuro/Psych:   (+) neuromuscular disease:, headaches:, psychiatric history:depression/anxiety             GI/Hepatic/Renal:   (+) GERD:,           Endo/Other:    (+) hypothyroidism: arthritis:., .                 Abdominal:             Vascular: Other Findings:           Anesthesia Plan      MAC     ASA 3       Induction: intravenous. Anesthetic plan and risks discussed with patient.       Plan discussed with surgical team.                    ORTIZ Mcbride - CRNA   8/5/2022

## 2022-08-05 NOTE — DISCHARGE INSTRUCTIONS
Endoscopic Ultrasound (Oral): What to Expect At Home  Your Recovery  After you have an endoscopic ultrasound--a test to look for problems in the stomach, liver, gallbladder, and other organs--you will stay at the hospital or clinic for 1 to 2 hours. This will allow the medicine to wear off. You will be able to go home after your doctor or nurse checks to make sure you are nothaving any problems. You may have a sore throat for a day or two after the test.  This care sheet gives you a general idea about what to expect after the test.  How can you care for yourself at home? Activity    Rest as much as you need to after you go home. You should be able to go back to your usual activities the day after the test.   Diet    Follow your doctor's directions for eating after the test.     Drink plenty of fluids (unless your doctor has told you not to). Medicines    If you have a sore throat the day after the test, use an over-the-counter spray to numb your throat. Other instructions    Ask your doctor when you can drive again. Do not sign legal documents or make major decisions until the medicine effects are gone and you can think clearly. The anesthesia medicine can make it hard for you to fully understand what you are agreeing to. Follow-up care is a key part of your treatment and safety. Be sure to make and go to all appointments, and call your doctor if you are having problems. It's also a good idea to know your test results and keep alist of the medicines you take. When should you call for help? Call 911 anytime you think you may need emergency care. For example, call if:    You passed out (lost consciousness). You have trouble breathing. Call your doctor now or seek immediate medical care if:    You are vomiting. You have new or worse belly pain. You have a fever. You cannot pass stools or gas.    Watch closely for any changes in your health, and be sure to contact yourdoctor if:    You do not get better as expected. Where can you learn more? Go to [email or web address] and sign in to your [email or web address] account. Enter O976 in the [facility] box to learn more about \"Endoscopic Ultrasound (Oral): What to Expect At Home. \"     If you do not have an account, please click on the \"Sign Up Now\" link. Current as of: September 8, 2021               Content Version: 13.3  © 2006-2022 [email or web address]. Care instructions adapted under license by [facility] ([facility]). If you have questions about a medical condition or this instruction, always ask your healthcare professional. [address] any warranty or liability for your use of this information. Upper GI Endoscopy: What to Expect at [address] had an upper GI endoscopy. Your doctor used a thin, lighted tube that bends to look at the inside of your esophagus, your stomach, and the first part ofthe small intestine, called the duodenum. After you have an endoscopy, you will stay at the hospital or clinic for 1 to 2 hours. This will allow the medicine to wear off. You will be able to go home after your doctor or nurse checks to make sure that you're not having anyproblems. You may have to stay overnight if you had treatment during the test. You mayhave a sore throat for a day or two after the test.  This care sheet gives you a general idea about what to expect after the test.  How can you care for yourself at home? Activity   Rest as much as you need to after you go home. You should be able to go back to your usual activities the day after the test.  Diet   Follow your doctor's directions for eating after the test.  Drink plenty of fluids (unless your doctor has told you not to). Medications   If you have a sore throat the day after the test, use an over-the-counter spray to numb your throat. Follow-up care is a key part of your treatment and safety.  Be sure to make and go to all appointments, and call your doctor if you are having problems. It's also a good idea to know your test results and keep alist of the medicines you take. When should you call for help? Call 911 anytime you think you may need emergency care. For example, call if:    You passed out (lost consciousness). You have trouble breathing. You pass maroon or bloody stools. Call your doctor now or seek immediate medical care if:    You have pain that does not get better after your take pain medicine. You have new or worse belly pain. You have blood in your stools. You are sick to your stomach and cannot keep fluids down. You have a fever. You cannot pass stools or gas. Watch closely for changes in your health, and be sure to contact your doctor if:    Your throat still hurts after a day or two. You do not get better as expected. Where can you learn more? Go to https://Light Blue OpticspeAuto Load Logic.DecisionView. org and sign in to your [employer] account. Enter Q063 in the [facility] box to learn more about \"Upper GI Endoscopy: What to Expect at Home. \"     If you do not have an account, please click on the \"Sign Up Now\" link. Current as of: September 8, 2021               Content Version: 13.3  © 5515-5954 Healthwise, Incorporated. Care instructions adapted under license by [facility] ([facility]). If you have questions about a medical condition or this instruction, always ask your healthcare professional. [place] [address] any warranty or liability for your use of this information. Learning About Coronavirus [phone])  What is coronavirus (COVID-19)? COVID-19 is a disease caused by a type of coronavirus. This illness was firstfound in December 2019. It has since spread worldwide. Coronaviruses are a large group of viruses. They cause the common cold.  They also cause more serious illnesses like [place] respiratory syndrome (MERS) and severe acute respiratory syndrome (SARS). COVID-19 is caused by a novelcoronavirus. That means it's a new type that has not been seen in people before. What are the symptoms? COVID-19 symptoms may include:  Fever. Cough. Trouble breathing. Chills or repeated shaking with chills. Muscle and body aches. Headache. Sore throat. New loss of taste or smell. Vomiting. Diarrhea. In severe cases, COVID-19 can cause pneumonia and make it hard to breathewithout help from a machine. It can cause death. How is it diagnosed? COVID-19 is diagnosed with a viral test. This may also be called a PCR test or antigen test. It looks for evidence of the virus in your breathing passages orlungs (respiratory system). The test is most often done on a sample from the nose, throat, or lungs. It's sometimes done on a sample of saliva. One way a sample is collected is byputting a long swab into the back of your nose. If you have questions about COVID-19 testing, ask your doctor or go to cdc.govto use the COVID-19 Viral Testing Tool. How is it treated? Mild cases of COVID-19 can be treated at home. Serious cases need treatment in the hospital. Treatment may include medicines, plus breathing support such as oxygen therapy or a ventilator. Some people may be placed on their belly tohelp their oxygen levels. Treatments that may help people who have COVID-19 include:  Antiviral medicines. These medicines treat viral infections. Immune-based therapy. These medicines help the immune system fight COVID-19. Examples include monoclonal antibodies. Blood thinners. These medicines help prevent blood clots. People with severe illness are at risk for blood clots. How can you protect yourself and others? Stay up to date on your COVID-19 vaccines. Avoid sick people, and stay away from others if you are sick. Stay at least 6 feet away from other people. Avoid crowds, especially inside.   Get tested for COVID-19 before you have an indoor visit with people who don't live with you. Improve the airflow when you spend time indoors with people who don't live with you. If you can, open windows and doors. Or you can use a fan to blow air away from people and out a window. Cover your mouth with a tissue when you cough or sneeze. Wash your hands often, especially after you cough or sneeze. Use soap and water, and scrub for at least 20 seconds. If soap and water aren't available, use an alcohol-based hand . Avoid touching your mouth, nose, and eyes. Check the CDC website at cdc.gov for the most current information on how to protect yourself. And if you have questions, ask your doctor or go to cdc.govto use the COVID-19 Quarantine and Isolation Calculator. Here are some other steps you may need to take. If you are not up to date on your COVID-19 vaccines:  Wear a mask with the best fit, protection, and comfort for you. A mask can protect you even when others aren't wearing one. This might be especially important if you:  Have certain health conditions. Live with someone who has a compromised immune system. Live with someone who is not up to date on their COVID-19 vaccines. If you have been exposed to the virus AND are not up to date on your COVID-19 vaccines:  Talk to your doctor as soon as you can. Your doctor might have you take medicine to help prevent serious illness. Get a COVID-19 test. You may need to be tested more than once. And if your test is positive, follow the instructions below. Stay home. Try to separate from other people where you live. Don't go to school, work, or public areas. Wear a well-fitting mask around other people for a full 10 days. Avoid travel, and stay away from people at high risk for serious illness. Watch for symptoms. If you have been exposed AND either tested positive for COVID-19 in the last 90 days and have recovered or you are up to date on your COVID-19 vaccines:  Talk to your doctor as soon as you can.  Your or sneeze. Use soap and water, and scrub for at least 20 seconds. If soap and water aren't available, use an alcohol-based hand . Don't share personal household items. These include bedding, towels, cups and glasses, and eating utensils. [address] in the warmest water allowed for the fabric type, and dry it completely. It's okay to wash other people's laundry with yours. Clean and disinfect your home. Use household  and disinfectant wipes or sprays. If you have questions, visit cdc.gov to check the Quarantine and IsolationCalculator. When should you call for help? Call 911 anytime you think you may need emergency care. For example, call if you have life-threatening symptoms, such as: You have severe trouble breathing. (You can't talk at all.)     You have constant chest pain or pressure. You are severely dizzy or lightheaded. You are confused or can't think clearly. You have pale, gray, or blue-colored skin or lips. You pass out (lose consciousness) or are very hard to wake up. You have loss of balance or trouble walking. You have trouble seeing out of one or both eyes. You have weakness or drooping on one side of the face. You have weakness or numbness in an arm or a leg. You have trouble speaking. You have a severe headache. You have a seizure. Call your doctor now or seek immediate medical care if:    You have moderate trouble breathing. (You can't speak a full sentence.)     You are coughing up blood. You have signs of low blood pressure. These include feeling lightheaded; being too weak to stand; and having cold, pale, clammy skin. Watch closely for changes in your health, and be sure to contact your doctor if:    Your symptoms get worse. You are not getting better as expected. You have new or worse symptoms of anxiety, depression, nightmares, or flashbacks. Call before you go to the doctor's office.  Follow their instructions. And wear a mask. Where can you learn more? Go to [email or web address]://[email or web address] and sign in to your Magnet[employer]s account. Enter C008 in the [employer] box to learn more about \"Learning About Coronavirus (COVID-19). \"     If you do not have an account, please click on the \"Sign Up Now\" link. Current as of: May 28, 2022               Content Version: 13.3  © 4503-7261 Healthwise, Incorporated. Care instructions adapted under license by [facility] ([facility]). If you have questions about a medical condition or this instruction, always ask your healthcare professional. [address] any warranty or liability for your use of this information.

## 2022-08-09 ENCOUNTER — CARE COORDINATION (OUTPATIENT)
Dept: CARE COORDINATION | Age: 52
End: 2022-08-09

## 2022-08-09 NOTE — CARE COORDINATION
Ambulatory Care Coordination Note  8/9/2022    ACC: Mario Khan, DARWIN    Summary Note:     Tani Connor returned my call. She tells me that she had a nephrology appointment this morning  Tani Connor says that she is still having a lot of pain and issues with the pancreas. She was hospitalized for 2 days for acute pancreatitis. She tells me that really nothing has changed and she continues to have severe pain that will travel back to her back. She adds that just reaching for something intensifies the pain. She is unable to eat anything as this will add to her pain  She is drinking very small sips of water. She is taking Carafate liquid but she states that this is really not helping  She is asking for a refill for her Nexium. She also tells me that her abdomen is very swollen and she looks like she is 5-6 months pregnant  She feels that this swelling in her abdomen is increasing. She adds that she was provided pain patches but this does not help  She would like something added for pain. I will forward this message to Nanette Bourne to obtain her input regarding ongoing abdominal pain, abdominal swelling, and inability to eat. Lab Results       None            Care Coordination Interventions    Referral from Primary Care Provider: No  Suggested Interventions and Community Resources  Disease Association: In Process  Pharmacist: Declined  Registered Dietician: Declined  Zone Management Tools: In Process  Other Services or Interventions: Pharmacy, Dietician referral declined. Walk in Clinic hours and usage discussed CHF zone tool education inititated. Goals Addressed    None         Prior to Admission medications    Medication Sig Start Date End Date Taking?  Authorizing Provider   furosemide (LASIX) 20 MG tablet TAKE ONE TABLET BY MOUTH TWO TIMES A DAY 8/3/22   ORTIZ Alva CNP   sucralfate (CARAFATE) 1 GM tablet Take 1 tablet by mouth in the morning and 1 tablet at noon and 1 tablet in the evening and 1 tablet before bedtime. 8/3/22   ORTIZ Clayton CNP   ibuprofen (ADVIL;MOTRIN) 400 MG tablet Take 1 tablet by mouth every 6 hours as needed for Pain 8/1/22   Kiran Elizabeth MD   MOTEGRITY 2 MG TABS TAKE ONE TABLET BY MOUTH EVERY DAY 7/4/22   Historical Provider, MD   amphetamine-dextroamphetamine (ADDERALL XR) 30 MG extended release capsule Take 1 capsule by mouth in the morning and 1 capsule in the evening. Do all this for 30 days. 7/22/22 8/21/22  Mariana Price DO   esomeprazole (NEXIUM) 40 MG delayed release capsule TAKE 1 CAPSULE BY MOUTH DAILY 6/21/22   ORTIZ Foster CNP   traZODone (DESYREL) 150 MG tablet TAKE ONE TABLET BY MOUTH NIGHTLY 6/16/22   ORTIZ Foster CNP   lidocaine (LIDODERM) 5 % apply one patch as directed once daily to affected area remove patch after twelve hours.  5/17/22   Historical Provider, MD   midodrine (PROAMATINE) 10 MG tablet Take 10 mg by mouth as needed 4/12/22 8/1/22  Historical Provider, MD   hyoscyamine (LEVSIN/SL) 125 MCG sublingual tablet Place 1 tablet under the tongue every 4 hours as needed for Cramping 4/7/22   ORTIZ Foster CNP   buPROPion (WELLBUTRIN XL) 300 MG extended release tablet Take 1 tablet by mouth every morning TAKE ONE TABLET BY MOUTH EVERY MORNING 3/14/22   ORTIZ Foster CNP   topiramate (TOPAMAX) 100 MG tablet TAKE ONE TABLET BY MOUTH TWO TIMES A DAY 3/3/22   ORTIZ Foster CNP   loratadine (CLARITIN) 10 MG tablet Take 1 tablet by mouth daily 12/28/21   ORTIZ Foster CNP   potassium chloride (KLOR-CON M) 10 MEQ extended release tablet Take 1 tablet by mouth daily 12/8/21   Orin Garcia PA-C   liothyronine (CYTOMEL) 25 MCG tablet Take 1 tablet by mouth daily 7/6/21   ORTIZ Foster CNP   aspirin (ASPIRIN CHILDRENS) 81 MG chewable tablet Take 1 tablet by mouth daily 6/24/21   TRUDY Chavez   cyclobenzaprine (FLEXERIL) 5 MG tablet Take 2.5-5 mg by mouth 2 times daily as needed 6/2/21   Historical Provider, MD   naratriptan (AMERGE) 2.5 MG tablet Take one at onset of severe headache/migraine may repeat x 1 after 4 hours if needed. Maximum 2/day and 2 days/week. 6/2/21   Historical Provider, MD   hydrocortisone (CORTEF) 5 MG tablet Take 5 mg by mouth daily 2/24/21   Historical Provider, MD   valACYclovir (VALTREX) 500 MG tablet Take 1 tablet by mouth daily 2/15/21   ORTIZ Warren CNP   NEXIUM 40 MG delayed release capsule TAKE ONE CAPSULE BY MOUTH EVERY DAY 11/2/20   ORTIZ Warren CNP   topiramate (TOPAMAX) 100 MG tablet Take 1 tablet by mouth 2 times daily 10/11/20   ORTIZ Warren CNP   Magnesium 400 MG TABS Take 1 tablet by mouth daily 10/3/20   Maxim Lazo PA-C   furosemide (LASIX) 20 MG tablet Take 1 tablet by mouth 2 times daily TAKE ONE TABLET BY MOUTH TWO TIMES A DAY 8/4/20   ORTIZ Warren CNP   TRULANCE 3 MG TABS Take 3 mg by mouth daily 3/10/20   Historical Provider, MD   ondansetron (ZOFRAN) 4 MG tablet Take 1 tablet by mouth every 8 hours as needed for Nausea or Vomiting 2/25/20   ORTIZ Warren CNP   azelastine (ASTELIN) 0.1 % nasal spray 1 spray by Nasal route 11/27/19   Historical Provider, MD   moxifloxacin (VIGAMOX) 0.5 % ophthalmic solution 1 drop    Historical Provider, MD   fluticasone (FLONASE) 50 MCG/ACT nasal spray USE 1 SPRAY NASALLY DAILY 5/15/19   Historical Provider, MD   ZOLMitriptan (ZOMIG) 5 MG tablet TAKE 1 TABLET BY MOUTH AT ONSET OF MIGRAINE. MAY REPEAT ONCE AFTER 2 HOURS.  MAX 10 MG (2 TABLETS) PER DAY 6/4/18   Historical Provider, MD   levothyroxine (SYNTHROID) 25 MCG tablet Take one daily 4/29/18   ORTIZ Warren CNP   hydroxychloroquine (PLAQUENIL) 200 MG tablet Take 300 mg by mouth daily     Historical Provider, MD       Future Appointments   Date Time Provider Angela Hernandez   8/11/2022  4:30 PM ORTIZ Warren CNP [facility] [place]   8/30/2022 10:30 AM [facility]   9/8/2022  3:30 PM Claudetta Anger, [place]

## 2022-08-10 DIAGNOSIS — K86.1 CHRONIC PANCREATITIS, UNSPECIFIED PANCREATITIS TYPE (HCC): Primary | ICD-10-CM

## 2022-08-10 RX ORDER — TRAMADOL HYDROCHLORIDE 50 MG/1
50 TABLET ORAL EVERY 6 HOURS PRN
Qty: 20 TABLET | Refills: 0 | Status: SHIPPED | OUTPATIENT
Start: 2022-08-10 | End: 2022-08-15

## 2022-08-10 NOTE — CARE COORDINATION
I apologize for not responding sooner. I have been struggling with what to do for patient. Without knowing what is causing the pain it's hard to treat it. Would she like a short script of tramadol?

## 2022-08-11 ENCOUNTER — OFFICE VISIT (OUTPATIENT)
Dept: FAMILY MEDICINE CLINIC | Age: 52
End: 2022-08-11
Payer: MEDICARE

## 2022-08-11 VITALS
OXYGEN SATURATION: 98 % | BODY MASS INDEX: 20.06 KG/M2 | HEIGHT: 62 IN | WEIGHT: 109 LBS | DIASTOLIC BLOOD PRESSURE: 76 MMHG | SYSTOLIC BLOOD PRESSURE: 114 MMHG | HEART RATE: 88 BPM

## 2022-08-11 DIAGNOSIS — R10.84 GENERALIZED ABDOMINAL PAIN: Primary | ICD-10-CM

## 2022-08-11 PROCEDURE — 1036F TOBACCO NON-USER: CPT | Performed by: NURSE PRACTITIONER

## 2022-08-11 PROCEDURE — G8427 DOCREV CUR MEDS BY ELIG CLIN: HCPCS | Performed by: NURSE PRACTITIONER

## 2022-08-11 PROCEDURE — G8420 CALC BMI NORM PARAMETERS: HCPCS | Performed by: NURSE PRACTITIONER

## 2022-08-11 PROCEDURE — 99214 OFFICE O/P EST MOD 30 MIN: CPT | Performed by: NURSE PRACTITIONER

## 2022-08-11 PROCEDURE — 1111F DSCHRG MED/CURRENT MED MERGE: CPT | Performed by: NURSE PRACTITIONER

## 2022-08-11 ASSESSMENT — ENCOUNTER SYMPTOMS
COUGH: 0
ABDOMINAL PAIN: 1
SHORTNESS OF BREATH: 0
NAUSEA: 1
ABDOMINAL DISTENTION: 1

## 2022-08-11 NOTE — PROGRESS NOTES
Subjective  Chief Complaint   Patient presents with    Follow-Up from Hospital     Pt states she was diagnosed with pancreatitis  Pt states she feels the same but better then before she went into hospital.          HPI    Pt is here for a fu of abdominal pain. She was admitted into hospital for acute pancreatitis. That pain has since resolved but she is still dealing with significant abdominal pain in other location. This has been an ongoing issue with the patient and seems to be worsening over time. Has had extensive work up [employer] and CT without much conclusion. Pt reports that the pain does not allow her to sleep. Feels extremely bloated and uncomfortable.     Patient Active Problem List    Diagnosis Date Noted    Acute pancreatitis without infection or necrosis, unspecified pancreatitis type 07/30/2022    Abdominal pain 07/30/2022    Pancreatitis, unspecified pancreatitis type 07/29/2022    Acute left-sided low back pain with bilateral sciatica 03/09/2022    Congestive heart failure, unspecified HF chronicity, unspecified heart failure type ([address] 02/11/2022    Claudication of both lower extremities ([address] 02/11/2022    Neck pain 12/09/2021    Non-recurrent acute suppurative otitis media of left ear without spontaneous rupture of tympanic membrane 12/09/2021    Neutropenia ([address] 01/12/2021    Hereditary hemochromatosis ([address] 01/12/2021    Chronic pancreatitis ([address] 08/11/2020    Major depressive disorder, single episode, 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[address] 01/15/2019    Abnormal MRI, liver 05/22/2018    Acute recurrent pancreatitis 05/22/2018    At risk of fracture due to osteoporosis 05/22/2018    Calcinosis 05/22/2018    Chronic headaches 05/22/2018    Conductive hearing loss in left ear 05/22/2018    Edema 05/22/2018    Iron overload 05/22/2018    Mass of left side of neck 05/22/2018    Medullary sponge kidney of both kidneys 98/31/1921    Metabolic acidosis 06/68/4102    Microscopic hematuria 05/22/2018    Nausea 05/22/2018    Renal tubular acidosis type I 05/22/2018    Rheumatoid arthritis ([address] 05/22/2018    Tension type headache 05/22/2018    Weight loss, abnormal 05/22/2018    Cellulitis, face 04/06/2018    Other chest pain 11/01/2017    Left lower quadrant pain 11/01/2017    Sjogren's syndrome ([address] 11/01/2017    Diverticulitis of large intestine without perforation or abscess without bleeding 11/01/2017    Fibroids 11/01/2017    Transfusion history 11/01/2017    Pancreatitis 05/13/2017    Acquired hypothyroidism 09/26/2016    GERD (gastroesophageal reflux disease) 09/26/2016    Anxiety 12/09/2015    Depression 12/09/2015    ADD (attention deficit disorder) 02/12/2015    Endometriosis 09/09/2014    ASCUS favoring benign 05/01/2013    S/P endometrial ablation 05/01/2013     Past Medical History:   Diagnosis Date    Acquired hypothyroidism 9/26/2016    Acute left-sided low back pain with bilateral sciatica 3/9/2022    Acute pancreatitis     ADD (attention deficit disorder) 2/12/2015    Anxiety     Congestive heart failure, unspecified HF chronicity, unspecified heart failure type ([address] 2/11/2022    Depression 12/9/2015    Endometrial cyst of ovary 5/10/14    RT ovary, ruptured    GERD (gastroesophageal reflux disease) 9/26/2016    Medullary sponge kidney of both kidneys 5/22/2018    Sjogren's disease ([address]     Stress     Swelling      Past Surgical History:   Procedure Laterality Date    BREAST BIOPSY Left 2015? ?    lump removed (benign) (Dr. [place])    BREAST CYST EXCISION Left 2015??     Dr [place] (benign)    BREAST LUMPECTOMY      CHOLECYSTECTOMY  2011    HYSTERECTOMY (CERVIX STATUS UNKNOWN)  2014 sept (total)    OVARY REMOVAL Left Jan 2014    UPPER GASTROINTESTINAL ENDOSCOPY  09/16/2016    EGD W/BX    UPPER GASTROINTESTINAL ENDOSCOPY N/A 5/6/2021    ENDOSCOPIC ULTRASOUND with EGD performed by Christian Aj MD at [address]  [facility] 8/1/2022    EGD DIAGNOSTIC ONLY performed by Waylan Severs, MD at [facility] 8/5/2022    EGD ESOPHAGOGASTRODUODENOSCOPY performed by Odilon Charles MD at [facility]  8/5/2022    ENDOSCOPIC ULTRASOUND performed by Odilon Charles MD at [facility]     Family History   Problem Relation Age of Onset    Heart Disease Father 48    Breast Cancer Paternal Aunt         in her 29's    Breast Cancer Paternal Aunt         in her 63's    Cancer Maternal Grandmother         breast    Breast Cancer Maternal Grandmother         in her 63's    Breast Cancer Paternal Grandmother         in her 63's    Heart Disease Other         mom and dad's side    Colon Cancer Neg Hx      Social History     Socioeconomic History    Marital status: Single     Spouse name: None    Number of children: None    Years of education: None    Highest education level: None   Tobacco Use    Smoking status: Never    Smokeless tobacco: Never   Vaping Use    Vaping Use: Never used   Substance and Sexual Activity    Alcohol use: No     Alcohol/week: 0.0 standard drinks     Comment: no alcohol since 2011    Drug use: No   Social History Narrative    ** Merged History Encounter **          Social Determinants of Health     Financial Resource Strain: Low Risk     Difficulty of Paying Living Expenses: Not hard at all   Food Insecurity: No Food Insecurity    Worried About Running Out of Food in the Last Year: Never true    Ran Out of Food in the Last Year: Never true   Transportation Needs: No Transportation Needs    Lack of Transportation (Medical): No    Lack of Transportation (Non-Medical):  No   Physical Activity: Insufficiently Active    Days of Exercise per Week: 3 days    Minutes of Exercise per Session: 10 min   Stress: Stress Concern Present    Feeling of Stress : Rather much   Social Connections: Unknown    Frequency of Communication with Friends and Family: Twice a week    Frequency of Social Gatherings with Friends and Family: Never    Active Member of Clubs or Organizations: No    Attends Club or Organization Meetings: Never    Marital Status: Never    Housing Stability: Unknown    Unable to Pay for Housing in the Last Year: No    Unstable Housing in the Last Year: No     Current Outpatient Medications on File Prior to Visit   Medication Sig Dispense Refill    NEXIUM 40 MG delayed release capsule TAKE 1 CAPSULE BY MOUTH DAILY 90 capsule 0    traMADol (ULTRAM) 50 MG tablet Take 1 tablet by mouth every 6 hours as needed for Pain for up to 5 days. Intended supply: 5 days. Take lowest dose possible to manage pain 20 tablet 0    furosemide (LASIX) 20 MG tablet TAKE ONE TABLET BY MOUTH TWO TIMES A DAY 60 tablet 0    sucralfate (CARAFATE) 1 GM tablet Take 1 tablet by mouth in the morning and 1 tablet at noon and 1 tablet in the evening and 1 tablet before bedtime. 120 tablet 3    ibuprofen (ADVIL;MOTRIN) 400 MG tablet Take 1 tablet by mouth every 6 hours as needed for Pain      MOTEGRITY 2 MG TABS TAKE ONE TABLET BY MOUTH EVERY DAY      amphetamine-dextroamphetamine (ADDERALL XR) 30 MG extended release capsule Take 1 capsule by mouth in the morning and 1 capsule in the evening. Do all this for 30 days. 60 capsule 0    traZODone (DESYREL) 150 MG tablet TAKE ONE TABLET BY MOUTH NIGHTLY 30 tablet 5    lidocaine (LIDODERM) 5 % apply one patch as directed once daily to affected area remove patch after twelve hours.       hyoscyamine (LEVSIN/SL) 125 MCG sublingual tablet Place 1 tablet under the tongue every 4 hours as needed for Cramping 90 tablet 3    buPROPion (WELLBUTRIN XL) 300 MG extended release tablet Take 1 tablet by mouth every morning TAKE ONE TABLET BY MOUTH EVERY MORNING 90 tablet 5    topiramate (TOPAMAX) 100 MG tablet TAKE ONE TABLET BY MOUTH TWO TIMES A DAY 60 tablet 5    loratadine (CLARITIN) 10 MG tablet Take 1 tablet by mouth daily 30 tablet 5    potassium chloride (KLOR-CON M) 10 MEQ extended release tablet Take 1 tablet by mouth daily 30 tablet 0    liothyronine (CYTOMEL) 25 MCG tablet Take 1 tablet by mouth daily 90 tablet 0    aspirin (ASPIRIN CHILDRENS) 81 MG chewable tablet Take 1 tablet by mouth daily 14 tablet 0    cyclobenzaprine (FLEXERIL) 5 MG tablet Take 2.5-5 mg by mouth 2 times daily as needed      naratriptan (AMERGE) 2.5 MG tablet Take one at onset of severe headache/migraine may repeat x 1 after 4 hours if needed. Maximum 2/day and 2 days/week.      hydrocortisone (CORTEF) 5 MG tablet Take 5 mg by mouth daily      valACYclovir (VALTREX) 500 MG tablet Take 1 tablet by mouth daily 30 tablet 5    Magnesium 400 MG TABS Take 1 tablet by mouth daily 30 tablet 0    TRULANCE 3 MG TABS Take 3 mg by mouth daily      ondansetron (ZOFRAN) 4 MG tablet Take 1 tablet by mouth every 8 hours as needed for Nausea or Vomiting 30 tablet 1    azelastine (ASTELIN) 0.1 % nasal spray 1 spray by Nasal route      moxifloxacin (VIGAMOX) 0.5 % ophthalmic solution 1 drop      fluticasone (FLONASE) 50 MCG/ACT nasal spray USE 1 SPRAY NASALLY DAILY  3    ZOLMitriptan (ZOMIG) 5 MG tablet TAKE 1 TABLET BY MOUTH AT ONSET OF MIGRAINE. MAY REPEAT ONCE AFTER 2 HOURS. MAX 10 MG (2 TABLETS) PER DAY  11    levothyroxine (SYNTHROID) 25 MCG tablet Take one daily 90 tablet 1    hydroxychloroquine (PLAQUENIL) 200 MG tablet Take 300 mg by mouth daily       [DISCONTINUED] midodrine (PROAMATINE) 10 MG tablet Take 10 mg by mouth as needed      [DISCONTINUED] NEXIUM 40 MG delayed release capsule TAKE ONE CAPSULE BY MOUTH EVERY DAY 30 capsule 5    [DISCONTINUED] topiramate (TOPAMAX) 100 MG tablet Take 1 tablet by mouth 2 times daily 60 tablet 5    [DISCONTINUED] furosemide (LASIX) 20 MG tablet Take 1 tablet by mouth 2 times daily TAKE ONE TABLET BY MOUTH TWO TIMES A DAY 60 tablet 5     No current facility-administered medications on file prior to visit.      No Known Allergies    Review of Systems   Constitutional:  Positive for fatigue. Negative for fever. Respiratory:  Negative for cough and shortness of breath. Cardiovascular:  Positive for leg swelling. Negative for chest pain. Gastrointestinal:  Positive for abdominal distention, abdominal pain and nausea. Objective  Vitals:    08/11/22 1632   BP: 114/76   Pulse: 88   SpO2: 98%   Weight: 109 lb (49.4 kg)   Height: 5' 2\" (1.575 m)     Physical Exam  Vitals and nursing note reviewed. Constitutional:       Appearance: Normal appearance. HENT:      Head: Normocephalic. Nose: Nose normal.      Mouth/Throat:      Mouth: Mucous membranes are moist.   Cardiovascular:      Rate and Rhythm: Normal rate and regular rhythm. Pulses: Normal pulses. Heart sounds: Normal heart sounds. Pulmonary:      Effort: Pulmonary effort is normal.      Breath sounds: Normal breath sounds. Abdominal:      General: Bowel sounds are increased. There is distension. Palpations: Abdomen is soft. Tenderness: There is abdominal tenderness in the left upper quadrant and left lower quadrant. There is right CVA tenderness and guarding. Musculoskeletal:      Cervical back: Neck supple. Neurological:      General: No focal deficit present. Mental Status: She is alert and oriented to person, place, and time. Mental status is at baseline. Psychiatric:         Mood and Affect: Mood normal.         Behavior: Behavior normal.         Thought Content: Thought content normal.         Judgment: Judgment normal.       Assessment & Plan     Diagnosis Orders   1.  Generalized abdominal pain  Lipase    Sedimentation Rate    C-Reactive Protein    CBC with Auto Differential    Urinalysis with Reflex to Culture    [id number] [facility] - Eda Drake MD, General Surgery, [place]          Orders Placed This Encounter   Procedures    Lipase     Standing Status:   Future     Standing Expiration Date:   8/11/2023    Sedimentation Rate     Standing Status:   Future     Standing Expiration Date: 8/11/2023    C-Reactive Protein     Standing Status:   Future     Standing Expiration Date:   8/11/2023    CBC with Auto Differential     Standing Status:   Future     Standing Expiration Date:   8/11/2023    Urinalysis with Reflex to Culture     Standing Status:   Future     Standing Expiration Date:   8/11/2023     Order Specific Question:   SPECIFY(EX-CATH,MIDSTREAM,CYSTO,ETC)? Answer:   midstream    Nusrat Gee MD, General Surgery, [facility]     Referral Priority:   Routine     Referral Type:   Eval and Treat     Referral Reason:   Specialty Services Required     Referred to Provider:   Minna Butler MD     Requested Specialty:   General Surgery     Number of Visits Requested:   1     We abiodun fu after bw. Side effects, adverse effects of the medication prescribed today, as well as treatment plan/ rationale and result expectations have been discussed with the patient who expresses understanding and desires to proceed. Close follow up to evaluate treatment results and for coordination of care. I have reviewed the patient's medical history in detail and updated the computerized patient record. As always, patient is advised that if symptoms worsen in any way they will proceed to the nearest emergency room.           Jennifer Swartz, ORTIZ - CNP
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
## 2022-08-23 ENCOUNTER — CARE COORDINATION (OUTPATIENT)
Dept: CARE COORDINATION | Age: 52
End: 2022-08-23

## 2022-08-23 NOTE — CARE COORDINATION
Ambulatory Care Coordination Note  8/23/2022    ACC: Nany Arnett, RN    Summary Note:     Rafy Hernández tells me that she is feeling better than she had been a couple of weeks ago. She continues to have intermittent diarrhea but pain and symptoms are relieved with this symptom  She adds that she is eating a little better. She did have her EGD completed and she says that it really did not show anything. She did try to get her gastric emptying study set up with scheduling but I made her aware that there is not order for this test.  I will get this set up and send it over for Dr Marielos Garrett approval.  She is trying to get things set up for her ultrasound of her kidney. She adds that she does have pain in her chest that is related to cartilage on a rib. PLAN:  Rafy Hernández will continue to monitor symptoms for any worsening or changes  Rafy Hernández will call the office or myself if she has any changes for recommendations  Rafy Hernández will take all medications as prescribed. Rafy Hernández will complete all scheduled appointments and procedures. Lab Results       None            Care Coordination Interventions    Referral from Primary Care Provider: No  Suggested Interventions and Community Resources  Disease Association: In Process  Pharmacist: Declined  Registered Dietician: Declined  Zone Management Tools: In Process  Other Services or Interventions: Pharmacy, Dietician referral declined. Walk in Clinic hours and usage discussed CHF zone tool education inititated. Goals Addressed    None         Prior to Admission medications    Medication Sig Start Date End Date Taking? Authorizing Provider   hydrocortisone 2.5 % cream Apply topically 2 times daily.  8/19/22   Nuha Weinstein DO   NEXIUM 40 MG delayed release capsule TAKE 1 CAPSULE BY MOUTH DAILY 8/11/22   ORTIZ Barrow - CNP   furosemide (LASIX) 20 MG tablet TAKE ONE TABLET BY MOUTH TWO TIMES A DAY 8/3/22   ORTIZ Barrow - CNP   sucralfate (CARAFATE) 1 GM tablet Take 1 Assessment/Plan: *Diagnoses and all orders for this visit: 1. Dizziness 
-     REFERRAL TO NEUROLOGY 2. Rib pain on left side -     XR RIBS LT UNI 2 V; Future 
-     naproxen (NAPROSYN) 500 mg tablet; Take 1 Tab by mouth two (2) times daily (with meals). 3. Fatigue, unspecified type 
-     CBC WITH AUTOMATED DIFF; Future 
-     TSH 3RD GENERATION; Future -     T4, FREE; Future -     VITAMIN B12; Future 4. Other general symptoms and signs -     VITAMIN B12; Future If labs above are fine, will refer to sleep specialist for her excessive drowsiness. Will await xray and see how she feels after naprosyn. She will set up appt with the neurologist to discuss her dizziness. The plan was discussed with the patient. The patient verbalized understanding and is in agreement with the plan. All medication potential side effects were discussed with the patient. 
 
------------------------------------------------------------------------------------------------------------------- Jeremy Snyder is a 73 y.o. female and presents with Rib Pain (left side . hit left side on door knob on saturday ); Shoulder Pain (pain from side goes up into shoulder); and Dizziness (referral was placed to Dr Miri Trent office . ) Subjective: 
Pt comes today for eval.  She had a fall at home, over the weekend. Had woken up and gotten out of bed, felt dizzy and fell on her left side, hit the doorknob with her ribs. Has had pain since then. Has only taken Tylenol and used heat. She has an on going issue with dizziness which seems to be getting worse. She has mentioned this in the past but was not significant but over time, it has gotten worse and is placing her at risk for falls. She has been feeling very tired, says she can fall asleep at any time. Review of Systems - General ROS: positive for  - fatigue The problem list was updated as a part of today's visit. Patient Active Problem List  
Diagnosis Code  Hypertension I10  
 Diabetes ([facility] [address] E11.9  GERD (gastroesophageal reflux disease) K21.9  Depression F32.9  
 HLD (hyperlipidemia) E78.5  Acquired hypothyroidism E03.9  Benign positional vertigo H81.10  Asthma J45.909  Sleep disturbance G47.9  Type 2 diabetes mellitus with diabetic neuropathy (HCC) E11.40 The PSH, FH were reviewed. SH: Social History Tobacco Use  Smoking status: Never Smoker  Smokeless tobacco: Never Used Substance Use Topics  Alcohol use: Yes  Drug use: No  
 
 
Medications/Allergies: 
Current Outpatient Medications on File Prior to Visit Medication Sig Dispense Refill  SITagliptin (JANUVIA) 100 mg tablet Take 1 Tab by mouth daily. 90 Tab 1  
 omeprazole (PRILOSEC) 40 mg capsule Take 1 Cap by mouth daily. 90 Cap 1  
 PARoxetine (PAXIL) 40 mg tablet Take 1 Tab by mouth daily. 90 Tab 1  
 levothyroxine (SYNTHROID) 50 mcg tablet Take 1 Tab by mouth Daily (before breakfast). 90 Tab 1  propranolol LA (INDERAL LA) 60 mg SR capsule Take 1 Cap by mouth daily. Indications: migraine prevention 90 Cap 1  
 fluticasone propion-salmeteroL (ADVAIR/WIXELA) 250-50 mcg/dose diskus inhaler Take 1 Puff by inhalation every twelve (12) hours. 3 Inhaler 2  
 albuterol (PROVENTIL HFA, VENTOLIN HFA, PROAIR HFA) 90 mcg/actuation inhaler Take 2 Puffs by inhalation every four (4) hours as needed for Wheezing. 2 Inhaler 0  
 metFORMIN (GLUCOPHAGE) 1,000 mg tablet Take 1 Tab by mouth two (2) times daily (with meals). 180 Tab 0  
 potassium chloride (KLOR-CON) 10 mEq tablet Take 2 Tabs by mouth daily. 180 Tab 0  
 gabapentin (NEURONTIN) 100 mg capsule Take 1 Cap by mouth three (3) times daily. 270 Cap 0  chlorthalidone (HYGROTEN) 50 mg tablet Take 1 Tab by mouth every morning. 90 Tab 0  
 docusate sodium (COLACE) 100 mg capsule Take 1 Cap by mouth two (2) times a day.  180 Cap 1  
 tablet by mouth in the morning and 1 tablet at noon and 1 tablet in the evening and 1 tablet before bedtime. 8/3/22   ORTIZ Frank CNP   ibuprofen (ADVIL;MOTRIN) 400 MG tablet Take 1 tablet by mouth every 6 hours as needed for Pain 8/1/22   Ever Osorio MD   MOTEGRITY 2 MG TABS TAKE ONE TABLET BY MOUTH EVERY DAY 7/4/22   Historical Provider, MD   amphetamine-dextroamphetamine (ADDERALL XR) 30 MG extended release capsule Take 1 capsule by mouth in the morning and 1 capsule in the evening. Do all this for 30 days. 7/22/22 8/21/22  Fatuma Cuevas,    traZODone (DESYREL) 150 MG tablet TAKE ONE TABLET BY MOUTH NIGHTLY 6/16/22   ORTIZ De La Fuente CNP   lidocaine (LIDODERM) 5 % apply one patch as directed once daily to affected area remove patch after twelve hours.  5/17/22   Historical Provider, MD   midodrine (PROAMATINE) 10 MG tablet Take 10 mg by mouth as needed 4/12/22 8/1/22  Historical Provider, MD   hyoscyamine (LEVSIN/SL) 125 MCG sublingual tablet Place 1 tablet under the tongue every 4 hours as needed for Cramping 4/7/22   ORTIZ De La Fuente CNP   buPROPion (WELLBUTRIN XL) 300 MG extended release tablet Take 1 tablet by mouth every morning TAKE ONE TABLET BY MOUTH EVERY MORNING 3/14/22   ORTIZ De La Fuente CNP   topiramate (TOPAMAX) 100 MG tablet TAKE ONE TABLET BY MOUTH TWO TIMES A DAY 3/3/22   ORTIZ De La Fuente CNP   loratadine (CLARITIN) 10 MG tablet Take 1 tablet by mouth daily 12/28/21   ORTIZ De La Fuente CNP   potassium chloride (KLOR-CON M) 10 MEQ extended release tablet Take 1 tablet by mouth daily 12/8/21   Shaka Palmer PA-C   liothyronine (CYTOMEL) 25 MCG tablet Take 1 tablet by mouth daily 7/6/21   ORTIZ De La Fuente CNP   aspirin (ASPIRIN CHILDRENS) 81 MG chewable tablet Take 1 tablet by mouth daily 6/24/21   TRUDY Stanley   cyclobenzaprine (FLEXERIL) 5 MG tablet Take 2.5-5 mg by mouth 2 times daily as needed 6/2/21   Historical Provider, MD  meclizine (ANTIVERT) 25 mg tablet Take 1 Tab by mouth three (3) times daily as needed for Dizziness. Indications: sensation of spinning or whirling 60 Tab 1  
 atorvastatin (LIPITOR) 10 mg tablet Take 1 Tab by mouth daily. 90 Tab 1  
 glipiZIDE (GLUCOTROL) 5 mg tablet Take 1 Tab by mouth two (2) times a day. 180 Tab 1  
 lisinopril (PRINIVIL, ZESTRIL) 40 mg tablet Take 1 Tab by mouth daily. 90 Tab 1  
 fenofibrate nanocrystallized (TRICOR) 48 mg tablet Take 1 Tab by mouth daily. 90 Tab 1  
 hydrocortisone (ANUSOL-HC) 2.5 % rectal cream Insert  into rectum four (4) times daily. 30 g 2  polyethylene glycol (MIRALAX) 17 gram packet Take 1 Packet by mouth daily. 90 Packet 2  phenylephrine 0.25 % suppository Insert 1 Suppository into rectum two (2) times a day. 60 Suppository 2  
 alendronate (FOSAMAX) 70 mg tablet Take 1 Tab by mouth every seven (7) days. 12 Tab 2  
 fluticasone propionate (FLONASE) 50 mcg/actuation nasal spray 2 Sprays by Both Nostrils route daily. 1 Bottle 2  
 cetirizine (ZYRTEC) 10 mg tablet Take 1 Tab by mouth daily. 90 Tab 0  
 garlic 1,899 mg cap Take  by mouth.  ginger, Zingiber officinalis, (GINGER EXTRACT) 250 mg cap Take  by mouth.  Omega-3-DHA-EPA-Fish Oil 1,000 mg (120 mg-180 mg) cap Take  by mouth daily.  [DISCONTINUED] promethazine (PHENERGAN) 25 mg tablet Take 1 Tab by mouth every eight (8) hours as needed for Nausea. Indications: chronic nausea 90 Tab 1 No current facility-administered medications on file prior to visit. Allergies Allergen Reactions  Codeine Nausea and Vomiting Health Maintenance:  
Health Maintenance Topic Date Due  Shingrix Vaccine Age 50> (1 of 2) 04/05/2001  Pneumococcal 65+ years (2 of 2 - PPSV23) 01/15/2020  GLAUCOMA SCREENING Q2Y  04/17/2020  Eye Exam Retinal or Dilated  04/17/2020  Influenza Age 5 to Adult  08/01/2020  
 DTaP/Tdap/Td series (2 - Td) 08/11/2020  Foot Exam Q1  09/12/2020  Medicare Yearly Exam  01/16/2021  MICROALBUMIN Q1  01/16/2021  Lipid Screen  01/16/2021  A1C test (Diabetic or Prediabetic)  06/02/2021  Colonoscopy  12/21/2021  Breast Cancer Screen Mammogram  02/04/2022  Hepatitis C Screening  Completed  Bone Densitometry (Dexa) Screening  Completed Objective: 
Visit Vitals /68 Pulse 72 Temp 97.5 °F (36.4 °C) (Oral) Resp 16 Ht 5' 1\" (1.549 m) Wt 134 lb (60.8 kg) SpO2 97% BMI 25.32 kg/m² Nurses notes and VS reviewed. Physical Examination: General appearance - alert, well appearing, and in no distress Chest - clear to auscultation, no wheezes, rales or rhonchi, symmetric air entry Heart - normal rate, regular rhythm, normal S1, S2, no murmurs, rubs, clicks or gallops Labwork and Ancillary Studies: CBC w/Diff Lab Results Component Value Date/Time WBC 9.8 01/16/2020 01:55 PM  
 HGB 13.4 01/16/2020 01:55 PM  
 PLATELET 782 33/32/6939 01:55 PM  
  
 
 Basic Metabolic Profile Lab Results Component Value Date/Time Sodium 140 03/04/2020 11:40 AM  
 Potassium 3.7 03/04/2020 11:40 AM  
 Chloride 107 03/04/2020 11:40 AM  
 CO2 28 03/04/2020 11:40 AM  
 Anion gap 5 03/04/2020 11:40 AM  
 Glucose 182 (H) 03/04/2020 11:40 AM  
 BUN 9 03/04/2020 11:40 AM  
 Creatinine 0.66 03/04/2020 11:40 AM  
 BUN/Creatinine ratio 14 03/04/2020 11:40 AM  
 GFR est AA >60 03/04/2020 11:40 AM  
 GFR est non-AA >60 03/04/2020 11:40 AM  
 Calcium 8.6 03/04/2020 11:40 AM  
  
  
LFT Lab Results Component Value Date/Time ALT (SGPT) 19 01/16/2020 01:55 PM  
 Alk. phosphatase 58 01/16/2020 01:55 PM  
 Bilirubin, direct 0.2 01/16/2020 01:55 PM  
 Bilirubin, total 0.6 01/16/2020 01:55 PM  
 
 
 
Cholesterol Lab Results Component Value Date/Time  Cholesterol, total 150 01/16/2020 01:55 PM  
 HDL Cholesterol 39 (L) 01/16/2020 01:55 PM  
 LDL, calculated 82.4 01/16/2020 01:55 PM  
 Triglyceride 143 01/16/2020 01:55 PM  
 CHOL/HDL Ratio 3.8 01/16/2020 01:55 PM

## 2022-08-24 DIAGNOSIS — F41.9 ANXIETY: ICD-10-CM

## 2022-08-24 DIAGNOSIS — F98.8 ATTENTION DEFICIT DISORDER, UNSPECIFIED HYPERACTIVITY PRESENCE: ICD-10-CM

## 2022-08-24 DIAGNOSIS — Z76.0 MEDICATION REFILL: ICD-10-CM

## 2022-08-24 RX ORDER — BUPROPION HYDROCHLORIDE 300 MG/1
300 TABLET ORAL EVERY MORNING
Qty: 90 TABLET | Refills: 5 | Status: SHIPPED | OUTPATIENT
Start: 2022-08-24

## 2022-08-24 RX ORDER — DEXTROAMPHETAMINE SACCHARATE, AMPHETAMINE ASPARTATE MONOHYDRATE, DEXTROAMPHETAMINE SULFATE AND AMPHETAMINE SULFATE 7.5; 7.5; 7.5; 7.5 MG/1; MG/1; MG/1; MG/1
30 CAPSULE, EXTENDED RELEASE ORAL 2 TIMES DAILY
Qty: 60 CAPSULE | Refills: 0 | Status: SHIPPED | OUTPATIENT
Start: 2022-08-24 | End: 2022-09-20 | Stop reason: SDUPTHER

## 2022-09-02 SDOH — HEALTH STABILITY: PHYSICAL HEALTH: ON AVERAGE, HOW MANY DAYS PER WEEK DO YOU ENGAGE IN MODERATE TO STRENUOUS EXERCISE (LIKE A BRISK WALK)?: 0 DAYS

## 2022-09-02 ASSESSMENT — PATIENT HEALTH QUESTIONNAIRE - PHQ9
SUM OF ALL RESPONSES TO PHQ9 QUESTIONS 1 & 2: 1
2. FEELING DOWN, DEPRESSED OR HOPELESS: 0
SUM OF ALL RESPONSES TO PHQ QUESTIONS 1-9: 1
1. LITTLE INTEREST OR PLEASURE IN DOING THINGS: 1
SUM OF ALL RESPONSES TO PHQ QUESTIONS 1-9: 1

## 2022-09-02 ASSESSMENT — LIFESTYLE VARIABLES
HOW OFTEN DO YOU HAVE A DRINK CONTAINING ALCOHOL: 1
HOW OFTEN DO YOU HAVE A DRINK CONTAINING ALCOHOL: NEVER

## 2022-09-03 ENCOUNTER — HOSPITAL ENCOUNTER (EMERGENCY)
Age: 52
Discharge: HOME | End: 2022-09-03
Attending: EMERGENCY MEDICINE
Payer: MEDICARE

## 2022-09-03 ENCOUNTER — APPOINTMENT (OUTPATIENT)
Dept: GENERAL RADIOLOGY | Age: 52
End: 2022-09-03
Payer: MEDICARE

## 2022-09-03 VITALS
BODY MASS INDEX: 20.06 KG/M2 | SYSTOLIC BLOOD PRESSURE: 120 MMHG | DIASTOLIC BLOOD PRESSURE: 71 MMHG | RESPIRATION RATE: 16 BRPM | OXYGEN SATURATION: 99 % | HEIGHT: 62 IN | TEMPERATURE: 97.9 F | HEART RATE: 89 BPM | WEIGHT: 109 LBS

## 2022-09-03 DIAGNOSIS — S46.911A STRAIN OF RIGHT SHOULDER, INITIAL ENCOUNTER: Primary | ICD-10-CM

## 2022-09-03 PROCEDURE — 96372 THER/PROPH/DIAG INJ SC/IM: CPT

## 2022-09-03 PROCEDURE — 73030 X-RAY EXAM OF SHOULDER: CPT

## 2022-09-03 PROCEDURE — 6360000002 HC RX W HCPCS: Performed by: EMERGENCY MEDICINE

## 2022-09-03 PROCEDURE — 99284 EMERGENCY DEPT VISIT MOD MDM: CPT

## 2022-09-03 RX ORDER — KETOROLAC TROMETHAMINE 10 MG/1
10 TABLET, FILM COATED ORAL EVERY 6 HOURS PRN
Qty: 20 TABLET | Refills: 0 | Status: SHIPPED | OUTPATIENT
Start: 2022-09-03

## 2022-09-03 RX ORDER — ORPHENADRINE CITRATE 30 MG/ML
60 INJECTION INTRAMUSCULAR; INTRAVENOUS ONCE
Status: COMPLETED | OUTPATIENT
Start: 2022-09-03 | End: 2022-09-03

## 2022-09-03 RX ORDER — ORPHENADRINE CITRATE 100 MG/1
100 TABLET, EXTENDED RELEASE ORAL 2 TIMES DAILY
Qty: 20 TABLET | Refills: 0 | Status: SHIPPED | OUTPATIENT
Start: 2022-09-03 | End: 2022-09-13

## 2022-09-03 RX ORDER — KETOROLAC TROMETHAMINE 30 MG/ML
60 INJECTION, SOLUTION INTRAMUSCULAR; INTRAVENOUS ONCE
Status: COMPLETED | OUTPATIENT
Start: 2022-09-03 | End: 2022-09-03

## 2022-09-03 RX ADMIN — KETOROLAC TROMETHAMINE 60 MG: 30 INJECTION, SOLUTION INTRAMUSCULAR at 16:54

## 2022-09-03 RX ADMIN — ORPHENADRINE CITRATE 60 MG: 30 INJECTION INTRAMUSCULAR; INTRAVENOUS at 16:55

## 2022-09-03 ASSESSMENT — PAIN DESCRIPTION - PAIN TYPE
TYPE: ACUTE PAIN
TYPE: ACUTE PAIN

## 2022-09-03 ASSESSMENT — PAIN DESCRIPTION - ORIENTATION
ORIENTATION: RIGHT

## 2022-09-03 ASSESSMENT — PAIN SCALES - GENERAL
PAINLEVEL_OUTOF10: 7
PAINLEVEL_OUTOF10: 8
PAINLEVEL_OUTOF10: 7

## 2022-09-03 ASSESSMENT — ENCOUNTER SYMPTOMS
APNEA: 0
DIARRHEA: 0
ABDOMINAL PAIN: 0
COLOR CHANGE: 0
EYE PAIN: 0
VOMITING: 0
ABDOMINAL DISTENTION: 0
NAUSEA: 0
CONSTIPATION: 0
SHORTNESS OF BREATH: 0
COUGH: 0
SORE THROAT: 0
WHEEZING: 0
RHINORRHEA: 0
PHOTOPHOBIA: 0
BACK PAIN: 0
SINUS PRESSURE: 0

## 2022-09-03 ASSESSMENT — PAIN DESCRIPTION - LOCATION
LOCATION: SHOULDER

## 2022-09-03 ASSESSMENT — PAIN - FUNCTIONAL ASSESSMENT: PAIN_FUNCTIONAL_ASSESSMENT: 0-10

## 2022-09-03 ASSESSMENT — PAIN DESCRIPTION - FREQUENCY: FREQUENCY: CONTINUOUS

## 2022-09-03 ASSESSMENT — PAIN DESCRIPTION - DESCRIPTORS
DESCRIPTORS: ACHING
DESCRIPTORS: ACHING;SORE
DESCRIPTORS: ACHING

## 2022-09-03 ASSESSMENT — PAIN DESCRIPTION - ONSET: ONSET: SUDDEN

## 2022-09-03 NOTE — ED NOTES
Pt is given d/c instructions and informed to  two e scripts at [facility].  Pt voiced understanding of d/c instructions without further questions.       Tai Hernandez RN  09/03/22 [id number]

## 2022-09-03 NOTE — ED TRIAGE NOTES
Pt arrives to ED, from home, via her daughter's personal vehicle. Pt presents with right shoulder pain.

## 2022-09-03 NOTE — ED PROVIDER NOTES
[address] [place] [address] ED  eMERGENCY dEPARTMENT eNCOUnter      Pt Name: Maci Martinez  MRN: [id number]  [place] 1970  Date of evaluation: 9/3/2022  Provider: Mari Banerjee MD    CHIEF COMPLAINT       Chief Complaint   Patient presents with    Shoulder Pain         HISTORY OF PRESENT ILLNESS   (Location/Symptom, Timing/Onset,Context/Setting, Quality, Duration, Modifying Factors, Severity)  Note limiting factors. Maci Martinez is a 54 y.o. female who presents to the emergency department with complaint of right shoulder pain since yesterday evening. Patient was cleaning countertops at home when she had a pop on the right shoulder. Pain is 7 on a scale of 1-10 and worse with pointing to the ceiling. Pain does not radiate. Pain is persistent. Pain is sharp. No other injuries. No other systemic symptoms. HPI    Nursing Notes were reviewed. REVIEW OF SYSTEMS    (2-9 systems for level 4, 10 or more for level 5)     Review of Systems   Constitutional: Negative. Negative for activity change, appetite change, chills, fatigue and fever. HENT:  Negative for congestion, ear discharge, ear pain, hearing loss, rhinorrhea, sinus pressure and sore throat. Eyes:  Negative for photophobia, pain and visual disturbance. Respiratory:  Negative for apnea, cough, shortness of breath and wheezing. Cardiovascular:  Negative for chest pain, palpitations and leg swelling. Gastrointestinal:  Negative for abdominal distention, abdominal pain, constipation, diarrhea, nausea and vomiting. Endocrine: Negative for cold intolerance, heat intolerance and polyuria. Genitourinary:  Negative for dysuria, flank pain, frequency and urgency. Musculoskeletal:  Positive for arthralgias. Negative for back pain, gait problem, myalgias and neck stiffness. Skin:  Negative for color change, pallor and rash. Allergic/Immunologic: Negative for food allergies and immunocompromised state.    Neurological:  Negative for dizziness, tremors, syncope, weakness, light-headedness and headaches. Psychiatric/Behavioral:  Negative for agitation, confusion and hallucinations. All other systems reviewed and are negative. Except as noted above the remainder of the review of systems was reviewed and negative. PAST MEDICAL HISTORY     Past Medical History:   Diagnosis Date    Acquired hypothyroidism 9/26/2016    Acute left-sided low back pain with bilateral sciatica 3/9/2022    Acute pancreatitis     ADD (attention deficit disorder) 2/12/2015    Anxiety     Congestive heart failure, unspecified HF chronicity, unspecified heart failure type ([facility] [address] 2/11/2022    Depression 12/9/2015    Endometrial cyst of ovary 5/10/14    RT ovary, ruptured    GERD (gastroesophageal reflux disease) 9/26/2016    Medullary sponge kidney of both kidneys 5/22/2018    Sjogren's disease ([facility] [address]     Stress     Swelling          SURGICAL HISTORY       Past Surgical History:   Procedure Laterality Date    BREAST BIOPSY Left 2015? ?    lump removed (benign) (Dr. Jesusita Lopez)    BREAST CYST EXCISION Left 2015??     Dr Jesusita Lopez (benign)    BREAST LUMPECTOMY      CHOLECYSTECTOMY  2011    HYSTERECTOMY (CERVIX STATUS UNKNOWN)  2014 sept (total)    OVARY REMOVAL Left Jan 2014    UPPER GASTROINTESTINAL ENDOSCOPY  09/16/2016    EGD W/BX    UPPER GASTROINTESTINAL ENDOSCOPY N/A 5/6/2021    ENDOSCOPIC ULTRASOUND with EGD performed by Britany Wilson MD at [facility] 8/1/2022    EGD DIAGNOSTIC ONLY performed by Brent Phalen, MD at [facility] N/A 8/5/2022    EGD ESOPHAGOGASTRODUODENOSCOPY performed by Britany Wilson MD at [facility]  8/5/2022    ENDOSCOPIC ULTRASOUND performed by Britany Wilson MD at [address] [place] [address]       Previous Medications    AMPHETAMINE-DEXTROAMPHETAMINE (ADDERALL XR) 30 MG EXTENDED RELEASE CAPSULE Take 1 capsule by mouth in the morning and 1 capsule in the evening. Do all this for 30 days. ASPIRIN (ASPIRIN CHILDRENS) 81 MG CHEWABLE TABLET    Take 1 tablet by mouth daily    AZELASTINE (ASTELIN) 0.1 % NASAL SPRAY    1 spray by Nasal route    BUPROPION (WELLBUTRIN XL) 300 MG EXTENDED RELEASE TABLET    Take 1 tablet by mouth every morning TAKE ONE TABLET BY MOUTH EVERY MORNING    FLUTICASONE (FLONASE) 50 MCG/ACT NASAL SPRAY    USE 1 SPRAY NASALLY DAILY    FUROSEMIDE (LASIX) 20 MG TABLET    TAKE ONE TABLET BY MOUTH TWO TIMES A DAY    HYDROCORTISONE (CORTEF) 5 MG TABLET    Take 5 mg by mouth daily    HYDROCORTISONE 2.5 % CREAM    Apply topically 2 times daily. HYDROXYCHLOROQUINE (PLAQUENIL) 200 MG TABLET    Take 300 mg by mouth daily     HYOSCYAMINE (LEVSIN/SL) 125 MCG SUBLINGUAL TABLET    Place 1 tablet under the tongue every 4 hours as needed for Cramping    LEVOTHYROXINE (SYNTHROID) 25 MCG TABLET    Take one daily    LIDOCAINE (LIDODERM) 5 %    apply one patch as directed once daily to affected area remove patch after twelve hours. LIOTHYRONINE (CYTOMEL) 25 MCG TABLET    Take 1 tablet by mouth daily    LORATADINE (CLARITIN) 10 MG TABLET    Take 1 tablet by mouth daily    MAGNESIUM 400 MG TABS    Take 1 tablet by mouth daily    MOTEGRITY 2 MG TABS    TAKE ONE TABLET BY MOUTH EVERY DAY    MOXIFLOXACIN (VIGAMOX) 0.5 % OPHTHALMIC SOLUTION    1 drop    NARATRIPTAN (AMERGE) 2.5 MG TABLET    Take one at onset of severe headache/migraine may repeat x 1 after 4 hours if needed. Maximum 2/day and 2 days/week.     NEXIUM 40 MG DELAYED RELEASE CAPSULE    TAKE 1 CAPSULE BY MOUTH DAILY    ONDANSETRON (ZOFRAN) 4 MG TABLET    Take 1 tablet by mouth every 8 hours as needed for Nausea or Vomiting    POTASSIUM CHLORIDE (KLOR-CON M) 10 MEQ EXTENDED RELEASE TABLET    Take 1 tablet by mouth daily    SUCRALFATE (CARAFATE) 1 GM TABLET    Take 1 tablet by mouth in the morning and 1 tablet at noon and 1 tablet in the evening and 1 tablet before bedtime. TOPIRAMATE (TOPAMAX) 100 MG TABLET    TAKE ONE TABLET BY MOUTH TWO TIMES A DAY    TRAZODONE (DESYREL) 150 MG TABLET    TAKE ONE TABLET BY MOUTH NIGHTLY    TRULANCE 3 MG TABS    Take 3 mg by mouth daily    VALACYCLOVIR (VALTREX) 500 MG TABLET    Take 1 tablet by mouth daily    ZOLMITRIPTAN (ZOMIG) 5 MG TABLET    TAKE 1 TABLET BY MOUTH AT ONSET OF MIGRAINE. MAY REPEAT ONCE AFTER 2 HOURS. MAX 10 MG (2 TABLETS) PER DAY       ALLERGIES     Patient has no known allergies. FAMILY HISTORY       Family History   Problem Relation Age of Onset    Heart Disease Father 48    Breast Cancer Paternal Aunt         in her 29's    Breast Cancer Paternal Aunt         in her 63's    Cancer Maternal Grandmother         breast    Breast Cancer Maternal Grandmother         in her 63's    Breast Cancer Paternal Grandmother         in her 63's    Heart Disease Other         mom and dad's side    Colon Cancer Neg Hx           SOCIAL HISTORY       Social History     Socioeconomic History    Marital status: Single   Tobacco Use    Smoking status: Never    Smokeless tobacco: Never   Vaping Use    Vaping Use: Never used   Substance and Sexual Activity    Alcohol use: No     Alcohol/week: 0.0 standard drinks     Comment: no alcohol since 2011    Drug use: No   Social History Narrative    ** Merged History Encounter **          Social Determinants of Health     Financial Resource Strain: Low Risk     Difficulty of Paying Living Expenses: Not hard at all   Food Insecurity: No Food Insecurity    Worried About Running Out of Food in the Last Year: Never true    Ran Out of Food in the Last Year: Never true   Transportation Needs: No Transportation Needs    Lack of Transportation (Medical): No    Lack of Transportation (Non-Medical):  No   Physical Activity: Inactive    Days of Exercise per Week: 0 days    Minutes of Exercise per Session: 10 min   Stress: Stress Concern Present    Feeling of Stress : Rather much   Social Connections: Unknown    Frequency of Communication with Friends and Family: Twice a week    Frequency of Social Gatherings with Friends and Family: Never    Active Member of Clubs or Organizations: No    Attends Club or Organization Meetings: Never    Marital Status: Never    Housing Stability: Unknown    Unable to Pay for Housing in the Last Year: No    Unstable Housing in the Last Year: No       SCREENINGS             PHYSICAL EXAM    (up to 7 for level 4, 8 or more for level 5)     ED Triage Vitals [09/03/22 1606]   BP Temp Temp Source Heart Rate Resp SpO2 Height Weight   120/71 97.9 °F (36.6 °C) Temporal 89 16 99 % 5' 2\" (1.575 m) 109 lb (49.4 kg)       Physical Exam  Vitals and nursing note reviewed. Constitutional:       General: She is not in acute distress. Appearance: Normal appearance. She is well-developed and normal weight. She is not ill-appearing, toxic-appearing or diaphoretic. HENT:      Head: Normocephalic and atraumatic. Nose: Nose normal. No congestion or rhinorrhea. Mouth/Throat:      Mouth: Mucous membranes are moist.      Pharynx: Oropharynx is clear. No oropharyngeal exudate or posterior oropharyngeal erythema. Eyes:      General: No scleral icterus. Right eye: No discharge. Left eye: No discharge. Extraocular Movements: Extraocular movements intact. Conjunctiva/sclera: Conjunctivae normal.      Pupils: Pupils are equal, round, and reactive to light. Neck:      Thyroid: No thyromegaly. Vascular: No carotid bruit or JVD. Trachea: No tracheal deviation. Cardiovascular:      Rate and Rhythm: Normal rate and regular rhythm. Pulses: Normal pulses. Heart sounds: Normal heart sounds. No murmur heard. No friction rub. No gallop. Pulmonary:      Effort: Pulmonary effort is normal. No respiratory distress. Breath sounds: Normal breath sounds. No stridor. No wheezing, rhonchi or rales.    Chest:      Chest wall: No tenderness. Abdominal:      General: Abdomen is flat. Bowel sounds are normal. There is no distension. Palpations: Abdomen is soft. There is no mass. Tenderness: There is no abdominal tenderness. There is no right CVA tenderness, left CVA tenderness, guarding or rebound. Hernia: No hernia is present. Musculoskeletal:         General: Tenderness and signs of injury present. No swelling or deformity. Normal range of motion. Cervical back: Normal range of motion and neck supple. No rigidity or tenderness. Right lower leg: No edema. Left lower leg: No edema. Lymphadenopathy:      Cervical: No cervical adenopathy. Skin:     General: Skin is warm and dry. Capillary Refill: Capillary refill takes less than 2 seconds. Coloration: Skin is not jaundiced or pale. Findings: No bruising, erythema, lesion or rash. Neurological:      General: No focal deficit present. Mental Status: She is alert and oriented to person, place, and time. Mental status is at baseline. Cranial Nerves: No cranial nerve deficit. Sensory: No sensory deficit. Motor: No weakness or abnormal muscle tone. Coordination: Coordination normal.      Gait: Gait normal.      Deep Tendon Reflexes: Reflexes are normal and symmetric. Reflexes normal.   Psychiatric:         Mood and Affect: Mood normal.         Behavior: Behavior normal.         Thought Content:  Thought content normal.         Judgment: Judgment normal.       DIAGNOSTIC RESULTS     EKG: All EKG's are interpreted by the Emergency Department Physician who either signs or Co-signs this chart in the absence of a cardiologist.        RADIOLOGY:   Non-plain film images such as CT, Ultrasound and MRI are read by the radiologist. Trang Fermin radiographicimages are visualized and preliminarily interpreted by the emergency physician with the below findings:    X-ray of the right shoulder shows no acute fractures or dislocation. Interpretation per the Radiologist below, if available at the time of this note:    XR SHOULDER RIGHT (MIN 2 VIEWS)   Final Result      NEGATIVE RIGHT SHOULDER. ED BEDSIDE ULTRASOUND:   Performed by ED Physician - none    LABS:  Labs Reviewed - No data to display    All other labs were within normal range or not returned as of this dictation. EMERGENCY DEPARTMENT COURSE and DIFFERENTIALDIAGNOSIS/MDM:   Vitals:    Vitals:    09/03/22 1606   BP: 120/71   Pulse: 89   Resp: 16   Temp: 97.9 °F (36.6 °C)   TempSrc: Temporal   SpO2: 99%   Weight: 109 lb (49.4 kg)   Height: 5' 2\" (1.575 m)         MDM     Amount and/or Complexity of Data Reviewed  Tests in the radiology section of CPT®: reviewed and ordered    Risk of Complications, Morbidity, and/or Mortality  Presenting problems: moderate  Diagnostic procedures: moderate  Management options: moderate    Patient Progress  Patient progress: improved      CRITICAL CARE TIME   Total Critical Care time was  minutes, excluding separately reportable procedures. There was a high probability of clinically significant/life threatening deterioration in the patient's condition which required my urgentintervention. CONSULTS:  None    PROCEDURES:  Unless otherwise noted below, none     Procedures    FINAL IMPRESSION      1.  Strain of right shoulder, initial encounter          DISPOSITION/PLAN   DISPOSITION Decision To Discharge 09/03/2022 04:45:11 PM      PATIENT REFERRED TO:  ORTIZ Ashley - CNP  [address] [address], [address]  [place] [address]  [phone]    In 3 days      Karishma Carrasquillo MD  [address] [facility] [address] [phone]    In 3 days      DISCHARGE MEDICATIONS:  New Prescriptions    KETOROLAC (TORADOL) 10 MG TABLET    Take 1 tablet by mouth every 6 hours as needed for Pain    ORPHENADRINE (NORFLEX) 100 MG EXTENDED RELEASE TABLET    Take 1 tablet by mouth 2 times daily for 10 days          (Please note that portions of this note were completed with a voice recognitionprogram.  Efforts were made to edit the dictations but occasionally words are mis-transcribed.)    Matteo Carlson MD (electronically signed)  Attending Emergency Physician          Matteo Carlson MD  09/03/22 7510

## 2022-09-06 NOTE — DISCHARGE SUMMARY
Hospital Medicine Discharge Summary    Taryn Mcmillan  :  1970  MRN:  [id number]    Admit date:  2022  Discharge date: 22     Admitting Physician: Kay Keith MD  Primary Care Physician:  Bryant Marie, APRN - CNP    Taryn Mcmillan is a 54 y.o. female that was admitted and treated at [facility] for the following medical issues:     Principal Problem:    Acute pancreatitis without infection or necrosis, unspecified pancreatitis type  Active Problems:    Pancreatitis, unspecified pancreatitis type    Abdominal pain  Resolved Problems:    * No resolved hospital problems. *      Discharge Diagnoses:    Principal Problem:    Acute pancreatitis without infection or necrosis, unspecified pancreatitis type  Active Problems:    Pancreatitis, unspecified pancreatitis type    Abdominal pain  Resolved Problems:    * No resolved hospital problems. *    No chief complaint on file. Hospital Course:   Taryn Mcmillan is a 54 y.o. female who was admitted with abd pain, US abd performed, seen by GI. For outpt egd. Dc'd with outpt followup  Pt was discharge in a stable condition. /70   Pulse 62   Temp 97.5 °F (36.4 °C) (Temporal)   Resp 16   Ht 5' 2\" (1.575 m)   Wt 122 lb (55.3 kg)   SpO2 100%   BMI 22.31 kg/m²     Patient was seen by the following consultants while admitted to [facility]:   Consults:  IP CONSULT TO GI  IP CONSULT TO GENERAL SURGERY    Significant Diagnostic Studies:    Refer to chart if  XR SHOULDER RIGHT (MIN 2 VIEWS)    Result Date: 9/3/2022  XR SHOULDER RIGHT (MIN 2 VIEWS) : 9/3/2022 CLINICAL HISTORY:  Right shoulder pain . COMPARISON: None available. TECHNIQUE: AP, Grashey and transscapular radiographs of the right shoulder were obtained.  FINDINGS: There is no fracture, dislocation, acromioclavicular joint separation, significant degenerative changes, radiodense foreign bodies, worrisome bone destruction, significant narrowing of the subacromial space, or pathologic calcifications identified. NEGATIVE RIGHT SHOULDER.        Discharge Medications:         Medication List        CONTINUE taking these medications      aspirin 81 MG chewable tablet  Commonly known as: Aspirin Childrens  Take 1 tablet by mouth daily     azelastine 0.1 % nasal spray  Commonly known as: ASTELIN     fluticasone 50 MCG/ACT nasal spray  Commonly known as: FLONASE     hydrocortisone 5 MG tablet  Commonly known as: CORTEF     hydroxychloroquine 200 MG tablet  Commonly known as: PLAQUENIL     hyoscyamine 125 MCG sublingual tablet  Commonly known as: LEVSIN/SL  Place 1 tablet under the tongue every 4 hours as needed for Cramping     levothyroxine 25 MCG tablet  Commonly known as: SYNTHROID  Take one daily     lidocaine 5 %  Commonly known as: LIDODERM     liothyronine 25 MCG tablet  Commonly known as: CYTOMEL  Take 1 tablet by mouth daily     loratadine 10 MG tablet  Commonly known as: Claritin  Take 1 tablet by mouth daily     Magnesium 400 MG Tabs  Take 1 tablet by mouth daily     Motegrity 2 MG Tabs  Generic drug: Prucalopride Succinate     moxifloxacin 0.5 % ophthalmic solution  Commonly known as: VIGAMOX     naratriptan 2.5 MG tablet  Commonly known as: AMERGE     ondansetron 4 MG tablet  Commonly known as: ZOFRAN  Take 1 tablet by mouth every 8 hours as needed for Nausea or Vomiting     potassium chloride 10 MEQ extended release tablet  Commonly known as: KLOR-CON M  Take 1 tablet by mouth daily     topiramate 100 MG tablet  Commonly known as: TOPAMAX  TAKE ONE TABLET BY MOUTH TWO TIMES A DAY     traZODone 150 MG tablet  Commonly known as: DESYREL  TAKE ONE TABLET BY MOUTH NIGHTLY     Trulance 3 MG Tabs  Generic drug: Plecanatide     valACYclovir 500 MG tablet  Commonly known as: Valtrex  Take 1 tablet by mouth daily     ZOLMitriptan 5 MG tablet  Commonly known as: ZOMIG            STOP taking these medications      amoxicillin-clavulanate 875-125 MG per tablet  Commonly known as: Augmentin     cyclobenzaprine 5 MG tablet  Commonly known as: FLEXERIL     furosemide 20 MG tablet  Commonly known as: LASIX     midodrine 10 MG tablet  Commonly known as: PROAMATINE                Disposition:   If discharged to Home, Any [place] [address] needs that were indicated and/or required as been addressed and set up by Social Work. Condition at discharge: Pt was medically stable at the time of discharge. Activity: activity as tolerated    Total time taken for discharging this patient: 40 minutes. Greater than 70% of time was spent focused exclusively on this patient. Time was taken to review chart, discuss plans with consultants, reconciling medications, discussing plan answering questions with patient.      Signed:  Jeremy Ponce MD

## 2022-09-07 ENCOUNTER — OFFICE VISIT (OUTPATIENT)
Dept: FAMILY MEDICINE CLINIC | Age: 52
End: 2022-09-07
Payer: MEDICARE

## 2022-09-07 VITALS
HEIGHT: 62 IN | OXYGEN SATURATION: 97 % | DIASTOLIC BLOOD PRESSURE: 68 MMHG | SYSTOLIC BLOOD PRESSURE: 120 MMHG | HEART RATE: 86 BPM | WEIGHT: 110 LBS | BODY MASS INDEX: 20.24 KG/M2

## 2022-09-07 DIAGNOSIS — Z00.00 MEDICARE ANNUAL WELLNESS VISIT, SUBSEQUENT: Primary | ICD-10-CM

## 2022-09-07 DIAGNOSIS — K59.09 CHRONIC CONSTIPATION: ICD-10-CM

## 2022-09-07 DIAGNOSIS — Z71.89 ACP (ADVANCE CARE PLANNING): ICD-10-CM

## 2022-09-07 PROCEDURE — G0439 PPPS, SUBSEQ VISIT: HCPCS | Performed by: NURSE PRACTITIONER

## 2022-09-07 RX ORDER — HYDROCORTISONE 10 MG/1
TABLET ORAL
COMMUNITY
Start: 2022-08-12

## 2022-09-07 RX ORDER — POLYETHYLENE GLYCOL 3350, SODIUM SULFATE ANHYDROUS, SODIUM BICARBONATE, SODIUM CHLORIDE, POTASSIUM CHLORIDE 236; 22.74; 6.74; 5.86; 2.97 G/4L; G/4L; G/4L; G/4L; G/4L
240 POWDER, FOR SOLUTION ORAL ONCE
Qty: 240 ML | Refills: 0 | Status: SHIPPED | OUTPATIENT
Start: 2022-09-07 | End: 2022-09-07

## 2022-09-07 ASSESSMENT — PATIENT HEALTH QUESTIONNAIRE - PHQ9
1. LITTLE INTEREST OR PLEASURE IN DOING THINGS: 0
SUM OF ALL RESPONSES TO PHQ9 QUESTIONS 1 & 2: 0
3. TROUBLE FALLING OR STAYING ASLEEP: 0
9. THOUGHTS THAT YOU WOULD BE BETTER OFF DEAD, OR OF HURTING YOURSELF: 0
8. MOVING OR SPEAKING SO SLOWLY THAT OTHER PEOPLE COULD HAVE NOTICED. OR THE OPPOSITE, BEING SO FIGETY OR RESTLESS THAT YOU HAVE BEEN MOVING AROUND A LOT MORE THAN USUAL: 0
SUM OF ALL RESPONSES TO PHQ QUESTIONS 1-9: 0
4. FEELING TIRED OR HAVING LITTLE ENERGY: 0
2. FEELING DOWN, DEPRESSED OR HOPELESS: 0
10. IF YOU CHECKED OFF ANY PROBLEMS, HOW DIFFICULT HAVE THESE PROBLEMS MADE IT FOR YOU TO DO YOUR WORK, TAKE CARE OF THINGS AT HOME, OR GET ALONG WITH OTHER PEOPLE: 0
7. TROUBLE CONCENTRATING ON THINGS, SUCH AS READING THE NEWSPAPER OR WATCHING TELEVISION: 0
SUM OF ALL RESPONSES TO PHQ QUESTIONS 1-9: 0
5. POOR APPETITE OR OVEREATING: 0
SUM OF ALL RESPONSES TO PHQ QUESTIONS 1-9: 0
6. FEELING BAD ABOUT YOURSELF - OR THAT YOU ARE A FAILURE OR HAVE LET YOURSELF OR YOUR FAMILY DOWN: 0
SUM OF ALL RESPONSES TO PHQ QUESTIONS 1-9: 0

## 2022-09-07 NOTE — PROGRESS NOTES
Have you lost any weight without trying in the past 3 months?: No  Body mass index: 20.12  Have you seen the dentist within the past year?: Appointment is scheduled  Health Habits/Nutrition Interventions:  Inadequate physical activity:  patient is not ready to increase his/her physical activity level at this time    Hearing/Vision:  Do you or your family notice any trouble with your hearing that hasn't been managed with hearing aids?: No  Do you have difficulty driving, watching TV, or doing any of your daily activities because of your eyesight?: (!) Yes  Have you had an eye exam within the past year?: Appointment is scheduled  No results found. Hearing/Vision Interventions:  Vision concerns:  follows with specialist     ADLs:  In the past 7 days, did you need help from others to perform any of the following everyday activities: Eating, dressing, grooming, bathing, toileting, or walking/balance?: (!) Yes  Select all that apply: (!) Bathing  In the past 7 days, did you need help from others to take care of any of the following: Laundry, housekeeping, banking/finances, shopping, telephone use, food preparation, transportation, or taking medications?: (!) Yes  Select all that apply: [employer], Housekeeping  ADL Interventions:  Daughter typically helps. Objective   Vitals:    09/07/22 1403   BP: 120/68   Pulse: 86   SpO2: 97%   Weight: 110 lb (49.9 kg)   Height: 5' 2\" (1.575 m)      Body mass index is 20.12 kg/m².       General Appearance: alert and oriented to person, place and time, well developed and well- nourished, in no acute distress  Skin: warm and dry, no rash or erythema  Head: normocephalic and atraumatic  Eyes: pupils equal, round, and reactive to light, extraocular eye movements intact, conjunctivae normal  ENT: tympanic membrane, external ear and ear canal normal bilaterally, nose without deformity, nasal mucosa and turbinates normal without polyps  Neck: supple and non-tender without mass, no thyromegaly or thyroid nodules, no cervical lymphadenopathy  Pulmonary/Chest: clear to auscultation bilaterally- no wheezes, rales or rhonchi, normal air movement, no respiratory distress  Cardiovascular: normal rate, regular rhythm, normal S1 and S2, no murmurs, rubs, clicks, or gallops, distal pulses intact, no carotid bruits  Abdomen: soft, tender, normal bowel sounds, no masses or organomegaly  Extremities: no cyanosis, clubbing or edema  Musculoskeletal: normal range of motion, no joint swelling, deformity or tenderness  Neurologic: reflexes normal and symmetric, no cranial nerve deficit, gait, coordination and speech normal       No Known Allergies  Prior to Visit Medications    Medication Sig Taking? Authorizing Provider   hydrocortisone (CORTEF) 10 MG tablet TAKE 1 AND 1/2 TABLETS BY MOUTH ONCE DAILY Yes Historical Provider, MD   polyethylene glycol (GOLYTELY) 236 g solution Take 240 mLs by mouth once for 1 dose Yes ORTIZ Verdin CNP   ketorolac (TORADOL) 10 MG tablet Take 1 tablet by mouth every 6 hours as needed for Pain Yes Ambar Raygoza MD   orphenadrine (NORFLEX) 100 MG extended release tablet Take 1 tablet by mouth 2 times daily for 10 days Yes Ambar Raygoza MD   buPROPion (WELLBUTRIN XL) 300 MG extended release tablet Take 1 tablet by mouth every morning TAKE ONE TABLET BY MOUTH EVERY MORNING Yes ORITZ Verdin CNP   amphetamine-dextroamphetamine (ADDERALL XR) 30 MG extended release capsule Take 1 capsule by mouth in the morning and 1 capsule in the evening. Do all this for 30 days. Yes ORTIZ Verdin CNP   hydrocortisone 2.5 % cream Apply topically 2 times daily.  Yes Dirk Patel, DO   NEXIUM 40 MG delayed release capsule TAKE 1 CAPSULE BY MOUTH DAILY Yes ORTIZ Verdin CNP   furosemide (LASIX) 20 MG tablet TAKE ONE TABLET BY MOUTH TWO TIMES A DAY Yes ORTIZ Verdin CNP   sucralfate (CARAFATE) 1 GM tablet Take 1 tablet by mouth in the morning and 1 tablet at noon and 1 tablet in the evening and 1 tablet before bedtime. Yes Claudetta Anger, APRN - CNP   MOTEGRITY 2 MG TABS TAKE ONE TABLET BY MOUTH EVERY DAY Yes Historical Provider, MD   traZODone (DESYREL) 150 MG tablet TAKE ONE TABLET BY MOUTH NIGHTLY Yes Ardis Boast, APRN - CNP   lidocaine (LIDODERM) 5 % apply one patch as directed once daily to affected area remove patch after twelve hours. Yes Historical Provider, MD   hyoscyamine (LEVSIN/SL) 125 MCG sublingual tablet Place 1 tablet under the tongue every 4 hours as needed for Cramping Yes Ardis Boast, APRN - CNP   topiramate (TOPAMAX) 100 MG tablet TAKE ONE TABLET BY MOUTH TWO TIMES A DAY Yes Ardis Boast, APRN - CNP   loratadine (CLARITIN) 10 MG tablet Take 1 tablet by mouth daily Yes Ardis Boast, APRN - CNP   potassium chloride (KLOR-CON M) 10 MEQ extended release tablet Take 1 tablet by mouth daily Yes Alberta Genao PA-C   liothyronine (CYTOMEL) 25 MCG tablet Take 1 tablet by mouth daily Yes Ardis Boast, APRN - CNP   aspirin (ASPIRIN CHILDRENS) 81 MG chewable tablet Take 1 tablet by mouth daily Yes TRUDY Nowak   naratriptan (AMERGE) 2.5 MG tablet Take one at onset of severe headache/migraine may repeat x 1 after 4 hours if needed. Maximum 2/day and 2 days/week.  Yes Historical Provider, MD   valACYclovir (VALTREX) 500 MG tablet Take 1 tablet by mouth daily Yes Ardis Boast, APRN - CNP   Magnesium 400 MG TABS Take 1 tablet by mouth daily Yes Ty Loaiza PA-C   TRULANCE 3 MG TABS Take 3 mg by mouth daily Yes Historical Provider, MD   ondansetron (ZOFRAN) 4 MG tablet Take 1 tablet by mouth every 8 hours as needed for Nausea or Vomiting Yes Ardis Boast, APRN - CNP   azelastine (ASTELIN) 0.1 % nasal spray 1 spray by Nasal route Yes Historical Provider, MD   moxifloxacin (VIGAMOX) 0.5 % ophthalmic solution 1 drop Yes Historical Provider, MD   fluticasone (FLONASE) 50 MCG/ACT nasal spray USE 1 SPRAY NASALLY DAILY Yes Historical Provider, MD   ZOLMitriptan (ZOMIG) 5 MG tablet TAKE 1 TABLET BY MOUTH AT ONSET OF MIGRAINE. MAY REPEAT ONCE AFTER 2 HOURS.  MAX 10 MG (2 TABLETS) PER DAY Yes Historical Provider, MD   levothyroxine (SYNTHROID) 25 MCG tablet Take one daily Yes ORTIZ Verdin CNP   hydroxychloroquine (PLAQUENIL) 200 MG tablet Take 300 mg by mouth daily  Yes Historical Provider, MD   midodrine (PROAMATINE) 10 MG tablet Take 10 mg by mouth as needed  Historical Provider, MD   NEXIUM 40 MG delayed release capsule TAKE ONE CAPSULE BY MOUTH EVERY DAY  ORTIZ Verdin CNP   topiramate (TOPAMAX) 100 MG tablet Take 1 tablet by mouth 2 times daily  ORTIZ Verdin CNP   furosemide (LASIX) 20 MG tablet Take 1 tablet by mouth 2 times daily TAKE ONE TABLET BY MOUTH TWO TIMES A DAY  ORTIZ Verdin CNP       CareTeam (Including outside providers/suppliers regularly involved in providing care):   Patient Care Team:  ORTIZ Verdin CNP as PCP - General (Nurse Practitioner)  ORTIZ Verdin CNP as PCP - [facility] Empaneled Provider  Serena Alvarado MD (General Surgery)  Janus Cabot, RN as Ambulatory Care Manager     Reviewed and updated this visit:  Allergies  Meds

## 2022-09-07 NOTE — PATIENT INSTRUCTIONS
Personalized Preventive Plan for Aiden Foy - 9/7/2022  Medicare offers a range of preventive health benefits. Some of the tests and screenings are paid in full while other may be subject to a deductible, co-insurance, and/or copay. Some of these benefits include a comprehensive review of your medical history including lifestyle, illnesses that may run in your family, and various assessments and screenings as appropriate. After reviewing your medical record and screening and assessments performed today your provider may have ordered immunizations, labs, imaging, and/or referrals for you. A list of these orders (if applicable) as well as your Preventive Care list are included within your After Visit Summary for your review. Other Preventive Recommendations:    A preventive eye exam performed by an eye specialist is recommended every 1-2 years to screen for glaucoma; cataracts, macular degeneration, and other eye disorders. A preventive dental visit is recommended every 6 months. Try to get at least 150 minutes of exercise per week or 10,000 steps per day on a pedometer . Order or download the FREE \"Exercise & Physical Activity: Your Everyday Guide\" from [employer]. Call 3-577.402.9947 or search [employer] online. You need 8172-0943 mg of calcium and 0404-7695 IU of vitamin D per day. It is possible to meet your calcium requirement with diet alone, but a vitamin D supplement is usually necessary to meet this goal.  When exposed to the sun, use a sunscreen that protects against both UVA and UVB radiation with an SPF of 30 or greater. Reapply every 2 to 3 hours or after sweating, drying off with a towel, or swimming. Always wear a seat belt when traveling in a car. Always wear a helmet when riding a bicycle or motorcycle.

## 2022-09-08 ENCOUNTER — CLINICAL DOCUMENTATION (OUTPATIENT)
Dept: SPIRITUAL SERVICES | Age: 52
End: 2022-09-08

## 2022-09-08 NOTE — ACP (ADVANCE CARE PLANNING)
Advance Care Planning   Ambulatory ACP Specialist Patient Outreach    Date:  9/8/2022  ACP Specialist:  TARAH Osei    Outreach call to patient in follow-up to ACP Specialist referral from: ORTIZ Ennis CNP    [x] PCP  [] Provider   [] Ambulatory Care Management [] Other for Reason:    [] Advance Directive Assistance  [] Code Status Discussion  [] Complete Portable DNR Order  [] Discuss Goals of Care  [] Complete POST/MOST  [x] Early ACP Decision-Making  [] Other    Date Referral Received: 09/07/2022    Today's Outreach:  [x] First   [] Second  [] Third                               Third outreach made by []  phone  [] email []   [employer]     Intervention:  [] Spoke with Patient  [x] Left VM requesting return call      Outcome: ACP specialist made an initial phone call to f/u with pt re: acp referral. Pt did not answer this phone call, vm was left encouraging pt to return phone call. A second outreach will be made in 1 week. Next Step:   [] ACP scheduled conversation  [x] Outreach again in one week               [] Email / Mail ACP Info Sheets  [] Email / Mail Advance Directive            [] Close Referral. Routing closure to referring provider/staff and to ACP Specialist . [] Closure Letter mailed to Patient with Invitation to Contact ACP Specialist if/when ready.     Thank you for this referral.

## 2022-09-13 ENCOUNTER — CARE COORDINATION (OUTPATIENT)
Dept: CARE COORDINATION | Age: 52
End: 2022-09-13

## 2022-09-13 NOTE — CARE COORDINATION
Attempted to contact patient for care coordination follow up. Unable to reach patient by phone. Message left regarding purpose of call. Number provided and call back requested. Risks/benefits discussed with patient/surrogate

## 2022-09-15 ENCOUNTER — CLINICAL DOCUMENTATION (OUTPATIENT)
Dept: SPIRITUAL SERVICES | Age: 52
End: 2022-09-15

## 2022-09-15 NOTE — ACP (ADVANCE CARE PLANNING)
Advance Care Planning   Ambulatory ACP Specialist Patient Outreach    Date:  9/15/2022  ACP Specialist:  Margarita Littlejohn RN    Outreach call to patient in follow-up to ACP Specialist referral from: ORTIZ Amador CNP    [x] PCP  [] Provider   [] Ambulatory Care Management [] Other for Reason:    [] Advance Directive Assistance  [] Code Status Discussion  [] Complete Portable DNR Order  [] Discuss Goals of Care  [] Complete POST/MOST  [x] Early ACP Decision-Making  [] Other    Date Referral Received:  9/7/2022    Today's Outreach:  [] First   [x] Second  [] Third                               Third outreach made by []  phone  [] email []   [employer]     Intervention:  [] Spoke with Patient  [] Left VM requesting return call      Outcome:     Second outreach attempted to offer Pt conversation with ACP Specialist for advance care planning assistance - no answer. Not able to leave VM as mailbox is full. Sent initial outreach note and ACP information packet to email on file and sent outreach message by [email or web address]. Next Step:   [] ACP scheduled conversation  [x] Outreach again in one week               [x] Email / Mail ACP Info Sheets  [x] Email / Mail Advance Directive            [] Close Referral. Routing closure to referring provider/staff and to ACP Specialist . [] Closure Letter mailed to Patient with Invitation to Contact ACP Specialist if/when ready.     Thank you for this referral.

## 2022-09-20 ENCOUNTER — CARE COORDINATION (OUTPATIENT)
Dept: CARE COORDINATION | Age: 52
End: 2022-09-20

## 2022-09-20 DIAGNOSIS — F98.8 ATTENTION DEFICIT DISORDER, UNSPECIFIED HYPERACTIVITY PRESENCE: ICD-10-CM

## 2022-09-20 DIAGNOSIS — Z76.0 MEDICATION REFILL: ICD-10-CM

## 2022-09-20 NOTE — TELEPHONE ENCOUNTER
Past Visits    Date Provider Specialty Visit Type Primary Dx   09/07/2022 ORTIZ Hitchcock CNP Family Medicine Office Visit Medicare annual wellness visit, subsequent   08/11/2022 ORTIZ Hitchcock CNP Family Medicine Office Visit Generalized abdominal pain

## 2022-09-20 NOTE — CARE COORDINATION
Ambulatory Care Coordination Note  9/20/2022    ACC: Chris Malloy RN    Summary Note:     Hayden Lord tells me that she is still not feeling well   She says that she is having a lot of issues with her stomach and bowels  She has not had the gastric emptying study but she will get this rescheduled   She also feels that it may be best to complete her colonoscopy   She states that she will call over to Dr Luisa Biggs office to get something scheduled  She says that she does have SOB and intermittent edema in her feet and ankles  Currently, she is wearing a Holter monitor for the next few days  She also is having issues with fatigue  She says that some days she is okay but many days all she wants to do is sleep but she does not feel rested. She says that she is taking trazodone 150 mg each night but she does not feel that it is enough  She asked about max dosage  I will reach out to Kailyn Wang to see if she feels that Hayden Lord needs to increase the dose  I also asked parmjitkwasi Lord to complete the lab studies that Kailyn Wang ordered to see if there is something else going on that may be causing her fatigue  She states that she will try to get this done today. PLAN:  donnienelsy Risa will complete the lab studies ordered by Kailyn Wang in early August completed ASAP  donnienelsy Risa will call Dr Luisa Biggs office to get colonoscopy scheduled related to her abdominal pain  Hayden Lord will call with any worsening symptoms       Lab Results       None            Care Coordination Interventions    Referral from Primary Care Provider: No  Suggested Interventions and Community Resources  Disease Association: In Process  Pharmacist: Declined  Registered Dietician: Declined  Zone Management Tools: In Process  Other Services or Interventions: Pharmacy, Dietician referral declined. Walk in Clinic hours and usage discussed CHF zone tool education inititated. Goals Addressed    None         Prior to Admission medications    Medication Sig Start Date End Date Taking?  Authorizing Provider   hydrocortisone (CORTEF) 10 MG tablet TAKE 1 AND 1/2 TABLETS BY MOUTH ONCE DAILY 8/12/22   Historical Provider, MD   ketorolac (TORADOL) 10 MG tablet Take 1 tablet by mouth every 6 hours as needed for Pain 9/3/22   Zhou Bernal MD   buPROPion (WELLBUTRIN XL) 300 MG extended release tablet Take 1 tablet by mouth every morning TAKE ONE TABLET BY MOUTH EVERY MORNING 8/24/22   ORTIZ Mendez CNP   amphetamine-dextroamphetamine (ADDERALL XR) 30 MG extended release capsule Take 1 capsule by mouth in the morning and 1 capsule in the evening. Do all this for 30 days. 8/24/22 9/23/22  ORTIZ Mendez CNP   hydrocortisone 2.5 % cream Apply topically 2 times daily. 8/19/22   Sarah Beth Montano,    NEXIUM 40 MG delayed release capsule TAKE 1 CAPSULE BY MOUTH DAILY 8/11/22   ORTIZ Mendez CNP   furosemide (LASIX) 20 MG tablet TAKE ONE TABLET BY MOUTH TWO TIMES A DAY 8/3/22   ORTIZ Mendez CNP   sucralfate (CARAFATE) 1 GM tablet Take 1 tablet by mouth in the morning and 1 tablet at noon and 1 tablet in the evening and 1 tablet before bedtime. 8/3/22   ORTIZ Cox CNP   MOTEGRITY 2 MG TABS TAKE ONE TABLET BY MOUTH EVERY DAY 7/4/22   Historical Provider, MD   traZODone (DESYREL) 150 MG tablet TAKE ONE TABLET BY MOUTH NIGHTLY 6/16/22   ORTIZ Mendez CNP   lidocaine (LIDODERM) 5 % apply one patch as directed once daily to affected area remove patch after twelve hours.  5/17/22   Historical Provider, MD   midodrine (PROAMATINE) 10 MG tablet Take 10 mg by mouth as needed 4/12/22 8/1/22  Historical Provider, MD   hyoscyamine (LEVSIN/SL) 125 MCG sublingual tablet Place 1 tablet under the tongue every 4 hours as needed for Cramping 4/7/22   ORTIZ Mendez CNP   topiramate (TOPAMAX) 100 MG tablet TAKE ONE TABLET BY MOUTH TWO TIMES A DAY 3/3/22   ORTIZ Mendez CNP   loratadine (CLARITIN) 10 MG tablet Take 1 tablet by mouth daily 12/28/21   ORTIZ Mendez - CNP   potassium chloride (KLOR-CON M) 10 MEQ extended release tablet Take 1 tablet by mouth daily 12/8/21   Radha Blanco PA-C   liothyronine (CYTOMEL) 25 MCG tablet Take 1 tablet by mouth daily 7/6/21   ORTIZ oGldberg CNP   aspirin (ASPIRIN CHILDRENS) 81 MG chewable tablet Take 1 tablet by mouth daily 6/24/21   TRUDY James   naratriptan (AMERGE) 2.5 MG tablet Take one at onset of severe headache/migraine may repeat x 1 after 4 hours if needed. Maximum 2/day and 2 days/week. 6/2/21   Historical Provider, MD   valACYclovir (VALTREX) 500 MG tablet Take 1 tablet by mouth daily 2/15/21   ORTIZ Goldberg CNP   NEXIUM 40 MG delayed release capsule TAKE ONE CAPSULE BY MOUTH EVERY DAY 11/2/20   ORTIZ Goldberg CNP   topiramate (TOPAMAX) 100 MG tablet Take 1 tablet by mouth 2 times daily 10/11/20   ORTIZ Goldberg CNP   Magnesium 400 MG TABS Take 1 tablet by mouth daily 10/3/20   Melvin Hernandez PA-C   furosemide (LASIX) 20 MG tablet Take 1 tablet by mouth 2 times daily TAKE ONE TABLET BY MOUTH TWO TIMES A DAY 8/4/20   ORTIZ Goldberg CNP   TRULANCE 3 MG TABS Take 3 mg by mouth daily 3/10/20   Historical Provider, MD   ondansetron (ZOFRAN) 4 MG tablet Take 1 tablet by mouth every 8 hours as needed for Nausea or Vomiting 2/25/20   ORTIZ Goldberg CNP   azelastine (ASTELIN) 0.1 % nasal spray 1 spray by Nasal route 11/27/19   Historical Provider, MD   moxifloxacin (VIGAMOX) 0.5 % ophthalmic solution 1 drop    Historical Provider, MD   fluticasone (FLONASE) 50 MCG/ACT nasal spray USE 1 SPRAY NASALLY DAILY 5/15/19   Historical Provider, MD   ZOLMitriptan (ZOMIG) 5 MG tablet TAKE 1 TABLET BY MOUTH AT ONSET OF MIGRAINE. MAY REPEAT ONCE AFTER 2 HOURS.  MAX 10 MG (2 TABLETS) PER DAY 6/4/18   Historical Provider, MD   levothyroxine (SYNTHROID) 25 MCG tablet Take one daily 4/29/18   ORTIZ Goldberg CNP   hydroxychloroquine (PLAQUENIL) 200 MG tablet Take 300 mg by mouth daily Historical Provider, MD       No future appointments.

## 2022-09-22 ENCOUNTER — CLINICAL DOCUMENTATION (OUTPATIENT)
Dept: SPIRITUAL SERVICES | Age: 52
End: 2022-09-22

## 2022-09-22 RX ORDER — DEXTROAMPHETAMINE SACCHARATE, AMPHETAMINE ASPARTATE MONOHYDRATE, DEXTROAMPHETAMINE SULFATE AND AMPHETAMINE SULFATE 7.5; 7.5; 7.5; 7.5 MG/1; MG/1; MG/1; MG/1
30 CAPSULE, EXTENDED RELEASE ORAL 2 TIMES DAILY
Qty: 60 CAPSULE | Refills: 0 | Status: SHIPPED | OUTPATIENT
Start: 2022-09-22 | End: 2022-10-24 | Stop reason: SDUPTHER

## 2022-09-22 NOTE — ACP (ADVANCE CARE PLANNING)
Advance Care Planning   Ambulatory ACP Specialist Patient Outreach    Date:  9/22/2022  ACP Specialist:  Thomasena Ahumada    Outreach call to patient in follow-up to ACP Specialist referral from: ORTIZ Nelson CNP    [] PCP  [x] Provider   [] Ambulatory Care Management [] Other for Reason:    [] Advance Directive Assistance  [] Code Status Discussion  [] Complete Portable DNR Order  [] Discuss Goals of Care  [] Complete POST/MOST  [x] Early ACP Decision-Making  [] Other    Date Referral Received:  9/7/2022    Today's Outreach:  [] First   [] Second  [x] Third                               Third outreach made by []  phone  [x] email [x]   [employer]     Intervention:  [] Spoke with Patient  [] Left VM requesting return call      Outcome:     No response received from patient to outreach attempts offering advance care planning assistance from an [address] [place] [address] Rd.  Per ACP Program process, referral closure notification sent to patient's email on file and by [place] with offer for future ACP assistance upon request (ACP Coordinator contact information included). Next Step:   [] ACP scheduled conversation  [] Outreach again in one week               [x] Email / Mail ACP Info Sheets  [] Email / Mail Advance Directive            [x] Close Referral. Routing closure to referring provider/staff and to ACP Specialist . [x] Closure Letter mailed to Patient with Invitation to Contact ACP Specialist if/when ready.     Thank you for this referral.

## 2022-09-26 DIAGNOSIS — R10.84 GENERALIZED ABDOMINAL PAIN: ICD-10-CM

## 2022-09-26 DIAGNOSIS — K59.09 CHRONIC CONSTIPATION: ICD-10-CM

## 2022-09-26 DIAGNOSIS — Z76.0 MEDICATION REFILL: ICD-10-CM

## 2022-09-26 LAB
BASOPHILS ABSOLUTE: 0 K/UL (ref 0–0.2)
BASOPHILS RELATIVE PERCENT: 0.5 %
C-REACTIVE PROTEIN: <3 MG/L (ref 0–5)
EOSINOPHILS ABSOLUTE: 0 K/UL (ref 0–0.7)
EOSINOPHILS RELATIVE PERCENT: 1 %
HCT VFR BLD CALC: 36.5 % (ref 37–47)
HEMOGLOBIN: 12.1 G/DL (ref 12–16)
LIPASE: 42 U/L (ref 12–95)
LYMPHOCYTES ABSOLUTE: 1.7 K/UL (ref 1–4.8)
LYMPHOCYTES RELATIVE PERCENT: 34.6 %
MCH RBC QN AUTO: 29.8 PG (ref 27–31.3)
MCHC RBC AUTO-ENTMCNC: 33.3 % (ref 33–37)
MCV RBC AUTO: 89.6 FL (ref 82–100)
MONOCYTES ABSOLUTE: 0.2 K/UL (ref 0.2–0.8)
MONOCYTES RELATIVE PERCENT: 4.4 %
NEUTROPHILS ABSOLUTE: 3 K/UL (ref 1.4–6.5)
NEUTROPHILS RELATIVE PERCENT: 59.5 %
PDW BLD-RTO: 13.1 % (ref 11.5–14.5)
PLATELET # BLD: 288 K/UL (ref 130–400)
RBC # BLD: 4.07 M/UL (ref 4.2–5.4)
TSH REFLEX: 1.95 UIU/ML (ref 0.44–3.86)
WBC # BLD: 5 K/UL (ref 4.8–10.8)

## 2022-09-26 RX ORDER — FUROSEMIDE 20 MG/1
TABLET ORAL
Qty: 60 TABLET | Refills: 0 | Status: SHIPPED | OUTPATIENT
Start: 2022-09-26

## 2022-09-26 NOTE — TELEPHONE ENCOUNTER
RED WORD : extreme tiredness x 1 week . Weak & foggy x 1 week. Spoke to DR Francisco Barrios who states that patient can be seen with provider within the next few days. Pt aware if sx worsen to go to ER.

## 2022-09-27 ENCOUNTER — CARE COORDINATION (OUTPATIENT)
Dept: CARE COORDINATION | Age: 52
End: 2022-09-27

## 2022-09-27 LAB
BACTERIA: NEGATIVE /HPF
BILIRUBIN URINE: NEGATIVE
BLOOD, URINE: NEGATIVE
CLARITY: CLEAR
COLOR: YELLOW
EPITHELIAL CELLS, UA: NORMAL /HPF (ref 0–5)
GLUCOSE URINE: NEGATIVE MG/DL
HYALINE CASTS: NORMAL /HPF (ref 0–5)
KETONES, URINE: NEGATIVE MG/DL
LEUKOCYTE ESTERASE, URINE: ABNORMAL
NITRITE, URINE: NEGATIVE
PH UA: 5.5 (ref 5–9)
PROTEIN UA: NEGATIVE MG/DL
RBC UA: NORMAL /HPF (ref 0–2)
SEDIMENTATION RATE, ERYTHROCYTE: 2 MM (ref 0–20)
SPECIFIC GRAVITY UA: 1.02 (ref 1–1.03)
URINE REFLEX TO CULTURE: ABNORMAL
UROBILINOGEN, URINE: 0.2 E.U./DL
WBC UA: NORMAL /HPF (ref 0–5)

## 2022-09-27 NOTE — CARE COORDINATION
Ambulatory Care Coordination Note  9/27/2022    ACC: rFed Madison, RN    Summary Note:     Jeaneth Obando sent a text last evening that she needed to know if her labs were okay. She states that she is very weak and she feels that it may be pancreatitis. I did speak with Jeaneth Obando this am  She states that she is \"not feeling right. \"  Symptoms include: nausea, weak, extreme fatigue, muscle pain and muscle weakness, she has temporal swelling one side of her head with visual blurriness along with mental fogginess   She denies fever, or chills, SOB  She is sleeping but not soundly  she says that she will sleep 5-6 hours and other days due to the extreme fatigue she will sleep 8-9 hours  She feels that she needs the trazodone increased to help with improved sleep and rest.  She is eating small meals but not full complete meals  She does not feel that she is loosing weight  She also spoke about [employer] related to [place]? She is scheduled with Radha Flannery tomorrow afternoon for follow regarding fatigue. I will forward this note to Rome Mckinney to update and ask for her input     Lab Results       None            Care Coordination Interventions    Referral from Primary Care Provider: No  Suggested Interventions and Community Resources  Disease Association: In Process  Pharmacist: Declined  Registered Dietician: Declined  Zone Management Tools: In Process  Other Services or Interventions: Pharmacy, Dietician referral declined. Walk in Clinic hours and usage discussed CHF zone tool education inititated. Goals Addressed    None         Prior to Admission medications    Medication Sig Start Date End Date Taking? Authorizing Provider   furosemide (LASIX) 20 MG tablet TAKE ONE TABLET BY MOUTH TWO TIMES A DAY 9/26/22   ORTIZ Terrazas - KRIS   amphetamine-dextroamphetamine (ADDERALL XR) 30 MG extended release capsule Take 1 capsule by mouth in the morning and 1 capsule in the evening. Do all this for 30 days.  9/22/22 10/22/22 ORTIZ Terrazas CNP   hydrocortisone (CORTEF) 10 MG tablet TAKE 1 AND 1/2 TABLETS BY MOUTH ONCE DAILY 8/12/22   Historical Provider, MD   ketorolac (TORADOL) 10 MG tablet Take 1 tablet by mouth every 6 hours as needed for Pain 9/3/22   Irma Bence, MD   buPROPion (WELLBUTRIN XL) 300 MG extended release tablet Take 1 tablet by mouth every morning TAKE ONE TABLET BY MOUTH EVERY MORNING 8/24/22   ORTIZ Terrazas CNP   hydrocortisone 2.5 % cream Apply topically 2 times daily. 8/19/22   Miya Espinosa DO   NEXIUM 40 MG delayed release capsule TAKE 1 CAPSULE BY MOUTH DAILY 8/11/22   ORTIZ Terrazas CNP   sucralfate (CARAFATE) 1 GM tablet Take 1 tablet by mouth in the morning and 1 tablet at noon and 1 tablet in the evening and 1 tablet before bedtime. 8/3/22   DairORTIZ Couch CNP   MOTEGRITY 2 MG TABS TAKE ONE TABLET BY MOUTH EVERY DAY 7/4/22   Historical Provider, MD   traZODone (DESYREL) 150 MG tablet TAKE ONE TABLET BY MOUTH NIGHTLY 6/16/22   ORTIZ Terrazas CNP   lidocaine (LIDODERM) 5 % apply one patch as directed once daily to affected area remove patch after twelve hours.  5/17/22   Historical Provider, MD   midodrine (PROAMATINE) 10 MG tablet Take 10 mg by mouth as needed 4/12/22 8/1/22  Historical Provider, MD   hyoscyamine (LEVSIN/SL) 125 MCG sublingual tablet Place 1 tablet under the tongue every 4 hours as needed for Cramping 4/7/22   ORTIZ Terrazas CNP   topiramate (TOPAMAX) 100 MG tablet TAKE ONE TABLET BY MOUTH TWO TIMES A DAY 3/3/22   ORTIZ Terrazas CNP   loratadine (CLARITIN) 10 MG tablet Take 1 tablet by mouth daily 12/28/21   ORTIZ Terrazas CNP   potassium chloride (KLOR-CON M) 10 MEQ extended release tablet Take 1 tablet by mouth daily 12/8/21   Lisa Waite PA-C   liothyronine (CYTOMEL) 25 MCG tablet Take 1 tablet by mouth daily 7/6/21   ORTIZ Terrazas CNP   aspirin (ASPIRIN CHILDRENS) 81 MG chewable tablet Take 1 tablet by

## 2022-09-28 ENCOUNTER — PATIENT MESSAGE (OUTPATIENT)
Dept: FAMILY MEDICINE CLINIC | Age: 52
End: 2022-09-28

## 2022-09-28 ENCOUNTER — OFFICE VISIT (OUTPATIENT)
Dept: FAMILY MEDICINE CLINIC | Age: 52
End: 2022-09-28
Payer: MEDICARE

## 2022-09-28 VITALS
DIASTOLIC BLOOD PRESSURE: 64 MMHG | HEIGHT: 62 IN | OXYGEN SATURATION: 98 % | WEIGHT: 109 LBS | BODY MASS INDEX: 20.06 KG/M2 | SYSTOLIC BLOOD PRESSURE: 92 MMHG | HEART RATE: 84 BPM

## 2022-09-28 DIAGNOSIS — M79.10 MYALGIA: ICD-10-CM

## 2022-09-28 DIAGNOSIS — M35.00 SJOGREN'S SYNDROME, WITH UNSPECIFIED ORGAN INVOLVEMENT (HCC): Primary | ICD-10-CM

## 2022-09-28 DIAGNOSIS — M76.891 HAMSTRING TENDONITIS OF RIGHT THIGH: ICD-10-CM

## 2022-09-28 DIAGNOSIS — E53.8 B12 DEFICIENCY: ICD-10-CM

## 2022-09-28 DIAGNOSIS — R42 VERTIGO: ICD-10-CM

## 2022-09-28 LAB — TOTAL CK: 151 U/L (ref 0–170)

## 2022-09-28 PROCEDURE — G8420 CALC BMI NORM PARAMETERS: HCPCS | Performed by: NURSE PRACTITIONER

## 2022-09-28 PROCEDURE — 1036F TOBACCO NON-USER: CPT | Performed by: NURSE PRACTITIONER

## 2022-09-28 PROCEDURE — 99214 OFFICE O/P EST MOD 30 MIN: CPT | Performed by: NURSE PRACTITIONER

## 2022-09-28 PROCEDURE — G8427 DOCREV CUR MEDS BY ELIG CLIN: HCPCS | Performed by: NURSE PRACTITIONER

## 2022-09-28 PROCEDURE — 96372 THER/PROPH/DIAG INJ SC/IM: CPT | Performed by: NURSE PRACTITIONER

## 2022-09-28 RX ORDER — METHYLPREDNISOLONE 4 MG/1
TABLET ORAL
Qty: 42 TABLET | Refills: 0 | Status: SHIPPED | OUTPATIENT
Start: 2022-09-28

## 2022-09-28 RX ORDER — CYANOCOBALAMIN 1000 UG/ML
1000 INJECTION INTRAMUSCULAR; SUBCUTANEOUS ONCE
Status: COMPLETED | OUTPATIENT
Start: 2022-09-28 | End: 2022-09-28

## 2022-09-28 RX ORDER — MECLIZINE HYDROCHLORIDE 25 MG/1
25 TABLET ORAL 3 TIMES DAILY PRN
Qty: 30 TABLET | Refills: 0 | Status: SHIPPED | OUTPATIENT
Start: 2022-09-28 | End: 2022-10-08

## 2022-09-28 RX ADMIN — CYANOCOBALAMIN 1000 MCG: 1000 INJECTION INTRAMUSCULAR; SUBCUTANEOUS at 14:41

## 2022-09-28 ASSESSMENT — ENCOUNTER SYMPTOMS
CONSTIPATION: 1
NAUSEA: 1

## 2022-09-28 NOTE — PROGRESS NOTES
Subjective  Chief Complaint   Patient presents with    Fatigue     Pt states fatigue x 1 week, some joint pain as well. Pt states she feels run down          HPI    Presents today for increased fatigue, mental fog, muscle pain, and vertigo symptoms for the past week. Recent lab work was drawn on 09/26/22 with no concerning results. Her pain is located to bilateral thighs and upper arms exacerbated by movement. She endorses weakness, but no recent falls. She reports joint swelling. She states she has been avoiding stairs and leaving the house due to the pain and fatigue. She states she has has decreased PO intake and has been constipated for days with frequent nausea. She contributes the nausea to vertigo and when she stands. She states her mouth has been very dry, but denies dry eyes. Rheumatology appointment scheduled.      Patient Active Problem List    Diagnosis Date Noted    Acute pancreatitis without infection or necrosis, unspecified pancreatitis type 07/30/2022    Abdominal pain 07/30/2022    Pancreatitis, unspecified pancreatitis type 07/29/2022    Acute left-sided low back pain with bilateral sciatica 03/09/2022    Congestive heart failure, unspecified HF chronicity, unspecified heart failure type ([address] 02/11/2022    Claudication of both lower extremities ([address] 02/11/2022    Neck pain 12/09/2021    Non-recurrent acute suppurative otitis media of left ear without spontaneous rupture of tympanic membrane 12/09/2021    Neutropenia ([address] 01/12/2021    Hereditary hemochromatosis ([address] 01/12/2021    Chronic pancreatitis ([address] 08/11/2020    Major depressive disorder, single episode, 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[address] 01/15/2019    Abnormal MRI, liver 05/22/2018    Acute recurrent pancreatitis 05/22/2018    At risk of fracture due to osteoporosis 05/22/2018    Calcinosis 05/22/2018    Chronic headaches 05/22/2018    Conductive hearing loss in left ear 05/22/2018    Edema 05/22/2018    Iron overload 05/22/2018    Mass of left side of neck 05/22/2018    Medullary sponge kidney of both kidneys 87/13/9302    Metabolic acidosis 24/18/8282    Microscopic hematuria 05/22/2018    Nausea 05/22/2018    Renal tubular acidosis type I 05/22/2018    Rheumatoid arthritis ([address] 05/22/2018    Tension type headache 05/22/2018    Weight loss, abnormal 05/22/2018    Cellulitis, face 04/06/2018    Other chest pain 11/01/2017    Left lower quadrant pain 11/01/2017    Sjogren's syndrome ([address] 11/01/2017    Diverticulitis of large intestine without perforation or abscess without bleeding 11/01/2017    Fibroids 11/01/2017    Transfusion history 11/01/2017    Pancreatitis 05/13/2017    Acquired hypothyroidism 09/26/2016    GERD (gastroesophageal reflux disease) 09/26/2016    Anxiety 12/09/2015    Depression 12/09/2015    ADD (attention deficit disorder) 02/12/2015    Endometriosis 09/09/2014    ASCUS favoring benign 05/01/2013    S/P endometrial ablation 05/01/2013     Past Medical History:   Diagnosis Date    Acquired hypothyroidism 9/26/2016    Acute left-sided low back pain with bilateral sciatica 3/9/2022    Acute pancreatitis     ADD (attention deficit disorder) 2/12/2015    Anxiety     Congestive heart failure, unspecified HF chronicity, unspecified heart failure type ([address] 2/11/2022    Depression 12/9/2015    Endometrial cyst of ovary 5/10/14    RT ovary, ruptured    GERD (gastroesophageal reflux disease) 9/26/2016    Medullary sponge kidney of both kidneys 5/22/2018    Sjogren's disease ([address]     Stress     Swelling      Past Surgical History:   Procedure Laterality Date    BREAST BIOPSY Left 2015? ?    lump removed (benign) (Dr. Timmy Shirley)    BREAST CYST EXCISION Left 2015??     Dr Timmy Shirley (benign)    BREAST LUMPECTOMY      CHOLECYSTECTOMY  2011    HYSTERECTOMY (CERVIX STATUS UNKNOWN)  2014 sept (total)    OVARY REMOVAL Left Jan 2014    UPPER GASTROINTESTINAL ENDOSCOPY  09/16/2016    EGD W/BX    UPPER GASTROINTESTINAL ENDOSCOPY N/A 5/6/2021    ENDOSCOPIC ULTRASOUND with EGD performed by Paul Escalona MD at [facility] 8/1/2022    EGD DIAGNOSTIC ONLY performed by Omaira Hobbs MD at [facility] 8/5/2022    EGD ESOPHAGOGASTRODUODENOSCOPY performed by Paul Escalona MD at [facility]  8/5/2022    ENDOSCOPIC ULTRASOUND performed by Paul [facility]ona MD at [facility]     Family History   Problem Relation Age of Onset    Heart Disease Father 48    Breast Cancer Paternal Aunt         in her 29's    Breast Cancer Paternal Aunt         in her 63's    Cancer Maternal Grandmother         breast    Breast Cancer Maternal Grandmother         in her 63's    Breast Cancer Paternal Grandmother         in her 63's    Heart Disease Other         mom and dad's side    Colon Cancer Neg Hx      Social History     Socioeconomic History    Marital status: Single     Spouse name: None    Number of children: None    Years of education: None    Highest education level: None   Tobacco Use    Smoking status: Never    Smokeless tobacco: Never   Vaping Use    Vaping Use: Never used   Substance and Sexual Activity    Alcohol use: No     Alcohol/week: 0.0 standard drinks     Comment: no alcohol since 2011    Drug use: No   Social History Narrative    ** Merged History Encounter **          Social Determinants of Health     Financial Resource Strain: Low Risk     Difficulty of Paying Living Expenses: Not hard at all   Food Insecurity: No Food Insecurity    Worried About Running Out of Food in the Last Year: Never true    Ran Out of Food in the Last Year: Never true   Transportation Needs: No Transportation Needs    Lack of Transportation (Medical): No    Lack of Transportation (Non-Medical):  No   Physical Activity: Inactive    Days of Exercise per Week: 0 days    Minutes of Exercise per Session: 10 min   Stress: Stress Concern Present Feeling of Stress : Rather much   Social Connections: Unknown    Frequency of Communication with Friends and Family: Twice a week    Frequency of Social Gatherings with Friends and Family: Never    Active Member of Clubs or Organizations: No    Attends Club or Organization Meetings: Never    Marital Status: Never    Housing Stability: Unknown    Unable to Pay for Housing in the Last Year: No    Unstable Housing in the Last Year: No     Current Outpatient Medications on File Prior to Visit   Medication Sig Dispense Refill    furosemide (LASIX) 20 MG tablet TAKE ONE TABLET BY MOUTH TWO TIMES A DAY 60 tablet 0    amphetamine-dextroamphetamine (ADDERALL XR) 30 MG extended release capsule Take 1 capsule by mouth in the morning and 1 capsule in the evening. Do all this for 30 days. 60 capsule 0    hydrocortisone (CORTEF) 10 MG tablet TAKE 1 AND 1/2 TABLETS BY MOUTH ONCE DAILY      ketorolac (TORADOL) 10 MG tablet Take 1 tablet by mouth every 6 hours as needed for Pain 20 tablet 0    buPROPion (WELLBUTRIN XL) 300 MG extended release tablet Take 1 tablet by mouth every morning TAKE ONE TABLET BY MOUTH EVERY MORNING 90 tablet 5    hydrocortisone 2.5 % cream Apply topically 2 times daily. 15 g 0    NEXIUM 40 MG delayed release capsule TAKE 1 CAPSULE BY MOUTH DAILY 90 capsule 0    sucralfate (CARAFATE) 1 GM tablet Take 1 tablet by mouth in the morning and 1 tablet at noon and 1 tablet in the evening and 1 tablet before bedtime. 120 tablet 3    MOTEGRITY 2 MG TABS TAKE ONE TABLET BY MOUTH EVERY DAY      traZODone (DESYREL) 150 MG tablet TAKE ONE TABLET BY MOUTH NIGHTLY 30 tablet 5    lidocaine (LIDODERM) 5 % apply one patch as directed once daily to affected area remove patch after twelve hours.       hyoscyamine (LEVSIN/SL) 125 MCG sublingual tablet Place 1 tablet under the tongue every 4 hours as needed for Cramping 90 tablet 3    topiramate (TOPAMAX) 100 MG tablet TAKE ONE TABLET BY MOUTH TWO TIMES A DAY 60 tablet 5 loratadine (CLARITIN) 10 MG tablet Take 1 tablet by mouth daily 30 tablet 5    potassium chloride (KLOR-CON M) 10 MEQ extended release tablet Take 1 tablet by mouth daily 30 tablet 0    liothyronine (CYTOMEL) 25 MCG tablet Take 1 tablet by mouth daily 90 tablet 0    aspirin (ASPIRIN CHILDRENS) 81 MG chewable tablet Take 1 tablet by mouth daily 14 tablet 0    naratriptan (AMERGE) 2.5 MG tablet Take one at onset of severe headache/migraine may repeat x 1 after 4 hours if needed. Maximum 2/day and 2 days/week. valACYclovir (VALTREX) 500 MG tablet Take 1 tablet by mouth daily 30 tablet 5    Magnesium 400 MG TABS Take 1 tablet by mouth daily 30 tablet 0    TRULANCE 3 MG TABS Take 3 mg by mouth daily      ondansetron (ZOFRAN) 4 MG tablet Take 1 tablet by mouth every 8 hours as needed for Nausea or Vomiting 30 tablet 1    azelastine (ASTELIN) 0.1 % nasal spray 1 spray by Nasal route      moxifloxacin (VIGAMOX) 0.5 % ophthalmic solution 1 drop      fluticasone (FLONASE) 50 MCG/ACT nasal spray USE 1 SPRAY NASALLY DAILY  3    ZOLMitriptan (ZOMIG) 5 MG tablet TAKE 1 TABLET BY MOUTH AT ONSET OF MIGRAINE. MAY REPEAT ONCE AFTER 2 HOURS. MAX 10 MG (2 TABLETS) PER DAY  11    levothyroxine (SYNTHROID) 25 MCG tablet Take one daily 90 tablet 1    hydroxychloroquine (PLAQUENIL) 200 MG tablet Take 300 mg by mouth daily       [DISCONTINUED] midodrine (PROAMATINE) 10 MG tablet Take 10 mg by mouth as needed      [DISCONTINUED] NEXIUM 40 MG delayed release capsule TAKE ONE CAPSULE BY MOUTH EVERY DAY 30 capsule 5    [DISCONTINUED] topiramate (TOPAMAX) 100 MG tablet Take 1 tablet by mouth 2 times daily 60 tablet 5    [DISCONTINUED] furosemide (LASIX) 20 MG tablet Take 1 tablet by mouth 2 times daily TAKE ONE TABLET BY MOUTH TWO TIMES A DAY 60 tablet 5     No current facility-administered medications on file prior to visit. No Known Allergies    Review of Systems   Constitutional:  Positive for appetite change and fatigue. Gastrointestinal:  Positive for constipation and nausea. Musculoskeletal:  Positive for myalgias. Skin:  Positive for pallor. Neurological:  Positive for weakness. Negative for headaches. Psychiatric/Behavioral:          Mental fog     Objective  Vitals:    09/28/22 1409   BP: 92/64   Pulse: 84   SpO2: 98%   Weight: 109 lb (49.4 kg)   Height: 5' 2\" (1.575 m)     Physical Exam  Vitals reviewed. Constitutional:       Appearance: She is normal weight. She is ill-appearing. HENT:      Head: Normocephalic and atraumatic. Mouth/Throat:      Mouth: Mucous membranes are dry. Eyes:      Extraocular Movements: Extraocular movements intact. Conjunctiva/sclera: Conjunctivae normal.   Cardiovascular:      Rate and Rhythm: Normal rate and regular rhythm. Pulses: Normal pulses. Heart sounds: Normal heart sounds. Pulmonary:      Effort: Pulmonary effort is normal.      Breath sounds: Normal breath sounds. Abdominal:      General: Abdomen is flat. Bowel sounds are normal.      Palpations: Abdomen is soft. Musculoskeletal:         General: Normal range of motion. Cervical back: Normal range of motion and neck supple. Skin:     General: Skin is warm and dry. Neurological:      General: No focal deficit present. Mental Status: She is alert and oriented to person, place, and time. Mental status is at baseline. Psychiatric:         Mood and Affect: Mood normal.         Behavior: Behavior normal.       Assessment & Plan     Diagnosis Orders   1. Sjogren's syndrome, with unspecified organ involvement ([facility]  Follow up with rheumatology  Start steroids for flare up       2. Myalgia  CK      3. B12 deficiency  cyanocobalamin injection 1,000 mcg      4. Hamstring tendonitis of right thigh  methylPREDNISolone (MEDROL DOSEPACK) 4 MG tablet      5.  Vertigo  meclizine (ANTIVERT) 25 MG tablet            Orders Placed This Encounter   Procedures    CK     Standing Status:   Future     Number of Occurrences:   1     Standing Expiration Date:   9/28/2023       Orders Placed This Encounter   Medications    cyanocobalamin injection 1,000 mcg    methylPREDNISolone (MEDROL DOSEPACK) 4 MG tablet     Sig: On days 1-6 take as directed on package for first 6-day course. On days 7-12 take as directed on (second) package for (second) 6-day course. Dispense:  42 tablet     Refill:  0    meclizine (ANTIVERT) 25 MG tablet     Sig: Take 1 tablet by mouth 3 times daily as needed for Dizziness     Dispense:  30 tablet     Refill:  0       There are no discontinued medications. Return in about 4 weeks (around 10/26/2022), or if symptoms worsen or fail to improve. Side effects, adverse effects of the medication prescribed today, as well as treatment plan/ rationale and result expectations have been discussed with the patient who expresses understanding and desires to proceed. Close follow up to evaluate treatment results and for coordination of care. I have reviewed the patient's medical history in detail and updated the computerized patient record. As always, patient is advised that if symptoms worsen in any way they will proceed to the nearest emergency room.         Minus ORTIZ Nelson - CNP

## 2022-09-29 NOTE — TELEPHONE ENCOUNTER
From: Israel Hoskins  To: Treasure Girish  Sent: 9/28/2022 7:24 PM EDT  Subject: Question regarding CK    I see the level is 151, last test in 2021 was 85, I just feel something is changing or getting worse. Janet never felt this awful.

## 2022-10-06 ENCOUNTER — HOSPITAL ENCOUNTER (OUTPATIENT)
Dept: ULTRASOUND IMAGING | Age: 52
Discharge: HOME | End: 2022-10-08
Payer: MEDICARE

## 2022-10-06 DIAGNOSIS — R10.9 ABDOMINAL PAIN, UNSPECIFIED ABDOMINAL LOCATION: ICD-10-CM

## 2022-10-06 PROCEDURE — 76700 US EXAM ABDOM COMPLETE: CPT

## 2022-10-13 ENCOUNTER — TELEPHONE (OUTPATIENT)
Dept: FAMILY MEDICINE CLINIC | Age: 52
End: 2022-10-13

## 2022-10-13 ENCOUNTER — PATIENT MESSAGE (OUTPATIENT)
Dept: FAMILY MEDICINE CLINIC | Age: 52
End: 2022-10-13

## 2022-10-13 NOTE — TELEPHONE ENCOUNTER
Pt is stating that [facility] in [facility] worth. Pt is stating she is having a lot of pressure and pain in the lower abdomin.     [phone]

## 2022-10-13 NOTE — TELEPHONE ENCOUNTER
From: Brayan Ng  To: Bandar Price  Sent: 10/13/2022 12:02 PM EDT  Subject: Dayanna Sheth    Can this count as my urine sample  Just did it

## 2022-10-14 DIAGNOSIS — N30.00 ACUTE CYSTITIS WITHOUT HEMATURIA: Primary | ICD-10-CM

## 2022-10-14 RX ORDER — CIPROFLOXACIN 500 MG/1
500 TABLET, FILM COATED ORAL 2 TIMES DAILY
Qty: 14 TABLET | Refills: 0 | Status: SHIPPED | OUTPATIENT
Start: 2022-10-14 | End: 2022-10-21

## 2022-10-18 ENCOUNTER — CARE COORDINATION (OUTPATIENT)
Dept: CARE COORDINATION | Age: 52
End: 2022-10-18

## 2022-10-18 NOTE — CARE COORDINATION
Ambulatory Care Coordination Note  10/18/2022    ACC: Karol Diaz, DARWIN    I returned Sisi's call  She tells me that she has not been feeling good for so long   She says that she cannot seem to get a handle on what is happening. Her biggest concerns are increasing fatigue no energy bone deformity of hands and pockets of inflammation which are related to her Sjogren's  She is being started on Cosentyx to see if this helps to alleviate the symptoms     Offered patient enrollment in the Remote Patient Monitoring (RPM) program for in-home monitoring: NA. Lab Results       None            Care Coordination Interventions    Referral from Primary Care Provider: No  Suggested Interventions and Community Resources  Disease Association: In Process  Pharmacist: Declined  Registered Dietician: Declined  Zone Management Tools: In Process  Other Services or Interventions: Pharmacy, Dietician referral declined. Walk in Clinic hours and usage discussed CHF zone tool education inititated. Goals Addressed    None         Prior to Admission medications    Medication Sig Start Date End Date Taking? Authorizing Provider   promethazine (PHENERGAN) 25 MG tablet Take 1 tablet by mouth 4 times daily as needed for Nausea 10/18/22 10/25/22  Christel Opitz, APRN - CNP   meclizine (ANTIVERT) 25 MG tablet Take 1 tablet by mouth 3 times daily as needed for Dizziness 10/18/22 10/28/22  Christel Opitz, APRN - CNP   ciprofloxacin (CIPRO) 500 MG tablet Take 1 tablet by mouth 2 times daily for 7 days 10/14/22 10/21/22  Wilfredo Mora,    methylPREDNISolone (MEDROL DOSEPACK) 4 MG tablet On days 1-6 take as directed on package for first 6-day course. On days 7-12 take as directed on (second) package for (second) 6-day course.  9/28/22   Christel Opitz, APRN - CNP   furosemide (LASIX) 20 MG tablet TAKE ONE TABLET BY MOUTH TWO TIMES A DAY 9/26/22   Christel Opitz, APRN - CNP   amphetamine-dextroamphetamine (ADDERALL XR) 30 MG extended release capsule Take 1 capsule by mouth in the morning and 1 capsule in the evening. Do all this for 30 days. 9/22/22 10/22/22  ORTIZ Sanchez CNP   hydrocortisone (CORTEF) 10 MG tablet TAKE 1 AND 1/2 TABLETS BY MOUTH ONCE DAILY 8/12/22   Historical Provider, MD   ketorolac (TORADOL) 10 MG tablet Take 1 tablet by mouth every 6 hours as needed for Pain 9/3/22   Stephenie Vickers MD   buPROPion (WELLBUTRIN XL) 300 MG extended release tablet Take 1 tablet by mouth every morning TAKE ONE TABLET BY MOUTH EVERY MORNING 8/24/22   ORTIZ Sanchez CNP   hydrocortisone 2.5 % cream Apply topically 2 times daily. 8/19/22   Ashlee Schilling DO   NEXIUM 40 MG delayed release capsule TAKE 1 CAPSULE BY MOUTH DAILY 8/11/22   ORTIZ Sanchez CNP   sucralfate (CARAFATE) 1 GM tablet Take 1 tablet by mouth in the morning and 1 tablet at noon and 1 tablet in the evening and 1 tablet before bedtime. 8/3/22   Cucok FavorsORTIZ CNP   MOTEGRITY 2 MG TABS TAKE ONE TABLET BY MOUTH EVERY DAY 7/4/22   Historical Provider, MD   traZODone (DESYREL) 150 MG tablet TAKE ONE TABLET BY MOUTH NIGHTLY 6/16/22   ORTIZ Sanchez CNP   lidocaine (LIDODERM) 5 % apply one patch as directed once daily to affected area remove patch after twelve hours.  5/17/22   Historical Provider, MD   midodrine (PROAMATINE) 10 MG tablet Take 10 mg by mouth as needed 4/12/22 8/1/22  Historical Provider, MD   hyoscyamine (LEVSIN/SL) 125 MCG sublingual tablet Place 1 tablet under the tongue every 4 hours as needed for Cramping 4/7/22   ORTIZ Sanchez CNP   topiramate (TOPAMAX) 100 MG tablet TAKE ONE TABLET BY MOUTH TWO TIMES A DAY 3/3/22   ORTIZ Sanchez CNP   loratadine (CLARITIN) 10 MG tablet Take 1 tablet by mouth daily 12/28/21   ORTIZ Sanchez CNP   potassium chloride (KLOR-CON M) 10 MEQ extended release tablet Take 1 tablet by mouth daily 12/8/21   Meghan Gloria PA-C   liothyronine (CYTOMEL) 25 MCG tablet Take 1 tablet by mouth daily 7/6/21   ORTIZ Patel CNP   aspirin (ASPIRIN CHILDRENS) 81 MG chewable tablet Take 1 tablet by mouth daily 6/24/21   TRUDY Rosas   naratriptan (AMERGE) 2.5 MG tablet Take one at onset of severe headache/migraine may repeat x 1 after 4 hours if needed. Maximum 2/day and 2 days/week. 6/2/21   Historical Provider, MD   valACYclovir (VALTREX) 500 MG tablet Take 1 tablet by mouth daily 2/15/21   ORTIZ Patel CNP   NEXIUM 40 MG delayed release capsule TAKE ONE CAPSULE BY MOUTH EVERY DAY 11/2/20   ORTIZ Patel CNP   topiramate (TOPAMAX) 100 MG tablet Take 1 tablet by mouth 2 times daily 10/11/20   ORTIZ Patel CNP   Magnesium 400 MG TABS Take 1 tablet by mouth daily 10/3/20   Nicci Verma PA-C   furosemide (LASIX) 20 MG tablet Take 1 tablet by mouth 2 times daily TAKE ONE TABLET BY MOUTH TWO TIMES A DAY 8/4/20   ORTIZ Patel CNP   TRULANCE 3 MG TABS Take 3 mg by mouth daily 3/10/20   Historical Provider, MD   ondansetron (ZOFRAN) 4 MG tablet Take 1 tablet by mouth every 8 hours as needed for Nausea or Vomiting 2/25/20   ORTIZ Patel CNP   azelastine (ASTELIN) 0.1 % nasal spray 1 spray by Nasal route 11/27/19   Historical Provider, MD   moxifloxacin (VIGAMOX) 0.5 % ophthalmic solution 1 drop    Historical Provider, MD   fluticasone (FLONASE) 50 MCG/ACT nasal spray USE 1 SPRAY NASALLY DAILY 5/15/19   Historical Provider, MD   ZOLMitriptan (ZOMIG) 5 MG tablet TAKE 1 TABLET BY MOUTH AT ONSET OF MIGRAINE. MAY REPEAT ONCE AFTER 2 HOURS. MAX 10 MG (2 TABLETS) PER DAY 6/4/18   Historical Provider, MD   levothyroxine (SYNTHROID) 25 MCG tablet Take one daily 4/29/18   ORTIZ Patel CNP   hydroxychloroquine (PLAQUENIL) 200 MG tablet Take 300 mg by mouth daily     Historical Provider, MD       No future appointments.

## 2022-10-24 ENCOUNTER — PATIENT MESSAGE (OUTPATIENT)
Dept: FAMILY MEDICINE CLINIC | Age: 52
End: 2022-10-24

## 2022-10-24 DIAGNOSIS — F98.8 ATTENTION DEFICIT DISORDER, UNSPECIFIED HYPERACTIVITY PRESENCE: ICD-10-CM

## 2022-10-24 DIAGNOSIS — Z76.0 MEDICATION REFILL: ICD-10-CM

## 2022-10-24 RX ORDER — DEXTROAMPHETAMINE SACCHARATE, AMPHETAMINE ASPARTATE MONOHYDRATE, DEXTROAMPHETAMINE SULFATE AND AMPHETAMINE SULFATE 7.5; 7.5; 7.5; 7.5 MG/1; MG/1; MG/1; MG/1
30 CAPSULE, EXTENDED RELEASE ORAL 2 TIMES DAILY
Qty: 60 CAPSULE | Refills: 0 | Status: SHIPPED | OUTPATIENT
Start: 2022-10-24 | End: 2022-10-27 | Stop reason: SDUPTHER

## 2022-10-24 NOTE — TELEPHONE ENCOUNTER
From: Mile [place]ves  To: Adeel Lorenz  Sent: 10/24/2022 12:41 PM EDT  Subject: Adderall refill    I dont know why med page isnt coming up as usual but can you please refill my adderall to [facility] in [place]. Thank you  Things are the same, doing the injection but tons of bruising everywhere no where near injection site.

## 2022-10-27 ENCOUNTER — TELEPHONE (OUTPATIENT)
Dept: GASTROENTEROLOGY | Age: 52
End: 2022-10-27

## 2022-10-27 DIAGNOSIS — F98.8 ATTENTION DEFICIT DISORDER, UNSPECIFIED HYPERACTIVITY PRESENCE: ICD-10-CM

## 2022-10-27 DIAGNOSIS — Z86.010 HISTORY OF COLON POLYPS: Primary | ICD-10-CM

## 2022-10-27 DIAGNOSIS — Z76.0 MEDICATION REFILL: ICD-10-CM

## 2022-10-27 RX ORDER — DEXTROAMPHETAMINE SACCHARATE, AMPHETAMINE ASPARTATE MONOHYDRATE, DEXTROAMPHETAMINE SULFATE AND AMPHETAMINE SULFATE 7.5; 7.5; 7.5; 7.5 MG/1; MG/1; MG/1; MG/1
30 CAPSULE, EXTENDED RELEASE ORAL 2 TIMES DAILY
Qty: 60 CAPSULE | Refills: 0 | Status: SHIPPED | OUTPATIENT
Start: 2022-10-27 | End: 2022-11-28 | Stop reason: SDUPTHER

## 2022-10-27 RX ORDER — SODIUM PICOSULFATE, MAGNESIUM OXIDE, AND ANHYDROUS CITRIC ACID 10; 3.5; 12 MG/160ML; G/160ML; G/160ML
LIQUID ORAL
Qty: 320 ML | Refills: 0 | Status: SHIPPED | OUTPATIENT
Start: 2022-10-27 | End: 2022-10-27

## 2022-10-27 RX ORDER — SOD SULF/POT CHLORIDE/MAG SULF 1.479 G
24 TABLET ORAL ONCE
Qty: 24 TABLET | Refills: 0 | Status: SHIPPED | OUTPATIENT
Start: 2022-10-27 | End: 2022-10-27

## 2022-10-27 NOTE — TELEPHONE ENCOUNTER
Patient called stating she is due for a colonoscopy. Can you place order and send prep to pharmacy.  Thanks

## 2022-10-27 NOTE — TELEPHONE ENCOUNTER
Sutab prescription sent to her pharmacy. Also, please let patient know we have a drug card/coupon card for Sutab. If it is going to cost her more than $40 she can come and pick the card up at our office and in our sample closet. Thank you.

## 2022-10-27 NOTE — TELEPHONE ENCOUNTER
Called patient to go over prep instructions she states she can't handle the Clenpiq she vomits with all liquid prep. She wants to know if you can send Sutab instead. Please advise.  Thanks

## 2022-10-27 NOTE — TELEPHONE ENCOUNTER
Order placed. Clenpiq sent to pharmacy. Please provide patient with Clenpiq instructions. Thank you.

## 2022-10-27 NOTE — TELEPHONE ENCOUNTER
Pt was prescribed adderall and it was sent [facility] in [place], but they are out of stock and on back order with no news of when they are getting it back. Pt would like to know if she can have it resent to [facility] in [facility]?       - [phone]

## 2022-11-03 ENCOUNTER — TELEMEDICINE (OUTPATIENT)
Dept: FAMILY MEDICINE CLINIC | Age: 52
End: 2022-11-03
Payer: MEDICARE

## 2022-11-03 DIAGNOSIS — R10.12 LEFT UPPER QUADRANT ABDOMINAL PAIN: Primary | ICD-10-CM

## 2022-11-03 PROCEDURE — 99213 OFFICE O/P EST LOW 20 MIN: CPT | Performed by: NURSE PRACTITIONER

## 2022-11-03 PROCEDURE — G8427 DOCREV CUR MEDS BY ELIG CLIN: HCPCS | Performed by: NURSE PRACTITIONER

## 2022-11-03 RX ORDER — SECUKINUMAB 150 MG/ML
INJECTION SUBCUTANEOUS WEEKLY
COMMUNITY

## 2022-11-03 ASSESSMENT — ENCOUNTER SYMPTOMS
SHORTNESS OF BREATH: 0
COUGH: 0
NAUSEA: 1
CONSTIPATION: 1
ABDOMINAL PAIN: 1

## 2022-11-03 NOTE — PROGRESS NOTES
11/3/2022    TELEHEALTH EVALUATION -- Audio/Visual (During WNVEP-68 public health emergency)    Due to COVID 19 outbreak, patient's office visit was converted to a virtual visit. Patient was contacted and agreed to proceed with a virtual visit via [email or web address] me  The risks and benefits of converting to a virtual visit were discussed in light of the current infectious disease epidemic. Patient also understood that insurance coverage and co-pays are up to their individual insurance plans. HPI:    Toya Ortega (:  1970) has requested an audio/video evaluation for the following concern(s):    Having a lot of pain in the area on the left side of the abdomen. Feels like there are two bulges. Hurts when swallowing. Hurts even when breathing. Nothing is coming out in regards to stool but nothing is coming out. Having a lot of nausea. Pt very uncomfortable. Has significant hx of abdominal issues. Review of Systems   Constitutional:  Positive for fatigue. Negative for fever. Respiratory:  Negative for cough and shortness of breath. Cardiovascular:  Negative for chest pain. Gastrointestinal:  Positive for abdominal pain, constipation and nausea. Prior to Visit Medications    Medication Sig Taking? Authorizing Provider   Secukinumab, 300 MG Dose, (COSENTYX, 300 MG DOSE,) 150 MG/ML SOSY Inject into the skin once a week Yes Historical Provider, MD   amphetamine-dextroamphetamine (ADDERALL XR) 30 MG extended release capsule Take 1 capsule by mouth in the morning and 1 capsule in the evening. Do all this for 30 days. Yes ORTIZ Patel CNP   methylPREDNISolone (MEDROL DOSEPACK) 4 MG tablet On days 1-6 take as directed on package for first 6-day course. On days 7-12 take as directed on (second) package for (second) 6-day course.  Yes ORTIZ Patel CNP   furosemide (LASIX) 20 MG tablet TAKE ONE TABLET BY MOUTH TWO TIMES A DAY Yes ORTIZ Patel CNP   hydrocortisone (CORTEF) 10 MG tablet TAKE 1 AND 1/2 TABLETS BY MOUTH ONCE DAILY Yes Historical Provider, MD   ketorolac (TORADOL) 10 MG tablet Take 1 tablet by mouth every 6 hours as needed for Pain Yes Tammie Dobbins MD   buPROPion (WELLBUTRIN XL) 300 MG extended release tablet Take 1 tablet by mouth every morning TAKE ONE TABLET BY MOUTH EVERY MORNING Yes ORTIZ Esparza CNP   hydrocortisone 2.5 % cream Apply topically 2 times daily. Yes Eleonora Lewis DO   NEXIUM 40 MG delayed release capsule TAKE 1 CAPSULE BY MOUTH DAILY Yes ORTIZ Esparza CNP   sucralfate (CARAFATE) 1 GM tablet Take 1 tablet by mouth in the morning and 1 tablet at noon and 1 tablet in the evening and 1 tablet before bedtime. Yes ORTIZ London CNP   MOTEGRITY 2 MG TABS TAKE ONE TABLET BY MOUTH EVERY DAY Yes Historical Provider, MD   traZODone (DESYREL) 150 MG tablet TAKE ONE TABLET BY MOUTH NIGHTLY Yes ORTIZ Esparza CNP   lidocaine (LIDODERM) 5 % apply one patch as directed once daily to affected area remove patch after twelve hours. Yes Historical Provider, MD   hyoscyamine (LEVSIN/SL) 125 MCG sublingual tablet Place 1 tablet under the tongue every 4 hours as needed for Cramping Yes ORTIZ Esparza CNP   topiramate (TOPAMAX) 100 MG tablet TAKE ONE TABLET BY MOUTH TWO TIMES A DAY Yes ORTIZ Esparza CNP   loratadine (CLARITIN) 10 MG tablet Take 1 tablet by mouth daily Yes ORTIZ Esparza CNP   potassium chloride (KLOR-CON M) 10 MEQ extended release tablet Take 1 tablet by mouth daily Yes Alka Mo PA-C   liothyronine (CYTOMEL) 25 MCG tablet Take 1 tablet by mouth daily Yes ORTIZ Esparza CNP   aspirin (ASPIRIN CHILDRENS) 81 MG chewable tablet Take 1 tablet by mouth daily Yes TRUDY Merritt   naratriptan (AMERGE) 2.5 MG tablet Take one at onset of severe headache/migraine may repeat x 1 after 4 hours if needed. Maximum 2/day and 2 days/week.  Yes Historical Provider, MD   valACYclovir (VALTREX) 500 MG tablet Take 1 tablet by mouth daily Yes ORTIZ Rios CNP   Magnesium 400 MG TABS Take 1 tablet by mouth daily Yes Mariam Giang PA-C   TRULANCE 3 MG TABS Take 3 mg by mouth daily Yes Historical Provider, MD   ondansetron (ZOFRAN) 4 MG tablet Take 1 tablet by mouth every 8 hours as needed for Nausea or Vomiting Yes ORTIZ Rios CNP   azelastine (ASTELIN) 0.1 % nasal spray 1 spray by Nasal route Yes Historical Provider, MD   moxifloxacin (VIGAMOX) 0.5 % ophthalmic solution 1 drop Yes Historical Provider, MD   fluticasone (FLONASE) 50 MCG/ACT nasal spray USE 1 SPRAY NASALLY DAILY Yes Historical Provider, MD   ZOLMitriptan (ZOMIG) 5 MG tablet TAKE 1 TABLET BY MOUTH AT ONSET OF MIGRAINE. MAY REPEAT ONCE AFTER 2 HOURS.  MAX 10 MG (2 TABLETS) PER DAY Yes Historical Provider, MD   levothyroxine (SYNTHROID) 25 MCG tablet Take one daily Yes ORTIZ Rios CNP   hydroxychloroquine (PLAQUENIL) 200 MG tablet Take 300 mg by mouth daily  Yes Historical Provider, MD   midodrine (PROAMATINE) 10 MG tablet Take 10 mg by mouth as needed  Historical Provider, MD   NEXIUM 40 MG delayed release capsule TAKE ONE CAPSULE BY MOUTH EVERY DAY  ORTIZ Rios CNP   topiramate (TOPAMAX) 100 MG tablet Take 1 tablet by mouth 2 times daily  ORTIZ Rios CNP   furosemide (LASIX) 20 MG tablet Take 1 tablet by mouth 2 times daily TAKE ONE TABLET BY MOUTH TWO TIMES A DAY  ORTIZ Rios CNP       Social History     Tobacco Use    Smoking status: Never    Smokeless tobacco: Never   Vaping Use    Vaping Use: Never used   Substance Use Topics    Alcohol use: No     Alcohol/week: 0.0 standard drinks     Comment: no alcohol since 2011    Drug use: No        No Known Allergies,   Past Medical History:   Diagnosis Date    Acquired hypothyroidism 9/26/2016    Acute left-sided low back pain with bilateral sciatica 3/9/2022    Acute pancreatitis     ADD (attention deficit disorder) 2/12/2015 Anxiety     Congestive heart failure, unspecified HF chronicity, unspecified heart failure type ([facility] [address] 2/11/2022    Depression 12/9/2015    Endometrial cyst of ovary 5/10/14    RT ovary, ruptured    GERD (gastroesophageal reflux disease) 9/26/2016    Medullary sponge kidney of both kidneys 5/22/2018    Sjogren's disease ([facility] [address]     Stress     Swelling    ,   Past Surgical History:   Procedure Laterality Date    BREAST BIOPSY Left 2015? ?    lump removed (benign) (Dr. Ermelinda Vazquez)    BREAST CYST EXCISION Left 2015??     Dr Ermelinda Vazquez (benign)    BREAST LUMPECTOMY      CHOLECYSTECTOMY  2011    HYSTERECTOMY (CERVIX STATUS UNKNOWN)  2014 sept (total)    OVARY REMOVAL Left Jan 2014    UPPER GASTROINTESTINAL ENDOSCOPY  09/16/2016    EGD W/BX    UPPER GASTROINTESTINAL ENDOSCOPY N/A 5/6/2021    ENDOSCOPIC ULTRASOUND with EGD performed by Melba Rice MD at [address] [place] [address] 8/1/2022    EGD DIAGNOSTIC ONLY performed by Denae Álvarez MD at [address] [place] [address] 8/5/2022    EGD ESOPHAGOGASTRODUODENOSCOPY performed by Melba Rice MD at [address] [place] [address]  8/5/2022    ENDOSCOPIC ULTRASOUND performed by Melba Rice MD at [facility]   ,   Social History     Tobacco Use    Smoking status: Never    Smokeless tobacco: Never   Vaping Use    Vaping Use: Never used   Substance Use Topics    Alcohol use: No     Alcohol/week: 0.0 standard drinks     Comment: no alcohol since 2011    Drug use: No   ,   Family History   Problem Relation Age of Onset    Heart Disease Father 48    Breast Cancer Paternal Aunt         in her 29's    Breast Cancer Paternal Aunt         in her 63's    Cancer Maternal Grandmother         breast    Breast Cancer Maternal Grandmother         in her 63's    Breast Cancer Paternal Grandmother         in her 63's    Heart Disease Other         mom and dad's side    Colon Cancer Neg Hx        PHYSICAL EXAMINATION:  [ INSTRUCTIONS:  \"[x]\" Indicates a positive item  \"[]\" Indicates a negative item  -- DELETE ALL ITEMS NOT EXAMINED]  [x] Alert  [x] Oriented to person/place/time    [x] No apparent distress  [] Toxic appearing    [] Face flushed appearing [] Sclera clear  [] Lips are cyanotic      [x] Breathing appears normal  [] Appears tachypneic      [] Rash on visible skin    [] Cranial Nerves II-XII grossly intact    [] Motor grossly intact in visible upper extremities    [] Motor grossly intact in visible lower extremities    [x] Normal Mood  [] Anxious appearing    [] Depressed appearing  [] Confused appearing      [] Poor short term memory  [] Poor long term memory    [] OTHER:      Due to this being a TeleHealth encounter, evaluation of the following organ systems is limited: Vitals/Constitutional/EENT/Resp/CV/GI//MS/Neuro/Skin/Heme-Lymph-Imm. ASSESSMENT/PLAN:  1. Left upper quadrant abdominal pain    - CT ABDOMEN PELVIS W IV CONTRAST Additional Contrast? Radiologist Recommendation; Future    We will fu after imaging    Side effects, adverse effects of the medication prescribed today, as well as treatment plan/ rationale and result expectations have been discussed with the patient who expresses understanding and desires to proceed. Close follow up to evaluate treatment results and for coordination of care. I have reviewed the patient's medical history in detail and updated the computerized patient record. As always, patient is advised that if symptoms worsen in any way they will proceed to the nearest emergency room. No follow-ups on file. An  electronic signature was used to authenticate this note.     --ORTIZ Sweet - CNP on 11/3/2022 at 1:25 PM        Pursuant to the emergency declaration under the [facility] waiver authority and the [employer] Act, this Virtual  Visit was conducted, with patient's consent, to reduce the patient's risk of exposure to COVID-19 and provide continuity of care for an established patient. Services were provided through a video synchronous discussion virtually to substitute for in-person clinic visit.

## 2022-11-04 ENCOUNTER — HOSPITAL ENCOUNTER (OUTPATIENT)
Dept: CT IMAGING | Age: 52
Discharge: HOME | End: 2022-11-06
Payer: MEDICARE

## 2022-11-04 DIAGNOSIS — R10.12 LEFT UPPER QUADRANT ABDOMINAL PAIN: ICD-10-CM

## 2022-11-04 PROCEDURE — G1010 CDSM STANSON: HCPCS

## 2022-11-04 PROCEDURE — A4641 RADIOPHARM DX AGENT NOC: HCPCS | Performed by: NURSE PRACTITIONER

## 2022-11-04 PROCEDURE — 6360000004 HC RX CONTRAST MEDICATION: Performed by: NURSE PRACTITIONER

## 2022-11-04 RX ADMIN — BARIUM SULFATE 450 ML: 20 SUSPENSION ORAL at 09:35

## 2022-11-04 RX ADMIN — IOPAMIDOL 50 ML: 612 INJECTION, SOLUTION INTRAVENOUS at 09:34

## 2022-11-15 ENCOUNTER — PATIENT MESSAGE (OUTPATIENT)
Dept: FAMILY MEDICINE CLINIC | Age: 52
End: 2022-11-15

## 2022-11-15 ENCOUNTER — TELEMEDICINE (OUTPATIENT)
Dept: FAMILY MEDICINE CLINIC | Age: 52
End: 2022-11-15
Payer: MEDICARE

## 2022-11-15 DIAGNOSIS — J02.9 ACUTE PHARYNGITIS, UNSPECIFIED ETIOLOGY: ICD-10-CM

## 2022-11-15 DIAGNOSIS — G47.00 INSOMNIA, UNSPECIFIED TYPE: ICD-10-CM

## 2022-11-15 DIAGNOSIS — K21.9 GASTROESOPHAGEAL REFLUX DISEASE WITHOUT ESOPHAGITIS: ICD-10-CM

## 2022-11-15 DIAGNOSIS — J06.9 VIRAL URI: Primary | ICD-10-CM

## 2022-11-15 DIAGNOSIS — B96.89 ACUTE BACTERIAL TONSILLITIS: Primary | ICD-10-CM

## 2022-11-15 DIAGNOSIS — J03.80 ACUTE BACTERIAL TONSILLITIS: Primary | ICD-10-CM

## 2022-11-15 LAB
INFLUENZA A ANTIBODY: NORMAL
INFLUENZA B ANTIBODY: NORMAL
S PYO AG THROAT QL: NORMAL

## 2022-11-15 PROCEDURE — 87804 INFLUENZA ASSAY W/OPTIC: CPT | Performed by: NURSE PRACTITIONER

## 2022-11-15 PROCEDURE — 87880 STREP A ASSAY W/OPTIC: CPT | Performed by: NURSE PRACTITIONER

## 2022-11-15 PROCEDURE — 99213 OFFICE O/P EST LOW 20 MIN: CPT | Performed by: NURSE PRACTITIONER

## 2022-11-15 PROCEDURE — 87426 SARSCOV CORONAVIRUS AG IA: CPT | Performed by: NURSE PRACTITIONER

## 2022-11-15 PROCEDURE — G8427 DOCREV CUR MEDS BY ELIG CLIN: HCPCS | Performed by: NURSE PRACTITIONER

## 2022-11-15 RX ORDER — AMOXICILLIN 500 MG/1
500 CAPSULE ORAL 2 TIMES DAILY
Qty: 20 CAPSULE | Refills: 0 | Status: SHIPPED | OUTPATIENT
Start: 2022-11-15 | End: 2022-11-25

## 2022-11-15 RX ORDER — FLUTICASONE PROPIONATE 50 MCG
1 SPRAY, SUSPENSION (ML) NASAL DAILY
Qty: 16 G | Refills: 2 | Status: SHIPPED | OUTPATIENT
Start: 2022-11-15

## 2022-11-15 ASSESSMENT — ENCOUNTER SYMPTOMS
COUGH: 1
SORE THROAT: 1
SHORTNESS OF BREATH: 0
CHEST TIGHTNESS: 1

## 2022-11-15 NOTE — TELEPHONE ENCOUNTER
From: Chandler Alston  To: Krystal Pace  Sent: 11/15/2022 1:58 PM EST  Subject: Throat    This is how big my tonsil is and I cant stand to swallow.

## 2022-11-15 NOTE — PROGRESS NOTES
11/15/2022    TELEHEALTH EVALUATION -- Audio/Visual (During WFVCR-77 public health emergency)    Due to COVID 19 outbreak, patient's office visit was converted to a virtual visit. Patient was contacted and agreed to proceed with a virtual visit via [email or web address] me  The risks and benefits of converting to a virtual visit were discussed in light of the current infectious disease epidemic. Patient also understood that insurance coverage and co-pays are up to their individual insurance plans. HPI:    Gary Buchanan (:  1970) has requested an audio/video evaluation for the following concern(s):    Not feeling well for the past 3 days  Fever, HA  Fatigued  Sore throat- severe  Cough  Congestion  SOB    Ears really bothering her. Using aleve D. Helped minimally. Colonoscopy- . Miserable every time eating. Very painful in lower abdomen. Urge to push all the time and nothing is coming out. Feels like it needs to \"split in half\"     Review of Systems   Constitutional:  Positive for fatigue and fever. HENT:  Positive for congestion, ear pain and sore throat. Respiratory:  Positive for cough and chest tightness. Negative for shortness of breath. Cardiovascular:  Negative for chest pain. Musculoskeletal:  Positive for myalgias. Prior to Visit Medications    Medication Sig Taking? Authorizing Provider   fluticasone (FLONASE) 50 MCG/ACT nasal spray 1 spray by Each Nostril route daily Yes ORTIZ Mott CNP   NEXIUM 40 MG delayed release capsule TAKE 1 CAPSULE BY MOUTH DAILY Yes ORTIZ Mott CNP   Secukinumab, 300 MG Dose, (COSENTYX, 300 MG DOSE,) 150 MG/ML SOSY Inject into the skin once a week Yes Historical Provider, MD   amphetamine-dextroamphetamine (ADDERALL XR) 30 MG extended release capsule Take 1 capsule by mouth in the morning and 1 capsule in the evening. Do all this for 30 days.  Yes ORTIZ Mott CNP   methylPREDNISolone (MEDROL DOSEPACK) 4 MG tablet On days naratriptan (AMERGE) 2.5 MG tablet Take one at onset of severe headache/migraine may repeat x 1 after 4 hours if needed. Maximum 2/day and 2 days/week. Yes Historical Provider, MD   valACYclovir (VALTREX) 500 MG tablet Take 1 tablet by mouth daily Yes ORTIZ Wong CNP   Magnesium 400 MG TABS Take 1 tablet by mouth daily Yes Frankey Grimmer, PA-C   TRULANCE 3 MG TABS Take 3 mg by mouth daily Yes Historical Provider, MD   ondansetron (ZOFRAN) 4 MG tablet Take 1 tablet by mouth every 8 hours as needed for Nausea or Vomiting Yes ORTIZ Wong CNP   azelastine (ASTELIN) 0.1 % nasal spray 1 spray by Nasal route Yes Historical Provider, MD   moxifloxacin (VIGAMOX) 0.5 % ophthalmic solution 1 drop Yes Historical Provider, MD   ZOLMitriptan (ZOMIG) 5 MG tablet TAKE 1 TABLET BY MOUTH AT ONSET OF MIGRAINE. MAY REPEAT ONCE AFTER 2 HOURS.  MAX 10 MG (2 TABLETS) PER DAY Yes Historical Provider, MD   levothyroxine (SYNTHROID) 25 MCG tablet Take one daily Yes ORTIZ Wong CNP   hydroxychloroquine (PLAQUENIL) 200 MG tablet Take 300 mg by mouth daily  Yes Historical Provider, MD   midodrine (PROAMATINE) 10 MG tablet Take 10 mg by mouth as needed  Historical Provider, MD   NEXIUM 40 MG delayed release capsule TAKE ONE CAPSULE BY MOUTH EVERY DAY  ORTIZ Wong CNP   topiramate (TOPAMAX) 100 MG tablet Take 1 tablet by mouth 2 times daily  ORTIZ Wong CNP   furosemide (LASIX) 20 MG tablet Take 1 tablet by mouth 2 times daily TAKE ONE TABLET BY MOUTH TWO TIMES A DAY  ORTIZ Wong CNP       Social History     Tobacco Use    Smoking status: Never    Smokeless tobacco: Never   Vaping Use    Vaping Use: Never used   Substance Use Topics    Alcohol use: No     Alcohol/week: 0.0 standard drinks     Comment: no alcohol since 2011    Drug use: No        No Known Allergies,   Past Medical History:   Diagnosis Date    Acquired hypothyroidism 9/26/2016    Acute left-sided low back pain with bilateral sciatica 3/9/2022    Acute pancreatitis     ADD (attention deficit disorder) 2/12/2015    Anxiety     Congestive heart failure, unspecified HF chronicity, unspecified heart failure type ([facility] [address] 2/11/2022    Depression 12/9/2015    Endometrial cyst of ovary 5/10/14    RT ovary, ruptured    GERD (gastroesophageal reflux disease) 9/26/2016    Medullary sponge kidney of both kidneys 5/22/2018    Sjogren's disease ([facility] [address]     Stress     Swelling    ,   Past Surgical History:   Procedure Laterality Date    BREAST BIOPSY Left 2015? ?    lump removed (benign) (Dr. Orion Landon)    BREAST CYST EXCISION Left 2015??     Dr Orion Landon (benign)    BREAST LUMPECTOMY      CHOLECYSTECTOMY  2011    HYSTERECTOMY (CERVIX STATUS UNKNOWN)  2014 sept (total)    OVARY REMOVAL Left Jan 2014    UPPER GASTROINTESTINAL ENDOSCOPY  09/16/2016    EGD W/BX    UPPER GASTROINTESTINAL ENDOSCOPY N/A 5/6/2021    ENDOSCOPIC ULTRASOUND with EGD performed by Kevin Walker MD at [facility] 8/1/2022    EGD DIAGNOSTIC ONLY performed by Gabriela Walters MD at [facility] 8/5/2022    EGD ESOPHAGOGASTRODUODENOSCOPY performed by Kevin Walker MD at [facility]  8/5/2022    ENDOSCOPIC ULTRASOUND performed by Kevin Walker MD at [facility]   ,   Social History     Tobacco Use    Smoking status: Never    Smokeless tobacco: Never   Vaping Use    Vaping Use: Never used   Substance Use Topics    Alcohol use: No     Alcohol/week: 0.0 standard drinks     Comment: no alcohol since 2011    Drug use: No   ,   Family History   Problem Relation Age of Onset    Heart Disease Father 48    Breast Cancer Paternal Aunt         in her 29's    Breast Cancer Paternal Aunt         in her 63's    Cancer Maternal Grandmother         breast    Breast Cancer Maternal Grandmother         in her 63's    Breast Cancer Paternal Grandmother         in her 63's    Heart Disease Other         mom and dad's side    Colon Cancer Neg Hx        PHYSICAL EXAMINATION:  [ INSTRUCTIONS:  \"[x]\" Indicates a positive item  \"[]\" Indicates a negative item  -- DELETE ALL ITEMS NOT EXAMINED]  [x] Alert  [x] Oriented to person/place/time    [x] No apparent distress  [] Toxic appearing    [] Face flushed appearing [x] Sclera clear  [] Lips are cyanotic      [x] Breathing appears normal  [] Appears tachypneic      [] Rash on visible skin    [] Cranial Nerves II-XII grossly intact    [] Motor grossly intact in visible upper extremities    [] Motor grossly intact in visible lower extremities    [x] Normal Mood  [] Anxious appearing    [] Depressed appearing  [] Confused appearing      [] Poor short term memory  [] Poor long term memory    [] OTHER:      Due to this being a TeleHealth encounter, evaluation of the following organ systems is limited: Vitals/Constitutional/EENT/Resp/CV/GI//MS/Neuro/Skin/Heme-Lymph-Imm. ASSESSMENT/PLAN:  1. Viral URI    - POCT COVID-19, Antigen  - POCT Influenza A/B  - fluticasone (FLONASE) 50 MCG/ACT nasal spray; 1 spray by Each Nostril route daily  Dispense: 16 g; Refill: 2    2. Acute pharyngitis, unspecified etiology    - POCT rapid strep A    3. Gastroesophageal reflux disease without esophagitis    - NEXIUM 40 MG delayed release capsule; TAKE 1 CAPSULE BY MOUTH DAILY  Dispense: 90 capsule; Refill: 1      Side effects, adverse effects of the medication prescribed today, as well as treatment plan/ rationale and result expectations have been discussed with the patient who expresses understanding and desires to proceed. Close follow up to evaluate treatment results and for coordination of care. I have reviewed the patient's medical history in detail and updated the computerized patient record. As always, patient is advised that if symptoms worsen in any way they will proceed to the nearest emergency room. Fu prn.     An  electronic signature was used to authenticate this note. --Neil Murray, ORTIZ - CNP on 11/15/2022 at 11:23 AM        Pursuant to the emergency declaration under the [address] [place] [address], [facility] waiver authority and the [employer] Act, this Virtual  Visit was conducted, with patient's consent, to reduce the patient's risk of exposure to COVID-19 and provide continuity of care for an established patient. Services were provided through a video synchronous discussion virtually to substitute for in-person clinic visit.

## 2022-11-17 RX ORDER — DEXTROMETHORPHAN HYDROBROMIDE AND PROMETHAZINE HYDROCHLORIDE 15; 6.25 MG/5ML; MG/5ML
SYRUP ORAL
Qty: 118 ML | Refills: 0 | Status: SHIPPED | OUTPATIENT
Start: 2022-11-17

## 2022-11-22 ENCOUNTER — CARE COORDINATION (OUTPATIENT)
Dept: CARE COORDINATION | Age: 52
End: 2022-11-22

## 2022-11-22 NOTE — CARE COORDINATION
Attempted to contact patient for care coordination follow up. Unable to reach patient by phone. Unable to leave a message as the voice mailbox is full.   I will follow up with Radha Chaney next week

## 2022-11-23 DIAGNOSIS — Z76.0 MEDICATION REFILL: ICD-10-CM

## 2022-11-23 DIAGNOSIS — F98.8 ATTENTION DEFICIT DISORDER, UNSPECIFIED HYPERACTIVITY PRESENCE: ICD-10-CM

## 2022-11-23 NOTE — TELEPHONE ENCOUNTER
Comments: pt states sore throat isn't better and that her ears are hurting is there anything that could help with these symptoms, was Covid positive 11/17/22, coughing isn't better and the cough syrup is not working. Tested again Got the same result Positive. No one else in the house has tested positive and pt does not get why or how she is still positive and not feeling any better and when at the office got negative results for the flu and sore throat did let provider know that she was positive on Thursday last week and its main just the ears and the sore throat. Last Office Visit (last PCP visit):   11/15/2022    Next Visit Date:  No future appointments. **If hasn't been seen in over a year OR hasn't followed up according to last diabetes/ADHD visit, make appointment for patient before sending refill to provider. Rx requested:  Requested Prescriptions     Pending Prescriptions Disp Refills    amphetamine-dextroamphetamine (ADDERALL XR) 30 MG extended release capsule 60 capsule 0     Sig: Take 1 capsule by mouth in the morning and 1 capsule in the evening. Do all this for 30 days.

## 2022-11-23 NOTE — TELEPHONE ENCOUNTER
Future Appointments    Encounter Information    Provider Department Appt Notes   2/21/2023 ORTIZ Wilson - [facility] ADHD f/u       Past Visits    Date Provider Specialty Visit Type Primary Dx   11/15/2022 ORTIZ Wilson - CNP Family Medicine Telemedicine Viral URI

## 2022-11-28 RX ORDER — DEXTROAMPHETAMINE SACCHARATE, AMPHETAMINE ASPARTATE MONOHYDRATE, DEXTROAMPHETAMINE SULFATE AND AMPHETAMINE SULFATE 7.5; 7.5; 7.5; 7.5 MG/1; MG/1; MG/1; MG/1
30 CAPSULE, EXTENDED RELEASE ORAL 2 TIMES DAILY
Qty: 60 CAPSULE | Refills: 0 | Status: SHIPPED | OUTPATIENT
Start: 2022-11-28 | End: 2022-12-28

## 2022-11-28 RX ORDER — DEXTROAMPHETAMINE SACCHARATE, AMPHETAMINE ASPARTATE MONOHYDRATE, DEXTROAMPHETAMINE SULFATE AND AMPHETAMINE SULFATE 7.5; 7.5; 7.5; 7.5 MG/1; MG/1; MG/1; MG/1
30 CAPSULE, EXTENDED RELEASE ORAL 2 TIMES DAILY
Qty: 60 CAPSULE | Refills: 0 | Status: SHIPPED | OUTPATIENT
Start: 2022-11-28 | End: 2022-11-28

## 2022-11-29 ENCOUNTER — CARE COORDINATION (OUTPATIENT)
Dept: CARE COORDINATION | Age: 52
End: 2022-11-29

## 2022-11-29 NOTE — CARE COORDINATION
Ambulatory Care Coordination Note  11/29/2022    ACC: Kassi Arriola, RN    Mark Gates spoke at length about having COVID along with her ongoing symptoms of fatigue and cough  I did explain that these may linger for a bit and she should rest and monitor symptoms for any changes. She also talked about the cosentyx as she feels that this did help her symptoms and she will be receiving another injection soon. CHF  Mark Gates denies any issues with chest pain or chest discomfort. She denies SOB, edema, lightheadedness or dizziness. Her appetite is fair but she does not have a big appetite  She is aware of the foods to avoid related to high sodium content    PLAN:  Mark Gates will take all medications as prescribed  She will continue to rest and drink plenty of liquids  Mark Gates will monitor symptoms using the CHF zone tools  If she has any worsening or if she drops into the yellow zone she will call myself or the office for recommendations. Offered patient enrollment in the Remote Patient Monitoring (RPM) program for in-home monitoring: NA. Lab Results       None            Care Coordination Interventions    Referral from Primary Care Provider: No  Suggested Interventions and Community Resources  Disease Association: In Process  Pharmacist: Declined  Registered Dietician: Declined  Zone Management Tools: In Process  Other Services or Interventions: Pharmacy, Dietician referral declined. Walk in Clinic hours and usage discussed CHF zone tool education inititated. Goals Addressed    None         Prior to Admission medications    Medication Sig Start Date End Date Taking? Authorizing Provider   amphetamine-dextroamphetamine (ADDERALL XR) 30 MG extended release capsule Take 1 capsule by mouth in the morning and 1 capsule in the evening. Do all this for 30 days.  11/28/22 12/28/22  ORTIZ Wilson - CNP   promethazine-dextromethorphan (PROMETHAZINE-DM) 6.25-15 MG/5ML syrup TAKE 5 MLS BY MOUTH AT BEDTIME FOR 7 DAYS 11/17/22   ORTIZ Rios CNP   fluticasone [facility]) 50 MCG/ACT nasal spray 1 spray by Each Nostril route daily 11/15/22   ORTIZ Rios CNP   NEXIUM 40 MG delayed release capsule TAKE 1 CAPSULE BY MOUTH DAILY 11/15/22   ORTIZ Rios CNP   Secukinumab, 300 MG Dose, (COSENTYX, 300 MG DOSE,) 150 MG/ML SOSY Inject into the skin once a week    Historical Provider, MD   methylPREDNISolone (MEDROL DOSEPACK) 4 MG tablet On days 1-6 take as directed on package for first 6-day course. On days 7-12 take as directed on (second) package for (second) 6-day course. 9/28/22   ORTIZ Rios CNP   furosemide (LASIX) 20 MG tablet TAKE ONE TABLET BY MOUTH TWO TIMES A DAY 9/26/22   ORTIZ Rios CNP   hydrocortisone (CORTEF) 10 MG tablet TAKE 1 AND 1/2 TABLETS BY MOUTH ONCE DAILY 8/12/22   Historical Provider, MD   ketorolac (TORADOL) 10 MG tablet Take 1 tablet by mouth every 6 hours as needed for Pain 9/3/22   Anne Beebe MD   buPROPion (WELLBUTRIN XL) 300 MG extended release tablet Take 1 tablet by mouth every morning TAKE ONE TABLET BY MOUTH EVERY MORNING 8/24/22   ORTIZ Rios CNP   hydrocortisone 2.5 % cream Apply topically 2 times daily. 8/19/22   Paola George DO   sucralfate (CARAFATE) 1 GM tablet Take 1 tablet by mouth in the morning and 1 tablet at noon and 1 tablet in the evening and 1 tablet before bedtime. 8/3/22   ORTIZ Law CNP   MOTEGRITY 2 MG TABS TAKE ONE TABLET BY MOUTH EVERY DAY 7/4/22   Historical Provider, MD   traZODone (DESYREL) 150 MG tablet TAKE ONE TABLET BY MOUTH NIGHTLY 6/16/22   ORTIZ Rios CNP   lidocaine (LIDODERM) 5 % apply one patch as directed once daily to affected area remove patch after twelve hours.  5/17/22   Historical Provider, MD   midodrine (PROAMATINE) 10 MG tablet Take 10 mg by mouth as needed 4/12/22 8/1/22  Historical Provider, MD   hyoscyamine (LEVSIN/SL) 125 MCG sublingual tablet Place 1 tablet under the tongue every 4 hours as needed for Cramping 4/7/22   ORTIZ Alonso CNP   topiramate (TOPAMAX) 100 MG tablet TAKE ONE TABLET BY MOUTH TWO TIMES A DAY 3/3/22   ORTIZ Alonso CNP   loratadine (CLARITIN) 10 MG tablet Take 1 tablet by mouth daily 12/28/21   ORTIZ Alonso CNP   potassium chloride (KLOR-CON M) 10 MEQ extended release tablet Take 1 tablet by mouth daily 12/8/21   Lauren Canales PA-C   liothyronine (CYTOMEL) 25 MCG tablet Take 1 tablet by mouth daily 7/6/21   ORTIZ Alonso CNP   aspirin (ASPIRIN CHILDRENS) 81 MG chewable tablet Take 1 tablet by mouth daily 6/24/21   TRUDY Banuelos   naratriptan (AMERGE) 2.5 MG tablet Take one at onset of severe headache/migraine may repeat x 1 after 4 hours if needed. Maximum 2/day and 2 days/week. 6/2/21   Historical Provider, MD   valACYclovir (VALTREX) 500 MG tablet Take 1 tablet by mouth daily 2/15/21   ORTIZ Alonso CNP   NEXIUM 40 MG delayed release capsule TAKE ONE CAPSULE BY MOUTH EVERY DAY 11/2/20   ORTIZ Alonso CNP   topiramate (TOPAMAX) 100 MG tablet Take 1 tablet by mouth 2 times daily 10/11/20   ORTIZ Alonso CNP   Magnesium 400 MG TABS Take 1 tablet by mouth daily 10/3/20   Bhavesh Bernard PA-C   furosemide (LASIX) 20 MG tablet Take 1 tablet by mouth 2 times daily TAKE ONE TABLET BY MOUTH TWO TIMES A DAY 8/4/20   ORTIZ Alonso CNP   TRULANCE 3 MG TABS Take 3 mg by mouth daily 3/10/20   Historical Provider, MD   ondansetron (ZOFRAN) 4 MG tablet Take 1 tablet by mouth every 8 hours as needed for Nausea or Vomiting 2/25/20   ORTIZ Alonso CNP   azelastine (ASTELIN) 0.1 % nasal spray 1 spray by Nasal route 11/27/19   Historical Provider, MD   moxifloxacin (VIGAMOX) 0.5 % ophthalmic solution 1 drop    Historical Provider, MD   ZOLMitriptan (ZOMIG) 5 MG tablet TAKE 1 TABLET BY MOUTH AT ONSET OF MIGRAINE. MAY REPEAT ONCE AFTER 2 HOURS.  MAX 10 MG (2 TABLETS) PER DAY 6/4/18 Historical Provider, MD   levothyroxine (SYNTHROID) 25 MCG tablet Take one daily 4/29/18   ORTIZ Brantley CNP   hydroxychloroquine (PLAQUENIL) 200 MG tablet Take 300 mg by mouth daily     Historical Provider, MD       Future Appointments   Date Time Provider Angela Mary   2/21/2023 12:30 PM [facility] [place]    and   Congestive Heart Failure Assessment    Are you currently restricting fluids?: No Restriction  Do you understand a low sodium diet?: Yes  Do you understand how to read food labels?: Yes         Symptoms:

## 2022-12-14 RX ORDER — SUVOREXANT 5 MG/1
1 TABLET, FILM COATED ORAL NIGHTLY
Qty: 30 TABLET | Refills: 0 | Status: SHIPPED | OUTPATIENT
Start: 2022-12-14 | End: 2023-01-13

## 2022-12-16 ENCOUNTER — HOSPITAL ENCOUNTER (OUTPATIENT)
Age: 52
Setting detail: OUTPATIENT SURGERY
Discharge: HOME | End: 2022-12-16
Attending: INTERNAL MEDICINE | Admitting: INTERNAL MEDICINE
Payer: MEDICARE

## 2022-12-16 ENCOUNTER — ANESTHESIA (OUTPATIENT)
Dept: ENDOSCOPY | Age: 52
End: 2022-12-16
Payer: MEDICARE

## 2022-12-16 ENCOUNTER — ANESTHESIA EVENT (OUTPATIENT)
Dept: ENDOSCOPY | Age: 52
End: 2022-12-16
Payer: MEDICARE

## 2022-12-16 VITALS
RESPIRATION RATE: 18 BRPM | TEMPERATURE: 98.6 F | OXYGEN SATURATION: 100 % | DIASTOLIC BLOOD PRESSURE: 61 MMHG | HEIGHT: 62 IN | SYSTOLIC BLOOD PRESSURE: 101 MMHG | HEART RATE: 67 BPM | BODY MASS INDEX: 20.06 KG/M2 | WEIGHT: 109 LBS

## 2022-12-16 PROBLEM — Z86.010 HISTORY OF COLON POLYPS: Status: ACTIVE | Noted: 2022-12-16

## 2022-12-16 PROBLEM — Z86.0100 HISTORY OF COLON POLYPS: Status: ACTIVE | Noted: 2022-12-16

## 2022-12-16 PROBLEM — R19.4 CHANGE IN BOWEL HABITS: Status: ACTIVE | Noted: 2022-12-16

## 2022-12-16 PROCEDURE — 2709999900 HC NON-CHARGEABLE SUPPLY: Performed by: INTERNAL MEDICINE

## 2022-12-16 PROCEDURE — 3700000000 HC ANESTHESIA ATTENDED CARE: Performed by: INTERNAL MEDICINE

## 2022-12-16 PROCEDURE — 6360000002 HC RX W HCPCS: Performed by: NURSE ANESTHETIST, CERTIFIED REGISTERED

## 2022-12-16 PROCEDURE — 2580000003 HC RX 258: Performed by: INTERNAL MEDICINE

## 2022-12-16 PROCEDURE — 45378 DIAGNOSTIC COLONOSCOPY: CPT | Performed by: INTERNAL MEDICINE

## 2022-12-16 PROCEDURE — 2580000003 HC RX 258

## 2022-12-16 PROCEDURE — 3609027000 HC COLONOSCOPY: Performed by: INTERNAL MEDICINE

## 2022-12-16 PROCEDURE — 6370000000 HC RX 637 (ALT 250 FOR IP): Performed by: INTERNAL MEDICINE

## 2022-12-16 PROCEDURE — 3700000001 HC ADD 15 MINUTES (ANESTHESIA): Performed by: INTERNAL MEDICINE

## 2022-12-16 PROCEDURE — 7100000010 HC PHASE II RECOVERY - FIRST 15 MIN: Performed by: INTERNAL MEDICINE

## 2022-12-16 PROCEDURE — 7100000011 HC PHASE II RECOVERY - ADDTL 15 MIN: Performed by: INTERNAL MEDICINE

## 2022-12-16 PROCEDURE — 2500000003 HC RX 250 WO HCPCS: Performed by: NURSE ANESTHETIST, CERTIFIED REGISTERED

## 2022-12-16 RX ORDER — MAGNESIUM HYDROXIDE 1200 MG/15ML
LIQUID ORAL PRN
Status: DISCONTINUED | OUTPATIENT
Start: 2022-12-16 | End: 2022-12-16 | Stop reason: ALTCHOICE

## 2022-12-16 RX ORDER — PROPOFOL 10 MG/ML
INJECTION, EMULSION INTRAVENOUS PRN
Status: DISCONTINUED | OUTPATIENT
Start: 2022-12-16 | End: 2022-12-16 | Stop reason: SDUPTHER

## 2022-12-16 RX ORDER — SODIUM CHLORIDE 9 MG/ML
INJECTION, SOLUTION INTRAVENOUS CONTINUOUS
Status: DISCONTINUED | OUTPATIENT
Start: 2022-12-16 | End: 2022-12-16 | Stop reason: HOSPADM

## 2022-12-16 RX ORDER — LIDOCAINE HYDROCHLORIDE 20 MG/ML
INJECTION, SOLUTION INFILTRATION; PERINEURAL PRN
Status: DISCONTINUED | OUTPATIENT
Start: 2022-12-16 | End: 2022-12-16 | Stop reason: SDUPTHER

## 2022-12-16 RX ORDER — SIMETHICONE 20 MG/.3ML
EMULSION ORAL PRN
Status: DISCONTINUED | OUTPATIENT
Start: 2022-12-16 | End: 2022-12-16 | Stop reason: ALTCHOICE

## 2022-12-16 RX ORDER — SODIUM CHLORIDE 9 MG/ML
INJECTION, SOLUTION INTRAVENOUS
Status: COMPLETED
Start: 2022-12-16 | End: 2022-12-16

## 2022-12-16 RX ADMIN — SODIUM CHLORIDE: 9 INJECTION, SOLUTION INTRAVENOUS at 08:11

## 2022-12-16 RX ADMIN — PROPOFOL 50 MG: 10 INJECTION, EMULSION INTRAVENOUS at 08:26

## 2022-12-16 RX ADMIN — LIDOCAINE HYDROCHLORIDE 40 MG: 20 INJECTION, SOLUTION INFILTRATION; PERINEURAL at 08:22

## 2022-12-16 RX ADMIN — PROPOFOL 50 MG: 10 INJECTION, EMULSION INTRAVENOUS at 08:32

## 2022-12-16 RX ADMIN — PROPOFOL 100 MG: 10 INJECTION, EMULSION INTRAVENOUS at 08:22

## 2022-12-16 RX ADMIN — PROPOFOL 50 MG: 10 INJECTION, EMULSION INTRAVENOUS at 08:41

## 2022-12-16 RX ADMIN — PROPOFOL 50 MG: 10 INJECTION, EMULSION INTRAVENOUS at 08:36

## 2022-12-16 ASSESSMENT — PAIN - FUNCTIONAL ASSESSMENT
PAIN_FUNCTIONAL_ASSESSMENT: 0-10

## 2022-12-16 ASSESSMENT — PAIN DESCRIPTION - DESCRIPTORS
DESCRIPTORS: STABBING;ACHING
DESCRIPTORS: ACHING;STABBING

## 2022-12-16 NOTE — ANESTHESIA PRE PROCEDURE
Department of Anesthesiology  Preprocedure Note       Name:  Tabitha Billingsley   Age:  54 y.o.  :  1970                                          MRN:  [id number]         Date:  2022      Surgeon: Ortiz Loaiza):  William Suresh MD    Procedure: Procedure(s):  COLORECTAL CANCER SCREENING, HIGH RISK    Medications prior to admission:   Prior to Admission medications    Medication Sig Start Date End Date Taking? Authorizing Provider   Suvorexant (BELSOMRA) 5 MG TABS Take 1 tablet by mouth at bedtime for 30 days. 22  ORTIZ Linder CNP   amphetamine-dextroamphetamine (ADDERALL XR) 30 MG extended release capsule Take 1 capsule by mouth in the morning and 1 capsule in the evening. Do all this for 30 days. 22  ORTIZ Linder CNP   promethazine-dextromethorphan (PROMETHAZINE-DM) 6.25-15 MG/5ML syrup TAKE 5 MLS BY MOUTH AT BEDTIME FOR 7 DAYS 22   ORTIZ Linder CNP   fluticasone [facility]) 50 MCG/ACT nasal spray 1 spray by Each Nostril route daily 11/15/22   ORTIZ Linder CNP   NEXIUM 40 MG delayed release capsule TAKE 1 CAPSULE BY MOUTH DAILY 11/15/22   ORTIZ Linder CNP   Secukinumab, 300 MG Dose, (COSENTYX, 300 MG DOSE,) 150 MG/ML SOSY Inject into the skin once a week    Historical Provider, MD   methylPREDNISolone (MEDROL DOSEPACK) 4 MG tablet On days 1-6 take as directed on package for first 6-day course. On days 7-12 take as directed on (second) package for (second) 6-day course.  22   ORTIZ Linder CNP   furosemide (LASIX) 20 MG tablet TAKE ONE TABLET BY MOUTH TWO TIMES A DAY 22   ORTIZ Linder CNP   hydrocortisone (CORTEF) 10 MG tablet TAKE 1 AND 1/2 TABLETS BY MOUTH ONCE DAILY 22   Historical Provider, MD   ketorolac (TORADOL) 10 MG tablet Take 1 tablet by mouth every 6 hours as needed for Pain 9/3/22   Gregorio Escobedo MD   buPROPion (WELLBUTRIN XL) 300 MG extended release tablet Take 1 tablet by mouth every morning TAKE ONE TABLET BY MOUTH EVERY MORNING 8/24/22   ORTIZ Obando CNP   hydrocortisone 2.5 % cream Apply topically 2 times daily. 8/19/22   Kiersten Charlton DO   sucralfate (CARAFATE) 1 GM tablet Take 1 tablet by mouth in the morning and 1 tablet at noon and 1 tablet in the evening and 1 tablet before bedtime. 8/3/22   ORTIZ Gallagher CNP   MOTEGRITY 2 MG TABS TAKE ONE TABLET BY MOUTH EVERY DAY 7/4/22   Historical Provider, MD   traZODone (DESYREL) 150 MG tablet TAKE ONE TABLET BY MOUTH NIGHTLY 6/16/22   ORTIZ Obando CNP   lidocaine (LIDODERM) 5 % apply one patch as directed once daily to affected area remove patch after twelve hours. 5/17/22   Historical Provider, MD   midodrine (PROAMATINE) 10 MG tablet Take 10 mg by mouth as needed 4/12/22 8/1/22  Historical Provider, MD   hyoscyamine (LEVSIN/SL) 125 MCG sublingual tablet Place 1 tablet under the tongue every 4 hours as needed for Cramping 4/7/22   ORTIZ Obando CNP   topiramate (TOPAMAX) 100 MG tablet TAKE ONE TABLET BY MOUTH TWO TIMES A DAY 3/3/22   ORTIZ Obando CNP   loratadine (CLARITIN) 10 MG tablet Take 1 tablet by mouth daily 12/28/21   ORTIZ Obando CNP   potassium chloride (KLOR-CON M) 10 MEQ extended release tablet Take 1 tablet by mouth daily 12/8/21   Reg Campoverde PA-C   liothyronine (CYTOMEL) 25 MCG tablet Take 1 tablet by mouth daily 7/6/21   ORTIZ Obando CNP   aspirin (ASPIRIN CHILDRENS) 81 MG chewable tablet Take 1 tablet by mouth daily 6/24/21   TRUDY Black   naratriptan (AMERGE) 2.5 MG tablet Take one at onset of severe headache/migraine may repeat x 1 after 4 hours if needed. Maximum 2/day and 2 days/week.  6/2/21   Historical Provider, MD   valACYclovir (VALTREX) 500 MG tablet Take 1 tablet by mouth daily 2/15/21   Stephen [place], APRN - CNP   NEXIUM 40 MG delayed release capsule TAKE ONE CAPSULE BY MOUTH EVERY DAY 11/2/20   ORTIZ Obando - CNP topiramate (TOPAMAX) 100 MG tablet Take 1 tablet by mouth 2 times daily 10/11/20   Cheral Alu, APRN - CNP   Magnesium 400 MG TABS Take 1 tablet by mouth daily 10/3/20   Deanne Meade PA-C   furosemide (LASIX) 20 MG tablet Take 1 tablet by mouth 2 times daily TAKE ONE TABLET BY MOUTH TWO TIMES A DAY 8/4/20   Cheral Alu, APRN - CNP   TRULANCE 3 MG TABS Take 3 mg by mouth daily 3/10/20   Historical Provider, MD   ondansetron (ZOFRAN) 4 MG tablet Take 1 tablet by mouth every 8 hours as needed for Nausea or Vomiting 2/25/20   Cheral Alu, APRN - CNP   azelastine (ASTELIN) 0.1 % nasal spray 1 spray by Nasal route 11/27/19   Historical Provider, MD   moxifloxacin (VIGAMOX) 0.5 % ophthalmic solution 1 drop    Historical Provider, MD   ZOLMitriptan (ZOMIG) 5 MG tablet TAKE 1 TABLET BY MOUTH AT ONSET OF MIGRAINE. MAY REPEAT ONCE AFTER 2 HOURS. MAX 10 MG (2 TABLETS) PER DAY 6/4/18   Historical Provider, MD   levothyroxine (SYNTHROID) 25 MCG tablet Take one daily 4/29/18   Cheral Alu, APRN - CNP   hydroxychloroquine (PLAQUENIL) 200 MG tablet Take 300 mg by mouth daily     Historical Provider, MD       Current medications:    Current Facility-Administered Medications   Medication Dose Route Frequency Provider Last Rate Last Admin    sodium chloride 0.9 % infusion             0.9 % sodium chloride infusion   IntraVENous Continuous Delphine Ibarra MD           Allergies:  No Known Allergies    Problem List:    Patient Active Problem List   Diagnosis Code    ADD (attention deficit disorder) F98.8    Anxiety F41.9    Depression F32. A    Acquired hypothyroidism E03.9    GERD (gastroesophageal reflux disease) K21.9    Pancreatitis K85.90    Other chest pain R07.89    Left lower quadrant pain R10.32    Sjogren's syndrome (HCC) M35.00    Diverticulitis of large intestine without perforation or abscess without bleeding K57.32    ASCUS favoring benign [id number]    Endometriosis N80.9    Fibroids D21.9    S/P endometrial ablation Z98.890    Transfusion history Z92.89    Cellulitis, face L03. 80    Abnormal MRI, liver R93.2    Acute recurrent pancreatitis K85.90    At risk of fracture due to osteoporosis M81.0, Z91.89    Calcinosis E83.59    Chronic headaches R51.9, G89.29    Conductive hearing loss in left ear H90.12    Edema R60.9    Iron overload E83.19    Mass of left side of neck R22.1    Medullary sponge kidney of both kidneys R26.3    Metabolic acidosis H87.35    Microscopic hematuria R31.29    Nausea R11.0    Renal tubular acidosis type I N25.89    Rheumatoid arthritis (HCC) M06.9    Tension type headache G44.209    Weight loss, abnormal R63.4    Major depressive disorder, single episode, 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[facility]) F32.4    Chronic pancreatitis ([facility]) K86.1    Neutropenia ([facility]) D70.9    Hereditary hemochromatosis ([facility]) E83.110    Neck pain M54.2    Non-recurrent acute suppurative otitis media of left ear without spontaneous rupture of tympanic membrane H66.002    Congestive heart failure, unspecified HF chronicity, unspecified heart failure type ([facility]) I50.9    Claudication of both lower extremities ([facility]) I73.9    Acute left-sided low back pain with bilateral sciatica M54.42, M54.41    Pancreatitis, unspecified pancreatitis type K85.90    Acute pancreatitis without infection or necrosis, unspecified pancreatitis type K85.90    Abdominal pain R10.9       Past Medical History:        Diagnosis Date    Acquired hypothyroidism 9/26/2016    Acute left-sided low back pain with bilateral sciatica 3/9/2022    Acute pancreatitis     ADD (attention deficit disorder) 2/12/2015    Anxiety     Congestive heart failure, unspecified HF chronicity, unspecified heart failure type ([facility] [address] 2/11/2022    Depression 12/9/2015    Endometrial cyst of ovary 5/10/14    RT ovary, ruptured    GERD (gastroesophageal reflux disease) 9/26/2016    Medullary sponge kidney of both kidneys 5/22/2018    Sjogren's disease ([facility] [address]     Stress     Swelling        Past Surgical History:        Procedure Laterality Date    BREAST BIOPSY Left 2015? ?    lump removed (benign) (Dr. Niraj Almodovar)   [address] Left 2015?? Dr Niraj Almodovar (benign)    BREAST LUMPECTOMY      CHOLECYSTECTOMY  2011    HYSTERECTOMY (CERVIX STATUS UNKNOWN)  2014 sept (total)    OVARY REMOVAL Left Jan 2014    UPPER GASTROINTESTINAL ENDOSCOPY  09/16/2016    EGD W/BX    UPPER GASTROINTESTINAL ENDOSCOPY N/A 5/6/2021    ENDOSCOPIC ULTRASOUND with EGD performed by Lali Siegel MD at [address] [employer] N/A 8/1/2022    EGD DIAGNOSTIC ONLY performed by Betty Eubanks MD at [facility] N/A 8/5/2022    EGD ESOPHAGOGASTRODUODENOSCOPY performed by Lali Siegel MD at [facility]  8/5/2022    ENDOSCOPIC ULTRASOUND performed by Lali Siegel MD at [address] History:    Social History     Tobacco Use    Smoking status: Never    Smokeless tobacco: Never   Substance Use Topics    Alcohol use: No     Alcohol/week: 0.0 standard drinks     Comment: no alcohol since 2011                                Counseling given: Not Answered      Vital Signs (Current): There were no vitals filed for this visit.                                            BP Readings from Last 3 Encounters:   09/28/22 92/64   09/07/22 120/68   09/03/22 120/71       NPO Status:                                                                                 BMI:   Wt Readings from Last 3 Encounters:   09/28/22 109 lb (49.4 kg)   09/07/22 110 lb (49.9 kg)   09/03/22 109 lb (49.4 kg)     There is no height or weight on file to calculate BMI.    CBC:   Lab Results   Component Value Date/Time    WBC 5.0 09/26/2022 12:20 PM    RBC 4.07 09/26/2022 12:20 PM    HGB 12.1 09/26/2022 12:20 PM    HCT 36.5 09/26/2022 12:20 PM    MCV 89.6 09/26/2022 12:20 PM Induction: intravenous.           Plan discussed with surgical team.                    Cheryal Osler, APRN - [facility]   12/16/2022

## 2022-12-16 NOTE — ANESTHESIA POSTPROCEDURE EVALUATION
Department of Anesthesiology  Postprocedure Note    Patient: Berto Pearson  MRN: [id number]  YOB: 1970  Date of evaluation: 12/16/2022      Procedure Summary     Date: 12/16/22 Room / Location: [address] [place] [address]    Anesthesia Start: 0818 Anesthesia Stop: 6856    Procedure: COLORECTAL CANCER SCREENING, HIGH RISK Diagnosis:       History of colon polyps      (History of colon polyps [Z86.010])    Surgeons: Ulysses Krebs, MD Responsible Provider: Cheryal Osler, APRN - CRNA    Anesthesia Type: MAC ASA Status: 3          Anesthesia Type: No value filed.     Hossein Phase I:      Hossein Phase II:        Anesthesia Post Evaluation    Patient location during evaluation: bedside  Patient participation: complete - patient participated  Nausea & Vomiting: no nausea and no vomiting  Complications: no  Cardiovascular status: blood pressure returned to baseline  Respiratory status: acceptable  Hydration status: euvolemic

## 2022-12-17 NOTE — H&P
Patient Name: Royce Edge  : 1970  MRN: [id number]  DATE: 22      ENDOSCOPY  History and Physical    Procedure:    [x] Diagnostic Colonoscopy       [] Screening Colonoscopy  [] EGD      [] ERCP      [] EUS       [] Other    [x] Previous office notes/History and Physical reviewed from the patients chart. Please see EMR for further details of HPI. I have examined the patient's status immediately prior to the procedure and:      Indications/HPI:    []Abdominal Pain   []Cancer- GI/Lung  []Fhx of colon CA  []History of Polyps   []Dicks   []Melena  []Abnormal Imaging   []Dysphagia    []Persistent Pneumonia  []Anemia   []Food Impaction  [x]History of Polyps  []GI Bleed   []Pulmonary nodule/Mass  [x]Change in bowel habits  []Heartburn/Reflux  []Rectal Bleed (BRBPR)  []Chest Pain - Non Cardiac  []Heme (+) Stool  []Ulcers  []Constipation   []Hemoptysis   []Varices  []Diarrhea   []Hypoxemia  []Nausea/Vomiting   []Screening   []Crohns/Colitis  []Other:    Anesthesia:   [x] MAC [] Moderate Sedation   [] General   [] None     ROS: 12 pt Review of Symptoms was negative unless mentioned above    Medications:   Prior to Admission medications    Medication Sig Start Date End Date Taking? Authorizing Provider   Suvorexant (BELSOMRA) 5 MG TABS Take 1 tablet by mouth at bedtime for 30 days. 22  ORTIZ Pulido CNP   amphetamine-dextroamphetamine (ADDERALL XR) 30 MG extended release capsule Take 1 capsule by mouth in the morning and 1 capsule in the evening. Do all this for 30 days.  22  ORTIZ Pulido CNP   promethazine-dextromethorphan (PROMETHAZINE-DM) 6.25-15 MG/5ML syrup TAKE 5 MLS BY MOUTH AT BEDTIME FOR 7 DAYS 22   ORTIZ Pulido CNP   fluticasone [facility]) 50 MCG/ACT nasal spray 1 spray by Each Nostril route daily 11/15/22   ORTIZ Pulido CNP   NEXIUM 40 MG delayed release capsule TAKE 1 CAPSULE BY MOUTH DAILY 11/15/22   ORTIZ Pulido - CNP Secukinumab, 300 MG Dose, (COSENTYX, 300 MG DOSE,) 150 MG/ML SOSY Inject into the skin once a week    Historical Provider, MD   methylPREDNISolone (MEDROL DOSEPACK) 4 MG tablet On days 1-6 take as directed on package for first 6-day course. On days 7-12 take as directed on (second) package for (second) 6-day course. Patient not taking: Reported on 12/16/2022 9/28/22   ORTIZ Franklin CNP   furosemide (LASIX) 20 MG tablet TAKE ONE TABLET BY MOUTH TWO TIMES A DAY 9/26/22   ORTIZ Franklin CNP   hydrocortisone (CORTEF) 10 MG tablet TAKE 1 AND 1/2 TABLETS BY MOUTH ONCE DAILY  Patient not taking: Reported on 12/16/2022 8/12/22   Historical Provider, MD   ketorolac (TORADOL) 10 MG tablet Take 1 tablet by mouth every 6 hours as needed for Pain  Patient not taking: Reported on 12/16/2022 9/3/22   Amrit Arambula MD   buPROPion (WELLBUTRIN XL) 300 MG extended release tablet Take 1 tablet by mouth every morning TAKE ONE TABLET BY MOUTH EVERY MORNING 8/24/22   ORTIZ Franklin CNP   hydrocortisone 2.5 % cream Apply topically 2 times daily. Patient not taking: Reported on 12/16/2022 8/19/22   Shari Bird,    sucralfate (CARAFATE) 1 GM tablet Take 1 tablet by mouth in the morning and 1 tablet at noon and 1 tablet in the evening and 1 tablet before bedtime. 8/3/22   ORTIZ Her CNP   MOTEGRITY 2 MG TABS TAKE ONE TABLET BY MOUTH EVERY DAY 7/4/22   Historical Provider, MD   traZODone (DESYREL) 150 MG tablet TAKE ONE TABLET BY MOUTH NIGHTLY 6/16/22   ORTIZ Franklin CNP   lidocaine (LIDODERM) 5 % apply one patch as directed once daily to affected area remove patch after twelve hours.  5/17/22   Historical Provider, MD   midodrine (PROAMATINE) 10 MG tablet Take 10 mg by mouth as needed 4/12/22 8/1/22  Historical Provider, MD   hyoscyamine (LEVSIN/SL) 125 MCG sublingual tablet Place 1 tablet under the tongue every 4 hours as needed for Cramping 4/7/22   Alejandro Smith, APRN - CNP topiramate (TOPAMAX) 100 MG tablet TAKE ONE TABLET BY MOUTH TWO TIMES A DAY 3/3/22   MuluORTIZ Mcadams CNP   loratadine (CLARITIN) 10 MG tablet Take 1 tablet by mouth daily 12/28/21   [facility]   potassium chloride (KLOR-CON M) 10 MEQ extended release tablet Take 1 tablet by mouth daily 12/8/21   Alka Mo PA-C   liothyronine (CYTOMEL) 25 MCG tablet Take 1 tablet by mouth daily 7/6/21   [facility]   aspirin (ASPIRIN CHILDRENS) 81 MG chewable tablet Take 1 tablet by mouth daily 6/24/21   TRUDY Merritt   naratriptan (AMERGE) 2.5 MG tablet Take one at onset of severe headache/migraine may repeat x 1 after 4 hours if needed. Maximum 2/day and 2 days/week. 6/2/21   Historical Provider, MD   valACYclovir (VALTREX) 500 MG tablet Take 1 tablet by mouth daily 2/15/21   ORTIZ Esparza CNP   NEXIUM 40 MG delayed release capsule TAKE ONE CAPSULE BY MOUTH EVERY DAY 11/2/20   [facility]   topiramate (TOPAMAX) 100 MG tablet Take 1 tablet by mouth 2 times daily 10/11/20   ORTIZ Esparza CNP   Magnesium 400 MG TABS Take 1 tablet by mouth daily 10/3/20   Rox Garcia PA-C   furosemide (LASIX) 20 MG tablet Take 1 tablet by mouth 2 times daily TAKE ONE TABLET BY MOUTH TWO TIMES A DAY 8/4/20   ORTIZ Esparza CNP   TRULANCE 3 MG TABS Take 3 mg by mouth daily 3/10/20   Historical Provider, MD   ondansetron (ZOFRAN) 4 MG tablet Take 1 tablet by mouth every 8 hours as needed for Nausea or Vomiting 2/25/20   ORTIZ Esparza CNP   azelastine (ASTELIN) 0.1 % nasal spray 1 spray by Nasal route  Patient not taking: Reported on 12/16/2022 11/27/19   Historical Provider, MD   moxifloxacin (VIGAMOX) 0.5 % ophthalmic solution 1 drop    Historical Provider, MD   ZOLMitriptan (ZOMIG) 5 MG tablet TAKE 1 TABLET BY MOUTH AT ONSET OF MIGRAINE. MAY REPEAT ONCE AFTER 2 HOURS.  MAX 10 MG (2 TABLETS) PER DAY 6/4/18   Historical Provider, MD   levothyroxine (SYNTHROID) 25 MCG tablet Take one daily 4/29/18   ORTIZ Mathew CNP   hydroxychloroquine (PLAQUENIL) 200 MG tablet Take 300 mg by mouth daily     Historical Provider, MD     Allergies: No Known Allergies   History of allergic reaction to anesthesia:  No  Past Medical History:  Past Medical History:   Diagnosis Date    Acquired hypothyroidism 9/26/2016    Acute left-sided low back pain with bilateral sciatica 3/9/2022    Acute pancreatitis     ADD (attention deficit disorder) 2/12/2015    Anxiety     Congestive heart failure, unspecified HF chronicity, unspecified heart failure type ([facility] 2/11/2022    Depression 12/9/2015    Endometrial cyst of ovary 5/10/14    RT ovary, ruptured    GERD (gastroesophageal reflux disease) 9/26/2016    Medullary sponge kidney of both kidneys 5/22/2018    Sjogren's disease ([facility]     Stress     Swelling      Past Surgical History:  Past Surgical History:   Procedure Laterality Date    BREAST BIOPSY Left 2015? ?    lump removed (benign) (Dr. Nacho Wallace)    BREAST CYST EXCISION Left 2015??     Dr Nacho Wallace (benign)    BREAST LUMPECTOMY      CHOLECYSTECTOMY  2011    COLONOSCOPY N/A 12/16/2022    COLORECTAL CANCER SCREENING, HIGH RISK performed by Rachel Barlow MD at [facility] ([address] [place] [address])  2014    sept (total)    OVARY REMOVAL Left 01/2014    UPPER GASTROINTESTINAL ENDOSCOPY  09/16/2016    EGD W/BX    UPPER GASTROINTESTINAL ENDOSCOPY N/A 05/06/2021    ENDOSCOPIC ULTRASOUND with EGD performed by Rachel Barlow MD at [facility] 08/01/2022    EGD DIAGNOSTIC ONLY performed by Chevy Newman MD at [facility] 08/05/2022    EGD ESOPHAGOGASTRODUODENOSCOPY performed by Rachel Barlow MD at [facility]  08/05/2022    ENDOSCOPIC ULTRASOUND performed by Rachel Barlow MD at [facility] Social History:  Social History     Tobacco Use    Smoking status: Never    Smokeless tobacco: Never   Vaping Use    Vaping Use: Never used   Substance Use Topics    Alcohol use: No     Alcohol/week: 0.0 standard drinks     Comment: no alcohol since 2011    Drug use: No     Vital Signs:   Vitals:    12/16/22 0915   BP: 101/61   Pulse: 67   Resp: 18   Temp:    SpO2: 100%       Physical Exam:  Cardiac:  [x]WNL []Comments:  Pulmonary:  [x]WNL []Comments:   Neuro/Mental Status:  [x]WNL []Comments:  Abdominal:  [x]WNL []Comments:  Other:   []WNL []Comments:    Informed Consent:  The risks and benefits of the procedure have been discussed with either the patient or if they cannot consent, their representative. Assessment:  Patient examined and appropriate for planned sedation and procedure. Plan:  Proceed with planned sedation and procedure as above. The patient was counseled at length about risks of micheal COVID-19 in the perioperative and any recovery window from the procedure. The patient was made aware that micheal COVID-19 may worsen their prognosis for recovery from their procedure and lend to a higher morbidity and-all mortality risk. The patient was given the option of postponing the procedure all material risks, benefits, and alternatives were discussed. The patient does wish to proceed with the procedure at this time.     Filemon Conde MD  9:23 PM

## 2022-12-19 ENCOUNTER — APPOINTMENT (OUTPATIENT)
Dept: GENERAL RADIOLOGY | Age: 52
End: 2022-12-19
Payer: MEDICARE

## 2022-12-19 ENCOUNTER — APPOINTMENT (OUTPATIENT)
Dept: CT IMAGING | Age: 52
End: 2022-12-19
Payer: MEDICARE

## 2022-12-19 ENCOUNTER — TELEPHONE (OUTPATIENT)
Dept: GASTROENTEROLOGY | Age: 52
End: 2022-12-19

## 2022-12-19 ENCOUNTER — HOSPITAL ENCOUNTER (EMERGENCY)
Age: 52
Discharge: HOME | End: 2022-12-19
Attending: EMERGENCY MEDICINE
Payer: MEDICARE

## 2022-12-19 VITALS
BODY MASS INDEX: 20.43 KG/M2 | WEIGHT: 111 LBS | OXYGEN SATURATION: 100 % | SYSTOLIC BLOOD PRESSURE: 110 MMHG | TEMPERATURE: 97.8 F | RESPIRATION RATE: 19 BRPM | HEART RATE: 80 BPM | HEIGHT: 62 IN | DIASTOLIC BLOOD PRESSURE: 88 MMHG

## 2022-12-19 DIAGNOSIS — E87.6 HYPOKALEMIA: ICD-10-CM

## 2022-12-19 DIAGNOSIS — K59.00 CONSTIPATION, UNSPECIFIED CONSTIPATION TYPE: ICD-10-CM

## 2022-12-19 DIAGNOSIS — R10.12 LEFT UPPER QUADRANT ABDOMINAL PAIN: Primary | ICD-10-CM

## 2022-12-19 LAB
ALBUMIN SERPL-MCNC: 4.6 G/DL (ref 3.5–4.6)
ALP BLD-CCNC: 98 U/L (ref 40–130)
ALT SERPL-CCNC: 13 U/L (ref 0–33)
AMYLASE: 110 U/L (ref 22–93)
ANION GAP SERPL CALCULATED.3IONS-SCNC: 14 MEQ/L (ref 9–15)
AST SERPL-CCNC: 20 U/L (ref 0–35)
BACTERIA: NEGATIVE /HPF
BASOPHILS ABSOLUTE: 0.1 K/UL (ref 0–0.1)
BASOPHILS RELATIVE PERCENT: 1.2 % (ref 0.1–1.2)
BILIRUB SERPL-MCNC: 0.3 MG/DL (ref 0.2–0.7)
BILIRUBIN URINE: NEGATIVE
BLOOD, URINE: NORMAL
BUN BLDV-MCNC: 17 MG/DL (ref 6–20)
CALCIUM IONIZED: 1.16 MMOL/L (ref 1.12–1.32)
CALCIUM SERPL-MCNC: 9.6 MG/DL (ref 8.5–9.9)
CHLORIDE BLD-SCNC: 99 MEQ/L (ref 95–107)
CLARITY: CLEAR
CO2: 23 MEQ/L (ref 20–31)
COLOR: YELLOW
CREAT SERPL-MCNC: 0.85 MG/DL (ref 0.5–0.9)
D DIMER: <0.27 MG/L FEU (ref 0–0.5)
EOSINOPHILS ABSOLUTE: 0.1 K/UL (ref 0–0.4)
EOSINOPHILS RELATIVE PERCENT: 2 % (ref 0.7–5.8)
EPITHELIAL CELLS, UA: NORMAL /HPF
GFR SERPL CREATININE-BSD FRML MDRD: >60 ML/MIN/{1.73_M2}
GFR SERPL CREATININE-BSD FRML MDRD: >60 ML/MIN/{1.73_M2}
GLOBULIN: 2.5 G/DL (ref 2.3–3.5)
GLUCOSE BLD-MCNC: 82 MG/DL (ref 70–99)
GLUCOSE BLD-MCNC: 87 MG/DL (ref 70–99)
GLUCOSE URINE: NEGATIVE MG/DL
HCT VFR BLD CALC: 36.2 % (ref 37–47)
HEMOGLOBIN: 11.9 G/DL (ref 11.2–15.7)
HEMOGLOBIN: 12.8 GM/DL (ref 12–16)
IMMATURE GRANULOCYTES #: 0 K/UL
IMMATURE GRANULOCYTES %: 0.4 %
KETONES, URINE: NEGATIVE MG/DL
LEUKOCYTE ESTERASE, URINE: NEGATIVE
LIPASE: 83 U/L (ref 12–95)
LYMPHOCYTES ABSOLUTE: 2.1 K/UL (ref 1.2–3.7)
LYMPHOCYTES RELATIVE PERCENT: 38 %
MCH RBC QN AUTO: 29.2 PG (ref 25.6–32.2)
MCHC RBC AUTO-ENTMCNC: 32.9 % (ref 32.2–35.5)
MCV RBC AUTO: 88.7 FL (ref 79.4–94.8)
MONOCYTES ABSOLUTE: 0.4 K/UL (ref 0.2–0.9)
MONOCYTES RELATIVE PERCENT: 6.4 % (ref 4.7–12.5)
NEUTROPHILS ABSOLUTE: 2.9 K/UL (ref 1.6–6.1)
NEUTROPHILS RELATIVE PERCENT: 52 % (ref 34–71.1)
NITRITE, URINE: NEGATIVE
PDW BLD-RTO: 12 % (ref 11.7–14.4)
PERFORMED ON: ABNORMAL
PH UA: 7.5 (ref 5–9)
PLATELET # BLD: 268 K/UL (ref 182–369)
POC CHLORIDE: 103 MEQ/L (ref 99–110)
POC CREATININE: 0.8 MG/DL (ref 0.6–1.2)
POC HEMATOCRIT: 38 % (ref 36–48)
POC POTASSIUM: 3.1 MEQ/L (ref 3.5–5.1)
POC SAMPLE TYPE: ABNORMAL
POC SODIUM: 139 MEQ/L (ref 136–145)
POTASSIUM REFLEX MAGNESIUM: 3.3 MEQ/L (ref 3.4–4.9)
PRO-BNP: 127 PG/ML
PROTEIN UA: NEGATIVE MG/DL
RBC # BLD: 4.08 M/UL (ref 3.93–5.22)
RBC UA: NORMAL /HPF (ref 0–2)
SODIUM BLD-SCNC: 136 MEQ/L (ref 135–144)
SPECIFIC GRAVITY UA: 1.01 (ref 1–1.03)
TOTAL PROTEIN: 7.1 G/DL (ref 6.3–8)
TROPONIN: <0.01 NG/ML (ref 0–0.01)
URINE REFLEX TO CULTURE: NORMAL
UROBILINOGEN, URINE: 0.2 E.U./DL
WBC # BLD: 5.6 K/UL (ref 4–10)
WBC UA: NORMAL /HPF (ref 0–5)

## 2022-12-19 PROCEDURE — 80053 COMPREHEN METABOLIC PANEL: CPT

## 2022-12-19 PROCEDURE — 96374 THER/PROPH/DIAG INJ IV PUSH: CPT

## 2022-12-19 PROCEDURE — 83735 ASSAY OF MAGNESIUM: CPT

## 2022-12-19 PROCEDURE — 74177 CT ABD & PELVIS W/CONTRAST: CPT

## 2022-12-19 PROCEDURE — 2580000003 HC RX 258

## 2022-12-19 PROCEDURE — 84484 ASSAY OF TROPONIN QUANT: CPT

## 2022-12-19 PROCEDURE — 93005 ELECTROCARDIOGRAM TRACING: CPT

## 2022-12-19 PROCEDURE — 84295 ASSAY OF SERUM SODIUM: CPT

## 2022-12-19 PROCEDURE — 74019 RADEX ABDOMEN 2 VIEWS: CPT

## 2022-12-19 PROCEDURE — 82150 ASSAY OF AMYLASE: CPT

## 2022-12-19 PROCEDURE — 81001 URINALYSIS AUTO W/SCOPE: CPT

## 2022-12-19 PROCEDURE — 6360000004 HC RX CONTRAST MEDICATION

## 2022-12-19 PROCEDURE — 85014 HEMATOCRIT: CPT

## 2022-12-19 PROCEDURE — 99285 EMERGENCY DEPT VISIT HI MDM: CPT

## 2022-12-19 PROCEDURE — 82565 ASSAY OF CREATININE: CPT

## 2022-12-19 PROCEDURE — 96376 TX/PRO/DX INJ SAME DRUG ADON: CPT

## 2022-12-19 PROCEDURE — 84132 ASSAY OF SERUM POTASSIUM: CPT

## 2022-12-19 PROCEDURE — 83690 ASSAY OF LIPASE: CPT

## 2022-12-19 PROCEDURE — 83880 ASSAY OF NATRIURETIC PEPTIDE: CPT

## 2022-12-19 PROCEDURE — 82435 ASSAY OF BLOOD CHLORIDE: CPT

## 2022-12-19 PROCEDURE — 36415 COLL VENOUS BLD VENIPUNCTURE: CPT

## 2022-12-19 PROCEDURE — 96375 TX/PRO/DX INJ NEW DRUG ADDON: CPT

## 2022-12-19 PROCEDURE — 85025 COMPLETE CBC W/AUTO DIFF WBC: CPT

## 2022-12-19 PROCEDURE — 6360000002 HC RX W HCPCS

## 2022-12-19 PROCEDURE — 85379 FIBRIN DEGRADATION QUANT: CPT

## 2022-12-19 PROCEDURE — 82330 ASSAY OF CALCIUM: CPT

## 2022-12-19 PROCEDURE — 6370000000 HC RX 637 (ALT 250 FOR IP)

## 2022-12-19 RX ORDER — 0.9 % SODIUM CHLORIDE 0.9 %
500 INTRAVENOUS SOLUTION INTRAVENOUS ONCE
Status: COMPLETED | OUTPATIENT
Start: 2022-12-19 | End: 2022-12-19

## 2022-12-19 RX ORDER — POLYETHYLENE GLYCOL 3350 17 G/17G
17 POWDER, FOR SOLUTION ORAL DAILY
Qty: 1530 G | Refills: 1 | Status: SHIPPED | OUTPATIENT
Start: 2022-12-19 | End: 2023-01-18

## 2022-12-19 RX ORDER — ONDANSETRON 2 MG/ML
4 INJECTION INTRAMUSCULAR; INTRAVENOUS ONCE
Status: COMPLETED | OUTPATIENT
Start: 2022-12-19 | End: 2022-12-19

## 2022-12-19 RX ORDER — MORPHINE SULFATE 4 MG/ML
4 INJECTION, SOLUTION INTRAMUSCULAR; INTRAVENOUS ONCE
Status: COMPLETED | OUTPATIENT
Start: 2022-12-19 | End: 2022-12-19

## 2022-12-19 RX ORDER — POTASSIUM CHLORIDE 20 MEQ/1
40 TABLET, EXTENDED RELEASE ORAL ONCE
Status: COMPLETED | OUTPATIENT
Start: 2022-12-19 | End: 2022-12-19

## 2022-12-19 RX ADMIN — SODIUM CHLORIDE 500 ML: 9 INJECTION, SOLUTION INTRAVENOUS at 19:38

## 2022-12-19 RX ADMIN — ONDANSETRON 4 MG: 2 INJECTION INTRAMUSCULAR; INTRAVENOUS at 18:35

## 2022-12-19 RX ADMIN — MORPHINE SULFATE 4 MG: 4 INJECTION, SOLUTION INTRAMUSCULAR; INTRAVENOUS at 18:35

## 2022-12-19 RX ADMIN — HYDROMORPHONE HYDROCHLORIDE 0.5 MG: 1 INJECTION, SOLUTION INTRAMUSCULAR; INTRAVENOUS; SUBCUTANEOUS at 19:39

## 2022-12-19 RX ADMIN — IOPAMIDOL 75 ML: 755 INJECTION, SOLUTION INTRAVENOUS at 21:04

## 2022-12-19 RX ADMIN — SODIUM CHLORIDE 500 ML: 9 INJECTION, SOLUTION INTRAVENOUS at 18:34

## 2022-12-19 RX ADMIN — ONDANSETRON 4 MG: 2 INJECTION INTRAMUSCULAR; INTRAVENOUS at 19:39

## 2022-12-19 RX ADMIN — POTASSIUM CHLORIDE 40 MEQ: 1500 TABLET, EXTENDED RELEASE ORAL at 19:39

## 2022-12-19 ASSESSMENT — PAIN SCALES - GENERAL
PAINLEVEL_OUTOF10: 10
PAINLEVEL_OUTOF10: 10

## 2022-12-19 ASSESSMENT — PAIN DESCRIPTION - ORIENTATION
ORIENTATION: LEFT
ORIENTATION: LEFT

## 2022-12-19 ASSESSMENT — ENCOUNTER SYMPTOMS
DIARRHEA: 0
NAUSEA: 1
COUGH: 0
SHORTNESS OF BREATH: 0
ABDOMINAL PAIN: 1
VOMITING: 0
SORE THROAT: 0
BACK PAIN: 0

## 2022-12-19 ASSESSMENT — PAIN DESCRIPTION - DESCRIPTORS
DESCRIPTORS: SHARP;SHOOTING
DESCRIPTORS: PRESSURE;GNAWING

## 2022-12-19 ASSESSMENT — PAIN - FUNCTIONAL ASSESSMENT
PAIN_FUNCTIONAL_ASSESSMENT: 0-10
PAIN_FUNCTIONAL_ASSESSMENT: 0-10

## 2022-12-19 ASSESSMENT — PAIN DESCRIPTION - LOCATION
LOCATION: CHEST
LOCATION: CHEST

## 2022-12-19 NOTE — TELEPHONE ENCOUNTER
Patient called back. States she had her colonoscopy with Dr. Nathalia Anna on Friday, did well on Friday night into Saturday morning. However, on Saturday afternoon/evening she began having left sided abdominal pain, progressing to left upper abdominal/chest pain (under her breastbone that radiates to her back/shoulder). States it huts to breathe, hurts to move. States her abdomen is becoming increasingly distended and she is unable to tolerate eating or drinking anything. States she tried to eat a piece of toast on Saturday morning but was unable to tolerate it. States she has been sipping on ginger ale and Gatorade and the pain is continually worsening. Discussed with patient recommendation for ER evaluation. She is agreeable and reports understanding. Patient prefers to go to [facility] ER. Called to [facility], ER. Report given.

## 2022-12-19 NOTE — ED PROVIDER NOTES
[facility] ED  eMERGENCYdEPARTMENT eNCOUnter      Pt Name: Joseline Bolanos  MRN: [id number]  [place] 1970of evaluation: 12/19/2022  Car Aguirre MD    CHIEF COMPLAINT       Chief Complaint   Patient presents with    Chest Pain     Lest sided, sharp, increasing since yesterday. Pt had colonoscopy on Friday, sent by Dr Adriane Velarde office, concerned for perf         HISTORY OF PRESENT ILLNESS  (Location/Symptom, Timing/Onset, Context/Setting, Quality, Duration, Modifying Factors, Severity.)   Joseline Bolanos is a 54 y.o. female hx of anxiety, GERD, Sjogrens, CHF who presents to the emergency department left sided abdominal/chest pain. Patient s/p colonoscopy 3 days ago, referred to ED to r/o possibility of perforation per Dr. Adriane Velarde report called in to ED nurse. Patient complains of left sided upper abdominal pain that radiates into her chest and back 10/10 she has tried OTC medication with no relief. Denies any SOB, fever, emesis, diarrhea. HPI    Nursing Notes were reviewed and I agree. REVIEW OF SYSTEMS    (2-9 systems for level 4, 10 or more for level 5)     Review of Systems   Constitutional:  Negative for activity change, chills and fever. HENT:  Negative for ear pain and sore throat. Eyes:  Negative for visual disturbance. Respiratory:  Negative for cough and shortness of breath. Cardiovascular:  Positive for chest pain. Negative for palpitations and leg swelling. Gastrointestinal:  Positive for abdominal pain and nausea. Negative for diarrhea and vomiting. Genitourinary:  Negative for dysuria. Musculoskeletal:  Negative for back pain. Skin:  Negative for rash. Neurological:  Negative for dizziness and weakness. as noted above the remainder of the review of systems was reviewed and negative.        PAST MEDICAL HISTORY     Past Medical History:   Diagnosis Date    Acquired hypothyroidism 9/26/2016    Acute left-sided low back pain with bilateral sciatica 3/9/2022 Acute pancreatitis     ADD (attention deficit disorder) 2/12/2015    Anxiety     Congestive heart failure, unspecified HF chronicity, unspecified heart failure type ([facility] 2/11/2022    Depression 12/9/2015    Endometrial cyst of ovary 5/10/14    RT ovary, ruptured    GERD (gastroesophageal reflux disease) 9/26/2016    Medullary sponge kidney of both kidneys 5/22/2018    Sjogren's disease ([facility]     Stress     Swelling          SURGICAL HISTORY       Past Surgical History:   Procedure Laterality Date    BREAST BIOPSY Left 2015? ?    lump removed (benign) (Dr. Link Jarquin)    BREAST CYST EXCISION Left 2015?? Dr Link Jarquin (benign)    BREAST LUMPECTOMY      CHOLECYSTECTOMY  2011    COLONOSCOPY N/A 12/16/2022    COLORECTAL CANCER SCREENING, HIGH RISK performed by Sylvester Ladd MD at [address] [place] [address] ([address] [place] [address])  2014    sept (total)    OVARY REMOVAL Left 01/2014    UPPER GASTROINTESTINAL ENDOSCOPY  09/16/2016    EGD W/BX    UPPER GASTROINTESTINAL ENDOSCOPY N/A 05/06/2021    ENDOSCOPIC ULTRASOUND with EGD performed by Sylvester Ladd MD at [facility] 08/01/2022    EGD DIAGNOSTIC ONLY performed by Juli Steward MD at [facility] 08/05/2022    EGD ESOPHAGOGASTRODUODENOSCOPY performed by Sylvester Ladd MD at [facility]  08/05/2022    ENDOSCOPIC ULTRASOUND performed by Sylvester Ladd MD at [address] [place] [address]       Previous Medications    AMPHETAMINE-DEXTROAMPHETAMINE (ADDERALL XR) 30 MG EXTENDED RELEASE CAPSULE    Take 1 capsule by mouth in the morning and 1 capsule in the evening. Do all this for 30 days.     ASPIRIN (ASPIRIN CHILDRENS) 81 MG CHEWABLE TABLET    Take 1 tablet by mouth daily    AZELASTINE (ASTELIN) 0.1 % NASAL SPRAY    1 spray by Nasal route    BUPROPION (WELLBUTRIN XL) 300 MG EXTENDED RELEASE TABLET    Take 1 tablet by mouth every morning TAKE ONE TABLET BY MOUTH EVERY MORNING    FLUTICASONE (FLONASE) 50 MCG/ACT NASAL SPRAY    1 spray by Each Nostril route daily    FUROSEMIDE (LASIX) 20 MG TABLET    TAKE ONE TABLET BY MOUTH TWO TIMES A DAY    HYDROCORTISONE (CORTEF) 10 MG TABLET    TAKE 1 AND 1/2 TABLETS BY MOUTH ONCE DAILY    HYDROCORTISONE 2.5 % CREAM    Apply topically 2 times daily. HYDROXYCHLOROQUINE (PLAQUENIL) 200 MG TABLET    Take 300 mg by mouth daily     HYOSCYAMINE (LEVSIN/SL) 125 MCG SUBLINGUAL TABLET    Place 1 tablet under the tongue every 4 hours as needed for Cramping    KETOROLAC (TORADOL) 10 MG TABLET    Take 1 tablet by mouth every 6 hours as needed for Pain    LEVOTHYROXINE (SYNTHROID) 25 MCG TABLET    Take one daily    LIDOCAINE (LIDODERM) 5 %    apply one patch as directed once daily to affected area remove patch after twelve hours. LIOTHYRONINE (CYTOMEL) 25 MCG TABLET    Take 1 tablet by mouth daily    LORATADINE (CLARITIN) 10 MG TABLET    Take 1 tablet by mouth daily    MAGNESIUM 400 MG TABS    Take 1 tablet by mouth daily    METHYLPREDNISOLONE (MEDROL DOSEPACK) 4 MG TABLET    On days 1-6 take as directed on package for first 6-day course. On days 7-12 take as directed on (second) package for (second) 6-day course. MOTEGRITY 2 MG TABS    TAKE ONE TABLET BY MOUTH EVERY DAY    MOXIFLOXACIN (VIGAMOX) 0.5 % OPHTHALMIC SOLUTION    1 drop    NARATRIPTAN (AMERGE) 2.5 MG TABLET    Take one at onset of severe headache/migraine may repeat x 1 after 4 hours if needed. Maximum 2/day and 2 days/week.     NEXIUM 40 MG DELAYED RELEASE CAPSULE    TAKE 1 CAPSULE BY MOUTH DAILY    ONDANSETRON (ZOFRAN) 4 MG TABLET    Take 1 tablet by mouth every 8 hours as needed for Nausea or Vomiting    POTASSIUM CHLORIDE (KLOR-CON M) 10 MEQ EXTENDED RELEASE TABLET    Take 1 tablet by mouth daily    PROMETHAZINE-DEXTROMETHORPHAN (PROMETHAZINE-DM) 6.25-15 MG/5ML SYRUP    TAKE 5 MLS BY MOUTH AT BEDTIME FOR 7 DAYS    SECUKINUMAB, 300 MG DOSE, (COSENTYX, 300 MG DOSE,) 150 MG/ML SOSY    Inject into the skin once a week    SUCRALFATE (CARAFATE) 1 GM TABLET    Take 1 tablet by mouth in the morning and 1 tablet at noon and 1 tablet in the evening and 1 tablet before bedtime. SUVOREXANT (BELSOMRA) 5 MG TABS    Take 1 tablet by mouth at bedtime for 30 days. TOPIRAMATE (TOPAMAX) 100 MG TABLET    TAKE ONE TABLET BY MOUTH TWO TIMES A DAY    TRAZODONE (DESYREL) 150 MG TABLET    TAKE ONE TABLET BY MOUTH NIGHTLY    TRULANCE 3 MG TABS    Take 3 mg by mouth daily    VALACYCLOVIR (VALTREX) 500 MG TABLET    Take 1 tablet by mouth daily    ZOLMITRIPTAN (ZOMIG) 5 MG TABLET    TAKE 1 TABLET BY MOUTH AT ONSET OF MIGRAINE. MAY REPEAT ONCE AFTER 2 HOURS. MAX 10 MG (2 TABLETS) PER DAY       ALLERGIES     Patient has no known allergies.     HISTORY       Family History   Problem Relation Age of Onset    Heart Disease Father 48    Breast Cancer Paternal Aunt         in her 29's    Breast Cancer Paternal Aunt         in her 63's    Cancer Maternal Grandmother         breast    Breast Cancer Maternal Grandmother         in her 63's    Breast Cancer Paternal Grandmother         in her 63's    Colon Cancer Paternal Grandfather     Heart Disease Other         mom and dad's side          SOCIAL HISTORY       Social History     Socioeconomic History    Marital status: Single     Spouse name: None    Number of children: None    Years of education: None    Highest education level: None   Tobacco Use    Smoking status: Never    Smokeless tobacco: Never   Vaping Use    Vaping Use: Never used   Substance and Sexual Activity    Alcohol use: No     Alcohol/week: 0.0 standard drinks     Comment: no alcohol since 2011    Drug use: No   Social History Narrative    ** Merged History Encounter **          Social Determinants of Health     Financial Resource Strain: Low Risk     Difficulty of Paying Living Expenses: Not hard at all   Food Insecurity: No Food Insecurity    Worried About Running Out of Food in the Last Year: Never true    [address] in the Last Year: Never true   Transportation Needs: No Transportation Needs    Lack of Transportation (Medical): No    Lack of Transportation (Non-Medical): No   Physical Activity: Inactive    Days of Exercise per Week: 0 days    Minutes of Exercise per Session: 10 min   Stress: Stress Concern Present    Feeling of Stress : Rather much   Social Connections: Unknown    Frequency of Communication with Friends and Family: Twice a week    Frequency of Social Gatherings with Friends and Family: Never    Active Member of Clubs or Organizations: No    Attends Club or Organization Meetings: Never    Marital Status: Never    Housing Stability: Unknown    Unable to Pay for Housing in the Last Year: No    Unstable Housing in the Last Year: No       SCREENINGS    [place] Coma Scale  Eye Opening: Spontaneous  Best Verbal Response: Oriented  Best Motor Response: Obeys commands  [place] Coma Scale Score: 15      PHYSICAL EXAM    (up to 7 forlevel 4, 8 or more for level 5)     ED Triage Vitals [12/19/22 1743]   BP Temp Temp Source Heart Rate Resp SpO2 Height Weight   (!) 141/86 97.8 °F (36.6 °C) Oral 74 20 100 % 5' 2\" (1.575 m) 111 lb (50.3 kg)       Physical Exam  Vitals and nursing note reviewed. Constitutional:       General: She is not in acute distress. Appearance: She is well-developed. She is not ill-appearing. HENT:      Head: Normocephalic and atraumatic. Right Ear: External ear normal.      Left Ear: External ear normal.      Nose: Nose normal.      Mouth/Throat:      Mouth: Mucous membranes are moist.   Eyes:      Conjunctiva/sclera: Conjunctivae normal.      Pupils: Pupils are equal, round, and reactive to light. Cardiovascular:      Rate and Rhythm: Normal rate and regular rhythm. Pulses: Normal pulses. Heart sounds: Normal heart sounds. No murmur heard. No friction rub.    Pulmonary: Effort: Pulmonary effort is normal.      Breath sounds: Normal breath sounds. No wheezing or rhonchi. Abdominal:      General: Bowel sounds are normal. There is no distension. Palpations: Abdomen is soft. Tenderness: There is abdominal tenderness. There is no right CVA tenderness or left CVA tenderness. Musculoskeletal:         General: Normal range of motion. Cervical back: Normal range of motion and neck supple. Skin:     General: Skin is warm and dry. Capillary Refill: Capillary refill takes less than 2 seconds. Neurological:      General: No focal deficit present. Mental Status: She is alert and oriented to person, place, and time. Mental status is at baseline. Psychiatric:         Mood and Affect: Mood normal.         Behavior: Behavior normal.         Thought Content: Thought content normal.         Judgment: Judgment normal.         DIAGNOSTIC RESULTS     RADIOLOGY:   Non-plain film images such as CT, Ultrasound and MRI are read by the radiologist. Plain radiographic images are visualized and preliminarilyinterpreted by Margo Jaramillo MD with the below findings:        Interpretation per the Radiologist below, if available at the time of this note:    XR ABDOMEN (2 VIEWS)   Final Result   No free air identified to suggest perforation. No evidence of bowel   obstruction.          CT ABDOMEN PELVIS W IV CONTRAST Additional Contrast? None    (Results Pending)       LABS:  Labs Reviewed   CBC WITH AUTO DIFFERENTIAL - Abnormal; Notable for the following components:       Result Value    Hematocrit 36.2 (*)     All other components within normal limits   COMPREHENSIVE METABOLIC PANEL W/ REFLEX TO MG FOR LOW K - Abnormal; Notable for the following components:    Potassium reflex Magnesium 3.3 (*)     All other components within normal limits   AMYLASE - Abnormal; Notable for the following components:    Amylase 110 (*)     All other components within normal limits   POCT VENOUS - Abnormal; Notable for the following components:    POC Potassium 3.1 (*)     All other components within normal limits   TROPONIN   BRAIN NATRIURETIC PEPTIDE   D-DIMER, QUANTITATIVE   LIPASE   URINALYSIS WITH REFLEX TO CULTURE   MICROSCOPIC URINALYSIS   MAGNESIUM   POCT EPOC BLOOD GAS, LACTIC ACID, ICA       All other labs were within normal range or not returnedas of this dictation. EMERGENCYDEPARTMENT COURSE and DIFFERENTIAL DIAGNOSIS/MDM:   Vitals:    Vitals:    12/19/22 1743 12/19/22 1854 12/19/22 1945 12/19/22 2128   BP: (!) 141/86 (!) 149/90 137/86 (!) 144/88   Pulse: 74 72 75 70   Resp: 20 21 17 22   Temp: 97.8 °F (36.6 °C)      TempSrc: Oral      SpO2: 100% 100% 100% 100%   Weight: 111 lb (50.3 kg)      Height: 5' 2\" (1.575 m)          REASSESSMENT            MDM  LD 54year old female presents to ED for chest/pain abdominal pain. Patient afebrile and hemodynamically stable. Medicated with IVF NS, Zofran IV, Morphine IV. EKG shows NSR with HR 70, normal axis, normal intervals, no ST changes. Abdominal series Xray shows: Bowel gas pattern is nonspecific without evidence of obstruction. Large amount of stool is seen overlying the pelvis. No free air is identified. The liver appears enlarged. Status post cholecystectomy. No renal calculi seen. Lung bases are clear. No acute osseous abnormality. Labs: UA unremarkable for UTI. CBC WNL. Troponin negative < 0.010. K 3.1 medicated with KDUR po. D-Dimer negative. Call put out to discuss case with  specialist Dr. Dana Curiel who sent patient to ED for testing. Dr. Mayra Spencer called back and discussed case, he states no further testing is warranted at this time after review results and to treat constipation with miralax. Medicated with Dilaudid IV, Zofran IV, IVF NS. Awaiting amylase and lipase results. Patient continues to have LUQ abdominal pain, CT abd/pelvis ordered.  Discussed case with Attending Dr. Velma Colvin and turned care over to him at this time as we are currently awaiting CT results. Patient was updated the results of labs and Radiology. Tolerating PO    PROCEDURES:    Procedures      FINAL IMPRESSION      1. Left upper quadrant abdominal pain    2. Constipation, unspecified constipation type    3.  Hypokalemia          DISPOSITION/PLAN   DISPOSITION Decision To Discharge 12/19/2022 10:15:20 PM      PATIENT REFERRED TO:  Lance Kelly MD  [address] [place] [address] File [phone]    In 2 days      [facility] ED  [address] [address]  [phone]    If symptoms worsen    ORTIZ Monroy - CNP  [address] [facility] [address]  [place] [address]  [phone]    Call in 1 day      DISCHARGE MEDICATIONS:  New Prescriptions    POLYETHYLENE GLYCOL (GLYCOLAX) 17 GM/SCOOP POWDER    Take 17 g by mouth daily       (Please note that portions of this note were completed with a voice recognition program.  Efforts were made to edit the dictations but occasionally words are mis-transcribed.)    MD Demi Cortez MD  12/19/22 3620

## 2022-12-19 NOTE — TELEPHONE ENCOUNTER
Patient called back stating she Is also have a lot of pain under her left breast she said it hurts to breath. Patient is asking if you can call her.  Thanks

## 2022-12-19 NOTE — TELEPHONE ENCOUNTER
Called patient. No answer. Left message for her to call our office back at her earliest convenience.

## 2022-12-20 ENCOUNTER — CARE COORDINATION (OUTPATIENT)
Dept: CARE COORDINATION | Age: 52
End: 2022-12-20

## 2022-12-20 LAB
EKG ATRIAL RATE: 70 BPM
EKG P AXIS: 61 DEGREES
EKG P-R INTERVAL: 144 MS
EKG Q-T INTERVAL: 408 MS
EKG QRS DURATION: 82 MS
EKG QTC CALCULATION (BAZETT): 440 MS
EKG R AXIS: 34 DEGREES
EKG T AXIS: 59 DEGREES
EKG VENTRICULAR RATE: 70 BPM
MAGNESIUM: 1.8 MG/DL (ref 1.7–2.4)

## 2022-12-20 PROCEDURE — 93010 ELECTROCARDIOGRAM REPORT: CPT | Performed by: INTERNAL MEDICINE

## 2022-12-20 NOTE — CARE COORDINATION
Ambulatory Care Coordination Note  12/20/2022    ACC: Chasity Bullock, RN    Rojelio Hennessy tells me that she is still having a lot of issues with her bowels  She continues to have a great deal of pain in her abdomen  She adds that she has pain under the left breast that hurts to touch and she also has pain with breathing  Rojelio Hennessy states that she feels like \"something has dropped in her stomach/ribcage. \"  She adds that she has told this to multiple people and no one is able to help her with this issue  She adds that she was told that she has \"stool burden/constipation. \"  She states that she takes multiple medications for this issue  She is not able to obtain any relief. She is not eating as she feels that eating would only make the issue worse. She is drinking a small amount of liquids. I spoke with her briefly about her CHF and I have made her aware that I would be sending her Footbalistic Education information   I have also asked her to follow up with Dayan Zamora to obtain her input / recommendations related to her bowel issues. Offered patient enrollment in the Remote Patient Monitoring (RPM) program for in-home monitoring: NA. Lab Results       None            Care Coordination Interventions    Referral from Primary Care Provider: No  Suggested Interventions and Community Resources  Disease Association: In Process  Pharmacist: Declined  Registered Dietician: Declined  Zone Management Tools: In Process  Other Services or Interventions: Pharmacy, Dietician referral declined. Walk in Clinic hours and usage discussed CHF zone tool education inititated. Goals Addressed    None         Prior to Admission medications    Medication Sig Start Date End Date Taking? Authorizing Provider   polyethylene glycol (GLYCOLAX) 17 GM/SCOOP powder Take 17 g by mouth daily 12/19/22 1/18/23  Mare Banegas MD   Suvorexant (BELSOMRA) 5 MG TABS Take 1 tablet by mouth at bedtime for 30 days.  12/14/22 1/13/23  ORTIZ Spencer - CNP   amphetamine-dextroamphetamine (ADDERALL XR) 30 MG extended release capsule Take 1 capsule by mouth in the morning and 1 capsule in the evening. Do all this for 30 days. 11/28/22 12/28/22  ORTIZ Oliver CNP   promethazine-dextromethorphan (PROMETHAZINE-DM) 6.25-15 MG/5ML syrup TAKE 5 MLS BY MOUTH AT BEDTIME FOR 7 DAYS 11/17/22   ORTIZ Oliver CNP   fluticasone Heather Nam) 50 MCG/ACT nasal spray 1 spray by Each Nostril route daily 11/15/22   ORTIZ Oliver CNP   NEXIUM 40 MG delayed release capsule TAKE 1 CAPSULE BY MOUTH DAILY 11/15/22   ORTIZ Oliver CNP   Secukinumab, 300 MG Dose, (COSENTYX, 300 MG DOSE,) 150 MG/ML SOSY Inject into the skin once a week    Historical Provider, MD   methylPREDNISolone (MEDROL DOSEPACK) 4 MG tablet On days 1-6 take as directed on package for first 6-day course. On days 7-12 take as directed on (second) package for (second) 6-day course. Patient not taking: Reported on 12/16/2022 9/28/22   ORTIZ Oliver CNP   furosemide (LASIX) 20 MG tablet TAKE ONE TABLET BY MOUTH TWO TIMES A DAY 9/26/22   ORTIZ Oliver CNP   hydrocortisone (CORTEF) 10 MG tablet TAKE 1 AND 1/2 TABLETS BY MOUTH ONCE DAILY  Patient not taking: Reported on 12/16/2022 8/12/22   Historical Provider, MD   ketorolac (TORADOL) 10 MG tablet Take 1 tablet by mouth every 6 hours as needed for Pain  Patient not taking: Reported on 12/16/2022 9/3/22   Noreen Thomason MD   buPROPion (WELLBUTRIN XL) 300 MG extended release tablet Take 1 tablet by mouth every morning TAKE ONE TABLET BY MOUTH EVERY MORNING 8/24/22   ORTIZ Oliver CNP   hydrocortisone 2.5 % cream Apply topically 2 times daily. Patient not taking: Reported on 12/16/2022 8/19/22   Lexjulisa Aguilar,    sucralfate (CARAFATE) 1 GM tablet Take 1 tablet by mouth in the morning and 1 tablet at noon and 1 tablet in the evening and 1 tablet before bedtime.  8/3/22   ORTIZ Mazariegos - CNP   MOTEGRITY 2 MG TABS TAKE ONE TABLET BY MOUTH EVERY DAY 7/4/22   Historical Provider, MD   traZODone (DESYREL) 150 MG tablet TAKE ONE TABLET BY MOUTH NIGHTLY 6/16/22   ORTIZ Maria CNP   lidocaine (LIDODERM) 5 % apply one patch as directed once daily to affected area remove patch after twelve hours. 5/17/22   Historical Provider, MD   midodrine (PROAMATINE) 10 MG tablet Take 10 mg by mouth as needed 4/12/22 8/1/22  Historical Provider, MD   hyoscyamine (LEVSIN/SL) 125 MCG sublingual tablet Place 1 tablet under the tongue every 4 hours as needed for Cramping 4/7/22   ORTIZ Maria CNP   topiramate (TOPAMAX) 100 MG tablet TAKE ONE TABLET BY MOUTH TWO TIMES A DAY 3/3/22   ORTIZ Maria CNP   loratadine (CLARITIN) 10 MG tablet Take 1 tablet by mouth daily 12/28/21   ORTIZ Maria CNP   potassium chloride (KLOR-CON M) 10 MEQ extended release tablet Take 1 tablet by mouth daily 12/8/21   Janes Shepard PA-C   liothyronine (CYTOMEL) 25 MCG tablet Take 1 tablet by mouth daily 7/6/21   ORTIZ Maria CNP   aspirin (ASPIRIN CHILDRENS) 81 MG chewable tablet Take 1 tablet by mouth daily 6/24/21   TRUDY Mccray   naratriptan (AMERGE) 2.5 MG tablet Take one at onset of severe headache/migraine may repeat x 1 after 4 hours if needed. Maximum 2/day and 2 days/week.  6/2/21   Historical Provider, MD   valACYclovir (VALTREX) 500 MG tablet Take 1 tablet by mouth daily 2/15/21   ORTIZ Maria CNP   NEXIUM 40 MG delayed release capsule TAKE ONE CAPSULE BY MOUTH EVERY DAY 11/2/20   ORTIZ Maira CNP   topiramate (TOPAMAX) 100 MG tablet Take 1 tablet by mouth 2 times daily 10/11/20   ORTIZ Maria CNP   Magnesium 400 MG TABS Take 1 tablet by mouth daily 10/3/20   Elena Bledsoe PA-C   furosemide (LASIX) 20 MG tablet Take 1 tablet by mouth 2 times daily TAKE ONE TABLET BY MOUTH TWO TIMES A DAY 8/4/20   ORTIZ Maria - CNP   TRULANCE 3 MG TABS Take 3 mg by mouth daily 3/10/20   Historical Provider, MD   ondansetron (ZOFRAN) 4 MG tablet Take 1 tablet by mouth every 8 hours as needed for Nausea or Vomiting 2/25/20   ORTIZ Monroy CNP   azelastine (ASTELIN) 0.1 % nasal spray 1 spray by Nasal route  Patient not taking: Reported on 12/16/2022 11/27/19   Historical Provider, MD   moxifloxacin (VIGAMOX) 0.5 % ophthalmic solution 1 drop    Historical Provider, MD   ZOLMitriptan (ZOMIG) 5 MG tablet TAKE 1 TABLET BY MOUTH AT ONSET OF MIGRAINE. MAY REPEAT ONCE AFTER 2 HOURS.  MAX 10 MG (2 TABLETS) PER DAY 6/4/18   Historical Provider, MD   levothyroxine (SYNTHROID) 25 MCG tablet Take one daily 4/29/18   ORTIZ Monroy CNP   hydroxychloroquine (PLAQUENIL) 200 MG tablet Take 300 mg by mouth daily     Historical Provider, MD       Future Appointments   Date Time Provider Angela Hernandez   2/21/2023 12:30 PM ORTIZ Monroy CNP [facility] EMERGENCY MEDICAL [place] AT [place]

## 2022-12-20 NOTE — DISCHARGE INSTRUCTIONS
Return to the Emergency Department immediately if you develop worsening symptoms, or you have any other concerns. Please follow up with your family doctor , Gastroenterologist in 1-2 days.    On Ct Abdomen  you have cysts in bilateral kidneys which needs to e followed with your PCP

## 2022-12-21 DIAGNOSIS — Z76.0 MEDICATION REFILL: ICD-10-CM

## 2022-12-21 DIAGNOSIS — F98.8 ATTENTION DEFICIT DISORDER, UNSPECIFIED HYPERACTIVITY PRESENCE: ICD-10-CM

## 2022-12-22 RX ORDER — DEXTROAMPHETAMINE SACCHARATE, AMPHETAMINE ASPARTATE MONOHYDRATE, DEXTROAMPHETAMINE SULFATE AND AMPHETAMINE SULFATE 7.5; 7.5; 7.5; 7.5 MG/1; MG/1; MG/1; MG/1
30 CAPSULE, EXTENDED RELEASE ORAL 2 TIMES DAILY
Qty: 60 CAPSULE | Refills: 0 | Status: SHIPPED | OUTPATIENT
Start: 2022-12-22 | End: 2023-01-21

## 2022-12-27 DIAGNOSIS — G47.00 INSOMNIA, UNSPECIFIED TYPE: ICD-10-CM

## 2022-12-27 RX ORDER — TRAZODONE HYDROCHLORIDE 150 MG/1
TABLET ORAL
Qty: 30 TABLET | Refills: 0 | Status: SHIPPED | OUTPATIENT
Start: 2022-12-27

## 2022-12-27 NOTE — TELEPHONE ENCOUNTER
Future Appointments    Encounter Information    Provider Department Appt Notes   2/21/2023 ORTIZ Vergara - [address] [place] [address] Primary Care ADHD f/u     Past Visits    Date Provider Specialty Visit Type Primary Dx   12/16/2022 Sharon Vides MD Endoscopy Surgery    11/15/2022 ORTIZ Vergara CNP Family Medicine Telemedicine Viral URI   11/03/2022 ORTIZ Vergara CNP Family Medicine Telemedicine Left upper quadrant abdominal pain

## 2023-01-04 ENCOUNTER — CARE COORDINATION (OUTPATIENT)
Dept: CARE COORDINATION | Age: 53
End: 2023-01-04

## 2023-01-06 ENCOUNTER — OFFICE VISIT (OUTPATIENT)
Dept: FAMILY MEDICINE CLINIC | Age: 53
End: 2023-01-06

## 2023-01-06 VITALS
OXYGEN SATURATION: 99 % | WEIGHT: 112.8 LBS | HEIGHT: 62 IN | DIASTOLIC BLOOD PRESSURE: 78 MMHG | BODY MASS INDEX: 20.76 KG/M2 | HEART RATE: 76 BPM | TEMPERATURE: 97.4 F | SYSTOLIC BLOOD PRESSURE: 120 MMHG

## 2023-01-06 DIAGNOSIS — S46.811A TRAPEZIUS STRAIN, RIGHT, INITIAL ENCOUNTER: Primary | ICD-10-CM

## 2023-01-06 DIAGNOSIS — Z79.52 LONG TERM CURRENT USE OF SYSTEMIC STEROIDS: ICD-10-CM

## 2023-01-06 RX ORDER — CYCLOBENZAPRINE HCL 10 MG
10 TABLET ORAL 3 TIMES DAILY PRN
Qty: 30 TABLET | Refills: 0 | Status: SHIPPED | OUTPATIENT
Start: 2023-01-06 | End: 2023-01-16

## 2023-01-06 RX ORDER — HYDROCODONE BITARTRATE AND ACETAMINOPHEN 5; 325 MG/1; MG/1
1 TABLET ORAL EVERY 8 HOURS PRN
Qty: 15 TABLET | Refills: 0 | Status: SHIPPED | OUTPATIENT
Start: 2023-01-06 | End: 2023-01-11

## 2023-01-06 ASSESSMENT — ENCOUNTER SYMPTOMS
ABDOMINAL PAIN: 0
VOMITING: 0
SHORTNESS OF BREATH: 0
SORE THROAT: 0
COUGH: 0
SINUS PRESSURE: 0

## 2023-01-06 NOTE — PROGRESS NOTES
2023    Braden James (:  1970) is a 50 y.o. female, here for evaluation of the following medical concerns:  Chief Complaint   Patient presents with    Neck Pain     Painful to move side to side , up and down pain in trapezoid and swelling       HPI  Neck pain  Symptoms started 3 days ago  No injury or trauma  Has been taking tylenol and flexeril   Used icee hot the first day  Worsening headache and migraine  Feels like her left shoulder area is swollen    Review of Systems   Constitutional:  Negative for chills and fever. HENT:  Negative for congestion, sinus pressure and sore throat. Respiratory:  Negative for cough and shortness of breath. Cardiovascular:  Negative for chest pain and palpitations. Gastrointestinal:  Negative for abdominal pain and vomiting. Musculoskeletal:  Positive for neck pain. Negative for arthralgias and myalgias. Skin:  Negative for rash and wound. Neurological:  Negative for speech difficulty and light-headedness. Psychiatric/Behavioral:  Negative for suicidal ideas. The patient is not nervous/anxious. Prior to Visit Medications    Medication Sig Taking? Authorizing Provider   cyclobenzaprine (FLEXERIL) 10 MG tablet Take 1 tablet by mouth 3 times daily as needed for Muscle spasms Yes Yanni Alejandre DO   HYDROcodone-acetaminophen (NORCO) 5-325 MG per tablet Take 1 tablet by mouth every 8 hours as needed for Pain for up to 5 days. Intended supply: 5 days. Take lowest dose possible to manage pain Max Daily Amount: 3 tablets Yes Yanni Alejandre DO   traZODone (DESYREL) 150 MG tablet TAKE ONE TABLET BY MOUTH NIGHTLY Yes Alex Lynn MD   amphetamine-dextroamphetamine (ADDERALL XR) 30 MG extended release capsule Take 1 capsule by mouth in the morning and 1 capsule in the evening. Do all this for 30 days.  Yes Alex Lynn MD   polyethylene glycol (GLYCOLAX) 17 GM/SCOOP powder Take 17 g by mouth daily Yes Claudene Brunet, MD   Suvorexant (BELSOMRA) 5 MG TABS Take 1 tablet by mouth at bedtime for 30 days. Yes ORTIZ Mendez CNP   promethazine-dextromethorphan (PROMETHAZINE-DM) 6.25-15 MG/5ML syrup TAKE 5 MLS BY MOUTH AT BEDTIME FOR 7 DAYS Yes ORTIZ Mendez CNP   fluticasone (FLONASE) 50 MCG/ACT nasal spray 1 spray by Each Nostril route daily Yes ORTIZ Mendez CNP   NEXIUM 40 MG delayed release capsule TAKE 1 CAPSULE BY MOUTH DAILY Yes ORTIZ Mendez CNP   Secukinumab, 300 MG Dose, (COSENTYX, 300 MG DOSE,) 150 MG/ML SOSY Inject into the skin once a week Yes Historical Provider, MD   furosemide (LASIX) 20 MG tablet TAKE ONE TABLET BY MOUTH TWO TIMES A DAY Yes ORTIZ Mendez CNP   hydrocortisone (CORTEF) 10 MG tablet  Yes Historical Provider, MD   ketorolac (TORADOL) 10 MG tablet Take 1 tablet by mouth every 6 hours as needed for Pain Yes Zhou Bernal MD   buPROPion (WELLBUTRIN XL) 300 MG extended release tablet Take 1 tablet by mouth every morning TAKE ONE TABLET BY MOUTH EVERY MORNING Yes ORTIZ Mendez CNP   hydrocortisone 2.5 % cream Apply topically 2 times daily. Yes Sarah Beth Montano DO   sucralfate (CARAFATE) 1 GM tablet Take 1 tablet by mouth in the morning and 1 tablet at noon and 1 tablet in the evening and 1 tablet before bedtime. Yes ORTIZ Cox CNP   MOTEGRITY 2 MG TABS TAKE ONE TABLET BY MOUTH EVERY DAY Yes Historical Provider, MD   lidocaine (LIDODERM) 5 % apply one patch as directed once daily to affected area remove patch after twelve hours.  Yes Historical Provider, MD   hyoscyamine (LEVSIN/SL) 125 MCG sublingual tablet Place 1 tablet under the tongue every 4 hours as needed for Cramping Yes ORTIZ Mendez CNP   topiramate (TOPAMAX) 100 MG tablet TAKE ONE TABLET BY MOUTH TWO TIMES A DAY Yes ORTIZ Mendez CNP   loratadine (CLARITIN) 10 MG tablet Take 1 tablet by mouth daily Yes ORTIZ Mendez CNP   potassium chloride (KLOR-CON M) 10 MEQ extended release tablet Take 1 tablet by mouth daily Yes Shiva Bernal PA-C   liothyronine (CYTOMEL) 25 MCG tablet Take 1 tablet by mouth daily Yes ORTIZ Alva CNP   aspirin (ASPIRIN CHILDRENS) 81 MG chewable tablet Take 1 tablet by mouth daily Yes TRUDY Cox   naratriptan (AMERGE) 2.5 MG tablet Take one at onset of severe headache/migraine may repeat x 1 after 4 hours if needed. Maximum 2/day and 2 days/week. Yes Historical Provider, MD   valACYclovir (VALTREX) 500 MG tablet Take 1 tablet by mouth daily Yes ORTIZ Alva CNP   Magnesium 400 MG TABS Take 1 tablet by mouth daily Yes Alina Perez PA-C   TRULANCE 3 MG TABS Take 3 mg by mouth daily Yes Historical Provider, MD   ondansetron (ZOFRAN) 4 MG tablet Take 1 tablet by mouth every 8 hours as needed for Nausea or Vomiting Yes ORTIZ Alva CNP   moxifloxacin (VIGAMOX) 0.5 % ophthalmic solution 1 drop Yes Historical Provider, MD   ZOLMitriptan (ZOMIG) 5 MG tablet TAKE 1 TABLET BY MOUTH AT ONSET OF MIGRAINE. MAY REPEAT ONCE AFTER 2 HOURS.  MAX 10 MG (2 TABLETS) PER DAY Yes Historical Provider, MD   levothyroxine (SYNTHROID) 25 MCG tablet Take one daily Yes ORTIZ Alva CNP   hydroxychloroquine (PLAQUENIL) 200 MG tablet Take 300 mg by mouth daily  Yes Historical Provider, MD   midodrine (PROAMATINE) 10 MG tablet Take 10 mg by mouth as needed  Historical Provider, MD   NEXIUM 40 MG delayed release capsule TAKE ONE CAPSULE BY MOUTH EVERY DAY  ORTIZ Alva CNP   topiramate (TOPAMAX) 100 MG tablet Take 1 tablet by mouth 2 times daily  ORTIZ Alva CNP   furosemide (LASIX) 20 MG tablet Take 1 tablet by mouth 2 times daily TAKE ONE TABLET BY MOUTH TWO TIMES A DAY  ORTIZ Alva CNP   azelastine (ASTELIN) 0.1 % nasal spray 1 spray by Nasal route  Patient not taking: Reported on 12/16/2022  Historical Provider, MD        Medications Discontinued During This Encounter   Medication Reason    methylPREDNISolone (MEDROL DOSEPACK) 4 MG tablet Therapy completed       No Known Allergies    Past Medical History:   Diagnosis Date    Acquired hypothyroidism 9/26/2016    Acute left-sided low back pain with bilateral sciatica 3/9/2022    Acute pancreatitis     ADD (attention deficit disorder) 2/12/2015    Anxiety     Congestive heart failure, unspecified HF chronicity, unspecified heart failure type ([facility] [facility] 2/11/2022    Depression 12/9/2015    Endometrial cyst of ovary 5/10/14    RT ovary, ruptured    GERD (gastroesophageal reflux disease) 9/26/2016    Medullary sponge kidney of both kidneys 5/22/2018    [facility]     Stress     Swelling        Past Surgical History:   Procedure Laterality Date    BREAST BIOPSY Left 2015? ?    lump removed (benign) (Dr. Augustin Toussaint)    BREAST CYST EXCISION Left 2015??     Dr Augustin Toussaint (benign)    BREAST LUMPECTOMY      CHOLECYSTECTOMY  2011    COLONOSCOPY N/A 12/16/2022    COLORECTAL CANCER SCREENING, HIGH RISK performed by Paul Escalona MD at [facility] ([address] [place] [address])  2014    sept (total)    OVARY REMOVAL Left 01/2014    UPPER GASTROINTESTINAL ENDOSCOPY  09/16/2016    EGD W/BX    UPPER GASTROINTESTINAL ENDOSCOPY N/A 05/06/2021    ENDOSCOPIC ULTRASOUND with EGD performed by Paul Escalona MD at [facility] 08/01/2022    EGD DIAGNOSTIC ONLY performed by Omaira Hobbs MD at [facility] 08/05/2022    EGD ESOPHAGOGASTRODUODENOSCOPY performed by Paul Escalona MD at [facility]  08/05/2022    ENDOSCOPIC ULTRASOUND performed by Paul Escalona MD at [address] History     Socioeconomic History    Marital status: Single     Spouse name: Not on file    Number of children: Not on file    Years of education: Not on file    Highest education level: Not on file   Occupational History    Not on file   Tobacco Use    Smoking status: Never    Smokeless tobacco: Never   Vaping Use    Vaping Use: Never used   Substance and Sexual Activity    Alcohol use: No     Alcohol/week: 0.0 standard drinks     Comment: no alcohol since 2011    Drug use: No    Sexual activity: Not on file   Other Topics Concern    Not on file   Social History Narrative    ** Merged History Encounter **          Social Determinants of Health     Financial Resource Strain: Low Risk     Difficulty of Paying Living Expenses: Not hard at all   Food Insecurity: No Food Insecurity    Worried About [address] in the Last Year: Never true    [facility] in the Last Year: Never true   Transportation Needs: No Transportation Needs    Lack of Transportation (Medical): No    Lack of Transportation (Non-Medical):  No   Physical Activity: Inactive    Days of Exercise per Week: 0 days    Minutes of Exercise per Session: 10 min   Stress: Stress Concern Present    Feeling of Stress : Rather much   Social Connections: Unknown    Frequency of Communication with Friends and Family: Twice a week    Frequency of Social Gatherings with Friends and Family: Never    Attends [employer] Services: Not on file    Active Member of Clubs or Organizations: No    Attends Club or Organization Meetings: Never    Marital Status: Never    Intimate Partner Violence: Not on file   Housing Stability: Unknown    Unable to Pay for Housing in the Last Year: No    Number of Places Lived in the Last Year: Not on file    Unstable Housing in the Last Year: No        Family History   Problem Relation Age of Onset    Heart Disease Father 48    Breast Cancer Paternal Aunt         in her 29's    Breast Cancer Paternal Aunt         in her 63's    Cancer Maternal Grandmother         breast    Breast Cancer Maternal Grandmother         in her 63's    Breast Cancer Paternal Grandmother         in her 63's    Colon Cancer Paternal Grandfather     Heart Disease Other         mom and dad's side       Vitals:    01/06/23 1205   BP: 120/78   Pulse: 76   Temp: 97.4 °F (36.3 °C)   SpO2: 99%   Weight: 112 lb 12.8 oz (51.2 kg)   Height: 5' 2\" (1.575 m)       Estimated body mass index is 20.63 kg/m² as calculated from the following:    Height as of this encounter: 5' 2\" (1.575 m). Weight as of this encounter: 112 lb 12.8 oz (51.2 kg). No results for input(s): WBC, RBC, HGB, HCT, MCV, MCH, MCHC, RDW, PLT, MPV in the last 72 hours. No results for input(s): NA, K, CL, CO2, BUN, CREATININE, GLUCOSE, CALCIUM, PROT, LABALBU, BILITOT, ALKPHOS, AST, ALT in the last 72 hours. Lab Results   Component Value Date/Time    LABA1C 4.9 05/11/2022 03:16 PM       CT ABDOMEN PELVIS W IV CONTRAST Additional Contrast? None    Result Date: 12/20/2022  Large volume stool seen in the cecum and ascending colon to suggest clinical presentation of constipation. No acute inflammatory changes identified within the bowel. Cyst in the kidneys and liver with no evidence of acute intra-abdominal or pelvic abnormality. XR ABDOMEN (2 VIEWS)    Result Date: 12/19/2022  No free air identified to suggest perforation. No evidence of bowel obstruction. Physical Exam  Constitutional:       General: She is not in acute distress. Appearance: Normal appearance. HENT:      Head: Normocephalic and atraumatic. Eyes:      Extraocular Movements: Extraocular movements intact. Conjunctiva/sclera: Conjunctivae normal.   Musculoskeletal:         General: No deformity. Right shoulder: Swelling and tenderness present. No effusion. Decreased range of motion. Arms:    Skin:     Findings: No lesion or rash. Neurological:      General: No focal deficit present. Mental Status: She is alert. Mental status is at baseline. Psychiatric:         Mood and Affect: Mood normal.         Behavior: Behavior normal.         Thought Content: Thought content normal.       ASSESSMENT/PLAN:  1.  Trapezius strain, right, initial encounter  Discussed likely musculoskeletal strain  Flexeril and Tylenol, patient cannot tolerate NSAIDs due to GI issues  Recommended ice 10 to 15 minutes several times a day  Norco for breakthrough pain  Patient will follow-up in 4 days for OMT    - cyclobenzaprine (FLEXERIL) 10 MG tablet; Take 1 tablet by mouth 3 times daily as needed for Muscle spasms  Dispense: 30 tablet; Refill: 0  - HYDROcodone-acetaminophen (NORCO) 5-325 MG per tablet; Take 1 tablet by mouth every 8 hours as needed for Pain for up to 5 days. Intended supply: 5 days. Take lowest dose possible to manage pain Max Daily Amount: 3 tablets  Dispense: 15 tablet; Refill: 0    2. Long term current use of systemic steroids  Patient concerned because she was told she had a fracture of her lower left rib without any known trauma or injury to the area  Patient states she has been on long-term steroids in the past and concerned about osteoporosis/penia  DEXA is ordered    - DEXA BONE DENSITY AXIAL SKELETON; Future    Medications Discontinued During This Encounter   Medication Reason    methylPREDNISolone (MEDROL DOSEPACK) 4 MG tablet Therapy completed       ---------------------------------------------------------------------  Side effects, adverse effects of the medication prescribed today, as well as treatment plan/ rationale and result expectations have been discussed with the patient who expresses understanding and desires to proceed. Close follow up to evaluate treatment results and for coordination of care. I have reviewed the patient's medical history in detail and updated the computerized patient record. As always, patient is advised that if symptoms worsen in any way they will proceed to the nearest emergency room. --------------------------------------------------------------------    Return in about 4 days (around 1/10/2023) for Dr. Annabella Shi OMT.     An  electronic signature was used to authenticate this note.    --Yanelis Nye, DO on 1/6/2023 at 12:41 PM

## 2023-01-11 ENCOUNTER — OFFICE VISIT (OUTPATIENT)
Dept: FAMILY MEDICINE CLINIC | Age: 53
End: 2023-01-11

## 2023-01-11 VITALS
WEIGHT: 112 LBS | TEMPERATURE: 97.6 F | SYSTOLIC BLOOD PRESSURE: 122 MMHG | DIASTOLIC BLOOD PRESSURE: 76 MMHG | BODY MASS INDEX: 20.49 KG/M2 | OXYGEN SATURATION: 98 % | HEART RATE: 80 BPM

## 2023-01-11 DIAGNOSIS — M99.00 SOMATIC DYSFUNCTION OF HEAD REGION: ICD-10-CM

## 2023-01-11 DIAGNOSIS — M99.08 SOMATIC DYSFUNCTION OF RIB REGION: ICD-10-CM

## 2023-01-11 DIAGNOSIS — M79.18 MYOFASCIAL PAIN: Primary | ICD-10-CM

## 2023-01-11 DIAGNOSIS — M99.07 SOMATIC DYSFUNCTION OF UPPER EXTREMITIES: ICD-10-CM

## 2023-01-11 DIAGNOSIS — M99.01 SOMATIC DYSFUNCTION OF CERVICAL REGION: ICD-10-CM

## 2023-01-11 DIAGNOSIS — M99.02 SOMATIC DYSFUNCTION OF THORACIC REGION: ICD-10-CM

## 2023-01-11 RX ORDER — TIZANIDINE 4 MG/1
4 TABLET ORAL EVERY 8 HOURS PRN
Qty: 10 TABLET | Refills: 0 | Status: SHIPPED | OUTPATIENT
Start: 2023-01-11

## 2023-01-11 ASSESSMENT — PATIENT HEALTH QUESTIONNAIRE - PHQ9
SUM OF ALL RESPONSES TO PHQ QUESTIONS 1-9: 0
SUM OF ALL RESPONSES TO PHQ9 QUESTIONS 1 & 2: 0
SUM OF ALL RESPONSES TO PHQ QUESTIONS 1-9: 0
6. FEELING BAD ABOUT YOURSELF - OR THAT YOU ARE A FAILURE OR HAVE LET YOURSELF OR YOUR FAMILY DOWN: 0
2. FEELING DOWN, DEPRESSED OR HOPELESS: 0
1. LITTLE INTEREST OR PLEASURE IN DOING THINGS: 0
SUM OF ALL RESPONSES TO PHQ QUESTIONS 1-9: 0
3. TROUBLE FALLING OR STAYING ASLEEP: 0
7. TROUBLE CONCENTRATING ON THINGS, SUCH AS READING THE NEWSPAPER OR WATCHING TELEVISION: 0
8. MOVING OR SPEAKING SO SLOWLY THAT OTHER PEOPLE COULD HAVE NOTICED. OR THE OPPOSITE, BEING SO FIGETY OR RESTLESS THAT YOU HAVE BEEN MOVING AROUND A LOT MORE THAN USUAL: 0
9. THOUGHTS THAT YOU WOULD BE BETTER OFF DEAD, OR OF HURTING YOURSELF: 0
5. POOR APPETITE OR OVEREATING: 0
SUM OF ALL RESPONSES TO PHQ QUESTIONS 1-9: 0
4. FEELING TIRED OR HAVING LITTLE ENERGY: 0
10. IF YOU CHECKED OFF ANY PROBLEMS, HOW DIFFICULT HAVE THESE PROBLEMS MADE IT FOR YOU TO DO YOUR WORK, TAKE CARE OF THINGS AT HOME, OR GET ALONG WITH OTHER PEOPLE: 0

## 2023-01-11 NOTE — PROGRESS NOTES
Joseline Bolanos (:  1970) is a 50 y.o. female,Established patient, here for evaluation of the following chief complaint(s):  Shoulder Pain (Rt shoulder pain, going into neck. )        SUBJECTIVE    History of present illness:     49 yo R handed female presents with right upper shoulder pain  Started last week. No inciting injury or trauma  Pain is sharp and feels like a pulling when she moves her neck   Does not radiate  Location: right trapezial ridge  Worse with activity   Taking flexeril which helps a little   Also norco prescribed by her PCP last week   No UE weakness, dropping objects, or balance issues    Review of Symptoms: As per HPI. Past Medical History:   Diagnosis Date    Acquired hypothyroidism 2016    Acute left-sided low back pain with bilateral sciatica 3/9/2022    Acute pancreatitis     ADD (attention deficit disorder) 2015    Anxiety     Congestive heart failure, unspecified HF chronicity, unspecified heart failure type ([facility] [address] 2022    Depression 2015    Endometrial cyst of ovary 5/10/14    RT ovary, ruptured    GERD (gastroesophageal reflux disease) 2016    Medullary sponge kidney of both kidneys 2018    Sjogren's disease ([facility] [address]     Stress     Swelling      Past Surgical History:   Procedure Laterality Date    BREAST BIOPSY Left ? ?    lump removed (benign) (Dr. Chanelle Taveras)    BREAST CYST EXCISION Left ??     Dr Chanelle Taveras (benign)    BREAST LUMPECTOMY      CHOLECYSTECTOMY      COLONOSCOPY N/A 2022    COLORECTAL CANCER SCREENING, HIGH RISK performed by Kathy Merritt MD at [address] [facility] ([address] [place] [address])      sept (total)    OVARY REMOVAL Left 2014    UPPER GASTROINTESTINAL ENDOSCOPY  2016    EGD W/BX    UPPER GASTROINTESTINAL ENDOSCOPY N/A 2021    ENDOSCOPIC ULTRASOUND with EGD performed by Kathy Merritt MD at [address] [facility] 08/01/2022    EGD DIAGNOSTIC ONLY performed by Gabriela Walters MD at [facility] N/A 08/05/2022    EGD ESOPHAGOGASTRODUODENOSCOPY performed by Kevin Walker MD at [facility]  08/05/2022    ENDOSCOPIC ULTRASOUND performed by Kevin Walker MD at Located within [facility]     Family History   Problem Relation Age of Onset    Heart Disease Father 48    Breast Cancer Paternal Aunt         in her 29's    Breast Cancer Paternal Aunt         in her 63's    Cancer Maternal Grandmother         breast    Breast Cancer Maternal Grandmother         in her 63's    Breast Cancer Paternal Grandmother         in her 63's    Colon Cancer Paternal Grandfather     Heart Disease Other         mom and dad's side     Social History     Socioeconomic History    Marital status: Single     Spouse name: Not on file    Number of children: Not on file    Years of education: Not on file    Highest education level: Not on file   Occupational History    Not on file   Tobacco Use    Smoking status: Never    Smokeless tobacco: Never   Vaping Use    Vaping Use: Never used   Substance and Sexual Activity    Alcohol use: No     Alcohol/week: 0.0 standard drinks     Comment: no alcohol since 2011    Drug use: No    Sexual activity: Not on file   Other Topics Concern    Not on file   Social History Narrative    ** Merged History Encounter **          Social Determinants of Health     Financial Resource Strain: Low Risk     Difficulty of Paying Living Expenses: Not hard at all   Food Insecurity: No Food Insecurity    Worried About Running Out of Food in the Last Year: Never true    [address] in the Last Year: Never true   Transportation Needs: No Transportation Needs    Lack of Transportation (Medical): No    Lack of Transportation (Non-Medical):  No   Physical Activity: Inactive    Days of Exercise per Week: 0 days    Minutes of Exercise per Session: 10 min   Stress: Stress Concern Present    Feeling of Stress : Rather much   Social Connections: Unknown    Frequency of Communication with Friends and Family: Twice a week    Frequency of Social Gatherings with Friends and Family: Never    Attends Islam Services: Not on file    Active Member of Clubs or Organizations: No    Attends Club or Organization Meetings: Never    Marital Status: Never    Intimate Partner Violence: Not on file   Housing Stability: Unknown    Unable to Pay for Housing in the Last Year: No    Number of Places Lived in the Last Year: Not on file    Unstable Housing in the Last Year: No     Patient has no known allergies. Prior to Admission medications    Medication Sig Start Date End Date Taking? Authorizing Provider   tiZANidine (ZANAFLEX) 4 MG tablet Take 1 tablet by mouth every 8 hours as needed (Pain) 1/11/23  Yes Bethel Gitelman,    cyclobenzaprine (FLEXERIL) 10 MG tablet Take 1 tablet by mouth 3 times daily as needed for Muscle spasms 1/6/23 1/16/23 Yes Avtar Francis, DO   traZODone (DESYREL) 150 MG tablet TAKE ONE TABLET BY MOUTH NIGHTLY 12/27/22  Yes Daniela Mc MD   amphetamine-dextroamphetamine (ADDERALL XR) 30 MG extended release capsule Take 1 capsule by mouth in the morning and 1 capsule in the evening. Do all this for 30 days. 12/22/22 1/21/23 Yes Daniela Mc MD   polyethylene glycol [facility]) 17 GM/SCOOP powder Take 17 g by mouth daily 12/19/22 1/18/23 Yes Pat Carbone MD   Suvorexant (BELSOMRA) 5 MG TABS Take 1 tablet by mouth at bedtime for 30 days.  12/14/22 1/13/23 Yes ORTIZ Spencer CNP   promethazine-dextromethorphan (PROMETHAZINE-DM) 6.25-15 MG/5ML syrup TAKE 5 MLS BY MOUTH AT BEDTIME FOR 7 DAYS 11/17/22  Yes Tracey Benítez APRN - CNP   fluticasone [facility]) 50 MCG/ACT nasal spray 1 spray by Each Nostril route daily 11/15/22  Yes ORTIZ Spencer - CNP   NEXIUM 40 MG delayed release capsule TAKE 1 CAPSULE BY MOUTH DAILY 11/15/22  Yes Tracey Benítez APRN - CNP   Secukinumab, 300 MG Dose, (COSENTYX, 300 MG DOSE,) 150 MG/ML SOSY Inject into the skin once a week   Yes Historical Provider, MD   furosemide (LASIX) 20 MG tablet TAKE ONE TABLET BY MOUTH TWO TIMES A DAY 9/26/22  Yes ORTIZ Mott CNP   hydrocortisone (CORTEF) 10 MG tablet  8/12/22  Yes Historical Provider, MD   ketorolac (TORADOL) 10 MG tablet Take 1 tablet by mouth every 6 hours as needed for Pain 9/3/22  Yes Osiris May MD   buPROPion (WELLBUTRIN XL) 300 MG extended release tablet Take 1 tablet by mouth every morning TAKE ONE TABLET BY MOUTH EVERY MORNING 8/24/22  Yes ORTIZ Mott CNP   hydrocortisone 2.5 % cream Apply topically 2 times daily. 8/19/22  Yes David Guzman DO   sucralfate (CARAFATE) 1 GM tablet Take 1 tablet by mouth in the morning and 1 tablet at noon and 1 tablet in the evening and 1 tablet before bedtime. 8/3/22  Yes ORTIZ Garrido CNP   MOTEGRITY 2 MG TABS TAKE ONE TABLET BY MOUTH EVERY DAY 7/4/22  Yes Historical Provider, MD   lidocaine (LIDODERM) 5 % apply one patch as directed once daily to affected area remove patch after twelve hours.  5/17/22  Yes Historical Provider, MD   hyoscyamine (LEVSIN/SL) 125 MCG sublingual tablet Place 1 tablet under the tongue every 4 hours as needed for Cramping 4/7/22  Yes ORTIZ Mott CNP   topiramate (TOPAMAX) 100 MG tablet TAKE ONE TABLET BY MOUTH TWO TIMES A DAY 3/3/22  Yes ORTIZ Mott CNP   loratadine (CLARITIN) 10 MG tablet Take 1 tablet by mouth daily 12/28/21  Yes ORTIZ Mott CNP   potassium chloride (KLOR-CON M) 10 MEQ extended release tablet Take 1 tablet by mouth daily 12/8/21  Yes Kaelyn House PA-C   liothyronine (CYTOMEL) 25 MCG tablet Take 1 tablet by mouth daily 7/6/21  Yes ORTIZ Mott CNP   aspirin (ASPIRIN CHILDRENS) 81 MG chewable tablet Take 1 tablet by mouth daily 6/24/21  Yes TRUDY Ng   naratriptan (AMERGE) 2.5 MG tablet Take one at onset of severe headache/migraine may repeat x 1 after 4 hours if needed. Maximum 2/day and 2 days/week. 6/2/21  Yes Historical Provider, MD   valACYclovir (VALTREX) 500 MG tablet Take 1 tablet by mouth daily 2/15/21  Yes ORTIZ Ang CNP   Magnesium 400 MG TABS Take 1 tablet by mouth daily 10/3/20  Yes Mario Alonzo PA-C   TRULANCE 3 MG TABS Take 3 mg by mouth daily 3/10/20  Yes Historical Provider, MD   ondansetron (ZOFRAN) 4 MG tablet Take 1 tablet by mouth every 8 hours as needed for Nausea or Vomiting 2/25/20  Yes ORTIZ Ang CNP   moxifloxacin (VIGAMOX) 0.5 % ophthalmic solution 1 drop   Yes Historical Provider, MD   ZOLMitriptan (ZOMIG) 5 MG tablet TAKE 1 TABLET BY MOUTH AT ONSET OF MIGRAINE. MAY REPEAT ONCE AFTER 2 HOURS.  MAX 10 MG (2 TABLETS) PER DAY 6/4/18  Yes Historical Provider, MD   levothyroxine (SYNTHROID) 25 MCG tablet Take one daily 4/29/18  Yes ORTIZ Ang CNP   hydroxychloroquine (PLAQUENIL) 200 MG tablet Take 300 mg by mouth daily    Yes Historical Provider, MD   midodrine (PROAMATINE) 10 MG tablet Take 10 mg by mouth as needed 4/12/22 8/1/22  Historical Provider, MD   NEXIUM 40 MG delayed release capsule TAKE ONE CAPSULE BY MOUTH EVERY DAY 11/2/20   ORTIZ Ang CNP   topiramate (TOPAMAX) 100 MG tablet Take 1 tablet by mouth 2 times daily 10/11/20   ORTIZ Ang CNP   furosemide (LASIX) 20 MG tablet Take 1 tablet by mouth 2 times daily TAKE ONE TABLET BY MOUTH TWO TIMES A DAY 8/4/20   ORTIZ Ang CNP   azelastine (ASTELIN) 0.1 % nasal spray 1 spray by Nasal route  Patient not taking: No sig reported 11/27/19   Historical Provider, MD       OBJECTIVE     /76 (Site: Right Upper Arm, Position: Sitting, Cuff Size: Medium Adult)   Pulse 80   Temp 97.6 °F (36.4 °C) (Tympanic)   Wt 112 lb (50.8 kg)   SpO2 98%   BMI 20.49 kg/m²     General: alert and oriented, NAD  Head: normocephalic, atraumatic  Cardiovascular: No cyanosis, no edema  Respiratory: No respiratory distress, no accessory muscle use  Psychological: normal mood and affect    MSK:    TTP in right upper trap  AROM cervical spine grossly intact however pain in all planes     Neurological:     UE strength 5/5 bl proximally and distally   Biceps triceps and brachioradialis DTRs 2/4 bl   No loss of sensation to light touch   Negative Spurling and Wagoner sign     Osteopathic structural exam:    Head: OA compressed tw disengagement   Cervical: C2 FRS left C3 ERS right, C5 FRS left tw functional tech  Thoracic: T6 FRS left tw stills technique  Rib: right rib 1 superior tw facilitated positional release  Upper extremity: right AC joint anterior tw muscle energy, right scapula protracted tw balance ligamentous tension, right trap tender point tw counterstrain, right trap HTP treated with fascial distortion model    Outcome of treatment: Treatment tolerated well with improvement in patient's somatic dysfunction. Patient's ambulatory and respiratory biomechanics improved post-treatment. ASSESSMENT/PLAN:    Myofascial pain    Acute. Location: trapezius muscles  OMT performed in office today, see below  Will try course of tizanidine   Will also refer to PT   Continue voltaren gel PRN     Somatic Dysfunction    An exam for somatic dysfunction was indicated to evaluate for musculoskeletal manifestations of the patient's primary complaint. Relevant somatic dysfunction was identified in multiple region given the neural and myofascial connections to the patient's chief complaint. The risks, benefits, and alternatives to Osteopathic manipulative treatment (OMT) were reviewed with patient who provided verbal consent to proceed with treatment. Gentle OMT was applied using both indirect and direct technique approaches. Patient tolerated treatment well without immediate complications. Post-treatment instructions were reviewed with patient, including post-treatment soreness.      Regions treated: Cervical, thoracic, rib, upper extremity, head     Techniques utilized: see above. Return in about 2 weeks (around 1/25/2023). An electronic signature was used to authenticate this note.

## 2023-01-17 ENCOUNTER — HOSPITAL ENCOUNTER (OUTPATIENT)
Dept: WOMENS IMAGING | Age: 53
Discharge: HOME | End: 2023-01-19
Payer: MEDICARE

## 2023-01-17 DIAGNOSIS — Z79.52 LONG TERM CURRENT USE OF SYSTEMIC STEROIDS: ICD-10-CM

## 2023-01-17 PROCEDURE — 77080 DXA BONE DENSITY AXIAL: CPT

## 2023-01-26 DIAGNOSIS — F98.8 ATTENTION DEFICIT DISORDER, UNSPECIFIED HYPERACTIVITY PRESENCE: ICD-10-CM

## 2023-01-26 DIAGNOSIS — Z76.0 MEDICATION REFILL: ICD-10-CM

## 2023-01-26 RX ORDER — DEXTROAMPHETAMINE SACCHARATE, AMPHETAMINE ASPARTATE MONOHYDRATE, DEXTROAMPHETAMINE SULFATE AND AMPHETAMINE SULFATE 7.5; 7.5; 7.5; 7.5 MG/1; MG/1; MG/1; MG/1
30 CAPSULE, EXTENDED RELEASE ORAL 2 TIMES DAILY
Qty: 60 CAPSULE | Refills: 0 | Status: SHIPPED | OUTPATIENT
Start: 2023-01-26 | End: 2023-02-25

## 2023-01-26 NOTE — TELEPHONE ENCOUNTER
Comments:     Last Office Visit (last PCP visit):   11/15/2022    Next Visit Date:  Future Appointments   Date Time Provider Angela Hernandez   2/21/2023 12:30 PM ORTIZ Marie CNP [facility] AT [place]       **If hasn't been seen in over a year OR hasn't followed up according to last diabetes/ADHD visit, make appointment for patient before sending refill to provider. Rx requested:  Requested Prescriptions     Pending Prescriptions Disp Refills    amphetamine-dextroamphetamine (ADDERALL XR) 30 MG extended release capsule 60 capsule 0     Sig: Take 1 capsule by mouth in the morning and 1 capsule in the evening. Do all this for 30 days.

## 2023-02-03 ENCOUNTER — TELEMEDICINE (OUTPATIENT)
Dept: FAMILY MEDICINE CLINIC | Age: 53
End: 2023-02-03

## 2023-02-03 DIAGNOSIS — B96.89 ACUTE BACTERIAL SINUSITIS: Primary | ICD-10-CM

## 2023-02-03 DIAGNOSIS — G43.909 MIGRAINE WITHOUT STATUS MIGRAINOSUS, NOT INTRACTABLE, UNSPECIFIED MIGRAINE TYPE: ICD-10-CM

## 2023-02-03 DIAGNOSIS — J01.90 ACUTE BACTERIAL SINUSITIS: Primary | ICD-10-CM

## 2023-02-03 RX ORDER — UBROGEPANT 100 MG/1
TABLET ORAL
Qty: 16 TABLET | Refills: 0 | Status: SHIPPED | OUTPATIENT
Start: 2023-02-03

## 2023-02-03 RX ORDER — AMOXICILLIN 875 MG/1
875 TABLET, COATED ORAL 2 TIMES DAILY
Qty: 20 TABLET | Refills: 0 | Status: SHIPPED | OUTPATIENT
Start: 2023-02-03 | End: 2023-02-13

## 2023-02-03 ASSESSMENT — ENCOUNTER SYMPTOMS
SINUS PRESSURE: 1
SINUS PAIN: 1
ABDOMINAL PAIN: 0
SHORTNESS OF BREATH: 0
RHINORRHEA: 1
VOMITING: 0
COUGH: 0
SORE THROAT: 0

## 2023-02-03 NOTE — PROGRESS NOTES
2/3/2023    TELEHEALTH EVALUATION -- Audio/Visual (During VLJPD-32 public health emergency)    Due to COVID 19 outbreak, patient's office visit was converted to a virtual visit. Patient was contacted and agreed to proceed with a virtual visit via Doxy. me  The risks and benefits of converting to a virtual visit were discussed in light of the current infectious disease epidemic. Patient also understood that insurance coverage and co-pays are up to their individual insurance plans. HPI:  Jack Crum (:  1970) has requested an audio/video evaluation for the following concern(s):  Chief Complaint   Patient presents with    Facial Pain    Headache    Fever     Symptoms x 4-5 days covid test 2 days ago was neg       URI  Symptoms started 4-5 days ago  Endorsing facial pressure/pain, headache, fever  Denied chills, muscle aches, fatigue, cough, wheezing, SOB, GI symptoms  Has tried mucinex DM, dayquil, sudafed, aleeve D  No recent sick contacts  Negative at home COVID test on day 2 of symptoms     Internal Administration   First Dose COVID-19, MODERNA BLUE border, Primary or Immunocompromised, (age 12y+), IM, 100 mcg/0.5mL  2021   Second Dose COVID-19, MODERNA BLUE border, Primary or Immunocompromised, (age 12y+), IM, 100 mcg/0.5mL   2021       Last COVID Lab SARS-CoV-2 (no units)   Date Value   2021 Not Detected     SARS-CoV-2, PCR (no units)   Date Value   2022 NOT DETECTED     SARS-COV-2, POC (no units)   Date Value   2021 Not-Detected        Migraines   Has tried Topamax, nortriptyline, Zomig, botox x 1  Was following with neurology but states she is tired of seeing so many specialists  Has had a headache/migraine for a week now and has been unable to break it  Takes Topamax 100 mg daily without improvement    Review of Systems   Constitutional:  Positive for fatigue and fever. Negative for chills. HENT:  Positive for congestion, rhinorrhea, sinus pressure and sinus pain. Negative for sore throat. Respiratory:  Negative for cough and shortness of breath. Cardiovascular:  Negative for chest pain and palpitations. Gastrointestinal:  Negative for abdominal pain and vomiting. Musculoskeletal:  Negative for arthralgias and myalgias. Skin:  Negative for rash and wound. Neurological:  Positive for headaches. Negative for speech difficulty and light-headedness. Psychiatric/Behavioral:  Negative for suicidal ideas. The patient is not nervous/anxious. Prior to Visit Medications    Medication Sig Taking? Authorizing Provider   amoxicillin (AMOXIL) 875 MG tablet Take 1 tablet by mouth 2 times daily for 10 days Yes Gayle Larsen, DO   Ubrogepant (UBRELVY) 100 MG TABS Take one tablet as needed for migraine, second dose can be taken at least 2 hours after the initial dose, if needed Yes Gayle Larsen, DO   amphetamine-dextroamphetamine (ADDERALL XR) 30 MG extended release capsule Take 1 capsule by mouth in the morning and 1 capsule in the evening. Do all this for 30 days.  Yes Britta Chandler MD   tiZANidine (ZANAFLEX) 4 MG tablet Take 1 tablet by mouth every 8 hours as needed (Pain) Yes Irwin Lewis DO   traZODone (DESYREL) 150 MG tablet TAKE ONE TABLET BY MOUTH NIGHTLY Yes Britta Chandler MD   promethazine-dextromethorphan (PROMETHAZINE-DM) 6.25-15 MG/5ML syrup TAKE 5 MLS BY MOUTH AT BEDTIME FOR 7 DAYS Yes ORTIZ Ang CNP   fluticasone (FLONASE) 50 MCG/ACT nasal spray 1 spray by Each Nostril route daily Yes ORTIZ Ang CNP   NEXIUM 40 MG delayed release capsule TAKE 1 CAPSULE BY MOUTH DAILY Yes ORTIZ Ang CNP   Secukinumab, 300 MG Dose, (COSENTYX, 300 MG DOSE,) 150 MG/ML SOSY Inject into the skin once a week Yes Historical Provider, MD   furosemide (LASIX) 20 MG tablet TAKE ONE TABLET BY MOUTH TWO TIMES A DAY Yes ORTIZ Ang CNP   hydrocortisone (CORTEF) 10 MG tablet  Yes Historical Provider, MD   ketorolac (TORADOL) 10 MG tablet Take 1 tablet by mouth every 6 hours as needed for Pain Yes Paz Laboy MD   buPROPion (WELLBUTRIN XL) 300 MG extended release tablet Take 1 tablet by mouth every morning TAKE ONE TABLET BY MOUTH EVERY MORNING Yes ORTIZ Dan CNP   hydrocortisone 2.5 % cream Apply topically 2 times daily. Yes Nish Yang DO   sucralfate (CARAFATE) 1 GM tablet Take 1 tablet by mouth in the morning and 1 tablet at noon and 1 tablet in the evening and 1 tablet before bedtime. Yes ORTIZ Blandon CNP   MOTEGRITY 2 MG TABS TAKE ONE TABLET BY MOUTH EVERY DAY Yes Historical Provider, MD   lidocaine (LIDODERM) 5 % apply one patch as directed once daily to affected area remove patch after twelve hours.  Yes Historical Provider, MD   hyoscyamine (LEVSIN/SL) 125 MCG sublingual tablet Place 1 tablet under the tongue every 4 hours as needed for Cramping Yes ORTIZ Dan CNP   topiramate (TOPAMAX) 100 MG tablet TAKE ONE TABLET BY MOUTH TWO TIMES A DAY Yes ORTIZ Dan CNP   loratadine (CLARITIN) 10 MG tablet Take 1 tablet by mouth daily Yes ORTIZ Dan CNP   potassium chloride (KLOR-CON M) 10 MEQ extended release tablet Take 1 tablet by mouth daily Yes Lina Dubose PA-C   liothyronine (CYTOMEL) 25 MCG tablet Take 1 tablet by mouth daily Yes ORTIZ Dan CNP   aspirin (ASPIRIN CHILDRENS) 81 MG chewable tablet Take 1 tablet by mouth daily Yes TRUDY Zamora   valACYclovir (VALTREX) 500 MG tablet Take 1 tablet by mouth daily Yes ORTIZ Dan CNP   Magnesium 400 MG TABS Take 1 tablet by mouth daily Yes Luis A Dickey PA-C   TRULANCE 3 MG TABS Take 3 mg by mouth daily Yes Historical Provider, MD   ondansetron (ZOFRAN) 4 MG tablet Take 1 tablet by mouth every 8 hours as needed for Nausea or Vomiting Yes ORTIZ Dan CNP   azelastine (ASTELIN) 0.1 % nasal spray 1 spray by Nasal route Yes Historical Provider, MD   moxifloxacin (VIGAMOX) 0.5 % ophthalmic solution 1 drop Yes Historical Provider, MD   levothyroxine (SYNTHROID) 25 MCG tablet Take one daily Yes ORTIZ Styles CNP   hydroxychloroquine (PLAQUENIL) 200 MG tablet Take 300 mg by mouth daily  Yes Historical Provider, MD   midodrine (PROAMATINE) 10 MG tablet Take 10 mg by mouth as needed  Historical Provider, MD   [address] 40 MG delayed release capsule TAKE ONE CAPSULE BY MOUTH EVERY DAY  ORTIZ Styles CNP   topiramate (TOPAMAX) 100 MG tablet Take 1 tablet by mouth 2 times daily  ORTIZ Styles CNP   furosemide (LASIX) 20 MG tablet Take 1 tablet by mouth 2 times daily TAKE ONE TABLET BY MOUTH TWO TIMES A DAY  ORTIZ Styles CNP       Social History     Tobacco Use    Smoking status: Never    Smokeless tobacco: Never   Vaping Use    Vaping Use: Never used   Substance Use Topics    Alcohol use: No     Alcohol/week: 0.0 standard drinks     Comment: no alcohol since 2011    Drug use: No        No Known Allergies,   Past Medical History:   Diagnosis Date    Acquired hypothyroidism 9/26/2016    Acute left-sided low back pain with bilateral sciatica 3/9/2022    Acute pancreatitis     ADD (attention deficit disorder) 2/12/2015    Anxiety     Congestive heart failure, unspecified HF chronicity, unspecified heart failure type ([facility] [place] [address] 2/11/2022    Depression 12/9/2015    Endometrial cyst of ovary 5/10/14    RT ovary, ruptured    GERD (gastroesophageal reflux disease) 9/26/2016    Medullary sponge kidney of both kidneys 5/22/2018    Sjogren's disease ([facility] [place] [address]     Stress     Swelling    ,   Past Surgical History:   Procedure Laterality Date    BREAST BIOPSY Left 2015? ?    lump removed (benign) (Dr. Timbo Navarro)    BREAST CYST EXCISION Left 2015??     Dr Timbo Navarro (benign)    BREAST LUMPECTOMY      CHOLECYSTECTOMY  2011    COLONOSCOPY N/A 12/16/2022    COLORECTAL CANCER SCREENING, HIGH RISK performed by Ulysses Krebs, MD at [facility] ([address] [place] [address] 2014    sept (total)    OVARY REMOVAL Left 01/2014    UPPER GASTROINTESTINAL ENDOSCOPY  09/16/2016    EGD W/BX    UPPER GASTROINTESTINAL ENDOSCOPY N/A 05/06/2021    ENDOSCOPIC ULTRASOUND with EGD performed by Filemon Conde MD at [address] 08/01/2022    EGD DIAGNOSTIC ONLY performed by Mely Chamberlain MD at [facility] 08/05/2022    EGD ESOPHAGOGASTRODUODENOSCOPY performed by Filemon Conde MD at [facility]  08/05/2022    ENDOSCOPIC ULTRASOUND performed by Filemon Conde MD at [facility]   ,   Social History     Tobacco Use    Smoking status: Never    Smokeless tobacco: Never   Vaping Use    Vaping Use: Never used   Substance Use Topics    Alcohol use: No     Alcohol/week: 0.0 standard drinks     Comment: no alcohol since 2011    Drug use: No   ,   Family History   Problem Relation Age of Onset    Heart Disease Father 48    Breast Cancer Paternal Aunt         in her 29's    Breast Cancer Paternal Aunt         in her 63's    Cancer Maternal Grandmother         breast    Breast Cancer Maternal Grandmother         in her 63's    Breast Cancer Paternal Grandmother         in her 63's    Colon Cancer Paternal Grandfather     Heart Disease Other         mom and dad's side   ,   Immunization History   Administered Date(s) Administered    COVID-19, MODERNA BLUE border, Primary or Immunocompromised, (age 12y+), IM, 100 mcg/0.5mL 02/03/2021, 03/03/2021    Pneumococcal Polysaccharide ([id number]) 11/15/2018    Tdap (Boostrix, Adacel) 04/01/2019   ,   Health Maintenance   Topic Date Due    HIV screen  Never done    Hepatitis C screen  Never done    COVID-19 Vaccine (3 - Booster for Moderna series) 04/28/2021    Flu vaccine (1) Never done    Shingles vaccine (1 of 2) Never done    Depression Monitoring  01/11/2024    Breast cancer screen  04/15/2024    Lipids  07/30/2027    DTaP/Tdap/Td vaccine (2 - Td or Tdap) 04/01/2029    Colorectal Cancer Screen  12/16/2032    Pneumococcal 0-64 years Vaccine  Completed    Hepatitis A vaccine  Aged Out    Hib vaccine  Aged Out    Meningococcal (ACWY) vaccine  Aged Out       PHYSICAL EXAMINATION:  [ INSTRUCTIONS:  \"[x]\" Indicates a positive item  \"[]\" Indicates a negative item  -- DELETE ALL ITEMS NOT EXAMINED]  [x] Alert  [] Oriented to person/place/time    [x] No apparent distress  [] Toxic appearing    [] Face flushed appearing [x] Sclera clear  [] Lips are cyanotic      [x] Breathing appears normal  [] Appears tachypneic      [] Rash on visible skin    [x] Cranial Nerves II-XII grossly intact    [x] Motor grossly intact in visible upper extremities    [] Motor grossly intact in visible lower extremities    [x] Normal Mood  [] Anxious appearing    [] Depressed appearing  [] Confused appearing      [] Poor short term memory  [] Poor long term memory    [] OTHER:      Due to this being a TeleHealth encounter, evaluation of the following organ systems is limited: Vitals/Constitutional/EENT/Resp/CV/GI//MS/Neuro/Skin/Heme-Lymph-Imm. ASSESSMENT/PLAN:  1. Acute bacterial sinusitis  Start abx therapy  Continue with supportive care  Follow-up precautions given    - amoxicillin (AMOXIL) 875 MG tablet; Take 1 tablet by mouth 2 times daily for 10 days  Dispense: 20 tablet; Refill: 0    2. Migraine without status migrainosus, not intractable, unspecific type  Patient states she has been on Topamax, nortriptyline, Zomig as well as tried Botox injections x1  Continues to have severe migraines  Requesting medication for headache relief  Will try Zomig  Discussed injectables for preventative migraines, she would like to hold off on this for now    Return if symptoms worsen or fail to improve. An  electronic signature was used to authenticate this note.     --Nish Yang DO on 2/3/2023 at 9:08 AM        Pursuant to the emergency declaration under the [facility] and the [address] authority and the Coronavirus Preparedness and [employer] Act, this Virtual  Visit was conducted, with patient's consent, to reduce the patient's risk of exposure to COVID-19 and provide continuity of care for an established patient. Services were provided through a video synchronous discussion virtually to substitute for in-person clinic visit.

## 2023-02-09 NOTE — ANESTHESIA POSTPROCEDURE EVALUATION
Department of Anesthesiology  Postprocedure Note    Patient: Jayden Price  MRN: [id number]  YOB: 1970  Date of evaluation: 8/1/2022      Procedure Summary     Date: 08/01/22 Room / Location: [address] [place] [address] 01 / [facility]    Anesthesia Start: 6478 Anesthesia Stop: 0900    Procedure: EGD DIAGNOSTIC ONLY (Esophagus) Diagnosis:       Abdominal pain, unspecified abdominal location      (Abdominal pain, unspecified abdominal location [R10.9])    Surgeons: Carolina Thomas MD Responsible Provider: ORTIZ Dunne CRNA    Anesthesia Type: MAC ASA Status: 3          Anesthesia Type: No value filed.     Hossein Phase I:      Hossein Phase II:        Anesthesia Post Evaluation    Patient location during evaluation: bedside  Patient participation: complete - patient participated  Level of consciousness: awake  Airway patency: patent  Nausea & Vomiting: no nausea and no vomiting  Complications: no  Cardiovascular status: blood pressure returned to baseline  Respiratory status: acceptable  Hydration status: euvolemic Azathioprine Counseling:  I discussed with the patient the risks of azathioprine including but not limited to myelosuppression, immunosuppression, hepatotoxicity, lymphoma, and infections.  The patient understands that monitoring is required including baseline LFTs, Creatinine, possible TPMP genotyping and weekly CBCs for the first month and then every 2 weeks thereafter.  The patient verbalized understanding of the proper use and possible adverse effects of azathioprine.  All of the patient's questions and concerns were addressed.

## 2023-02-18 ENCOUNTER — HOSPITAL ENCOUNTER (EMERGENCY)
Age: 53
Discharge: HOME | End: 2023-02-18
Attending: EMERGENCY MEDICINE | Admitting: EMERGENCY MEDICINE
Payer: MEDICARE

## 2023-02-18 ENCOUNTER — APPOINTMENT (OUTPATIENT)
Dept: CT IMAGING | Age: 53
End: 2023-02-18
Payer: MEDICARE

## 2023-02-18 VITALS
OXYGEN SATURATION: 99 % | RESPIRATION RATE: 16 BRPM | TEMPERATURE: 98.3 F | SYSTOLIC BLOOD PRESSURE: 118 MMHG | WEIGHT: 112 LBS | HEART RATE: 79 BPM | HEIGHT: 62 IN | DIASTOLIC BLOOD PRESSURE: 74 MMHG | BODY MASS INDEX: 20.61 KG/M2

## 2023-02-18 DIAGNOSIS — N30.00 ACUTE CYSTITIS WITHOUT HEMATURIA: Primary | ICD-10-CM

## 2023-02-18 DIAGNOSIS — K59.00 CONSTIPATION, UNSPECIFIED CONSTIPATION TYPE: ICD-10-CM

## 2023-02-18 LAB
ALBUMIN SERPL-MCNC: 4.4 G/DL (ref 3.5–4.6)
ALP BLD-CCNC: 106 U/L (ref 40–130)
ALT SERPL-CCNC: 15 U/L (ref 0–33)
ANION GAP SERPL CALCULATED.3IONS-SCNC: 13 MEQ/L (ref 9–15)
AST SERPL-CCNC: 18 U/L (ref 0–35)
BACTERIA: ABNORMAL /HPF
BASOPHILS ABSOLUTE: 0.1 K/UL (ref 0–0.1)
BASOPHILS RELATIVE PERCENT: 1.2 % (ref 0.1–1.2)
BILIRUB SERPL-MCNC: <0.2 MG/DL (ref 0.2–0.7)
BILIRUBIN URINE: ABNORMAL
BLOOD, URINE: ABNORMAL
BUN BLDV-MCNC: 17 MG/DL (ref 6–20)
CALCIUM SERPL-MCNC: 9.5 MG/DL (ref 8.5–9.9)
CHLORIDE BLD-SCNC: 104 MEQ/L (ref 95–107)
CLARITY: CLEAR
CO2: 23 MEQ/L (ref 20–31)
COLOR: ABNORMAL
CREAT SERPL-MCNC: 1.01 MG/DL (ref 0.5–0.9)
EOSINOPHILS ABSOLUTE: 0 K/UL (ref 0–0.4)
EOSINOPHILS RELATIVE PERCENT: 0.7 % (ref 0.7–5.8)
EPITHELIAL CELLS, UA: ABNORMAL /HPF
GFR SERPL CREATININE-BSD FRML MDRD: >60 ML/MIN/{1.73_M2}
GLOBULIN: 2.6 G/DL (ref 2.3–3.5)
GLUCOSE BLD-MCNC: 109 MG/DL (ref 70–99)
GLUCOSE URINE: 100 MG/DL
HCT VFR BLD CALC: 36.1 % (ref 37–47)
HEMOGLOBIN: 12 G/DL (ref 11.2–15.7)
IMMATURE GRANULOCYTES #: 0 K/UL
IMMATURE GRANULOCYTES %: 0.3 %
KETONES, URINE: ABNORMAL MG/DL
LEUKOCYTE ESTERASE, URINE: NEGATIVE
LIPASE: 31 U/L (ref 12–95)
LYMPHOCYTES ABSOLUTE: 1.7 K/UL (ref 1.2–3.7)
LYMPHOCYTES RELATIVE PERCENT: 27.5 %
MCH RBC QN AUTO: 29.3 PG (ref 25.6–32.2)
MCHC RBC AUTO-ENTMCNC: 33.2 % (ref 32.2–35.5)
MCV RBC AUTO: 88 FL (ref 79.4–94.8)
MONOCYTES ABSOLUTE: 0.4 K/UL (ref 0.2–0.9)
MONOCYTES RELATIVE PERCENT: 6 % (ref 4.7–12.5)
NEUTROPHILS ABSOLUTE: 3.9 K/UL (ref 1.6–6.1)
NEUTROPHILS RELATIVE PERCENT: 64.3 % (ref 34–71.1)
NITRITE, URINE: POSITIVE
PDW BLD-RTO: 12.3 % (ref 11.7–14.4)
PH UA: 5 (ref 5–9)
PLATELET # BLD: 191 K/UL (ref 182–369)
POTASSIUM SERPL-SCNC: 3.5 MEQ/L (ref 3.4–4.9)
PROTEIN UA: 100 MG/DL
RBC # BLD: 4.1 M/UL (ref 3.93–5.22)
RBC UA: ABNORMAL /HPF (ref 0–2)
SODIUM BLD-SCNC: 140 MEQ/L (ref 135–144)
SPECIFIC GRAVITY UA: 1.02 (ref 1–1.03)
TOTAL PROTEIN: 7 G/DL (ref 6.3–8)
URINE REFLEX TO CULTURE: ABNORMAL
UROBILINOGEN, URINE: 2 E.U./DL
WBC # BLD: 6 K/UL (ref 4–10)
WBC UA: ABNORMAL /HPF (ref 0–5)

## 2023-02-18 PROCEDURE — 2580000003 HC RX 258: Performed by: EMERGENCY MEDICINE

## 2023-02-18 PROCEDURE — 36415 COLL VENOUS BLD VENIPUNCTURE: CPT

## 2023-02-18 PROCEDURE — 83690 ASSAY OF LIPASE: CPT

## 2023-02-18 PROCEDURE — 85025 COMPLETE CBC W/AUTO DIFF WBC: CPT

## 2023-02-18 PROCEDURE — 74176 CT ABD & PELVIS W/O CONTRAST: CPT

## 2023-02-18 PROCEDURE — 6360000002 HC RX W HCPCS: Performed by: EMERGENCY MEDICINE

## 2023-02-18 PROCEDURE — 99284 EMERGENCY DEPT VISIT MOD MDM: CPT

## 2023-02-18 PROCEDURE — 80053 COMPREHEN METABOLIC PANEL: CPT

## 2023-02-18 PROCEDURE — 96375 TX/PRO/DX INJ NEW DRUG ADDON: CPT

## 2023-02-18 PROCEDURE — 96374 THER/PROPH/DIAG INJ IV PUSH: CPT

## 2023-02-18 PROCEDURE — 81001 URINALYSIS AUTO W/SCOPE: CPT

## 2023-02-18 RX ORDER — 0.9 % SODIUM CHLORIDE 0.9 %
1000 INTRAVENOUS SOLUTION INTRAVENOUS ONCE
Status: COMPLETED | OUTPATIENT
Start: 2023-02-18 | End: 2023-02-18

## 2023-02-18 RX ORDER — ONDANSETRON 2 MG/ML
4 INJECTION INTRAMUSCULAR; INTRAVENOUS ONCE
Status: COMPLETED | OUTPATIENT
Start: 2023-02-18 | End: 2023-02-18

## 2023-02-18 RX ORDER — MORPHINE SULFATE 4 MG/ML
4 INJECTION, SOLUTION INTRAMUSCULAR; INTRAVENOUS
Status: COMPLETED | OUTPATIENT
Start: 2023-02-18 | End: 2023-02-18

## 2023-02-18 RX ADMIN — MORPHINE SULFATE 4 MG: 4 INJECTION, SOLUTION INTRAMUSCULAR; INTRAVENOUS at 20:30

## 2023-02-18 RX ADMIN — SODIUM CHLORIDE 1000 ML: 9 INJECTION, SOLUTION INTRAVENOUS at 20:28

## 2023-02-18 RX ADMIN — ONDANSETRON 4 MG: 2 INJECTION INTRAMUSCULAR; INTRAVENOUS at 20:28

## 2023-02-18 ASSESSMENT — PAIN DESCRIPTION - ORIENTATION
ORIENTATION: RIGHT;LEFT;LOWER
ORIENTATION: RIGHT;LEFT;LOWER;MID
ORIENTATION: RIGHT;LEFT;LOWER

## 2023-02-18 ASSESSMENT — PAIN DESCRIPTION - ONSET: ONSET: ON-GOING

## 2023-02-18 ASSESSMENT — PAIN DESCRIPTION - DESCRIPTORS
DESCRIPTORS: PRESSURE
DESCRIPTORS: PRESSURE

## 2023-02-18 ASSESSMENT — LIFESTYLE VARIABLES
HOW MANY STANDARD DRINKS CONTAINING ALCOHOL DO YOU HAVE ON A TYPICAL DAY: PATIENT DOES NOT DRINK
HOW OFTEN DO YOU HAVE A DRINK CONTAINING ALCOHOL: NEVER

## 2023-02-18 ASSESSMENT — PAIN SCALES - GENERAL
PAINLEVEL_OUTOF10: 10
PAINLEVEL_OUTOF10: 5
PAINLEVEL_OUTOF10: 7
PAINLEVEL_OUTOF10: 7

## 2023-02-18 ASSESSMENT — PAIN DESCRIPTION - LOCATION
LOCATION: ABDOMEN
LOCATION: ABDOMEN;BACK
LOCATION: ABDOMEN;BACK

## 2023-02-18 ASSESSMENT — PAIN - FUNCTIONAL ASSESSMENT
PAIN_FUNCTIONAL_ASSESSMENT: 0-10

## 2023-02-18 ASSESSMENT — PAIN DESCRIPTION - PAIN TYPE: TYPE: ACUTE PAIN

## 2023-02-18 ASSESSMENT — PAIN DESCRIPTION - FREQUENCY: FREQUENCY: CONTINUOUS

## 2023-02-19 NOTE — ED PROVIDER NOTES
CC/HPI: 46-year-old female to the emergency department chief complaint of UTI symptoms lower abdominal discomfort into her lower back. Patient states she has felt chilled. Denies nausea or vomiting. States that her bowel movements have been loose but she has had some discomfort in her lower abdomen with moving her bowels. Denies chest pain or shortness of breath. Was diagnosed with UTI and started on Pyridium and Macrobid yesterday but symptoms have not improved so he came to the emergency department. Patient states she has a history of Sjogren's disease medullary sponge kidney has had kidney stones in the past.  Patient denies radicular symptoms      VITALS/PMH/PSH: Reviewed per nurses notes    REVIEW OF SYSTEMS: As in chief complaint history of present illness, otherwise all other systems are reviewed and negative the total 10 systems reviewed    PHYSICAL EXAM:  GEN: Pt alert and oriented, no acute distress. Appears uncomfortable. HEENT:         Normocephalic/Atramatic        PERRL, EOMI       Throat non-edematous. No erythema noted. No exudates noted. Moist membranes  NECK: Nontender, no signs of trauma, no lymphadenopathy  HEART: Reg S1/S2, without murmer, rub or gallop  LUNGS: Clear to auscultation bilaterally, respirations even and unlabored  ABDOMEN: Bowel sounds positive, soft, mild. Gaseous distention. Patient diffusely tender mostly suprapubic area. Bilateral CVA tenderness. No calf swelling or tenderness. No guarding rebound or rigidity  MUSCULOSKELETAL/EXTREMITITES:  No signs of trauma, cyanosis or edema. No CVA tenderness  LYMPH: no peripheral lympadenopathy noted  SKIN:  Warm & dry, no rash  NEUROLOGIC:  Alert and oriented. Speech clear    Medical decision making/ED course;  Patient main stable throughout the course of emergency department stay. IV was established and lab work was obtained and reviewed.   CMP was within normal limits other than creatinine being elevated slightly at 1.01.  Glucose was 109. Liver enzymes and lipase were within normal limits. White blood cell count was 6.0 with an H&H of 12 and 36 and platelets of 892. Urinalysis showed 100 glucose and moderate bilirubin and 100 protein. Was positive for nitrites but only 0-2 white blood cells per high-power field. Trace of intact blood. Patient was given a normal saline bolus and also was given 4 mg of IV morphine and 4 mg of IV Zofran. Patient states that she was dropped off and has a ride home    CT scan of the abdomen pelvis was obtained which was interpreted by radiologist as showing no obvious signs of acute abnormalities. No hydronephrosis small nonobstructing left kidney stone. No bowel obstruction colonic stool retention may represent constipation. Discussed all findings with patient and need for close outpatient follow-up. Encouraged to return for worsening and or changes to symptoms. Final Clinical impression;  1) urinary tract infection  2) constipation    Disposition/plan; patient discharged home in stable condition given discharge instructions on UTI and constipation. Patient is to follow-up with her primary care physician early next week. Patient has MiraLAX at home advised to take it as prescribed. Encouraged to return for worsening and or changes to symptoms. Continue her Macrobid and Pyridium as prescribed.      Genet Locke,   02/18/23 1814

## 2023-02-19 NOTE — ED TRIAGE NOTES
Pt a&o x4. States was dx with UTI yesterday, and pain increasing with pressure to all areas of abd and lower back. Also c/o n/v. Pt was started on macrobid and Pyridium and states she is not getting any better. States abd tender to touch. Last BM this am, but states it was loose and painful to move bowels. Resp easy and even. Afebrile. Skin tan/w/d.

## 2023-02-21 ENCOUNTER — OFFICE VISIT (OUTPATIENT)
Dept: FAMILY MEDICINE CLINIC | Age: 53
End: 2023-02-21

## 2023-02-21 VITALS
BODY MASS INDEX: 20.61 KG/M2 | HEIGHT: 62 IN | DIASTOLIC BLOOD PRESSURE: 64 MMHG | SYSTOLIC BLOOD PRESSURE: 104 MMHG | WEIGHT: 112 LBS | HEART RATE: 102 BPM | OXYGEN SATURATION: 99 %

## 2023-02-21 DIAGNOSIS — R30.9 PAINFUL URINATION: ICD-10-CM

## 2023-02-21 DIAGNOSIS — R35.89 POLYURIA: ICD-10-CM

## 2023-02-21 DIAGNOSIS — R30.9 PAINFUL URINATION: Primary | ICD-10-CM

## 2023-02-21 LAB
ALBUMIN SERPL-MCNC: 4.8 G/DL (ref 3.5–4.6)
ALP BLD-CCNC: 106 U/L (ref 40–130)
ALT SERPL-CCNC: 21 U/L (ref 0–33)
ANION GAP SERPL CALCULATED.3IONS-SCNC: 13 MEQ/L (ref 9–15)
AST SERPL-CCNC: 26 U/L (ref 0–35)
BASOPHILS ABSOLUTE: 0.1 K/UL (ref 0–0.2)
BASOPHILS RELATIVE PERCENT: 1.1 %
BILIRUB SERPL-MCNC: <0.2 MG/DL (ref 0.2–0.7)
BILIRUBIN, POC: ABNORMAL
BLOOD URINE, POC: ABNORMAL
BUN BLDV-MCNC: 19 MG/DL (ref 6–20)
CALCIUM SERPL-MCNC: 9.9 MG/DL (ref 8.5–9.9)
CHLORIDE BLD-SCNC: 99 MEQ/L (ref 95–107)
CLARITY, POC: CLEAR
CO2: 25 MEQ/L (ref 20–31)
COLOR, POC: YELLOW
CREAT SERPL-MCNC: 0.69 MG/DL (ref 0.5–0.9)
EOSINOPHILS ABSOLUTE: 0 K/UL (ref 0–0.7)
EOSINOPHILS RELATIVE PERCENT: 0.4 %
GFR SERPL CREATININE-BSD FRML MDRD: >60 ML/MIN/{1.73_M2}
GLOBULIN: 2.5 G/DL (ref 2.3–3.5)
GLUCOSE BLD-MCNC: 92 MG/DL (ref 70–99)
GLUCOSE URINE, POC: ABNORMAL
HCT VFR BLD CALC: 39.5 % (ref 37–47)
HEMOGLOBIN: 13.3 G/DL (ref 12–16)
KETONES, POC: ABNORMAL
LEUKOCYTE EST, POC: ABNORMAL
LYMPHOCYTES ABSOLUTE: 1.7 K/UL (ref 1–4.8)
LYMPHOCYTES RELATIVE PERCENT: 29.3 %
MCH RBC QN AUTO: 29.5 PG (ref 27–31.3)
MCHC RBC AUTO-ENTMCNC: 33.8 % (ref 33–37)
MCV RBC AUTO: 87.4 FL (ref 79.4–94.8)
MONOCYTES ABSOLUTE: 0.3 K/UL (ref 0.2–0.8)
MONOCYTES RELATIVE PERCENT: 5.3 %
NEUTROPHILS ABSOLUTE: 3.7 K/UL (ref 1.4–6.5)
NEUTROPHILS RELATIVE PERCENT: 63.9 %
NITRITE, POC: ABNORMAL
PDW BLD-RTO: 13 % (ref 11.5–14.5)
PH, POC: 6
PLATELET # BLD: 323 K/UL (ref 130–400)
POTASSIUM SERPL-SCNC: 3.7 MEQ/L (ref 3.4–4.9)
PROTEIN, POC: ABNORMAL
RBC # BLD: 4.52 M/UL (ref 4.2–5.4)
SEDIMENTATION RATE, ERYTHROCYTE: 2 MM (ref 0–30)
SODIUM BLD-SCNC: 137 MEQ/L (ref 135–144)
SPECIFIC GRAVITY, POC: 1.02
TOTAL CK: 72 U/L (ref 0–170)
TOTAL PROTEIN: 7.3 G/DL (ref 6.3–8)
UROBILINOGEN, POC: ABNORMAL
WBC # BLD: 5.8 K/UL (ref 4.8–10.8)

## 2023-02-21 RX ORDER — CIPROFLOXACIN 500 MG/1
500 TABLET, FILM COATED ORAL 2 TIMES DAILY
Qty: 14 TABLET | Refills: 0 | Status: SHIPPED | OUTPATIENT
Start: 2023-02-21 | End: 2023-02-28

## 2023-02-21 ASSESSMENT — ENCOUNTER SYMPTOMS
SHORTNESS OF BREATH: 0
ABDOMINAL DISTENTION: 1
ABDOMINAL PAIN: 1
COUGH: 0

## 2023-02-21 NOTE — PROGRESS NOTES
Subjective  Chief Complaint   Patient presents with    Abdominal Pain    Dysuria       HPI    Pt is here for generalized abdominal pain. Was in ER and had labs and CT    Lab Results   Component Value Date    WBC 6.0 02/18/2023    HGB 12.0 02/18/2023    HCT 36.1 (L) 02/18/2023     02/18/2023    CHOL 204 (H) 07/30/2022    TRIG 133 07/30/2022    HDL 69 (H) 07/30/2022    ALT 15 02/18/2023    AST 18 02/18/2023     02/18/2023    K 3.5 02/18/2023     02/18/2023    CREATININE 1.01 (H) 02/18/2023    BUN 17 02/18/2023    CO2 23 02/18/2023    TSH 3.570 12/08/2021    INR 0.9 08/28/2021    LABA1C 4.9 05/11/2022       Has also noticed increased urinary frequency, increased thirst.      Impression   Tiny stone identified in the left kidney with hyperdense cysts bilaterally. There is incomplete distension of the urinary bladder limiting evaluation. Correlation with urinalysis is recommended given patient's history. Increased stool burden seen diffusely throughout the colon to suggest   clinical presentation of constipation.          Patient Active Problem List    Diagnosis Date Noted    Change in bowel habits 12/16/2022    History of colon polyps 12/16/2022    Acute pancreatitis without infection or necrosis, unspecified pancreatitis type 07/30/2022    Abdominal pain 07/30/2022    Pancreatitis, unspecified pancreatitis type 07/29/2022    Acute left-sided low back pain with bilateral sciatica 03/09/2022    Congestive heart failure, unspecified HF chronicity, unspecified heart failure type ([address] 02/11/2022    Claudication of both lower extremities ([address] 02/11/2022    Neck pain 12/09/2021    Non-recurrent acute suppurative otitis media of left ear without spontaneous rupture of tympanic membrane 12/09/2021    Neutropenia ([address] 01/12/2021    Hereditary hemochromatosis ([address] 01/12/2021    Chronic pancreatitis ([address] 08/11/2020    Major depressive disorder, single episode, 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[address] 01/15/2019    Abnormal MRI, liver 05/22/2018    Acute recurrent pancreatitis 05/22/2018    At risk of fracture due to osteoporosis 05/22/2018    Calcinosis 05/22/2018    Chronic headaches 05/22/2018    Conductive hearing loss in left ear 05/22/2018    Edema 05/22/2018    Iron overload 05/22/2018    Mass of left side of neck 05/22/2018    Medullary sponge kidney of both kidneys 17/89/7473    Metabolic acidosis 97/98/1133    Microscopic hematuria 05/22/2018    Nausea 05/22/2018    Renal tubular acidosis type I 05/22/2018    Rheumatoid arthritis ([address] 05/22/2018    Tension type headache 05/22/2018    Weight loss, abnormal 05/22/2018    Cellulitis, face 04/06/2018    Other chest pain 11/01/2017    Left lower quadrant pain 11/01/2017    Sjogren's syndrome ([address] 11/01/2017    Diverticulitis of large intestine without perforation or abscess without bleeding 11/01/2017    Fibroids 11/01/2017    Transfusion history 11/01/2017    Pancreatitis 05/13/2017    Acquired hypothyroidism 09/26/2016    GERD (gastroesophageal reflux disease) 09/26/2016    Anxiety 12/09/2015    Depression 12/09/2015    ADD (attention deficit disorder) 02/12/2015    Endometriosis 09/09/2014    ASCUS favoring benign 05/01/2013    S/P endometrial ablation 05/01/2013     Past Medical History:   Diagnosis Date    Acquired hypothyroidism 9/26/2016    Acute left-sided low back pain with bilateral sciatica 3/9/2022    Acute pancreatitis     ADD (attention deficit disorder) 2/12/2015    Anxiety     Congestive heart failure, unspecified HF chronicity, unspecified heart failure type ([address] 2/11/2022    Depression 12/9/2015    Endometrial cyst of ovary 5/10/14    RT ovary, ruptured    GERD (gastroesophageal reflux disease) 9/26/2016    Medullary sponge kidney of both kidneys 5/22/2018    Sjogren's disease ([address]     Stress     Swelling      Past Surgical History:   Procedure Laterality Date    BREAST BIOPSY Left 2015? ?    lump removed (benign) (Dr. Timbo Navarro)    BREAST CYST EXCISION Left 2015??     Dr Adonis Abraham (benign)    BREAST LUMPECTOMY      CHOLECYSTECTOMY  2011    COLONOSCOPY N/A 12/16/2022    COLORECTAL CANCER SCREENING, HIGH RISK performed by Delphine Ibarra MD at [facility] ([address] [place] [address])  2014    sept (total)    OVARY REMOVAL Left 01/2014    UPPER GASTROINTESTINAL ENDOSCOPY  09/16/2016    EGD W/BX    UPPER GASTROINTESTINAL ENDOSCOPY N/A 05/06/2021    ENDOSCOPIC ULTRASOUND with EGD performed by Delphine Ibarra MD at [address] 08/01/2022    EGD DIAGNOSTIC ONLY performed by Eliana Posada MD at [address] 08/05/2022    EGD ESOPHAGOGASTRODUODENOSCOPY performed by Delphine Ibarra MD at "[facility]  08/05/2022    ENDOSCOPIC ULTRASOUND performed by Delphine Ibarra MD at [facility]     Family History   Problem Relation Age of Onset    Heart Disease Father 48    Breast Cancer Paternal Aunt         in her 29's    Breast Cancer Paternal Aunt         in her 63's    Cancer Maternal Grandmother         breast    Breast Cancer Maternal Grandmother         in her 63's    Breast Cancer Paternal Grandmother         in her 63's    Colon Cancer Paternal Grandfather     Heart Disease Other         mom and dad's side     Social History     Socioeconomic History    Marital status: Single     Spouse name: None    Number of children: None    Years of education: None    Highest education level: None   Tobacco Use    Smoking status: Never    Smokeless tobacco: Never   Vaping Use    Vaping Use: Never used   Substance and Sexual Activity    Alcohol use: No     Alcohol/week: 0.0 standard drinks     Comment: no alcohol since 2011    Drug use: No   Social History Narrative    ** Merged History Encounter **          Social Determinants of Health     Financial Resource Strain: Low Risk     Difficulty of Paying Living Expenses: Not hard at all   Food Insecurity: No Food Insecurity    Worried About [facility] in the Last Year: Never true    [facility] in the Last Year: Never true   Transportation Needs: No Transportation Needs    Lack of Transportation (Medical): No    Lack of Transportation (Non-Medical): No   Physical Activity: Inactive    Days of Exercise per Week: 0 days    Minutes of Exercise per Session: 10 min   Stress: Stress Concern Present    Feeling of Stress : Rather much   Social Connections: Unknown    Frequency of Communication with Friends and Family: Twice a week    Frequency of Social Gatherings with Friends and Family: Never    Active Member of Clubs or Organizations: No    Attends Club or Organization Meetings: Never    Marital Status: Never    Housing Stability: Unknown    Unable to Pay for Housing in the Last Year: No    Unstable Housing in the Last Year: No     Current Outpatient Medications on File Prior to Visit   Medication Sig Dispense Refill    Rimegepant Sulfate 75 MG TBDP Take 1 tablet by mouth as needed (for Migraine) 30 tablet 0    Ubrogepant (UBRELVY) 100 MG TABS Take one tablet as needed for migraine, second dose can be taken at least 2 hours after the initial dose, if needed 16 tablet 0    amphetamine-dextroamphetamine (ADDERALL XR) 30 MG extended release capsule Take 1 capsule by mouth in the morning and 1 capsule in the evening. Do all this for 30 days.  60 capsule 0    tiZANidine (ZANAFLEX) 4 MG tablet Take 1 tablet by mouth every 8 hours as needed (Pain) 10 tablet 0    traZODone (DESYREL) 150 MG tablet TAKE ONE TABLET BY MOUTH NIGHTLY 30 tablet 0    promethazine-dextromethorphan (PROMETHAZINE-DM) 6.25-15 MG/5ML syrup TAKE 5 MLS BY MOUTH AT BEDTIME FOR 7 DAYS 118 mL 0    fluticasone (FLONASE) 50 MCG/ACT nasal spray 1 spray by Each Nostril route daily 16 g 2    NEXIUM 40 MG delayed release capsule TAKE 1 CAPSULE BY MOUTH DAILY 90 capsule 1    Secukinumab, 300 MG Dose, (COSENTYX, 300 MG DOSE,) 150 MG/ML SOSY Inject into the skin once a week      furosemide (LASIX) 20 MG tablet TAKE ONE TABLET BY MOUTH TWO TIMES A DAY 60 tablet 0    hydrocortisone (CORTEF) 10 MG tablet       ketorolac (TORADOL) 10 MG tablet Take 1 tablet by mouth every 6 hours as needed for Pain 20 tablet 0    buPROPion (WELLBUTRIN XL) 300 MG extended release tablet Take 1 tablet by mouth every morning TAKE ONE TABLET BY MOUTH EVERY MORNING 90 tablet 5    hydrocortisone 2.5 % cream Apply topically 2 times daily. 15 g 0    sucralfate (CARAFATE) 1 GM tablet Take 1 tablet by mouth in the morning and 1 tablet at noon and 1 tablet in the evening and 1 tablet before bedtime. 120 tablet 3    MOTEGRITY 2 MG TABS TAKE ONE TABLET BY MOUTH EVERY DAY      lidocaine (LIDODERM) 5 % apply one patch as directed once daily to affected area remove patch after twelve hours.       hyoscyamine (LEVSIN/SL) 125 MCG sublingual tablet Place 1 tablet under the tongue every 4 hours as needed for Cramping 90 tablet 3    topiramate (TOPAMAX) 100 MG tablet TAKE ONE TABLET BY MOUTH TWO TIMES A DAY 60 tablet 5    loratadine (CLARITIN) 10 MG tablet Take 1 tablet by mouth daily 30 tablet 5    potassium chloride (KLOR-CON M) 10 MEQ extended release tablet Take 1 tablet by mouth daily 30 tablet 0    liothyronine (CYTOMEL) 25 MCG tablet Take 1 tablet by mouth daily 90 tablet 0    aspirin (ASPIRIN CHILDRENS) 81 MG chewable tablet Take 1 tablet by mouth daily 14 tablet 0    valACYclovir (VALTREX) 500 MG tablet Take 1 tablet by mouth daily 30 tablet 5    Magnesium 400 MG TABS Take 1 tablet by mouth daily 30 tablet 0    TRULANCE 3 MG TABS Take 3 mg by mouth daily      ondansetron (ZOFRAN) 4 MG tablet Take 1 tablet by mouth every 8 hours as needed for Nausea or Vomiting 30 tablet 1    azelastine (ASTELIN) 0.1 % nasal spray 1 spray by Nasal route      moxifloxacin (VIGAMOX) 0.5 % ophthalmic solution 1 drop      levothyroxine (SYNTHROID) 25 MCG tablet Take one daily 90 tablet 1    hydroxychloroquine (PLAQUENIL) 200 MG tablet Take 300 mg by mouth daily       [DISCONTINUED] midodrine (PROAMATINE) 10 MG tablet Take 10 mg by mouth as needed      [DISCONTINUED] NEXIUM 40 MG delayed release capsule TAKE ONE CAPSULE BY MOUTH EVERY DAY 30 capsule 5    [DISCONTINUED] topiramate (TOPAMAX) 100 MG tablet Take 1 tablet by mouth 2 times daily 60 tablet 5    [DISCONTINUED] furosemide (LASIX) 20 MG tablet Take 1 tablet by mouth 2 times daily TAKE ONE TABLET BY MOUTH TWO TIMES A DAY 60 tablet 5     No current facility-administered medications on file prior to visit. No Known Allergies    Review of Systems   Constitutional:  Positive for fatigue. Respiratory:  Negative for cough and shortness of breath. Cardiovascular:  Negative for chest pain. Gastrointestinal:  Positive for abdominal distention and abdominal pain. Endocrine: Positive for polydipsia, polyphagia and polyuria. Objective  Vitals:    02/21/23 1243   BP: 104/64   Pulse: (!) 102   SpO2: 99%   Weight: 112 lb (50.8 kg)   Height: 5' 2\" (1.575 m)     Physical Exam  Vitals and nursing note reviewed. Constitutional:       Appearance: Normal appearance. HENT:      Head: Normocephalic. Nose: Nose normal.      Mouth/Throat:      Mouth: Mucous membranes are moist.      Pharynx: Oropharynx is clear. Eyes:      Extraocular Movements: Extraocular movements intact. Conjunctiva/sclera: Conjunctivae normal.      Pupils: Pupils are equal, round, and reactive to light. Cardiovascular:      Rate and Rhythm: Normal rate and regular rhythm. Pulses: Normal pulses. Heart sounds: Normal heart sounds. Pulmonary:      Effort: Pulmonary effort is normal.      Breath sounds: Normal breath sounds. Abdominal:      Palpations: Abdomen is soft. Tenderness: There is abdominal tenderness (generalized). Musculoskeletal:      Cervical back: Neck supple. Skin:     General: Skin is warm. Neurological:      General: No focal deficit present. Mental Status: She is alert and oriented to person, place, and time. Mental status is at baseline. Psychiatric:         Mood and Affect: Mood normal.         Behavior: Behavior normal.         Thought Content: Thought content normal.         Judgment: Judgment normal.       Assessment & Plan     Diagnosis Orders   1. Painful urination  POCT Urinalysis No Micro (Auto)    Culture, Urine    ciprofloxacin (CIPRO) 500 MG tablet      2. Polyuria  Culture, Urine    Comprehensive Metabolic Panel    CK    Sedimentation Rate    CBC with Auto Differential    ciprofloxacin (CIPRO) 500 MG tablet          Orders Placed This Encounter   Procedures    Culture, Urine     Standing Status:   Future     Number of Occurrences:   1     Standing Expiration Date:   2/21/2024     Order Specific Question:   Specify (ex-cath, midstream, cysto, etc)? Answer:   midstream    Comprehensive Metabolic Panel     Standing Status:   Future     Number of Occurrences:   1     Standing Expiration Date:   2/21/2024    CK     Standing Status:   Future     Number of Occurrences:   1     Standing Expiration Date:   2/21/2024    Sedimentation Rate     Standing Status:   Future     Number of Occurrences:   1     Standing Expiration Date:   2/21/2024    CBC with Auto Differential     Standing Status:   Future     Number of Occurrences:   1     Standing Expiration Date:   2/21/2024    POCT Urinalysis No Micro (Auto)       Orders Placed This Encounter   Medications    ciprofloxacin (CIPRO) 500 MG tablet     Sig: Take 1 tablet by mouth 2 times daily for 7 days     Dispense:  14 tablet     Refill:  0     Side effects, adverse effects of the medication prescribed today, as well as treatment plan/ rationale and result expectations have been discussed with the patient who expresses understanding and desires to proceed. Close follow up to evaluate treatment results and for coordination of care.   I have reviewed the patient's medical history in detail and updated the computerized patient record. As always, patient is advised that if symptoms worsen in any way they will proceed to the nearest emergency room.         ORTIZ Harmon - CNP

## 2023-02-22 LAB — URINE CULTURE, ROUTINE: NORMAL

## 2023-02-27 ENCOUNTER — PATIENT MESSAGE (OUTPATIENT)
Dept: FAMILY MEDICINE CLINIC | Age: 53
End: 2023-02-27

## 2023-02-27 DIAGNOSIS — F98.8 ATTENTION DEFICIT DISORDER, UNSPECIFIED HYPERACTIVITY PRESENCE: ICD-10-CM

## 2023-02-27 DIAGNOSIS — F41.9 ANXIETY: ICD-10-CM

## 2023-02-27 DIAGNOSIS — Z76.0 MEDICATION REFILL: ICD-10-CM

## 2023-02-27 NOTE — TELEPHONE ENCOUNTER
From: Emil Dickson  To: Manuel Brady  Sent: 2/27/2023 7:57 AM EST  Subject: Medication refills    I need my Adderall refilled into [facility] and my Wellbutrin refilled into [facility].  Thanks

## 2023-02-28 RX ORDER — DEXTROAMPHETAMINE SACCHARATE, AMPHETAMINE ASPARTATE MONOHYDRATE, DEXTROAMPHETAMINE SULFATE AND AMPHETAMINE SULFATE 7.5; 7.5; 7.5; 7.5 MG/1; MG/1; MG/1; MG/1
30 CAPSULE, EXTENDED RELEASE ORAL 2 TIMES DAILY
Qty: 60 CAPSULE | Refills: 0 | Status: SHIPPED | OUTPATIENT
Start: 2023-02-28 | End: 2023-03-30

## 2023-02-28 RX ORDER — BUPROPION HYDROCHLORIDE 300 MG/1
300 TABLET ORAL EVERY MORNING
Qty: 90 TABLET | Refills: 5 | Status: SHIPPED | OUTPATIENT
Start: 2023-02-28

## 2023-02-28 NOTE — PROGRESS NOTES
Placed This Encounter   Procedures    US Soft Tissue Limited Area     Standing Status:   Future     Standing Expiration Date:   9/11/2020     Order Specific Question:   Reason for exam:     Answer:   left axillary fullness    SHAUN DIGITAL DIAGNOSTIC W OR WO CAD BILATERAL     With US if needed     Standing Status:   Future     Standing Expiration Date:   11/11/2020     Order Specific Question:   Reason for exam:     Answer:   left axillary fullness    Comprehensive Metabolic Panel     Standing Status:   Future     Standing Expiration Date:   9/11/2020    CBC Auto Differential     Standing Status:   Future     Standing Expiration Date:   9/11/2020    Protime-Inr     Standing Status:   Future     Standing Expiration Date:   9/11/2020     Order Specific Question:   Daily Coumadin Dose? Answer:   n/a    Aptt     Standing Status:   Future     Standing Expiration Date:   9/11/2020     Order Specific Question:   Daily Heparin Dose? Answer:   n/a       Plan for patient to have blood work done. Also to get mammogram and ultrasound of right axillary. Side effects, adverse effects of the medication prescribed today, as well as treatment plan/ rationale and result expectations have been discussed with the patient who expresses understanding and desires to proceed. Close follow up to evaluate treatment results and for coordination of care. I have reviewed the patient's medical history in detail and updated the computerized patient record. As always, patient is advised that if symptoms worsen in any way they will proceed to the nearest emergency room. Follow up after blood work is completed.      ORTIZ Hager - CNP PROVIDER:[TOKEN:[[id number]]]

## 2023-03-02 ENCOUNTER — OFFICE VISIT (OUTPATIENT)
Dept: FAMILY MEDICINE CLINIC | Age: 53
End: 2023-03-02

## 2023-03-02 VITALS
HEART RATE: 87 BPM | BODY MASS INDEX: 20.61 KG/M2 | OXYGEN SATURATION: 97 % | HEIGHT: 62 IN | SYSTOLIC BLOOD PRESSURE: 108 MMHG | WEIGHT: 112 LBS | DIASTOLIC BLOOD PRESSURE: 72 MMHG

## 2023-03-02 DIAGNOSIS — E23.2 DIABETES INSIPIDUS (HCC): ICD-10-CM

## 2023-03-02 DIAGNOSIS — Z76.0 MEDICATION REFILL: ICD-10-CM

## 2023-03-02 DIAGNOSIS — F98.8 ATTENTION DEFICIT DISORDER, UNSPECIFIED HYPERACTIVITY PRESENCE: ICD-10-CM

## 2023-03-02 DIAGNOSIS — G43.811 OTHER MIGRAINE WITH STATUS MIGRAINOSUS, INTRACTABLE: ICD-10-CM

## 2023-03-02 DIAGNOSIS — K21.9 GASTROESOPHAGEAL REFLUX DISEASE WITHOUT ESOPHAGITIS: Primary | ICD-10-CM

## 2023-03-02 RX ORDER — KETOROLAC TROMETHAMINE 30 MG/ML
30 INJECTION, SOLUTION INTRAMUSCULAR; INTRAVENOUS ONCE
Status: COMPLETED | OUTPATIENT
Start: 2023-03-02 | End: 2023-03-02

## 2023-03-02 RX ORDER — FAMOTIDINE 20 MG/1
20 TABLET, FILM COATED ORAL 2 TIMES DAILY
Qty: 60 TABLET | Refills: 1 | Status: SHIPPED | OUTPATIENT
Start: 2023-03-02

## 2023-03-02 RX ORDER — DEXTROAMPHETAMINE SACCHARATE, AMPHETAMINE ASPARTATE MONOHYDRATE, DEXTROAMPHETAMINE SULFATE AND AMPHETAMINE SULFATE 7.5; 7.5; 7.5; 7.5 MG/1; MG/1; MG/1; MG/1
30 CAPSULE, EXTENDED RELEASE ORAL 2 TIMES DAILY
Qty: 60 CAPSULE | Refills: 0 | OUTPATIENT
Start: 2023-03-02 | End: 2023-04-01

## 2023-03-02 RX ADMIN — KETOROLAC TROMETHAMINE 30 MG: 30 INJECTION, SOLUTION INTRAMUSCULAR; INTRAVENOUS at 08:12

## 2023-03-02 ASSESSMENT — ENCOUNTER SYMPTOMS
COUGH: 0
SHORTNESS OF BREATH: 0

## 2023-03-02 NOTE — PROGRESS NOTES
Subjective  Chief Complaint   Patient presents with    Follow-Up from Hospital     Pt states concern with EKG results from last night at [facility]. Pt states she does not feel well. HPI    Struggling with heartburn over the past few days. Went to [facility] last night. Overall EKG and labs were \"ok\"   States that it has improved some but still struggling. Pt c/o frequent urination. Changes in vision. Did have glucosuria. Seems to be coming and going. Thirsty all the time.       Patient Active Problem List    Diagnosis Date Noted    Change in bowel habits 12/16/2022    History of colon polyps 12/16/2022    Acute pancreatitis without infection or necrosis, unspecified pancreatitis type 07/30/2022    Abdominal pain 07/30/2022    Pancreatitis, unspecified pancreatitis type 07/29/2022    Acute left-sided low back pain with bilateral sciatica 03/09/2022    Congestive heart failure, unspecified HF chronicity, unspecified heart failure type ([address] 02/11/2022    Claudication of both lower extremities ([address] 02/11/2022    Neck pain 12/09/2021    Non-recurrent acute suppurative otitis media of left ear without spontaneous rupture of tympanic membrane 12/09/2021    Neutropenia ([address] 01/12/2021    Hereditary hemochromatosis ([address] 01/12/2021    Chronic pancreatitis ([address] 08/11/2020    Major depressive disorder, single episode, 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[address] 01/15/2019    Abnormal MRI, liver 05/22/2018    Acute recurrent pancreatitis 05/22/2018    At risk of fracture due to osteoporosis 05/22/2018    Calcinosis 05/22/2018    Chronic headaches 05/22/2018    Conductive hearing loss in left ear 05/22/2018    Edema 05/22/2018    Iron overload 05/22/2018    Mass of left side of neck 05/22/2018    Medullary sponge kidney of both kidneys 63/62/5155    Metabolic acidosis 18/08/7589    Microscopic hematuria 05/22/2018    Nausea 05/22/2018    Renal tubular acidosis type I 05/22/2018    Rheumatoid arthritis ([address] 05/22/2018    Tension type headache 05/22/2018    Weight loss, abnormal 05/22/2018    Cellulitis, face 04/06/2018    Other chest pain 11/01/2017    Left lower quadrant pain 11/01/2017    Sjogren's syndrome ([address] 11/01/2017    Diverticulitis of large intestine without perforation or abscess without bleeding 11/01/2017    Fibroids 11/01/2017    Transfusion history 11/01/2017    Pancreatitis 05/13/2017    Acquired hypothyroidism 09/26/2016    GERD (gastroesophageal reflux disease) 09/26/2016    Anxiety 12/09/2015    Depression 12/09/2015    ADD (attention deficit disorder) 02/12/2015    Endometriosis 09/09/2014    ASCUS favoring benign 05/01/2013    S/P endometrial ablation 05/01/2013     Past Medical History:   Diagnosis Date    Acquired hypothyroidism 9/26/2016    Acute left-sided low back pain with bilateral sciatica 3/9/2022    Acute pancreatitis     ADD (attention deficit disorder) 2/12/2015    Anxiety     Congestive heart failure, unspecified HF chronicity, unspecified heart failure type ([address] 2/11/2022    Depression 12/9/2015    Endometrial cyst of ovary 5/10/14    RT ovary, ruptured    GERD (gastroesophageal reflux disease) 9/26/2016    Medullary sponge kidney of both kidneys 5/22/2018    Sjogren's disease ([address]     Stress     Swelling      Past Surgical History:   Procedure Laterality Date    BREAST BIOPSY Left 2015? ?    lump removed (benign) (Dr. Wolf Khan)    BREAST CYST EXCISION Left 2015??     Dr Wolf Khan (benign)    BREAST LUMPECTOMY      CHOLECYSTECTOMY  2011    COLONOSCOPY N/A 12/16/2022    COLORECTAL CANCER SCREENING, HIGH RISK performed by Phillip Wells MD at [facility] ([facility])  2014    sept (total)    OVARY REMOVAL Left 01/2014    UPPER GASTROINTESTINAL ENDOSCOPY  09/16/2016    EGD W/BX    UPPER GASTROINTESTINAL ENDOSCOPY N/A 05/06/2021    ENDOSCOPIC ULTRASOUND with EGD performed by Phillip Wells MD at [facility] ENDOSCOPY N/A 08/01/2022    EGD DIAGNOSTIC ONLY performed by Valeri Lala MD at [address] [place] [address] N/A 08/05/2022    EGD ESOPHAGOGASTRODUODENOSCOPY performed by Jazlyn Turcios MD at [address] [place] [address]  08/05/2022    ENDOSCOPIC ULTRASOUND performed by Jazlyn Turcios MD at [facility]     Family History   Problem Relation Age of Onset    Heart Disease Father 48    Breast Cancer Paternal Aunt         in her 29's    Breast Cancer Paternal Aunt         in her 63's    Cancer Maternal Grandmother         breast    Breast Cancer Maternal Grandmother         in her 63's    Breast Cancer Paternal Grandmother         in her 63's    Colon Cancer Paternal Grandfather     Heart Disease Other         mom and dad's side     Social History     Socioeconomic History    Marital status: Single     Spouse name: None    Number of children: None    Years of education: None    Highest education level: None   Tobacco Use    Smoking status: Never    Smokeless tobacco: Never   Vaping Use    Vaping Use: Never used   Substance and Sexual Activity    Alcohol use: No     Alcohol/week: 0.0 standard drinks     Comment: no alcohol since 2011    Drug use: No   Social History Narrative    ** Merged History Encounter **          Social Determinants of Health     Financial Resource Strain: Low Risk     Difficulty of Paying Living Expenses: Not hard at all   Food Insecurity: No Food Insecurity    Worried About [facility] in the Last Year: Never true    [facility] in the Last Year: Never true   Transportation Needs: No Transportation Needs    Lack of Transportation (Medical): No    Lack of Transportation (Non-Medical):  No   Physical Activity: Inactive    Days of Exercise per Week: 0 days    Minutes of Exercise per Session: 10 min   Stress: Stress Concern Present    Feeling of Stress : Rather much   Social Connections: Unknown    Frequency of Communication with Friends and Family: Twice a week    Frequency of Social Gatherings with Friends and Family: Never    Active Member of Clubs or Organizations: No    Attends Club or Organization Meetings: Never    Marital Status: Never    Housing Stability: Unknown    Unable to Pay for Housing in the Last Year: No    Unstable Housing in the Last Year: No     Current Outpatient Medications on File Prior to Visit   Medication Sig Dispense Refill    amphetamine-dextroamphetamine (ADDERALL XR) 30 MG extended release capsule Take 1 capsule by mouth in the morning and 1 capsule in the evening. Do all this for 30 days. 60 capsule 0    buPROPion (WELLBUTRIN XL) 300 MG extended release tablet Take 1 tablet by mouth every morning TAKE ONE TABLET BY MOUTH EVERY MORNING 90 tablet 5    Rimegepant Sulfate 75 MG TBDP Take 1 tablet by mouth as needed (for Migraine) 30 tablet 0    Ubrogepant (UBRELVY) 100 MG TABS Take one tablet as needed for migraine, second dose can be taken at least 2 hours after the initial dose, if needed 16 tablet 0    tiZANidine (ZANAFLEX) 4 MG tablet Take 1 tablet by mouth every 8 hours as needed (Pain) 10 tablet 0    traZODone (DESYREL) 150 MG tablet TAKE ONE TABLET BY MOUTH NIGHTLY 30 tablet 0    promethazine-dextromethorphan (PROMETHAZINE-DM) 6.25-15 MG/5ML syrup TAKE 5 MLS BY MOUTH AT BEDTIME FOR 7 DAYS 118 mL 0    fluticasone (FLONASE) 50 MCG/ACT nasal spray 1 spray by Each Nostril route daily 16 g 2    NEXIUM 40 MG delayed release capsule TAKE 1 CAPSULE BY MOUTH DAILY 90 capsule 1    Secukinumab, 300 MG Dose, (COSENTYX, 300 MG DOSE,) 150 MG/ML SOSY Inject into the skin once a week      furosemide (LASIX) 20 MG tablet TAKE ONE TABLET BY MOUTH TWO TIMES A DAY 60 tablet 0    hydrocortisone (CORTEF) 10 MG tablet       ketorolac (TORADOL) 10 MG tablet Take 1 tablet by mouth every 6 hours as needed for Pain 20 tablet 0    hydrocortisone 2.5 % cream Apply topically 2 times daily.  15 g 0    sucralfate (CARAFATE) 1 GM tablet Take 1 tablet by mouth in the morning and 1 tablet at noon and 1 tablet in the evening and 1 tablet before bedtime. 120 tablet 3    MOTEGRITY 2 MG TABS TAKE ONE TABLET BY MOUTH EVERY DAY      lidocaine (LIDODERM) 5 % apply one patch as directed once daily to affected area remove patch after twelve hours.       hyoscyamine (LEVSIN/SL) 125 MCG sublingual tablet Place 1 tablet under the tongue every 4 hours as needed for Cramping 90 tablet 3    topiramate (TOPAMAX) 100 MG tablet TAKE ONE TABLET BY MOUTH TWO TIMES A DAY 60 tablet 5    loratadine (CLARITIN) 10 MG tablet Take 1 tablet by mouth daily 30 tablet 5    potassium chloride (KLOR-CON M) 10 MEQ extended release tablet Take 1 tablet by mouth daily 30 tablet 0    liothyronine (CYTOMEL) 25 MCG tablet Take 1 tablet by mouth daily 90 tablet 0    aspirin (ASPIRIN CHILDRENS) 81 MG chewable tablet Take 1 tablet by mouth daily 14 tablet 0    valACYclovir (VALTREX) 500 MG tablet Take 1 tablet by mouth daily 30 tablet 5    Magnesium 400 MG TABS Take 1 tablet by mouth daily 30 tablet 0    TRULANCE 3 MG TABS Take 3 mg by mouth daily      ondansetron (ZOFRAN) 4 MG tablet Take 1 tablet by mouth every 8 hours as needed for Nausea or Vomiting 30 tablet 1    azelastine (ASTELIN) 0.1 % nasal spray 1 spray by Nasal route      moxifloxacin (VIGAMOX) 0.5 % ophthalmic solution 1 drop      levothyroxine (SYNTHROID) 25 MCG tablet Take one daily 90 tablet 1    hydroxychloroquine (PLAQUENIL) 200 MG tablet Take 300 mg by mouth daily       [DISCONTINUED] midodrine (PROAMATINE) 10 MG tablet Take 10 mg by mouth as needed      [DISCONTINUED] NEXIUM 40 MG delayed release capsule TAKE ONE CAPSULE BY MOUTH EVERY DAY 30 capsule 5    [DISCONTINUED] topiramate (TOPAMAX) 100 MG tablet Take 1 tablet by mouth 2 times daily 60 tablet 5    [DISCONTINUED] furosemide (LASIX) 20 MG tablet Take 1 tablet by mouth 2 times daily TAKE ONE TABLET BY MOUTH TWO TIMES A DAY 60 tablet 5     No current facility-administered medications on file prior to visit. No Known Allergies    Review of Systems   Constitutional:  Positive for fatigue. Eyes:  Positive for visual disturbance. Respiratory:  Negative for cough and shortness of breath. Cardiovascular:  Negative for chest pain. Endocrine: Positive for polydipsia and polyuria. Neurological:  Positive for headaches. Objective  Vitals:    03/02/23 0733   BP: 108/72   Pulse: 87   SpO2: 97%   Weight: 112 lb (50.8 kg)   Height: 5' 2\" (1.575 m)     Physical Exam  Vitals and nursing note reviewed. Constitutional:       Appearance: Normal appearance. HENT:      Head: Normocephalic. Nose: Nose normal.      Mouth/Throat:      Mouth: Mucous membranes are moist.      Pharynx: Oropharynx is clear. Eyes:      Extraocular Movements: Extraocular movements intact. Conjunctiva/sclera: Conjunctivae normal.      Pupils: Pupils are equal, round, and reactive to light. Cardiovascular:      Rate and Rhythm: Normal rate and regular rhythm. Pulses: Normal pulses. Heart sounds: Normal heart sounds. Pulmonary:      Effort: Pulmonary effort is normal.      Breath sounds: Normal breath sounds. Musculoskeletal:      Cervical back: Neck supple. Skin:     General: Skin is warm. Coloration: Skin is pale. Neurological:      General: No focal deficit present. Mental Status: She is alert and oriented to person, place, and time. Mental status is at baseline. Psychiatric:         Mood and Affect: Mood normal.         Behavior: Behavior normal.         Thought Content: Thought content normal.         Judgment: Judgment normal.       Assessment & Plan     Diagnosis Orders   1. Gastroesophageal reflux disease without esophagitis  famotidine (PEPCID) 20 MG tablet      2. Other migraine with status migrainosus, intractable  ketorolac (TORADOL) injection 30 mg      3.  Diabetes insipidus [facility]  Xena Evans MD, Endocrinology, [facility] Orders Placed This Encounter   Procedures    Latanya Anderson MD, Endocrinology, [place]     Referral Priority:   Urgent     Referral Type:   Eval and Treat     Referral Reason:   Specialty Services Required     Referred to Provider:   Donato Singh MD     Requested Specialty:   Endocrinology     Number of Visits Requested:   1       Orders Placed This Encounter   Medications    famotidine (PEPCID) 20 MG tablet     Sig: Take 1 tablet by mouth 2 times daily     Dispense:  60 tablet     Refill:  1    ketorolac (TORADOL) injection 30 mg       There are no discontinued medications. Side effects, adverse effects of the medication prescribed today, as well as treatment plan/ rationale and result expectations have been discussed with the patient who expresses understanding and desires to proceed. Close follow up to evaluate treatment results and for coordination of care. I have reviewed the patient's medical history in detail and updated the computerized patient record. As always, patient is advised that if symptoms worsen in any way they will proceed to the nearest emergency room.         Berenice Holliday, APRN - CNP

## 2023-03-03 ENCOUNTER — OFFICE VISIT (OUTPATIENT)
Dept: ENDOCRINOLOGY | Age: 53
End: 2023-03-03

## 2023-03-03 VITALS
OXYGEN SATURATION: 98 % | SYSTOLIC BLOOD PRESSURE: 119 MMHG | BODY MASS INDEX: 20.8 KG/M2 | DIASTOLIC BLOOD PRESSURE: 80 MMHG | HEIGHT: 62 IN | HEART RATE: 85 BPM | WEIGHT: 113 LBS

## 2023-03-03 DIAGNOSIS — R53.83 OTHER FATIGUE: ICD-10-CM

## 2023-03-03 DIAGNOSIS — M81.0 OSTEOPOROSIS, UNSPECIFIED OSTEOPOROSIS TYPE, UNSPECIFIED PATHOLOGICAL FRACTURE PRESENCE: ICD-10-CM

## 2023-03-03 DIAGNOSIS — E03.8 OTHER SPECIFIED HYPOTHYROIDISM: Primary | ICD-10-CM

## 2023-03-03 LAB
CHP ED QC CHECK: NORMAL
GLUCOSE BLD-MCNC: 101 MG/DL

## 2023-03-03 ASSESSMENT — ENCOUNTER SYMPTOMS
EYES NEGATIVE: 1
ROS SKIN COMMENTS: DRY SKIN

## 2023-03-03 NOTE — PROGRESS NOTES
3/3/2023    Assessment:       Diagnosis Orders   1. Other specified hypothyroidism  T4, Free    TSH    Thyroid Peroxidase Antibody    Thyroid Stimulating Immunoglobulin      2. Other fatigue  Cortisol Total    Vitamin B12 & Folate      3. Osteoporosis, unspecified osteoporosis type, unspecified pathological fracture presence              PLAN:     Get baseline lab work for cortisol thyroid function test thyroid antibodies H65 folic acid discussed also treatment for osteoporosis with Evenity initially and then Prolia later on further management to depend upon the results of the labs more than 50% of 35 minutes spent patient education counseling  Continue Synthroid 25 mcg daily  Orders Placed This Encounter   Procedures    T4, Free     Standing Status:   Future     Standing Expiration Date:   3/3/2024    TSH     Standing Status:   Future     Standing Expiration Date:   3/3/2024    Thyroid Peroxidase Antibody     Standing Status:   Future     Standing Expiration Date:   3/3/2024    Thyroid Stimulating Immunoglobulin     Standing Status:   Future     Standing Expiration Date:   3/3/2024    Cortisol Total     Standing Status:   Future     Standing Expiration Date:   3/3/2024     Order Specific Question:   8AM or 4PM?     Answer:   random    Vitamin B12 & Folate     Standing Status:   Future     Standing Expiration Date:   3/3/2024    POCT Glucose         Orders Placed This Encounter   Procedures    POCT Glucose     No orders of the defined types were placed in this encounter. No follow-ups on file.   Subjective:     Chief Complaint   Patient presents with    New Patient    Diabetes     Vitals:    03/03/23 1542   BP: 119/80   Site: Left Upper Arm   Position: Sitting   Cuff Size: Medium Adult   Pulse: 85   SpO2: 98%   Weight: 113 lb (51.3 kg)   Height: 5' 2\" (1.575 m)     Wt Readings from Last 3 Encounters:   03/03/23 113 lb (51.3 kg)   03/02/23 112 lb (50.8 kg)   02/21/23 112 lb (50.8 kg)     BP Readings from Last 3 Encounters:   03/03/23 119/80   03/02/23 108/72   02/21/23 104/64     Patient referred here for hypothyroidism possible diabetes insipidus complains of increased thirst urination hemoglobin A1c has been normal occasionally has had higher blood sugars patient also complained of occasional hypoglycemia was put on hydrocortisone many years ago currently not taking it is not sure what was the reason apparently was started by nephrologist  Currently on Synthroid 25 mcg low-dose no recent labs to review most current TSH was normal free T4 on the low side  Does complain of fatigue denies any neck pain difficulty swallowing  History of pancreatitis is seen gastroenterologist here as well as the [facility]  History of Sjogren's syndrome on Plaquenil  History of COVID-19 infection in the past  Also has had history of severe osteoporosis currently not on any medication bone density shows T score of -3.8 at the level of lumbar spine with history of rib fracture    Thyroid Problem  Presents for initial visit. Symptoms include dry skin and fatigue. Patient reports no palpitations or tremors. The symptoms have been stable. Past treatments include levothyroxine. The treatment provided moderate relief. There are no known risk factors. Other  This is a new (Osteoporosis) problem. The current episode started 1 to 4 weeks ago. The problem occurs intermittently. Associated symptoms include arthralgias, fatigue and myalgias. Associated symptoms comments: History of rib fractures and sacral bone fracture. Exacerbated by: Postmenopausal. She has tried nothing for the symptoms. Narrative   EXAMINATION:   BONE DENSITOMETRY       1/17/2023 1:13 pm       TECHNIQUE:   A bone density dual x-ray absorptiometry (DXA) scan was performed of the   lumbar spine and bilateral hips on a [employer] system. COMPARISON:   None.        HISTORY:   ORDERING SYSTEM PROVIDED HISTORY: Long term current use of systemic steroids Gender: F       Age: 49 y/o       FINDINGS:   LUMBAR SPINE: L1-L4       BMD: 0.720 g/cm2       T-score: -3.8       Z-score: -2.9       Osteoporosis. LEFT TOTAL HIP:       BMD: 0.659 g/cm2       T-score: -2.8       Z-score: -1.9       LEFT FEMORAL NECK:   BMD: 0.621 g/cm2   T-score: -3.0       Z-score: -1.9       RIGHT TOTAL HIP:       BMD: 0.658 g/cm2       T-score: -2.8       Z-score: -1.9       RIGHT FEMORAL NECK:   BMD: 0.569 g/cm2   T-score: -3.4       Z-score: -2.3       Osteoporotic       FRAX:       10-year fracture risk is performed using the [employer] FRAX   calculator based on patient-reported risk factors. Major osteoporotic fracture: 20.1%       Hip fracture: 6.7%       Other situations known to alter the reliability of the FRAX score should be   considered when making treatment decisions, including chronic glucocorticoid   use and past treatments. Further guidance on treatment can be found at the   [facility]'s website [phone]. Past Medical History:   Diagnosis Date    Acquired hypothyroidism 9/26/2016    Acute left-sided low back pain with bilateral sciatica 3/9/2022    Acute pancreatitis     ADD (attention deficit disorder) 2/12/2015    Anxiety     Congestive heart failure, unspecified HF chronicity, unspecified heart failure type ([address] 2/11/2022    Depression 12/9/2015    Endometrial cyst of ovary 5/10/14    RT ovary, ruptured    GERD (gastroesophageal reflux disease) 9/26/2016    Medullary sponge kidney of both kidneys 5/22/2018    Sjogren's disease ([facility]     Stress     Swelling      Past Surgical History:   Procedure Laterality Date    BREAST BIOPSY Left 2015? ?    lump removed (benign) (Dr. Cheryle Shorts)    BREAST CYST EXCISION Left 2015??     Dr Cheryle Shorts (benign)    BREAST LUMPECTOMY      CHOLECYSTECTOMY  2011    COLONOSCOPY N/A 12/16/2022    COLORECTAL CANCER SCREENING, HIGH RISK performed by Desirae Lakhani MD at [facility] W/ PLASTIC STENT PLACEMENT N/A     HYSTERECTOMY (CERVIX STATUS UNKNOWN)  2014 sept (total)    OVARY REMOVAL Left 01/2014    UPPER GASTROINTESTINAL ENDOSCOPY  09/16/2016    EGD W/BX    UPPER GASTROINTESTINAL ENDOSCOPY N/A 05/06/2021    ENDOSCOPIC ULTRASOUND with EGD performed by Ousmane Friend MD at [address] 08/01/2022    EGD DIAGNOSTIC ONLY performed by Roseann Nobles MD at [address] 08/05/2022    EGD ESOPHAGOGASTRODUODENOSCOPY performed by Ousmane Friend MD at [facility] [address]  08/05/2022    ENDOSCOPIC ULTRASOUND performed by Ousmane Friend MD at [address] [place] [address] History     Socioeconomic History    Marital status: Single     Spouse name: Not on file    Number of children: Not on file    Years of education: Not on file    Highest education level: Not on file   Occupational History    Not on file   Tobacco Use    Smoking status: Never    Smokeless tobacco: Never   Vaping Use    Vaping Use: Never used   Substance and Sexual Activity    Alcohol use: No     Alcohol/week: 0.0 standard drinks     Comment: no alcohol since 2011    Drug use: No    Sexual activity: Not on file   Other Topics Concern    Not on file   Social History Narrative    ** Merged History Encounter **          Social Determinants of Health     Financial Resource Strain: Low Risk     Difficulty of Paying Living Expenses: Not hard at all   Food Insecurity: No Food Insecurity    Worried About Running Out of Food in the Last Year: Never true    Ran Out of Food in the Last Year: Never true   Transportation Needs: No Transportation Needs    Lack of Transportation (Medical): No    Lack of Transportation (Non-Medical):  No   Physical Activity: Inactive    Days of Exercise per Week: 0 days    Minutes of Exercise per Session: 10 min   Stress: Stress Concern Present    Feeling of Stress : Rather much   Social Connections: Unknown    Frequency of Communication with Friends and Family: Twice a week    Frequency of Social Gatherings with Friends and Family: Never    Attends [employer] Services: Not on file    Active Member of Clubs or Organizations: No    Attends Club or Organization Meetings: Never    Marital Status: Never    Intimate Partner Violence: Not on file   Housing Stability: Unknown    Unable to Pay for Housing in the Last Year: No    Number of [place] in the Last Year: Not on file    Unstable Housing in the Last Year: No     Family History   Problem Relation Age of Onset    Heart Disease Father 48    Breast Cancer Paternal Aunt         in her 29's    Breast Cancer Paternal Aunt         in her 63's    Cancer Maternal Grandmother         breast    Breast Cancer Maternal Grandmother         in her 63's    Breast Cancer Paternal Grandmother         in her 63's    Colon Cancer Paternal Grandfather     Heart Disease Other         mom and dad's side     No Known Allergies    Current Outpatient Medications:     famotidine (PEPCID) 20 MG tablet, Take 1 tablet by mouth 2 times daily, Disp: 60 tablet, Rfl: 1    amphetamine-dextroamphetamine (ADDERALL XR) 30 MG extended release capsule, Take 1 capsule by mouth in the morning and 1 capsule in the evening. Do all this for 30 days. , Disp: 60 capsule, Rfl: 0    buPROPion (WELLBUTRIN XL) 300 MG extended release tablet, Take 1 tablet by mouth every morning TAKE ONE TABLET BY MOUTH EVERY MORNING, Disp: 90 tablet, Rfl: 5    Rimegepant Sulfate 75 MG TBDP, Take 1 tablet by mouth as needed (for Migraine), Disp: 30 tablet, Rfl: 0    Ubrogepant (UBRELVY) 100 MG TABS, Take one tablet as needed for migraine, second dose can be taken at least 2 hours after the initial dose, if needed, Disp: 16 tablet, Rfl: 0    tiZANidine (ZANAFLEX) 4 MG tablet, Take 1 tablet by mouth every 8 hours as needed (Pain), Disp: 10 tablet, Rfl: 0    traZODone (DESYREL) 150 MG tablet, TAKE ONE TABLET BY MOUTH NIGHTLY, Disp: 30 tablet, Rfl: 0    fluticasone (FLONASE) 50 MCG/ACT nasal spray, 1 spray by Each Nostril route daily, Disp: 16 g, Rfl: 2    NEXIUM 40 MG delayed release capsule, TAKE 1 CAPSULE BY MOUTH DAILY, Disp: 90 capsule, Rfl: 1    furosemide (LASIX) 20 MG tablet, TAKE ONE TABLET BY MOUTH TWO TIMES A DAY, Disp: 60 tablet, Rfl: 0    ketorolac (TORADOL) 10 MG tablet, Take 1 tablet by mouth every 6 hours as needed for Pain, Disp: 20 tablet, Rfl: 0    hydrocortisone 2.5 % cream, Apply topically 2 times daily. , Disp: 15 g, Rfl: 0    sucralfate (CARAFATE) 1 GM tablet, Take 1 tablet by mouth in the morning and 1 tablet at noon and 1 tablet in the evening and 1 tablet before bedtime. , Disp: 120 tablet, Rfl: 3    MOTEGRITY 2 MG TABS, TAKE ONE TABLET BY MOUTH EVERY DAY, Disp: , Rfl:     lidocaine (LIDODERM) 5 %, apply one patch as directed once daily to affected area remove patch after twelve hours. , Disp: , Rfl:     topiramate (TOPAMAX) 100 MG tablet, TAKE ONE TABLET BY MOUTH TWO TIMES A DAY, Disp: 60 tablet, Rfl: 5    loratadine (CLARITIN) 10 MG tablet, Take 1 tablet by mouth daily, Disp: 30 tablet, Rfl: 5    potassium chloride (KLOR-CON M) 10 MEQ extended release tablet, Take 1 tablet by mouth daily, Disp: 30 tablet, Rfl: 0    aspirin (ASPIRIN CHILDRENS) 81 MG chewable tablet, Take 1 tablet by mouth daily, Disp: 14 tablet, Rfl: 0    valACYclovir (VALTREX) 500 MG tablet, Take 1 tablet by mouth daily, Disp: 30 tablet, Rfl: 5    Magnesium 400 MG TABS, Take 1 tablet by mouth daily, Disp: 30 tablet, Rfl: 0    TRULANCE 3 MG TABS, Take 3 mg by mouth daily, Disp: , Rfl:     ondansetron (ZOFRAN) 4 MG tablet, Take 1 tablet by mouth every 8 hours as needed for Nausea or Vomiting, Disp: 30 tablet, Rfl: 1    azelastine (ASTELIN) 0.1 % nasal spray, 1 spray by Nasal route, Disp: , Rfl:     moxifloxacin (VIGAMOX) 0.5 % ophthalmic solution, 1 drop, Disp: , Rfl:     levothyroxine (SYNTHROID) 25 MCG tablet, Take one daily, Disp: 90 tablet, Rfl: 1    hydroxychloroquine (PLAQUENIL) 200 MG tablet, Take 300 mg by mouth daily , Disp: , Rfl:   Lab Results   Component Value Date     02/21/2023    K 3.7 02/21/2023    CL 99 02/21/2023    CO2 25 02/21/2023    BUN 19 02/21/2023    CREATININE 0.69 02/21/2023    GLUCOSE 101 03/03/2023    CALCIUM 9.9 02/21/2023    PROT 7.3 02/21/2023    LABALBU 4.8 (H) 02/21/2023    BILITOT <0.2 02/21/2023    ALKPHOS 106 02/21/2023    AST 26 02/21/2023    ALT 21 02/21/2023    LABGLOM >60.0 02/21/2023    GFRAA >60.0 08/01/2022    AGRATIO 1.5 04/05/2019    GLOB 2.5 02/21/2023     Lab Results   Component Value Date    WBC 5.8 02/21/2023    HGB 13.3 02/21/2023    HCT 39.5 02/21/2023    MCV 87.4 02/21/2023     02/21/2023     Lab Results   Component Value Date    LABA1C 4.9 05/11/2022    LABA1C 5.1 01/31/2021     Lab Results   Component Value Date    HDL 69 (H) 07/30/2022    HDL 81 (H) 01/12/2021    HDL 93 (H) 09/20/2016    LDLCALC 108 07/30/2022    LDLCALC 126 01/12/2021    LDLCALC 109 09/20/2016    CHOL 204 (H) 07/30/2022    CHOL 219 (H) 01/12/2021    CHOL 226 (H) 09/20/2016    TRIG 133 07/30/2022    TRIG 62 01/12/2021    TRIG 121 09/20/2016     No results found for: TESTM  Lab Results   Component Value Date    TSH 3.570 12/08/2021    TSH 0.02 (L) 04/05/2019    TSH 2.240 02/20/2018    TSHREFLEX 1.950 09/26/2022    TSHREFLEX 1.590 01/12/2021    TSHREFLEX 0.088 (L) 08/26/2020    FT3 3.8 04/05/2019    FT3 2.7 09/14/2016    T4FREE 0.71 (L) 08/26/2020    T4FREE 1.29 09/20/2017    T4FREE 1.00 02/14/2017     No results found for: TPOABS    Review of Systems   Constitutional:  Positive for fatigue and unexpected weight change. Eyes: Negative. Cardiovascular:  Negative for palpitations. Genitourinary:  Positive for frequency. Musculoskeletal:  Positive for arthralgias and myalgias. Skin:         Dry skin   Neurological:  Negative for tremors. Psychiatric/Behavioral:  Positive for decreased concentration. All other systems reviewed and are negative. Objective:   Physical Exam  Vitals reviewed. Constitutional:       General: She is not in acute distress. Appearance: Normal appearance. She is normal weight. HENT:      Head: Normocephalic and atraumatic. Right Ear: External ear normal.      Left Ear: External ear normal.      Nose: Nose normal.   Eyes:      General: No scleral icterus. Right eye: No discharge. Left eye: No discharge. Extraocular Movements: Extraocular movements intact. Conjunctiva/sclera: Conjunctivae normal.   Cardiovascular:      Rate and Rhythm: Normal rate and regular rhythm. Heart sounds: Normal heart sounds. Pulmonary:      Effort: Pulmonary effort is normal.      Breath sounds: Normal breath sounds. No wheezing or rhonchi. Musculoskeletal:         General: Normal range of motion. Cervical back: Normal range of motion and neck supple. Skin:     General: Skin is warm. Findings: No lesion or rash. Neurological:      General: No focal deficit present. Mental Status: She is alert and oriented to person, place, and time. Psychiatric:         Attention and Perception: Attention normal.         Mood and Affect: Mood is anxious.          Behavior: Behavior normal.

## 2023-03-04 ENCOUNTER — PATIENT MESSAGE (OUTPATIENT)
Dept: FAMILY MEDICINE CLINIC | Age: 53
End: 2023-03-04

## 2023-03-04 DIAGNOSIS — F98.8 ATTENTION DEFICIT DISORDER, UNSPECIFIED HYPERACTIVITY PRESENCE: Primary | ICD-10-CM

## 2023-03-04 DIAGNOSIS — R51.9 CHRONIC NONINTRACTABLE HEADACHE, UNSPECIFIED HEADACHE TYPE: ICD-10-CM

## 2023-03-04 DIAGNOSIS — G47.00 INSOMNIA, UNSPECIFIED TYPE: ICD-10-CM

## 2023-03-04 DIAGNOSIS — G89.29 CHRONIC NONINTRACTABLE HEADACHE, UNSPECIFIED HEADACHE TYPE: ICD-10-CM

## 2023-03-04 NOTE — TELEPHONE ENCOUNTER
Comments:     Last Office Visit (last PCP visit):   3/2/2023    Next Visit Date:  Future Appointments   Date Time Provider Angela Hernandez   3/31/2023  2:45 PM Cecilia Beltran MD [facility]       **If hasn't been seen in over a year OR hasn't followed up according to last diabetes/ADHD visit, make appointment for patient before sending refill to provider.     Rx requested:  Requested Prescriptions     Pending Prescriptions Disp Refills    topiramate (TOPAMAX) 100 MG tablet [Pharmacy Med Name: Topiramate Oral Tablet 100 MG] 60 tablet 0     Sig: TAKE ONE TABLET BY MOUTH TWO TIMES A DAY

## 2023-03-04 NOTE — TELEPHONE ENCOUNTER
Comments:     Last Office Visit (last PCP visit):   3/2/2023    Next Visit Date:  Future Appointments   Date Time Provider Angela Hernandez   3/31/2023  2:45 PM Lizeth Hunter  [address]       **If hasn't been seen in over a year OR hasn't followed up according to last diabetes/ADHD visit, make appointment for patient before sending refill to provider.     Rx requested:  Requested Prescriptions     Pending Prescriptions Disp Refills    traZODone (DESYREL) 150 MG tablet [Pharmacy Med Name: traZODone HCl Oral Tablet 150 MG] 30 tablet 0     Sig: take one tablet by mouth nightly

## 2023-03-05 NOTE — TELEPHONE ENCOUNTER
From: Mireille Márquez  To: Chapin Christian  Sent: 3/4/2023 11:41 PM EST  Subject: Adderall     Ximena Nobles to  prescription of adderall at [facility]  Their out of the 30mg XR and so r the other pharmacies so I need you to either escribe 20 mg x3 a day or do a LAVERNE please  So sorry

## 2023-03-06 RX ORDER — TRAZODONE HYDROCHLORIDE 150 MG/1
TABLET ORAL
Qty: 30 TABLET | Refills: 0 | Status: SHIPPED | OUTPATIENT
Start: 2023-03-06

## 2023-03-06 RX ORDER — TOPIRAMATE 100 MG/1
TABLET, FILM COATED ORAL
Qty: 60 TABLET | Refills: 0 | Status: SHIPPED | OUTPATIENT
Start: 2023-03-06

## 2023-03-07 RX ORDER — DEXTROAMPHETAMINE SACCHARATE, AMPHETAMINE ASPARTATE, DEXTROAMPHETAMINE SULFATE AND AMPHETAMINE SULFATE 7.5; 7.5; 7.5; 7.5 MG/1; MG/1; MG/1; MG/1
30 TABLET ORAL 2 TIMES DAILY
Qty: 60 TABLET | Refills: 0 | Status: SHIPPED | OUTPATIENT
Start: 2023-03-07 | End: 2023-04-06

## 2023-03-09 ENCOUNTER — PATIENT MESSAGE (OUTPATIENT)
Dept: FAMILY MEDICINE CLINIC | Age: 53
End: 2023-03-09

## 2023-03-09 NOTE — TELEPHONE ENCOUNTER
From: Zeke Baum  To: Iker Mittal  Sent: 3/9/2023 3:28 PM EST  Subject: Constipation    Liz Botanja can you refill my lactose syrup for constipation please   [facility]

## 2023-03-12 RX ORDER — LACTULOSE 10 G/15ML
10 SOLUTION ORAL EVERY EVENING
Qty: 237 ML | Refills: 1 | Status: SHIPPED | OUTPATIENT
Start: 2023-03-12

## 2023-03-27 DIAGNOSIS — E03.8 OTHER SPECIFIED HYPOTHYROIDISM: ICD-10-CM

## 2023-03-27 DIAGNOSIS — R53.83 OTHER FATIGUE: ICD-10-CM

## 2023-03-27 LAB
T4 FREE SERPL-MCNC: 1.07 NG/DL (ref 0.84–1.68)
TSH SERPL-MCNC: 2.71 UIU/ML (ref 0.44–3.86)

## 2023-03-28 LAB
CORTISOL COLLECTION INFO: NORMAL
CORTISOL: 4.1 UG/DL (ref 2.7–18.4)

## 2023-03-29 ENCOUNTER — HOSPITAL ENCOUNTER (OUTPATIENT)
Dept: GENERAL RADIOLOGY | Age: 53
Discharge: HOME | End: 2023-03-31
Payer: MEDICARE

## 2023-03-29 ENCOUNTER — OFFICE VISIT (OUTPATIENT)
Dept: GASTROENTEROLOGY | Age: 53
End: 2023-03-29
Payer: MEDICARE

## 2023-03-29 ENCOUNTER — TELEPHONE (OUTPATIENT)
Dept: GASTROENTEROLOGY | Age: 53
End: 2023-03-29

## 2023-03-29 VITALS — HEART RATE: 61 BPM | WEIGHT: 118 LBS | OXYGEN SATURATION: 99 % | BODY MASS INDEX: 21.71 KG/M2 | HEIGHT: 62 IN

## 2023-03-29 DIAGNOSIS — K59.00 CONSTIPATION, UNSPECIFIED CONSTIPATION TYPE: ICD-10-CM

## 2023-03-29 DIAGNOSIS — R10.84 GENERALIZED ABDOMINAL PAIN: ICD-10-CM

## 2023-03-29 DIAGNOSIS — Z87.19 HISTORY OF PANCREATITIS: ICD-10-CM

## 2023-03-29 DIAGNOSIS — R10.12 LUQ PAIN: Primary | ICD-10-CM

## 2023-03-29 DIAGNOSIS — K21.9 GASTROESOPHAGEAL REFLUX DISEASE WITHOUT ESOPHAGITIS: ICD-10-CM

## 2023-03-29 DIAGNOSIS — R10.84 GENERALIZED ABDOMINAL PAIN: Primary | ICD-10-CM

## 2023-03-29 LAB — THYROPEROXIDASE IGG SERPL-ACNC: <4 IU/ML (ref 0–25)

## 2023-03-29 PROCEDURE — 3017F COLORECTAL CA SCREEN DOC REV: CPT | Performed by: NURSE PRACTITIONER

## 2023-03-29 PROCEDURE — G8420 CALC BMI NORM PARAMETERS: HCPCS | Performed by: NURSE PRACTITIONER

## 2023-03-29 PROCEDURE — 74018 RADEX ABDOMEN 1 VIEW: CPT

## 2023-03-29 PROCEDURE — G8484 FLU IMMUNIZE NO ADMIN: HCPCS | Performed by: NURSE PRACTITIONER

## 2023-03-29 PROCEDURE — 99214 OFFICE O/P EST MOD 30 MIN: CPT | Performed by: NURSE PRACTITIONER

## 2023-03-29 PROCEDURE — 1036F TOBACCO NON-USER: CPT | Performed by: NURSE PRACTITIONER

## 2023-03-29 PROCEDURE — G8427 DOCREV CUR MEDS BY ELIG CLIN: HCPCS | Performed by: NURSE PRACTITIONER

## 2023-03-29 RX ORDER — LUBIPROSTONE 24 UG/1
24 CAPSULE ORAL 2 TIMES DAILY WITH MEALS
Qty: 60 CAPSULE | Refills: 3 | Status: SHIPPED | OUTPATIENT
Start: 2023-03-29

## 2023-03-29 ASSESSMENT — ENCOUNTER SYMPTOMS
VOICE CHANGE: 0
CONSTIPATION: 1
NAUSEA: 1
EYES NEGATIVE: 1
ANAL BLEEDING: 0
TROUBLE SWALLOWING: 0
ABDOMINAL DISTENTION: 0
DIARRHEA: 0
ABDOMINAL PAIN: 1
RECTAL PAIN: 0
VOMITING: 0
BLOOD IN STOOL: 0
CHOKING: 0

## 2023-03-29 NOTE — PROGRESS NOTES
Gastroenterology Clinic Follow up Visit    Jermaine Robbins  [id number]  Chief Complaint   Patient presents with    Follow-up     HPI: 50 y.o. female following up after last GI clinic on 8/3/2022. Interval change: Patient presents to the GI clinic with worsening of constipation/abdominal pain symptoms over the past 2 weeks despite taking Trulance daily, lactulose twice daily, miralax, enemas and suppositories intermittently. States she has only had 3 very small caliber/liquid bowel movements over the past 2 weeks. She reports lower back pain and possible mucus in her stool. States she is unsure if she is seeing mucus in her stool or residual of the suppositories she had previously tried in order to have a bowel movement. She reports an increase in GERD symptoms/regurgitation along with sulfur like burps all the time with associated mild nausea however no vomiting at this time. Reports taking Nexium daily. She denies vomiting, hematemesis, dysphagia, abdominal pain, hematochezia, melena or weight loss. HPI from last GI clinic visit on 8/3/2022  summarized below:  54 y.o. female following up after last GI clinic on 5/4/2021. S/p recent inpatient hospitalization on 7/30/2022 - 8/1/2022 with epigastric/RUQ pain associated with radiation to her back, nausea, and early satiety. She reports prior to admission, the pain began last Tuesday afternoon, states it was a dull epigastric pain, RUQ pain with some burning and felt like a knife is in her back. States the pain is worse with inspiration, bending and with movement. Laying down calms her symptoms. She reports prior Carafate use helped her symptoms. She reports history of gallstones, s/p cholecystectomy and also required ERCP x2 for stone removal.  She additionally reports history of EUS x2 with most recent one being with Dr. Anuja Funes in 2021 with no stone identified. She reports loose stools beginning this morning.  States she usually alternates between

## 2023-03-29 NOTE — TELEPHONE ENCOUNTER
Called patient. Discussed normal KUB results. Discussed recommendation to check lipase and pancreatic elastase levels given her abdominal pain as patient feels her pain is greater in the epigastric/left upper quadrant. She will come to the GI clinic and  stool kit at her earliest convenience. She is agreeable and reports understanding. @ximena and : Please have pancreatic fecal elastase stool kit ready for patient. Thank you.

## 2023-03-30 LAB — TSI SER-ACNC: <0.1 IU/L

## 2023-03-31 ENCOUNTER — OFFICE VISIT (OUTPATIENT)
Dept: ENDOCRINOLOGY | Age: 53
End: 2023-03-31

## 2023-03-31 VITALS
SYSTOLIC BLOOD PRESSURE: 126 MMHG | HEIGHT: 62 IN | HEART RATE: 86 BPM | WEIGHT: 119 LBS | DIASTOLIC BLOOD PRESSURE: 78 MMHG | BODY MASS INDEX: 21.9 KG/M2 | OXYGEN SATURATION: 99 %

## 2023-03-31 DIAGNOSIS — R53.83 OTHER FATIGUE: ICD-10-CM

## 2023-03-31 DIAGNOSIS — E03.8 OTHER SPECIFIED HYPOTHYROIDISM: Primary | ICD-10-CM

## 2023-03-31 DIAGNOSIS — R79.89 LOW SERUM CORTISOL LEVEL: ICD-10-CM

## 2023-03-31 RX ORDER — DIPHENHYDRAMINE HYDROCHLORIDE 50 MG/ML
50 INJECTION INTRAMUSCULAR; INTRAVENOUS
OUTPATIENT
Start: 2023-03-31

## 2023-03-31 RX ORDER — ACETAMINOPHEN 325 MG/1
650 TABLET ORAL
OUTPATIENT
Start: 2023-03-31

## 2023-03-31 RX ORDER — ONDANSETRON 2 MG/ML
8 INJECTION INTRAMUSCULAR; INTRAVENOUS
OUTPATIENT
Start: 2023-03-31

## 2023-03-31 RX ORDER — EPINEPHRINE 1 MG/ML
0.3 INJECTION, SOLUTION, CONCENTRATE INTRAVENOUS PRN
OUTPATIENT
Start: 2023-03-31

## 2023-03-31 RX ORDER — FAMOTIDINE 10 MG/ML
20 INJECTION, SOLUTION INTRAVENOUS
OUTPATIENT
Start: 2023-03-31

## 2023-03-31 RX ORDER — ALBUTEROL SULFATE 90 UG/1
4 AEROSOL, METERED RESPIRATORY (INHALATION) PRN
OUTPATIENT
Start: 2023-03-31

## 2023-03-31 RX ORDER — COSYNTROPIN 0.25 MG/ML
250 INJECTION, POWDER, FOR SOLUTION INTRAMUSCULAR; INTRAVENOUS ONCE
OUTPATIENT
Start: 2023-03-31 | End: 2023-03-31

## 2023-03-31 RX ORDER — SODIUM CHLORIDE 9 MG/ML
INJECTION, SOLUTION INTRAVENOUS CONTINUOUS
OUTPATIENT
Start: 2023-03-31

## 2023-03-31 NOTE — LETTER
April 9, 2023      Craig Mojica, [address] [facility], [address]  [address]      Patient: Saroj Oconnell   MR Number: [id number]   YOB: 1970   Date of Visit: 3/31/2023       Dear Craig Mojica: Thank you for referring Mary Rosenbaum to me for evaluation/treatment. Below are the relevant portions of my assessment and plan of care. If you have questions, please do not hesitate to call me. I look forward to following [email or web address] along with you.     Sincerely,        Love Wagoner MD

## 2023-03-31 NOTE — PROGRESS NOTES
ophthalmic solution, 1 drop, Disp: , Rfl:     levothyroxine (SYNTHROID) 25 MCG tablet, Take one daily, Disp: 90 tablet, Rfl: 1    hydroxychloroquine (PLAQUENIL) 200 MG tablet, Take 300 mg by mouth daily , Disp: , Rfl:   Lab Results   Component Value Date     02/21/2023    K 3.7 02/21/2023    CL 99 02/21/2023    CO2 25 02/21/2023    BUN 19 02/21/2023    CREATININE 0.69 02/21/2023    GLUCOSE 101 03/03/2023    CALCIUM 9.9 02/21/2023    PROT 7.3 02/21/2023    LABALBU 4.8 (H) 02/21/2023    BILITOT <0.2 02/21/2023    ALKPHOS 106 02/21/2023    AST 26 02/21/2023    ALT 21 02/21/2023    LABGLOM >60.0 02/21/2023    GFRAA >60.0 08/01/2022    AGRATIO 1.5 04/05/2019    GLOB 2.5 02/21/2023     Lab Results   Component Value Date    WBC 5.8 02/21/2023    HGB 13.3 02/21/2023    HCT 39.5 02/21/2023    MCV 87.4 02/21/2023     02/21/2023     Lab Results   Component Value Date    LABA1C 4.9 05/11/2022    LABA1C 5.1 01/31/2021     Lab Results   Component Value Date    HDL 69 (H) 07/30/2022    HDL 81 (H) 01/12/2021    HDL 93 (H) 09/20/2016    LDLCALC 108 07/30/2022    LDLCALC 126 01/12/2021    LDLCALC 109 09/20/2016    CHOL 204 (H) 07/30/2022    CHOL 219 (H) 01/12/2021    CHOL 226 (H) 09/20/2016    TRIG 133 07/30/2022    TRIG 62 01/12/2021    TRIG 121 09/20/2016     No results found for: TESTM  Lab Results   Component Value Date    TSH 2.710 03/27/2023    TSH 3.570 12/08/2021    TSH 0.02 (L) 04/05/2019    TSHREFLEX 1.950 09/26/2022    TSHREFLEX 1.590 01/12/2021    TSHREFLEX 0.088 (L) 08/26/2020    FT3 3.8 04/05/2019    FT3 2.7 09/14/2016    T4FREE 1.07 03/27/2023    T4FREE 0.71 (L) 08/26/2020    T4FREE 1.29 09/20/2017     Lab Results   Component Value Date    TPOABS <4.0 03/27/2023       Review of Systems   Constitutional:  Positive for fatigue. Cardiovascular:  Negative for palpitations. Neurological:  Negative for tremors. Psychiatric/Behavioral:  Positive for decreased concentration.       Objective:   Physical

## 2023-04-02 LAB
FOLATE: 13.6 NG/ML
VITAMIN B-12: 365 PG/ML (ref 232–1245)

## 2023-04-03 DIAGNOSIS — R10.12 LUQ PAIN: ICD-10-CM

## 2023-04-03 DIAGNOSIS — Z87.19 HISTORY OF PANCREATITIS: ICD-10-CM

## 2023-04-03 LAB — LIPASE SERPL-CCNC: 109 U/L (ref 12–95)

## 2023-04-04 ENCOUNTER — PATIENT MESSAGE (OUTPATIENT)
Dept: GASTROENTEROLOGY | Age: 53
End: 2023-04-04

## 2023-04-04 DIAGNOSIS — R10.13 EPIGASTRIC PAIN: Primary | ICD-10-CM

## 2023-04-04 DIAGNOSIS — R74.8 ELEVATED LIPASE: ICD-10-CM

## 2023-04-04 DIAGNOSIS — Z87.19 HISTORY OF PANCREATITIS: ICD-10-CM

## 2023-04-05 ENCOUNTER — HOSPITAL ENCOUNTER (OUTPATIENT)
Dept: CT IMAGING | Age: 53
Discharge: HOME | End: 2023-04-07
Payer: MEDICARE

## 2023-04-05 DIAGNOSIS — Z87.19 HISTORY OF PANCREATITIS: ICD-10-CM

## 2023-04-05 DIAGNOSIS — R10.13 EPIGASTRIC PAIN: ICD-10-CM

## 2023-04-05 DIAGNOSIS — R74.8 ELEVATED LIPASE: ICD-10-CM

## 2023-04-05 PROCEDURE — 74177 CT ABD & PELVIS W/CONTRAST: CPT

## 2023-04-05 PROCEDURE — 6360000004 HC RX CONTRAST MEDICATION: Performed by: NURSE PRACTITIONER

## 2023-04-05 RX ADMIN — IOPAMIDOL 50 ML: 612 INJECTION, SOLUTION INTRAVENOUS at 13:27

## 2023-04-05 RX ADMIN — IOPAMIDOL 20 ML: 612 INJECTION, SOLUTION INTRAVENOUS at 13:23

## 2023-04-06 DIAGNOSIS — F98.8 ATTENTION DEFICIT DISORDER, UNSPECIFIED HYPERACTIVITY PRESENCE: ICD-10-CM

## 2023-04-06 DIAGNOSIS — Z76.0 MEDICATION REFILL: ICD-10-CM

## 2023-04-06 RX ORDER — DEXTROAMPHETAMINE SACCHARATE, AMPHETAMINE ASPARTATE, DEXTROAMPHETAMINE SULFATE AND AMPHETAMINE SULFATE 7.5; 7.5; 7.5; 7.5 MG/1; MG/1; MG/1; MG/1
30 TABLET ORAL 2 TIMES DAILY
Qty: 60 TABLET | Refills: 0 | Status: SHIPPED | OUTPATIENT
Start: 2023-04-06 | End: 2023-05-04

## 2023-04-06 RX ORDER — FUROSEMIDE 20 MG/1
20 TABLET ORAL 2 TIMES DAILY
Qty: 60 TABLET | Refills: 0 | Status: SHIPPED | OUTPATIENT
Start: 2023-04-06

## 2023-04-07 ENCOUNTER — OFFICE VISIT (OUTPATIENT)
Dept: FAMILY MEDICINE CLINIC | Age: 53
End: 2023-04-07

## 2023-04-07 ENCOUNTER — HOSPITAL ENCOUNTER (OUTPATIENT)
Dept: ULTRASOUND IMAGING | Age: 53
Discharge: HOME | End: 2023-04-07
Payer: MEDICARE

## 2023-04-07 VITALS
OXYGEN SATURATION: 99 % | DIASTOLIC BLOOD PRESSURE: 70 MMHG | HEIGHT: 62 IN | SYSTOLIC BLOOD PRESSURE: 100 MMHG | HEART RATE: 95 BPM | RESPIRATION RATE: 16 BRPM | TEMPERATURE: 98.3 F | WEIGHT: 119.2 LBS | BODY MASS INDEX: 21.94 KG/M2

## 2023-04-07 DIAGNOSIS — Z87.19 HISTORY OF ACUTE PANCREATITIS: ICD-10-CM

## 2023-04-07 DIAGNOSIS — R10.84 GENERALIZED ABDOMINAL PAIN: Primary | ICD-10-CM

## 2023-04-07 DIAGNOSIS — R10.84 GENERALIZED ABDOMINAL PAIN: ICD-10-CM

## 2023-04-07 DIAGNOSIS — J40 BRONCHITIS: ICD-10-CM

## 2023-04-07 LAB
BILIRUBIN, POC: NORMAL
BLOOD URINE, POC: NORMAL
CLARITY, POC: NORMAL
COLOR, POC: NORMAL
GLUCOSE URINE, POC: NORMAL
KETONES, POC: NORMAL
LEUKOCYTE EST, POC: NORMAL
NITRITE, POC: NORMAL
PH, POC: 6
PROTEIN, POC: NORMAL
SPECIFIC GRAVITY, POC: 1.02
UROBILINOGEN, POC: NORMAL

## 2023-04-07 PROCEDURE — 76705 ECHO EXAM OF ABDOMEN: CPT

## 2023-04-07 RX ORDER — SOD SULF/POT CHLORIDE/MAG SULF 1.479 G
TABLET ORAL
COMMUNITY
Start: 2023-03-29

## 2023-04-07 RX ORDER — AZITHROMYCIN 250 MG/1
250 TABLET, FILM COATED ORAL SEE ADMIN INSTRUCTIONS
Qty: 6 TABLET | Refills: 0 | Status: SHIPPED | OUTPATIENT
Start: 2023-04-07 | End: 2023-04-12

## 2023-04-07 ASSESSMENT — ENCOUNTER SYMPTOMS
ABDOMINAL DISTENTION: 1
CONSTIPATION: 1
SINUS PAIN: 0
EYE REDNESS: 0
SORE THROAT: 0
WHEEZING: 1
SINUS PRESSURE: 0
SHORTNESS OF BREATH: 1
RHINORRHEA: 1
COUGH: 1
EYE ITCHING: 0
EYE PAIN: 0
BLOOD IN STOOL: 0
VOMITING: 0
NAUSEA: 1

## 2023-04-07 NOTE — PROGRESS NOTES
Living Expenses: Not hard at all   Food Insecurity: No Food Insecurity    Worried About [address] [employer] in the Last Year: Never true    Ran Out of Food in the Last Year: Never true   Transportation Needs: No Transportation Needs    Lack of Transportation (Medical): No    Lack of Transportation (Non-Medical):  No   Physical Activity: Inactive    Days of Exercise per Week: 0 days    Minutes of Exercise per Session: 10 min   Stress: Stress Concern Present    Feeling of Stress : Rather much   Social Connections: Unknown    Frequency of Communication with Friends and Family: Twice a week    Frequency of Social Gatherings with Friends and Family: Never    Attends Denominational Services: Not on file    Active Member of Clubs or Organizations: No    Attends Club or Organization Meetings: Never    Marital Status: Never    Intimate Partner Violence: Not on file   Housing Stability: Unknown    Unable to Pay for Housing in the Last Year: No    Number of [place] in the Last Year: Not on file    Unstable Housing in the Last Year: No     Family History   Problem Relation Age of Onset    Heart Disease Father 48    Breast Cancer Paternal Aunt         in her 29's    Breast Cancer Paternal Aunt         in her 63's    Cancer Maternal Grandmother         breast    Breast Cancer Maternal Grandmother         in her 63's    Breast Cancer Paternal Grandmother         in her 63's    Colon Cancer Paternal Grandfather     Heart Disease Other         mom and dad's side     No Known Allergies  Current Outpatient Medications   Medication Sig Dispense Refill    SUTAB [id number] MG TABS TAKE ALL 24 TABLETS BY MOUTH ONCE FOR 1 DOSE      azithromycin (ZITHROMAX) 250 MG tablet Take 1 tablet by mouth See Admin Instructions for 5 days 500mg on day 1 followed by 250mg on days 2 - 5 6 tablet 0    furosemide (LASIX) 20 MG tablet Take 1 tablet by mouth 2 times daily 60 tablet 0    amphetamine-dextroamphetamine (ADDERALL, 30MG,) 30 MG tablet

## 2023-04-07 NOTE — PATIENT INSTRUCTIONS
Antibiotic Instructions: Complete the full course of antibiotics as ordered. Take each dose with a small snack or meal to lessen potential GI upset. To prevent antibiotic resistance, please take medication as ordered and for the full duration even if you start to feel better. Consider intake of yogurt or probiotic during antibiotic use and for a few days after to help reduce the risk of developing a secondary infection. Take the yogurt or probiotic at least 2 hours after taking the antibiotic.      Any worsening symptoms ER as discussed     See pcp and GI as scheduled

## 2023-04-08 NOTE — PROGRESS NOTES
Patient was called with u/s results. Encouraged patient to have labs drawn prior to appointment with PCP. Patient should follow up with GI and PCP regarding results. ER if anything should worsen.

## 2023-04-09 DIAGNOSIS — N30.01 ACUTE CYSTITIS WITH HEMATURIA: Primary | ICD-10-CM

## 2023-04-09 LAB
BACTERIA UR CULT: ABNORMAL
BACTERIA UR CULT: ABNORMAL
ORGANISM: ABNORMAL

## 2023-04-09 RX ORDER — CEPHALEXIN 500 MG/1
500 CAPSULE ORAL 2 TIMES DAILY
Qty: 10 CAPSULE | Refills: 0 | Status: SHIPPED | OUTPATIENT
Start: 2023-04-09 | End: 2023-04-14

## 2023-04-10 DIAGNOSIS — R10.84 GENERALIZED ABDOMINAL PAIN: ICD-10-CM

## 2023-04-10 LAB
ALBUMIN SERPL-MCNC: 4.9 G/DL (ref 3.5–4.6)
ALP SERPL-CCNC: 100 U/L (ref 40–130)
ALT SERPL-CCNC: 12 U/L (ref 0–33)
ANION GAP SERPL CALCULATED.3IONS-SCNC: 14 MEQ/L (ref 9–15)
AST SERPL-CCNC: 11 U/L (ref 0–35)
BILIRUB SERPL-MCNC: <0.2 MG/DL (ref 0.2–0.7)
BUN SERPL-MCNC: 22 MG/DL (ref 6–20)
CALCIUM SERPL-MCNC: 9.8 MG/DL (ref 8.5–9.9)
CHLORIDE SERPL-SCNC: 102 MEQ/L (ref 95–107)
CO2 SERPL-SCNC: 23 MEQ/L (ref 20–31)
CREAT SERPL-MCNC: 0.78 MG/DL (ref 0.5–0.9)
ERYTHROCYTE [DISTWIDTH] IN BLOOD BY AUTOMATED COUNT: 12.7 % (ref 11.5–14.5)
GLOBULIN SER CALC-MCNC: 2.7 G/DL (ref 2.3–3.5)
GLUCOSE SERPL-MCNC: 87 MG/DL (ref 70–99)
HCT VFR BLD AUTO: 39.3 % (ref 37–47)
HGB BLD-MCNC: 13 G/DL (ref 12–16)
MCH RBC QN AUTO: 28.9 PG (ref 27–31.3)
MCHC RBC AUTO-ENTMCNC: 33.1 % (ref 33–37)
MCV RBC AUTO: 87.2 FL (ref 79.4–94.8)
PLATELET # BLD AUTO: 328 K/UL (ref 130–400)
POTASSIUM SERPL-SCNC: 3.5 MEQ/L (ref 3.4–4.9)
PROT SERPL-MCNC: 7.6 G/DL (ref 6.3–8)
RBC # BLD AUTO: 4.5 M/UL (ref 4.2–5.4)
SODIUM SERPL-SCNC: 139 MEQ/L (ref 135–144)
WBC # BLD AUTO: 5.2 K/UL (ref 4.8–10.8)

## 2023-04-11 PROBLEM — I50.9 CONGESTIVE HEART FAILURE, UNSPECIFIED HF CHRONICITY, UNSPECIFIED HEART FAILURE TYPE (HCC): Status: RESOLVED | Noted: 2022-02-11 | Resolved: 2023-04-11

## 2023-04-19 ENCOUNTER — CARE COORDINATION (OUTPATIENT)
Dept: CARE COORDINATION | Age: 53
End: 2023-04-19

## 2023-04-19 NOTE — CARE COORDINATION
ACM called patient. Left message requesting a return call for [address] out reach. Contact information supplied.

## 2023-04-21 ENCOUNTER — CARE COORDINATION (OUTPATIENT)
Dept: CARE COORDINATION | Age: 53
End: 2023-04-21

## 2023-04-21 NOTE — CARE COORDINATION
ACM called patient. Left message requesting return call for [facility] out reach.   ACM does note a MyChart message dated yesterday patient may be inpt at [place] [address].

## 2023-04-26 ENCOUNTER — CARE COORDINATION (OUTPATIENT)
Dept: CARE COORDINATION | Age: 53
End: 2023-04-26

## 2023-04-26 NOTE — CARE COORDINATION
Ambulatory Care Coordination Note  2023    Patient Current Location:  Home: [address] [place] [id number]     ACM contacted the patient by telephone. Verified name and  with patient as identifiers. Provided introduction to self, and explanation of the ACM role. Challenges to be reviewed by the provider   Additional needs identified to be addressed with provider: No  none               Method of communication with provider: none. ACM: Issa Couch, RN  ACM spoke to patient. Patient is new to care coordination as previously stated earlier encounter patient went to see her [facility] nephrologist and ended up being admitted - at  [facility] for 4 days patient DX severe dehydration and hypotension. Per patient GI consult they performed soapsuds enemas which returned some mucus and blood. Per patient she was advised that she would need a colorectal surgeon more than likely she would need a colostomy and a PEG tube. Per patient she was advised that it was coming up on a weekend and they would need to do 2-day prep for colonoscopy and providers do not do colonoscopies on the weekend. Per patient she was discharged home Friday evening. Per patient she was advised to contact a  colorectal surgeon named Dr. Noel Vo. Patient has attempted to get an appointment she is waiting on a return call. Per patient her [facility] nephrologist Dr. Rm Hudson is upset. Per patient she is to call provider tomorrow. Per patient she was sent home with 2 soapsuds enemas . patient has completed with no results patient has not had any BMs since in the hospital.  Patient has been attempting to find castor oil liquid at drug stores so she can give herself additional soapsuds enemas. ACM located at [facility] patient will . Patient reports she is miserable, bloated, abdominal pain. The symptoms are not new they are ongoing. Patient feels no one is helping.   Patient is very stressed and

## 2023-04-28 ENCOUNTER — CARE COORDINATION (OUTPATIENT)
Dept: CARE COORDINATION | Age: 53
End: 2023-04-28

## 2023-04-28 NOTE — CARE COORDINATION
ACM called patient. Left message requesting return call for [facility] out reach. Contact information supplied. From: Bisi Gayle  To: Sujey Saucedo MD  Sent: 2020 4:40 PM CST  Subject: Other    This message is being sent by Sachi Barnhart on behalf of Bisi Powers! My child has developed these bumps all over her face and I'm not sure why, is there something I should be doing or something that I should apply on her skin? She also has on her ears so I'm not sure if it's a reaction or what it is

## 2023-05-01 ENCOUNTER — CARE COORDINATION (OUTPATIENT)
Dept: CARE COORDINATION | Age: 53
End: 2023-05-01

## 2023-05-01 NOTE — CARE COORDINATION
ACM called patient. Left message requesting a return call for [facility] out reach. Contact information supplied.

## 2023-05-04 DIAGNOSIS — Z76.0 MEDICATION REFILL: ICD-10-CM

## 2023-05-04 DIAGNOSIS — F98.8 ATTENTION DEFICIT DISORDER, UNSPECIFIED HYPERACTIVITY PRESENCE: ICD-10-CM

## 2023-05-04 RX ORDER — DEXTROAMPHETAMINE SACCHARATE, AMPHETAMINE ASPARTATE, DEXTROAMPHETAMINE SULFATE AND AMPHETAMINE SULFATE 7.5; 7.5; 7.5; 7.5 MG/1; MG/1; MG/1; MG/1
30 TABLET ORAL 2 TIMES DAILY
Qty: 60 TABLET | Refills: 0 | Status: CANCELLED | OUTPATIENT
Start: 2023-05-04 | End: 2023-06-03

## 2023-05-04 RX ORDER — DEXTROAMPHETAMINE SACCHARATE, AMPHETAMINE ASPARTATE MONOHYDRATE, DEXTROAMPHETAMINE SULFATE AND AMPHETAMINE SULFATE 7.5; 7.5; 7.5; 7.5 MG/1; MG/1; MG/1; MG/1
30 CAPSULE, EXTENDED RELEASE ORAL 2 TIMES DAILY
Qty: 60 CAPSULE | Refills: 0 | Status: SHIPPED | OUTPATIENT
Start: 2023-05-04 | End: 2023-05-05 | Stop reason: SDUPTHER

## 2023-05-04 NOTE — TELEPHONE ENCOUNTER
Phill Weinstein 75 calling for her adderall ER they don't have that in stock they do have the immediate release

## 2023-05-04 NOTE — TELEPHONE ENCOUNTER
Patient comment: I want the extended release like i had been gettinng, gastro said im edgarg a hard time absorbing meds due to colon issue, i beljeve it! Last Office Visit (last PCP visit):   4/11/2023    Next Visit Date:  Future Appointments   Date Time Provider Angela Hernandez   6/9/2023  3:45 PM Rosa Kraft MD [facility]       **If hasn't been seen in over a year OR hasn't followed up according to last diabetes/ADHD visit, make appointment for patient before sending refill to provider. Rx requested:  Requested Prescriptions     Pending Prescriptions Disp Refills    amphetamine-dextroamphetamine (ADDERALL, 30MG,) 30 MG tablet 60 tablet 0     Sig: Take 1 tablet by mouth 2 times daily for 30 days.  Max Daily Amount: 60 mg

## 2023-05-05 RX ORDER — DEXTROAMPHETAMINE SACCHARATE, AMPHETAMINE ASPARTATE MONOHYDRATE, DEXTROAMPHETAMINE SULFATE AND AMPHETAMINE SULFATE 7.5; 7.5; 7.5; 7.5 MG/1; MG/1; MG/1; MG/1
30 CAPSULE, EXTENDED RELEASE ORAL 2 TIMES DAILY
Qty: 60 CAPSULE | Refills: 0 | Status: SHIPPED | OUTPATIENT
Start: 2023-05-05 | End: 2023-06-04

## 2023-05-05 NOTE — TELEPHONE ENCOUNTER
Pt calling back stating that the pharmacy of   [facility] does have the adderall please send to [facility].

## 2023-05-09 ENCOUNTER — CARE COORDINATION (OUTPATIENT)
Dept: CARE COORDINATION | Age: 53
End: 2023-05-09

## 2023-05-09 NOTE — CARE COORDINATION
Ambulatory Care Coordination Note  2023    Patient Current Location:  Home: [address] [place] [address]     ACM contacted the patient by telephone. Verified name and  with patient as identifiers. Provided introduction to self, and explanation of the ACM role. Challenges to be reviewed by the provider   Additional needs identified to be addressed with provider: Yes  Per patient severe body aches, weak. Per patient she was negitive with home covid test                Method of communication with provider: chart routing. ACM: Mouna Hanson RN    ACM spoke to patient she reports since yesterday feeling very weak and tired body aches. Shoulders arms legs everything hurts. Patient states she took a home COVID test which is negative. Patient states her bowels have been working better. Patient reports she canceled [facility] as she is going to use different  provider. ACM encouraged patient to monitor symptoms closely. Patient encourage to reach out to PCP for additional needs. ACM sent PCP message regarding patient's symptoms. Patient encouraged to seek medical evaluation if her symptoms continue or worsen. Patient encouraged to hydrate as patient suffers from hydration and malnutrition. Offered patient enrollment in the Remote Patient Monitoring (RPM) program for in-home monitoring: Patient declined. Care Coordination Interventions    Referral from Primary Care Provider: Yes  Suggested Interventions and Community Resources  Disease Specific Clinic: Not Started (Comment: chf clinic)  Pharmacist: Not Started  Registered Dietician: Not Started  Specialty Services Referral: Not Started (Comment: acp planning)  Zone Management Tools: Completed (Comment: per pcp doesnt have chf  no zones reviewed)          Goals Addressed                   This Visit's Progress     Conditions and Symptoms   No change     I will schedule office visits, as directed by my provider.   I will keep my appointment

## 2023-05-14 DIAGNOSIS — G47.00 INSOMNIA, UNSPECIFIED TYPE: ICD-10-CM

## 2023-05-14 DIAGNOSIS — G89.29 CHRONIC NONINTRACTABLE HEADACHE, UNSPECIFIED HEADACHE TYPE: ICD-10-CM

## 2023-05-14 DIAGNOSIS — R51.9 CHRONIC NONINTRACTABLE HEADACHE, UNSPECIFIED HEADACHE TYPE: ICD-10-CM

## 2023-05-15 RX ORDER — TOPIRAMATE 100 MG/1
TABLET, FILM COATED ORAL
Qty: 60 TABLET | Refills: 0 | Status: SHIPPED | OUTPATIENT
Start: 2023-05-15

## 2023-05-15 RX ORDER — TRAZODONE HYDROCHLORIDE 150 MG/1
TABLET ORAL
Qty: 30 TABLET | Refills: 0 | Status: SHIPPED | OUTPATIENT
Start: 2023-05-15

## 2023-05-15 NOTE — TELEPHONE ENCOUNTER
Comments:     Last Office Visit (last PCP visit):   4/11/2023    Next Visit Date:  Future Appointments   Date Time Provider Angela Hernandez   6/9/2023  3:45 PM Roland Verma MD [facility]       **If hasn't been seen in over a year OR hasn't followed up according to last diabetes/ADHD visit, make appointment for patient before sending refill to provider.     Rx requested:  Requested Prescriptions     Pending Prescriptions Disp Refills    traZODone (DESYREL) 150 MG tablet [Pharmacy Med Name: traZODone HCl Oral Tablet 150 MG] 30 tablet 0     Sig: TAKE ONE TABLET BY MOUTH NIGHTLY    topiramate (TOPAMAX) 100 MG tablet [Pharmacy Med Name: Topiramate Oral Tablet 100 MG] 60 tablet 0     Sig: TAKE ONE TABLET BY MOUTH TWO TIMES A DAY

## 2023-05-16 ENCOUNTER — OFFICE VISIT (OUTPATIENT)
Dept: FAMILY MEDICINE CLINIC | Age: 53
End: 2023-05-16

## 2023-05-16 ENCOUNTER — CARE COORDINATION (OUTPATIENT)
Dept: CARE COORDINATION | Age: 53
End: 2023-05-16

## 2023-05-16 VITALS
HEIGHT: 62 IN | BODY MASS INDEX: 21.71 KG/M2 | SYSTOLIC BLOOD PRESSURE: 100 MMHG | HEART RATE: 90 BPM | OXYGEN SATURATION: 97 % | DIASTOLIC BLOOD PRESSURE: 72 MMHG | WEIGHT: 118 LBS

## 2023-05-16 DIAGNOSIS — M25.50 ARTHRALGIA, UNSPECIFIED JOINT: ICD-10-CM

## 2023-05-16 RX ORDER — PREDNISONE 20 MG/1
20 TABLET ORAL 2 TIMES DAILY
Qty: 10 TABLET | Refills: 0 | Status: SHIPPED | OUTPATIENT
Start: 2023-05-16 | End: 2023-05-21

## 2023-05-16 ASSESSMENT — ENCOUNTER SYMPTOMS
SHORTNESS OF BREATH: 0
COUGH: 0

## 2023-05-16 NOTE — CARE COORDINATION
ACM called patient. Left message requesting return call for [facility] out reach. Contact information supplied.

## 2023-05-16 NOTE — PROGRESS NOTES
Subjective  Chief Complaint   Patient presents with    Knee Pain     Pt states swollen right knee, painful x 3 days. Pt states she feels pulling in the groin area as well. Pt states she has taken motrin for pain. HPI    Pt is here for right knee pain. Does not recall any injury. Swelling has been present. Feels like it is going up her thigh area  Denies redness or warmth.     Patient Active Problem List    Diagnosis Date Noted    Change in bowel habits 12/16/2022    History of colon polyps 12/16/2022    Acute pancreatitis without infection or necrosis, unspecified pancreatitis type 07/30/2022    Abdominal pain 07/30/2022    Pancreatitis, unspecified pancreatitis type 07/29/2022    Low serum cortisol level 03/31/2023    Acute left-sided low back pain with bilateral sciatica 03/09/2022    Claudication of both lower extremities ([address] 02/11/2022    Neck pain 12/09/2021    Non-recurrent acute suppurative otitis media of left ear without spontaneous rupture of tympanic membrane 12/09/2021    Neutropenia ([address] 01/12/2021    Hereditary hemochromatosis ([address] 01/12/2021    Chronic pancreatitis ([address] 08/11/2020    Major depressive disorder, single episode, 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[address] 01/15/2019    Abnormal MRI, liver 05/22/2018    Acute recurrent pancreatitis 05/22/2018    At risk of fracture due to osteoporosis 05/22/2018    Calcinosis 05/22/2018    Chronic headaches 05/22/2018    Conductive hearing loss in left ear 05/22/2018    Edema 05/22/2018    Iron overload 05/22/2018    Mass of left side of neck 05/22/2018    Medullary sponge kidney of both kidneys 23/55/3564    Metabolic acidosis 70/30/9564    Microscopic hematuria 05/22/2018    Nausea 05/22/2018    Renal tubular acidosis type I 05/22/2018    Rheumatoid arthritis ([address] 05/22/2018    Tension type headache 05/22/2018    Weight loss, abnormal 05/22/2018    Cellulitis, face 04/06/2018    Other chest pain 11/01/2017    Left lower quadrant pain 11/01/2017

## 2023-05-23 ENCOUNTER — CARE COORDINATION (OUTPATIENT)
Dept: CARE COORDINATION | Age: 53
End: 2023-05-23

## 2023-05-23 NOTE — CARE COORDINATION
ACM called patient left message requesting return call for [facility] out reach. Contact patient supplied.

## 2023-05-24 ENCOUNTER — CARE COORDINATION (OUTPATIENT)
Dept: CARE COORDINATION | Age: 53
End: 2023-05-24

## 2023-05-24 DIAGNOSIS — D64.9 ANEMIA, UNSPECIFIED TYPE: Primary | ICD-10-CM

## 2023-05-24 DIAGNOSIS — K21.9 GASTROESOPHAGEAL REFLUX DISEASE WITHOUT ESOPHAGITIS: ICD-10-CM

## 2023-05-24 NOTE — CARE COORDINATION
Ambulatory Care Coordination Note  2023    Patient Current Location: [facility]     ACM contacted the patient by telephone. Verified name and  with patient as identifiers. Provided introduction to self, and explanation of the ACM role. Challenges to be reviewed by the provider   Additional needs identified to be addressed with provider: Yes  medications-PATIENT REQUESTING REFILL ON NEXIUM   labs-PATIENT REQUESTING CBC   CO/ DARK STOOLS WAITING TO GET INTO NEW GI PROVIDER. INCREASED TIRENESS  ADDITIONAL UPDATE : PATIENT WENT TO [facility] [place] SHE CO SOMETHING IN RT NARES. PER PATIENT SHE HAS INVASIVE SQUANOUS  CARCINOMA. SHE HAS SURGERY IN [facility] 2023. PATIENT ALSO NEEDS CT BRAIN . PATIENT ADVISED TO ASK [facility] TO SEND OFFICE NOTE TO UPDATE [facility] PCP. Method of communication with provider: chart routing. ACM: Ruslan Atwood RN    ACM received a return call to patient. Patient reports she is currently in [facility] because her son just had his first child. Patient is under a lot of stress. Patient reports that she went to [facility] appointment due to something in her right nares. Patient states they did a biopsy that came back invasive squamous cell carcinoma and she will surgery on 2023 with [place] in [facility]. Or sooner if they can fit her in. Patient also reports that she was advised she needs to have a brain CT she is waiting on that to be set up. Patient does report her bowels are working okay she does note soft to loose bowel movements. She does state that the color is dark. She notes no active bright blood. However does states she is very tired. Patient has a few requests that were sent to PCP for new Rx for Nexium, request for CBC to check her hemoglobin. Patient is still working on getting a new GI provider she is waiting for them to call you today or tomorrow hopefully for an appointment she was advised previously that the soonest was in .   ACM

## 2023-05-25 DIAGNOSIS — K21.9 GASTROESOPHAGEAL REFLUX DISEASE WITHOUT ESOPHAGITIS: ICD-10-CM

## 2023-05-26 DIAGNOSIS — K21.9 GASTROESOPHAGEAL REFLUX DISEASE WITHOUT ESOPHAGITIS: ICD-10-CM

## 2023-05-30 ENCOUNTER — OFFICE VISIT (OUTPATIENT)
Dept: FAMILY MEDICINE CLINIC | Age: 53
End: 2023-05-30
Payer: MEDICARE

## 2023-05-30 VITALS
HEART RATE: 95 BPM | WEIGHT: 118 LBS | BODY MASS INDEX: 21.71 KG/M2 | DIASTOLIC BLOOD PRESSURE: 66 MMHG | SYSTOLIC BLOOD PRESSURE: 94 MMHG | OXYGEN SATURATION: 98 % | HEIGHT: 62 IN

## 2023-05-30 DIAGNOSIS — M79.604 RIGHT LEG PAIN: ICD-10-CM

## 2023-05-30 DIAGNOSIS — C44.321 SQUAMOUS CELL CANCER OF SKIN OF NOSE: ICD-10-CM

## 2023-05-30 DIAGNOSIS — R59.1 LYMPHADENOPATHY: ICD-10-CM

## 2023-05-30 DIAGNOSIS — M54.2 NECK DISCOMFORT: Primary | ICD-10-CM

## 2023-05-30 DIAGNOSIS — M25.561 ACUTE PAIN OF RIGHT KNEE: ICD-10-CM

## 2023-05-30 PROCEDURE — 99214 OFFICE O/P EST MOD 30 MIN: CPT | Performed by: NURSE PRACTITIONER

## 2023-05-30 PROCEDURE — 1036F TOBACCO NON-USER: CPT | Performed by: NURSE PRACTITIONER

## 2023-05-30 PROCEDURE — G8427 DOCREV CUR MEDS BY ELIG CLIN: HCPCS | Performed by: NURSE PRACTITIONER

## 2023-05-30 PROCEDURE — G8420 CALC BMI NORM PARAMETERS: HCPCS | Performed by: NURSE PRACTITIONER

## 2023-05-30 PROCEDURE — 3017F COLORECTAL CA SCREEN DOC REV: CPT | Performed by: NURSE PRACTITIONER

## 2023-05-30 SDOH — ECONOMIC STABILITY: INCOME INSECURITY: HOW HARD IS IT FOR YOU TO PAY FOR THE VERY BASICS LIKE FOOD, HOUSING, MEDICAL CARE, AND HEATING?: NOT HARD AT ALL

## 2023-05-30 SDOH — ECONOMIC STABILITY: FOOD INSECURITY: WITHIN THE PAST 12 MONTHS, THE FOOD YOU BOUGHT JUST DIDN'T LAST AND YOU DIDN'T HAVE MONEY TO GET MORE.: NEVER TRUE

## 2023-05-30 SDOH — ECONOMIC STABILITY: FOOD INSECURITY: WITHIN THE PAST 12 MONTHS, YOU WORRIED THAT YOUR FOOD WOULD RUN OUT BEFORE YOU GOT MONEY TO BUY MORE.: NEVER TRUE

## 2023-05-30 ASSESSMENT — ENCOUNTER SYMPTOMS
SHORTNESS OF BREATH: 0
COUGH: 0

## 2023-05-30 NOTE — PROGRESS NOTES
Anne-Marie Muñoz MD at [facility] N/A 08/05/2022    EGD ESOPHAGOGASTRODUODENOSCOPY performed by Wai Le MD at [address] [place] [address]  08/05/2022    ENDOSCOPIC ULTRASOUND performed by Wai Le MD at [facility]     Family History   Problem Relation Age of Onset    Heart Disease Father 48    Breast Cancer Paternal Aunt         in her 29's    Breast Cancer Paternal Aunt         in her 63's    Cancer Maternal Grandmother         breast    Breast Cancer Maternal Grandmother         in her 63's    Breast Cancer Paternal Grandmother         in her 63's    Colon Cancer Paternal Grandfather     Heart Disease Other         mom and dad's side     Social History     Socioeconomic History    Marital status: Single   Tobacco Use    Smoking status: Never    Smokeless tobacco: Never   Vaping Use    Vaping Use: Never used   Substance and Sexual Activity    Alcohol use: No     Alcohol/week: 0.0 standard drinks     Comment: no alcohol since 2011    Drug use: No   Social History Narrative    ** Merged History Encounter **          Social Determinants of Health     Financial Resource Strain: Low Risk     Difficulty of Paying Living Expenses: Not hard at all   Food Insecurity: No Food Insecurity    Worried About Running Out of Food in the Last Year: Never true    Ran Out of Food in the Last Year: Never true   Transportation Needs: Unknown    Lack of Transportation (Non-Medical):  No   Physical Activity: Unknown    Days of Exercise per Week: 0 days   Housing Stability: Unknown    Unstable Housing in the Last Year: No     Current Outpatient Medications on File Prior to Visit   Medication Sig Dispense Refill    NEXIUM 40 MG delayed release capsule TAKE 1 CAPSULE BY MOUTH DAILY 90 capsule 1    traZODone (DESYREL) 150 MG tablet TAKE ONE TABLET BY MOUTH NIGHTLY 30 tablet 0    topiramate (TOPAMAX) 100 MG tablet TAKE ONE TABLET BY MOUTH TWO TIMES A DAY 60 tablet

## 2023-05-31 ENCOUNTER — HOSPITAL ENCOUNTER (OUTPATIENT)
Dept: CT IMAGING | Age: 53
Discharge: HOME | End: 2023-06-02
Payer: MEDICARE

## 2023-05-31 DIAGNOSIS — F98.8 ATTENTION DEFICIT DISORDER, UNSPECIFIED HYPERACTIVITY PRESENCE: ICD-10-CM

## 2023-05-31 DIAGNOSIS — Z76.0 MEDICATION REFILL: ICD-10-CM

## 2023-05-31 DIAGNOSIS — C44.321 SQUAMOUS CELL CANCER OF SKIN OF NOSE: ICD-10-CM

## 2023-05-31 DIAGNOSIS — R59.1 LYMPHADENOPATHY: ICD-10-CM

## 2023-05-31 DIAGNOSIS — M54.2 NECK DISCOMFORT: ICD-10-CM

## 2023-05-31 PROCEDURE — 6360000004 HC RX CONTRAST MEDICATION: Performed by: NURSE PRACTITIONER

## 2023-05-31 PROCEDURE — 70491 CT SOFT TISSUE NECK W/DYE: CPT

## 2023-05-31 RX ADMIN — IOPAMIDOL 75 ML: 612 INJECTION, SOLUTION INTRAVENOUS at 14:47

## 2023-06-01 DIAGNOSIS — M79.604 RIGHT LEG PAIN: Primary | ICD-10-CM

## 2023-06-01 DIAGNOSIS — M25.561 ACUTE PAIN OF RIGHT KNEE: ICD-10-CM

## 2023-06-01 RX ORDER — DEXTROAMPHETAMINE SACCHARATE, AMPHETAMINE ASPARTATE MONOHYDRATE, DEXTROAMPHETAMINE SULFATE AND AMPHETAMINE SULFATE 7.5; 7.5; 7.5; 7.5 MG/1; MG/1; MG/1; MG/1
30 CAPSULE, EXTENDED RELEASE ORAL 2 TIMES DAILY
Qty: 60 CAPSULE | Refills: 0 | Status: SHIPPED | OUTPATIENT
Start: 2023-06-01 | End: 2023-07-01

## 2023-06-06 ENCOUNTER — APPOINTMENT (OUTPATIENT)
Dept: WOMENS IMAGING | Age: 53
End: 2023-06-06
Payer: MEDICARE

## 2023-06-06 ENCOUNTER — HOSPITAL ENCOUNTER (OUTPATIENT)
Dept: ULTRASOUND IMAGING | Age: 53
Discharge: HOME | End: 2023-06-08
Payer: MEDICARE

## 2023-06-06 DIAGNOSIS — M79.89 RIGHT LEG SWELLING: ICD-10-CM

## 2023-06-06 PROCEDURE — 93971 EXTREMITY STUDY: CPT

## 2023-06-07 ENCOUNTER — CARE COORDINATION (OUTPATIENT)
Dept: CARE COORDINATION | Age: 53
End: 2023-06-07

## 2023-06-07 NOTE — CARE COORDINATION
ACM called patient. Left message requesting return call for regular scheduled out reach.   Contact information supplied

## 2023-06-08 ENCOUNTER — CARE COORDINATION (OUTPATIENT)
Dept: CARE COORDINATION | Age: 53
End: 2023-06-08

## 2023-06-08 NOTE — CARE COORDINATION
Ambulatory Care Coordination Note  2023    Patient Current Location:  Home: [address] [place] [address]     ACM contacted the patient by telephone. Verified name and  with patient as identifiers. Provided introduction to self, and explanation of the ACM role. Challenges to be reviewed by the provider   Additional needs identified to be addressed with provider: No  none               Method of communication with provider: none. ACM: Tamia Gill RN    Return call from patient. She reports feeling a little stressed. She is having surgery on her nose tomorrow for  INVASIVE SQUANOUS  CARCINOMA. Per patient this provider is in [facility]. ACM reviewed [facility] nephrologist notes patient has a follow-up 2023 labs prior she was referred to an ENT Dr. Chaim Parra. She has yet to set up appointment. ACM reviewed her current complaints of neck pain right knee pain right femur pain. Patient has had CT of the neck x-ray of the right knee x-ray of the right femur and ultrasound of the right leg all negative    Patient states that her leg burns. ACM encouraged patient to follow-up with the secondary complaints when she has recovered from her surgery. ACM provided emotional support patient was grateful. Patient courage to ask questions tomorrow following her procedure to make sure she understands follow-up care and next actions. Patient verbalized understanding              Care Coordination Interventions    Referral from Primary Care Provider: Yes  Suggested Interventions and Community Resources  Pharmacist: Declined  Registered Dietician: Declined  Specialty Services Referral: Not Started (Comment: acp planning)  Zone Management Tools: Completed (Comment: per pcp doesnt have chf  no zones reviewed)          Goals Addressed                   This Visit's Progress     Conditions and Symptoms   Improving     I will schedule office visits, as directed by my provider.   I will keep my appointment

## 2023-06-16 ENCOUNTER — HOSPITAL ENCOUNTER (OUTPATIENT)
Dept: NUCLEAR MEDICINE | Age: 53
Discharge: HOME | End: 2023-06-18
Payer: MEDICARE

## 2023-06-16 DIAGNOSIS — M25.561 ACUTE PAIN OF RIGHT KNEE: ICD-10-CM

## 2023-06-16 DIAGNOSIS — M79.604 RIGHT LEG PAIN: ICD-10-CM

## 2023-06-16 PROCEDURE — 3430000000 HC RX DIAGNOSTIC RADIOPHARMACEUTICAL: Performed by: NURSE PRACTITIONER

## 2023-06-16 PROCEDURE — 78306 BONE IMAGING WHOLE BODY: CPT | Performed by: NURSE PRACTITIONER

## 2023-06-16 PROCEDURE — A9503 TC99M MEDRONATE: HCPCS | Performed by: NURSE PRACTITIONER

## 2023-06-16 RX ORDER — TC 99M MEDRONATE 20 MG/10ML
25 INJECTION, POWDER, LYOPHILIZED, FOR SOLUTION INTRAVENOUS
Status: COMPLETED | OUTPATIENT
Start: 2023-06-16 | End: 2023-06-16

## 2023-06-16 RX ADMIN — TC 99M MEDRONATE 27.7 MILLICURIE: 20 INJECTION, POWDER, LYOPHILIZED, FOR SOLUTION INTRAVENOUS at 08:40

## 2023-06-23 ENCOUNTER — CARE COORDINATION (OUTPATIENT)
Dept: CARE COORDINATION | Age: 53
End: 2023-06-23

## 2023-06-23 NOTE — CARE COORDINATION
ACM called patient left message requesting return call for [facility] out reach. Contact information supplied.

## 2023-06-26 ENCOUNTER — OFFICE VISIT (OUTPATIENT)
Dept: FAMILY MEDICINE CLINIC | Age: 53
End: 2023-06-26
Payer: MEDICARE

## 2023-06-26 VITALS
TEMPERATURE: 98 F | SYSTOLIC BLOOD PRESSURE: 100 MMHG | BODY MASS INDEX: 21.16 KG/M2 | HEIGHT: 62 IN | DIASTOLIC BLOOD PRESSURE: 68 MMHG | HEART RATE: 82 BPM | WEIGHT: 115 LBS | OXYGEN SATURATION: 98 %

## 2023-06-26 DIAGNOSIS — M35.00 SJOGREN'S SYNDROME, WITH UNSPECIFIED ORGAN INVOLVEMENT (HCC): Primary | ICD-10-CM

## 2023-06-26 DIAGNOSIS — M25.50 ARTHRALGIA, UNSPECIFIED JOINT: ICD-10-CM

## 2023-06-26 DIAGNOSIS — G43.909 MIGRAINE WITHOUT STATUS MIGRAINOSUS, NOT INTRACTABLE, UNSPECIFIED MIGRAINE TYPE: ICD-10-CM

## 2023-06-26 DIAGNOSIS — E03.9 ACQUIRED HYPOTHYROIDISM: ICD-10-CM

## 2023-06-26 PROCEDURE — G8427 DOCREV CUR MEDS BY ELIG CLIN: HCPCS | Performed by: NURSE PRACTITIONER

## 2023-06-26 PROCEDURE — 1036F TOBACCO NON-USER: CPT | Performed by: NURSE PRACTITIONER

## 2023-06-26 PROCEDURE — 3017F COLORECTAL CA SCREEN DOC REV: CPT | Performed by: NURSE PRACTITIONER

## 2023-06-26 PROCEDURE — 99214 OFFICE O/P EST MOD 30 MIN: CPT | Performed by: NURSE PRACTITIONER

## 2023-06-26 PROCEDURE — G8420 CALC BMI NORM PARAMETERS: HCPCS | Performed by: NURSE PRACTITIONER

## 2023-06-26 RX ORDER — HYDROCORTISONE 10 MG/1
TABLET ORAL
COMMUNITY
Start: 2023-05-30

## 2023-06-26 RX ORDER — LEVOTHYROXINE AND LIOTHYRONINE 19; 4.5 UG/1; UG/1
30 TABLET ORAL DAILY
Qty: 30 TABLET | Refills: 3 | Status: SHIPPED | OUTPATIENT
Start: 2023-06-26

## 2023-06-26 RX ORDER — UBROGEPANT 100 MG/1
TABLET ORAL
Qty: 16 TABLET | Refills: 0 | Status: SHIPPED | OUTPATIENT
Start: 2023-06-26

## 2023-06-26 RX ORDER — TRIAMCINOLONE ACETONIDE 1 MG/G
CREAM TOPICAL
COMMUNITY
Start: 2023-06-21

## 2023-06-26 RX ORDER — PREDNISONE 20 MG/1
TABLET ORAL
Qty: 20 TABLET | Refills: 0 | Status: SHIPPED | OUTPATIENT
Start: 2023-06-26

## 2023-06-26 ASSESSMENT — ENCOUNTER SYMPTOMS
COUGH: 0
TROUBLE SWALLOWING: 1
CONSTIPATION: 0
SHORTNESS OF BREATH: 0
DIARRHEA: 0

## 2023-06-29 DIAGNOSIS — Z76.0 MEDICATION REFILL: ICD-10-CM

## 2023-06-29 DIAGNOSIS — F98.8 ATTENTION DEFICIT DISORDER, UNSPECIFIED HYPERACTIVITY PRESENCE: ICD-10-CM

## 2023-06-29 RX ORDER — DEXTROAMPHETAMINE SACCHARATE, AMPHETAMINE ASPARTATE MONOHYDRATE, DEXTROAMPHETAMINE SULFATE AND AMPHETAMINE SULFATE 7.5; 7.5; 7.5; 7.5 MG/1; MG/1; MG/1; MG/1
30 CAPSULE, EXTENDED RELEASE ORAL 2 TIMES DAILY
Qty: 60 CAPSULE | Refills: 0 | Status: SHIPPED | OUTPATIENT
Start: 2023-06-29 | End: 2023-07-29

## 2023-07-07 ENCOUNTER — CARE COORDINATION (OUTPATIENT)
Dept: CARE COORDINATION | Age: 53
End: 2023-07-07

## 2023-07-07 NOTE — CARE COORDINATION
[facility] has made several attempts over the last month to contact patient who is engaged in [facility] program.  Unable to contact or received return calls. [facility] will sign off at this time. Message left for patient that in the future if she has additional care coordination needs do not hesitate to reach out.   Contact information supplied

## 2023-07-10 DIAGNOSIS — D64.9 ANEMIA, UNSPECIFIED TYPE: ICD-10-CM

## 2023-07-10 DIAGNOSIS — Z87.19 HISTORY OF ACUTE PANCREATITIS: ICD-10-CM

## 2023-07-10 LAB
ALBUMIN SERPL-MCNC: 4.7 G/DL (ref 3.5–4.6)
ALP SERPL-CCNC: 95 U/L (ref 40–130)
ALT SERPL-CCNC: 9 U/L (ref 0–33)
ANION GAP SERPL CALCULATED.3IONS-SCNC: 13 MEQ/L (ref 9–15)
AST SERPL-CCNC: 11 U/L (ref 0–35)
BASOPHILS # BLD: 0.1 K/UL (ref 0–0.2)
BASOPHILS NFR BLD: 1.1 %
BILIRUB SERPL-MCNC: <0.2 MG/DL (ref 0.2–0.7)
BUN SERPL-MCNC: 20 MG/DL (ref 6–20)
CALCIUM SERPL-MCNC: 9.6 MG/DL (ref 8.5–9.9)
CHLORIDE SERPL-SCNC: 100 MEQ/L (ref 95–107)
CO2 SERPL-SCNC: 24 MEQ/L (ref 20–31)
CREAT SERPL-MCNC: 0.9 MG/DL (ref 0.5–0.9)
EOSINOPHIL # BLD: 0.1 K/UL (ref 0–0.7)
EOSINOPHIL NFR BLD: 0.9 %
ERYTHROCYTE [DISTWIDTH] IN BLOOD BY AUTOMATED COUNT: 12.9 % (ref 11.5–14.5)
GLOBULIN SER CALC-MCNC: 2.8 G/DL (ref 2.3–3.5)
GLUCOSE SERPL-MCNC: 89 MG/DL (ref 70–99)
HCT VFR BLD AUTO: 38.4 % (ref 37–47)
HGB BLD-MCNC: 12.9 G/DL (ref 12–16)
LIPASE SERPL-CCNC: 38 U/L (ref 12–95)
LYMPHOCYTES # BLD: 1.8 K/UL (ref 1–4.8)
LYMPHOCYTES NFR BLD: 29.9 %
MCH RBC QN AUTO: 29.3 PG (ref 27–31.3)
MCHC RBC AUTO-ENTMCNC: 33.6 % (ref 33–37)
MCV RBC AUTO: 87.3 FL (ref 79.4–94.8)
MONOCYTES # BLD: 0.4 K/UL (ref 0.2–0.8)
MONOCYTES NFR BLD: 6.1 %
NEUTROPHILS # BLD: 3.7 K/UL (ref 1.4–6.5)
NEUTS SEG NFR BLD: 62 %
PLATELET # BLD AUTO: 333 K/UL (ref 130–400)
POTASSIUM SERPL-SCNC: 3.3 MEQ/L (ref 3.4–4.9)
PROT SERPL-MCNC: 7.5 G/DL (ref 6.3–8)
RBC # BLD AUTO: 4.4 M/UL (ref 4.2–5.4)
SODIUM SERPL-SCNC: 137 MEQ/L (ref 135–144)
WBC # BLD AUTO: 6 K/UL (ref 4.8–10.8)

## 2023-07-11 LAB — AMYLASE SERPL-CCNC: 73 U/L (ref 22–93)

## 2023-07-13 ENCOUNTER — OFFICE VISIT (OUTPATIENT)
Dept: FAMILY MEDICINE CLINIC | Age: 53
End: 2023-07-13
Payer: MEDICARE

## 2023-07-13 VITALS
SYSTOLIC BLOOD PRESSURE: 110 MMHG | TEMPERATURE: 98.9 F | WEIGHT: 112 LBS | DIASTOLIC BLOOD PRESSURE: 80 MMHG | OXYGEN SATURATION: 98 % | BODY MASS INDEX: 20.61 KG/M2 | HEIGHT: 62 IN | HEART RATE: 87 BPM

## 2023-07-13 DIAGNOSIS — R19.7 DIARRHEA, UNSPECIFIED TYPE: ICD-10-CM

## 2023-07-13 DIAGNOSIS — R19.7 DIARRHEA, UNSPECIFIED TYPE: Primary | ICD-10-CM

## 2023-07-13 DIAGNOSIS — R10.32 LLQ PAIN: ICD-10-CM

## 2023-07-13 LAB
ANISOCYTOSIS BLD QL SMEAR: NORMAL
BASOPHILS # BLD: 0.1 K/UL (ref 0–0.2)
BASOPHILS NFR BLD: 1.2 %
EOSINOPHIL # BLD: 0.1 K/UL (ref 0–0.7)
EOSINOPHIL NFR BLD: 0.8 %
ERYTHROCYTE [DISTWIDTH] IN BLOOD BY AUTOMATED COUNT: 12.8 % (ref 11.5–14.5)
HCT VFR BLD AUTO: 39.1 % (ref 37–47)
HGB BLD-MCNC: 13.1 G/DL (ref 12–16)
LYMPHOCYTES # BLD: 2.2 K/UL (ref 1–4.8)
LYMPHOCYTES NFR BLD: 33.7 %
MCH RBC QN AUTO: 29.5 PG (ref 27–31.3)
MCHC RBC AUTO-ENTMCNC: 33.6 % (ref 33–37)
MCV RBC AUTO: 87.9 FL (ref 79.4–94.8)
MONOCYTES # BLD: 0.5 K/UL (ref 0.2–0.8)
MONOCYTES NFR BLD: 7.3 %
NEUTROPHILS # BLD: 3.8 K/UL (ref 1.4–6.5)
NEUTS SEG NFR BLD: 57 %
PLATELET # BLD AUTO: 309 K/UL (ref 130–400)
PLATELET BLD QL SMEAR: ADEQUATE
POIKILOCYTOSIS BLD QL SMEAR: NORMAL
RBC # BLD AUTO: 4.44 M/UL (ref 4.2–5.4)
WBC # BLD AUTO: 6.6 K/UL (ref 4.8–10.8)

## 2023-07-13 PROCEDURE — 99214 OFFICE O/P EST MOD 30 MIN: CPT | Performed by: FAMILY MEDICINE

## 2023-07-13 PROCEDURE — 1036F TOBACCO NON-USER: CPT | Performed by: FAMILY MEDICINE

## 2023-07-13 PROCEDURE — G8420 CALC BMI NORM PARAMETERS: HCPCS | Performed by: FAMILY MEDICINE

## 2023-07-13 PROCEDURE — G8427 DOCREV CUR MEDS BY ELIG CLIN: HCPCS | Performed by: FAMILY MEDICINE

## 2023-07-13 PROCEDURE — 3017F COLORECTAL CA SCREEN DOC REV: CPT | Performed by: FAMILY MEDICINE

## 2023-07-13 ASSESSMENT — ENCOUNTER SYMPTOMS
ABDOMINAL PAIN: 1
DIARRHEA: 1
BLOOD IN STOOL: 1
VOMITING: 0
ABDOMINAL DISTENTION: 1
ANAL BLEEDING: 0
NAUSEA: 0
CONSTIPATION: 0

## 2023-07-13 NOTE — PROGRESS NOTES
starting antibiotics due to no diverticuli noted on colonoscopy. No treatment for pancreatitis this time due to negative amylase. This note was partially created with the assistance of dictation. This may lead to grammatical or spelling errors. Zach Neri M.D.

## 2023-07-13 NOTE — PATIENT INSTRUCTIONS
Patient will have stool cultures done looking for signs of infection. Not starting antibiotics due to no diverticuli noted on colonoscopy. No treatment for pancreatitis this time due to negative amylase.

## 2023-07-14 DIAGNOSIS — R19.7 DIARRHEA, UNSPECIFIED TYPE: ICD-10-CM

## 2023-07-14 DIAGNOSIS — R10.32 LLQ PAIN: ICD-10-CM

## 2023-07-15 DIAGNOSIS — R73.09 ABNORMAL BLOOD SUGAR: ICD-10-CM

## 2023-07-15 LAB
ADV 40+41 DNA STL QL NAA+NON-PROBE: NOT DETECTED
C CAYETANENSIS DNA STL QL NAA+NON-PROBE: NOT DETECTED
C COLI+JEJ+UPSA DNA STL QL NAA+NON-PROBE: NOT DETECTED
C DIFF TOX A+B STL QL IA: NORMAL
CRYPTOSP AG STL QL IA: NORMAL
CRYPTOSP DNA STL QL NAA+NON-PROBE: NOT DETECTED
E HISTOLYT DNA STL QL NAA+NON-PROBE: NOT DETECTED
EAEC PAA PLAS AGGR+AATA ST NAA+NON-PRB: NOT DETECTED
EC STX1+STX2 GENES STL QL NAA+NON-PROBE: NOT DETECTED
EPEC EAE GENE STL QL NAA+NON-PROBE: NOT DETECTED
ETEC LTA+ST1A+ST1B TOX ST NAA+NON-PROBE: NOT DETECTED
G LAMBLIA AG STL QL IA: NORMAL
G LAMBLIA DNA STL QL NAA+NON-PROBE: NOT DETECTED
GI PATH DNA+RNA PNL STL NAA+NON-PROBE: NOT DETECTED
HEMOCCULT STL QL IA: NORMAL
LACTOFERRIN, FECAL: NEGATIVE
NOROVIRUS GI+II RNA STL QL NAA+NON-PROBE: NOT DETECTED
P SHIGELLOIDES DNA STL QL NAA+NON-PROBE: NOT DETECTED
RVA RNA STL QL NAA+NON-PROBE: NOT DETECTED
S ENT+BONG DNA STL QL NAA+NON-PROBE: NOT DETECTED
SAPO I+II+IV+V RNA STL QL NAA+NON-PROBE: NOT DETECTED
SHIGELLA SP+EIEC IPAH ST NAA+NON-PROBE: NOT DETECTED
V CHOL+PARA+VUL DNA STL QL NAA+NON-PROBE: NOT DETECTED
V CHOLERAE DNA STL QL NAA+NON-PROBE: NOT DETECTED
Y ENTEROCOL DNA STL QL NAA+NON-PROBE: NOT DETECTED

## 2023-07-19 ENCOUNTER — HOSPITAL ENCOUNTER (OUTPATIENT)
Dept: INFUSION THERAPY | Age: 53
Setting detail: INFUSION SERIES
Discharge: HOME | End: 2023-07-19
Payer: MEDICARE

## 2023-07-19 VITALS
TEMPERATURE: 98.8 F | DIASTOLIC BLOOD PRESSURE: 58 MMHG | HEART RATE: 83 BPM | RESPIRATION RATE: 18 BRPM | SYSTOLIC BLOOD PRESSURE: 107 MMHG

## 2023-07-19 DIAGNOSIS — R79.89 LOW SERUM CORTISOL LEVEL: Primary | ICD-10-CM

## 2023-07-19 PROCEDURE — 6360000002 HC RX W HCPCS: Performed by: INTERNAL MEDICINE

## 2023-07-19 PROCEDURE — 82533 TOTAL CORTISOL: CPT

## 2023-07-19 PROCEDURE — 36415 COLL VENOUS BLD VENIPUNCTURE: CPT

## 2023-07-19 PROCEDURE — 96375 TX/PRO/DX INJ NEW DRUG ADDON: CPT

## 2023-07-19 PROCEDURE — 96374 THER/PROPH/DIAG INJ IV PUSH: CPT

## 2023-07-19 RX ORDER — COSYNTROPIN 0.25 MG/ML
250 INJECTION, POWDER, FOR SOLUTION INTRAMUSCULAR; INTRAVENOUS ONCE
Status: COMPLETED | OUTPATIENT
Start: 2023-07-19 | End: 2023-07-19

## 2023-07-19 RX ORDER — COSYNTROPIN 0.25 MG/ML
250 INJECTION, POWDER, FOR SOLUTION INTRAMUSCULAR; INTRAVENOUS ONCE
Status: CANCELLED | OUTPATIENT
Start: 2023-07-19 | End: 2023-07-19

## 2023-07-19 RX ORDER — FAMOTIDINE 10 MG/ML
20 INJECTION, SOLUTION INTRAVENOUS
Status: CANCELLED | OUTPATIENT
Start: 2023-07-19

## 2023-07-19 RX ORDER — SODIUM CHLORIDE 9 MG/ML
INJECTION, SOLUTION INTRAVENOUS CONTINUOUS
Status: CANCELLED | OUTPATIENT
Start: 2023-07-19

## 2023-07-19 RX ORDER — ALBUTEROL SULFATE 90 UG/1
4 AEROSOL, METERED RESPIRATORY (INHALATION) PRN
Status: CANCELLED | OUTPATIENT
Start: 2023-07-19

## 2023-07-19 RX ORDER — EPINEPHRINE 1 MG/ML
0.3 INJECTION, SOLUTION, CONCENTRATE INTRAVENOUS PRN
Status: CANCELLED | OUTPATIENT
Start: 2023-07-19

## 2023-07-19 RX ORDER — DIPHENHYDRAMINE HYDROCHLORIDE 50 MG/ML
50 INJECTION INTRAMUSCULAR; INTRAVENOUS
Status: CANCELLED | OUTPATIENT
Start: 2023-07-19

## 2023-07-19 RX ORDER — ACETAMINOPHEN 325 MG/1
650 TABLET ORAL
Status: CANCELLED | OUTPATIENT
Start: 2023-07-19

## 2023-07-19 RX ORDER — ONDANSETRON 2 MG/ML
8 INJECTION INTRAMUSCULAR; INTRAVENOUS
Status: CANCELLED | OUTPATIENT
Start: 2023-07-19

## 2023-07-19 RX ADMIN — COSYNTROPIN 250 MCG: 0.25 INJECTION, POWDER, LYOPHILIZED, FOR SOLUTION INTRAMUSCULAR; INTRAVENOUS at 15:47

## 2023-07-19 NOTE — FLOWSHEET NOTE
Patient to the floor ambulatory for her ACTH testing. Vital signs taken. Education given regarding this treatment. Verbalized understanding. Call light within reach.

## 2023-07-19 NOTE — FLOWSHEET NOTE
1 hour cortisol level obtained and sent to lab. Patient tolerated well. AVS printed and given to patient. Left the unit ambulatory. All equipment used in the care for this patient has been cleaned.

## 2023-07-20 LAB
CORTISOL COLLECTION INFO: ABNORMAL
CORTISOL COLLECTION INFO: NORMAL
CORTISOL COLLECTION INFO: NORMAL
CORTISOL: 17 UG/DL (ref 2.7–18.4)
CORTISOL: 21.3 UG/DL (ref 2.7–18.4)
CORTISOL: 7.7 UG/DL (ref 2.7–18.4)

## 2023-07-27 NOTE — TELEPHONE ENCOUNTER
The patient did not want to wait until October to see Diogenes Robb, is there something she can take, she is feeling bloated, nausea and has left side back pain. Her pcp is out of the office at this time. Please advise

## 2023-07-28 DIAGNOSIS — F98.8 ATTENTION DEFICIT DISORDER, UNSPECIFIED HYPERACTIVITY PRESENCE: ICD-10-CM

## 2023-07-28 DIAGNOSIS — Z76.0 MEDICATION REFILL: ICD-10-CM

## 2023-07-28 RX ORDER — LUBIPROSTONE 24 UG/1
CAPSULE ORAL
Qty: 60 CAPSULE | Refills: 0 | Status: SHIPPED | OUTPATIENT
Start: 2023-07-28

## 2023-07-28 NOTE — TELEPHONE ENCOUNTER
Patient is calling back and states that prefers to be seen by the first available. She is very uncomfortable and would like to avoid ER visit. Please advise if patient can be seen in the clinic by Dr. Angeline Estrada in the meantime?

## 2023-07-29 NOTE — TELEPHONE ENCOUNTER
Comments:     Last Office Visit (last PCP visit):   6/26/2023    Next Visit Date:  Future Appointments   Date Time Provider [address]   7/31/2023  2:45 PM Sherwin Reyez MD [facility]       **If hasn't been seen in over a year OR hasn't followed up according to last diabetes/ADHD visit, make appointment for patient before sending refill to provider. Rx requested:  Requested Prescriptions     Pending Prescriptions Disp Refills    amphetamine-dextroamphetamine (ADDERALL XR) 30 MG extended release capsule 60 capsule 0     Sig: Take 1 capsule by mouth in the morning and 1 capsule in the evening. Do all this for 30 days.

## 2023-07-29 NOTE — TELEPHONE ENCOUNTER
Comments:     Last Office Visit (last PCP visit):   6/26/2023    Next Visit Date:  Future Appointments   Date Time Provider [address]   7/31/2023  2:45 PM Dinh Rangel MD [facility]       **If hasn't been seen in over a year OR hasn't followed up according to last diabetes/ADHD visit, make appointment for patient before sending refill to provider. Rx requested:  Requested Prescriptions     Pending Prescriptions Disp Refills    amphetamine-dextroamphetamine (ADDERALL XR) 30 MG extended release capsule 60 capsule 0     Sig: Take 1 capsule by mouth in the morning and 1 capsule in the evening. Do all this for 30 days.

## 2023-07-31 ENCOUNTER — OFFICE VISIT (OUTPATIENT)
Dept: GASTROENTEROLOGY | Age: 53
End: 2023-07-31
Payer: MEDICARE

## 2023-07-31 VITALS
OXYGEN SATURATION: 97 % | HEIGHT: 62 IN | WEIGHT: 113 LBS | SYSTOLIC BLOOD PRESSURE: 107 MMHG | DIASTOLIC BLOOD PRESSURE: 63 MMHG | HEART RATE: 98 BPM | BODY MASS INDEX: 20.8 KG/M2

## 2023-07-31 DIAGNOSIS — R10.33 PERIUMBILICAL ABDOMINAL PAIN: Primary | ICD-10-CM

## 2023-07-31 DIAGNOSIS — Z87.19 HISTORY OF PANCREATITIS: ICD-10-CM

## 2023-07-31 DIAGNOSIS — R10.33 PERIUMBILICAL ABDOMINAL PAIN: ICD-10-CM

## 2023-07-31 DIAGNOSIS — K62.5 RECTAL BLEEDING: ICD-10-CM

## 2023-07-31 PROBLEM — E11.9 TYPE 2 DIABETES MELLITUS (HCC): Status: ACTIVE | Noted: 2023-07-31

## 2023-07-31 LAB
ALBUMIN SERPL-MCNC: 4.9 G/DL (ref 3.5–4.6)
ALP SERPL-CCNC: 101 U/L (ref 40–130)
ALT SERPL-CCNC: 12 U/L (ref 0–33)
ANION GAP SERPL CALCULATED.3IONS-SCNC: 13 MEQ/L (ref 9–15)
AST SERPL-CCNC: 12 U/L (ref 0–35)
BASOPHILS # BLD: 0.1 K/UL (ref 0–0.2)
BASOPHILS NFR BLD: 1.2 %
BILIRUB SERPL-MCNC: 0.3 MG/DL (ref 0.2–0.7)
BUN SERPL-MCNC: 18 MG/DL (ref 6–20)
CALCIUM SERPL-MCNC: 9.7 MG/DL (ref 8.5–9.9)
CHLORIDE SERPL-SCNC: 101 MEQ/L (ref 95–107)
CO2 SERPL-SCNC: 24 MEQ/L (ref 20–31)
CREAT SERPL-MCNC: 0.72 MG/DL (ref 0.5–0.9)
EOSINOPHIL # BLD: 0 K/UL (ref 0–0.7)
EOSINOPHIL NFR BLD: 0.9 %
ERYTHROCYTE [DISTWIDTH] IN BLOOD BY AUTOMATED COUNT: 13.1 % (ref 11.5–14.5)
GLOBULIN SER CALC-MCNC: 2.7 G/DL (ref 2.3–3.5)
GLUCOSE SERPL-MCNC: 88 MG/DL (ref 70–99)
HCT VFR BLD AUTO: 38.3 % (ref 37–47)
HGB BLD-MCNC: 12.8 G/DL (ref 12–16)
LIPASE SERPL-CCNC: 27 U/L (ref 12–95)
LYMPHOCYTES # BLD: 2.1 K/UL (ref 1–4.8)
LYMPHOCYTES NFR BLD: 37.7 %
MCH RBC QN AUTO: 29.6 PG (ref 27–31.3)
MCHC RBC AUTO-ENTMCNC: 33.6 % (ref 33–37)
MCV RBC AUTO: 88.3 FL (ref 79.4–94.8)
MONOCYTES # BLD: 0.4 K/UL (ref 0.2–0.8)
MONOCYTES NFR BLD: 7.4 %
NEUTROPHILS # BLD: 3 K/UL (ref 1.4–6.5)
NEUTS SEG NFR BLD: 52.8 %
PLATELET # BLD AUTO: 340 K/UL (ref 130–400)
POTASSIUM SERPL-SCNC: 3.2 MEQ/L (ref 3.4–4.9)
PROT SERPL-MCNC: 7.6 G/DL (ref 6.3–8)
RBC # BLD AUTO: 4.34 M/UL (ref 4.2–5.4)
SODIUM SERPL-SCNC: 138 MEQ/L (ref 135–144)
WBC # BLD AUTO: 5.7 K/UL (ref 4.8–10.8)

## 2023-07-31 PROCEDURE — G8420 CALC BMI NORM PARAMETERS: HCPCS | Performed by: SPECIALIST

## 2023-07-31 PROCEDURE — 3017F COLORECTAL CA SCREEN DOC REV: CPT | Performed by: SPECIALIST

## 2023-07-31 PROCEDURE — 1036F TOBACCO NON-USER: CPT | Performed by: SPECIALIST

## 2023-07-31 PROCEDURE — 99213 OFFICE O/P EST LOW 20 MIN: CPT | Performed by: SPECIALIST

## 2023-07-31 PROCEDURE — G8427 DOCREV CUR MEDS BY ELIG CLIN: HCPCS | Performed by: SPECIALIST

## 2023-07-31 RX ORDER — DEXTROAMPHETAMINE SACCHARATE, AMPHETAMINE ASPARTATE MONOHYDRATE, DEXTROAMPHETAMINE SULFATE AND AMPHETAMINE SULFATE 7.5; 7.5; 7.5; 7.5 MG/1; MG/1; MG/1; MG/1
30 CAPSULE, EXTENDED RELEASE ORAL 2 TIMES DAILY
Qty: 60 CAPSULE | Refills: 0 | OUTPATIENT
Start: 2023-07-31 | End: 2023-08-30

## 2023-07-31 RX ORDER — ONDANSETRON 4 MG/1
4 TABLET, FILM COATED ORAL DAILY PRN
Qty: 30 TABLET | Refills: 0 | Status: SHIPPED | OUTPATIENT
Start: 2023-07-31

## 2023-07-31 RX ORDER — DEXTROAMPHETAMINE SACCHARATE, AMPHETAMINE ASPARTATE MONOHYDRATE, DEXTROAMPHETAMINE SULFATE AND AMPHETAMINE SULFATE 7.5; 7.5; 7.5; 7.5 MG/1; MG/1; MG/1; MG/1
30 CAPSULE, EXTENDED RELEASE ORAL 2 TIMES DAILY
Qty: 60 CAPSULE | Refills: 0 | Status: SHIPPED | OUTPATIENT
Start: 2023-07-31 | End: 2023-08-03 | Stop reason: SDUPTHER

## 2023-07-31 ASSESSMENT — ENCOUNTER SYMPTOMS
RESPIRATORY NEGATIVE: 1
ABDOMINAL DISTENTION: 0
BLOOD IN STOOL: 1
EYES NEGATIVE: 1
NAUSEA: 1
ANAL BLEEDING: 0
RECTAL PAIN: 0
CONSTIPATION: 0
VOMITING: 0
ABDOMINAL PAIN: 1
DIARRHEA: 0

## 2023-07-31 NOTE — PROGRESS NOTES
Gastroenterology Clinic Follow up Visit    Leighton Tan  [id number]  Chief Complaint   Patient presents with    Abdominal Pain     Back pain, can't eat or drink. Severe bloating, mucus stool. HPI and A/P at last visit summarized below:  22-year-old female comes to the office because of abdominal discomfort since early part of July, and has been getting worse recently. Patient experience moderate abdominal pain predominant in the upper abdominal area and sometimes also has pain in the upper back. At times pain radiates to the lower abdomen. Also noticed mucus and blood in the stool, stool consistency varies. ,  Has been feeling nauseous and has been taking Zofran. Feels bloated in the stomach. Patient feels that she may have lost about 5 pounds in the last few days, patient had a colonoscopy in December 2022 by Dr. Baron Xiao and was unremarkable. No history of travel or eating anything unusual.  Reports having low-grade fever. Patient has history of GERD and has been on Nexium and Pepcid. Patient had pancreatitis and had biliary stent placed for choledocholithiasis and the stent was removed later. Patient was admitted to hospital in May 2023 with abdominal pain and dehydration. CT of the abdomen in April showed large amount of stool in the colon and normal appearance of pancreas, had stool studies done including stool for occult blood about 2 weeks ago and were negative    Review of Systems   Constitutional: Negative. HENT: Negative. Eyes: Negative. Respiratory: Negative. Cardiovascular: Negative. No cardiac issues   Gastrointestinal:  Positive for abdominal pain, blood in stool and nausea. Negative for abdominal distention, anal bleeding, constipation, diarrhea, rectal pain and vomiting. Endocrine: Negative. Genitourinary: Negative. Musculoskeletal: Negative. Skin: Negative. Allergic/Immunologic: Positive for food allergies. Neurological: Negative.     Hematological:

## 2023-08-01 ENCOUNTER — TELEPHONE (OUTPATIENT)
Dept: GASTROENTEROLOGY | Age: 53
End: 2023-08-01

## 2023-08-01 ENCOUNTER — HOSPITAL ENCOUNTER (OUTPATIENT)
Dept: CT IMAGING | Age: 53
Discharge: HOME | End: 2023-08-03
Attending: SPECIALIST
Payer: MEDICARE

## 2023-08-01 DIAGNOSIS — Z87.19 HISTORY OF PANCREATITIS: ICD-10-CM

## 2023-08-01 DIAGNOSIS — R10.33 PERIUMBILICAL ABDOMINAL PAIN: ICD-10-CM

## 2023-08-01 DIAGNOSIS — K62.5 RECTAL BLEEDING: ICD-10-CM

## 2023-08-01 PROCEDURE — 6360000004 HC RX CONTRAST MEDICATION: Performed by: SPECIALIST

## 2023-08-01 PROCEDURE — 74177 CT ABD & PELVIS W/CONTRAST: CPT

## 2023-08-01 RX ADMIN — IOPAMIDOL 20 ML: 612 INJECTION, SOLUTION INTRAVENOUS at 14:00

## 2023-08-01 RX ADMIN — IOPAMIDOL 50 ML: 612 INJECTION, SOLUTION INTRAVENOUS at 14:00

## 2023-08-01 NOTE — TELEPHONE ENCOUNTER
Patient called asking that Dr Natalie Godwin call him with her CT results.  Patient states that she is in a lot of pain today and would also like to discuss this with Dr Natalie Godwin

## 2023-08-02 ENCOUNTER — PREP FOR PROCEDURE (OUTPATIENT)
Dept: GASTROENTEROLOGY | Age: 53
End: 2023-08-02

## 2023-08-02 DIAGNOSIS — F98.8 ATTENTION DEFICIT DISORDER, UNSPECIFIED HYPERACTIVITY PRESENCE: ICD-10-CM

## 2023-08-02 DIAGNOSIS — Z76.0 MEDICATION REFILL: ICD-10-CM

## 2023-08-02 PROBLEM — R13.10 DYSPHAGIA: Status: ACTIVE | Noted: 2023-08-02

## 2023-08-02 RX ORDER — SODIUM CHLORIDE 9 MG/ML
INJECTION, SOLUTION INTRAVENOUS PRN
Status: CANCELLED | OUTPATIENT
Start: 2023-08-02

## 2023-08-02 RX ORDER — SODIUM CHLORIDE 9 MG/ML
INJECTION, SOLUTION INTRAVENOUS CONTINUOUS
Status: CANCELLED | OUTPATIENT
Start: 2023-08-02

## 2023-08-02 RX ORDER — SODIUM CHLORIDE 0.9 % (FLUSH) 0.9 %
5-40 SYRINGE (ML) INJECTION EVERY 12 HOURS SCHEDULED
Status: CANCELLED | OUTPATIENT
Start: 2023-08-02

## 2023-08-02 RX ORDER — DEXTROAMPHETAMINE SACCHARATE, AMPHETAMINE ASPARTATE MONOHYDRATE, DEXTROAMPHETAMINE SULFATE AND AMPHETAMINE SULFATE 7.5; 7.5; 7.5; 7.5 MG/1; MG/1; MG/1; MG/1
30 CAPSULE, EXTENDED RELEASE ORAL 2 TIMES DAILY
Qty: 60 CAPSULE | Refills: 0 | Status: CANCELLED | OUTPATIENT
Start: 2023-08-02 | End: 2023-09-01

## 2023-08-03 ENCOUNTER — ANESTHESIA EVENT (OUTPATIENT)
Dept: ENDOSCOPY | Age: 53
End: 2023-08-03
Payer: MEDICARE

## 2023-08-03 ENCOUNTER — HOSPITAL ENCOUNTER (OUTPATIENT)
Age: 53
Setting detail: OUTPATIENT SURGERY
Discharge: HOME | End: 2023-08-03
Attending: SPECIALIST | Admitting: SPECIALIST
Payer: MEDICARE

## 2023-08-03 ENCOUNTER — ANESTHESIA (OUTPATIENT)
Dept: ENDOSCOPY | Age: 53
End: 2023-08-03
Payer: MEDICARE

## 2023-08-03 VITALS
WEIGHT: 113 LBS | OXYGEN SATURATION: 97 % | DIASTOLIC BLOOD PRESSURE: 64 MMHG | SYSTOLIC BLOOD PRESSURE: 109 MMHG | HEIGHT: 62 IN | BODY MASS INDEX: 20.8 KG/M2 | TEMPERATURE: 98 F | HEART RATE: 64 BPM | RESPIRATION RATE: 16 BRPM

## 2023-08-03 DIAGNOSIS — R10.13 DYSPEPSIA: Primary | ICD-10-CM

## 2023-08-03 DIAGNOSIS — R13.10 DYSPHAGIA: ICD-10-CM

## 2023-08-03 PROCEDURE — 3700000000 HC ANESTHESIA ATTENDED CARE: Performed by: SPECIALIST

## 2023-08-03 PROCEDURE — 88342 IMHCHEM/IMCYTCHM 1ST ANTB: CPT

## 2023-08-03 PROCEDURE — 3609017100 HC EGD: Performed by: SPECIALIST

## 2023-08-03 PROCEDURE — 88305 TISSUE EXAM BY PATHOLOGIST: CPT

## 2023-08-03 PROCEDURE — 6360000002 HC RX W HCPCS: Performed by: NURSE ANESTHETIST, CERTIFIED REGISTERED

## 2023-08-03 PROCEDURE — 7100000010 HC PHASE II RECOVERY - FIRST 15 MIN: Performed by: SPECIALIST

## 2023-08-03 PROCEDURE — 2580000003 HC RX 258: Performed by: SPECIALIST

## 2023-08-03 PROCEDURE — 43239 EGD BIOPSY SINGLE/MULTIPLE: CPT | Performed by: SPECIALIST

## 2023-08-03 PROCEDURE — 6370000000 HC RX 637 (ALT 250 FOR IP): Performed by: SPECIALIST

## 2023-08-03 PROCEDURE — 2580000003 HC RX 258

## 2023-08-03 PROCEDURE — 7100000011 HC PHASE II RECOVERY - ADDTL 15 MIN: Performed by: SPECIALIST

## 2023-08-03 PROCEDURE — 2709999900 HC NON-CHARGEABLE SUPPLY: Performed by: SPECIALIST

## 2023-08-03 PROCEDURE — 2500000003 HC RX 250 WO HCPCS: Performed by: NURSE ANESTHETIST, CERTIFIED REGISTERED

## 2023-08-03 RX ORDER — SODIUM CHLORIDE 9 MG/ML
INJECTION, SOLUTION INTRAVENOUS PRN
Status: DISCONTINUED | OUTPATIENT
Start: 2023-08-03 | End: 2023-08-03 | Stop reason: HOSPADM

## 2023-08-03 RX ORDER — PROPOFOL 10 MG/ML
INJECTION, EMULSION INTRAVENOUS PRN
Status: DISCONTINUED | OUTPATIENT
Start: 2023-08-03 | End: 2023-08-03 | Stop reason: SDUPTHER

## 2023-08-03 RX ORDER — MAGNESIUM HYDROXIDE 1200 MG/15ML
LIQUID ORAL PRN
Status: DISCONTINUED | OUTPATIENT
Start: 2023-08-03 | End: 2023-08-03 | Stop reason: ALTCHOICE

## 2023-08-03 RX ORDER — SODIUM CHLORIDE 9 MG/ML
INJECTION, SOLUTION INTRAVENOUS
Status: COMPLETED
Start: 2023-08-03 | End: 2023-08-03

## 2023-08-03 RX ORDER — SODIUM CHLORIDE 0.9 % (FLUSH) 0.9 %
5-40 SYRINGE (ML) INJECTION EVERY 12 HOURS SCHEDULED
Status: DISCONTINUED | OUTPATIENT
Start: 2023-08-03 | End: 2023-08-03 | Stop reason: HOSPADM

## 2023-08-03 RX ORDER — SODIUM CHLORIDE 0.9 % (FLUSH) 0.9 %
5-40 SYRINGE (ML) INJECTION PRN
Status: DISCONTINUED | OUTPATIENT
Start: 2023-08-03 | End: 2023-08-03 | Stop reason: HOSPADM

## 2023-08-03 RX ORDER — SIMETHICONE 20 MG/.3ML
EMULSION ORAL PRN
Status: DISCONTINUED | OUTPATIENT
Start: 2023-08-03 | End: 2023-08-03 | Stop reason: ALTCHOICE

## 2023-08-03 RX ORDER — LIDOCAINE HYDROCHLORIDE 20 MG/ML
INJECTION, SOLUTION INFILTRATION; PERINEURAL PRN
Status: DISCONTINUED | OUTPATIENT
Start: 2023-08-03 | End: 2023-08-03 | Stop reason: SDUPTHER

## 2023-08-03 RX ORDER — SODIUM CHLORIDE 9 MG/ML
INJECTION, SOLUTION INTRAVENOUS CONTINUOUS
Status: DISCONTINUED | OUTPATIENT
Start: 2023-08-03 | End: 2023-08-03 | Stop reason: HOSPADM

## 2023-08-03 RX ADMIN — SODIUM CHLORIDE: 9 INJECTION, SOLUTION INTRAVENOUS at 06:45

## 2023-08-03 RX ADMIN — LIDOCAINE HYDROCHLORIDE 60 MG: 20 INJECTION, SOLUTION INFILTRATION; PERINEURAL at 07:39

## 2023-08-03 RX ADMIN — PROPOFOL 250 MG: 10 INJECTION, EMULSION INTRAVENOUS at 07:39

## 2023-08-03 ASSESSMENT — PAIN SCALES - GENERAL: PAINLEVEL_OUTOF10: 0

## 2023-08-03 ASSESSMENT — PAIN - FUNCTIONAL ASSESSMENT: PAIN_FUNCTIONAL_ASSESSMENT: 0-10

## 2023-08-03 NOTE — ANESTHESIA PRE PROCEDURE
Department of Anesthesiology  Preprocedure Note       Name:  Jaswinder Azul   Age:  50 y.o.  :  1970                                          MRN:  [id number]         Date:  8/3/2023      Surgeon: Ross Vera):  Sophia Urbano MD    Procedure: Procedure(s):  EGD ESOPHAGOGASTRODUODENOSCOPY    Medications prior to admission:   Prior to Admission medications    Medication Sig Start Date End Date Taking? Authorizing Provider   amphetamine-dextroamphetamine (ADDERALL XR) 30 MG extended release capsule Take 1 capsule by mouth in the morning and 1 capsule in the evening. Do all this for 30 days. 23  ORTIZ Waldron CNP   ondansetron (ZOFRAN) 4 MG tablet Take 1 tablet by mouth daily as needed for Nausea or Vomiting 23   Sophia Urbano MD   lubiprostone (AMITIZA) 24 MCG capsule TAKE ONE CAPSULE BY MOUTH TWICE A DAY WITH MEALS 23   Sophia Urbano MD   metFORMIN (GLUCOPHAGE) 500 MG tablet Take 1 tablet by mouth daily (with breakfast) 23   ORTIZ Garland CNP   hydrocortisone (CORTEF) 10 MG tablet TAKE 1 & 1/2 TABLETS BY MOUTH EVERY DAY  Patient not taking: Reported on 2023   Historical Provider, MD   tretinoin (RETIN-A) 0.025 % cream APPLY A PEAS SIZED AMOUNT TO FACE ONCE NIGHTLY WITH MOISTURIZER 6/10/23   Historical Provider, MD   triamcinolone (KENALOG) 0.1 % cream apply TWice daily to affected areas for 3 weeks. 23   Historical Provider, MD   Ubrogepant (UBRELVY) 100 MG TABS Take one tablet as needed for migraine, second dose can be taken at least 2 hours after the initial dose, if needed 23   ORTIZ Waldron CNP   thyroid [facility] THYROID) 30 MG tablet Take 1 tablet by mouth daily 23   ORTIZ Waldron CNP   predniSONE (DELTASONE) 20 MG tablet Take 3 tabs for 3 days, then 2 tabs for 3 days, then 1 tab for 3 days, then 1/2 tab for 3 days, then stop.   Patient not taking: Reported on 2023   ORTIZ Waldron CNP

## 2023-08-03 NOTE — DISCHARGE INSTRUCTIONS
Upper Discharge Instructions    Patient Name: Renan Esparza  Patient ID: [id number]  YOB: 1970  Procedure: Carroll Woodall  Referring Physician: [unfilled]  Procedure Date: 8/3/2023    Recommendations: Follow-up appointment with endoscopist in 2 weeks. Follow-up appointment with family physician in 4 weeks. Reports of your procedure and these recommendations have been sent to:  [unfilled]    Sedative medication given for procedures can slow your reaction time and coordination for many hours. If you receive medications, it is important for your safety to follow the instructions below for the remainder of the day:  BE TAKEN directly home from the center and rest quietly. DO NOT resume normal activities until tomorrow. Do NOT drive, return to work, or operate any machinery or power tools. Do NOT make any important personal or business decisions, sign any legal papers or perform any activity that depends on your full concentration power or mental judgement. Do NOT drink any alcohol or take nerve or sleeping drugs. They add to the effects of the medicine still present in your body. If a biopsy or polyp removal is performed during the procedure, there is a slight risk of bleeding. If you had a biopsy or a polyp removed, we suggest that you follow the instructions below:  Do NOT take aspirin or similar anti-inflammatory medicine for *** days. Do NOT exercise, jog, or do any heavy lifting or straining for 1 day. Potential common after effects and treatment following the procedure:  Mild sore throat - treat with throat lozenges and gargle with warm salt water. Mild abdominal pain, bloating, or excessive gas - rest, eat lightly, and use a heating pad. Redness and/or swelling where the IV was - apply heat and elevate. Symptoms to report to your physician:  Severe sore throat or inability to swallow and/or eat usual diet. Chills or fever above 101 degrees occurring within 24 hours after procedure.   Pain in

## 2023-08-03 NOTE — ANESTHESIA POSTPROCEDURE EVALUATION
Department of Anesthesiology  Postprocedure Note    Patient: Kunal Delvalle  MRN: [id number]  YOB: 1970  Date of evaluation: 8/3/2023      Procedure Summary     Date: 08/03/23 Room / Location: [facility] OR 01 / [facility]    Anesthesia Start: 9504 Anesthesia Stop:     Procedure: EGD ESOPHAGOGASTRODUODENOSCOPY Diagnosis:       Dysphagia      (Dysphagia [R13.10])    Surgeons: Kaela Morillo MD Responsible Provider: ORTIZ Miranda CRNA    Anesthesia Type: MAC ASA Status: 3          Anesthesia Type: No value filed.     Hossein Phase I: Hossein Score: 10    Hossein Phase II:        Anesthesia Post Evaluation    Patient location during evaluation: bedside  Patient participation: complete - patient participated  Level of consciousness: awake and awake and alert  Pain score: 0  Airway patency: patent  Nausea & Vomiting: no nausea and no vomiting  Complications: no  Cardiovascular status: blood pressure returned to baseline and hemodynamically stable  Respiratory status: acceptable and spontaneous ventilation  Hydration status: euvolemic  Pain management: adequate

## 2023-08-30 DIAGNOSIS — F98.8 ATTENTION DEFICIT DISORDER, UNSPECIFIED HYPERACTIVITY PRESENCE: ICD-10-CM

## 2023-08-30 DIAGNOSIS — Z76.0 MEDICATION REFILL: ICD-10-CM

## 2023-08-30 RX ORDER — DEXTROAMPHETAMINE SACCHARATE, AMPHETAMINE ASPARTATE MONOHYDRATE, DEXTROAMPHETAMINE SULFATE AND AMPHETAMINE SULFATE 7.5; 7.5; 7.5; 7.5 MG/1; MG/1; MG/1; MG/1
30 CAPSULE, EXTENDED RELEASE ORAL 2 TIMES DAILY
Qty: 60 CAPSULE | Refills: 0 | Status: SHIPPED | OUTPATIENT
Start: 2023-08-30 | End: 2023-09-29

## 2023-08-30 NOTE — TELEPHONE ENCOUNTER
Requesting medication refill. Please approve or deny this request.    Rx requested:  Requested Prescriptions     Pending Prescriptions Disp Refills    amphetamine-dextroamphetamine (ADDERALL XR) 30 MG extended release capsule 60 capsule 0     Sig: Take 1 capsule by mouth in the morning and 1 capsule in the evening. Do all this for 30 days. Last Office Visit:   6/26/2023    Next Visit Date:  Future Appointments   Date Time Provider [address] [place] [address]   9/8/2023 10:00 AM [address] [place] [address]   9/12/2023  7:00 AM [place] NUCLE[facility] MEDICINE ROOM 3 [facility] NUC MED [facility] Fac RAD     Message sent to schedule appt.

## 2023-09-08 ENCOUNTER — HOSPITAL ENCOUNTER (OUTPATIENT)
Dept: WOMENS IMAGING | Age: 53
End: 2023-09-08
Payer: MEDICARE

## 2023-09-08 VITALS — WEIGHT: 112 LBS | HEIGHT: 62 IN | BODY MASS INDEX: 20.61 KG/M2

## 2023-09-08 DIAGNOSIS — Z12.31 ENCOUNTER FOR SCREENING MAMMOGRAM FOR BREAST CANCER: ICD-10-CM

## 2023-09-08 PROCEDURE — 77063 BREAST TOMOSYNTHESIS BI: CPT

## 2023-09-20 ENCOUNTER — TELEMEDICINE (OUTPATIENT)
Dept: FAMILY MEDICINE CLINIC | Age: 53
End: 2023-09-20
Payer: MEDICARE

## 2023-09-20 DIAGNOSIS — N28.9 RENAL LESION: ICD-10-CM

## 2023-09-20 DIAGNOSIS — M35.00 SJOGREN'S SYNDROME, WITH UNSPECIFIED ORGAN INVOLVEMENT (HCC): Primary | ICD-10-CM

## 2023-09-20 DIAGNOSIS — M25.50 ARTHRALGIA, UNSPECIFIED JOINT: ICD-10-CM

## 2023-09-20 DIAGNOSIS — G43.909 MIGRAINE WITHOUT STATUS MIGRAINOSUS, NOT INTRACTABLE, UNSPECIFIED MIGRAINE TYPE: ICD-10-CM

## 2023-09-20 DIAGNOSIS — J06.9 VIRAL URI: ICD-10-CM

## 2023-09-20 DIAGNOSIS — M79.604 RIGHT LEG PAIN: ICD-10-CM

## 2023-09-20 PROBLEM — E11.9 TYPE 2 DIABETES MELLITUS (HCC): Status: RESOLVED | Noted: 2023-07-31 | Resolved: 2023-09-20

## 2023-09-20 PROCEDURE — G8427 DOCREV CUR MEDS BY ELIG CLIN: HCPCS | Performed by: NURSE PRACTITIONER

## 2023-09-20 PROCEDURE — 3017F COLORECTAL CA SCREEN DOC REV: CPT | Performed by: NURSE PRACTITIONER

## 2023-09-20 PROCEDURE — 99214 OFFICE O/P EST MOD 30 MIN: CPT | Performed by: NURSE PRACTITIONER

## 2023-09-20 RX ORDER — NAPROXEN 500 MG/1
500 TABLET ORAL 2 TIMES DAILY
COMMUNITY
Start: 2023-09-12

## 2023-09-20 RX ORDER — TRETINOIN 0.5 MG/G
CREAM TOPICAL
COMMUNITY
Start: 2023-09-05

## 2023-09-20 RX ORDER — UBROGEPANT 100 MG/1
TABLET ORAL
Qty: 16 TABLET | Refills: 0 | Status: SHIPPED | OUTPATIENT
Start: 2023-09-20

## 2023-09-20 RX ORDER — AZELASTINE 1 MG/ML
SPRAY, METERED NASAL
COMMUNITY
Start: 2023-07-16

## 2023-09-20 RX ORDER — POTASSIUM CITRATE 15 MEQ/1
TABLET, EXTENDED RELEASE ORAL
COMMUNITY
Start: 2023-08-24

## 2023-09-20 RX ORDER — OXYCODONE HYDROCHLORIDE AND ACETAMINOPHEN 5; 325 MG/1; MG/1
TABLET ORAL
COMMUNITY
Start: 2023-09-12

## 2023-09-20 RX ORDER — FUROSEMIDE 40 MG/1
40 TABLET ORAL 2 TIMES DAILY
COMMUNITY
Start: 2023-09-12

## 2023-09-20 RX ORDER — MIDODRINE HYDROCHLORIDE 10 MG/1
TABLET ORAL
COMMUNITY
Start: 2023-08-24

## 2023-09-20 NOTE — PROGRESS NOTES
Sherlyn Duncan, was evaluated through a synchronous (real-time) audio-video encounter. The patient (or guardian if applicable) is aware that this is a billable service, which includes applicable co-pays. This Virtual Visit was conducted with patient's (and/or legal guardian's) consent. Patient identification was verified, and a caregiver was present when appropriate. The patient was located at Home: [address] [place] [address] [address] [id number]  Provider was located at [facility] (Appt Dept): [address] [place], [address] [facility], [address]  [facility],  [address]      Sherlyn Duncan (:  1970) is a Established patient, presenting virtually for evaluation of the following:    Assessment & Plan   Below is the assessment and plan developed based on review of pertinent history, physical exam, labs, studies, and medications. 1. Sjogren's syndrome, with unspecified organ involvement ([address])  -     [facility]; Starting 2023, Disp-1 each, R-0, PrintGood for 5 years  2. Migraine without status migrainosus, not intractable, unspecified migraine type  -     Ubrogepant (UBRELVY) 100 MG TABS; Take one tablet as needed for migraine, second dose can be taken at least 2 hours after the initial dose, if needed, Disp-16 tablet, R-0Normal  3. Arthralgia, unspecified joint  -     Handicap Placard MISC; Starting Wed 2023, Disp-1 each, R-0, PrintGood for 5 years  4. Right leg pain  -     XR TIBIA FIBULA RIGHT (2 VIEWS); Future  5. Renal lesion- fu urology  6. Viral URI- treat symptomatically    Discussed retesting for covid in another 24-48 hrs. Consider flu testing. FU prn     Subjective   HPI    Pt recently in the ER for severe flank pain. Found to have complex renal cysts. Following with urology for this. Has a fu and MRI being scheduled. Pt also developed URI symptoms yesterday  Headache, body aches, congestion, fatigue. States that she has not left bed. Took an at home covid test that was negative.      Pt also c/o

## 2023-09-21 ASSESSMENT — ENCOUNTER SYMPTOMS
SORE THROAT: 1
COUGH: 1
SINUS PRESSURE: 1

## 2023-09-25 DIAGNOSIS — M25.50 ARTHRALGIA, UNSPECIFIED JOINT: ICD-10-CM

## 2023-09-25 DIAGNOSIS — M35.00 SJOGREN'S SYNDROME, WITH UNSPECIFIED ORGAN INVOLVEMENT (HCC): Primary | ICD-10-CM

## 2023-09-27 LAB
D-DIMER, QUANTITATIVE VTE EXCLUSION: 288 NG/ML FEU
NATRIURETIC PEPTIDE B (PG/ML) IN SER/PLAS: 46 PG/ML (ref 0–99)

## 2023-09-28 DIAGNOSIS — Z76.0 MEDICATION REFILL: ICD-10-CM

## 2023-09-28 DIAGNOSIS — F98.8 ATTENTION DEFICIT DISORDER, UNSPECIFIED HYPERACTIVITY PRESENCE: ICD-10-CM

## 2023-09-28 RX ORDER — DEXTROAMPHETAMINE SACCHARATE, AMPHETAMINE ASPARTATE MONOHYDRATE, DEXTROAMPHETAMINE SULFATE AND AMPHETAMINE SULFATE 7.5; 7.5; 7.5; 7.5 MG/1; MG/1; MG/1; MG/1
30 CAPSULE, EXTENDED RELEASE ORAL 2 TIMES DAILY
Qty: 60 CAPSULE | Refills: 0 | Status: SHIPPED | OUTPATIENT
Start: 2023-09-28 | End: 2023-10-28

## 2023-09-28 NOTE — TELEPHONE ENCOUNTER
Comments:     Last Office Visit (last PCP visit):   9/20/2023    Next Visit Date:  No future appointments. **If hasn't been seen in over a year OR hasn't followed up according to last diabetes/ADHD visit, make appointment for patient before sending refill to provider. Rx requested:  Requested Prescriptions     Pending Prescriptions Disp Refills    amphetamine-dextroamphetamine (ADDERALL XR) 30 MG extended release capsule 60 capsule 0     Sig: Take 1 capsule by mouth in the morning and 1 capsule in the evening. Do all this for 30 days.

## 2023-10-03 NOTE — TELEPHONE ENCOUNTER
Pt is seeing NL tomorrow. An adderall script was sent for pt to [address] [place] [address] Court currently does not have medication and they can not send script to another pharmacy since it is a controlled substance. Pt would like a new script sent to [facility]. Please advise. Scalpel Size: 15 blade

## 2023-10-08 ENCOUNTER — HOSPITAL ENCOUNTER (OUTPATIENT)
Dept: RADIOLOGY | Facility: HOSPITAL | Age: 53
Discharge: HOME | End: 2023-10-08

## 2023-10-08 DIAGNOSIS — I10 ESSENTIAL (PRIMARY) HYPERTENSION: ICD-10-CM

## 2023-10-08 PROCEDURE — 75571 CT HRT W/O DYE W/CA TEST: CPT

## 2023-10-12 DIAGNOSIS — E03.9 ACQUIRED HYPOTHYROIDISM: ICD-10-CM

## 2023-10-12 DIAGNOSIS — R73.09 ABNORMAL BLOOD SUGAR: ICD-10-CM

## 2023-10-12 RX ORDER — THYROID 30 MG/1
30 TABLET ORAL DAILY
Qty: 30 TABLET | Refills: 3 | Status: SHIPPED | OUTPATIENT
Start: 2023-10-12

## 2023-10-25 ENCOUNTER — OFFICE VISIT (OUTPATIENT)
Dept: FAMILY MEDICINE CLINIC | Age: 53
End: 2023-10-25

## 2023-10-25 VITALS
WEIGHT: 121 LBS | DIASTOLIC BLOOD PRESSURE: 56 MMHG | OXYGEN SATURATION: 98 % | HEIGHT: 62 IN | HEART RATE: 88 BPM | BODY MASS INDEX: 22.26 KG/M2 | SYSTOLIC BLOOD PRESSURE: 108 MMHG | TEMPERATURE: 98.9 F

## 2023-10-25 DIAGNOSIS — J02.9 ACUTE PHARYNGITIS, UNSPECIFIED ETIOLOGY: ICD-10-CM

## 2023-10-25 DIAGNOSIS — B27.90 INFECTIOUS MONONUCLEOSIS WITHOUT COMPLICATION, INFECTIOUS MONONUCLEOSIS DUE TO UNSPECIFIED ORGANISM: Primary | ICD-10-CM

## 2023-10-25 DIAGNOSIS — J11.1 INFLUENZA-LIKE ILLNESS: ICD-10-CM

## 2023-10-25 LAB
HETEROPHILE ANTIBODIES: NORMAL
INFLUENZA A ANTIBODY: NORMAL
INFLUENZA B ANTIBODY: NORMAL
S PYO AG THROAT QL: NORMAL

## 2023-10-25 RX ORDER — BENZONATATE 100 MG/1
100 CAPSULE ORAL 3 TIMES DAILY PRN
Qty: 30 CAPSULE | Refills: 0 | Status: SHIPPED | OUTPATIENT
Start: 2023-10-25 | End: 2023-11-04

## 2023-10-25 RX ORDER — NAPROXEN 500 MG/1
500 TABLET ORAL 2 TIMES DAILY WITH MEALS
Qty: 60 TABLET | Refills: 1 | Status: CANCELLED | OUTPATIENT
Start: 2023-10-25

## 2023-10-25 RX ORDER — AMOXICILLIN AND CLAVULANATE POTASSIUM 875; 125 MG/1; MG/1
TABLET, FILM COATED ORAL
COMMUNITY
Start: 2023-10-18

## 2023-10-25 RX ORDER — NAPROXEN 500 MG/1
500 TABLET ORAL 2 TIMES DAILY WITH MEALS
Qty: 60 TABLET | Refills: 0 | Status: SHIPPED | OUTPATIENT
Start: 2023-10-25

## 2023-10-25 ASSESSMENT — ENCOUNTER SYMPTOMS
EYE DISCHARGE: 0
DIARRHEA: 0
RHINORRHEA: 0
ABDOMINAL PAIN: 1
EYE REDNESS: 0
NAUSEA: 0
VOMITING: 0
SORE THROAT: 1
SHORTNESS OF BREATH: 0
COUGH: 0

## 2023-10-25 NOTE — PROGRESS NOTES
contain errors related to that system including grammar, punctuation and spelling as well as words and phrases that may seem inappropriate. If there are questions or concerns please feel free to contact me to clarify.

## 2023-10-26 ENCOUNTER — PATIENT MESSAGE (OUTPATIENT)
Dept: FAMILY MEDICINE CLINIC | Age: 53
End: 2023-10-26

## 2023-10-26 DIAGNOSIS — J02.9 ACUTE PHARYNGITIS, UNSPECIFIED ETIOLOGY: ICD-10-CM

## 2023-10-26 DIAGNOSIS — B27.90 INFECTIOUS MONONUCLEOSIS WITHOUT COMPLICATION, INFECTIOUS MONONUCLEOSIS DUE TO UNSPECIFIED ORGANISM: ICD-10-CM

## 2023-10-31 ENCOUNTER — HOSPITAL ENCOUNTER (OUTPATIENT)
Dept: CARDIOLOGY | Facility: HOSPITAL | Age: 53
Discharge: HOME | End: 2023-10-31
Payer: MEDICARE

## 2023-10-31 DIAGNOSIS — Z76.0 MEDICATION REFILL: ICD-10-CM

## 2023-10-31 DIAGNOSIS — F98.8 ATTENTION DEFICIT DISORDER, UNSPECIFIED HYPERACTIVITY PRESENCE: ICD-10-CM

## 2023-10-31 DIAGNOSIS — R06.02 SOB (SHORTNESS OF BREATH): ICD-10-CM

## 2023-10-31 PROCEDURE — 93306 TTE W/DOPPLER COMPLETE: CPT | Performed by: INTERNAL MEDICINE

## 2023-10-31 PROCEDURE — 93306 TTE W/DOPPLER COMPLETE: CPT

## 2023-10-31 RX ORDER — DEXTROAMPHETAMINE SACCHARATE, AMPHETAMINE ASPARTATE MONOHYDRATE, DEXTROAMPHETAMINE SULFATE AND AMPHETAMINE SULFATE 7.5; 7.5; 7.5; 7.5 MG/1; MG/1; MG/1; MG/1
30 CAPSULE, EXTENDED RELEASE ORAL 2 TIMES DAILY
Qty: 60 CAPSULE | Refills: 0 | Status: SHIPPED | OUTPATIENT
Start: 2023-10-31 | End: 2023-11-30

## 2023-10-31 NOTE — TELEPHONE ENCOUNTER
Comments:     Last Office Visit (last PCP visit):   9/20/2023    Next Visit Date:  Future Appointments   Date Time Provider [address]   11/8/2023  2:30 PM ORTIZ Molina CNP [facility] [place]       **If hasn't been seen in over a year OR hasn't followed up according to last diabetes/ADHD visit, make appointment for patient before sending refill to provider. Rx requested:  Requested Prescriptions     Pending Prescriptions Disp Refills    amphetamine-dextroamphetamine (ADDERALL XR) 30 MG extended release capsule 60 capsule 0     Sig: Take 1 capsule by mouth in the morning and 1 capsule in the evening. Do all this for 30 days.

## 2023-11-01 LAB — EJECTION FRACTION APICAL 4 CHAMBER: 51.4

## 2023-11-02 DIAGNOSIS — B27.90 INFECTIOUS MONONUCLEOSIS WITHOUT COMPLICATION, INFECTIOUS MONONUCLEOSIS DUE TO UNSPECIFIED ORGANISM: ICD-10-CM

## 2023-11-02 LAB
ALBUMIN SERPL-MCNC: 4.9 G/DL (ref 3.5–4.6)
ALP SERPL-CCNC: 105 U/L (ref 40–130)
ALT SERPL-CCNC: 12 U/L (ref 0–33)
AST SERPL-CCNC: 17 U/L (ref 0–35)
BASOPHILS # BLD: 0.1 K/UL (ref 0–0.2)
BASOPHILS NFR BLD: 1.3 %
BILIRUB DIRECT SERPL-MCNC: <0.2 MG/DL (ref 0–0.4)
BILIRUB INDIRECT SERPL-MCNC: ABNORMAL MG/DL (ref 0–0.6)
BILIRUB SERPL-MCNC: <0.2 MG/DL (ref 0.2–0.7)
EOSINOPHIL # BLD: 0.1 K/UL (ref 0–0.7)
EOSINOPHIL NFR BLD: 0.8 %
ERYTHROCYTE [DISTWIDTH] IN BLOOD BY AUTOMATED COUNT: 11.9 % (ref 11.5–14.5)
HCT VFR BLD AUTO: 40.5 % (ref 37–47)
HGB BLD-MCNC: 13.1 G/DL (ref 12–16)
LYMPHOCYTES # BLD: 2.1 K/UL (ref 1–4.8)
LYMPHOCYTES NFR BLD: 33.4 %
MCH RBC QN AUTO: 29.2 PG (ref 27–31.3)
MCHC RBC AUTO-ENTMCNC: 32.3 % (ref 33–37)
MCV RBC AUTO: 90.2 FL (ref 79.4–94.8)
MONOCYTES # BLD: 0.4 K/UL (ref 0.2–0.8)
MONOCYTES NFR BLD: 6.3 %
NEUTROPHILS # BLD: 3.7 K/UL (ref 1.4–6.5)
NEUTS SEG NFR BLD: 57.9 %
PLATELET # BLD AUTO: 394 K/UL (ref 130–400)
PROT SERPL-MCNC: 7.6 G/DL (ref 6.3–8)
RBC # BLD AUTO: 4.49 M/UL (ref 4.2–5.4)
WBC # BLD AUTO: 6.3 K/UL (ref 4.8–10.8)

## 2023-11-05 PROBLEM — E83.00: Status: ACTIVE | Noted: 2023-11-05

## 2023-11-05 PROBLEM — Q61.5 MEDULLARY SPONGE KIDNEY OF BOTH KIDNEYS: Status: ACTIVE | Noted: 2018-05-22

## 2023-11-05 PROBLEM — K90.9: Status: ACTIVE | Noted: 2023-11-05

## 2023-11-05 PROBLEM — R93.2 ABNORMAL MRI, LIVER: Status: ACTIVE | Noted: 2018-05-22

## 2023-11-05 PROBLEM — D70.9 NEUTROPENIA (CMS-HCC): Status: ACTIVE | Noted: 2021-01-12

## 2023-11-05 PROBLEM — I73.9 CLAUDICATION OF BOTH LOWER EXTREMITIES (CMS-HCC): Status: ACTIVE | Noted: 2022-02-11

## 2023-11-05 PROBLEM — R10.32 LEFT LOWER QUADRANT PAIN: Status: ACTIVE | Noted: 2017-11-01

## 2023-11-05 PROBLEM — R14.0 ABDOMINAL DISTENTION: Status: ACTIVE | Noted: 2023-11-05

## 2023-11-05 PROBLEM — M06.9 RHEUMATOID ARTHRITIS (MULTI): Status: ACTIVE | Noted: 2018-05-22

## 2023-11-05 PROBLEM — Z86.0100 HISTORY OF COLON POLYPS: Status: ACTIVE | Noted: 2023-11-05

## 2023-11-05 PROBLEM — R68.81 EARLY SATIETY: Status: ACTIVE | Noted: 2023-11-05

## 2023-11-05 PROBLEM — R00.2 PALPITATIONS: Status: ACTIVE | Noted: 2021-09-17

## 2023-11-05 PROBLEM — I73.00 RAYNAUDS PHENOMENON: Status: ACTIVE | Noted: 2023-11-05

## 2023-11-05 PROBLEM — E87.20 METABOLIC ACIDOSIS: Status: ACTIVE | Noted: 2018-05-22

## 2023-11-05 PROBLEM — R10.12 CHRONIC LUQ PAIN: Status: ACTIVE | Noted: 2023-11-05

## 2023-11-05 PROBLEM — F41.9 ANXIETY: Status: ACTIVE | Noted: 2017-11-01

## 2023-11-05 PROBLEM — R59.1 LYMPHADENOPATHY: Status: ACTIVE | Noted: 2023-11-05

## 2023-11-05 PROBLEM — Z86.19 HISTORY OF SHINGLES: Status: ACTIVE | Noted: 2023-11-05

## 2023-11-05 PROBLEM — R63.4 WEIGHT LOSS, ABNORMAL: Status: ACTIVE | Noted: 2018-05-22

## 2023-11-05 PROBLEM — K31.819 GASTRIC AVM: Status: ACTIVE | Noted: 2023-11-05

## 2023-11-05 PROBLEM — K56.1 INTUSSUSCEPTION OF JEJUNUM (MULTI): Status: ACTIVE | Noted: 2023-11-05

## 2023-11-05 PROBLEM — E87.6 HYPOKALEMIA: Status: ACTIVE | Noted: 2021-09-16

## 2023-11-05 PROBLEM — K57.32 DIVERTICULITIS OF LARGE INTESTINE WITHOUT PERFORATION OR ABSCESS WITHOUT BLEEDING: Status: ACTIVE | Noted: 2017-11-01

## 2023-11-05 PROBLEM — K85.90 ACUTE RECURRENT PANCREATITIS (HHS-HCC): Status: ACTIVE | Noted: 2017-05-13

## 2023-11-05 PROBLEM — F32.A DEPRESSION: Status: ACTIVE | Noted: 2017-11-01

## 2023-11-05 PROBLEM — Z20.822 SUSPECTED COVID-19 VIRUS INFECTION: Status: ACTIVE | Noted: 2023-11-05

## 2023-11-05 PROBLEM — R15.0 INCOMPLETE DEFECATION: Status: ACTIVE | Noted: 2023-11-05

## 2023-11-05 PROBLEM — R19.8 ABNORMAL DEFECATION: Status: ACTIVE | Noted: 2023-11-05

## 2023-11-05 PROBLEM — Z86.010 HISTORY OF COLON POLYPS: Status: ACTIVE | Noted: 2023-11-05

## 2023-11-05 PROBLEM — I50.9 CONGESTIVE HEART FAILURE (MULTI): Status: ACTIVE | Noted: 2022-02-11

## 2023-11-05 PROBLEM — K31.84 GASTRIC STASIS: Status: ACTIVE | Noted: 2023-11-05

## 2023-11-05 PROBLEM — R93.5 ABNORMAL ABDOMINAL CT SCAN: Status: ACTIVE | Noted: 2023-11-05

## 2023-11-05 PROBLEM — K59.02 DYSSYNERGIC CONSTIPATION: Status: ACTIVE | Noted: 2023-11-05

## 2023-11-05 PROBLEM — D21.9 FIBROIDS: Status: ACTIVE | Noted: 2017-11-01

## 2023-11-05 PROBLEM — M79.7 FIBROMYALGIA: Status: ACTIVE | Noted: 2023-11-05

## 2023-11-05 PROBLEM — R60.9 EDEMA: Status: ACTIVE | Noted: 2018-05-22

## 2023-11-05 PROBLEM — L03.211 CELLULITIS, FACE: Status: ACTIVE | Noted: 2018-04-06

## 2023-11-05 PROBLEM — F98.8 ADD (ATTENTION DEFICIT DISORDER): Status: ACTIVE | Noted: 2018-05-22

## 2023-11-05 PROBLEM — N28.9 KIDNEY DISEASE: Status: ACTIVE | Noted: 2023-11-05

## 2023-11-05 PROBLEM — M54.41 ACUTE LEFT-SIDED LOW BACK PAIN WITH BILATERAL SCIATICA: Status: ACTIVE | Noted: 2022-03-09

## 2023-11-05 PROBLEM — G89.29 CHRONIC LUQ PAIN: Status: ACTIVE | Noted: 2023-11-05

## 2023-11-05 PROBLEM — M54.42 ACUTE LEFT-SIDED LOW BACK PAIN WITH BILATERAL SCIATICA: Status: ACTIVE | Noted: 2022-03-09

## 2023-11-05 PROBLEM — E03.9 ACQUIRED HYPOTHYROIDISM: Status: ACTIVE | Noted: 2018-05-22

## 2023-11-05 PROBLEM — K86.1 CHRONIC PANCREATITIS (MULTI): Status: ACTIVE | Noted: 2020-08-11

## 2023-11-05 PROBLEM — R22.1 MASS OF LEFT SIDE OF NECK: Status: ACTIVE | Noted: 2018-05-22

## 2023-11-05 PROBLEM — M25.50 ARTHRALGIA: Status: ACTIVE | Noted: 2023-11-05

## 2023-11-05 PROBLEM — N39.0 CHRONIC UTI: Status: ACTIVE | Noted: 2023-11-05

## 2023-11-05 PROBLEM — B02.9 SHINGLES: Status: ACTIVE | Noted: 2023-11-05

## 2023-11-05 PROBLEM — M35.00 SJOGREN'S SYNDROME (MULTI): Status: ACTIVE | Noted: 2018-05-22

## 2023-11-05 PROBLEM — R31.9 HEMATURIA: Status: ACTIVE | Noted: 2023-11-05

## 2023-11-05 PROBLEM — H90.12 CONDUCTIVE HEARING LOSS IN LEFT EAR: Status: ACTIVE | Noted: 2018-05-22

## 2023-11-05 PROBLEM — R31.29 MICROSCOPIC HEMATURIA: Status: ACTIVE | Noted: 2018-05-22

## 2023-11-05 PROBLEM — K63.89 SMALL BOWEL MASS: Status: ACTIVE | Noted: 2023-11-05

## 2023-11-05 PROBLEM — E83.110 HEREDITARY HEMOCHROMATOSIS (CMS-HCC): Status: ACTIVE | Noted: 2021-01-12

## 2023-11-05 PROBLEM — R07.89 ATYPICAL CHEST PAIN: Status: ACTIVE | Noted: 2017-11-01

## 2023-11-05 PROBLEM — N25.89 RENAL TUBULAR ACIDOSIS TYPE I: Status: ACTIVE | Noted: 2018-05-22

## 2023-11-05 PROBLEM — H26.9 CATARACTA: Status: ACTIVE | Noted: 2023-11-05

## 2023-11-05 PROBLEM — K59.09 CHRONIC CONSTIPATION: Status: ACTIVE | Noted: 2023-11-05

## 2023-11-05 PROBLEM — B99.9 RECURRENT INFECTIONS: Status: ACTIVE | Noted: 2023-11-05

## 2023-11-05 PROBLEM — R15.2 DEFECATION URGENCY: Status: ACTIVE | Noted: 2023-11-05

## 2023-11-05 PROBLEM — H66.002 NON-RECURRENT ACUTE SUPPURATIVE OTITIS MEDIA OF LEFT EAR WITHOUT SPONTANEOUS RUPTURE OF TYMPANIC MEMBRANE: Status: ACTIVE | Noted: 2021-12-09

## 2023-11-05 PROBLEM — E83.59 CALCINOSIS: Status: ACTIVE | Noted: 2018-05-22

## 2023-11-05 PROBLEM — G89.29 CHRONIC HEADACHES: Status: ACTIVE | Noted: 2018-05-22

## 2023-11-05 PROBLEM — R06.09 DYSPNEA ON EXERTION: Status: ACTIVE | Noted: 2023-11-05

## 2023-11-05 PROBLEM — R11.0 NAUSEA: Status: ACTIVE | Noted: 2018-05-22

## 2023-11-05 PROBLEM — E83.19 IRON OVERLOAD: Status: ACTIVE | Noted: 2018-05-22

## 2023-11-05 PROBLEM — T78.3XXA ANGIOEDEMA: Status: ACTIVE | Noted: 2023-11-05

## 2023-11-05 PROBLEM — G43.909 MIGRAINE HEADACHE: Status: ACTIVE | Noted: 2023-11-05

## 2023-11-05 PROBLEM — F32.0 DEPRESSION, MAJOR, SINGLE EPISODE, MILD (CMS-HCC): Status: ACTIVE | Noted: 2023-11-05

## 2023-11-05 PROBLEM — R51.9 CHRONIC HEADACHES: Status: ACTIVE | Noted: 2018-05-22

## 2023-11-05 PROBLEM — R16.0 HEPATOMEGALY: Status: ACTIVE | Noted: 2021-09-16

## 2023-11-05 PROBLEM — G44.209 TENSION TYPE HEADACHE: Status: ACTIVE | Noted: 2018-05-22

## 2023-11-05 RX ORDER — SODIUM PICOSULFATE, MAGNESIUM OXIDE, AND ANHYDROUS CITRIC ACID 10; 3.5; 12 MG/160ML; G/160ML; G/160ML
LIQUID ORAL
COMMUNITY
Start: 2021-05-11

## 2023-11-05 RX ORDER — HYDROCORTISONE 10 MG/1
15 TABLET ORAL
COMMUNITY
Start: 2023-05-30

## 2023-11-05 RX ORDER — HYDROXYCHLOROQUINE SULFATE 200 MG/1
200 TABLET, FILM COATED ORAL 2 TIMES DAILY
COMMUNITY
Start: 2022-10-11

## 2023-11-05 RX ORDER — PRUCALOPRIDE 2 MG/1
1 TABLET, FILM COATED ORAL DAILY
COMMUNITY
Start: 2021-11-19

## 2023-11-05 RX ORDER — FUROSEMIDE 40 MG/1
40 TABLET ORAL 2 TIMES DAILY PRN
COMMUNITY
Start: 2023-09-12

## 2023-11-05 RX ORDER — FLUDROCORTISONE ACETATE 0.1 MG/1
2 TABLET ORAL 2 TIMES DAILY
COMMUNITY
Start: 2022-04-05

## 2023-11-05 RX ORDER — SUCRALFATE 1 G/10ML
1 SUSPENSION ORAL 4 TIMES DAILY
COMMUNITY
Start: 2022-02-06

## 2023-11-05 RX ORDER — LIOTHYRONINE SODIUM 25 UG/1
25 TABLET ORAL EVERY OTHER DAY
COMMUNITY

## 2023-11-05 RX ORDER — VALACYCLOVIR HYDROCHLORIDE 500 MG/1
1 TABLET, FILM COATED ORAL DAILY
COMMUNITY
Start: 2021-02-15

## 2023-11-05 RX ORDER — POTASSIUM CHLORIDE 750 MG/1
TABLET, FILM COATED, EXTENDED RELEASE ORAL
COMMUNITY
Start: 2019-09-13 | End: 2023-11-14 | Stop reason: ALTCHOICE

## 2023-11-05 RX ORDER — ONDANSETRON 4 MG/1
TABLET, FILM COATED ORAL
COMMUNITY
End: 2023-11-14 | Stop reason: ALTCHOICE

## 2023-11-05 RX ORDER — TOPIRAMATE 25 MG/1
TABLET ORAL 2 TIMES DAILY
COMMUNITY

## 2023-11-05 RX ORDER — MIDODRINE HYDROCHLORIDE 10 MG/1
TABLET ORAL
COMMUNITY

## 2023-11-05 RX ORDER — HYDROCODONE BITARTRATE AND ACETAMINOPHEN 5; 325 MG/1; MG/1
TABLET ORAL
COMMUNITY
Start: 2023-01-06 | End: 2024-03-05 | Stop reason: ALTCHOICE

## 2023-11-05 RX ORDER — PHENAZOPYRIDINE HYDROCHLORIDE 200 MG/1
TABLET, FILM COATED ORAL
COMMUNITY
Start: 2023-08-12 | End: 2023-11-14 | Stop reason: ALTCHOICE

## 2023-11-05 RX ORDER — PANTOPRAZOLE SODIUM 40 MG/1
40 TABLET, DELAYED RELEASE ORAL
COMMUNITY
Start: 2021-09-20 | End: 2023-11-14 | Stop reason: ALTCHOICE

## 2023-11-05 RX ORDER — METOCLOPRAMIDE 10 MG/1
TABLET ORAL EVERY 8 HOURS PRN
COMMUNITY
Start: 2020-02-27 | End: 2024-03-05 | Stop reason: ALTCHOICE

## 2023-11-05 RX ORDER — FAMOTIDINE 20 MG/1
20 TABLET, FILM COATED ORAL 2 TIMES DAILY
COMMUNITY
Start: 2023-05-14

## 2023-11-05 RX ORDER — RIMEGEPANT SULFATE 75 MG/75MG
TABLET, ORALLY DISINTEGRATING ORAL
COMMUNITY
Start: 2023-10-23

## 2023-11-05 RX ORDER — DEXTROMETHORPHAN POLISTIREX 30 MG/5ML
60 SUSPENSION ORAL 2 TIMES DAILY
COMMUNITY
Start: 2022-01-11 | End: 2024-03-05 | Stop reason: ALTCHOICE

## 2023-11-05 RX ORDER — LIDOCAINE 50 MG/G
1 PATCH TOPICAL
COMMUNITY
Start: 2023-10-23 | End: 2023-11-14 | Stop reason: ALTCHOICE

## 2023-11-05 RX ORDER — TIZANIDINE 4 MG/1
1 TABLET ORAL EVERY 8 HOURS PRN
COMMUNITY
Start: 2023-01-11 | End: 2023-11-14 | Stop reason: ALTCHOICE

## 2023-11-05 RX ORDER — SOD SULF/POT CHLORIDE/MAG SULF 1.479 G
TABLET ORAL
COMMUNITY
Start: 2023-03-29 | End: 2023-11-14 | Stop reason: ALTCHOICE

## 2023-11-05 RX ORDER — DEXTROAMPHETAMINE SACCHARATE, AMPHETAMINE ASPARTATE, DEXTROAMPHETAMINE SULFATE, AND AMPHETAMINE SULFATE 7.5; 7.5; 7.5; 7.5 MG/1; MG/1; MG/1; MG/1
1 TABLET ORAL 2 TIMES DAILY
COMMUNITY
End: 2023-11-07 | Stop reason: DRUGHIGH

## 2023-11-05 RX ORDER — LACTULOSE 10 G/15ML
SOLUTION ORAL; RECTAL
COMMUNITY
Start: 2019-02-05 | End: 2023-11-14 | Stop reason: ALTCHOICE

## 2023-11-05 RX ORDER — ESOMEPRAZOLE MAGNESIUM 40 MG/1
40 CAPSULE, DELAYED RELEASE ORAL DAILY
COMMUNITY

## 2023-11-05 RX ORDER — AZELASTINE 1 MG/ML
2 SPRAY, METERED NASAL
COMMUNITY
Start: 2023-07-16

## 2023-11-05 RX ORDER — SULFAMETHOXAZOLE AND TRIMETHOPRIM 800; 160 MG/1; MG/1
1 TABLET ORAL 2 TIMES DAILY
COMMUNITY
Start: 2023-09-11 | End: 2023-11-14 | Stop reason: ALTCHOICE

## 2023-11-05 RX ORDER — AMLODIPINE BESYLATE 2.5 MG/1
1 TABLET ORAL DAILY
COMMUNITY
Start: 2020-01-09 | End: 2023-11-14 | Stop reason: ALTCHOICE

## 2023-11-05 RX ORDER — CEPHALEXIN 500 MG/1
500 CAPSULE ORAL 3 TIMES DAILY
COMMUNITY
Start: 2023-07-14 | End: 2023-11-14 | Stop reason: ALTCHOICE

## 2023-11-05 RX ORDER — CEFUROXIME AXETIL 500 MG/1
500 TABLET ORAL EVERY 12 HOURS
COMMUNITY
Start: 2023-08-16 | End: 2023-11-14 | Stop reason: ALTCHOICE

## 2023-11-05 RX ORDER — NALTREXONE HCL DIHYDRATE 100 %
POWDER (GRAM) MISCELLANEOUS
COMMUNITY
Start: 2023-01-05 | End: 2024-03-05 | Stop reason: ALTCHOICE

## 2023-11-05 RX ORDER — UBROGEPANT 100 MG/1
TABLET ORAL
COMMUNITY
Start: 2023-09-20

## 2023-11-05 RX ORDER — MOXIFLOXACIN 5 MG/ML
1 SOLUTION/ DROPS OPHTHALMIC 3 TIMES DAILY
COMMUNITY

## 2023-11-05 RX ORDER — TRETINOIN 0.25 MG/G
CREAM TOPICAL
COMMUNITY
Start: 2023-08-02 | End: 2024-03-05 | Stop reason: ALTCHOICE

## 2023-11-05 RX ORDER — LORATADINE 10 MG/1
1 TABLET ORAL DAILY
COMMUNITY
Start: 2021-12-28 | End: 2024-03-05 | Stop reason: ALTCHOICE

## 2023-11-05 RX ORDER — NALTREXONE HYDROCHLORIDE 50 MG/1
4.5 TABLET, FILM COATED ORAL
COMMUNITY
Start: 2022-10-11 | End: 2023-11-14 | Stop reason: ALTCHOICE

## 2023-11-05 RX ORDER — CYCLOBENZAPRINE HCL 10 MG
TABLET ORAL
COMMUNITY
Start: 2023-01-06 | End: 2024-03-05 | Stop reason: ALTCHOICE

## 2023-11-05 RX ORDER — FLUTICASONE PROPIONATE 50 MCG
SPRAY, SUSPENSION (ML) NASAL
COMMUNITY
Start: 2023-03-19

## 2023-11-05 RX ORDER — PHENAZOPYRIDINE HYDROCHLORIDE 100 MG/1
TABLET, FILM COATED ORAL
COMMUNITY
Start: 2023-02-17 | End: 2023-11-14 | Stop reason: ALTCHOICE

## 2023-11-05 RX ORDER — NAPROXEN 500 MG/1
TABLET ORAL
COMMUNITY
Start: 2023-10-25 | End: 2024-03-05 | Stop reason: ALTCHOICE

## 2023-11-05 RX ORDER — THYROID, PORCINE 30 MG/1
30 TABLET ORAL DAILY
COMMUNITY
Start: 2023-10-12

## 2023-11-05 RX ORDER — LEVOTHYROXINE SODIUM 25 UG/1
1 TABLET ORAL DAILY
COMMUNITY
Start: 2016-09-16 | End: 2023-11-14 | Stop reason: ALTCHOICE

## 2023-11-05 RX ORDER — PROMETHAZINE HYDROCHLORIDE AND DEXTROMETHORPHAN HYDROBROMIDE 6.25; 15 MG/5ML; MG/5ML
SYRUP ORAL
COMMUNITY
Start: 2023-03-10

## 2023-11-05 RX ORDER — FROVATRIPTAN SUCCINATE 2.5 MG/1
TABLET, FILM COATED ORAL
COMMUNITY
Start: 2021-12-14

## 2023-11-05 RX ORDER — TRIAMCINOLONE ACETONIDE 1 MG/G
CREAM TOPICAL
COMMUNITY
Start: 2023-07-21 | End: 2024-03-05 | Stop reason: ALTCHOICE

## 2023-11-05 RX ORDER — DEXTROAMPHETAMINE SACCHARATE, AMPHETAMINE ASPARTATE MONOHYDRATE, DEXTROAMPHETAMINE SULFATE AND AMPHETAMINE SULFATE 6.25; 6.25; 6.25; 6.25 MG/1; MG/1; MG/1; MG/1
25 CAPSULE, EXTENDED RELEASE ORAL 2 TIMES DAILY
COMMUNITY
End: 2023-11-07 | Stop reason: DRUGHIGH

## 2023-11-05 RX ORDER — LUBIPROSTONE 24 UG/1
24 CAPSULE ORAL
COMMUNITY
Start: 2023-07-28 | End: 2024-03-05 | Stop reason: ALTCHOICE

## 2023-11-05 RX ORDER — TRAZODONE HYDROCHLORIDE 150 MG/1
TABLET ORAL
COMMUNITY
Start: 2021-05-11 | End: 2023-11-14 | Stop reason: ALTCHOICE

## 2023-11-05 RX ORDER — ALBUTEROL SULFATE 90 UG/1
2-4 AEROSOL, METERED RESPIRATORY (INHALATION) EVERY 2 HOUR PRN
COMMUNITY
Start: 2023-04-17

## 2023-11-05 RX ORDER — ONDANSETRON 4 MG/1
4 TABLET, ORALLY DISINTEGRATING ORAL 4 TIMES DAILY
COMMUNITY
Start: 2016-11-19

## 2023-11-05 RX ORDER — PROMETHAZINE HYDROCHLORIDE 12.5 MG/1
12.5 TABLET ORAL
COMMUNITY

## 2023-11-05 RX ORDER — BUPROPION HYDROCHLORIDE 300 MG/1
300 TABLET ORAL
COMMUNITY
Start: 2017-10-30

## 2023-11-05 RX ORDER — LEFLUNOMIDE 20 MG/1
1 TABLET ORAL DAILY
COMMUNITY
Start: 2023-02-19 | End: 2024-03-05 | Stop reason: ALTCHOICE

## 2023-11-05 RX ORDER — DOXYCYCLINE 100 MG/1
100 CAPSULE ORAL 2 TIMES DAILY
COMMUNITY
Start: 2022-01-03 | End: 2023-11-14 | Stop reason: ALTCHOICE

## 2023-11-05 RX ORDER — DICLOFENAC SODIUM 10 MG/G
1 GEL TOPICAL SEE ADMIN INSTRUCTIONS
COMMUNITY
Start: 2019-10-28

## 2023-11-05 RX ORDER — METFORMIN HYDROCHLORIDE 500 MG/1
500 TABLET ORAL
COMMUNITY
Start: 2023-10-12

## 2023-11-05 RX ORDER — POTASSIUM CITRATE 15 MEQ/1
30 TABLET, EXTENDED RELEASE ORAL 2 TIMES DAILY
COMMUNITY
Start: 2023-09-25

## 2023-11-07 ENCOUNTER — APPOINTMENT (OUTPATIENT)
Dept: CARDIOLOGY | Facility: CLINIC | Age: 53
End: 2023-11-07
Payer: MEDICARE

## 2023-11-07 RX ORDER — DEXTROAMPHETAMINE SACCHARATE, AMPHETAMINE ASPARTATE MONOHYDRATE, DEXTROAMPHETAMINE SULFATE AND AMPHETAMINE SULFATE 7.5; 7.5; 7.5; 7.5 MG/1; MG/1; MG/1; MG/1
30 CAPSULE, EXTENDED RELEASE ORAL EVERY MORNING
COMMUNITY
Start: 2023-11-01

## 2023-11-07 NOTE — PROGRESS NOTES
"Name : Leigha Alexis   : 1970   MRN : [id number]   ENC Date : 2023    CC: No chief complaint on file.     HPI:    Primary Cardiologist: Dr. 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 Other history of note: RA, fibromyalgia, Sjogren's, medullary nephrocalcinosis, medullary sponge kidney, hypotension on Cortef and midodrine, Raynaud's, recurrent angioedema, Frausto's esophagus, GERD, ileus, IBS-C, severe endometriosis s/p hysterectomy, chronic autoimmune pancreatitis, Chuck's disease. She presents today to review echo & CT coronary artery ca score results.    Interval Hx:  She still has palpitations where she will feel an extra beat in her heart. It sometimes frightens her and she wants to sit down to relax. This occurs at least once a week.     Reports that her SOB has worsened. She gets SOB just walking from her car to the office. Sometimes she will get SOB at rest. She is often fatigued.     She is physically active walking 2x a week, but was walking 3-5x a week prior to her \"kidney issues & dog bite\". Father  of heart attack at age 60 & as she advances in age this concerns her.     Reviewed ER note from 2023 as well as discharge note from 2023. Reviewed CBC and CMP from 2023     Diagnostics:  - CT Coronary artery calcium score 10/29/23- total 0  - Echo 10/31/23: EF 55-60%, impaired relaxation, Trace MR & TR, RVSP normal  - Ambulatory monitor 2022: Sinus rhythm with rare PACs and PVCs.  - Echocardiogram 2021: EF 60 to 65%. Grade 1 diastolic dysfunction.  - Lexiscan nuclear stress test 2021: No evidence of ischemia or scar, EF 77%.  - PFTs 3/9/2021: No airway obstruction noted. Normal lung volumes.  - Echocardiogram 2016: EF 60%. Otherwise normal echocardiogram.     ROS: unless otherwise noted in the " history of present illness, all other systems were reviewed and they are negative for complaints     Allergies:  Patient has no known allergies.    Current Outpatient Medications   Medication Instructions    albuterol 90 mcg/actuation inhaler 2-4 puffs, inhalation, Every 2 hour PRN    amLODIPine (Norvasc) 2.5 mg tablet 1 tablet, oral, Daily    amphetamine-dextroamphetamine XR (Adderall XR) 30 mg 24 hr capsule 30 mg, oral, Every morning    [place] Thyroid 30 mg, oral, Daily    azelastine (Astelin) 137 mcg (0.1 %) nasal spray 2 sprays    buPROPion XL (WELLBUTRIN XL) 300 mg, oral, Daily RT    cefuroxime (CEFTIN) 500 mg, oral, Every 12 hours    cephalexin (KEFLEX) 500 mg, oral, 3 times daily    cyclobenzaprine (Flexeril) 10 mg tablet TAKE ONE TABLET BY MOUTH 3 TIMES DAILY AS NEEDED FOR MUSCLE SPASMS    dextromethorphan (DELSYM) 60 mg, oral, 2 times daily    diclofenac sodium (Voltaren) 1 % gel gel 1 Application, transdermal, See admin instructions    doxycycline (MONODOX) 100 mg, oral, 2 times daily    famotidine (PEPCID) 20 mg, oral, 2 times daily    fludrocortisone (Florinef) 0.1 mg tablet 2 tablets, oral, 2 times daily    fluticasone (Flonase) 50 mcg/actuation nasal spray USE ONE SPRAY IN EACH NOSTRIL DAILY    frovatriptan (Frova) 2.5 mg tablet Take one at onset of severe headache/migraine may repeat x 1 after 4 hours if needed.  Maximum 2/day and 2 days/week.    furosemide (LASIX) 40 mg, oral, 2 times daily    HYDROcodone-acetaminophen (Norco) 5-325 mg tablet TAKE ONE TABLET BY MOUTH EVERY 8 HOURS AS NEEDED FOR PAIN FOR UP TO 5 DAYS. TAKE LOWEST DOSE POSSIBLE TO MANAGE PAIN. REDUCE DOSES TAKEN AS    hydrocortisone (CORTEF) 15 mg, oral, Daily RT    hydroxychloroquine (PLAQUENIL) 200 mg, oral, 2 times daily    lactulose 10 gram/15 mL solution take 30ml by mouth once daily    leflunomide (Arava) 20 mg tablet 1 tablet, oral, Daily    levothyroxine (Synthroid, Levoxyl) 25 mcg tablet 1 tablet, oral, Daily    lidocaine  (Lidoderm) 5 % patch 1 patch, transdermal, Daily RT    liothyronine (CYTOMEL) 25 mcg, oral, Every other day    loratadine (Claritin) 10 mg tablet 1 tablet, oral, Daily    lubiprostone (AMITIZA) 24 mcg, oral, 2 times daily with meals    metFORMIN (GLUCOPHAGE) 500 mg, oral, Daily with breakfast    metoclopramide (Reglan) 10 mg tablet oral, Every 8 hours PRN    midodrine (Proamatine) 10 mg tablet TAKE ONE TABLET BY MOUTH AS NEEDED FOR SBP LESS THAN 90    moxifloxacin (Vigamox) 0.5 % ophthalmic solution 1 drop, ophthalmic (eye), 3 times daily    naltrexone (DEPADE) 4.5 mg, oral, Daily RT    naltrexone HCl dihydrate,bulk, 100 % powder     naproxen (Naprosyn) 500 mg tablet TAKE ONE TABLET BY MOUTH TWO TIMES DAILY WITH MEALS    NexIUM 40 mg, oral, Daily    Nurtec ODT 75 mg tablet,disintegrating DISSOLVE ONE TABLET IN MOUTH EVERY DAY AS NEEDED for headaches    ondansetron (Zofran) 4 mg tablet TAKE 1 TABLET BY MOUTH 3 TIMES A DAY AS NEEDED. FOR NAUSEA OR VOMITING    ondansetron ODT (ZOFRAN-ODT) 4 mg, oral, 4 times daily    pancrelipase, Lip-Prot-Amyl, (Creon) 12,000-38,000 -60,000 unit capsule oral    pantoprazole (PROTONIX) 40 mg, oral    phenazopyridine (Pyridium) 100 mg tablet TAKE 1 TO 2 TABLETS BY MOUTH THREE TIMES DAILY AFTER MEALS AS NEEDED FOR URINARY PAIN    phenazopyridine (Pyridium) 200 mg tablet TAKE 1 TABLET BY MOUTH 3 TIMES DAILY AFTER MEALS AS NEEDED FOR PAIN WITH URINATION. MAY TURN URINE RED-ORANGE TEMPORARILY AND MAY STAIN UNDE    plecanatide (Trulance) tablet tablet TAKE ONE (1) TABLET BY MOUTH ONCE DAILY    potassium chloride CR 10 mEq ER tablet oral    potassium citrate CR (Urocit-K-15) 15 mEq ER tablet 30 mEq, oral, 2 times daily    promethazine (PHENERGAN) 12.5 mg, oral    promethazine-DM (Phenergan-DM) 6.25-15 mg/5 mL syrup TAKE 5 MLS BY MOUTH EVERY 6 HOURS AS NEEDED    prucalopride (Motegrity) 2 mg tablet 1 tablet, oral, Daily    sod picosulf-mag ox-citric ac (Clenpiq) 10 mg-3.5 gram- 12 gram/160 mL  "solution     sucralfate (CARAFATE) 1 g, oral, 4 times daily    sulfamethoxazole-trimethoprim (Bactrim DS) 800-160 mg tablet 1 tablet, oral, 2 times daily    Sutab 1.479-0.188- 0.225 gram tablet TAKE ALL 24 TABLETS BY MOUTH ONCE FOR 1 DOSE    tiZANidine (Zanaflex) 4 mg tablet 1 tablet, oral, Every 8 hours PRN    topiramate (Topamax) 25 mg tablet oral, 2 times daily    traZODone (Desyrel) 150 mg tablet oral    tretinoin (Retin-A) 0.025 % cream APPLY A PEAS SIZED AMOUNT TO FACE ONCE NIGHTLY WITH MOISTURIZER    triamcinolone (Kenalog) 0.1 % cream apply TWice daily to affected areas for 3 weeks.    Ubrelvy 100 mg tablet tablet TAKE 1 TABLET BY MOUTH AS NEEDED FOR MIGRAINE  SECOND DOSE CAN BE TAKEN AT LEAST 2 HOURS AFTER THE INITIAL DOSE IF NEEDED    valACYclovir (Valtrex) 500 mg tablet 1 tablet, oral, Daily        Last Labs:  CBC -  Lab Results   Component Value Date    WBC 7.4 09/11/2023    HGB 11.7 (L) 09/11/2023    HCT 36.7 09/11/2023    MCV 90 09/11/2023     09/11/2023       CMP -  Lab Results   Component Value Date    CALCIUM 9.3 09/11/2023    PHOS 4.8 04/21/2023    PROT 7.1 09/11/2023    ALBUMIN 4.4 09/11/2023    AST 16 09/11/2023    ALT 17 09/11/2023    ALKPHOS 81 09/11/2023    BILITOT 0.2 09/11/2023       LIPID PANEL -   No results found for: \"CHOL\", \"TRIG\", \"HDL\", \"CHHDL\", \"LDLF\", \"VLDL\", \"NHDL\"    RENAL FUNCTION PANEL -   Lab Results   Component Value Date    GLUCOSE 86 09/11/2023     09/11/2023    K 3.3 (L) 09/11/2023     09/11/2023    CO2 23 09/11/2023    ANIONGAP 13 09/11/2023    BUN 18 09/11/2023    CREATININE 0.73 09/11/2023    CALCIUM 9.3 09/11/2023    PHOS 4.8 04/21/2023    ALBUMIN 4.4 09/11/2023        Lab Results   Component Value Date    BNP 46 09/27/2023    HGBA1C 5.1 02/24/2023       Last Recorded Vitals:  There were no vitals filed for this visit.    Physical Exam:  On exam Ms. Alexis appears her stated age, is alert and oriented x3, and in no acute distress. Her sclera are " anicteric and her oropharynx has moist mucous membranes. Her neck is supple and without thyromegaly. The JVP is ~5 cm of water above the right atrium. Her cardiac exam has regular rhythm, normal S1, S2. No S3/4. There are no murmurs. Her lungs are clear to auscultation bilaterally and there is no dullness to percussion. Her abdomen is soft, nontender with normoactive bowel sounds. There is no HJR. The extremities are warm and without edema. The skin is dry. There is no rash present. The distal pulses are 2-3+ in all four extremities. Her mood and affect are appropriate for todays encounter.     Last Cardiology Tests:  ECG: ***    Assessment/Plan:  1) HACKETT: Stress test and echocardiogram 2021 demonstrated no cardiac etiology for her symptoms. I believe that her symptoms are not cardiac in etiology; however, will repeat echocardiogram & get CT cardiac score test, D-Dimer as well as a BNP:  CT Coronary artery calcium score 10/29/23- total 0  Echo 10/31/23: EF 55-60%, no valvular abnormalities to explain symptoms  D-Dimer 288  BNP 46  Her symptoms are not of cardiac origin. Recommend following up with PCP & possibly pulmonary     2) Palpitations: Consistent with PAC's or PVC's. 1 week Zio patch done 09/2022 did not show any sustained pathologic arrhythmia & her PAC/PVC burden was <1%. No further w/u at this time     3) hypertension: Controlled     Follow up in 1 year or as needed     Tracy M Schwab, APRN-CNP

## 2023-11-08 ENCOUNTER — OFFICE VISIT (OUTPATIENT)
Dept: FAMILY MEDICINE CLINIC | Age: 53
End: 2023-11-08
Payer: COMMERCIAL

## 2023-11-08 VITALS
DIASTOLIC BLOOD PRESSURE: 78 MMHG | WEIGHT: 115 LBS | HEART RATE: 84 BPM | HEIGHT: 62 IN | TEMPERATURE: 96.8 F | BODY MASS INDEX: 21.16 KG/M2 | SYSTOLIC BLOOD PRESSURE: 104 MMHG | OXYGEN SATURATION: 99 %

## 2023-11-08 DIAGNOSIS — E03.9 ACQUIRED HYPOTHYROIDISM: ICD-10-CM

## 2023-11-08 DIAGNOSIS — M54.2 NECK PAIN: Primary | ICD-10-CM

## 2023-11-08 DIAGNOSIS — R23.3 EASY BRUISING: ICD-10-CM

## 2023-11-08 DIAGNOSIS — R53.83 OTHER FATIGUE: ICD-10-CM

## 2023-11-08 DIAGNOSIS — R73.09 ABNORMAL BLOOD SUGAR: ICD-10-CM

## 2023-11-08 PROCEDURE — G8420 CALC BMI NORM PARAMETERS: HCPCS | Performed by: NURSE PRACTITIONER

## 2023-11-08 PROCEDURE — 3017F COLORECTAL CA SCREEN DOC REV: CPT | Performed by: NURSE PRACTITIONER

## 2023-11-08 PROCEDURE — 99214 OFFICE O/P EST MOD 30 MIN: CPT | Performed by: NURSE PRACTITIONER

## 2023-11-08 PROCEDURE — G8484 FLU IMMUNIZE NO ADMIN: HCPCS | Performed by: NURSE PRACTITIONER

## 2023-11-08 PROCEDURE — G8427 DOCREV CUR MEDS BY ELIG CLIN: HCPCS | Performed by: NURSE PRACTITIONER

## 2023-11-08 PROCEDURE — 1036F TOBACCO NON-USER: CPT | Performed by: NURSE PRACTITIONER

## 2023-11-08 RX ORDER — CYCLOBENZAPRINE HCL 10 MG
10 TABLET ORAL NIGHTLY PRN
Qty: 30 TABLET | Refills: 0 | Status: SHIPPED | OUTPATIENT
Start: 2023-11-08 | End: 2023-12-08

## 2023-11-08 RX ORDER — PREDNISONE 20 MG/1
TABLET ORAL
Qty: 20 TABLET | Refills: 0 | Status: SHIPPED | OUTPATIENT
Start: 2023-11-08

## 2023-11-08 RX ORDER — THYROID 30 MG/1
30 TABLET ORAL DAILY
Qty: 30 TABLET | Refills: 3 | Status: SHIPPED | OUTPATIENT
Start: 2023-11-08

## 2023-11-08 ASSESSMENT — ENCOUNTER SYMPTOMS
SORE THROAT: 1
SHORTNESS OF BREATH: 0
COUGH: 0

## 2023-11-08 NOTE — PROGRESS NOTES
Subjective  Chief Complaint   Patient presents with    Headache     Says she went to [facility] and says he thinks she has mono. The left side of throat seems swollen still and she has headaches. They did all the test for her, flu, strep, covid and mono and all came back neg. Pt says she still does not feel herself        HPI    Pt is here for multiple issues. Complex pt. Has multiple specialists for ongoing chronic illnesses. Having a lot of localized pain on the right side of her neck/throat. States that she feels like it is swollen and it goes down her arm. Recently saw other provider. [place] that she likely had mono.  She notes that \"she just doesn't feel good\"     Getting frequent HA's     Patient Active Problem List    Diagnosis Date Noted    Change in bowel habits 12/16/2022    History of colon polyps 12/16/2022    Acute pancreatitis without infection or necrosis, unspecified pancreatitis type 07/30/2022    Abdominal pain 07/30/2022    Pancreatitis, unspecified pancreatitis type 07/29/2022    Dysphagia 08/02/2023    Low serum cortisol level 03/31/2023    Acute left-sided low back pain with bilateral sciatica 03/09/2022    Claudication of both lower extremities ([address]) 02/11/2022    Neck pain 12/09/2021    Non-recurrent acute suppurative otitis media of left ear without spontaneous rupture of tympanic membrane 12/09/2021    Neutropenia ([address]) 01/12/2021    Hereditary hemochromatosis ([address]) 01/12/2021    Chronic pancreatitis ([address]) 08/11/2020    Major depressive disorder, single episode, 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[address]) 01/15/2019    Abnormal MRI, liver 05/22/2018    Acute recurrent pancreatitis 05/22/2018    At risk of fracture due to osteoporosis 05/22/2018    Calcinosis 05/22/2018    Chronic headaches 05/22/2018    Conductive hearing loss in left ear 05/22/2018    Edema 05/22/2018    Iron overload 05/22/2018    Mass of left side of neck 05/22/2018    Medullary sponge kidney of both kidneys 53/57/2742    Metabolic

## 2023-11-09 ENCOUNTER — HOSPITAL ENCOUNTER (OUTPATIENT)
Dept: ULTRASOUND IMAGING | Age: 53
Discharge: HOME | End: 2023-11-11
Payer: COMMERCIAL

## 2023-11-09 ENCOUNTER — PHARMACY VISIT (OUTPATIENT)
Dept: PHARMACY | Facility: CLINIC | Age: 53
End: 2023-11-09
Payer: MEDICARE

## 2023-11-09 DIAGNOSIS — M54.2 NECK PAIN: ICD-10-CM

## 2023-11-09 PROCEDURE — 76536 US EXAM OF HEAD AND NECK: CPT

## 2023-11-09 PROCEDURE — RXMED WILLOW AMBULATORY MEDICATION CHARGE

## 2023-11-13 ENCOUNTER — TELEMEDICINE (OUTPATIENT)
Dept: FAMILY MEDICINE CLINIC | Age: 53
End: 2023-11-13
Payer: COMMERCIAL

## 2023-11-13 DIAGNOSIS — R59.1 LYMPHADENOPATHY: Primary | ICD-10-CM

## 2023-11-13 PROCEDURE — G8427 DOCREV CUR MEDS BY ELIG CLIN: HCPCS | Performed by: NURSE PRACTITIONER

## 2023-11-13 PROCEDURE — 3017F COLORECTAL CA SCREEN DOC REV: CPT | Performed by: NURSE PRACTITIONER

## 2023-11-13 PROCEDURE — 99213 OFFICE O/P EST LOW 20 MIN: CPT | Performed by: NURSE PRACTITIONER

## 2023-11-13 ASSESSMENT — ENCOUNTER SYMPTOMS
COUGH: 0
SHORTNESS OF BREATH: 0

## 2023-11-13 NOTE — PROGRESS NOTES
Maddison Blackwood, was evaluated through a synchronous (real-time) audio-video encounter. The patient (or guardian if applicable) is aware that this is a billable service, which includes applicable co-pays. This Virtual Visit was conducted with patient's (and/or legal guardian's) consent. Patient identification was verified, and a caregiver was present when appropriate. The patient was located at Home: [address] [place] [address] [address] [address]  Provider was located at [facility] (Appt Dept): [address] [place], [address] [place] [address], [address]  [facility],  [address] [facility]      Maddison Blackwood (:  1970) is a Established patient, presenting virtually for evaluation of the following:    Assessment & Plan   Below is the assessment and plan developed based on review of pertinent history, physical exam, labs, studies, and medications. 1. Lymphadenopathy  -     AFL - Tara Corcoran MD, Otolaryngology, [facility]  Will also wait to get BW back to review findings with patient. Side effects, adverse effects of the medication prescribed today, as well as treatment plan/ rationale and result expectations have been discussed with the patient who expresses understanding and desires to proceed. Close follow up to evaluate treatment results and for coordination of care. I have reviewed the patient's medical history in detail and updated the computerized patient record. As always, patient is advised that if symptoms worsen in any way they will proceed to the nearest emergency room. Subjective   HPI    Still not sleeping well. States that she feels pressure in her neck when she lays down. Recent US revealed prominent lymph node in the neck. Pt nervous due to recent hx of squamous cell carcinoma. Review of Systems   Constitutional:  Positive for fatigue. Respiratory:  Negative for cough and shortness of breath. Cardiovascular:  Negative for chest pain. Psychiatric/Behavioral:  Positive for sleep disturbance.  The patient is

## 2023-11-14 ENCOUNTER — OFFICE VISIT (OUTPATIENT)
Dept: CARDIOLOGY | Facility: CLINIC | Age: 53
End: 2023-11-14
Payer: MEDICARE

## 2023-11-14 VITALS
WEIGHT: 116.4 LBS | OXYGEN SATURATION: 98 % | SYSTOLIC BLOOD PRESSURE: 102 MMHG | RESPIRATION RATE: 18 BRPM | HEIGHT: 62 IN | DIASTOLIC BLOOD PRESSURE: 62 MMHG | HEART RATE: 90 BPM | BODY MASS INDEX: 21.42 KG/M2

## 2023-11-14 DIAGNOSIS — R06.09 DYSPNEA ON EXERTION: Primary | ICD-10-CM

## 2023-11-14 PROCEDURE — 1036F TOBACCO NON-USER: CPT | Performed by: NURSE PRACTITIONER

## 2023-11-14 PROCEDURE — 99213 OFFICE O/P EST LOW 20 MIN: CPT | Performed by: NURSE PRACTITIONER

## 2023-11-14 RX ORDER — PREDNISONE 20 MG/1
TABLET ORAL
COMMUNITY
Start: 2023-11-08 | End: 2023-11-14 | Stop reason: ALTCHOICE

## 2023-11-14 NOTE — PATIENT INSTRUCTIONS
- It was my pleasure to meet you. I look forward to being your cardiac Nurse Practitioner. I am a huge believer in communicating with my patients. Please contact me at any time, if anything is not clear to you regarding anything we have discussed, or if new questions occur to you.

## 2023-11-14 NOTE — PROGRESS NOTES
"Name : Leigha Alexis   : 1970   MRN : [id number]   ENC Date : 2023    CC: Hypertension, HACKETT, Palpitations, and Results     HPI:    Primary Cardiologist: Dr. 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 Other history of note: RA, fibromyalgia, Sjogren's, medullary nephrocalcinosis, medullary sponge kidney, hypotension on Cortef and midodrine, Raynaud's, recurrent angioedema, Frausto's esophagus, GERD, ileus, IBS-C, severe endometriosis s/p hysterectomy, chronic autoimmune pancreatitis, Chuck's disease     She presents today to review the results of her echo & CT cardiac score test done for HACKETT/Risk stratification    Recently identified enlarged right neck lymph node for which she has to have biopsied next week & is extremely worried about this.     She is physically active walking 2x a week, but was walking 3-5x a week prior to her \"kidney issues & dog bite\". Father  of heart attack at age 60 & as she advances in age this concerns her.     Diagnostics:  - Echo 23: 55-60%, impaired relaxation, trace MR & TR  - CT coronary artery calcium score test 10/08/23: Total score 0  - Ambulatory monitor 2022: Sinus rhythm with rare PACs and PVCs.  - Echocardiogram 2021: EF 60 to 65%. Grade 1 diastolic dysfunction.  - Lexiscan nuclear stress test 2021: No evidence of ischemia or scar, EF 77%.  - PFTs 3/9/2021: No airway obstruction noted. Normal lung volumes.  - Echocardiogram 2016: EF 60%. Otherwise normal echocardiogram.     ROS: unless otherwise noted in the history of present illness, all other systems were reviewed and they are negative for complaints     Allergies:  Patient has no known allergies.    Current Outpatient Medications   Medication Instructions    albuterol 90 mcg/actuation inhaler 2-4 puffs, inhalation, Every 2 hour PRN "    amphetamine-dextroamphetamine XR (Adderall XR) 30 mg 24 hr capsule 30 mg, oral, Every morning    [place] Thyroid 30 mg, oral, Daily    azelastine (Astelin) 137 mcg (0.1 %) nasal spray 2 sprays    buPROPion XL (WELLBUTRIN XL) 300 mg, oral, Daily RT    cyclobenzaprine (Flexeril) 10 mg tablet TAKE ONE TABLET BY MOUTH 3 TIMES DAILY AS NEEDED FOR MUSCLE SPASMS    dextromethorphan (DELSYM) 60 mg, oral, 2 times daily    diclofenac sodium (Voltaren) 1 % gel gel 1 Application, transdermal, See admin instructions    famotidine (PEPCID) 20 mg, oral, 2 times daily    fludrocortisone (Florinef) 0.1 mg tablet 2 tablets, oral, 2 times daily    fluticasone (Flonase) 50 mcg/actuation nasal spray USE ONE SPRAY IN EACH NOSTRIL DAILY    frovatriptan (Frova) 2.5 mg tablet Take one at onset of severe headache/migraine may repeat x 1 after 4 hours if needed.  Maximum 2/day and 2 days/week.    furosemide (LASIX) 40 mg, oral, 2 times daily PRN    HYDROcodone-acetaminophen (Norco) 5-325 mg tablet TAKE ONE TABLET BY MOUTH EVERY 8 HOURS AS NEEDED FOR PAIN FOR UP TO 5 DAYS. TAKE LOWEST DOSE POSSIBLE TO MANAGE PAIN. REDUCE DOSES TAKEN AS    hydrocortisone (CORTEF) 15 mg, oral, Daily RT    hydroxychloroquine (PLAQUENIL) 200 mg, oral, 2 times daily    leflunomide (Arava) 20 mg tablet 1 tablet, oral, Daily    liothyronine (CYTOMEL) 25 mcg, oral, Every other day    loratadine (Claritin) 10 mg tablet 1 tablet, oral, Daily    lubiprostone (AMITIZA) 24 mcg, oral, 2 times daily with meals    metFORMIN (GLUCOPHAGE) 500 mg, oral, Daily with breakfast    metoclopramide (Reglan) 10 mg tablet oral, Every 8 hours PRN    midodrine (Proamatine) 10 mg tablet TAKE ONE TABLET BY MOUTH AS NEEDED FOR SBP LESS THAN 90    moxifloxacin (Vigamox) 0.5 % ophthalmic solution 1 drop, ophthalmic (eye), 3 times daily    naltrexone HCl dihydrate,bulk, 100 % powder     naproxen (Naprosyn) 500 mg tablet TAKE ONE TABLET BY MOUTH TWO TIMES DAILY WITH MEALS    NexIUM 40 mg, oral,  "Daily    Nurtec ODT 75 mg tablet,disintegrating DISSOLVE ONE TABLET IN MOUTH EVERY DAY AS NEEDED for headaches    ondansetron ODT (ZOFRAN-ODT) 4 mg, oral, 4 times daily    pancrelipase, Lip-Prot-Amyl, (Creon) 12,000-38,000 -60,000 unit capsule oral    plecanatide (Trulance) tablet tablet TAKE ONE (1) TABLET BY MOUTH ONCE DAILY    potassium citrate CR (Urocit-K-15) 15 mEq ER tablet 30 mEq, oral, 2 times daily    promethazine (PHENERGAN) 12.5 mg, oral    promethazine-DM (Phenergan-DM) 6.25-15 mg/5 mL syrup TAKE 5 MLS BY MOUTH EVERY 6 HOURS AS NEEDED    prucalopride (Motegrity) 2 mg tablet 1 tablet, oral, Daily    sod picosulf-mag ox-citric ac (Clenpiq) 10 mg-3.5 gram- 12 gram/160 mL solution     sucralfate (CARAFATE) 1 g, oral, 4 times daily    topiramate (Topamax) 25 mg tablet oral, 2 times daily    tretinoin (Retin-A) 0.025 % cream APPLY A PEAS SIZED AMOUNT TO FACE ONCE NIGHTLY WITH MOISTURIZER    triamcinolone (Kenalog) 0.1 % cream apply TWice daily to affected areas for 3 weeks.    Ubrelvy 100 mg tablet tablet TAKE 1 TABLET BY MOUTH AS NEEDED FOR MIGRAINE  SECOND DOSE CAN BE TAKEN AT LEAST 2 HOURS AFTER THE INITIAL DOSE IF NEEDED    valACYclovir (Valtrex) 500 mg tablet 1 tablet, oral, Daily        Last Labs:  CBC  Lab Results   Component Value Date    WBC 7.4 09/11/2023    HGB 11.7 (L) 09/11/2023    HCT 36.7 09/11/2023    MCV 90 09/11/2023     09/11/2023       CMP  Lab Results   Component Value Date    CALCIUM 9.3 09/11/2023    PHOS 4.8 04/21/2023    PROT 7.1 09/11/2023    ALBUMIN 4.4 09/11/2023    AST 16 09/11/2023    ALT 17 09/11/2023    ALKPHOS 81 09/11/2023    BILITOT 0.2 09/11/2023       BMP   Lab Results   Component Value Date     09/11/2023    K 3.3 (L) 09/11/2023     09/11/2023    CO2 23 09/11/2023    GLUCOSE 86 09/11/2023    BUN 18 09/11/2023    CREATININE 0.73 09/11/2023       LIPID PANEL   No results found for: \"CHOL\", \"TRIG\", \"HDL\", \"CHHDL\", \"LDLF\", \"VLDL\", \"NHDL\"    RENAL FUNCTION " "PANEL   Lab Results   Component Value Date    GLUCOSE 86 09/11/2023     09/11/2023    K 3.3 (L) 09/11/2023     09/11/2023    CO2 23 09/11/2023    ANIONGAP 13 09/11/2023    BUN 18 09/11/2023    CREATININE 0.73 09/11/2023    CALCIUM 9.3 09/11/2023    PHOS 4.8 04/21/2023    ALBUMIN 4.4 09/11/2023        Lab Results   Component Value Date    BNP 46 09/27/2023    HGBA1C 5.1 02/24/2023       Last Recorded Vitals:  Vitals:    11/14/23 1346   BP: 102/62   BP Location: Left arm   Patient Position: Sitting   Pulse: 90   Resp: 18   SpO2: 98%   Weight: 52.8 kg (116 lb 6.4 oz)   Height: 1.575 m (5' 2\")     Physical Exam:  On exam Ms. Alexis appears her stated age, is alert and oriented x3, and in no acute distress. Her sclera are anicteric and her oropharynx has moist mucous membranes. Her neck is supple and without thyromegaly. The JVP is ~5 cm of water above the right atrium. Her cardiac exam has regular rhythm, normal S1, S2. No S3/4. There are no murmurs. Her lungs are clear to auscultation bilaterally and there is no dullness to percussion. Her abdomen is soft, nontender with normoactive bowel sounds. There is no HJR. The extremities are warm and without edema. The skin is dry. There is no rash present. The distal pulses are 2-3+ in all four extremities. Her mood and affect are appropriate for todays encounter.     Assessment/Plan:  1) HACKETT: Stress test and echocardiogram 2021 demonstrated no cardiac etiology for her symptoms. Repeat echo as well as D-dimer & BNP results all consistent with non cardiac etiology. Follow up with PCP, consider pulmonary evaluation     2) Palpitations: Consistent with PAC's or PVC's. 1 week Zio patch done 09/2022 did not show any sustained pathologic arrhythmia & her PAC/PVC burden was <1%. No further w/u at this time.     3) hypertension: Controlled     Follow up in 1 year or as needed     Tracy M Schwab, APRN-CNP    "

## 2023-11-15 DIAGNOSIS — R53.83 OTHER FATIGUE: ICD-10-CM

## 2023-11-15 DIAGNOSIS — Z87.19 HISTORY OF ACUTE PANCREATITIS: ICD-10-CM

## 2023-11-15 LAB
ALBUMIN SERPL-MCNC: 4.4 G/DL (ref 3.5–4.6)
ALP SERPL-CCNC: 98 U/L (ref 40–130)
ALT SERPL-CCNC: 8 U/L (ref 0–33)
AMYLASE SERPL-CCNC: 230 U/L (ref 22–93)
ANION GAP SERPL CALCULATED.3IONS-SCNC: 14 MEQ/L (ref 9–15)
AST SERPL-CCNC: 12 U/L (ref 0–35)
BILIRUB SERPL-MCNC: 0.3 MG/DL (ref 0.2–0.7)
BUN SERPL-MCNC: 16 MG/DL (ref 6–20)
CALCIUM SERPL-MCNC: 9 MG/DL (ref 8.5–9.9)
CHLORIDE SERPL-SCNC: 103 MEQ/L (ref 95–107)
CO2 SERPL-SCNC: 23 MEQ/L (ref 20–31)
CREAT SERPL-MCNC: 0.74 MG/DL (ref 0.5–0.9)
GLOBULIN SER CALC-MCNC: 2.4 G/DL (ref 2.3–3.5)
GLUCOSE SERPL-MCNC: 104 MG/DL (ref 70–99)
POTASSIUM SERPL-SCNC: 3.2 MEQ/L (ref 3.4–4.9)
PROT SERPL-MCNC: 6.8 G/DL (ref 6.3–8)
SODIUM SERPL-SCNC: 140 MEQ/L (ref 135–144)
TSH REFLEX: 1.4 UIU/ML (ref 0.44–3.86)

## 2023-11-16 ENCOUNTER — TELEPHONE (OUTPATIENT)
Dept: FAMILY MEDICINE CLINIC | Age: 53
End: 2023-11-16

## 2023-11-16 DIAGNOSIS — K85.90 ACUTE PANCREATITIS WITHOUT INFECTION OR NECROSIS, UNSPECIFIED PANCREATITIS TYPE: ICD-10-CM

## 2023-11-16 DIAGNOSIS — K85.90 ACUTE PANCREATITIS WITHOUT INFECTION OR NECROSIS, UNSPECIFIED PANCREATITIS TYPE: Primary | ICD-10-CM

## 2023-11-18 LAB
EBV EA-D IGG SER-ACNC: 26.9 U/ML (ref 0–10.9)
EBV NA IGG SER IA-ACNC: 138 U/ML (ref 0–21.9)
EBV VCA IGG SER IA-ACNC: 97.4 U/ML (ref 0–21.9)
EBV VCA IGM SER IA-ACNC: <10 U/ML (ref 0–43.9)

## 2023-11-20 LAB — LIPASE SERPL-CCNC: 180 U/L (ref 12–95)

## 2023-11-29 DIAGNOSIS — F98.8 ATTENTION DEFICIT DISORDER, UNSPECIFIED HYPERACTIVITY PRESENCE: ICD-10-CM

## 2023-11-29 DIAGNOSIS — Z76.0 MEDICATION REFILL: ICD-10-CM

## 2023-11-29 RX ORDER — DEXTROAMPHETAMINE SACCHARATE, AMPHETAMINE ASPARTATE MONOHYDRATE, DEXTROAMPHETAMINE SULFATE AND AMPHETAMINE SULFATE 7.5; 7.5; 7.5; 7.5 MG/1; MG/1; MG/1; MG/1
30 CAPSULE, EXTENDED RELEASE ORAL 2 TIMES DAILY
Qty: 60 CAPSULE | Refills: 0 | Status: SHIPPED | OUTPATIENT
Start: 2023-11-29 | End: 2023-12-29

## 2023-11-29 RX ORDER — ONDANSETRON 4 MG/1
4 TABLET, FILM COATED ORAL 3 TIMES DAILY PRN
Qty: 30 TABLET | Refills: 0 | Status: SHIPPED | OUTPATIENT
Start: 2023-11-29

## 2023-11-29 NOTE — TELEPHONE ENCOUNTER
Comments: Please review    Last Office Visit (last PCP visit):   11/13/2023    Next Visit Date:  No future appointments. **If hasn't been seen in over a year OR hasn't followed up according to last diabetes/ADHD visit, make appointment for patient before sending refill to provider.     Rx requested:  Requested Prescriptions     Pending Prescriptions Disp Refills    ondansetron (ZOFRAN) 4 MG tablet 30 tablet 0     Sig: Take 1 tablet by mouth 3 times daily as needed for Nausea or Vomiting

## 2023-11-29 NOTE — TELEPHONE ENCOUNTER
Comments: Please review, last rx 10/31    Last Office Visit (last PCP visit):   11/13/2023    Next Visit Date:  No future appointments. **If hasn't been seen in over a year OR hasn't followed up according to last diabetes/ADHD visit, make appointment for patient before sending refill to provider. Rx requested:  Requested Prescriptions     Pending Prescriptions Disp Refills    amphetamine-dextroamphetamine (ADDERALL XR) 30 MG extended release capsule 60 capsule 0     Sig: Take 1 capsule by mouth in the morning and 1 capsule in the evening. Do all this for 30 days.

## 2023-12-04 ENCOUNTER — PHARMACY VISIT (OUTPATIENT)
Dept: PHARMACY | Facility: CLINIC | Age: 53
End: 2023-12-04
Payer: MEDICARE

## 2023-12-04 PROCEDURE — RXMED WILLOW AMBULATORY MEDICATION CHARGE

## 2023-12-11 DIAGNOSIS — K85.90 ACUTE PANCREATITIS WITHOUT INFECTION OR NECROSIS, UNSPECIFIED PANCREATITIS TYPE: ICD-10-CM

## 2023-12-11 LAB
AMYLASE SERPL-CCNC: 88 U/L (ref 22–93)
LIPASE SERPL-CCNC: 51 U/L (ref 12–95)

## 2023-12-15 ENCOUNTER — OFFICE VISIT (OUTPATIENT)
Dept: FAMILY MEDICINE CLINIC | Age: 53
End: 2023-12-15

## 2023-12-15 VITALS
OXYGEN SATURATION: 98 % | DIASTOLIC BLOOD PRESSURE: 76 MMHG | WEIGHT: 118.4 LBS | HEART RATE: 78 BPM | HEIGHT: 62 IN | BODY MASS INDEX: 21.79 KG/M2 | SYSTOLIC BLOOD PRESSURE: 112 MMHG

## 2023-12-15 DIAGNOSIS — G47.00 INSOMNIA, UNSPECIFIED TYPE: ICD-10-CM

## 2023-12-15 DIAGNOSIS — G89.29 CHRONIC NONINTRACTABLE HEADACHE, UNSPECIFIED HEADACHE TYPE: ICD-10-CM

## 2023-12-15 DIAGNOSIS — R10.33 PERIUMBILICAL ABDOMINAL PAIN: Primary | ICD-10-CM

## 2023-12-15 DIAGNOSIS — R51.9 CHRONIC NONINTRACTABLE HEADACHE, UNSPECIFIED HEADACHE TYPE: ICD-10-CM

## 2023-12-15 RX ORDER — TOPIRAMATE 100 MG/1
100 TABLET, FILM COATED ORAL 2 TIMES DAILY
Qty: 60 TABLET | Refills: 0 | Status: SHIPPED | OUTPATIENT
Start: 2023-12-15

## 2023-12-15 RX ORDER — TRAZODONE HYDROCHLORIDE 150 MG/1
TABLET ORAL
Qty: 30 TABLET | Refills: 0 | Status: SHIPPED | OUTPATIENT
Start: 2023-12-15

## 2023-12-15 RX ORDER — RIMEGEPANT SULFATE 75 MG/75MG
1 TABLET, ORALLY DISINTEGRATING ORAL DAILY PRN
Qty: 16 TABLET | Refills: 1 | Status: SHIPPED | OUTPATIENT
Start: 2023-12-15

## 2023-12-15 NOTE — PROGRESS NOTES
Subjective  Maddison Blackwood, 50 y.o. female presents today with:  Chief Complaint   Patient presents with    Abdominal Pain     Onset- 2 weeks ago worse Monday   Abd pain- Y LT side she states wraps around her back   Worse- when eating/ drinking   N/V- Y nausea aking Zofran        I reviewed staff HPI/chief complaint and do agree with above    Patient presents today for concerns of acute on chronic abdominal pain has been present for approximately 2 weeks however much worse starting Monday. Has been feeling nauseous and has been taking Zofran. Feels bloated in the stomach. Patient does have a history of chronic pancreatitis and states she has had pancreatic duct stenting in the past.  She has recently had her amylase and lipase drawn which initially was high and now have returned to normal.  Also recently had a MRI of kidneys that did show Bilateral Bosniak I and II cysts. No enhancing renal masses. Denies any fever/chills, changes in bowels, shortness of breath/troubles breathing, chest pain. Review of Systems   Constitutional:  Negative for chills, fatigue and fever. Respiratory:  Negative for chest tightness, shortness of breath and wheezing. Cardiovascular:  Negative for chest pain and palpitations. Gastrointestinal:  Positive for abdominal pain, nausea and vomiting. Negative for blood in stool, constipation and diarrhea. Genitourinary:  Positive for flank pain. Negative for decreased urine volume, difficulty urinating, dysuria, frequency, hematuria, pelvic pain, urgency and vaginal pain. Musculoskeletal:  Negative for myalgias. Skin:  Negative for color change and rash. Neurological:  Negative for dizziness, weakness, light-headedness and headaches.        Past Medical History:   Diagnosis Date    Acquired hypothyroidism 09/26/2016    Acute left-sided low back pain with bilateral sciatica 03/09/2022    Acute pancreatitis     ADD (attention deficit disorder) 02/12/2015    Anxiety

## 2023-12-19 ENCOUNTER — TELEPHONE (OUTPATIENT)
Dept: GASTROENTEROLOGY | Age: 53
End: 2023-12-19

## 2023-12-19 NOTE — TELEPHONE ENCOUNTER
Patient was requesting a prescription for Sutab, she is currently in [place] and has constipation and abdominal distention and she took MiraLAX and other stool softener without much relief, patient had tried Sutab in the past and that has helped her and she requested a prescription for Sutab.,  This was sent to her pharmacy in [place].

## 2023-12-19 NOTE — TELEPHONE ENCOUNTER
Patient calling requesting Sutab for constipation. She is currently in [place] on vacation and needs something due to feeling miserable. Had BM this morning \"pudding style\" prior to that last Wednesday. Would need to go to [facility] [address] [place] [address]. Added pharmacy to medications.

## 2023-12-20 NOTE — TELEPHONE ENCOUNTER
Patient said Sutab was sent, but is only available in 24 tabs, not 12 as prescribed. Please call pharmacy to change quantity. Thank you!

## 2023-12-28 DIAGNOSIS — Z76.0 MEDICATION REFILL: ICD-10-CM

## 2023-12-28 DIAGNOSIS — F98.8 ATTENTION DEFICIT DISORDER, UNSPECIFIED HYPERACTIVITY PRESENCE: ICD-10-CM

## 2023-12-28 NOTE — TELEPHONE ENCOUNTER
Last Office Visit (last PCP visit):   11/13/2023    Next Visit Date:  No future appointments. **If hasn't been seen in over a year OR hasn't followed up according to last diabetes/ADHD visit, make appointment for patient before sending refill to provider. Rx requested:  Requested Prescriptions     Pending Prescriptions Disp Refills    amphetamine-dextroamphetamine (ADDERALL XR) 30 MG extended release capsule 60 capsule 0     Sig: Take 1 capsule by mouth in the morning and 1 capsule in the evening. Do all this for 30 days.

## 2023-12-29 RX ORDER — DEXTROAMPHETAMINE SACCHARATE, AMPHETAMINE ASPARTATE MONOHYDRATE, DEXTROAMPHETAMINE SULFATE AND AMPHETAMINE SULFATE 7.5; 7.5; 7.5; 7.5 MG/1; MG/1; MG/1; MG/1
30 CAPSULE, EXTENDED RELEASE ORAL 2 TIMES DAILY
Qty: 60 CAPSULE | Refills: 0 | Status: SHIPPED | OUTPATIENT
Start: 2023-12-29 | End: 2024-01-28

## 2024-01-02 DIAGNOSIS — K59.02 DYSSYNERGIC CONSTIPATION: Primary | ICD-10-CM

## 2024-01-02 RX ORDER — PLECANATIDE 3 MG/1
3 TABLET ORAL DAILY
Qty: 30 TABLET | Refills: 11 | Status: SHIPPED | OUTPATIENT
Start: 2024-01-02 | End: 2024-12-31

## 2024-01-26 ENCOUNTER — APPOINTMENT (OUTPATIENT)
Dept: CT IMAGING | Age: 54
End: 2024-01-26
Payer: MEDICARE

## 2024-01-26 ENCOUNTER — HOSPITAL ENCOUNTER (EMERGENCY)
Age: 54
Discharge: HOME | End: 2024-01-26
Attending: EMERGENCY MEDICINE
Payer: MEDICARE

## 2024-01-26 ENCOUNTER — APPOINTMENT (OUTPATIENT)
Dept: GENERAL RADIOLOGY | Age: 54
End: 2024-01-26
Payer: MEDICARE

## 2024-01-26 VITALS
OXYGEN SATURATION: 97 % | HEIGHT: 61 IN | DIASTOLIC BLOOD PRESSURE: 68 MMHG | TEMPERATURE: 98.2 F | HEART RATE: 73 BPM | BODY MASS INDEX: 22.28 KG/M2 | RESPIRATION RATE: 18 BRPM | WEIGHT: 118 LBS | SYSTOLIC BLOOD PRESSURE: 90 MMHG

## 2024-01-26 DIAGNOSIS — R11.0 NAUSEA: ICD-10-CM

## 2024-01-26 DIAGNOSIS — R07.89 ATYPICAL CHEST PAIN: Primary | ICD-10-CM

## 2024-01-26 DIAGNOSIS — R10.13 ABDOMINAL PAIN, EPIGASTRIC: ICD-10-CM

## 2024-01-26 LAB
ALBUMIN SERPL-MCNC: 4.6 G/DL (ref 3.5–4.6)
ALP SERPL-CCNC: 120 U/L (ref 40–130)
ALT SERPL-CCNC: 11 U/L (ref 0–33)
AMYLASE SERPL-CCNC: 127 U/L (ref 22–93)
ANION GAP SERPL CALCULATED.3IONS-SCNC: 11 MEQ/L (ref 9–15)
AST SERPL-CCNC: 13 U/L (ref 0–35)
BACTERIA URNS QL MICRO: ABNORMAL /HPF
BASOPHILS # BLD: 0.1 K/UL (ref 0–0.1)
BASOPHILS NFR BLD: 1.4 % (ref 0.1–1.2)
BILIRUB SERPL-MCNC: <0.2 MG/DL (ref 0.2–0.7)
BILIRUB UR QL STRIP: NEGATIVE
BUN SERPL-MCNC: 22 MG/DL (ref 6–20)
CALCIUM SERPL-MCNC: 9.4 MG/DL (ref 8.5–9.9)
CHLORIDE SERPL-SCNC: 101 MEQ/L (ref 95–107)
CLARITY UR: CLEAR
CO2 SERPL-SCNC: 26 MEQ/L (ref 20–31)
COLOR UR: YELLOW
CREAT SERPL-MCNC: 0.71 MG/DL (ref 0.5–0.9)
EKG ATRIAL RATE: 83 BPM
EKG P AXIS: 82 DEGREES
EKG P-R INTERVAL: 144 MS
EKG Q-T INTERVAL: 370 MS
EKG QRS DURATION: 78 MS
EKG QTC CALCULATION (BAZETT): 434 MS
EKG R AXIS: 49 DEGREES
EKG T AXIS: 51 DEGREES
EKG VENTRICULAR RATE: 83 BPM
EOSINOPHIL # BLD: 0.2 K/UL (ref 0–0.4)
EOSINOPHIL NFR BLD: 2.9 % (ref 0.7–5.8)
EPI CELLS #/AREA URNS HPF: ABNORMAL /HPF
ERYTHROCYTE [DISTWIDTH] IN BLOOD BY AUTOMATED COUNT: 11.9 % (ref 11.7–14.4)
GLOBULIN SER CALC-MCNC: 2.7 G/DL (ref 2.3–3.5)
GLUCOSE SERPL-MCNC: 86 MG/DL (ref 70–99)
GLUCOSE UR STRIP-MCNC: NEGATIVE MG/DL
HCT VFR BLD AUTO: 38.9 % (ref 37–47)
HGB BLD-MCNC: 12.6 G/DL (ref 11.2–15.7)
HGB UR QL STRIP: NORMAL
IMM GRANULOCYTES # BLD: 0 K/UL
IMM GRANULOCYTES NFR BLD: 0.3 %
INFLUENZA A BY PCR: NEGATIVE
INFLUENZA B BY PCR: NEGATIVE
INR PPP: 0.9
KETONES UR STRIP-MCNC: NEGATIVE MG/DL
LACTATE BLDV-SCNC: 1.1 MMOL/L (ref 0.5–2.2)
LEUKOCYTE ESTERASE UR QL STRIP: NEGATIVE
LIPASE SERPL-CCNC: 84 U/L (ref 12–95)
LYMPHOCYTES # BLD: 1.9 K/UL (ref 1.2–3.7)
LYMPHOCYTES NFR BLD: 30.1 %
MAGNESIUM SERPL-MCNC: 1.9 MG/DL (ref 1.7–2.4)
MCH RBC QN AUTO: 28.8 PG (ref 25.6–32.2)
MCHC RBC AUTO-ENTMCNC: 32.4 % (ref 32.2–35.5)
MCV RBC AUTO: 89 FL (ref 79.4–94.8)
MONOCYTES # BLD: 0.4 K/UL (ref 0.2–0.9)
MONOCYTES NFR BLD: 6.8 % (ref 4.7–12.5)
NEUTROPHILS # BLD: 3.7 K/UL (ref 1.6–6.1)
NEUTS SEG NFR BLD: 58.5 % (ref 34–71.1)
NITRITE UR QL STRIP: NEGATIVE
PH UR STRIP: 5 [PH] (ref 5–9)
PLATELET # BLD AUTO: 345 K/UL (ref 182–369)
POTASSIUM SERPL-SCNC: 3.6 MEQ/L (ref 3.4–4.9)
PROT SERPL-MCNC: 7.3 G/DL (ref 6.3–8)
PROT UR STRIP-MCNC: NEGATIVE MG/DL
PROTHROMBIN TIME: 12.3 SEC (ref 12.3–14.9)
RBC # BLD AUTO: 4.37 M/UL (ref 3.93–5.22)
RBC #/AREA URNS HPF: ABNORMAL /HPF (ref 0–2)
SARS-COV-2 RDRP RESP QL NAA+PROBE: NOT DETECTED
SODIUM SERPL-SCNC: 138 MEQ/L (ref 135–144)
SP GR UR STRIP: 1.01 (ref 1–1.03)
TROPONIN, HIGH SENSITIVITY: <6 NG/L (ref 0–19)
URINE REFLEX TO CULTURE: NORMAL
UROBILINOGEN UR STRIP-ACNC: 0.2 E.U./DL
WBC # BLD AUTO: 6.3 K/UL (ref 4–10)
WBC #/AREA URNS HPF: ABNORMAL /HPF (ref 0–5)

## 2024-01-26 PROCEDURE — 96375 TX/PRO/DX INJ NEW DRUG ADDON: CPT

## 2024-01-26 PROCEDURE — 2580000003 HC RX 258: Performed by: EMERGENCY MEDICINE

## 2024-01-26 PROCEDURE — 85025 COMPLETE CBC W/AUTO DIFF WBC: CPT

## 2024-01-26 PROCEDURE — 83690 ASSAY OF LIPASE: CPT

## 2024-01-26 PROCEDURE — 83735 ASSAY OF MAGNESIUM: CPT

## 2024-01-26 PROCEDURE — 93005 ELECTROCARDIOGRAM TRACING: CPT

## 2024-01-26 PROCEDURE — 87635 SARS-COV-2 COVID-19 AMP PRB: CPT

## 2024-01-26 PROCEDURE — 80053 COMPREHEN METABOLIC PANEL: CPT

## 2024-01-26 PROCEDURE — 87502 INFLUENZA DNA AMP PROBE: CPT

## 2024-01-26 PROCEDURE — 2500000003 HC RX 250 WO HCPCS: Performed by: EMERGENCY MEDICINE

## 2024-01-26 PROCEDURE — 96374 THER/PROPH/DIAG INJ IV PUSH: CPT

## 2024-01-26 PROCEDURE — 71045 X-RAY EXAM CHEST 1 VIEW: CPT

## 2024-01-26 PROCEDURE — 84484 ASSAY OF TROPONIN QUANT: CPT

## 2024-01-26 PROCEDURE — 6360000002 HC RX W HCPCS: Performed by: EMERGENCY MEDICINE

## 2024-01-26 PROCEDURE — 83605 ASSAY OF LACTIC ACID: CPT

## 2024-01-26 PROCEDURE — 85610 PROTHROMBIN TIME: CPT

## 2024-01-26 PROCEDURE — 36415 COLL VENOUS BLD VENIPUNCTURE: CPT

## 2024-01-26 PROCEDURE — 6360000004 HC RX CONTRAST MEDICATION: Performed by: EMERGENCY MEDICINE

## 2024-01-26 PROCEDURE — 81001 URINALYSIS AUTO W/SCOPE: CPT

## 2024-01-26 PROCEDURE — 74177 CT ABD & PELVIS W/CONTRAST: CPT

## 2024-01-26 PROCEDURE — 82150 ASSAY OF AMYLASE: CPT

## 2024-01-26 PROCEDURE — 87040 BLOOD CULTURE FOR BACTERIA: CPT

## 2024-01-26 PROCEDURE — 93010 ELECTROCARDIOGRAM REPORT: CPT | Performed by: INTERNAL MEDICINE

## 2024-01-26 PROCEDURE — 99285 EMERGENCY DEPT VISIT HI MDM: CPT

## 2024-01-26 RX ORDER — DIPHENHYDRAMINE HYDROCHLORIDE 50 MG/ML
25 INJECTION INTRAMUSCULAR; INTRAVENOUS ONCE
Status: COMPLETED | OUTPATIENT
Start: 2024-01-26 | End: 2024-01-26

## 2024-01-26 RX ORDER — 0.9 % SODIUM CHLORIDE 0.9 %
1000 INTRAVENOUS SOLUTION INTRAVENOUS ONCE
Status: COMPLETED | OUTPATIENT
Start: 2024-01-26 | End: 2024-01-26

## 2024-01-26 RX ORDER — METOCLOPRAMIDE HYDROCHLORIDE 5 MG/ML
10 INJECTION INTRAMUSCULAR; INTRAVENOUS ONCE
Status: COMPLETED | OUTPATIENT
Start: 2024-01-26 | End: 2024-01-26

## 2024-01-26 RX ORDER — SODIUM CHLORIDE 9 MG/ML
INJECTION, SOLUTION INTRAVENOUS CONTINUOUS
Status: DISCONTINUED | OUTPATIENT
Start: 2024-01-26 | End: 2024-01-26 | Stop reason: HOSPADM

## 2024-01-26 RX ADMIN — DIPHENHYDRAMINE HYDROCHLORIDE 25 MG: 50 INJECTION INTRAMUSCULAR; INTRAVENOUS at 18:37

## 2024-01-26 RX ADMIN — Medication 20 MG: at 18:34

## 2024-01-26 RX ADMIN — IOPAMIDOL 75 ML: 755 INJECTION, SOLUTION INTRAVENOUS at 18:51

## 2024-01-26 RX ADMIN — SODIUM CHLORIDE 1000 ML: 9 INJECTION, SOLUTION INTRAVENOUS at 18:33

## 2024-01-26 RX ADMIN — METOCLOPRAMIDE HYDROCHLORIDE 10 MG: 5 INJECTION INTRAMUSCULAR; INTRAVENOUS at 18:43

## 2024-01-26 ASSESSMENT — ENCOUNTER SYMPTOMS
CHEST TIGHTNESS: 0
SHORTNESS OF BREATH: 0
COUGH: 1
ABDOMINAL PAIN: 1
EYE REDNESS: 0
SORE THROAT: 0
EYE PAIN: 0
FACIAL SWELLING: 0
SINUS PRESSURE: 0
VOMITING: 1
VOICE CHANGE: 0
BACK PAIN: 0
CHOKING: 0
DIARRHEA: 0
EYE DISCHARGE: 0
BLOOD IN STOOL: 0
NAUSEA: 1
WHEEZING: 0
CONSTIPATION: 0
STRIDOR: 0

## 2024-01-26 ASSESSMENT — PAIN DESCRIPTION - PAIN TYPE: TYPE: ACUTE PAIN

## 2024-01-26 ASSESSMENT — PAIN SCALES - GENERAL: PAINLEVEL_OUTOF10: 7

## 2024-01-26 ASSESSMENT — PAIN DESCRIPTION - DESCRIPTORS: DESCRIPTORS: HEAVINESS

## 2024-01-26 ASSESSMENT — PAIN DESCRIPTION - ORIENTATION: ORIENTATION: LEFT

## 2024-01-26 ASSESSMENT — PAIN DESCRIPTION - FREQUENCY: FREQUENCY: CONTINUOUS

## 2024-01-26 ASSESSMENT — PAIN - FUNCTIONAL ASSESSMENT
PAIN_FUNCTIONAL_ASSESSMENT: 0-10
PAIN_FUNCTIONAL_ASSESSMENT: PREVENTS OR INTERFERES SOME ACTIVE ACTIVITIES AND ADLS

## 2024-01-26 ASSESSMENT — PAIN DESCRIPTION - LOCATION: LOCATION: CHEST

## 2024-01-26 NOTE — ED PROVIDER NOTES
[facility] ED  eMERGENCY dEPARTMENT eNCOUnter      Pt Name: Sisi Cam  MRN: [id number]  Birthdate 1970  Date of evaluation: 2024  Provider: Phoebe Aleman MD    CHIEF COMPLAINT       Chief Complaint   Patient presents with    Chest Pain     Left sided          HISTORY OF PRESENT ILLNESS   (Location/Symptom, Timing/Onset,Context/Setting, Quality, Duration, Modifying Factors, Severity)  Note limiting factors.   Sisi Cam is a 53 y.o. female who presents to the emergency department patient comes to this emergency because of sickness going on for the past 1 week time as a patient as she is ill with fever and chills and she become so weak that she could not even take a shower for 3 days did not seek any medical attention intense nausea but no vomiting dry heaving upper abdominal discomfort left shoulder pain patient has no chest tightness chest pain on exertion patient has dry cough thought she had with COVID she did a home COVID testing at home which was negative no one sick at home patient is  2 para 2 non-smoker Sjogren syndrome GERD anxiety depression chronic abdominal discomfort patient has a remote history of pancreatitis which was related to the gall bladder she needed a stent and there was complication for her gallbladder surgery  at [facility] as per patient    HPI    NursingNotes were reviewed.    REVIEW OF SYSTEMS    (2-9 systems for level 4, 10 or more for level 5)     Review of Systems   Constitutional:  Positive for activity change, appetite change, chills, diaphoresis, fatigue and fever.   HENT:  Negative for congestion, drooling, facial swelling, mouth sores, nosebleeds, sinus pressure, sore throat, trouble swallowing and voice change.    Eyes:  Negative for pain, discharge, redness and visual disturbance.   Respiratory:  Positive for cough. Negative for choking, chest tightness, shortness of breath, wheezing and stridor.    Cardiovascular:  Positive for  Tenderness: There is no abdominal tenderness. There is no right CVA tenderness, left CVA tenderness, guarding or rebound.      Hernia: No hernia is present.   Musculoskeletal:         General: No swelling, tenderness, deformity or signs of injury.      Cervical back: Neck supple. No rigidity or tenderness.      Right lower leg: No edema.      Left lower leg: No edema.   Lymphadenopathy:      Cervical: No cervical adenopathy.   Skin:     Coloration: Skin is not jaundiced or pale.      Findings: No bruising, erythema, lesion or rash.   Neurological:      Mental Status: She is alert.      Cranial Nerves: No cranial nerve deficit.      Sensory: No sensory deficit.      Motor: No weakness.      Coordination: Coordination normal.      Gait: Gait normal.   Psychiatric:         Mood and Affect: Mood normal.         DIAGNOSTIC RESULTS     EKG: All EKG's are interpreted by the Emergency Department Physician who either signs or Co-signsthis chart in the absence of a cardiologist.        RADIOLOGY:   Non-plain filmimages such as CT, Ultrasound and MRI are read by the radiologist. Plain radiographic images are visualized and preliminarily interpreted by the emergency physician with the below findings:    terpretation per the Radiologist below, if available at the time ofthis note:    XR CHEST PORTABLE    (Results Pending)   CT ABDOMEN PELVIS W IV CONTRAST Additional Contrast? None    (Results Pending)      BEDSIDE ULTRASOUND:   Performed by ED Physician - none    LABS:  Labs Reviewed   CBC WITH AUTO DIFFERENTIAL - Abnormal; Notable for the following components:       Result Value    Basophils % 1.4 (*)     All other components within normal limits   COMPREHENSIVE METABOLIC PANEL - Abnormal; Notable for the following components:    BUN 22 (*)     All other components within normal limits   AMYLASE - Abnormal; Notable for the following components:    Amylase 127 (*)     All other components within normal limits   CULTURE, BLOOD 1

## 2024-01-28 LAB
BACTERIA BLD CULT ORG #2: NORMAL
BACTERIA BLD CULT: NORMAL

## 2024-01-31 ENCOUNTER — OFFICE VISIT (OUTPATIENT)
Dept: FAMILY MEDICINE CLINIC | Age: 54
End: 2024-01-31

## 2024-01-31 VITALS
OXYGEN SATURATION: 98 % | HEART RATE: 85 BPM | WEIGHT: 118 LBS | SYSTOLIC BLOOD PRESSURE: 90 MMHG | TEMPERATURE: 98.6 F | BODY MASS INDEX: 22.3 KG/M2 | DIASTOLIC BLOOD PRESSURE: 60 MMHG

## 2024-01-31 DIAGNOSIS — F98.8 ATTENTION DEFICIT DISORDER, UNSPECIFIED HYPERACTIVITY PRESENCE: ICD-10-CM

## 2024-01-31 DIAGNOSIS — J06.9 BACTERIAL URI: Primary | ICD-10-CM

## 2024-01-31 DIAGNOSIS — B96.89 BACTERIAL URI: Primary | ICD-10-CM

## 2024-01-31 DIAGNOSIS — L02.31 CUTANEOUS ABSCESS OF BUTTOCK: ICD-10-CM

## 2024-01-31 DIAGNOSIS — Z76.0 MEDICATION REFILL: ICD-10-CM

## 2024-01-31 RX ORDER — ECHINACEA PURPUREA EXTRACT 125 MG
2 TABLET ORAL PRN
Qty: 1 EACH | Refills: 0 | Status: SHIPPED | OUTPATIENT
Start: 2024-01-31

## 2024-01-31 RX ORDER — GUAIFENESIN 600 MG/1
1200 TABLET, EXTENDED RELEASE ORAL 2 TIMES DAILY
Qty: 40 TABLET | Refills: 0 | Status: SHIPPED | OUTPATIENT
Start: 2024-01-31 | End: 2024-02-10

## 2024-01-31 RX ORDER — DOXYCYCLINE HYCLATE 100 MG
100 TABLET ORAL 2 TIMES DAILY
Qty: 20 TABLET | Refills: 0 | Status: SHIPPED | OUTPATIENT
Start: 2024-01-31 | End: 2024-02-10

## 2024-01-31 RX ORDER — BENZONATATE 100 MG/1
100 CAPSULE ORAL 3 TIMES DAILY PRN
Qty: 30 CAPSULE | Refills: 0 | Status: SHIPPED | OUTPATIENT
Start: 2024-01-31 | End: 2024-02-10

## 2024-01-31 ASSESSMENT — ENCOUNTER SYMPTOMS
NAUSEA: 0
RHINORRHEA: 1
ABDOMINAL PAIN: 0
COUGH: 1
EYE REDNESS: 0
SORE THROAT: 1
VOMITING: 0
DIARRHEA: 0
SHORTNESS OF BREATH: 0
EYE DISCHARGE: 0

## 2024-01-31 NOTE — PROGRESS NOTES
[facility] SPECIALTY/PRIMARY CARE  [address], [address]  [facility] [address]  Dept: [phone]  Dept Fax: [phone]  Loc: [phone]     1/31/2024    Visit type: Follow up    Reason for Visit: Rash (Pt states that she noticed rash on buttocks yesterday) and Fatigue (Pt states that she feels very weak and struggling with joint pain and swelling. In to see urgent care and strep neg, that was yesterday. [facility] on Friday, labs/CT and Xray done. Labs abnormal)         Patient: Sisi Cam is a 53 y.o. female   HPI: 53-year-old female presents with rash of her gluteal region and generalized fatigue as well as joint pains.  Patient also states she has been ill as of late and has had swollen lymph nodes and a cough with runny nose.    ASSESSMENT/PLAN   1. Bacterial URI  -     sodium chloride (OCEAN) 0.65 % nasal spray; 2 sprays by Nasal route as needed for Congestion One to Two sprays per nostril 2-4 times/day, Disp-1 each, R-0Normal  -     guaiFENesin (MUCINEX) 600 MG extended release tablet; Take 2 tablets by mouth 2 times daily for 10 days, Disp-40 tablet, R-0Normal  -     benzonatate (TESSALON) 100 MG capsule; Take 1 capsule by mouth 3 times daily as needed for Cough, Disp-30 capsule, R-0Normal  -     doxycycline hyclate (VIBRA-TABS) 100 MG tablet; Take 1 tablet by mouth 2 times daily for 10 days, Disp-20 tablet, R-0Normal  2. Cutaneous abscess of buttock  -     doxycycline hyclate (VIBRA-TABS) 100 MG tablet; Take 1 tablet by mouth 2 times daily for 10 days, Disp-20 tablet, R-0Normal  -2-3 small abscesses noted at the medial fold of the intergluteal cleft  -Reports fever, chills, fatigue  -Adverse effects of medications prescribed were discussed, patient acknowledged understanding.  -Patient was advised to let me know if there is no improvement in the next 7 to 10 days. Discussed red flags warranting decision to go to the emergency department and patient

## 2024-01-31 NOTE — TELEPHONE ENCOUNTER
Comments: pt aware to [facility] appt for next refill    Last Office Visit (last PCP visit):   11/13/2023    Next Visit Date:  No future appointments.    **If hasn't been seen in over a year OR hasn't followed up according to last diabetes/ADHD visit, make appointment for patient before sending refill to provider.    Rx requested:  Requested Prescriptions      No prescriptions requested or ordered in this encounter

## 2024-02-01 LAB
BACTERIA BLD CULT ORG #2: NORMAL
BACTERIA BLD CULT: NORMAL

## 2024-02-01 RX ORDER — DEXTROAMPHETAMINE SACCHARATE, AMPHETAMINE ASPARTATE MONOHYDRATE, DEXTROAMPHETAMINE SULFATE AND AMPHETAMINE SULFATE 7.5; 7.5; 7.5; 7.5 MG/1; MG/1; MG/1; MG/1
30 CAPSULE, EXTENDED RELEASE ORAL 2 TIMES DAILY
Qty: 60 CAPSULE | Refills: 0 | Status: SHIPPED | OUTPATIENT
Start: 2024-02-01 | End: 2024-03-02

## 2024-02-07 DIAGNOSIS — M25.50 ARTHRALGIA, UNSPECIFIED JOINT: ICD-10-CM

## 2024-02-07 LAB
CRP SERPL HS-MCNC: <3 MG/L (ref 0–5)
ERYTHROCYTE [SEDIMENTATION RATE] IN BLOOD BY WESTERGREN METHOD: 2 MM (ref 0–30)

## 2024-02-08 ENCOUNTER — TELEPHONE (OUTPATIENT)
Dept: CARDIOLOGY | Facility: CLINIC | Age: 54
End: 2024-02-08
Payer: MEDICARE

## 2024-02-08 ENCOUNTER — HOSPITAL ENCOUNTER (EMERGENCY)
Age: 54
Discharge: HOME | End: 2024-02-08
Payer: MEDICARE

## 2024-02-08 ENCOUNTER — APPOINTMENT (OUTPATIENT)
Dept: GENERAL RADIOLOGY | Age: 54
End: 2024-02-08
Payer: MEDICARE

## 2024-02-08 ENCOUNTER — TELEPHONE (OUTPATIENT)
Dept: FAMILY MEDICINE CLINIC | Age: 54
End: 2024-02-08

## 2024-02-08 VITALS
HEART RATE: 70 BPM | HEIGHT: 62 IN | SYSTOLIC BLOOD PRESSURE: 108 MMHG | DIASTOLIC BLOOD PRESSURE: 70 MMHG | TEMPERATURE: 98.2 F | RESPIRATION RATE: 19 BRPM | BODY MASS INDEX: 21.71 KG/M2 | WEIGHT: 118 LBS | OXYGEN SATURATION: 100 %

## 2024-02-08 DIAGNOSIS — E03.9 HYPOTHYROIDISM, UNSPECIFIED TYPE: Primary | ICD-10-CM

## 2024-02-08 LAB
ALBUMIN SERPL-MCNC: 4.2 G/DL (ref 3.5–4.6)
ALP SERPL-CCNC: 100 U/L (ref 40–130)
ALT SERPL-CCNC: 10 U/L (ref 0–33)
ANION GAP SERPL CALCULATED.3IONS-SCNC: 12 MEQ/L (ref 9–15)
AST SERPL-CCNC: 13 U/L (ref 0–35)
B PARAP IS1001 DNA NPH QL NAA+NON-PROBE: NOT DETECTED
B PERT.PT PRMT NPH QL NAA+NON-PROBE: NOT DETECTED
BASOPHILS # BLD: 0.1 K/UL (ref 0–0.2)
BASOPHILS NFR BLD: 1.6 %
BILIRUB SERPL-MCNC: 0.3 MG/DL (ref 0.2–0.7)
BILIRUB UR QL STRIP: NEGATIVE
BUN SERPL-MCNC: 15 MG/DL (ref 6–20)
C PNEUM DNA NPH QL NAA+NON-PROBE: NOT DETECTED
CALCIUM SERPL-MCNC: 9.3 MG/DL (ref 8.5–9.9)
CHLORIDE SERPL-SCNC: 103 MEQ/L (ref 95–107)
CLARITY UR: CLEAR
CO2 SERPL-SCNC: 23 MEQ/L (ref 20–31)
COLOR UR: YELLOW
CREAT SERPL-MCNC: 0.69 MG/DL (ref 0.5–0.9)
EKG ATRIAL RATE: 56 BPM
EKG P AXIS: 98 DEGREES
EKG P-R INTERVAL: 142 MS
EKG Q-T INTERVAL: 432 MS
EKG QRS DURATION: 86 MS
EKG QTC CALCULATION (BAZETT): 416 MS
EKG R AXIS: 50 DEGREES
EKG T AXIS: 57 DEGREES
EKG VENTRICULAR RATE: 56 BPM
EOSINOPHIL # BLD: 0.2 K/UL (ref 0–0.7)
EOSINOPHIL NFR BLD: 2.7 %
ERYTHROCYTE [DISTWIDTH] IN BLOOD BY AUTOMATED COUNT: 12.1 % (ref 11.5–14.5)
FLUAV RNA NPH QL NAA+NON-PROBE: NOT DETECTED
FLUBV RNA NPH QL NAA+NON-PROBE: NOT DETECTED
GLOBULIN SER CALC-MCNC: 2.4 G/DL (ref 2.3–3.5)
GLUCOSE SERPL-MCNC: 91 MG/DL (ref 70–99)
GLUCOSE UR STRIP-MCNC: NEGATIVE MG/DL
HADV DNA NPH QL NAA+NON-PROBE: NOT DETECTED
HCOV 229E RNA NPH QL NAA+NON-PROBE: NOT DETECTED
HCOV HKU1 RNA NPH QL NAA+NON-PROBE: NOT DETECTED
HCOV NL63 RNA NPH QL NAA+NON-PROBE: NOT DETECTED
HCOV OC43 RNA NPH QL NAA+NON-PROBE: NOT DETECTED
HCT VFR BLD AUTO: 35 % (ref 37–47)
HGB BLD-MCNC: 11.7 G/DL (ref 12–16)
HGB UR QL STRIP: ABNORMAL
HMPV RNA NPH QL NAA+NON-PROBE: NOT DETECTED
HPIV1 RNA NPH QL NAA+NON-PROBE: NOT DETECTED
HPIV2 RNA NPH QL NAA+NON-PROBE: NOT DETECTED
HPIV3 RNA NPH QL NAA+NON-PROBE: NOT DETECTED
HPIV4 RNA NPH QL NAA+NON-PROBE: NOT DETECTED
KETONES UR STRIP-MCNC: NEGATIVE MG/DL
LACTATE BLDV-SCNC: 1.1 MMOL/L (ref 0.5–2.2)
LEUKOCYTE ESTERASE UR QL STRIP: NEGATIVE
LIPASE SERPL-CCNC: 95 U/L (ref 12–95)
LYMPHOCYTES # BLD: 2.4 K/UL (ref 1–4.8)
LYMPHOCYTES NFR BLD: 38.4 %
M PNEUMO DNA NPH QL NAA+NON-PROBE: NOT DETECTED
MAGNESIUM SERPL-MCNC: 1.7 MG/DL (ref 1.7–2.4)
MCH RBC QN AUTO: 28.5 PG (ref 27–31.3)
MCHC RBC AUTO-ENTMCNC: 33.4 % (ref 33–37)
MCV RBC AUTO: 85.4 FL (ref 79.4–94.8)
MONOCYTES # BLD: 0.5 K/UL (ref 0.2–0.8)
MONOCYTES NFR BLD: 8.4 %
NEUTROPHILS # BLD: 3.1 K/UL (ref 1.4–6.5)
NEUTS SEG NFR BLD: 48.6 %
NITRITE UR QL STRIP: NEGATIVE
PH UR STRIP: 7 [PH] (ref 5–9)
PLATELET # BLD AUTO: 330 K/UL (ref 130–400)
POTASSIUM SERPL-SCNC: 3.2 MEQ/L (ref 3.4–4.9)
PROCALCITONIN SERPL IA-MCNC: 0.05 NG/ML (ref 0–0.15)
PROT SERPL-MCNC: 6.6 G/DL (ref 6.3–8)
PROT UR STRIP-MCNC: NEGATIVE MG/DL
RBC # BLD AUTO: 4.1 M/UL (ref 4.2–5.4)
RBC #/AREA URNS HPF: NORMAL /HPF (ref 0–2)
RSV RNA NPH QL NAA+NON-PROBE: NOT DETECTED
RV+EV RNA NPH QL NAA+NON-PROBE: NOT DETECTED
SARS-COV-2 RNA NPH QL NAA+NON-PROBE: NOT DETECTED
SODIUM SERPL-SCNC: 138 MEQ/L (ref 135–144)
SP GR UR STRIP: 1 (ref 1–1.03)
T4 FREE SERPL-MCNC: 1.35 NG/DL (ref 0.84–1.68)
TSH REFLEX: 5.22 UIU/ML (ref 0.44–3.86)
URINE REFLEX TO CULTURE: ABNORMAL
UROBILINOGEN UR STRIP-ACNC: 0.2 E.U./DL
WBC # BLD AUTO: 6.3 K/UL (ref 4.8–10.8)
WBC #/AREA URNS HPF: NORMAL /HPF (ref 0–5)

## 2024-02-08 PROCEDURE — 93010 ELECTROCARDIOGRAM REPORT: CPT | Performed by: INTERNAL MEDICINE

## 2024-02-08 PROCEDURE — 84145 PROCALCITONIN (PCT): CPT

## 2024-02-08 PROCEDURE — 83690 ASSAY OF LIPASE: CPT

## 2024-02-08 PROCEDURE — 36415 COLL VENOUS BLD VENIPUNCTURE: CPT

## 2024-02-08 PROCEDURE — 2500000003 HC RX 250 WO HCPCS

## 2024-02-08 PROCEDURE — 81001 URINALYSIS AUTO W/SCOPE: CPT

## 2024-02-08 PROCEDURE — 80053 COMPREHEN METABOLIC PANEL: CPT

## 2024-02-08 PROCEDURE — 84439 ASSAY OF FREE THYROXINE: CPT

## 2024-02-08 PROCEDURE — 96361 HYDRATE IV INFUSION ADD-ON: CPT

## 2024-02-08 PROCEDURE — 99285 EMERGENCY DEPT VISIT HI MDM: CPT

## 2024-02-08 PROCEDURE — 85025 COMPLETE CBC W/AUTO DIFF WBC: CPT

## 2024-02-08 PROCEDURE — 6360000002 HC RX W HCPCS

## 2024-02-08 PROCEDURE — 83735 ASSAY OF MAGNESIUM: CPT

## 2024-02-08 PROCEDURE — 96375 TX/PRO/DX INJ NEW DRUG ADDON: CPT

## 2024-02-08 PROCEDURE — 96374 THER/PROPH/DIAG INJ IV PUSH: CPT

## 2024-02-08 PROCEDURE — A4216 STERILE WATER/SALINE, 10 ML: HCPCS

## 2024-02-08 PROCEDURE — 6370000000 HC RX 637 (ALT 250 FOR IP)

## 2024-02-08 PROCEDURE — 84443 ASSAY THYROID STIM HORMONE: CPT

## 2024-02-08 PROCEDURE — 93005 ELECTROCARDIOGRAM TRACING: CPT

## 2024-02-08 PROCEDURE — 0202U NFCT DS 22 TRGT SARS-COV-2: CPT

## 2024-02-08 PROCEDURE — 71045 X-RAY EXAM CHEST 1 VIEW: CPT

## 2024-02-08 PROCEDURE — 2580000003 HC RX 258

## 2024-02-08 PROCEDURE — 83605 ASSAY OF LACTIC ACID: CPT

## 2024-02-08 RX ORDER — KETOROLAC TROMETHAMINE 15 MG/ML
15 INJECTION, SOLUTION INTRAMUSCULAR; INTRAVENOUS ONCE
Status: COMPLETED | OUTPATIENT
Start: 2024-02-08 | End: 2024-02-08

## 2024-02-08 RX ORDER — LEVOTHYROXINE SODIUM 88 UG/1
88 TABLET ORAL ONCE
Status: COMPLETED | OUTPATIENT
Start: 2024-02-08 | End: 2024-02-08

## 2024-02-08 RX ORDER — 0.9 % SODIUM CHLORIDE 0.9 %
1000 INTRAVENOUS SOLUTION INTRAVENOUS ONCE
Status: COMPLETED | OUTPATIENT
Start: 2024-02-08 | End: 2024-02-08

## 2024-02-08 RX ORDER — ONDANSETRON 2 MG/ML
4 INJECTION INTRAMUSCULAR; INTRAVENOUS ONCE
Status: COMPLETED | OUTPATIENT
Start: 2024-02-08 | End: 2024-02-08

## 2024-02-08 RX ORDER — LEVOTHYROXINE SODIUM 88 UG/1
88 TABLET ORAL DAILY
Qty: 30 TABLET | Refills: 0 | Status: SHIPPED | OUTPATIENT
Start: 2024-02-08

## 2024-02-08 RX ORDER — POTASSIUM CHLORIDE 20 MEQ/1
40 TABLET, EXTENDED RELEASE ORAL ONCE
Status: COMPLETED | OUTPATIENT
Start: 2024-02-08 | End: 2024-02-08

## 2024-02-08 RX ADMIN — ONDANSETRON 4 MG: 2 INJECTION INTRAMUSCULAR; INTRAVENOUS at 04:07

## 2024-02-08 RX ADMIN — FAMOTIDINE 20 MG: 10 INJECTION, SOLUTION INTRAVENOUS at 04:07

## 2024-02-08 RX ADMIN — SODIUM CHLORIDE 1000 ML: 9 INJECTION, SOLUTION INTRAVENOUS at 04:07

## 2024-02-08 RX ADMIN — LEVOTHYROXINE SODIUM 88 MCG: 0.09 TABLET ORAL at 06:33

## 2024-02-08 RX ADMIN — POTASSIUM CHLORIDE 40 MEQ: 1500 TABLET, EXTENDED RELEASE ORAL at 05:24

## 2024-02-08 RX ADMIN — KETOROLAC TROMETHAMINE 15 MG: 15 INJECTION, SOLUTION INTRAMUSCULAR; INTRAVENOUS at 04:07

## 2024-02-08 ASSESSMENT — PAIN SCALES - GENERAL
PAINLEVEL_OUTOF10: 6
PAINLEVEL_OUTOF10: 6

## 2024-02-08 ASSESSMENT — ENCOUNTER SYMPTOMS
VOMITING: 0
PHOTOPHOBIA: 0
ABDOMINAL PAIN: 1
NAUSEA: 1
COUGH: 0
SHORTNESS OF BREATH: 1
DIARRHEA: 0

## 2024-02-08 ASSESSMENT — LIFESTYLE VARIABLES
HOW OFTEN DO YOU HAVE A DRINK CONTAINING ALCOHOL: NEVER
HOW MANY STANDARD DRINKS CONTAINING ALCOHOL DO YOU HAVE ON A TYPICAL DAY: PATIENT DOES NOT DRINK

## 2024-02-08 ASSESSMENT — PAIN DESCRIPTION - LOCATION
LOCATION: GENERALIZED
LOCATION: ABDOMEN

## 2024-02-08 NOTE — TELEPHONE ENCOUNTER
Pt discharged from [facility] ED on 3/8 - C/O nausea vomiting headache SOB . Also states can not get into her cardiologist for another 2 weeks. Please advise , thank you

## 2024-02-08 NOTE — TELEPHONE ENCOUNTER
I saw that thyroid was a little off. I would like to increase dose. Where to? This could be contributing to joint swelling. Mild anemia present. Not really sure why. I looked at xray reading. It was read as borderline cardiomegaly. Sometimes this is read differently depending on radiologist. I would keep the cardiologist appt for now that you have.

## 2024-02-08 NOTE — ED PROVIDER NOTES
[facility] ED  EMERGENCY DEPARTMENT ENCOUNTER      Pt Name: Sisi Cam  MRN: [id number]  Birthdate 1970  Date of evaluation: 2/8/2024  Provider: TRUDY Martines  3:30 AM EST      CHIEF COMPLAINT       Chief Complaint   Patient presents with    Hypotension     Patient states low BP around 0100    Nausea     Patient states nausea started around 0030         HISTORY OF PRESENT ILLNESS   (Location/Symptom, Timing/Onset, Context/Setting, Quality, Duration, Modifying Factors, Severity)  Note limiting factors.   Sisi Cam is a 53 y.o. female who presents to the emergency department with PMHx hypothyroidism, anemia, CKD, pancreatitis, fibromyalgia, major depressive disorder, nephrotic syndrome, orthostatic hypotension, Raynaud's, syncope, Parker's disease, gastroparesis, migraines, ADD, endometriosis, Sjogren's, rheumatoid arthritis.    Patient presents to the emergency room for evaluation of generalized illness, fatigue.  Patient states symptoms started about 2 weeks ago.  She was seen at a different [facility] ED and diagnosed with dehydration.  She was also seen in a [facility] earlier today.  Patient states she has also had intermittent nausea, abdominal discomfort, intermittent shortness of breath.  States she woke acutely this evening from sleep, states she had a dry mouth, states she got up to go get some water and did not feel well.  She took her blood pressure and noted it was 90s over 50s.  She has history of orthostatic hypotension so she took a midodrine.  Patient states she was also dry heaving at this time.  States diffuse body pains/myalgias.  Denies any localized abdominal pain, diarrhea, constipation, dysuria, hematuria, urinary urgency, urinary frequency, vaginal bleeding, sore throat, cough, ear pain, lethargy, headache, rash.    HPI    Nursing Notes were reviewed.    REVIEW OF SYSTEMS    (2-9 systems for level 4, 10 or more for level 5)     Review of Systems   Constitutional:

## 2024-02-09 NOTE — TELEPHONE ENCOUNTER
Future Appointments    This patient does not currently have any appointments scheduled.  Past Visits    Date Provider Specialty Visit Type Primary Dx   01/31/2024 Donnie Scherer MD Family Medicine Office Visit Bacterial URI   12/15/2023 Luis E Paige APRN  CNP Family Medicine Office Visit Periumbilical abdominal pain   11/13/2023 Emmie Avalos APRN  CNP Family Medicine Telemedicine Lymphadenopathy   11/08/2023 Emmie Avalos APRN - CNP Family Medicine Office Visit Neck pain   10/25/2023 Donnie Scherer MD Family Medicine Office Visit Infectious mononucleosis without complication, infectious mononucleosis due to unspecified organism

## 2024-02-11 RX ORDER — RIMEGEPANT SULFATE 75 MG/75MG
1 TABLET, ORALLY DISINTEGRATING ORAL DAILY PRN
Qty: 16 TABLET | Refills: 1 | Status: SHIPPED | OUTPATIENT
Start: 2024-02-11

## 2024-02-12 ENCOUNTER — TELEPHONE (OUTPATIENT)
Dept: FAMILY MEDICINE CLINIC | Age: 54
End: 2024-02-12

## 2024-02-12 NOTE — TELEPHONE ENCOUNTER
Pt says she is still having SOB and headaches made appt for tomorrow at 9am [facility]. Says the hospital increased her thyroid medication to 88 mcg. Pt is aware of rest of message

## 2024-02-12 NOTE — TELEPHONE ENCOUNTER
----- Message from Sisi Cam sent at 2/9/2024 12:10 PM EST -----  Regarding: Refill  Contact: [phone]  I was told I can’t get a refill on Nurtec until I come in. I’m scheduled for March 13th at 230, so I have to wait a month to get it?   I saw Dr cali a few weeks ago, was in ER twice, have to see my cardiologist for sinus bradycardia and boaderline edema. Emmie, I know I can’t see you everytime I’m sick but I was sometimes I just wish I could. I have NEVER felt so weak, fatigued and SOB, talking takes my breath away. My head is pounding , I’ll see u in March.  ER doctor raised levithyroxine to 88 and called in 30 pills to [facility], which I always use LAVERNE synthroid.

## 2024-02-13 ENCOUNTER — OFFICE VISIT (OUTPATIENT)
Dept: ENDOCRINOLOGY | Age: 54
End: 2024-02-13
Payer: MEDICARE

## 2024-02-13 ENCOUNTER — TELEMEDICINE (OUTPATIENT)
Dept: FAMILY MEDICINE CLINIC | Age: 54
End: 2024-02-13
Payer: MEDICARE

## 2024-02-13 VITALS
OXYGEN SATURATION: 98 % | HEART RATE: 96 BPM | BODY MASS INDEX: 21.71 KG/M2 | SYSTOLIC BLOOD PRESSURE: 104 MMHG | DIASTOLIC BLOOD PRESSURE: 72 MMHG | HEIGHT: 62 IN | WEIGHT: 118 LBS

## 2024-02-13 DIAGNOSIS — I51.7 CARDIOMEGALY: ICD-10-CM

## 2024-02-13 DIAGNOSIS — R59.1 LYMPHADENOPATHY: ICD-10-CM

## 2024-02-13 DIAGNOSIS — E03.9 ACQUIRED HYPOTHYROIDISM: Primary | ICD-10-CM

## 2024-02-13 DIAGNOSIS — D64.9 ANEMIA, UNSPECIFIED TYPE: ICD-10-CM

## 2024-02-13 DIAGNOSIS — R06.00 DYSPNEA, UNSPECIFIED TYPE: Primary | ICD-10-CM

## 2024-02-13 PROCEDURE — 99214 OFFICE O/P EST MOD 30 MIN: CPT | Performed by: NURSE PRACTITIONER

## 2024-02-13 PROCEDURE — G8427 DOCREV CUR MEDS BY ELIG CLIN: HCPCS | Performed by: NURSE PRACTITIONER

## 2024-02-13 PROCEDURE — 3017F COLORECTAL CA SCREEN DOC REV: CPT | Performed by: PHYSICIAN ASSISTANT

## 2024-02-13 PROCEDURE — G8420 CALC BMI NORM PARAMETERS: HCPCS | Performed by: PHYSICIAN ASSISTANT

## 2024-02-13 PROCEDURE — 3017F COLORECTAL CA SCREEN DOC REV: CPT | Performed by: NURSE PRACTITIONER

## 2024-02-13 PROCEDURE — G8427 DOCREV CUR MEDS BY ELIG CLIN: HCPCS | Performed by: PHYSICIAN ASSISTANT

## 2024-02-13 PROCEDURE — 99214 OFFICE O/P EST MOD 30 MIN: CPT | Performed by: PHYSICIAN ASSISTANT

## 2024-02-13 PROCEDURE — 1036F TOBACCO NON-USER: CPT | Performed by: PHYSICIAN ASSISTANT

## 2024-02-13 PROCEDURE — G8484 FLU IMMUNIZE NO ADMIN: HCPCS | Performed by: PHYSICIAN ASSISTANT

## 2024-02-13 NOTE — PROGRESS NOTES
Sisi Cam, was evaluated through a synchronous (real-time) audio-video encounter. The patient (or guardian if applicable) is aware that this is a billable service, which includes applicable co-pays. This Virtual Visit was conducted with patient's (and/or legal guardian's) consent. Patient identification was verified, and a caregiver was present when appropriate.   The patient was located at Home (Appt Dept State): [place]  Provider was located at Facility (Appt Dept): [address], [address], [address]  [place] [address]      Sisi Cam (:  1970) is a Established patient, presenting virtually for evaluation of the following:    Assessment & Plan   Below is the assessment and plan developed based on review of pertinent history, physical exam, labs, studies, and medications.  1. Dyspnea, unspecified type  -     Echo (TTE) complete (PRN contrast/bubble/strain/3D); Future  -     Full PFT Study With Bronchodilator; Future  2. Cardiomegaly  -     Echo (TTE) complete (PRN contrast/bubble/strain/3D); Future  3. Anemia, unspecified type  -     Vitamin B12 & Folate; Future  4. Lymphadenopathy  -     Tracie Card MD , Otolaryngology/ENT - [place]    FU  after testing    Subjective   HPI    Pt notes that she is not feeling well.  Has been to the ER recently and had bw done.  Thyroid found to be off.    CC- Joint pain and swelling  Fatigue  Dyspnea  Lymphadenopathy   Brain fog.     Lab Results   Component Value Date    WBC 6.3 2024    HGB 11.7 (L) 2024    HCT 35.0 (L) 2024     2024    CHOL 204 (H) 2022    TRIG 133 2022    HDL 69 (H) 2022    ALT 10 2024    AST 13 2024     2024    K 3.2 (L) 2024     2024    CREATININE 0.69 2024    BUN 15 2024    CO2 23 2024    TSH 1.44 2023    INR 0.9 2024    LABA1C 4.9 2022       Review of Systems   Constitutional:  Positive for fatigue.   HENT:  Positive

## 2024-02-13 NOTE — PATIENT INSTRUCTIONS
Levothyroxine 88 mcg daily on an empty stomach (just started 2/8/2024)   Metformin 500 mg daily before breakfast  Repeat labs every 6 weeks  Follow up in 3 months

## 2024-02-13 NOTE — PROGRESS NOTES
2/13/2024    Assessment:       Diagnosis Orders   1. Acquired hypothyroidism  Insulin Free & Total    TSH    T4, Free          HO gallbladder pancreatitis   PLAN:     Levothyroxine 88 mcg daily on an empty stomach (just started 2/8/2024)   Metformin 500 mg daily before breakfast  Repeat labs every 6 weeks  Follow up in 3 months       Orders Placed This Encounter   Procedures    Insulin Free & Total     Standing Status:   Future     Standing Expiration Date:   2/13/2025    TSH     Standing Status:   Standing     Number of Occurrences:   10     Standing Expiration Date:   2/13/2025    T4, Free     Standing Status:   Standing     Number of Occurrences:   10     Standing Expiration Date:   2/13/2025     No orders of the defined types were placed in this encounter.    Return in about 3 months (around 5/13/2024) for Thyroid.  Subjective:     Chief Complaint   Patient presents with    New Patient    Thyroid Problem     Vitals:    02/13/24 1512   BP: 104/72   Site: Left Upper Arm   Pulse: 96   SpO2: 98%   Weight: 53.5 kg (118 lb)   Height: 1.575 m (5' 2.01\")     Wt Readings from Last 3 Encounters:   02/13/24 53.5 kg (118 lb)   02/08/24 53.5 kg (118 lb)   01/31/24 53.5 kg (118 lb)     BP Readings from Last 3 Encounters:   02/13/24 104/72   02/08/24 108/70   01/31/24 90/60     Sisi is a 53-year-old female presents today for evaluation of her thyroid disease.  She diagnosed many years ago and has been on both levothyroxine and Kemp Thyroid.  She was recently restarted on levothyroxine 88 mcg by mouth daily.  Will monitor labs every 6 weeks and make dosing adjustments as needed.  She has multiple medical complaints today including shortness of breath, weakness, left ankle edema, and arthralgias.  We discussed the pathophysiology of thyroid disease and the probability that these symptoms were being caused by her mild hypothyroidism is unlikely.  I advised her to follow-up with her primary care provider as scheduled to discuss

## 2024-02-26 ENCOUNTER — TELEMEDICINE (OUTPATIENT)
Dept: FAMILY MEDICINE CLINIC | Age: 54
End: 2024-02-26
Payer: MEDICARE

## 2024-02-26 DIAGNOSIS — Z09 HOSPITAL DISCHARGE FOLLOW-UP: ICD-10-CM

## 2024-02-26 DIAGNOSIS — R10.84 GENERALIZED ABDOMINAL PAIN: Primary | ICD-10-CM

## 2024-02-26 DIAGNOSIS — K59.09 CHRONIC CONSTIPATION: ICD-10-CM

## 2024-02-26 DIAGNOSIS — K86.81 EXOCRINE PANCREATIC INSUFFICIENCY: ICD-10-CM

## 2024-02-26 PROCEDURE — G8427 DOCREV CUR MEDS BY ELIG CLIN: HCPCS | Performed by: NURSE PRACTITIONER

## 2024-02-26 PROCEDURE — G8420 CALC BMI NORM PARAMETERS: HCPCS | Performed by: NURSE PRACTITIONER

## 2024-02-26 PROCEDURE — G8484 FLU IMMUNIZE NO ADMIN: HCPCS | Performed by: NURSE PRACTITIONER

## 2024-02-26 PROCEDURE — 1036F TOBACCO NON-USER: CPT | Performed by: NURSE PRACTITIONER

## 2024-02-26 PROCEDURE — 99214 OFFICE O/P EST MOD 30 MIN: CPT | Performed by: NURSE PRACTITIONER

## 2024-02-26 PROCEDURE — 3017F COLORECTAL CA SCREEN DOC REV: CPT | Performed by: NURSE PRACTITIONER

## 2024-02-26 RX ORDER — AMOXICILLIN 250 MG
1 CAPSULE ORAL 2 TIMES DAILY
COMMUNITY
Start: 2024-02-22 | End: 2024-03-23

## 2024-02-26 RX ORDER — PANCRELIPASE 36000; 180000; 114000 [USP'U]/1; [USP'U]/1; [USP'U]/1
CAPSULE, DELAYED RELEASE PELLETS ORAL
Qty: 180 CAPSULE | Refills: 3 | Status: SHIPPED | OUTPATIENT
Start: 2024-02-26

## 2024-02-26 ASSESSMENT — ENCOUNTER SYMPTOMS
CONSTIPATION: 1
ABDOMINAL DISTENTION: 1
SHORTNESS OF BREATH: 0
COUGH: 0
ABDOMINAL PAIN: 1

## 2024-02-26 NOTE — PROGRESS NOTES
Called to MARIAM Latif via   
that she did not have a good experience while in the hospital.     Still feeling weak and distended.      Review of Systems   Constitutional:  Positive for fatigue.   Respiratory:  Negative for cough and shortness of breath.    Cardiovascular:  Negative for chest pain.   Gastrointestinal:  Positive for abdominal distention, abdominal pain and constipation.   Neurological:  Positive for weakness.          Objective   Patient-Reported Vitals  BP Observations: No, remote/electronic monitoring device was not used or able to be verified  Patient-Reported Weight: 117 lb  Patient-Reported Height: 5'2       Physical Exam  [INSTRUCTIONS:  \"[x]\" Indicates a positive item  \"[]\" Indicates a negative item  -- DELETE ALL ITEMS NOT EXAMINED]    Constitutional: [x] Appears well-developed and well-nourished [x] No apparent distress      [] Abnormal -     Mental status: [x] Alert and awake  [x] Oriented to person/place/time [x] Able to follow commands    [] Abnormal -     Eyes:   EOM    [x]  Normal    [] Abnormal -   Sclera  [x]  Normal    [] Abnormal -          Discharge [x]  None visible   [] Abnormal -     HENT: [x] Normocephalic, atraumatic  [] Abnormal -   [x] Mouth/Throat: Mucous membranes are moist    External Ears [x] Normal  [] Abnormal -    Neck: [x] No visualized mass [] Abnormal -     Pulmonary/Chest: [x] Respiratory effort normal   [x] No visualized signs of difficulty breathing or respiratory distress        [] Abnormal -      Musculoskeletal:   [x] Normal gait with no signs of ataxia         [x] Normal range of motion of neck        [] Abnormal -     Neurological:        [x] No Facial Asymmetry (Cranial nerve 7 motor function) (limited exam due to video visit)          [x] No gaze palsy        [] Abnormal -          Skin:        [x] No significant exanthematous lesions or discoloration noted on facial skin         [] Abnormal -            Psychiatric:       [x] Normal Affect [] Abnormal -        [x] No

## 2024-02-29 DIAGNOSIS — F98.8 ATTENTION DEFICIT DISORDER, UNSPECIFIED HYPERACTIVITY PRESENCE: ICD-10-CM

## 2024-02-29 DIAGNOSIS — Z76.0 MEDICATION REFILL: ICD-10-CM

## 2024-03-01 ENCOUNTER — OFFICE VISIT (OUTPATIENT)
Dept: GASTROENTEROLOGY | Age: 54
End: 2024-03-01
Payer: MEDICARE

## 2024-03-01 VITALS — WEIGHT: 117 LBS | HEIGHT: 62 IN | BODY MASS INDEX: 21.53 KG/M2 | OXYGEN SATURATION: 98 % | HEART RATE: 84 BPM

## 2024-03-01 DIAGNOSIS — K86.81 EXOCRINE PANCREATIC INSUFFICIENCY: ICD-10-CM

## 2024-03-01 DIAGNOSIS — R14.0 ABDOMINAL BLOATING: ICD-10-CM

## 2024-03-01 DIAGNOSIS — Z87.19 HISTORY OF PANCREATITIS: ICD-10-CM

## 2024-03-01 DIAGNOSIS — K59.00 CONSTIPATION, UNSPECIFIED CONSTIPATION TYPE: Primary | ICD-10-CM

## 2024-03-01 PROBLEM — E11.9 TYPE 2 DIABETES MELLITUS (HCC): Status: ACTIVE | Noted: 2024-03-01

## 2024-03-01 PROCEDURE — G8484 FLU IMMUNIZE NO ADMIN: HCPCS | Performed by: NURSE PRACTITIONER

## 2024-03-01 PROCEDURE — 99213 OFFICE O/P EST LOW 20 MIN: CPT | Performed by: NURSE PRACTITIONER

## 2024-03-01 PROCEDURE — G8427 DOCREV CUR MEDS BY ELIG CLIN: HCPCS | Performed by: NURSE PRACTITIONER

## 2024-03-01 PROCEDURE — 3017F COLORECTAL CA SCREEN DOC REV: CPT | Performed by: NURSE PRACTITIONER

## 2024-03-01 PROCEDURE — G8420 CALC BMI NORM PARAMETERS: HCPCS | Performed by: NURSE PRACTITIONER

## 2024-03-01 PROCEDURE — 1036F TOBACCO NON-USER: CPT | Performed by: NURSE PRACTITIONER

## 2024-03-01 RX ORDER — LINACLOTIDE 290 UG/1
290 CAPSULE, GELATIN COATED ORAL
Qty: 30 CAPSULE | Refills: 3 | Status: SHIPPED | OUTPATIENT
Start: 2024-03-01

## 2024-03-01 RX ORDER — DEXTROAMPHETAMINE SACCHARATE, AMPHETAMINE ASPARTATE MONOHYDRATE, DEXTROAMPHETAMINE SULFATE AND AMPHETAMINE SULFATE 7.5; 7.5; 7.5; 7.5 MG/1; MG/1; MG/1; MG/1
30 CAPSULE, EXTENDED RELEASE ORAL 2 TIMES DAILY
Qty: 60 CAPSULE | Refills: 0 | Status: SHIPPED | OUTPATIENT
Start: 2024-03-01 | End: 2024-03-31

## 2024-03-01 RX ORDER — LINACLOTIDE 290 UG/1
290 CAPSULE, GELATIN COATED ORAL
COMMUNITY
Start: 2024-02-25 | End: 2024-03-01

## 2024-03-01 NOTE — PROGRESS NOTES
Gastroenterology Clinic Follow up Visit    Sisi Cam  [id number]  Chief Complaint   Patient presents with    Follow-up       HPI: 53 y.o. female following up after last GI clinic on 7/31/2023     Interval change: Patient presents to office for follow-up from recent hospitalization 2/16/2024 to 2/23/2024.  Patient was last seen in GI clinic 7/20/2023 per Dr. Quiroz.  Patient had gastric emptying completed at [facility] noting normal study.  Patient was recently hospitalized for abdominal pain and hypertension admitted to [facility].  She was previously taking Linzess 145 mcg with suboptimal results.  She was given prescription for increased linzess to 290 mcg daily at time of discharge and has been taking for 4 days.  Patient does endorse having small soft bowel movement daily.  Patient had KUB 2/26/2024 indicating nonobstructive nonspecific bowel gas pattern.  Patient does report left upper abdominal discomfort, this has been consistent since hospitalization. Patient reports she was not taking creon during hospitalization, she reports noticing full tablets of medication in her stool during hospitalization.  She followed up with PCP after hospitalization and was started again on Creon.  Patient denies any unintentional weight loss, dysphagia, hematemesis, hematochezia, nor melena.    Summarization of hospitalization:  Patient was admitted to [facility] for constipation and abdominal pain.  She was initiated on bowel rest and bowel regiment including Linzess for constipation at 290 mcg daily.  Patient had bowel prep, enemas, serial KUBs and CTs of the abdomen noting significant stool burden, no bowel obstruction, suspected ileus.     HPI from last GI clinic visit on 7/31/2023 summarized below:  52-year-old female comes to the office because of abdominal discomfort since early part of July, and has been getting worse recently.  Patient experience moderate abdominal pain predominant in the upper abdominal area and

## 2024-03-01 NOTE — TELEPHONE ENCOUNTER
Comments:     Last Office Visit (last PCP visit):   2/26/2024    Next Visit Date:  03/12/2024    **If hasn't been seen in over a year OR hasn't followed up according to last diabetes/ADHD visit, make appointment for patient before sending refill to provider.    Rx requested:  Requested Prescriptions     Pending Prescriptions Disp Refills    amphetamine-dextroamphetamine (ADDERALL XR) 30 MG extended release capsule 60 capsule 0     Sig: Take 1 capsule by mouth in the morning and 1 capsule in the evening. Do all this for 30 days.

## 2024-03-04 DIAGNOSIS — R51.9 CHRONIC NONINTRACTABLE HEADACHE, UNSPECIFIED HEADACHE TYPE: ICD-10-CM

## 2024-03-04 DIAGNOSIS — G47.00 INSOMNIA, UNSPECIFIED TYPE: ICD-10-CM

## 2024-03-04 DIAGNOSIS — G89.29 CHRONIC NONINTRACTABLE HEADACHE, UNSPECIFIED HEADACHE TYPE: ICD-10-CM

## 2024-03-04 RX ORDER — TRAZODONE HYDROCHLORIDE 150 MG/1
TABLET ORAL
Qty: 30 TABLET | Refills: 0 | Status: SHIPPED | OUTPATIENT
Start: 2024-03-04 | End: 2024-03-08 | Stop reason: SDUPTHER

## 2024-03-04 RX ORDER — TOPIRAMATE 100 MG/1
100 TABLET, FILM COATED ORAL 2 TIMES DAILY
Qty: 60 TABLET | Refills: 0 | Status: SHIPPED | OUTPATIENT
Start: 2024-03-04

## 2024-03-04 NOTE — TELEPHONE ENCOUNTER
Comments: Please review, thanks    Last Office Visit (last PCP visit):   12/15/2023    Next Visit Date:  Future Appointments   Date Time Provider Department Center   3/8/2024  1:00 PM JETHRO PFT ROOM 1 MLOZ PFT MOLZ Center   3/12/2024  2:30 PM [facility]   3/15/2024  9:00 AM Tracie Villasenor MD MLOX [facility] [place]   3/26/2024  7:30 AM JETHRO NUCLEAR MEDICINE ROOM 3 MLOZ NUC MED MOLZ Fac RAD   3/26/2024  8:00 AM MAL ECHO 1 MALZ  KAYLYN MALZ Fac RAD   4/2/2024 10:00 AM Slavik-Bosworth, Kelly J, APRN - CNP [place] [facility] [place]   5/14/2024  1:30 PM Darrion June, TRUDY V[facility]quez [facility]       **If hasn't been seen in over a year OR hasn't followed up according to last diabetes/ADHD visit, make appointment for patient before sending refill to provider.    Rx requested:  Requested Prescriptions     Pending Prescriptions Disp Refills    topiramate (TOPAMAX) 100 MG tablet 60 tablet 0     Sig: Take 1 tablet by mouth 2 times daily    traZODone (DESYREL) 150 MG tablet 30 tablet 0     Sig: As needed

## 2024-03-04 NOTE — PROGRESS NOTES
"Subjective   Patient ID:   [id number]   Leigha Alexis is a 53 y.o. female who presents for elevated BW (NPV).    Chief Complaint   Patient presents with    elevated BW     NPV     Patient was here in 2003.    Since last visit, 01-, patient reports her EBV has been elevated and she has been sick more frequently.  She had testing done at [facility].  She has had lymphadenopathy, chronic fatigue x1 month, swelling \"everywhere,\" leg weakness and recurrent throat infections.  This last month her Sx have been the worst.  She has had a few years of \"fluid pockets\" HERE , but gets them everywhere and they can last a couple of weeks.  She was at the [facility] for ileus and was told something else is going on, per patient.    Patient has an appointment scheduled with Aissatou Moya MD, Otolaryngologist, 03-, who is planning a neck Bx.      Patient has Sjogren syndrome and is frustrated because something is always going on.  She was recently hospitalized for pancreatitis.  She notes her EKGs are suddenly not normal and feels that something is going on.    Patient is S/P a Mohs procedure by [facility].    Review of Systems   Constitutional:  Positive for fatigue.   Skin:         Random fluid pockets all over.  Swollen lymph nodes.   Neurological:  Positive for weakness (leg).     Objective     /84   Pulse 77   Wt 53.5 kg (118 lb)   SpO2 99%   BMI 21.58 kg/m²      Physical Exam  Constitutional:       Appearance: Normal appearance.   HENT:      Head: Normocephalic and atraumatic.      Right Ear: External ear normal. There is no impacted cerumen.      Left Ear: External ear normal. There is no impacted cerumen.      Nose: No congestion or rhinorrhea.   Eyes:      Extraocular Movements: Extraocular movements intact.      Conjunctiva/sclera: Conjunctivae normal.      Pupils: Pupils are equal, round, and reactive to light.   Cardiovascular:      Rate and Rhythm: Normal rate and regular rhythm.      Heart sounds: No " murmur heard.     No friction rub. No gallop.   Pulmonary:      Effort: No respiratory distress.      Breath sounds: No wheezing, rhonchi or rales.   Skin:     General: Skin is warm and dry.      Comments: She has 2 cm circular, flesh colored, edematous area that is mildy raised on left ankle   Neurological:      Mental Status: She is alert.   Psychiatric:         Mood and Affect: Mood normal.         Behavior: Behavior normal.     Assessment/Plan         Other fatigue  I ordered a basic immune workup and I checked for common viral infections that cause fatigue. I explained to the patient that her EBV titers will most likely always stay positive.   She has a positive Anti-VCA IgG, EA and EBNA.   Anti-VCA IgM appears early in EBV infection and usually disappears within four to six weeks.    Anti-VCA IgG appears in the acute phase of EBV infection, peaks at two to four weeks after onset, declines slightly then persists for the rest of a person’s life.    Anti-EA IgG appears in the acute phase of illness and generally falls to undetectable levels after three to six months. In many people, detection of antibody to EA is a sign of active infection. However, 20% of healthy people may have antibodies against EA for years.    EBV nuclear antigen (EBNA)  Antibody to EBNA, determined by the standard immunofluorescent test, is not seen in the acute phase of EBV infection but slowly appears two to four months after onset of symptoms and persists for the rest of a person’s life.     I will order a PCR to confirm she does not have an infection.     Other skin changes  Follow up with dermatologist. I am unsure if a biopsy is indicated .      By signing my name below, I, Cynthia Petit, attest that this documentation has been prepared under the direction and in the presence of Josette Peace MD.  All medical record entries made by the Cynthia were at my direction and personally dictated by me. I have reviewed the chart  and agree that the record accurately reflects my personal performance of the history, physical exam, discussion and plan.

## 2024-03-05 ENCOUNTER — LAB (OUTPATIENT)
Dept: LAB | Facility: LAB | Age: 54
End: 2024-03-05
Payer: MEDICARE

## 2024-03-05 ENCOUNTER — OFFICE VISIT (OUTPATIENT)
Dept: ALLERGY | Facility: CLINIC | Age: 54
End: 2024-03-05
Payer: MEDICARE

## 2024-03-05 VITALS
OXYGEN SATURATION: 99 % | SYSTOLIC BLOOD PRESSURE: 122 MMHG | DIASTOLIC BLOOD PRESSURE: 84 MMHG | BODY MASS INDEX: 21.58 KG/M2 | WEIGHT: 118 LBS | HEART RATE: 77 BPM

## 2024-03-05 DIAGNOSIS — E55.9 VITAMIN D DEFICIENCY: ICD-10-CM

## 2024-03-05 DIAGNOSIS — M25.50 ARTHRALGIA, UNSPECIFIED JOINT: ICD-10-CM

## 2024-03-05 DIAGNOSIS — M25.50 ARTHRALGIA, UNSPECIFIED JOINT: Primary | ICD-10-CM

## 2024-03-05 LAB
25(OH)D3 SERPL-MCNC: 53 NG/ML (ref 30–100)
CMV IGG AVIDITY SERPL IA-RTO: NONREACTIVE %
ERYTHROCYTE [SEDIMENTATION RATE] IN BLOOD BY WESTERGREN METHOD: 1 MM/H (ref 0–30)
IGA SERPL-MCNC: 180 MG/DL (ref 70–400)
IGG SERPL-MCNC: 977 MG/DL (ref 700–1600)
IGM SERPL-MCNC: 60 MG/DL (ref 40–230)
VIT B12 SERPL-MCNC: 373 PG/ML (ref 211–911)

## 2024-03-05 PROCEDURE — 36415 COLL VENOUS BLD VENIPUNCTURE: CPT

## 2024-03-05 PROCEDURE — 1036F TOBACCO NON-USER: CPT | Performed by: ALLERGY & IMMUNOLOGY

## 2024-03-05 PROCEDURE — 86631 CHLAMYDIA ANTIBODY: CPT

## 2024-03-05 PROCEDURE — 86644 CMV ANTIBODY: CPT

## 2024-03-05 PROCEDURE — 84207 ASSAY OF VITAMIN B-6: CPT

## 2024-03-05 PROCEDURE — 86317 IMMUNOASSAY INFECTIOUS AGENT: CPT

## 2024-03-05 PROCEDURE — 87799 DETECT AGENT NOS DNA QUANT: CPT

## 2024-03-05 PROCEDURE — 85652 RBC SED RATE AUTOMATED: CPT

## 2024-03-05 PROCEDURE — 82784 ASSAY IGA/IGD/IGG/IGM EACH: CPT

## 2024-03-05 PROCEDURE — 82607 VITAMIN B-12: CPT

## 2024-03-05 PROCEDURE — 86632 CHLAMYDIA IGM ANTIBODY: CPT

## 2024-03-05 PROCEDURE — 82306 VITAMIN D 25 HYDROXY: CPT

## 2024-03-05 PROCEDURE — 99205 OFFICE O/P NEW HI 60 MIN: CPT | Performed by: ALLERGY & IMMUNOLOGY

## 2024-03-05 PROCEDURE — 86038 ANTINUCLEAR ANTIBODIES: CPT

## 2024-03-05 PROCEDURE — 86738 MYCOPLASMA ANTIBODY: CPT

## 2024-03-05 PROCEDURE — 86645 CMV ANTIBODY IGM: CPT

## 2024-03-05 RX ORDER — BUMETANIDE 2 MG/1
2 TABLET ORAL
COMMUNITY
Start: 2024-03-05

## 2024-03-05 RX ORDER — LEVOTHYROXINE SODIUM 88 UG/1
88 TABLET ORAL DAILY
COMMUNITY
Start: 2024-02-08

## 2024-03-05 ASSESSMENT — ENCOUNTER SYMPTOMS
FATIGUE: 1
WEAKNESS: 1

## 2024-03-05 NOTE — PATIENT INSTRUCTIONS
Obtain blood work to evaluate EBV panel, vitamin D, B12 and immune system.  We will call you with results, recommendations and followup plan.    Follow up with [facility] for evaluation of skin.

## 2024-03-06 LAB
ANA SER QL HEP2 SUBST: NEGATIVE
EBV DNA SPEC NAA+PROBE-LOG#: NORMAL {LOG_COPIES}/ML
LABORATORY COMMENT REPORT: NOT DETECTED

## 2024-03-07 LAB
CMV IGM SERPL-ACNC: <8 AU/ML
M PNEUMO IGG SER IA-ACNC: 0.04 U/L
M PNEUMO IGM SER IA-ACNC: 0.1 U/L

## 2024-03-08 ENCOUNTER — HOSPITAL ENCOUNTER (OUTPATIENT)
Dept: PULMONOLOGY | Age: 54
Discharge: HOME | End: 2024-03-08
Payer: COMMERCIAL

## 2024-03-08 DIAGNOSIS — G47.00 INSOMNIA, UNSPECIFIED TYPE: ICD-10-CM

## 2024-03-08 DIAGNOSIS — R06.00 DYSPNEA, UNSPECIFIED TYPE: ICD-10-CM

## 2024-03-08 DIAGNOSIS — K21.9 GASTROESOPHAGEAL REFLUX DISEASE WITHOUT ESOPHAGITIS: ICD-10-CM

## 2024-03-08 LAB
C PNEUM IGG TITR SER IF: ABNORMAL {TITER}
C PNEUM IGM TITR SER IF: NORMAL {TITER}
C PSITTACI IGG TITR SER IF: ABNORMAL {TITER}
C PSITTACI IGM TITR SER IF: NORMAL {TITER}
C TRACH IGG TITR SER IF: ABNORMAL {TITER}
C TRACH IGM TITR SER IF: NORMAL {TITER}
MICROBIOLOGIST REVIEW: NORMAL
S PN DA SERO 19F IGG SER-MCNC: 4.54 UG/ML
S PNEUM DA 1 IGG SER-MCNC: 2.04 UG/ML
S PNEUM DA 10A IGG SER-MCNC: 1.17 UG/ML
S PNEUM DA 11A IGG SER-MCNC: 1.8 UG/ML
S PNEUM DA 12F IGG SER-MCNC: 0.33 UG/ML
S PNEUM DA 14 IGG SER-MCNC: 8.67 UG/ML
S PNEUM DA 15B IGG SER-MCNC: 1.34 UG/ML
S PNEUM DA 17F IGG SER-MCNC: 0.61 UG/ML
S PNEUM DA 18C IGG SER-MCNC: 0.38 UG/ML
S PNEUM DA 19A IGG SER-MCNC: 2.22 UG/ML
S PNEUM DA 2 IGG SER-MCNC: 5.43 UG/ML
S PNEUM DA 20A IGG SER-MCNC: 4.47 UG/ML
S PNEUM DA 22F IGG SER-MCNC: 7.53 UG/ML
S PNEUM DA 23F IGG SER-MCNC: 0.46 UG/ML
S PNEUM DA 3 IGG SER-MCNC: 0.62 UG/ML
S PNEUM DA 33F IGG SER-MCNC: 1.94 UG/ML
S PNEUM DA 4 IGG SER-MCNC: 3.15 UG/ML
S PNEUM DA 5 IGG SER-MCNC: 3.01 UG/ML
S PNEUM DA 6B IGG SER-MCNC: 1.13 UG/ML
S PNEUM DA 7F IGG SER-MCNC: 3.48 UG/ML
S PNEUM DA 8 IGG SER-MCNC: >19.77 UG/ML
S PNEUM DA 9N IGG SER-MCNC: 3.37 UG/ML
S PNEUM DA 9V IGG SER-MCNC: 2.47 UG/ML
S PNEUM SEROTYPE IGG SER-IMP: NORMAL

## 2024-03-08 PROCEDURE — 94060 EVALUATION OF WHEEZING: CPT

## 2024-03-08 PROCEDURE — 94729 DIFFUSING CAPACITY: CPT

## 2024-03-08 PROCEDURE — 94726 PLETHYSMOGRAPHY LUNG VOLUMES: CPT

## 2024-03-08 PROCEDURE — 6360000002 HC RX W HCPCS

## 2024-03-08 RX ORDER — RIMEGEPANT SULFATE 75 MG/75MG
1 TABLET, ORALLY DISINTEGRATING ORAL DAILY PRN
Qty: 16 TABLET | Refills: 1 | Status: SHIPPED | OUTPATIENT
Start: 2024-03-08

## 2024-03-08 RX ORDER — ALBUTEROL SULFATE 2.5 MG/3ML
SOLUTION RESPIRATORY (INHALATION)
Status: COMPLETED
Start: 2024-03-08 | End: 2024-03-08

## 2024-03-08 RX ORDER — TRAZODONE HYDROCHLORIDE 150 MG/1
TABLET ORAL
Qty: 30 TABLET | Refills: 0 | Status: CANCELLED | OUTPATIENT
Start: 2024-03-08

## 2024-03-08 RX ORDER — TRAZODONE HYDROCHLORIDE 150 MG/1
150 TABLET ORAL NIGHTLY PRN
Qty: 30 TABLET | Refills: 0 | Status: SHIPPED | OUTPATIENT
Start: 2024-03-08

## 2024-03-08 RX ADMIN — ALBUTEROL SULFATE 2.5 MG: 2.5 SOLUTION RESPIRATORY (INHALATION) at 13:40

## 2024-03-08 NOTE — TELEPHONE ENCOUNTER
Last Office Visit (last PCP visit):   2/26/2024    Next Visit Date:    3/12/2024  2:30 PM   OFFICE VISIT  [facility]  Emmie Avalos APRN - CNP  Appointment Notes: Refills

## 2024-03-08 NOTE — TELEPHONE ENCOUNTER
Last Office Visit (last PCP visit):   2/26/2024    Next Visit Date:    3/12/2024  2:30 PMOFFICE VISIT  [facility]  Emmie Avalos APRN - CNP   Appointment Notes: Refills

## 2024-03-09 LAB — PYRIDOXAL PHOS SERPL-SCNC: 20.7 NMOL/L (ref 20–125)

## 2024-03-10 PROBLEM — R53.83 OTHER FATIGUE: Status: ACTIVE | Noted: 2024-03-10

## 2024-03-10 PROBLEM — R23.8 OTHER SKIN CHANGES: Status: ACTIVE | Noted: 2024-03-10

## 2024-03-10 NOTE — ASSESSMENT & PLAN NOTE
I ordered a basic immune workup and I checked for common viral infections that cause fatigue. I explained to the patient that her EBV titers will most likely always stay positive.   She has a positive Anti-VCA IgG, EA and EBNA.   Anti-VCA IgM appears early in EBV infection and usually disappears within four to six weeks.    Anti-VCA IgG appears in the acute phase of EBV infection, peaks at two to four weeks after onset, declines slightly then persists for the rest of a person’s life.    Anti-EA IgG appears in the acute phase of illness and generally falls to undetectable levels after three to six months. In many people, detection of antibody to EA is a sign of active infection. However, 20% of healthy people may have antibodies against EA for years.    EBV nuclear antigen (EBNA)  Antibody to EBNA, determined by the standard immunofluorescent test, is not seen in the acute phase of EBV infection but slowly appears two to four months after onset of symptoms and persists for the rest of a person’s life.     I will order a PCR to confirm she does not have an infection.

## 2024-03-12 ENCOUNTER — OFFICE VISIT (OUTPATIENT)
Dept: FAMILY MEDICINE CLINIC | Age: 54
End: 2024-03-12

## 2024-03-12 VITALS
SYSTOLIC BLOOD PRESSURE: 98 MMHG | BODY MASS INDEX: 21.03 KG/M2 | OXYGEN SATURATION: 98 % | DIASTOLIC BLOOD PRESSURE: 62 MMHG | WEIGHT: 115 LBS | TEMPERATURE: 98.4 F | HEART RATE: 89 BPM

## 2024-03-12 DIAGNOSIS — M25.50 ARTHRALGIA, UNSPECIFIED JOINT: ICD-10-CM

## 2024-03-12 DIAGNOSIS — M35.00 SJOGREN'S SYNDROME, WITH UNSPECIFIED ORGAN INVOLVEMENT (HCC): Primary | ICD-10-CM

## 2024-03-12 DIAGNOSIS — R06.00 DYSPNEA, UNSPECIFIED TYPE: ICD-10-CM

## 2024-03-12 DIAGNOSIS — Z13.1 DIABETES MELLITUS SCREENING: ICD-10-CM

## 2024-03-12 DIAGNOSIS — Z13.220 LIPID SCREENING: ICD-10-CM

## 2024-03-12 RX ORDER — BUMETANIDE 2 MG/1
2 TABLET ORAL
COMMUNITY
Start: 2024-03-05

## 2024-03-12 RX ORDER — PLECANATIDE 3 MG/1
TABLET ORAL
COMMUNITY
Start: 2024-03-11

## 2024-03-12 RX ORDER — PREDNISONE 20 MG/1
20 TABLET ORAL 2 TIMES DAILY
Qty: 10 TABLET | Refills: 0 | Status: SHIPPED | OUTPATIENT
Start: 2024-03-12 | End: 2024-03-17

## 2024-03-12 RX ORDER — MAGNESIUM CHLORIDE 71.5 G/G
2 TABLET ORAL DAILY
Qty: 60 TABLET | Refills: 5 | Status: SHIPPED | OUTPATIENT
Start: 2024-03-12

## 2024-03-12 ASSESSMENT — PATIENT HEALTH QUESTIONNAIRE - PHQ9
SUM OF ALL RESPONSES TO PHQ QUESTIONS 1-9: 4
1. LITTLE INTEREST OR PLEASURE IN DOING THINGS: 1
5. POOR APPETITE OR OVEREATING: 0
SUM OF ALL RESPONSES TO PHQ QUESTIONS 1-9: 4
9. THOUGHTS THAT YOU WOULD BE BETTER OFF DEAD, OR OF HURTING YOURSELF: 0
8. MOVING OR SPEAKING SO SLOWLY THAT OTHER PEOPLE COULD HAVE NOTICED. OR THE OPPOSITE, BEING SO FIGETY OR RESTLESS THAT YOU HAVE BEEN MOVING AROUND A LOT MORE THAN USUAL: 0
1. LITTLE INTEREST OR PLEASURE IN DOING THINGS: SEVERAL DAYS
10. IF YOU CHECKED OFF ANY PROBLEMS, HOW DIFFICULT HAVE THESE PROBLEMS MADE IT FOR YOU TO DO YOUR WORK, TAKE CARE OF THINGS AT HOME, OR GET ALONG WITH OTHER PEOPLE: 1
6. FEELING BAD ABOUT YOURSELF - OR THAT YOU ARE A FAILURE OR HAVE LET YOURSELF OR YOUR FAMILY DOWN: 0
8. MOVING OR SPEAKING SO SLOWLY THAT OTHER PEOPLE COULD HAVE NOTICED. OR THE OPPOSITE - BEING SO FIDGETY OR RESTLESS THAT YOU HAVE BEEN MOVING AROUND A LOT MORE THAN USUAL: NOT AT ALL
2. FEELING DOWN, DEPRESSED OR HOPELESS: NOT AT ALL
7. TROUBLE CONCENTRATING ON THINGS, SUCH AS READING THE NEWSPAPER OR WATCHING TELEVISION: 0
9. THOUGHTS THAT YOU WOULD BE BETTER OFF DEAD, OR OF HURTING YOURSELF: NOT AT ALL
SUM OF ALL RESPONSES TO PHQ9 QUESTIONS 1 & 2: 1
2. FEELING DOWN, DEPRESSED OR HOPELESS: 0
3. TROUBLE FALLING OR STAYING ASLEEP: 2
SUM OF ALL RESPONSES TO PHQ QUESTIONS 1-9: 4
6. FEELING BAD ABOUT YOURSELF - OR THAT YOU ARE A FAILURE OR HAVE LET YOURSELF OR YOUR FAMILY DOWN: NOT AT ALL
4. FEELING TIRED OR HAVING LITTLE ENERGY: 1
7. TROUBLE CONCENTRATING ON THINGS, SUCH AS READING THE NEWSPAPER OR WATCHING TELEVISION: NOT AT ALL
3. TROUBLE FALLING OR STAYING ASLEEP: MORE THAN HALF THE DAYS
5. POOR APPETITE OR OVEREATING: NOT AT ALL
SUM OF ALL RESPONSES TO PHQ QUESTIONS 1-9: 4
4. FEELING TIRED OR HAVING LITTLE ENERGY: SEVERAL DAYS
10. IF YOU CHECKED OFF ANY PROBLEMS, HOW DIFFICULT HAVE THESE PROBLEMS MADE IT FOR YOU TO DO YOUR WORK, TAKE CARE OF THINGS AT HOME, OR GET ALONG WITH OTHER PEOPLE: SOMEWHAT DIFFICULT
SUM OF ALL RESPONSES TO PHQ QUESTIONS 1-9: 4

## 2024-03-12 ASSESSMENT — ENCOUNTER SYMPTOMS
NAUSEA: 1
EYE REDNESS: 1
ABDOMINAL PAIN: 1
COUGH: 0
SHORTNESS OF BREATH: 1

## 2024-03-12 NOTE — PROGRESS NOTES
05/22/2018    Medullary sponge kidney of both kidneys 05/22/2018    Metabolic acidosis 05/22/2018    Microscopic hematuria 05/22/2018    Nausea 05/22/2018    Renal tubular acidosis type I 05/22/2018    Rheumatoid arthritis (HCC) 05/22/2018    Tension type headache 05/22/2018    Weight loss, abnormal 05/22/2018    Cellulitis, face 04/06/2018    Other chest pain 11/01/2017    Left lower quadrant pain 11/01/2017    Sjogren's syndrome (HCC) 11/01/2017    Diverticulitis of large intestine without perforation or abscess without bleeding 11/01/2017    Fibroids 11/01/2017    Transfusion history 11/01/2017    Pancreatitis 05/13/2017    Acquired hypothyroidism 09/26/2016    GERD (gastroesophageal reflux disease) 09/26/2016    Anxiety 12/09/2015    Depression 12/09/2015    ADD (attention deficit disorder) 02/12/2015    Endometriosis 09/09/2014    ASCUS favoring benign 05/01/2013    S/P endometrial ablation 05/01/2013     Past Medical History:   Diagnosis Date    Acquired hypothyroidism 09/26/2016    Acute left-sided low back pain with bilateral sciatica 03/09/2022    Acute pancreatitis     ADD (attention deficit disorder) 02/12/2015    Anxiety     Claudication of both lower extremities (HCC) 02/11/2022    Congestive heart failure, unspecified HF chronicity, unspecified heart failure type (HCC) 02/11/2022    Depression 12/09/2015    Endometrial cyst of ovary 05/10/2014    RT ovary, ruptured    GERD (gastroesophageal reflux disease) 09/26/2016    Medullary sponge kidney of both kidneys 05/22/2018    Sjogren's disease (HCC)     Stress     Swelling     Type 2 diabetes mellitus 3/1/2024     Past Surgical History:   Procedure Laterality Date    BREAST BIOPSY Left 2015??    lump removed (benign) (Dr. Garcia)    BREAST CYST EXCISION Left 2015??    Dr Garcia (benign)    BREAST LUMPECTOMY Left     2015? (benign)    CHOLECYSTECTOMY  2011    COLONOSCOPY N/A 12/16/2022    COLORECTAL CANCER SCREENING, HIGH RISK performed by Sergei Rangel MD at

## 2024-03-15 ENCOUNTER — OFFICE VISIT (OUTPATIENT)
Dept: CARDIOLOGY CLINIC | Age: 54
End: 2024-03-15

## 2024-03-15 VITALS
WEIGHT: 115 LBS | OXYGEN SATURATION: 98 % | HEART RATE: 89 BPM | SYSTOLIC BLOOD PRESSURE: 110 MMHG | HEIGHT: 62 IN | DIASTOLIC BLOOD PRESSURE: 70 MMHG | BODY MASS INDEX: 21.16 KG/M2

## 2024-03-15 DIAGNOSIS — K55.1 SMAS (SUPERIOR MESENTERIC ARTERY SYNDROME) (HCC): ICD-10-CM

## 2024-03-15 DIAGNOSIS — R06.09 DOE (DYSPNEA ON EXERTION): Primary | ICD-10-CM

## 2024-03-15 DIAGNOSIS — K85.90 PANCREATITIS, UNSPECIFIED PANCREATITIS TYPE: ICD-10-CM

## 2024-03-15 ASSESSMENT — ENCOUNTER SYMPTOMS
CHEST TIGHTNESS: 0
COUGH: 0
GASTROINTESTINAL NEGATIVE: 1
STRIDOR: 0
WHEEZING: 0
SHORTNESS OF BREATH: 1
NAUSEA: 0
EYES NEGATIVE: 1
BLOOD IN STOOL: 0

## 2024-03-15 NOTE — PROGRESS NOTES
Low BP - conitnue Miododrine and compression sockes and increase salt diet.     Will refer to Pulmonary       Counseling:  Heart Healthy Lifestyle, Low Salt Diet, Take Precautions to Prevent Falls, and Walk Daily    Return in about 2 months (around 5/15/2024).      Electronically signed by NITIN VELASQUEZ MD on 3/15/2024 at 3:33 PM

## 2024-03-21 ENCOUNTER — PATIENT MESSAGE (OUTPATIENT)
Dept: FAMILY MEDICINE CLINIC | Age: 54
End: 2024-03-21

## 2024-03-21 RX ORDER — POTASSIUM CHLORIDE 20 MEQ/1
20 TABLET, EXTENDED RELEASE ORAL DAILY
Qty: 30 TABLET | Refills: 5 | Status: SHIPPED | OUTPATIENT
Start: 2024-03-21

## 2024-03-21 RX ORDER — MAGNESIUM CHLORIDE 71.5 G/G
2 TABLET ORAL DAILY
Qty: 60 TABLET | Refills: 5 | Status: SHIPPED | OUTPATIENT
Start: 2024-03-21

## 2024-03-21 NOTE — TELEPHONE ENCOUNTER
From: Sisi Cam  To: Emmie Avalos  Sent: 3/21/2024 1:27 PM EDT  Subject: Potassium med    I need klor-con called back in, is that ok?   Same pharmacy. Thank you about dog, I’m miserable

## 2024-03-21 NOTE — TELEPHONE ENCOUNTER
Comments: Spoke w/ pharmacist, MARIAM/Salomon. She stated they have not received Rx order, asking if you could send in script again.     Last Office Visit (last PCP visit):   3/12/2024    Next Visit Date:  No new appt        **If hasn't been seen in over a year OR hasn't followed up according to last diabetes/ADHD visit, make appointment for patient before sending refill to provider.    Rx requested:  Requested Prescriptions     Pending Prescriptions Disp Refills    magnesium cl-calcium carbonate (SLOW-MAG) 71.5-119 MG TBEC tablet 60 tablet 5     Sig: Take 2 tablets by mouth daily

## 2024-03-26 ENCOUNTER — HOSPITAL ENCOUNTER (OUTPATIENT)
Age: 54
Discharge: HOME | End: 2024-03-28
Payer: MEDICARE

## 2024-03-26 VITALS
BODY MASS INDEX: 20.98 KG/M2 | HEIGHT: 62 IN | DIASTOLIC BLOOD PRESSURE: 58 MMHG | WEIGHT: 114 LBS | SYSTOLIC BLOOD PRESSURE: 102 MMHG

## 2024-03-26 DIAGNOSIS — R06.00 DYSPNEA, UNSPECIFIED TYPE: ICD-10-CM

## 2024-03-26 DIAGNOSIS — I51.7 CARDIOMEGALY: ICD-10-CM

## 2024-03-26 LAB
ECHO AV CUSP MM: 1.8 CM
ECHO BSA: 1.5 M2
ECHO EST RA PRESSURE: 10 MMHG
ECHO LA VOL A-L A2C: 22 ML (ref 22–52)
ECHO LA VOL A-L A4C: 19 ML (ref 22–52)
ECHO LA VOL MOD A2C: 21 ML (ref 22–52)
ECHO LA VOL MOD A4C: 17 ML (ref 22–52)
ECHO LA VOLUME AREA LENGTH: 21 ML
ECHO LA VOLUME INDEX A-L A2C: 15 ML/M2 (ref 16–34)
ECHO LA VOLUME INDEX A-L A4C: 13 ML/M2 (ref 16–34)
ECHO LA VOLUME INDEX AREA LENGTH: 14 ML/M2 (ref 16–34)
ECHO LA VOLUME INDEX MOD A2C: 14 ML/M2 (ref 16–34)
ECHO LA VOLUME INDEX MOD A4C: 11 ML/M2 (ref 16–34)
ECHO LV E' LATERAL VELOCITY: 15 CM/S
ECHO LV E' SEPTAL VELOCITY: 9 CM/S
ECHO LV EDV A2C: 49 ML
ECHO LV EDV A4C: 58 ML
ECHO LV EDV BP: 54 ML (ref 56–104)
ECHO LV EDV INDEX A4C: 38 ML/M2
ECHO LV EDV INDEX BP: 36 ML/M2
ECHO LV EDV NDEX A2C: 32 ML/M2
ECHO LV EJECTION FRACTION A2C: 71 %
ECHO LV EJECTION FRACTION A4C: 66 %
ECHO LV EJECTION FRACTION BIPLANE: 65 % (ref 55–100)
ECHO LV ESV A2C: 14 ML
ECHO LV ESV A4C: 20 ML
ECHO LV ESV BP: 19 ML (ref 19–49)
ECHO LV ESV INDEX A2C: 9 ML/M2
ECHO LV ESV INDEX A4C: 13 ML/M2
ECHO LV ESV INDEX BP: 13 ML/M2
ECHO LV FRACTIONAL SHORTENING: 18 % (ref 28–44)
ECHO LV INTERNAL DIMENSION DIASTOLE INDEX: 2.58 CM/M2
ECHO LV INTERNAL DIMENSION DIASTOLIC: 3.9 CM (ref 3.9–5.3)
ECHO LV INTERNAL DIMENSION SYSTOLIC INDEX: 2.12 CM/M2
ECHO LV INTERNAL DIMENSION SYSTOLIC: 3.2 CM
ECHO LV IVSD: 0.8 CM (ref 0.6–0.9)
ECHO LV IVSS: 0.7 CM
ECHO LV MASS 2D: 97.4 G (ref 67–162)
ECHO LV MASS INDEX 2D: 64.5 G/M2 (ref 43–95)
ECHO LV POSTERIOR WALL DIASTOLIC: 0.9 CM (ref 0.6–0.9)
ECHO LV POSTERIOR WALL SYSTOLIC: 1 CM
ECHO LV RELATIVE WALL THICKNESS RATIO: 0.46
ECHO LVOT AREA: 3.1 CM2
ECHO LVOT DIAM: 2 CM
ECHO MV A VELOCITY: 0.66 M/S
ECHO MV AREA PHT: 2.6 CM2
ECHO MV E DECELERATION TIME (DT): 224.9 MS
ECHO MV E VELOCITY: 0.61 M/S
ECHO MV E/A RATIO: 0.92
ECHO MV E/E' LATERAL: 4.07
ECHO MV E/E' RATIO (AVERAGED): 5.42
ECHO MV PRESSURE HALF TIME (PHT): 84 MS
ECHO RIGHT VENTRICULAR SYSTOLIC PRESSURE (RVSP): 25 MMHG
ECHO RV INTERNAL DIMENSION: 0.6 CM
ECHO RVOT PEAK GRADIENT: 1 MMHG
ECHO RVOT PEAK VELOCITY: 0.6 M/S
ECHO TV REGURGITANT MAX VELOCITY: 1.95 M/S
ECHO TV REGURGITANT PEAK GRADIENT: 15 MMHG

## 2024-03-26 PROCEDURE — 93306 TTE W/DOPPLER COMPLETE: CPT | Performed by: INTERNAL MEDICINE

## 2024-03-26 PROCEDURE — 93306 TTE W/DOPPLER COMPLETE: CPT

## 2024-03-28 PROCEDURE — RXMED WILLOW AMBULATORY MEDICATION CHARGE

## 2024-03-29 ENCOUNTER — OFFICE VISIT (OUTPATIENT)
Age: 54
End: 2024-03-29
Payer: MEDICARE

## 2024-03-29 ENCOUNTER — PATIENT MESSAGE (OUTPATIENT)
Dept: GASTROENTEROLOGY | Age: 54
End: 2024-03-29

## 2024-03-29 VITALS
WEIGHT: 116 LBS | OXYGEN SATURATION: 96 % | BODY MASS INDEX: 21.22 KG/M2 | HEART RATE: 78 BPM | DIASTOLIC BLOOD PRESSURE: 70 MMHG | SYSTOLIC BLOOD PRESSURE: 108 MMHG

## 2024-03-29 DIAGNOSIS — M54.2 NECK PAIN: ICD-10-CM

## 2024-03-29 DIAGNOSIS — H91.90 HEARING LOSS, UNSPECIFIED HEARING LOSS TYPE, UNSPECIFIED LATERALITY: Primary | ICD-10-CM

## 2024-03-29 DIAGNOSIS — H92.02 LEFT EAR PAIN: ICD-10-CM

## 2024-03-29 PROCEDURE — G8427 DOCREV CUR MEDS BY ELIG CLIN: HCPCS | Performed by: STUDENT IN AN ORGANIZED HEALTH CARE EDUCATION/TRAINING PROGRAM

## 2024-03-29 PROCEDURE — 99203 OFFICE O/P NEW LOW 30 MIN: CPT | Performed by: STUDENT IN AN ORGANIZED HEALTH CARE EDUCATION/TRAINING PROGRAM

## 2024-03-29 PROCEDURE — G8484 FLU IMMUNIZE NO ADMIN: HCPCS | Performed by: STUDENT IN AN ORGANIZED HEALTH CARE EDUCATION/TRAINING PROGRAM

## 2024-03-29 PROCEDURE — 3017F COLORECTAL CA SCREEN DOC REV: CPT | Performed by: STUDENT IN AN ORGANIZED HEALTH CARE EDUCATION/TRAINING PROGRAM

## 2024-03-29 PROCEDURE — G8420 CALC BMI NORM PARAMETERS: HCPCS | Performed by: STUDENT IN AN ORGANIZED HEALTH CARE EDUCATION/TRAINING PROGRAM

## 2024-03-29 PROCEDURE — 1036F TOBACCO NON-USER: CPT | Performed by: STUDENT IN AN ORGANIZED HEALTH CARE EDUCATION/TRAINING PROGRAM

## 2024-03-29 NOTE — PROGRESS NOTES
[facility] [place] [facility], [facility] OTOLARYNGOLOGY  [address] [place] [address], [address]  [place] [id number]  Dept: [phone]  Dept Fax: [phone]  Loc: [phone]     3/29/2024    Visit type: New patient    Reason for Visit: Other (Lymphadenopathy/)       ASSESSMENT/PLAN   1. Hearing loss, unspecified hearing loss type, unspecified laterality  -     Amb External Referral To Audiology  2. Left ear pain  -     CT SOFT TISSUE NECK W CONTRAST; Future  3. Neck pain  -     CT SOFT TISSUE NECK W CONTRAST; Future    Audiogram ordered to evaluate hearing loss    CT neck to evaluate neck/ear symptoms    No follow-ups on file.  No orders of the defined types were placed in this encounter.     Subjective    Patient: Sisi Cam is a 53 y.o. female   HPI:    Referred by: Emmie Avalos APRN -*  I have personally reviewed the note by the referring provider    Left side neck pain   On and off for a year     Feeling like fluid builds up in ears-- feels clogged- lasts 5-7 days.   Endorses pressure   No recent audiogram   No subjective hearing loss (\" others say I do\")   No tinnitus     11/12/2023   FINDINGS:  Right thyroid lobe:  3.3 x 1.0 x 1.0 cm     Left thyroid lobe:  3.0 x 0.9 x 0.6 cm     Isthmus:  1 mm     Thyroid Gland:  Somewhat diminutive and otherwise normal appearing thyroid  gland.     Nodules: Bilateral colloid cysts.  No suspicious nodules.     Cervical lymphadenopathy: There are few prominent lymph nodes.  Right neck  soft tissues measuring 17 x 10 x 4 mm.  This does have a reniform shape with  a fatty and vascular hilum.  Cortex is not thickened.  Largest lymph node in  the left neck soft tissues measured 9 mm.  Normal morphology.     IMPRESSION:  1.  Somewhat diminutive but otherwise normal appearing thyroid gland.  No  suspicious nodules.  Bilateral small colloid cysts.     2.  Mildly prominent but otherwise normal appearing right neck soft tissue  lymph node measuring 17

## 2024-03-29 NOTE — PROGRESS NOTES
Contacted office to request medication to help with constipation.    Sutab sent to Pharmacy as requested.

## 2024-04-01 DIAGNOSIS — Z76.0 MEDICATION REFILL: ICD-10-CM

## 2024-04-01 DIAGNOSIS — F98.8 ATTENTION DEFICIT DISORDER, UNSPECIFIED HYPERACTIVITY PRESENCE: ICD-10-CM

## 2024-04-02 ENCOUNTER — OFFICE VISIT (OUTPATIENT)
Dept: GASTROENTEROLOGY | Age: 54
End: 2024-04-02
Payer: MEDICARE

## 2024-04-02 ENCOUNTER — HOSPITAL ENCOUNTER (OUTPATIENT)
Dept: CT IMAGING | Age: 54
Discharge: HOME | End: 2024-04-04
Attending: STUDENT IN AN ORGANIZED HEALTH CARE EDUCATION/TRAINING PROGRAM
Payer: MEDICARE

## 2024-04-02 VITALS — WEIGHT: 115 LBS | HEIGHT: 62 IN | BODY MASS INDEX: 21.16 KG/M2 | OXYGEN SATURATION: 98 %

## 2024-04-02 DIAGNOSIS — Z87.19 HISTORY OF PANCREATITIS: ICD-10-CM

## 2024-04-02 DIAGNOSIS — K59.00 CONSTIPATION, UNSPECIFIED CONSTIPATION TYPE: Primary | ICD-10-CM

## 2024-04-02 DIAGNOSIS — R14.0 ABDOMINAL BLOATING: ICD-10-CM

## 2024-04-02 DIAGNOSIS — H92.02 LEFT EAR PAIN: ICD-10-CM

## 2024-04-02 DIAGNOSIS — M54.2 NECK PAIN: ICD-10-CM

## 2024-04-02 PROCEDURE — 70491 CT SOFT TISSUE NECK W/DYE: CPT

## 2024-04-02 PROCEDURE — 1036F TOBACCO NON-USER: CPT | Performed by: NURSE PRACTITIONER

## 2024-04-02 PROCEDURE — 3017F COLORECTAL CA SCREEN DOC REV: CPT | Performed by: NURSE PRACTITIONER

## 2024-04-02 PROCEDURE — G8420 CALC BMI NORM PARAMETERS: HCPCS | Performed by: NURSE PRACTITIONER

## 2024-04-02 PROCEDURE — 99213 OFFICE O/P EST LOW 20 MIN: CPT | Performed by: NURSE PRACTITIONER

## 2024-04-02 PROCEDURE — G8427 DOCREV CUR MEDS BY ELIG CLIN: HCPCS | Performed by: NURSE PRACTITIONER

## 2024-04-02 PROCEDURE — 6360000004 HC RX CONTRAST MEDICATION: Performed by: STUDENT IN AN ORGANIZED HEALTH CARE EDUCATION/TRAINING PROGRAM

## 2024-04-02 RX ADMIN — IOPAMIDOL 75 ML: 755 INJECTION, SOLUTION INTRAVENOUS at 08:09

## 2024-04-02 ASSESSMENT — ENCOUNTER SYMPTOMS: CONSTIPATION: 1

## 2024-04-02 NOTE — TELEPHONE ENCOUNTER
Comments: lm for f/u appt     Last Office Visit (last PCP visit):   Visit date not found    Next Visit Date:  Future Appointments   Date Time Provider Department Center   4/30/2024 11:30 AM Jose Roberto Duque MD [place] [facility]   5/1/2024  2:00 PM Ariel Ardon MD [place] [facility]   5/3/2024 10:30 AM Slavik-Bosworth, Kelly J, APRN - CNP [place] [facility]   5/14/2024  1:30 PM June, Darrion S, PA [place] [facility]   5/17/2024  1:30 PM Ariel Ardon MD [place] [facility]       **If hasn't been seen in over a year OR hasn't followed up according to last diabetes/ADHD visit, make appointment for patient before sending refill to provider.    Rx requested:  Requested Prescriptions     Pending Prescriptions Disp Refills    amphetamine-dextroamphetamine (ADDERALL XR) 30 MG extended release capsule 60 capsule 0     Sig: Take 1 capsule by mouth in the morning and 1 capsule in the evening. Do all this for 30 days.

## 2024-04-02 NOTE — PROGRESS NOTES
possible history of a grandfather with colon cancer.     HPI from recent inpatient GI consult on 7/30/2022 summarized below:  Sisi Cam is a 51 y.o. female on hospital day 0 with a history of hypothyroidism, anxiety, CHF, GERD,'s RA, Sjogren's syndrome, attention deficit disorder, and acute pancreatitis. Past surgical history significant for breast lumpectomy, hysterectomy, cholecystectomy.  Family history is negative for GI malignancies.  Social history denies nicotine, EtOH or illicit drug use.  History Obtained From:  patient, electronic medical record  GI consult for pancreatitis-admitted with acute pancreatitis, symptoms started Wednesday and include right upper quadrant pain with radiation to her back associated with nausea and anorexia, no fever, no vomiting, no diarrhea. Reports a hx of chronic pancreatitis previously on Creon, has been followed by Dr Rangel in the outpatient setting and  GI.  Patient is post cholecystectomy for cholelithiasis with history of CBD stone requiring ERCP, no history of EtOH use, on arrival was noted to have elevated lipase at 214, otherwise blood work is unremarkable, mild normocytic anemia with Hgb 11.9, no leukocytosis, normal triglycerides. Patient is post cholecystectomy for cholelithiasis with history of CBD stone requiring ERCP, no history of EtOH use, on arrival was noted to have elevated lipase at 214, otherwise blood work is unremarkable, mild normocytic anemia with Hgb 11.9, no leukocytosis, normal triglycerides. CT abd pelvis/ Us abdomen are pending.      HPI from last GI clinic visit on 5/4/2021 summarized below:  50 y.o. female made this appointment with recurrence of GI symptoms which include abdominal pain over the last 3 to 4 weeks, reports that starting rather abruptly, sharp pain radiating to the back.  She also reports having nausea and dry heaving mainly in the mornings.  Additionally reports oily gray-colored stools and dark black stools intermittently.

## 2024-04-03 ENCOUNTER — TELEPHONE (OUTPATIENT)
Dept: FAMILY MEDICINE CLINIC | Age: 54
End: 2024-04-03

## 2024-04-03 RX ORDER — DEXTROAMPHETAMINE SACCHARATE, AMPHETAMINE ASPARTATE MONOHYDRATE, DEXTROAMPHETAMINE SULFATE AND AMPHETAMINE SULFATE 7.5; 7.5; 7.5; 7.5 MG/1; MG/1; MG/1; MG/1
30 CAPSULE, EXTENDED RELEASE ORAL 2 TIMES DAILY
Qty: 60 CAPSULE | Refills: 0 | Status: SHIPPED | OUTPATIENT
Start: 2024-04-03 | End: 2024-05-03

## 2024-04-05 ENCOUNTER — PHARMACY VISIT (OUTPATIENT)
Dept: PHARMACY | Facility: CLINIC | Age: 54
End: 2024-04-05
Payer: MEDICARE

## 2024-04-05 ENCOUNTER — SPECIALTY PHARMACY (OUTPATIENT)
Dept: PHARMACY | Facility: CLINIC | Age: 54
End: 2024-04-05

## 2024-04-05 NOTE — TELEPHONE ENCOUNTER
Comments:     Last Office Visit (last PCP visit):   12/15/2023    Next Visit Date:  Future Appointments   Date Time Provider Department Center   4/30/2024 11:30 AM Jose Roberto Duque MD [facility]   5/1/2024  2:00 PM Ariel Ardon MD [facility]   5/3/2024 10:30 AM Slavik-Bosworth, Kelly J, APRN - CNP [facility]   5/14/2024  1:30 PM June, Darrion S, PA [facility]   5/17/2024  1:30 PM Ariel Ardon MD [facility]       **If hasn't been seen in over a year OR hasn't followed up according to last diabetes/ADHD visit, make appointment for patient before sending refill to provider.    Rx requested:  Requested Prescriptions     Pending Prescriptions Disp Refills    rimegepant sulfate (NURTEC) 75 MG TBDP 16 tablet 1     Sig: Take 1 tablet by mouth daily as needed (for headaches)

## 2024-04-09 RX ORDER — RIMEGEPANT SULFATE 75 MG/75MG
1 TABLET, ORALLY DISINTEGRATING ORAL DAILY PRN
Qty: 16 TABLET | Refills: 1 | Status: SHIPPED | OUTPATIENT
Start: 2024-04-09

## 2024-04-12 DIAGNOSIS — R73.09 ABNORMAL BLOOD SUGAR: ICD-10-CM

## 2024-04-12 DIAGNOSIS — G47.00 INSOMNIA, UNSPECIFIED TYPE: ICD-10-CM

## 2024-04-12 NOTE — TELEPHONE ENCOUNTER
Comments: Please review, thanks    Last Office Visit (last PCP visit):   3/12/2024    Next Visit Date:  Future Appointments   Date Time Provider Department Center   4/30/2024 11:30 AM Jose Roberto Duque MD [facility]   5/1/2024  2:00 PM Ariel Ardon MD [facility]   5/3/2024 10:30 AM Slavik-Bosworth, Kelly J, APRN - CNP [facility]   5/14/2024  1:30 PM June, Darrion S, PA [facility]   5/17/2024  1:30 PM Ariel Ardon MD [facility]       **If hasn't been seen in over a year OR hasn't followed up according to last diabetes/ADHD visit, make appointment for patient before sending refill to provider.    Rx requested:  Requested Prescriptions     Pending Prescriptions Disp Refills    metFORMIN (GLUCOPHAGE) 500 MG tablet 30 tablet 3     Sig: Take 1 tablet by mouth daily (with breakfast)

## 2024-04-15 RX ORDER — TRAZODONE HYDROCHLORIDE 150 MG/1
150 TABLET ORAL NIGHTLY PRN
Qty: 30 TABLET | Refills: 5 | Status: SHIPPED | OUTPATIENT
Start: 2024-04-15

## 2024-04-15 NOTE — TELEPHONE ENCOUNTER
Comments:     Last Office Visit (last PCP visit):   12/15/2023    Next Visit Date:  Future Appointments   Date Time Provider Department Center   4/30/2024 11:30 AM Jose Roberto Duque MD [facility]   5/1/2024  2:00 PM Ariel Ardon MD [facility]   5/3/2024 10:30 AM Slavik-Bosworth, Kelly J, APRN - CNP [facility]   5/14/2024  1:30 PM June, Darrion S, PA [facility]   5/17/2024  1:30 PM Ariel Ardon MD [facility]       **If hasn't been seen in over a year OR hasn't followed up according to last diabetes/ADHD visit, make appointment for patient before sending refill to provider.    Rx requested:  Requested Prescriptions     Pending Prescriptions Disp Refills    traZODone (DESYREL) 150 MG tablet 30 tablet 0     Sig: Take 1 tablet by mouth nightly as needed for Sleep As needed

## 2024-04-18 ENCOUNTER — TELEPHONE (OUTPATIENT)
Dept: FAMILY MEDICINE CLINIC | Age: 54
End: 2024-04-18

## 2024-04-29 PROCEDURE — RXMED WILLOW AMBULATORY MEDICATION CHARGE

## 2024-04-30 ENCOUNTER — PHARMACY VISIT (OUTPATIENT)
Dept: PHARMACY | Facility: CLINIC | Age: 54
End: 2024-04-30
Payer: MEDICARE

## 2024-05-02 ENCOUNTER — SCHEDULED TELEPHONE ENCOUNTER (OUTPATIENT)
Dept: CARDIOLOGY CLINIC | Age: 54
End: 2024-05-02
Payer: MEDICARE

## 2024-05-02 DIAGNOSIS — R06.00 DYSPNEA, UNSPECIFIED TYPE: ICD-10-CM

## 2024-05-02 DIAGNOSIS — F98.8 ATTENTION DEFICIT DISORDER, UNSPECIFIED HYPERACTIVITY PRESENCE: ICD-10-CM

## 2024-05-02 DIAGNOSIS — Z76.0 MEDICATION REFILL: ICD-10-CM

## 2024-05-02 DIAGNOSIS — R06.09 DOE (DYSPNEA ON EXERTION): ICD-10-CM

## 2024-05-02 DIAGNOSIS — I51.7 CARDIOMEGALY: Primary | ICD-10-CM

## 2024-05-02 DIAGNOSIS — K85.90 PANCREATITIS, UNSPECIFIED PANCREATITIS TYPE: ICD-10-CM

## 2024-05-02 DIAGNOSIS — K55.1 SMAS (SUPERIOR MESENTERIC ARTERY SYNDROME) (HCC): ICD-10-CM

## 2024-05-02 PROCEDURE — 99214 OFFICE O/P EST MOD 30 MIN: CPT | Performed by: INTERNAL MEDICINE

## 2024-05-02 ASSESSMENT — ENCOUNTER SYMPTOMS
BLOOD IN STOOL: 0
GASTROINTESTINAL NEGATIVE: 1
SHORTNESS OF BREATH: 1
COUGH: 0
STRIDOR: 0
CHEST TIGHTNESS: 0
NAUSEA: 0
EYES NEGATIVE: 1
WHEEZING: 0

## 2024-05-02 NOTE — PROGRESS NOTES
OFFICE VISIT        Patient: Sisi Cam  YOB: 1970  MRN: [id number]    Chief Complaint: SOB Hypotension.   Chief Complaint   Patient presents with    Shortness of Breath       CV Data:  10/25/23 Ca2+ Score = 0  3/24 Echo EF 55-60  10/23 SPECT negative    Subjective/HPI referred for SOB. 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 Her dog  this am. She is upset. Occ CP. SPECT negative. Calcium score 0.  She is getting sob even talking on phone.     On MIdodrine 10 qam for low BP    24 Patient and/or health care decision maker is aware that that he/she may receive a bill for this telephone service, depending on his insurance coverage, and has provided verbal consent to proceed.  This visit was completed via telephone.  Total time 15 minutes.     Pt is in [place]. Brother on a vent post SZ pt is still [place] missed Pulm appt due to being ouot there. No cp no falls no bleed.      EKG:    +FH  Never smoker  Lives w kids.   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    Past Medical History:   Diagnosis Date    Acquired hypothyroidism 2016    Acute left-sided low back pain with bilateral sciatica 2022    Acute pancreatitis     ADD (attention deficit disorder) 2015    Anxiety     Claudication of both lower extremities (HCC) 2022    Congestive heart failure, unspecified HF chronicity, unspecified heart failure type (HCC) 2022    Depression 2015    Endometrial cyst of ovary 05/10/2014    RT ovary, ruptured    GERD (gastroesophageal reflux disease) 2016    Medullary sponge kidney of both kidneys 2018    Sjogren's disease (HCC)     Stress     Swelling     Type 2 diabetes mellitus 3/1/2024       Past Surgical History:   Procedure Laterality Date    BREAST BIOPSY Left ??    lump removed (benign) (Dr. Garcia)    BREAST CYST EXCISION Left ??    Dr Garcia (benign)    BREAST LUMPECTOMY Left     ? (benign)    CHOLECYSTECTOMY      COLONOSCOPY N/A 2022

## 2024-05-03 ENCOUNTER — OFFICE VISIT (OUTPATIENT)
Dept: GASTROENTEROLOGY | Age: 54
End: 2024-05-03
Payer: MEDICARE

## 2024-05-03 VITALS
WEIGHT: 113 LBS | OXYGEN SATURATION: 99 % | DIASTOLIC BLOOD PRESSURE: 60 MMHG | HEART RATE: 84 BPM | BODY MASS INDEX: 20.67 KG/M2 | SYSTOLIC BLOOD PRESSURE: 100 MMHG

## 2024-05-03 DIAGNOSIS — R10.9 ABDOMINAL CRAMPING: ICD-10-CM

## 2024-05-03 DIAGNOSIS — K86.1 CHRONIC PANCREATITIS, UNSPECIFIED PANCREATITIS TYPE (HCC): ICD-10-CM

## 2024-05-03 DIAGNOSIS — K21.9 GASTROESOPHAGEAL REFLUX DISEASE, UNSPECIFIED WHETHER ESOPHAGITIS PRESENT: ICD-10-CM

## 2024-05-03 DIAGNOSIS — K59.00 CONSTIPATION, UNSPECIFIED CONSTIPATION TYPE: Primary | ICD-10-CM

## 2024-05-03 DIAGNOSIS — R19.5 CHANGE IN CONSISTENCY OF STOOL: ICD-10-CM

## 2024-05-03 PROCEDURE — G8420 CALC BMI NORM PARAMETERS: HCPCS | Performed by: NURSE PRACTITIONER

## 2024-05-03 PROCEDURE — 3017F COLORECTAL CA SCREEN DOC REV: CPT | Performed by: NURSE PRACTITIONER

## 2024-05-03 PROCEDURE — 99213 OFFICE O/P EST LOW 20 MIN: CPT | Performed by: NURSE PRACTITIONER

## 2024-05-03 PROCEDURE — G8427 DOCREV CUR MEDS BY ELIG CLIN: HCPCS | Performed by: NURSE PRACTITIONER

## 2024-05-03 PROCEDURE — 1036F TOBACCO NON-USER: CPT | Performed by: NURSE PRACTITIONER

## 2024-05-03 RX ORDER — FAMOTIDINE 20 MG/1
20 TABLET, FILM COATED ORAL 2 TIMES DAILY
Qty: 60 TABLET | Refills: 3 | Status: SHIPPED | OUTPATIENT
Start: 2024-05-03

## 2024-05-03 RX ORDER — DICYCLOMINE HYDROCHLORIDE 10 MG/1
10 CAPSULE ORAL 2 TIMES DAILY
Qty: 60 CAPSULE | Refills: 3 | Status: SHIPPED | OUTPATIENT
Start: 2024-05-03

## 2024-05-03 ASSESSMENT — ENCOUNTER SYMPTOMS
ABDOMINAL PAIN: 1
ABDOMINAL DISTENTION: 1
CONSTIPATION: 1

## 2024-05-03 NOTE — PROGRESS NOTES
Gastroenterology Clinic Follow up Visit    Sisi Cam  [id number]  Chief Complaint   Patient presents with    Constipation       HPI: 53 y.o. female following up after last GI clinic on 4/2/2024     Interval change: Patient presents to GI office for follow-up with longstanding history of chronic constipation.  Patient has been taking Linzess 290 mcg daily and reports bowel movements every 3 days.  She reports having mucous in her stool 4 to 5 episodes over the last 2 weeks.  She continues to have abdominal cramping.  Patient used to take Creon with meals and snacks as directed.  She does endorse increased stress with brother hospitalized out of state.  Denies any unintentional weight loss, dysphagia, melena emesis, hematochezia, nor melena.    HPI from last GI clinic visit on 4/2/2024 summarized below:   Patient did request Sutab a few days ago due to no bowel movement. Last bowel movement, 4 to 5 days ago.  Having small hard stools daily.  Patient has long standing history of constipation with recent hospitalization in February.  Patient reports using Soapsuds enema, Miralax, Metamucil, Lactulose, Trulance, Amitiza, Motegrity, Dulcolax, and Fleets enemas/ suppositories in the past and reports that all have been sub optimal relief.   After recent hospitalization, patient was given Linzess 290 mcg for 30 days.  Having BM every two days. She reports unable to obtain Rx sent to [facility] after last visit due to insurance prior authorization.  Patient denies any unintentional weight loss, dysphagie, hematemesis, hematochezia nor melena.      HPI from last GI clinic visit on 3/1/2024 summarized below:   Patient presents to office for follow-up from recent hospitalization 2/16/2024 to 2/23/2024.  Patient was last seen in GI clinic 7/20/2023 per Dr. Quiroz.  Patient had gastric emptying completed at [facility] noting normal study.  Patient was recently hospitalized for abdominal pain and hypertension admitted

## 2024-05-03 NOTE — TELEPHONE ENCOUNTER
Comments: lov 3/12/24 lm for future appt     Last Office Visit (last PCP visit):   Visit date not found    Next Visit Date:  Future Appointments   Date Time Provider Department Center   5/14/2024  1:30 PM June, Darrion S, PA [facility]   5/22/2024  3:00 PM Jose Roberto Duque MD [facility]       **If hasn't been seen in over a year OR hasn't followed up according to last diabetes/ADHD visit, make appointment for patient before sending refill to provider.    Rx requested:  Requested Prescriptions     Pending Prescriptions Disp Refills    amphetamine-dextroamphetamine (ADDERALL XR) 30 MG extended release capsule 60 capsule 0     Sig: Take 1 capsule by mouth in the morning and 1 capsule in the evening. Do all this for 30 days.

## 2024-05-06 RX ORDER — DEXTROAMPHETAMINE SACCHARATE, AMPHETAMINE ASPARTATE MONOHYDRATE, DEXTROAMPHETAMINE SULFATE AND AMPHETAMINE SULFATE 7.5; 7.5; 7.5; 7.5 MG/1; MG/1; MG/1; MG/1
30 CAPSULE, EXTENDED RELEASE ORAL 2 TIMES DAILY
Qty: 60 CAPSULE | Refills: 0 | Status: SHIPPED | OUTPATIENT
Start: 2024-05-06 | End: 2024-06-05

## 2024-05-08 ENCOUNTER — HOSPITAL ENCOUNTER (OUTPATIENT)
Dept: ULTRASOUND IMAGING | Age: 54
Discharge: HOME | End: 2024-05-10
Payer: MEDICARE

## 2024-05-08 ENCOUNTER — OFFICE VISIT (OUTPATIENT)
Dept: FAMILY MEDICINE CLINIC | Age: 54
End: 2024-05-08

## 2024-05-08 VITALS
HEIGHT: 62 IN | HEART RATE: 87 BPM | TEMPERATURE: 98.1 F | BODY MASS INDEX: 21.53 KG/M2 | WEIGHT: 117 LBS | SYSTOLIC BLOOD PRESSURE: 116 MMHG | OXYGEN SATURATION: 98 % | DIASTOLIC BLOOD PRESSURE: 70 MMHG

## 2024-05-08 DIAGNOSIS — R06.02 SHORTNESS OF BREATH: ICD-10-CM

## 2024-05-08 DIAGNOSIS — M25.562 ACUTE PAIN OF LEFT KNEE: ICD-10-CM

## 2024-05-08 DIAGNOSIS — M79.662 PAIN OF LEFT CALF: Primary | ICD-10-CM

## 2024-05-08 DIAGNOSIS — M79.662 PAIN OF LEFT CALF: ICD-10-CM

## 2024-05-08 PROCEDURE — 93971 EXTREMITY STUDY: CPT

## 2024-05-08 ASSESSMENT — ENCOUNTER SYMPTOMS
SHORTNESS OF BREATH: 1
COUGH: 1

## 2024-05-08 NOTE — PROGRESS NOTES
[facility]  [facility]  [address], [address]  [facility] [address]  Dept: [phone]  Dept Fax: [phone]  Loc: [phone]     2024    Sisi Cam (:  1970) is a 53 y.o. female, Established patient, here for evaluation of the following chief complaint(s):  Swelling (And pain behind left knee x 2 weeks taking motrin and tylenol otc also applying ice  and heat  nothing has helped )      Vitals:    24 1625   BP: 116/70   Pulse: 87   Temp: 98.1 °F (36.7 °C)   SpO2: 98%       SUBJECTIVE/OBJECTIVE:    Patient presents with posterior left knee pain and swelling x2 weeks. Occasional pain in the calf. She has had increased shortness of breath for the last week or so. Patient also states she has been coughing when she lays down at night. She recently flew to [place] and back x2, just got back yesterday.        Review of Systems   Constitutional:  Negative for chills and fever.   Respiratory:  Positive for cough and shortness of breath.    Cardiovascular:  Positive for leg swelling.   Musculoskeletal:  Positive for arthralgias and myalgias.       Physical Exam  Constitutional:       Appearance: Normal appearance.   HENT:      Head: Normocephalic and atraumatic.      Right Ear: Ear canal and external ear normal.      Left Ear: Ear canal and external ear normal.   Cardiovascular:      Rate and Rhythm: Normal rate and regular rhythm.      Heart sounds: Normal heart sounds.   Pulmonary:      Effort: Pulmonary effort is normal.      Breath sounds: Normal breath sounds.   Musculoskeletal:         General: Swelling and tenderness present.      Cervical back: Normal range of motion.      Left knee: Swelling present. No bony tenderness or crepitus. Tenderness present.      Left lower le+ Edema present.        Legs:    Skin:     General: Skin is warm and dry.   Neurological:      General: No focal deficit present.      Mental

## 2024-05-09 DIAGNOSIS — M25.562 ACUTE PAIN OF LEFT KNEE: Primary | ICD-10-CM

## 2024-05-10 ENCOUNTER — OFFICE VISIT (OUTPATIENT)
Dept: ORTHOPEDIC SURGERY | Age: 54
End: 2024-05-10

## 2024-05-10 VITALS
WEIGHT: 114 LBS | TEMPERATURE: 98.6 F | HEIGHT: 62 IN | OXYGEN SATURATION: 100 % | HEART RATE: 96 BPM | BODY MASS INDEX: 20.98 KG/M2

## 2024-05-10 DIAGNOSIS — M25.462 KNEE EFFUSION, LEFT: ICD-10-CM

## 2024-05-10 DIAGNOSIS — M71.22 POPLITEAL CYST, UNRUPTURED, LEFT: Primary | ICD-10-CM

## 2024-05-10 DIAGNOSIS — R51.9 CHRONIC NONINTRACTABLE HEADACHE, UNSPECIFIED HEADACHE TYPE: ICD-10-CM

## 2024-05-10 DIAGNOSIS — G89.29 CHRONIC NONINTRACTABLE HEADACHE, UNSPECIFIED HEADACHE TYPE: ICD-10-CM

## 2024-05-10 DIAGNOSIS — M17.12 PRIMARY OSTEOARTHRITIS OF LEFT KNEE: ICD-10-CM

## 2024-05-10 RX ORDER — LIDOCAINE HYDROCHLORIDE 10 MG/ML
8 INJECTION, SOLUTION INFILTRATION; PERINEURAL ONCE
Status: COMPLETED | OUTPATIENT
Start: 2024-05-10 | End: 2024-05-10

## 2024-05-10 RX ORDER — TRIAMCINOLONE ACETONIDE 40 MG/ML
80 INJECTION, SUSPENSION INTRA-ARTICULAR; INTRAMUSCULAR ONCE
Status: COMPLETED | OUTPATIENT
Start: 2024-05-10 | End: 2024-05-10

## 2024-05-10 RX ADMIN — TRIAMCINOLONE ACETONIDE 80 MG: 40 INJECTION, SUSPENSION INTRA-ARTICULAR; INTRAMUSCULAR at 15:49

## 2024-05-10 RX ADMIN — LIDOCAINE HYDROCHLORIDE 8 ML: 10 INJECTION, SOLUTION INFILTRATION; PERINEURAL at 15:50

## 2024-05-10 ASSESSMENT — ENCOUNTER SYMPTOMS
GASTROINTESTINAL NEGATIVE: 1
EYES NEGATIVE: 1
RESPIRATORY NEGATIVE: 1

## 2024-05-10 NOTE — TELEPHONE ENCOUNTER
Comments: lm for f/u appt - lov 3/12/24    Last Office Visit (last PCP visit):   12/15/2023    Next Visit Date:  Future Appointments   Date Time Provider Department Center   5/14/2024  1:30 PM June, Darrion S, PA [facility]   5/22/2024  3:00 PM Jose Roberto Duque MD [facility]   6/7/2024 10:00 AM Slavik-Bosworth, Kelly J, APRN - CNP [facility]       **If hasn't been seen in over a year OR hasn't followed up according to last diabetes/ADHD visit, make appointment for patient before sending refill to provider.    Rx requested:  Requested Prescriptions     Pending Prescriptions Disp Refills    topiramate (TOPAMAX) 100 MG tablet 60 tablet 0     Sig: Take 1 tablet by mouth 2 times daily

## 2024-05-10 NOTE — PROGRESS NOTES
Sisi Cam (:  1970) is a 53 y.o. female,New patient, consult from CECILIA Emdonds consult from CECIL[facility] Burnham here for evaluation of the following chief complaint(s):  Knee Pain (Patient presents for posterior right knee pain. Present for approximately 2 weeks. Pain is currently 7/10. 10/10 at its worst. )      Assessment & Plan   1. Popliteal cyst, unruptured, left  -     MS ARTHROCENTESIS ASPIR&/INJ MAJOR JT/BURSA W/O US  -     lidocaine 1 % injection 8 mL; 8 mL, Intra-artICUlar, ONCE, 1 dose, On Fri 5/10/24 at 1615  -     triamcinolone acetonide (KENALOG-40) injection 80 mg; 80 mg, Intra-artICUlar, ONCE, 1 dose, On Fri 5/10/24 at 1615  2. Primary osteoarthritis of left knee  -     MS ARTHROCENTESIS ASPIR&/INJ MAJOR JT/BURSA W/O US  -     lidocaine 1 % injection 8 mL; 8 mL, Intra-artICUlar, ONCE, 1 dose, On Fri 5/10/24 at 1615  -     triamcinolone acetonide (KENALOG-40) injection 80 mg; 80 mg, Intra-artICUlar, ONCE, 1 dose, On Fri 5/10/24 at 1615  3. Knee effusion, left  -     MS ARTHROCENTESIS ASPIR&/INJ MAJOR JT/BURSA W/O US  -     lidocaine 1 % injection 8 mL; 8 mL, Intra-artICUlar, ONCE, 1 dose, On Fri 5/10/24 at 1615  -     triamcinolone acetonide (KENALOG-40) injection 80 mg; 80 mg, Intra-artICUlar, ONCE, 1 dose, On Fri 5/10/24 at 1615      Return for Viscolubricant injection.       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  She states she has flown to [place] several times in the last month due to family obligations.  She is having pain at the posterior aspect of her knee.  X-rays show some mild to moderate degenerative arthritis.  Ultrasound does not show any DVT.        Review of Systems   Constitutional: Negative.    HENT: Negative.     Eyes: Negative.    Respiratory: Negative.     Gastrointestinal: Negative.    Genitourinary: Negative.    Musculoskeletal: Negative.    Psychiatric/Behavioral: Negative.

## 2024-05-12 RX ORDER — TOPIRAMATE 100 MG/1
100 TABLET, FILM COATED ORAL 2 TIMES DAILY
Qty: 60 TABLET | Refills: 0 | Status: SHIPPED | OUTPATIENT
Start: 2024-05-12

## 2024-05-15 ENCOUNTER — OFFICE VISIT (OUTPATIENT)
Dept: ORTHOPEDIC SURGERY | Facility: CLINIC | Age: 54
End: 2024-05-15
Payer: MEDICARE

## 2024-05-15 ENCOUNTER — HOSPITAL ENCOUNTER (OUTPATIENT)
Dept: RADIOLOGY | Facility: HOSPITAL | Age: 54
Discharge: HOME | End: 2024-05-15
Payer: MEDICARE

## 2024-05-15 ENCOUNTER — TELEPHONE (OUTPATIENT)
Dept: FAMILY MEDICINE CLINIC | Age: 54
End: 2024-05-15

## 2024-05-15 VITALS — HEIGHT: 62 IN | BODY MASS INDEX: 20.98 KG/M2 | WEIGHT: 114 LBS

## 2024-05-15 DIAGNOSIS — Z13.1 DIABETES MELLITUS SCREENING: ICD-10-CM

## 2024-05-15 DIAGNOSIS — M25.512 ACUTE PAIN OF LEFT SHOULDER: ICD-10-CM

## 2024-05-15 DIAGNOSIS — R06.02 SHORTNESS OF BREATH: ICD-10-CM

## 2024-05-15 DIAGNOSIS — Z13.220 LIPID SCREENING: ICD-10-CM

## 2024-05-15 DIAGNOSIS — M25.512 ACUTE PAIN OF LEFT SHOULDER: Primary | ICD-10-CM

## 2024-05-15 LAB
BNP BLD-MCNC: 208 PG/ML
CHOLEST SERPL-MCNC: 232 MG/DL (ref 0–199)
D DIMER PPP FEU-MCNC: <0.27 MG/L FEU (ref 0–0.5)
HDLC SERPL-MCNC: 93 MG/DL (ref 40–59)
LDLC SERPL CALC-MCNC: 129 MG/DL (ref 0–129)
TRIGL SERPL-MCNC: 51 MG/DL (ref 0–150)

## 2024-05-15 PROCEDURE — 73030 X-RAY EXAM OF SHOULDER: CPT | Mod: LT

## 2024-05-15 PROCEDURE — 73030 X-RAY EXAM OF SHOULDER: CPT | Mod: LEFT SIDE | Performed by: RADIOLOGY

## 2024-05-15 PROCEDURE — 99204 OFFICE O/P NEW MOD 45 MIN: CPT | Performed by: STUDENT IN AN ORGANIZED HEALTH CARE EDUCATION/TRAINING PROGRAM

## 2024-05-15 RX ORDER — MELOXICAM 15 MG/1
15 TABLET ORAL DAILY
Qty: 10 TABLET | Refills: 0 | Status: SHIPPED | OUTPATIENT
Start: 2024-05-15 | End: 2024-05-25

## 2024-05-15 NOTE — PROGRESS NOTES
Acute Injury New Patient Visit    CC:   Chief Complaint   Patient presents with    Left Shoulder - Pain     Patient c/o pain around her left shoulder suddenly last night  Xray today       HPI: Leigha is a 53 y.o.female who presents today with new complaints of left shoulder pain.  She states that for couple of days the pain has been located laterally and posteriorly and feels like a deep bruise.  She states it feels like she had a tetanus shot.  She states that she does not recall any falls or injuries.  She does not have any prior surgical or injury history of the left shoulder.  She denies any chest pain, chest pressure, neck pain, nausea, vomiting.  She does have some dyspnea, but this is not new and is currently being worked up by pulmonology.  She is right-hand dominant.  She does note some mild swelling on the posterior aspect of the glenohumeral joint.  She denies any numbness and tingling.  She denies any neck pain.  She has been using ibuprofen and ice occasionally.  She works as an LPN 1 day a week.  Reviewed some of her recent medical records, and she has a complex history of kidney and GI issues, and multiple side effects and sequela from steroid use in the past.    Assessment:   Problem List Items Addressed This Visit    None  Visit Diagnoses       Acute pain of left shoulder    -  Primary    Relevant Orders    XR shoulder left 2+ views             Plan: For this likely left rotator cuff tendinitis, mild, we will start her on meloxicam for only about 10 days given her medical history and prior side effect profile.  We will hold off on any corticosteroid injections given her reactions to steroids in the past.  Will start physical therapy as well.  Discussed home exercises.  Rest and activity modifications.  Follow-up in 6 weeks.    Physical Exam:  GENERAL:  No obvious acute distress or medical instability.  NEURO:  Distally neurovascularly intact.  Sensation intact to light touch.  Extremity: Exam of the  left shoulder reveals skin is intact with no signs of infection.  There is no significant swelling or bruising.  She is tender over the anterior and posterior joint lines.  She is nontender to palpation over the AC joint, clavicle, spine of the scapula, trapezius, or proximal biceps tendons.  She has full range of motion that is symmetric with the right side.  She is painful at terminal flexion and abduction.  She has full strength, 5 out of 5 throughout strength testing and symmetric with the right.  She has positive impingement signs with positive Santiago, Neer's, and crossover test.  Negative speeds test.  Negative Yergason's.    Diagnostics: 4 view x-ray of the left shoulder reviewed and shows no acute fracture or dislocation.  There is no significant degenerative changes.      Procedure: None  Procedures    Past Medical History  Past Medical History:   Diagnosis Date    Abdominal distension (gaseous) 02/02/2021    Abdominal distention    Age-related osteoporosis without current pathological fracture 02/17/2017    At risk of fracture due to osteoporosis    Angiodysplasia of stomach and duodenum without bleeding 08/28/2020    Gastric AVM    Angioneurotic edema, initial encounter 04/20/2022    Angioedema    CHF (congestive heart failure) (Multi)     Chronic pancreatitis (Multi)     Conductive hearing loss, unilateral, left ear, with unrestricted hearing on the contralateral side 03/10/2016    Conductive hearing loss in left ear    Depression, unspecified 09/17/2019    Depression    Disorder of kidney and ureter, unspecified     Kidney disease    Fecal urgency 02/02/2021    Defecation urgency    Fibromyalgia 10/06/2022    Fibromyalgia    Gastro-esophageal reflux disease without esophagitis 09/17/2019    GERD (gastroesophageal reflux disease)    Gastroparesis 04/15/2020    Gastric stasis    Hematuria, unspecified 02/17/2017    Hematuria    Hypothyroidism, unspecified 12/28/2017    Hypothyroidism    Hypothyroidism,  unspecified 06/07/2021    Acquired hypothyroidism    Ileus (Multi)     Migraine, unspecified, not intractable, without status migrainosus 01/09/2020    Migraine headache    Nausea 07/15/2020    Moderate nausea    Other constipation 11/19/2021    Chronic constipation    Other disorders of calcium metabolism 08/04/2017    Calcinosis    Other disorders of iron metabolism     Iron overload    Other disorders of iron metabolism 07/06/2018    Iron overload syndrome    Other forms of dyspnea 08/30/2022    Dyspnea on exertion    Other long term (current) drug therapy 01/25/2019    High risk medication use    Other specified behavioral and emotional disorders with onset usually occurring in childhood and adolescence 04/24/2018    ADD (attention deficit disorder)    Other specified cataract 11/27/2018    Other cataract of both eyes    Other specified diseases of intestine 07/15/2020    Small bowel mass    Other specified health status     No pertinent past medical history    Other specified postprocedural states     History of ERCP    Other specified symptoms and signs involving the digestive system and abdomen 02/02/2021    Abnormal defecation    Outlet dysfunction constipation 12/15/2020    Dyssynergic constipation    Pain in unspecified joint 04/16/2022    Arthralgia    Palpitations 09/09/2022    Palpitations    Personal history of other infectious and parasitic diseases 01/26/2021    History of shingles    Raynaud's syndrome without gangrene 01/09/2020    Raynauds phenomenon    Sjogren syndrome, unspecified (Multi) 04/28/2020    Sjogren's disease    Sjogren syndrome, unspecified (Multi) 10/08/2022    Sjogren's syndrome    Tension-type headache, unspecified, not intractable 04/30/2021    Tension-type headache    Unspecified infectious disease 07/08/2021    Recurrent infections    Zoster without complications 12/13/2018    Shingles       Medication review  Medication Documentation Review Audit       Reviewed by Josette REYES  MD Kobi (Physician) on 03/05/24 at 1458      Medication Order Taking? Sig Documenting Provider Last Dose Status   albuterol 90 mcg/actuation inhaler [id number] Yes Inhale 2-4 puffs every 2 hours if needed. Jose Marc MD Taking Active   amphetamine-dextroamphetamine XR (Adderall XR) 30 mg 24 hr capsule [id number] Yes Take 1 capsule (30 mg) by mouth once daily in the morning. Jose Marc MD Taking Active   Windfall Thyroid 30 mg tablet [id number] No Take 1 tablet (30 mg) by mouth once daily. Jose Marc MD Not Taking Active   azelastine (Astelin) 137 mcg (0.1 %) nasal spray [id number] Yes 2 sprays. Jose Marc MD Taking Active   bumetanide (Bumex) 2 mg tablet [id number] Yes Take 1 tablet (2 mg) by mouth once daily. Jose Marc MD Taking Active   buPROPion XL (Wellbutrin XL) 300 mg 24 hr tablet [id number] Yes Take 1 tablet (300 mg) by mouth once daily. Jose Marc MD Taking Active   Discontinued 03/05/24 1448   Discontinued 03/05/24 1448   diclofenac sodium (Voltaren) 1 % gel gel [id number] Yes Place 4.5 inches (1 Application) on the skin see administration instructions. Jose Marc MD Taking Active   famotidine (Pepcid) 20 mg tablet [id number] Yes Take 1 tablet (20 mg) by mouth 2 times a day. Jose Marc MD Taking Active   fludrocortisone (Florinef) 0.1 mg tablet [id number] Yes Take 2 tablets (0.2 mg) by mouth 2 times a day. Jose Marc MD Taking Active   fluticasone (Flonase) 50 mcg/actuation nasal spray [id number] Yes USE ONE SPRAY IN EACH NOSTRIL DAILY Jose Marc MD Taking Active   frovatriptan (Frova) 2.5 mg tablet [id number] Yes Take one at onset of severe headache/migraine may repeat x 1 after 4 hours if needed.  Maximum 2/day and 2 days/week. Jose Marc MD Taking Active   furosemide (Lasix) 40 mg tablet [id number] Yes Take 1 tablet (40 mg) by mouth 2 times a day as needed. Jose Marc MD Taking Active    Discontinued 03/05/24 1449   hydrocortisone (Cortef) 10 mg tablet [id number] Yes Take 1.5 tablets (15 mg) by mouth once daily. Historical Provider, MD Taking Active   hydroxychloroquine (Plaquenil) 200 mg tablet [id number] Yes Take 1 tablet (200 mg) by mouth twice a day. Historical Provider, MD Taking Active   Discontinued 03/05/24 1449   levothyroxine (Synthroid, Levoxyl) 88 mcg tablet [id number] Yes Take 1 tablet (88 mcg) by mouth once daily. Historical Provider, MD Taking Active   liothyronine (Cytomel) 25 mcg tablet [id number] Yes Take 1 tablet (25 mcg) by mouth every other day. Historical Provider, MD Taking Active   Discontinued 03/05/24 1450   Discontinued 03/05/24 1451   metFORMIN (Glucophage) 500 mg tablet [id number] Yes Take 1 tablet (500 mg) by mouth once daily with breakfast. Historical Provider, MD Taking Active   Discontinued 03/05/24 1451   midodrine (Proamatine) 10 mg tablet [id number] Yes TAKE ONE TABLET BY MOUTH AS NEEDED FOR SBP LESS THAN 90 Historical Provider, MD Taking Active   moxifloxacin (Vigamox) 0.5 % ophthalmic solution [id number] Yes Administer 1 drop into affected eye(s) 3 times a day. Historical Provider, MD Taking Active    Discontinued 03/05/24 1451   Discontinued 03/05/24 1451   NexIUM 40 mg DR capsule [id number] Yes Take 1 capsule (40 mg) by mouth once daily. Historical Provider, MD Taking Active   Nurtec ODT 75 mg tablet,disintegrating [id number] Yes DISSOLVE ONE TABLET IN MOUTH EVERY DAY AS NEEDED for headaches Historical Provider, MD Taking Active   ondansetron ODT (Zofran-ODT) 4 mg disintegrating tablet [id number] Yes Take 1 tablet (4 mg) by mouth 4 times a day. Historical Provider, MD Taking Active   pancrelipase, Lip-Prot-Amyl, (Creon) 12,000-38,000 -60,000 unit capsule [id number] Yes Take by mouth. Historical Provider, MD Taking Active   plecanatide (Trulance) tablet tablet [id number] Yes TAKE ONE (1) TABLET BY MOUTH ONCE DAILY Marcin Saleh DO Taking Active   potassium citrate CR  (Urocit-K-15) 15 mEq ER tablet [id number] Yes Take 2 tablets (30 mEq) by mouth twice a day. Historical MD Monico Taking Active   promethazine (Phenergan) 12.5 mg tablet [id number] Yes Take 1 tablet (12.5 mg) by mouth. Jose Marc MD Taking Active   promethazine-DM (Phenergan-DM) 6.25-15 mg/5 mL syrup [id number] Yes TAKE 5 MLS BY MOUTH EVERY 6 HOURS AS NEEDED Jose Marc MD Taking Active   prucalopride (Motegrity) 2 mg tablet [id number] No Take 1 tablet (2 mg) by mouth once daily. Historical MD Monico Not Taking Active   sod picosulf-mag ox-citric ac (Clenpiq) 10 mg-3.5 gram- 12 gram/160 mL solution [id number] No  Jose Marc MD Not Taking Active   sucralfate (Carafate) 100 mg/mL suspension [id number] Yes Take 10 mL (1 g) by mouth 4 times a day. Jose Marc MD Taking Active   topiramate (Topamax) 25 mg tablet [id number] Yes Take by mouth twice a day. Jose Marc MD Taking Active   Discontinued 03/05/24 1452   Discontinued 03/05/24 1452   Ubrelvy 100 mg tablet tablet [id number] Yes TAKE 1 TABLET BY MOUTH AS NEEDED FOR MIGRAINE  SECOND DOSE CAN BE TAKEN AT LEAST 2 HOURS AFTER THE INITIAL DOSE IF NEEDED Historical MD Monico Taking Active   valACYclovir (Valtrex) 500 mg tablet [id number] Yes Take 1 tablet (500 mg) by mouth once daily. Historical MD Monico Taking Active                    Allergies  No Known Allergies    Social History  Social History     Socioeconomic History    Marital status: Single     Spouse name: Not on file    Number of children: Not on file    Years of education: Not on file    Highest education level: Not on file   Occupational History    Not on file   Tobacco Use    Smoking status: Never     Passive exposure: Past    Smokeless tobacco: Never   Substance and Sexual Activity    Alcohol use: Never    Drug use: Never    Sexual activity: Not on file   Other Topics Concern    Not on file   Social History Narrative    Not on file     Social Determinants  of Health     Financial Resource Strain: Low Risk  (2/18/2024)    Received from [facility]    Overall Financial Resource Strain (CARDIA)     Difficulty of Paying Living Expenses: Not hard at all   Food Insecurity: No Food Insecurity (2/18/2024)    Received from [facility]    Hunger Vital Sign     Worried About Running Out of Food in the Last Year: Never true     Ran Out of Food in the Last Year: Never true   Transportation Needs: No Transportation Needs (2/18/2024)    Received from [facility]    PRAPARE - Transportation     Lack of Transportation (Medical): No     Lack of Transportation (Non-Medical): No   Physical Activity: Unknown (9/2/2022)    Received from [facility]    Exercise Vital Sign     Days of Exercise per Week: 0 days     Minutes of Exercise per Session: Not on file   Stress: Stress Concern Present (5/27/2022)    Received from [facility]    [employer] [place] [employer] - Occupational Stress Questionnaire     Feeling of Stress : Rather much   Social Connections: Unknown (5/27/2022)    Received from [facility]    Social Connection and Isolation Panel [NHANES]     Frequency of Communication with Friends and Family: Twice a week     Frequency of Social Gatherings with Friends and Family: Never     Attends Congregation Services: Not on file     Active Member of Clubs or Organizations: No     Attends Club or Organization Meetings: Never     Marital Status: Never    Intimate Partner Violence: Not on file   Housing Stability: Unknown (2/18/2024)    Received from [facility]    Housing Stability Vital Sign     Unable to Pay for Housing in the Last Year: No     Number of Places Lived in the Last Year: Not on file     Unstable Housing in the Last Year: No       Surgical History  Past Surgical History:   Procedure Laterality Date    CHOLECYSTECTOMY  05/18/2021    Cholecystectomy Laparoscopic    COLONOSCOPY   05/11/2018    Complete Colonoscopy    ESOPHAGOGASTRODUODENOSCOPY  05/11/2018    Diagnostic Esophagogastroduodenoscopy    HYSTERECTOMY  02/28/2018    Hysterectomy    US GUIDED NEEDLE LIVER BIOPSY  6/1/2018    US GUIDED NEEDLE LIVER BIOPSY 6/1/2018 [facility]       Orders Placed This Encounter    XR shoulder left 2+ views      At the conclusion of the visit there were no further questions by the patient/family regarding their plan of care.  Patient was instructed to call or return with any issues, questions, or concerns regarding their injury and/or treatment plan described above.     05/15/24 at 1:22 PM - aIn [place], DO    Office: [phone]    This note was prepared using voice recognition software.  The details of this note are correct and have been reviewed, and corrected to the best of my ability.  Some grammatical errors may persist related to the Dragon software.

## 2024-05-15 NOTE — TELEPHONE ENCOUNTER
Pt called in today c/o LT shoulder pain and that her arm is hurting between her shoulder and elbow. States that she has a \"pocket of water\" in her arm. States that it is very painful. Spoke to Avelina and she stated that she should call Arnaldo Kidd or go to ER if pain is worsening. Michelle spoke to pt as she had hung up while waiting for me and let her know KR's recommendation.

## 2024-05-16 LAB
ESTIMATED AVERAGE GLUCOSE: 103 MG/DL
HBA1C MFR BLD: 5.2 % (ref 4–6)

## 2024-05-21 ENCOUNTER — OFFICE VISIT (OUTPATIENT)
Dept: FAMILY MEDICINE CLINIC | Age: 54
End: 2024-05-21
Payer: MEDICARE

## 2024-05-21 VITALS
SYSTOLIC BLOOD PRESSURE: 112 MMHG | HEART RATE: 83 BPM | TEMPERATURE: 98.8 F | BODY MASS INDEX: 21.23 KG/M2 | HEIGHT: 62 IN | OXYGEN SATURATION: 98 % | DIASTOLIC BLOOD PRESSURE: 76 MMHG | WEIGHT: 115.4 LBS

## 2024-05-21 DIAGNOSIS — F98.8 ATTENTION DEFICIT DISORDER, UNSPECIFIED HYPERACTIVITY PRESENCE: ICD-10-CM

## 2024-05-21 DIAGNOSIS — M35.00 SJOGREN'S SYNDROME, WITH UNSPECIFIED ORGAN INVOLVEMENT (HCC): ICD-10-CM

## 2024-05-21 DIAGNOSIS — T14.8XXA BRUISING: ICD-10-CM

## 2024-05-21 DIAGNOSIS — M06.9 RHEUMATOID ARTHRITIS, INVOLVING UNSPECIFIED SITE, UNSPECIFIED WHETHER RHEUMATOID FACTOR PRESENT (HCC): ICD-10-CM

## 2024-05-21 DIAGNOSIS — K86.1 CHRONIC PANCREATITIS, UNSPECIFIED PANCREATITIS TYPE (HCC): ICD-10-CM

## 2024-05-21 DIAGNOSIS — F32.4 MAJOR DEPRESSIVE DISORDER, SINGLE EPISODE, IN PARTIAL REMISSION (HCC): ICD-10-CM

## 2024-05-21 DIAGNOSIS — M79.10 MYALGIA: ICD-10-CM

## 2024-05-21 DIAGNOSIS — E03.9 HYPOTHYROIDISM, UNSPECIFIED TYPE: ICD-10-CM

## 2024-05-21 DIAGNOSIS — R06.00 DYSPNEA, UNSPECIFIED TYPE: Primary | ICD-10-CM

## 2024-05-21 DIAGNOSIS — F41.9 ANXIETY: ICD-10-CM

## 2024-05-21 DIAGNOSIS — D70.9 NEUTROPENIA, UNSPECIFIED TYPE (HCC): ICD-10-CM

## 2024-05-21 DIAGNOSIS — Q61.5 MEDULLARY SPONGE KIDNEY OF BOTH KIDNEYS: ICD-10-CM

## 2024-05-21 PROBLEM — E83.110 HEREDITARY HEMOCHROMATOSIS (HCC): Status: RESOLVED | Noted: 2021-01-12 | Resolved: 2024-05-21

## 2024-05-21 PROBLEM — E11.9 TYPE 2 DIABETES MELLITUS (HCC): Status: RESOLVED | Noted: 2024-03-01 | Resolved: 2024-05-21

## 2024-05-21 PROCEDURE — 99214 OFFICE O/P EST MOD 30 MIN: CPT | Performed by: NURSE PRACTITIONER

## 2024-05-21 PROCEDURE — 3017F COLORECTAL CA SCREEN DOC REV: CPT | Performed by: NURSE PRACTITIONER

## 2024-05-21 PROCEDURE — G8420 CALC BMI NORM PARAMETERS: HCPCS | Performed by: NURSE PRACTITIONER

## 2024-05-21 PROCEDURE — 1036F TOBACCO NON-USER: CPT | Performed by: NURSE PRACTITIONER

## 2024-05-21 PROCEDURE — G8427 DOCREV CUR MEDS BY ELIG CLIN: HCPCS | Performed by: NURSE PRACTITIONER

## 2024-05-21 RX ORDER — RIMEGEPANT SULFATE 75 MG/75MG
1 TABLET, ORALLY DISINTEGRATING ORAL DAILY PRN
Qty: 16 TABLET | Refills: 1 | Status: SHIPPED | OUTPATIENT
Start: 2024-05-21

## 2024-05-21 RX ORDER — BUPROPION HYDROCHLORIDE 300 MG/1
300 TABLET ORAL EVERY MORNING
Qty: 90 TABLET | Refills: 3 | Status: SHIPPED | OUTPATIENT
Start: 2024-05-21

## 2024-05-21 ASSESSMENT — ENCOUNTER SYMPTOMS
ABDOMINAL DISTENTION: 1
CONSTIPATION: 1
SHORTNESS OF BREATH: 1
ABDOMINAL PAIN: 1
NAUSEA: 1
COUGH: 0

## 2024-05-21 NOTE — PROGRESS NOTES
mouth daily (Patient not taking: Reported on 5/21/2024) 14 tablet 0     No current facility-administered medications on file prior to visit.     No Known Allergies    Review of Systems   Constitutional:  Positive for fatigue.   Respiratory:  Positive for shortness of breath. Negative for cough.    Cardiovascular:  Negative for chest pain.   Gastrointestinal:  Positive for abdominal distention, abdominal pain, constipation and nausea.   Musculoskeletal:  Positive for arthralgias.   Psychiatric/Behavioral:  Positive for dysphoric mood. The patient is nervous/anxious.        Objective  Vitals:    05/21/24 1034   BP: 112/76   Pulse: 83   Temp: 98.8 °F (37.1 °C)   SpO2: 98%   Weight: 52.3 kg (115 lb 6.4 oz)   Height: 1.575 m (5' 2\")     Physical Exam  Vitals and nursing note reviewed.   Constitutional:       Appearance: Normal appearance.   HENT:      Head: Normocephalic.      Nose: Nose normal.      Mouth/Throat:      Mouth: Mucous membranes are moist.      Pharynx: Oropharynx is clear.   Eyes:      Extraocular Movements: Extraocular movements intact.      Conjunctiva/sclera: Conjunctivae normal.      Pupils: Pupils are equal, round, and reactive to light.   Cardiovascular:      Rate and Rhythm: Normal rate and regular rhythm.      Pulses: Normal pulses.      Heart sounds: Normal heart sounds.   Pulmonary:      Effort: Pulmonary effort is normal.      Breath sounds: Normal breath sounds.   Abdominal:      General: There is distension.   Musculoskeletal:      Cervical back: Neck supple.   Skin:     General: Skin is warm.      Findings: Bruising present.   Neurological:      General: No focal deficit present.      Mental Status: She is alert and oriented to person, place, and time. Mental status is at baseline.   Psychiatric:         Mood and Affect: Mood normal.         Behavior: Behavior normal.         Thought Content: Thought content normal.         Judgment: Judgment normal.         Assessment & Plan     Diagnosis

## 2024-05-22 ENCOUNTER — OFFICE VISIT (OUTPATIENT)
Dept: PULMONOLOGY | Age: 54
End: 2024-05-22
Payer: MEDICARE

## 2024-05-22 VITALS
HEART RATE: 86 BPM | DIASTOLIC BLOOD PRESSURE: 77 MMHG | BODY MASS INDEX: 20.94 KG/M2 | SYSTOLIC BLOOD PRESSURE: 119 MMHG | WEIGHT: 113.8 LBS | HEIGHT: 62 IN | OXYGEN SATURATION: 99 %

## 2024-05-22 DIAGNOSIS — R06.02 SOB (SHORTNESS OF BREATH): Primary | ICD-10-CM

## 2024-05-22 DIAGNOSIS — R94.2 DIFFUSION CAPACITY OF LUNG (DL), DECREASED: ICD-10-CM

## 2024-05-22 DIAGNOSIS — R09.89 PULMONARY AIR TRAPPING: ICD-10-CM

## 2024-05-22 DIAGNOSIS — M35.00 SJOGREN'S SYNDROME, WITH UNSPECIFIED ORGAN INVOLVEMENT (HCC): Chronic | ICD-10-CM

## 2024-05-22 PROCEDURE — G8420 CALC BMI NORM PARAMETERS: HCPCS | Performed by: INTERNAL MEDICINE

## 2024-05-22 PROCEDURE — 3017F COLORECTAL CA SCREEN DOC REV: CPT | Performed by: INTERNAL MEDICINE

## 2024-05-22 PROCEDURE — 1036F TOBACCO NON-USER: CPT | Performed by: INTERNAL MEDICINE

## 2024-05-22 PROCEDURE — G8427 DOCREV CUR MEDS BY ELIG CLIN: HCPCS | Performed by: INTERNAL MEDICINE

## 2024-05-22 PROCEDURE — 99204 OFFICE O/P NEW MOD 45 MIN: CPT | Performed by: INTERNAL MEDICINE

## 2024-05-22 RX ORDER — ALBUTEROL SULFATE 90 UG/1
2 AEROSOL, METERED RESPIRATORY (INHALATION) 4 TIMES DAILY PRN
Qty: 18 G | Refills: 0 | Status: SHIPPED | OUTPATIENT
Start: 2024-05-22

## 2024-05-22 NOTE — PROGRESS NOTES
4 MG tablet Take 1 tablet by mouth 3 times daily as needed for Nausea or Vomiting 30 tablet 0    Handicap Placard MISC by Does not apply route Good for 5 years 1 each 0    azelastine (ASTELIN) 0.1 % nasal spray SPRAY 2 SPRAYS INTO EACH NOSTRIL TWICE DAILY.      furosemide (LASIX) 40 MG tablet Take 1 tablet by mouth 2 times daily      midodrine (PROAMATINE) 10 MG tablet TAKE ONE TABLET BY MOUTH AS NEEDED FOR SBP LESS THAN 90      Ubrogepant (UBRELVY) 100 MG TABS Take one tablet as needed for migraine, second dose can be taken at least 2 hours after the initial dose, if needed 16 tablet 0    hydrocortisone 2.5 % cream Apply topically 2 times daily. 15 g 0    loratadine (CLARITIN) 10 MG tablet Take 1 tablet by mouth daily 30 tablet 5    valACYclovir (VALTREX) 500 MG tablet Take 1 tablet by mouth daily 30 tablet 5    moxifloxacin (VIGAMOX) 0.5 % ophthalmic solution 1 drop      hydroxychloroquine (PLAQUENIL) 200 MG tablet Take 1.5 tablets by mouth daily      lidocaine (LIDODERM) 5 %  (Patient not taking: Reported on 5/21/2024)      aspirin (ASPIRIN CHILDRENS) 81 MG chewable tablet Take 1 tablet by mouth daily (Patient not taking: Reported on 5/21/2024) 14 tablet 0     No current facility-administered medications for this visit.       Objective:     Vitals:    05/22/24 1524   BP: 119/77   Site: Left Upper Arm   Position: Sitting   Cuff Size: Medium Adult   Pulse: 86   SpO2: 99%   Weight: 51.6 kg (113 lb 12.8 oz)   Height: 1.575 m (5' 2\")         Physical Exam  Constitutional:       General: She is not in acute distress.     Appearance: She is well-developed. She is not diaphoretic.   HENT:      Head: Normocephalic and atraumatic.   Eyes:      Conjunctiva/sclera: Conjunctivae normal.      Pupils: Pupils are equal, round, and reactive to light.   Cardiovascular:      Rate and Rhythm: Normal rate and regular rhythm.      Heart sounds: No murmur heard.     No friction rub. No gallop.   Pulmonary:      Effort: Pulmonary effort

## 2024-05-28 ENCOUNTER — TELEPHONE (OUTPATIENT)
Dept: FAMILY MEDICINE CLINIC | Age: 54
End: 2024-05-28

## 2024-05-28 ENCOUNTER — OFFICE VISIT (OUTPATIENT)
Dept: FAMILY MEDICINE CLINIC | Age: 54
End: 2024-05-28

## 2024-05-28 VITALS
HEART RATE: 88 BPM | HEIGHT: 62 IN | OXYGEN SATURATION: 100 % | WEIGHT: 115.8 LBS | SYSTOLIC BLOOD PRESSURE: 118 MMHG | DIASTOLIC BLOOD PRESSURE: 88 MMHG | BODY MASS INDEX: 21.31 KG/M2

## 2024-05-28 DIAGNOSIS — N64.4 MASTALGIA: Primary | ICD-10-CM

## 2024-05-28 ASSESSMENT — ENCOUNTER SYMPTOMS
VOMITING: 0
SINUS PRESSURE: 0
SHORTNESS OF BREATH: 0
COUGH: 0
SORE THROAT: 0
ABDOMINAL PAIN: 0

## 2024-05-28 NOTE — TELEPHONE ENCOUNTER
REUBEN  Pt calling due to her having this burning and swelling under her arm/breast area more her left breast is swollen and has the burning sensation and it started off as coming and going but not the burning sensation wont go away and she is worried about she has done her exam and she does not feel a lump but its swollen and burning feels thicker and tender than the right one patient is going to make an appt to come see the provider she can barely tolerate a bra on. Pt has an appt with  today.

## 2024-05-28 NOTE — PROGRESS NOTES
Unknown (5/30/2023)    PRAPARE - Transportation     Lack of Transportation (Medical): Not on file     Lack of Transportation (Non-Medical): No   Physical Activity: Unknown (9/2/2022)    Exercise Vital Sign     Days of Exercise per Week: 0 days     Minutes of Exercise per Session: Not on file   Stress: Stress Concern Present (5/27/2022)    [employer] [place] [employer] - Occupational Stress Questionnaire     Feeling of Stress : Rather much   Social Connections: Unknown (5/27/2022)    Social Connection and Isolation Panel [NHANES]     Frequency of Communication with Friends and Family: Twice a week     Frequency of Social Gatherings with Friends and Family: Never     Attends Judaism Services: Not on file     Active Member of Clubs or Organizations: No     Attends Club or Organization Meetings: Never     Marital Status: Never    Intimate Partner Violence: Not on file   Housing Stability: Unknown (5/30/2023)    Housing Stability Vital Sign     Unable to Pay for Housing in the Last Year: Not on file     Number of Places Lived in the Last Year: Not on file     Unstable Housing in the Last Year: No        Family History   Problem Relation Age of Onset    Heart Disease Father 53    Cancer Maternal Grandmother         breast    Breast Cancer Maternal Grandmother         in her 60's    Breast Cancer Paternal Grandmother         in her 60's    Colon Cancer Paternal Grandfather     Breast Cancer Paternal Aunt         in her 30's    Breast Cancer Paternal Aunt         in her 60's    Heart Disease Other         mom and dad's side       Vitals:    05/28/24 0859   BP: 118/88   Site: Right Upper Arm   Position: Sitting   Cuff Size: Medium Adult   Pulse: 88   SpO2: 100%   Weight: 52.5 kg (115 lb 12.8 oz)   Height: 1.575 m (5' 2\")       Estimated body mass index is 21.18 kg/m² as calculated from the following:    Height as of this encounter: 1.575 m (5' 2\").    Weight as of this encounter: 52.5 kg (115 lb 12.8

## 2024-05-29 ENCOUNTER — HOSPITAL ENCOUNTER (OUTPATIENT)
Dept: CT IMAGING | Age: 54
Discharge: HOME | End: 2024-05-31
Attending: INTERNAL MEDICINE
Payer: MEDICARE

## 2024-05-29 ENCOUNTER — APPOINTMENT (OUTPATIENT)
Dept: ULTRASOUND IMAGING | Age: 54
End: 2024-05-29
Payer: MEDICARE

## 2024-05-29 ENCOUNTER — HOSPITAL ENCOUNTER (OUTPATIENT)
Dept: WOMENS IMAGING | Age: 54
Discharge: HOME | End: 2024-05-31
Payer: MEDICARE

## 2024-05-29 DIAGNOSIS — R06.02 SOB (SHORTNESS OF BREATH): ICD-10-CM

## 2024-05-29 DIAGNOSIS — M79.10 MYALGIA: ICD-10-CM

## 2024-05-29 DIAGNOSIS — R06.00 DYSPNEA, UNSPECIFIED TYPE: ICD-10-CM

## 2024-05-29 DIAGNOSIS — R94.2 DIFFUSION CAPACITY OF LUNG (DL), DECREASED: ICD-10-CM

## 2024-05-29 DIAGNOSIS — N64.4 MASTALGIA: ICD-10-CM

## 2024-05-29 DIAGNOSIS — T14.8XXA BRUISING: ICD-10-CM

## 2024-05-29 LAB
BASOPHILS # BLD: 0.1 K/UL (ref 0–0.2)
BASOPHILS NFR BLD: 1.1 %
CRP SERPL HS-MCNC: <3 MG/L (ref 0–5)
EOSINOPHIL # BLD: 0 K/UL (ref 0–0.7)
EOSINOPHIL NFR BLD: 0.5 %
ERYTHROCYTE [DISTWIDTH] IN BLOOD BY AUTOMATED COUNT: 12.9 % (ref 11.5–14.5)
ERYTHROCYTE [SEDIMENTATION RATE] IN BLOOD BY WESTERGREN METHOD: 1 MM (ref 0–30)
HCT VFR BLD AUTO: 37.6 % (ref 37–47)
HGB BLD-MCNC: 12.8 G/DL (ref 12–16)
INR PPP: 0.9
LYMPHOCYTES # BLD: 2.3 K/UL (ref 1–4.8)
LYMPHOCYTES NFR BLD: 30.9 %
MCH RBC QN AUTO: 28.9 PG (ref 27–31.3)
MCHC RBC AUTO-ENTMCNC: 34 % (ref 33–37)
MCV RBC AUTO: 84.9 FL (ref 79.4–94.8)
MONOCYTES # BLD: 0.4 K/UL (ref 0.2–0.8)
MONOCYTES NFR BLD: 5.9 %
NEUTROPHILS # BLD: 4.6 K/UL (ref 1.4–6.5)
NEUTS SEG NFR BLD: 61.2 %
PLATELET # BLD AUTO: 358 K/UL (ref 130–400)
PROTHROMBIN TIME: 12.6 SEC (ref 12.3–14.9)
RBC # BLD AUTO: 4.43 M/UL (ref 4.2–5.4)
T4 FREE SERPL-MCNC: 2.01 NG/DL (ref 0.84–1.68)
TSH SERPL-MCNC: 0.1 UIU/ML (ref 0.44–3.86)
WBC # BLD AUTO: 7.5 K/UL (ref 4.8–10.8)

## 2024-05-29 PROCEDURE — G0279 TOMOSYNTHESIS, MAMMO: HCPCS

## 2024-05-29 PROCEDURE — RXMED WILLOW AMBULATORY MEDICATION CHARGE

## 2024-05-29 PROCEDURE — 71250 CT THORAX DX C-: CPT

## 2024-05-29 RX ORDER — PROCHLORPERAZINE MALEATE 10 MG
10 TABLET ORAL EVERY 6 HOURS PRN
Qty: 40 TABLET | Refills: 0 | Status: SHIPPED | OUTPATIENT
Start: 2024-05-29 | End: 2024-06-08

## 2024-05-30 ENCOUNTER — PHARMACY VISIT (OUTPATIENT)
Dept: PHARMACY | Facility: CLINIC | Age: 54
End: 2024-05-30
Payer: MEDICARE

## 2024-05-30 DIAGNOSIS — E03.9 HYPOTHYROIDISM, UNSPECIFIED TYPE: Primary | ICD-10-CM

## 2024-05-30 RX ORDER — LEVOTHYROXINE SODIUM 0.07 MG/1
75 TABLET ORAL DAILY
Qty: 90 TABLET | Refills: 1 | Status: SHIPPED | OUTPATIENT
Start: 2024-05-30

## 2024-05-31 ENCOUNTER — OFFICE VISIT (OUTPATIENT)
Dept: ENDOCRINOLOGY | Age: 54
End: 2024-05-31
Payer: MEDICARE

## 2024-05-31 VITALS
WEIGHT: 113 LBS | SYSTOLIC BLOOD PRESSURE: 111 MMHG | DIASTOLIC BLOOD PRESSURE: 80 MMHG | BODY MASS INDEX: 20.8 KG/M2 | HEART RATE: 70 BPM | HEIGHT: 62 IN

## 2024-05-31 DIAGNOSIS — E03.9 ACQUIRED HYPOTHYROIDISM: Primary | ICD-10-CM

## 2024-05-31 PROCEDURE — 99214 OFFICE O/P EST MOD 30 MIN: CPT | Performed by: PHYSICIAN ASSISTANT

## 2024-05-31 PROCEDURE — G8427 DOCREV CUR MEDS BY ELIG CLIN: HCPCS | Performed by: PHYSICIAN ASSISTANT

## 2024-05-31 PROCEDURE — 1036F TOBACCO NON-USER: CPT | Performed by: PHYSICIAN ASSISTANT

## 2024-05-31 PROCEDURE — G8420 CALC BMI NORM PARAMETERS: HCPCS | Performed by: PHYSICIAN ASSISTANT

## 2024-05-31 PROCEDURE — 3017F COLORECTAL CA SCREEN DOC REV: CPT | Performed by: PHYSICIAN ASSISTANT

## 2024-05-31 ASSESSMENT — ENCOUNTER SYMPTOMS
COUGH: 0
ABDOMINAL PAIN: 0
SINUS PRESSURE: 0
WHEEZING: 0
SHORTNESS OF BREATH: 0
SORE THROAT: 0
NAUSEA: 0
DIARRHEA: 0
VOMITING: 0
RHINORRHEA: 0

## 2024-05-31 NOTE — PATIENT INSTRUCTIONS
Levothyroxine 75 mcg daily on an empty stomach   Metformin 500 mg daily before breakfast  Follow up in 3 months

## 2024-05-31 NOTE — PROGRESS NOTES
Stability Vital Sign     Unable to Pay for Housing in the Last Year: Not on file     Number of Places Lived in the Last Year: Not on file     Unstable Housing in the Last Year: No     Family History   Problem Relation Age of Onset    Heart Disease Father 53    Cancer Maternal Grandmother         breast    Breast Cancer Maternal Grandmother         in her 60's    Breast Cancer Paternal Grandmother         in her 60's    Colon Cancer Paternal Grandfather     Uterine Cancer Paternal Aunt     Breast Cancer Paternal Aunt         in her 30's    Breast Cancer Paternal Aunt         in her 60's    Heart Disease Other         mom and dad's side    Colon Cancer Paternal Uncle      Not on File    Current Outpatient Medications:     levothyroxine (SYNTHROID) 75 MCG tablet, Take 1 tablet by mouth daily, Disp: 90 tablet, Rfl: 1    prochlorperazine (COMPAZINE) 10 MG tablet, Take 1 tablet by mouth every 6 hours as needed (for nausea), Disp: 40 tablet, Rfl: 0    albuterol sulfate HFA (VENTOLIN HFA) 108 (90 Base) MCG/ACT inhaler, Inhale 2 puffs into the lungs 4 times daily as needed for Wheezing, Disp: 18 g, Rfl: 0    rimegepant sulfate (NURTEC) 75 MG TBDP, Take 1 tablet by mouth daily as needed (for headaches), Disp: 16 tablet, Rfl: 1    buPROPion (WELLBUTRIN XL) 300 MG extended release tablet, Take 1 tablet by mouth every morning TAKE ONE TABLET BY MOUTH EVERY MORNING, Disp: 90 tablet, Rfl: 3    topiramate (TOPAMAX) 100 MG tablet, Take 1 tablet by mouth 2 times daily, Disp: 60 tablet, Rfl: 0    amphetamine-dextroamphetamine (ADDERALL XR) 30 MG extended release capsule, Take 1 capsule by mouth in the morning and 1 capsule in the evening. Do all this for 30 days., Disp: 60 capsule, Rfl: 0    dicyclomine (BENTYL) 10 MG capsule, Take 1 capsule by mouth 2 times daily, Disp: 60 capsule, Rfl: 3    famotidine (PEPCID) 20 MG tablet, Take 1 tablet by mouth 2 times daily, Disp: 60 tablet, Rfl: 3    traZODone (DESYREL) 150 MG tablet, Take 1

## 2024-06-01 DIAGNOSIS — Z76.0 MEDICATION REFILL: ICD-10-CM

## 2024-06-01 DIAGNOSIS — F98.8 ATTENTION DEFICIT DISORDER, UNSPECIFIED HYPERACTIVITY PRESENCE: ICD-10-CM

## 2024-06-01 LAB — F5 P.R506Q BLD/T QL: NEGATIVE

## 2024-06-03 RX ORDER — DEXTROAMPHETAMINE SACCHARATE, AMPHETAMINE ASPARTATE MONOHYDRATE, DEXTROAMPHETAMINE SULFATE AND AMPHETAMINE SULFATE 7.5; 7.5; 7.5; 7.5 MG/1; MG/1; MG/1; MG/1
30 CAPSULE, EXTENDED RELEASE ORAL 2 TIMES DAILY
Qty: 60 CAPSULE | Refills: 0 | Status: SHIPPED | OUTPATIENT
Start: 2024-06-03 | End: 2024-07-03

## 2024-06-03 NOTE — TELEPHONE ENCOUNTER
Comments: [employer] message sent regarding appt.    Last Office Visit (last PCP visit):   5/21/2024    Next Visit Date:  Future Appointments   Date Time Provider Department Center   6/7/2024 10:00 AM Slavik-Bosworth, Kelly J, APRN - CNP [facility]   6/11/2024  1:00 PM Jose Juan Kidd PA [facility]   6/14/2024  8:00 AM JETHRO PFT ROOM 1 [facility] [place]   7/23/2024  1:00 PM Jose Roberto Duque MD [facility]   9/6/2024  9:45 AM June, Darrion S, PA [facility]       **If hasn't been seen in over a year OR hasn't followed up according to last diabetes/ADHD visit, make appointment for patient before sending refill to provider.    Rx requested:  Requested Prescriptions     Pending Prescriptions Disp Refills    amphetamine-dextroamphetamine (ADDERALL XR) 30 MG extended release capsule 60 capsule 0     Sig: Take 1 capsule by mouth in the morning and 1 capsule in the evening. Do all this for 30 days.

## 2024-06-14 ENCOUNTER — TELEMEDICINE (OUTPATIENT)
Dept: FAMILY MEDICINE CLINIC | Age: 54
End: 2024-06-14

## 2024-06-14 DIAGNOSIS — U07.1 COVID-19: Primary | ICD-10-CM

## 2024-06-14 RX ORDER — BROMPHENIRAMINE MALEATE, PSEUDOEPHEDRINE HYDROCHLORIDE, AND DEXTROMETHORPHAN HYDROBROMIDE 2; 30; 10 MG/5ML; MG/5ML; MG/5ML
5 SYRUP ORAL 4 TIMES DAILY PRN
Qty: 120 ML | Refills: 1 | Status: SHIPPED | OUTPATIENT
Start: 2024-06-14

## 2024-06-14 RX ORDER — BENZONATATE 200 MG/1
200 CAPSULE ORAL 3 TIMES DAILY PRN
Qty: 30 CAPSULE | Refills: 0 | Status: SHIPPED | OUTPATIENT
Start: 2024-06-14 | End: 2024-06-21

## 2024-06-14 SDOH — ECONOMIC STABILITY: FOOD INSECURITY: WITHIN THE PAST 12 MONTHS, THE FOOD YOU BOUGHT JUST DIDN'T LAST AND YOU DIDN'T HAVE MONEY TO GET MORE.: NEVER TRUE

## 2024-06-14 SDOH — ECONOMIC STABILITY: FOOD INSECURITY: WITHIN THE PAST 12 MONTHS, YOU WORRIED THAT YOUR FOOD WOULD RUN OUT BEFORE YOU GOT MONEY TO BUY MORE.: NEVER TRUE

## 2024-06-14 SDOH — ECONOMIC STABILITY: INCOME INSECURITY: HOW HARD IS IT FOR YOU TO PAY FOR THE VERY BASICS LIKE FOOD, HOUSING, MEDICAL CARE, AND HEATING?: NOT HARD AT ALL

## 2024-06-14 ASSESSMENT — ENCOUNTER SYMPTOMS
SINUS PRESSURE: 1
TROUBLE SWALLOWING: 0
CONSTIPATION: 0
SHORTNESS OF BREATH: 0
CHEST TIGHTNESS: 0

## 2024-06-14 NOTE — PROGRESS NOTES
Subjective  Sisi Cam, 53 y.o. female presents today with:  Chief Complaint   Patient presents with    URI     Onset- + for COVID 19 symptoms 2-3 days ago   Headache- Y   Body aches- Y   Ear ache- Y bilat   Runny/stuffy nose- Y   Problem with smell-N   Problem with taste- Y    Sore throat- Y   Cough- Y   Scratchy throat-Y   Chest symptoms- N   SOB/ DUNCAN- N  Hx of Asthma Chest cold or bronchitis- N  Fever/chills- Y   Nausea/ vomiting-  nausea   Gi symptoms- Y   COVID exposures- pt was traveling   Treatments- Tylenol/ cough medicine            Sisi Cam, was evaluated through a synchronous (real-time) audio-video encounter. The patient (or guardian if applicable) is aware that this is a billable service, which includes applicable co-pays. This Virtual Visit was conducted with patient's (and/or legal guardian's) consent. Patient identification was verified, and a caregiver was present when appropriate.   The patient was located at Home: [address] [place] [address]   [place] [address]  Provider was located at Facility (Appt Dept): [address] [place] [address], [address]  [place] [address]  Confirm you are appropriately licensed, registered, or certified to deliver care in the [facility] where the patient is located as indicated above. If you are not or unsure, please re-schedule the visit: Yes, I confirm.     Sisi Cam (:  1970) is a Established patient, presenting virtually for evaluation of the following:    Assessment & Plan   Below is the assessment and plan developed based on review of pertinent history, physical exam, labs, studies, and medications.  1. COVID-19    Discussed current quarantine guidelines with patient as well as supportive measures.  Started on Paxlovid given chronic medical history and use of Bromfed and benzonatate as needed.  Follow-up if no improvements over the next 7 to 10 days or sooner symptoms are worsening    -     nirmatrelvir/ritonavir 300/100 (PAXLOVID) 20 x 150 MG & 10 x 100MG TBPK; Take 3

## 2024-06-24 DIAGNOSIS — R51.9 CHRONIC NONINTRACTABLE HEADACHE, UNSPECIFIED HEADACHE TYPE: ICD-10-CM

## 2024-06-24 DIAGNOSIS — R73.09 ABNORMAL BLOOD SUGAR: ICD-10-CM

## 2024-06-24 DIAGNOSIS — G89.29 CHRONIC NONINTRACTABLE HEADACHE, UNSPECIFIED HEADACHE TYPE: ICD-10-CM

## 2024-06-24 RX ORDER — TOPIRAMATE 100 MG/1
100 TABLET, FILM COATED ORAL 2 TIMES DAILY
Qty: 60 TABLET | Refills: 5 | Status: SHIPPED | OUTPATIENT
Start: 2024-06-24

## 2024-06-24 NOTE — TELEPHONE ENCOUNTER
Comments: LMTCB regarding appt    Last Office Visit (last PCP visit):   5/21/2024    Next Visit Date:  Future Appointments   Date Time Provider Department Center   7/5/2024  8:00 AM JETHRO PFT ROOM 1 [facility]   7/23/2024  1:00 PM Jose Roberto Duque MD [facility]   9/6/2024  9:45 AM June, Darrion S, PA [facility]       **If hasn't been seen in over a year OR hasn't followed up according to last diabetes/ADHD visit, make appointment for patient before sending refill to provider.    Rx requested:  Requested Prescriptions     Pending Prescriptions Disp Refills    topiramate (TOPAMAX) 100 MG tablet 60 tablet 0     Sig: Take 1 tablet by mouth 2 times daily

## 2024-06-24 NOTE — TELEPHONE ENCOUNTER
Comments: LMTCB regarding appt    Last Office Visit (last PCP visit):   5/21/2024    Next Visit Date:  Future Appointments   Date Time Provider Department Center   7/5/2024  8:00 AM JETHRO PFT ROOM 1 [facility]   7/23/2024  1:00 PM Jose Roberto Duque MD [facility]   9/6/2024  9:45 AM June, Darrion S, PA [facility]       **If hasn't been seen in over a year OR hasn't followed up according to last diabetes/ADHD visit, make appointment for patient before sending refill to provider.    Rx requested:  Requested Prescriptions     Pending Prescriptions Disp Refills    metFORMIN (GLUCOPHAGE) 500 MG tablet 30 tablet 3     Sig: Take 1 tablet by mouth daily (with breakfast)

## 2024-06-25 DIAGNOSIS — K59.00 CONSTIPATION, UNSPECIFIED CONSTIPATION TYPE: ICD-10-CM

## 2024-06-27 PROCEDURE — RXMED WILLOW AMBULATORY MEDICATION CHARGE

## 2024-06-29 ENCOUNTER — PHARMACY VISIT (OUTPATIENT)
Dept: PHARMACY | Facility: CLINIC | Age: 54
End: 2024-06-29
Payer: MEDICARE

## 2024-07-01 ENCOUNTER — TELEPHONE (OUTPATIENT)
Dept: FAMILY MEDICINE CLINIC | Age: 54
End: 2024-07-01

## 2024-07-02 ENCOUNTER — APPOINTMENT (OUTPATIENT)
Dept: ORTHOPEDIC SURGERY | Facility: CLINIC | Age: 54
End: 2024-07-02
Payer: MEDICARE

## 2024-07-03 ENCOUNTER — APPOINTMENT (OUTPATIENT)
Dept: CT IMAGING | Age: 54
End: 2024-07-03
Payer: MEDICARE

## 2024-07-03 ENCOUNTER — HOSPITAL ENCOUNTER (EMERGENCY)
Age: 54
Discharge: HOME | End: 2024-07-03
Payer: MEDICARE

## 2024-07-03 VITALS
HEART RATE: 87 BPM | BODY MASS INDEX: 21.16 KG/M2 | RESPIRATION RATE: 18 BRPM | WEIGHT: 115 LBS | OXYGEN SATURATION: 98 % | TEMPERATURE: 98.6 F | SYSTOLIC BLOOD PRESSURE: 135 MMHG | HEIGHT: 62 IN | DIASTOLIC BLOOD PRESSURE: 91 MMHG

## 2024-07-03 DIAGNOSIS — N30.01 ACUTE CYSTITIS WITH HEMATURIA: ICD-10-CM

## 2024-07-03 DIAGNOSIS — R19.7 DIARRHEA, UNSPECIFIED TYPE: ICD-10-CM

## 2024-07-03 DIAGNOSIS — R10.9 ABDOMINAL PAIN, UNSPECIFIED ABDOMINAL LOCATION: Primary | ICD-10-CM

## 2024-07-03 LAB
ALBUMIN SERPL-MCNC: 4.5 G/DL (ref 3.5–4.6)
ALP SERPL-CCNC: 87 U/L (ref 40–130)
ALT SERPL-CCNC: 12 U/L (ref 0–33)
AMYLASE SERPL-CCNC: 56 U/L (ref 22–93)
ANION GAP SERPL CALCULATED.3IONS-SCNC: 10 MEQ/L (ref 9–15)
AST SERPL-CCNC: 14 U/L (ref 0–35)
BACTERIA URNS QL MICRO: ABNORMAL /HPF
BASOPHILS # BLD: 0.1 K/UL (ref 0–0.1)
BASOPHILS NFR BLD: 1.3 % (ref 0.1–1.2)
BILIRUB SERPL-MCNC: 0.3 MG/DL (ref 0.2–0.7)
BILIRUB UR QL STRIP: NEGATIVE
BUN SERPL-MCNC: 18 MG/DL (ref 6–20)
CALCIUM SERPL-MCNC: 9.5 MG/DL (ref 8.5–9.9)
CHLORIDE SERPL-SCNC: 99 MEQ/L (ref 95–107)
CLARITY UR: ABNORMAL
CO2 SERPL-SCNC: 29 MEQ/L (ref 20–31)
COLOR UR: YELLOW
CREAT SERPL-MCNC: 0.67 MG/DL (ref 0.5–0.9)
EOSINOPHIL # BLD: 0.1 K/UL (ref 0–0.4)
EOSINOPHIL NFR BLD: 0.8 % (ref 0.7–5.8)
EPI CELLS #/AREA URNS HPF: ABNORMAL /HPF
ERYTHROCYTE [DISTWIDTH] IN BLOOD BY AUTOMATED COUNT: 11.9 % (ref 11.7–14.4)
GLOBULIN SER CALC-MCNC: 2.6 G/DL (ref 2.3–3.5)
GLUCOSE SERPL-MCNC: 87 MG/DL (ref 70–99)
GLUCOSE UR STRIP-MCNC: NEGATIVE MG/DL
HCT VFR BLD AUTO: 36.4 % (ref 37–47)
HGB BLD-MCNC: 12.3 G/DL (ref 11.2–15.7)
HGB UR QL STRIP: ABNORMAL
IMM GRANULOCYTES # BLD: 0 K/UL
IMM GRANULOCYTES NFR BLD: 0.2 %
KETONES UR STRIP-MCNC: NEGATIVE MG/DL
LEUKOCYTE ESTERASE UR QL STRIP: ABNORMAL
LIPASE SERPL-CCNC: 23 U/L (ref 12–95)
LYMPHOCYTES # BLD: 1.7 K/UL (ref 1.2–3.7)
LYMPHOCYTES NFR BLD: 27.3 %
MAGNESIUM SERPL-MCNC: 1.7 MG/DL (ref 1.7–2.4)
MCH RBC QN AUTO: 28.8 PG (ref 25.6–32.2)
MCHC RBC AUTO-ENTMCNC: 33.8 % (ref 32.2–35.5)
MCV RBC AUTO: 85.2 FL (ref 79.4–94.8)
MONOCYTES # BLD: 0.4 K/UL (ref 0.2–0.9)
MONOCYTES NFR BLD: 7.1 % (ref 4.7–12.5)
NEUTROPHILS # BLD: 3.9 K/UL (ref 1.6–6.1)
NEUTS SEG NFR BLD: 63.3 % (ref 34–71.1)
NITRITE UR QL STRIP: NEGATIVE
PH UR STRIP: 5.5 [PH] (ref 5–9)
PLATELET # BLD AUTO: 281 K/UL (ref 182–369)
POTASSIUM SERPL-SCNC: 3.3 MEQ/L (ref 3.4–4.9)
PROT SERPL-MCNC: 7.1 G/DL (ref 6.3–8)
PROT UR STRIP-MCNC: NEGATIVE MG/DL
RBC # BLD AUTO: 4.27 M/UL (ref 3.93–5.22)
RBC #/AREA URNS HPF: ABNORMAL /HPF (ref 0–2)
SODIUM SERPL-SCNC: 138 MEQ/L (ref 135–144)
SP GR UR STRIP: 1.02 (ref 1–1.03)
URINE REFLEX TO CULTURE: ABNORMAL
UROBILINOGEN UR STRIP-ACNC: 0.2 E.U./DL
WBC # BLD AUTO: 6.2 K/UL (ref 4–10)
WBC #/AREA URNS HPF: ABNORMAL /HPF (ref 0–5)

## 2024-07-03 PROCEDURE — 36415 COLL VENOUS BLD VENIPUNCTURE: CPT

## 2024-07-03 PROCEDURE — 81003 URINALYSIS AUTO W/O SCOPE: CPT

## 2024-07-03 PROCEDURE — 6360000002 HC RX W HCPCS

## 2024-07-03 PROCEDURE — 80053 COMPREHEN METABOLIC PANEL: CPT

## 2024-07-03 PROCEDURE — 83690 ASSAY OF LIPASE: CPT

## 2024-07-03 PROCEDURE — 83735 ASSAY OF MAGNESIUM: CPT

## 2024-07-03 PROCEDURE — 96361 HYDRATE IV INFUSION ADD-ON: CPT

## 2024-07-03 PROCEDURE — 96374 THER/PROPH/DIAG INJ IV PUSH: CPT

## 2024-07-03 PROCEDURE — 2580000003 HC RX 258

## 2024-07-03 PROCEDURE — 96375 TX/PRO/DX INJ NEW DRUG ADDON: CPT

## 2024-07-03 PROCEDURE — 96372 THER/PROPH/DIAG INJ SC/IM: CPT

## 2024-07-03 PROCEDURE — 82150 ASSAY OF AMYLASE: CPT

## 2024-07-03 PROCEDURE — 2500000003 HC RX 250 WO HCPCS

## 2024-07-03 PROCEDURE — 99284 EMERGENCY DEPT VISIT MOD MDM: CPT

## 2024-07-03 PROCEDURE — 6370000000 HC RX 637 (ALT 250 FOR IP)

## 2024-07-03 PROCEDURE — 85025 COMPLETE CBC W/AUTO DIFF WBC: CPT

## 2024-07-03 PROCEDURE — 74176 CT ABD & PELVIS W/O CONTRAST: CPT

## 2024-07-03 RX ORDER — CIPROFLOXACIN 500 MG/1
500 TABLET, FILM COATED ORAL 2 TIMES DAILY
Qty: 14 TABLET | Refills: 0 | Status: SHIPPED | OUTPATIENT
Start: 2024-07-03 | End: 2024-07-10

## 2024-07-03 RX ORDER — DICYCLOMINE HYDROCHLORIDE 10 MG/1
10 CAPSULE ORAL 4 TIMES DAILY PRN
Qty: 24 CAPSULE | Refills: 0 | Status: SHIPPED | OUTPATIENT
Start: 2024-07-03

## 2024-07-03 RX ORDER — CIPROFLOXACIN 500 MG/1
500 TABLET, FILM COATED ORAL ONCE
Status: COMPLETED | OUTPATIENT
Start: 2024-07-03 | End: 2024-07-03

## 2024-07-03 RX ORDER — ONDANSETRON 4 MG/1
4 TABLET, ORALLY DISINTEGRATING ORAL 3 TIMES DAILY PRN
Qty: 21 TABLET | Refills: 0 | Status: SHIPPED | OUTPATIENT
Start: 2024-07-03

## 2024-07-03 RX ORDER — KETOROLAC TROMETHAMINE 30 MG/ML
30 INJECTION, SOLUTION INTRAMUSCULAR; INTRAVENOUS ONCE
Status: COMPLETED | OUTPATIENT
Start: 2024-07-03 | End: 2024-07-03

## 2024-07-03 RX ORDER — 0.9 % SODIUM CHLORIDE 0.9 %
1000 INTRAVENOUS SOLUTION INTRAVENOUS ONCE
Status: COMPLETED | OUTPATIENT
Start: 2024-07-03 | End: 2024-07-03

## 2024-07-03 RX ORDER — ONDANSETRON 2 MG/ML
4 INJECTION INTRAMUSCULAR; INTRAVENOUS ONCE
Status: COMPLETED | OUTPATIENT
Start: 2024-07-03 | End: 2024-07-03

## 2024-07-03 RX ORDER — DICYCLOMINE HYDROCHLORIDE 10 MG/ML
20 INJECTION INTRAMUSCULAR ONCE
Status: COMPLETED | OUTPATIENT
Start: 2024-07-03 | End: 2024-07-03

## 2024-07-03 RX ORDER — MORPHINE SULFATE 4 MG/ML
4 INJECTION, SOLUTION INTRAMUSCULAR; INTRAVENOUS ONCE
Status: COMPLETED | OUTPATIENT
Start: 2024-07-03 | End: 2024-07-03

## 2024-07-03 RX ADMIN — KETOROLAC TROMETHAMINE 30 MG: 30 INJECTION, SOLUTION INTRAMUSCULAR at 18:21

## 2024-07-03 RX ADMIN — CIPROFLOXACIN 500 MG: 500 TABLET, FILM COATED ORAL at 19:21

## 2024-07-03 RX ADMIN — ONDANSETRON 4 MG: 2 INJECTION INTRAMUSCULAR; INTRAVENOUS at 17:10

## 2024-07-03 RX ADMIN — MORPHINE SULFATE 4 MG: 4 INJECTION INTRAVENOUS at 17:11

## 2024-07-03 RX ADMIN — SODIUM CHLORIDE 1000 ML: 9 INJECTION, SOLUTION INTRAVENOUS at 17:11

## 2024-07-03 RX ADMIN — FAMOTIDINE 20 MG: 10 INJECTION, SOLUTION INTRAVENOUS at 17:11

## 2024-07-03 RX ADMIN — DICYCLOMINE HYDROCHLORIDE 20 MG: 10 INJECTION, SOLUTION INTRAMUSCULAR at 19:21

## 2024-07-03 ASSESSMENT — ENCOUNTER SYMPTOMS
NAUSEA: 1
COUGH: 0
SHORTNESS OF BREATH: 0
SORE THROAT: 0
DIARRHEA: 1
VOMITING: 1
ABDOMINAL PAIN: 1
BACK PAIN: 0

## 2024-07-03 ASSESSMENT — PAIN DESCRIPTION - PAIN TYPE: TYPE: ACUTE PAIN

## 2024-07-03 ASSESSMENT — PAIN - FUNCTIONAL ASSESSMENT: PAIN_FUNCTIONAL_ASSESSMENT: 0-10

## 2024-07-03 ASSESSMENT — PAIN DESCRIPTION - LOCATION: LOCATION: ABDOMEN

## 2024-07-03 ASSESSMENT — PAIN SCALES - GENERAL
PAINLEVEL_OUTOF10: 7
PAINLEVEL_OUTOF10: 7

## 2024-07-03 ASSESSMENT — PAIN DESCRIPTION - ORIENTATION: ORIENTATION: LEFT;MID;UPPER

## 2024-07-03 ASSESSMENT — PAIN DESCRIPTION - FREQUENCY: FREQUENCY: CONTINUOUS

## 2024-07-03 NOTE — DISCHARGE INSTRUCTIONS
Please obtain stool sample and return to PCP or GI specialist.  Clear liquid diet advance to soft as tolerated.  Follow-up with PCP and GI specialist.  Return to emergency department for any new or worsening symptoms.

## 2024-07-03 NOTE — ED PROVIDER NOTES
summarize past medical records: yes  Discuss the patient with other providers: yes  Independent visualization of images, tracings, or specimens: yes    Risk of Complications, Morbidity, and/or Mortality  Presenting problems: moderate  Diagnostic procedures: moderate  Management options: moderate    Patient Progress  Patient progress: stable    LD 53 year old female presents to ED with abdominal pain.  Patient is afebrile and hemodynamically stable.  Medicated with IV fluid normal saline, Zofran IV, Pepcid IV, morphine IV.  Suspicious for possibility of pancreatitis or viral illness.  Urinalysis… Labs: CBC shows no acute leukocytosis or anemia.  WBC 6.2, hematocrit 36.4, hemoglobin 12.3.  Amylase and lipase within normal limits.  CMP shows no acute electrolyte imbalance or dehydration.  Reassessed patient she states she is still having some pain, medicated with Toradol IV.  Urinalysis shows small leukocytes small blood.  Microscopic UA shows WBC 3-5, RBC 3-5, moderate bacteria.  Patient complains of increased abdominal pain that radiates into the left flank CT abdomen and pelvis ordered to rule out any acute intra-abdominal process.  Suspicious for kidney stone. Final read shows: Gas fluid levels within the right colon, left colon is decompressed.  Findings are nonspecific but can be seen in the setting of diarrheal illness.  Right nephrolithiasis. No hydronephrosis. Stable benign-appearing hepatic cysts. Minimal pneumobilia. Status post cholecystectomy. Status post hysterectomy.  Discussed CT results at length with patient she states understanding.  Suspect patient may have more of a viral diarrheal illness like a gastroenteritis, food intolerance.  Stool studies ordered patient unable to go in emergency department.  She was given home-going supplies to obtain stool specimen.  Patient states she follows with Dr. Rangel advised her to follow-up with Dr. Rangel about her stomach pain as it seems chronic in nature.  Low

## 2024-07-05 ENCOUNTER — TELEPHONE (OUTPATIENT)
Dept: GASTROENTEROLOGY | Age: 54
End: 2024-07-05

## 2024-07-06 DIAGNOSIS — F98.8 ATTENTION DEFICIT DISORDER, UNSPECIFIED HYPERACTIVITY PRESENCE: ICD-10-CM

## 2024-07-06 DIAGNOSIS — Z76.0 MEDICATION REFILL: ICD-10-CM

## 2024-07-08 ENCOUNTER — HOSPITAL ENCOUNTER (EMERGENCY)
Facility: HOSPITAL | Age: 54
Discharge: HOME | End: 2024-07-09
Attending: EMERGENCY MEDICINE
Payer: MEDICARE

## 2024-07-08 ENCOUNTER — APPOINTMENT (OUTPATIENT)
Dept: RADIOLOGY | Facility: HOSPITAL | Age: 54
End: 2024-07-08
Payer: MEDICARE

## 2024-07-08 ENCOUNTER — TELEPHONE (OUTPATIENT)
Dept: GASTROENTEROLOGY | Age: 54
End: 2024-07-08

## 2024-07-08 ENCOUNTER — APPOINTMENT (OUTPATIENT)
Dept: CARDIOLOGY | Facility: HOSPITAL | Age: 54
End: 2024-07-08
Payer: MEDICARE

## 2024-07-08 DIAGNOSIS — R19.5 STOOL MUCUS: ICD-10-CM

## 2024-07-08 DIAGNOSIS — K52.9 ENTERITIS: Primary | ICD-10-CM

## 2024-07-08 DIAGNOSIS — R19.5 CHANGE IN CONSISTENCY OF STOOL: Primary | ICD-10-CM

## 2024-07-08 LAB
ALBUMIN SERPL BCP-MCNC: 4.4 G/DL (ref 3.4–5)
ALP SERPL-CCNC: 69 U/L (ref 33–110)
ALT SERPL W P-5'-P-CCNC: 10 U/L (ref 7–45)
AMYLASE SERPL-CCNC: 38 U/L (ref 29–103)
ANION GAP SERPL CALC-SCNC: 14 MMOL/L (ref 10–20)
AST SERPL W P-5'-P-CCNC: 11 U/L (ref 9–39)
BASOPHILS # BLD AUTO: 0.08 X10*3/UL (ref 0–0.1)
BASOPHILS NFR BLD AUTO: 1.5 %
BILIRUB SERPL-MCNC: 0.3 MG/DL (ref 0–1.2)
BUN SERPL-MCNC: 21 MG/DL (ref 6–23)
CALCIUM SERPL-MCNC: 9.5 MG/DL (ref 8.6–10.3)
CARDIAC TROPONIN I PNL SERPL HS: 3 NG/L (ref 0–13)
CHLORIDE SERPL-SCNC: 103 MMOL/L (ref 98–107)
CO2 SERPL-SCNC: 24 MMOL/L (ref 21–32)
CREAT SERPL-MCNC: 0.89 MG/DL (ref 0.5–1.05)
EGFRCR SERPLBLD CKD-EPI 2021: 79 ML/MIN/1.73M*2
EOSINOPHIL # BLD AUTO: 0.1 X10*3/UL (ref 0–0.7)
EOSINOPHIL NFR BLD AUTO: 1.8 %
ERYTHROCYTE [DISTWIDTH] IN BLOOD BY AUTOMATED COUNT: 12.2 % (ref 11.5–14.5)
GLUCOSE SERPL-MCNC: 97 MG/DL (ref 74–99)
HCT VFR BLD AUTO: 35.8 % (ref 36–46)
HGB BLD-MCNC: 12.3 G/DL (ref 12–16)
IMM GRANULOCYTES # BLD AUTO: 0.01 X10*3/UL (ref 0–0.7)
IMM GRANULOCYTES NFR BLD AUTO: 0.2 % (ref 0–0.9)
LACTATE SERPL-SCNC: 1 MMOL/L (ref 0.4–2)
LIPASE SERPL-CCNC: 35 U/L (ref 9–82)
LYMPHOCYTES # BLD AUTO: 2.25 X10*3/UL (ref 1.2–4.8)
LYMPHOCYTES NFR BLD AUTO: 41.1 %
MCH RBC QN AUTO: 29.2 PG (ref 26–34)
MCHC RBC AUTO-ENTMCNC: 34.4 G/DL (ref 32–36)
MCV RBC AUTO: 85 FL (ref 80–100)
MONOCYTES # BLD AUTO: 0.47 X10*3/UL (ref 0.1–1)
MONOCYTES NFR BLD AUTO: 8.6 %
NEUTROPHILS # BLD AUTO: 2.57 X10*3/UL (ref 1.2–7.7)
NEUTROPHILS NFR BLD AUTO: 46.8 %
NRBC BLD-RTO: 0 /100 WBCS (ref 0–0)
PLATELET # BLD AUTO: 288 X10*3/UL (ref 150–450)
POTASSIUM SERPL-SCNC: 3.6 MMOL/L (ref 3.5–5.3)
PROT SERPL-MCNC: 7 G/DL (ref 6.4–8.2)
RBC # BLD AUTO: 4.21 X10*6/UL (ref 4–5.2)
SODIUM SERPL-SCNC: 137 MMOL/L (ref 136–145)
WBC # BLD AUTO: 5.5 X10*3/UL (ref 4.4–11.3)

## 2024-07-08 PROCEDURE — 85025 COMPLETE CBC W/AUTO DIFF WBC: CPT | Performed by: EMERGENCY MEDICINE

## 2024-07-08 PROCEDURE — 80053 COMPREHEN METABOLIC PANEL: CPT | Performed by: EMERGENCY MEDICINE

## 2024-07-08 PROCEDURE — 36415 COLL VENOUS BLD VENIPUNCTURE: CPT | Performed by: EMERGENCY MEDICINE

## 2024-07-08 PROCEDURE — 96375 TX/PRO/DX INJ NEW DRUG ADDON: CPT

## 2024-07-08 PROCEDURE — 2500000004 HC RX 250 GENERAL PHARMACY W/ HCPCS (ALT 636 FOR OP/ED): Performed by: EMERGENCY MEDICINE

## 2024-07-08 PROCEDURE — 74177 CT ABD & PELVIS W/CONTRAST: CPT

## 2024-07-08 PROCEDURE — 93005 ELECTROCARDIOGRAM TRACING: CPT

## 2024-07-08 PROCEDURE — 96361 HYDRATE IV INFUSION ADD-ON: CPT

## 2024-07-08 PROCEDURE — 99285 EMERGENCY DEPT VISIT HI MDM: CPT | Mod: 25

## 2024-07-08 PROCEDURE — 82150 ASSAY OF AMYLASE: CPT | Performed by: EMERGENCY MEDICINE

## 2024-07-08 PROCEDURE — 83605 ASSAY OF LACTIC ACID: CPT | Performed by: EMERGENCY MEDICINE

## 2024-07-08 PROCEDURE — 84484 ASSAY OF TROPONIN QUANT: CPT | Performed by: EMERGENCY MEDICINE

## 2024-07-08 PROCEDURE — 96374 THER/PROPH/DIAG INJ IV PUSH: CPT | Mod: 59

## 2024-07-08 PROCEDURE — 74177 CT ABD & PELVIS W/CONTRAST: CPT | Mod: FOREIGN READ | Performed by: RADIOLOGY

## 2024-07-08 PROCEDURE — 2550000001 HC RX 255 CONTRASTS: Performed by: EMERGENCY MEDICINE

## 2024-07-08 PROCEDURE — 83690 ASSAY OF LIPASE: CPT | Performed by: EMERGENCY MEDICINE

## 2024-07-08 RX ORDER — ONDANSETRON 4 MG/1
4 TABLET, ORALLY DISINTEGRATING ORAL EVERY 8 HOURS PRN
Qty: 20 TABLET | Refills: 0 | Status: SHIPPED | OUTPATIENT
Start: 2024-07-08 | End: 2024-07-15

## 2024-07-08 RX ORDER — HYDROMORPHONE HYDROCHLORIDE 1 MG/ML
1 INJECTION, SOLUTION INTRAMUSCULAR; INTRAVENOUS; SUBCUTANEOUS ONCE
Status: COMPLETED | OUTPATIENT
Start: 2024-07-08 | End: 2024-07-08

## 2024-07-08 RX ORDER — TRAMADOL HYDROCHLORIDE 50 MG/1
50 TABLET ORAL EVERY 6 HOURS PRN
Qty: 15 TABLET | Refills: 0 | Status: SHIPPED | OUTPATIENT
Start: 2024-07-08 | End: 2024-07-11

## 2024-07-08 RX ORDER — ONDANSETRON HYDROCHLORIDE 2 MG/ML
4 INJECTION, SOLUTION INTRAVENOUS ONCE
Status: COMPLETED | OUTPATIENT
Start: 2024-07-08 | End: 2024-07-08

## 2024-07-08 RX ORDER — DICYCLOMINE HYDROCHLORIDE 20 MG/1
20 TABLET ORAL 2 TIMES DAILY
Qty: 20 TABLET | Refills: 0 | Status: SHIPPED | OUTPATIENT
Start: 2024-07-08 | End: 2024-07-18

## 2024-07-08 RX ORDER — DEXTROAMPHETAMINE SACCHARATE, AMPHETAMINE ASPARTATE MONOHYDRATE, DEXTROAMPHETAMINE SULFATE AND AMPHETAMINE SULFATE 7.5; 7.5; 7.5; 7.5 MG/1; MG/1; MG/1; MG/1
30 CAPSULE, EXTENDED RELEASE ORAL 2 TIMES DAILY
Qty: 60 CAPSULE | Refills: 0 | Status: SHIPPED | OUTPATIENT
Start: 2024-07-08 | End: 2024-08-07

## 2024-07-08 ASSESSMENT — PAIN - FUNCTIONAL ASSESSMENT: PAIN_FUNCTIONAL_ASSESSMENT: 0-10

## 2024-07-08 ASSESSMENT — LIFESTYLE VARIABLES
EVER FELT BAD OR GUILTY ABOUT YOUR DRINKING: NO
HAVE YOU EVER FELT YOU SHOULD CUT DOWN ON YOUR DRINKING: NO
EVER HAD A DRINK FIRST THING IN THE MORNING TO STEADY YOUR NERVES TO GET RID OF A HANGOVER: NO
HAVE PEOPLE ANNOYED YOU BY CRITICIZING YOUR DRINKING: NO
TOTAL SCORE: 0

## 2024-07-08 ASSESSMENT — PAIN DESCRIPTION - LOCATION: LOCATION: ABDOMEN

## 2024-07-08 ASSESSMENT — COLUMBIA-SUICIDE SEVERITY RATING SCALE - C-SSRS
2. HAVE YOU ACTUALLY HAD ANY THOUGHTS OF KILLING YOURSELF?: NO
1. IN THE PAST MONTH, HAVE YOU WISHED YOU WERE DEAD OR WISHED YOU COULD GO TO SLEEP AND NOT WAKE UP?: NO
6. HAVE YOU EVER DONE ANYTHING, STARTED TO DO ANYTHING, OR PREPARED TO DO ANYTHING TO END YOUR LIFE?: NO

## 2024-07-08 ASSESSMENT — PAIN DESCRIPTION - PAIN TYPE: TYPE: ACUTE PAIN

## 2024-07-08 ASSESSMENT — PAIN SCALES - GENERAL: PAINLEVEL_OUTOF10: 7

## 2024-07-08 NOTE — TELEPHONE ENCOUNTER
Spoke with patient regarding symptoms.  Order for calprotectin placed.  Patient has supplies and will complete as soon as possible.

## 2024-07-08 NOTE — TELEPHONE ENCOUNTER
----- Message from Murali Zazueta CMA sent at 7/5/2024  8:16 AM EDT -----  Regarding: FW: Ct scan of abdomen at [facility]  Contact: [phone]    ----- Message -----  From: Sisi Cam  Sent: 7/3/2024   7:39 PM EDT  To: [facility] Staff  Subject: Ct scan of abdomen at [facility]              I got the results through department. But Dr Rangel something is wrong, the pain, the distention makes me look 5 months pregnant, the nausea has been going almost a month or so and the minute I eat my stomach blows up. Trying to have a bowel movement it feels like something is obstructing or stuck in colon. It’s so hard to have a bowel movement and I feel weak and exhausted.  Please let me know your thoughts and how to fix this please. Thank you,  Sisi Cam  Phone number is [phone]

## 2024-07-08 NOTE — TELEPHONE ENCOUNTER
Comments: LMTCB regarding appt    Last Office Visit (last PCP visit):   5/21/2024    Next Visit Date:  Future Appointments   Date Time Provider Department Center   7/10/2024  1:30 PM Slavik-Bosworth, Kelly J, APRN - CNP [facility]   7/23/2024  1:00 PM Jose Roberto Duque MD [facility]   9/6/2024  9:45 AM June, Darrion S, PA [facility]       **If hasn't been seen in over a year OR hasn't followed up according to last diabetes/ADHD visit, make appointment for patient before sending refill to provider.    Rx requested:  Requested Prescriptions     Pending Prescriptions Disp Refills    amphetamine-dextroamphetamine (ADDERALL XR) 30 MG extended release capsule 60 capsule 0     Sig: Take 1 capsule by mouth in the morning and 1 capsule in the evening. Do all this for 30 days.

## 2024-07-09 VITALS
HEIGHT: 62 IN | BODY MASS INDEX: 20.98 KG/M2 | TEMPERATURE: 97.7 F | HEART RATE: 69 BPM | SYSTOLIC BLOOD PRESSURE: 103 MMHG | RESPIRATION RATE: 16 BRPM | WEIGHT: 114 LBS | OXYGEN SATURATION: 99 % | DIASTOLIC BLOOD PRESSURE: 55 MMHG

## 2024-07-09 NOTE — ED PROVIDER NOTES
[facility] and [facility] is like he is fine he can go 3 workup passing a kidney HPI   Chief Complaint   Patient presents with    Abdominal Pain     Hx of pancreatitis. Nausea vomiting over a week          Chief complaint abdominal pain  History of present illness 53-year-old female who has a past medical history of recurrent chronic pancreatitis also angiodysplasia of the stomach and no here with abdominal pain she recently went to [facility] was treated and released she still having pain and has been taking Zofran with no relief.  Is here with blood pressure 1/27/1993 pulse of 80 respirate is 18 temp 36 pulse of 90                          Connor Coma Scale Score: 15                     Patient History   Past Medical History:   Diagnosis Date    Abdominal distension (gaseous) 02/02/2021    Abdominal distention    Age-related osteoporosis without current pathological fracture 02/17/2017    At risk of fracture due to osteoporosis    Angiodysplasia of stomach and duodenum without bleeding 08/28/2020    Gastric AVM    Angioneurotic edema, initial encounter 04/20/2022    Angioedema    CHF (congestive heart failure) (Multi)     Chronic pancreatitis (Multi)     Conductive hearing loss, unilateral, left ear, with unrestricted hearing on the contralateral side 03/10/2016    Conductive hearing loss in left ear    Depression, unspecified 09/17/2019    Depression    Disorder of kidney and ureter, unspecified     Kidney disease    Fecal urgency 02/02/2021    Defecation urgency    Fibromyalgia 10/06/2022    Fibromyalgia    Gastro-esophageal reflux disease without esophagitis 09/17/2019    GERD (gastroesophageal reflux disease)    Gastroparesis 04/15/2020    Gastric stasis    Hematuria, unspecified 02/17/2017    Hematuria    Hypothyroidism, unspecified 12/28/2017    Hypothyroidism    Hypothyroidism, unspecified 06/07/2021    Acquired hypothyroidism    Ileus (Multi)     Migraine, unspecified, not intractable, without status  migrainosus 01/09/2020    Migraine headache    Nausea 07/15/2020    Moderate nausea    Other constipation 11/19/2021    Chronic constipation    Other disorders of calcium metabolism 08/04/2017    Calcinosis    Other disorders of iron metabolism     Iron overload    Other disorders of iron metabolism 07/06/2018    Iron overload syndrome    Other forms of dyspnea 08/30/2022    Dyspnea on exertion    Other long term (current) drug therapy 01/25/2019    High risk medication use    Other specified behavioral and emotional disorders with onset usually occurring in childhood and adolescence 04/24/2018    ADD (attention deficit disorder)    Other specified cataract 11/27/2018    Other cataract of both eyes    Other specified diseases of intestine 07/15/2020    Small bowel mass    Other specified health status     No pertinent past medical history    Other specified postprocedural states     History of ERCP    Other specified symptoms and signs involving the digestive system and abdomen 02/02/2021    Abnormal defecation    Outlet dysfunction constipation 12/15/2020    Dyssynergic constipation    Pain in unspecified joint 04/16/2022    Arthralgia    Palpitations 09/09/2022    Palpitations    Personal history of other infectious and parasitic diseases 01/26/2021    History of shingles    Raynaud's syndrome without gangrene 01/09/2020    Raynauds phenomenon    Sjogren syndrome, unspecified (Multi) 04/28/2020    Sjogren's disease    Sjogren syndrome, unspecified (Multi) 10/08/2022    Sjogren's syndrome    Tension-type headache, unspecified, not intractable 04/30/2021    Tension-type headache    Unspecified infectious disease 07/08/2021    Recurrent infections    Zoster without complications 12/13/2018    Shingles     Past Surgical History:   Procedure Laterality Date    CHOLECYSTECTOMY  05/18/2021    Cholecystectomy Laparoscopic    COLONOSCOPY  05/11/2018    Complete Colonoscopy    ESOPHAGOGASTRODUODENOSCOPY  05/11/2018     Diagnostic Esophagogastroduodenoscopy    HYSTERECTOMY  02/28/2018    Hysterectomy    US GUIDED NEEDLE LIVER BIOPSY  6/1/2018    US GUIDED NEEDLE LIVER BIOPSY 6/1/2018 [facility]     Family History   Problem Relation Name Age of Onset    Hypertension Mother      Heart disease Mother      Rheum arthritis Mother      Heart attack Mother      Other (phlebitis) Mother      Heart disease Father      Hypertension Father      Heart attack Father      Hypertension Sibling       Social History     Tobacco Use    Smoking status: Never     Passive exposure: Past    Smokeless tobacco: Never   Substance Use Topics    Alcohol use: Never    Drug use: Never       Physical Exam   ED Triage Vitals   Temp Pulse Resp BP   -- -- -- --      SpO2 Temp src Heart Rate Source Patient Position   -- -- -- --      BP Location FiO2 (%)     -- --       Physical Exam  Vitals and nursing note reviewed. Exam conducted with a chaperone present.   Constitutional:       General: She is in acute distress.      Appearance: Normal appearance. She is ill-appearing.   HENT:      Head: Normocephalic and atraumatic.      Nose: Nose normal.      Mouth/Throat:      Mouth: Mucous membranes are moist.   Eyes:      Pupils: Pupils are equal, round, and reactive to light.   Cardiovascular:      Rate and Rhythm: Normal rate and regular rhythm.   Pulmonary:      Effort: Pulmonary effort is normal.   Abdominal:      Palpations: Abdomen is soft.      Tenderness: There is abdominal tenderness in the epigastric area.   Musculoskeletal:         General: Normal range of motion.      Cervical back: Normal range of motion.   Skin:     General: Skin is warm and dry.      Capillary Refill: Capillary refill takes less than 2 seconds.   Neurological:      General: No focal deficit present.      Mental Status: She is alert.   Psychiatric:         Mood and Affect: Mood normal.         ED Course & MDM   Diagnoses as of 07/08/24 2344   Enteritis       Medical Decision  Making  Differential diagnosis  Acute coronary syndrom  Ischemic bowel  Arrhythmia  Pancreatitis  Hepatitis  Bowel obstruction  Considering the above differential diagnosis following testing treatments were considered and ordered EKG CT of the abdomen IV fluids IV Zofran IV Dilaudid CBC electrolytes amylase lipase lactic acid and troponin    Amount and/or Complexity of Data Reviewed  Labs: ordered. Decision-making details documented in ED Course.     Details:  labs reviewed amylase lipase lactic were normal so CT of the abdomen revealed no pancreatitis or bowel obstruction  Radiology: ordered and independent interpretation performed.     Details: CT was negative for pancreatitis or bowel obstruction  ECG/medicine tests: ordered.     Details: EKG was normal sinus rhythm with a rate 82 as interpreted by me gali then  Discussion of management or test interpretation with external provider(s): Patient had relief with Dilaudid and fluids results of test were discussed and shared decision making patient was discharged home with intent to follow-up with gastroenterology      Labs Reviewed   CBC WITH AUTO DIFFERENTIAL - Abnormal       Result Value    WBC 5.5      nRBC 0.0      RBC 4.21      Hemoglobin 12.3      Hematocrit 35.8 (*)     MCV 85      MCH 29.2      MCHC 34.4      RDW 12.2      Platelets 288      Neutrophils % 46.8      Immature Granulocytes %, Automated 0.2      Lymphocytes % 41.1      Monocytes % 8.6      Eosinophils % 1.8      Basophils % 1.5      Neutrophils Absolute 2.57      Immature Granulocytes Absolute, Automated 0.01      Lymphocytes Absolute 2.25      Monocytes Absolute 0.47      Eosinophils Absolute 0.10      Basophils Absolute 0.08     COMPREHENSIVE METABOLIC PANEL - Normal    Glucose 97      Sodium 137      Potassium 3.6      Chloride 103      Bicarbonate 24      Anion Gap 14      Urea Nitrogen 21      Creatinine 0.89      eGFR 79      Calcium 9.5      Albumin 4.4      Alkaline Phosphatase 69       Total Protein 7.0      AST 11      Bilirubin, Total 0.3      ALT 10     LIPASE - Normal    Lipase 35      Narrative:     Venipuncture immediately after or during the administration of Metamizole may lead to falsely low results. Testing should be performed immediately prior to Metamizole dosing.   LACTATE - Normal    Lactate 1.0      Narrative:     Venipuncture immediately after or during the administration of Metamizole may lead to falsely low results. Testing should be performed immediately  prior to Metamizole dosing.   AMYLASE - Normal    Amylase 38     TROPONIN I, HIGH SENSITIVITY - Normal    Troponin I, High Sensitivity 3      Narrative:     Less than 99th percentile of normal range cutoff-  Female and children under 18 years old <14 ng/L; Male <21 ng/L: Negative  Repeat testing should be performed if clinically indicated.     Female and children under 18 years old 14-50 ng/L; Male 21-50 ng/L:  Consistent with possible cardiac damage and possible increased clinical   risk. Serial measurements may help to assess extent of myocardial damage.     >50 ng/L: Consistent with cardiac damage, increased clinical risk and  myocardial infarction. Serial measurements may help assess extent of   myocardial damage.      NOTE: Children less than 1 year old may have higher baseline troponin   levels and results should be interpreted in conjunction with the overall   clinical context.     NOTE: Troponin I testing is performed using a different   testing methodology at [facility] than at other   [facility]. Direct result comparisons should only   be made within the same method.        CT abdomen pelvis w IV contrast   Final Result   Nonspecific and nondistended fluid-filled small bowel loops throughout   the abdomen and pelvis suggesting enteritis.  No evidence of bowel   obstruction.   No distinct acute intra-abdominal or pelvic process otherwise   identified.    Signed by Salomon Cabral MD                    Procedure  ECG 12 lead    Performed by: Jess Lopez MD  Authorized by: Jess Lopez MD    ECG interpreted by ED Physician in the absence of a cardiologist: yes    Interpretation:     Interpretation: normal    Rate:     ECG rate:  82    ECG rate assessment: normal    Rhythm:     Rhythm: sinus rhythm    Ectopy:     Ectopy: none    QRS:     QRS axis:  Normal  ST segments:     ST segments:  Normal  T waves:     T waves: normal         Jess Lopez MD  07/08/24 [id number]

## 2024-07-14 LAB
ATRIAL RATE: 82 BPM
P AXIS: 65 DEGREES
P OFFSET: 197 MS
P ONSET: 149 MS
PR INTERVAL: 142 MS
Q ONSET: 220 MS
QRS COUNT: 14 BEATS
QRS DURATION: 80 MS
QT INTERVAL: 370 MS
QTC CALCULATION(BAZETT): 432 MS
QTC FREDERICIA: 410 MS
R AXIS: 44 DEGREES
T AXIS: 60 DEGREES
T OFFSET: 405 MS
VENTRICULAR RATE: 82 BPM

## 2024-07-22 PROCEDURE — RXMED WILLOW AMBULATORY MEDICATION CHARGE

## 2024-07-23 ENCOUNTER — PHARMACY VISIT (OUTPATIENT)
Dept: PHARMACY | Facility: CLINIC | Age: 54
End: 2024-07-23
Payer: MEDICARE

## 2024-07-31 ENCOUNTER — TELEPHONE (OUTPATIENT)
Dept: FAMILY MEDICINE CLINIC | Age: 54
End: 2024-07-31

## 2024-07-31 NOTE — TELEPHONE ENCOUNTER
Agree. I would recommend letting them know just in case there were any issues during procedure. If anything changes let me know.

## 2024-07-31 NOTE — TELEPHONE ENCOUNTER
Patient called to let us know she had a colonoscopy yesterday and woke up in the middle of the night with some discomfort to fin her right labia is swollen and red.     I spoke to Maya who advised me to tell the patient to try and get ahold of her surgeon for the colonoscopy but also let the patient know that I will notify Emmie and let her what what is said.

## 2024-08-01 NOTE — TELEPHONE ENCOUNTER
Patient spoke to her doctor and was told to apply Aquaphor and if not getting better follow up with Emmie. Patient wanted to tell us \"I love you guys and thank you for always following up\"

## 2024-08-07 DIAGNOSIS — Z76.0 MEDICATION REFILL: ICD-10-CM

## 2024-08-07 DIAGNOSIS — F98.8 ATTENTION DEFICIT DISORDER, UNSPECIFIED HYPERACTIVITY PRESENCE: ICD-10-CM

## 2024-08-07 NOTE — TELEPHONE ENCOUNTER
Comments:     Last Office Visit (last PCP visit):   5/21/2024    Next Visit Date:  Future Appointments   Date Time Provider Department Center   8/20/2024  2:30 PM Jose Roberto Duque MD [facility]   8/21/2024  8:00 AM Emmie Avalos APRN - CNP [facility]   9/6/2024  9:45 AM June, Darrion S, PA [facility]       **If hasn't been seen in over a year OR hasn't followed up according to last diabetes/ADHD visit, make appointment for patient before sending refill to provider.    Rx requested:  Requested Prescriptions     Pending Prescriptions Disp Refills    amphetamine-dextroamphetamine (ADDERALL XR) 30 MG extended release capsule 60 capsule 0     Sig: Take 1 capsule by mouth in the morning and 1 capsule in the evening. Do all this for 30 days.               Otolaryngologist Procedure Text (F): After obtaining clear surgical margins the patient was sent to otolaryngology for surgical repair.  The patient understands they will receive post-surgical care and follow-up from the referring physician's office.

## 2024-08-08 RX ORDER — DEXTROAMPHETAMINE SACCHARATE, AMPHETAMINE ASPARTATE MONOHYDRATE, DEXTROAMPHETAMINE SULFATE AND AMPHETAMINE SULFATE 7.5; 7.5; 7.5; 7.5 MG/1; MG/1; MG/1; MG/1
30 CAPSULE, EXTENDED RELEASE ORAL 2 TIMES DAILY
Qty: 60 CAPSULE | Refills: 0 | Status: SHIPPED | OUTPATIENT
Start: 2024-08-08 | End: 2024-09-07

## 2024-08-20 ENCOUNTER — TELEMEDICINE (OUTPATIENT)
Dept: PULMONOLOGY | Age: 54
End: 2024-08-20
Payer: MEDICARE

## 2024-08-20 DIAGNOSIS — R06.02 SOB (SHORTNESS OF BREATH): ICD-10-CM

## 2024-08-20 DIAGNOSIS — J20.9 ACUTE BRONCHITIS, UNSPECIFIED ORGANISM: ICD-10-CM

## 2024-08-20 DIAGNOSIS — R94.2 DIFFUSION CAPACITY OF LUNG (DL), DECREASED: ICD-10-CM

## 2024-08-20 DIAGNOSIS — R09.89 PULMONARY AIR TRAPPING: ICD-10-CM

## 2024-08-20 DIAGNOSIS — J47.9 BRONCHIECTASIS WITHOUT COMPLICATION (HCC): Primary | ICD-10-CM

## 2024-08-20 DIAGNOSIS — M35.00 SJOGREN'S SYNDROME, WITH UNSPECIFIED ORGAN INVOLVEMENT (HCC): ICD-10-CM

## 2024-08-20 PROCEDURE — 3017F COLORECTAL CA SCREEN DOC REV: CPT | Performed by: INTERNAL MEDICINE

## 2024-08-20 PROCEDURE — G8428 CUR MEDS NOT DOCUMENT: HCPCS | Performed by: INTERNAL MEDICINE

## 2024-08-20 PROCEDURE — 99214 OFFICE O/P EST MOD 30 MIN: CPT | Performed by: INTERNAL MEDICINE

## 2024-08-20 RX ORDER — DOXYCYCLINE HYCLATE 100 MG
100 TABLET ORAL 2 TIMES DAILY
Qty: 10 TABLET | Refills: 0 | Status: SHIPPED | OUTPATIENT
Start: 2024-08-20 | End: 2024-08-25

## 2024-08-20 RX ORDER — BUDESONIDE AND FORMOTEROL FUMARATE DIHYDRATE 80; 4.5 UG/1; UG/1
2 AEROSOL RESPIRATORY (INHALATION) 2 TIMES DAILY
Qty: 10.2 G | Refills: 3 | Status: SHIPPED | OUTPATIENT
Start: 2024-08-20

## 2024-08-20 RX ORDER — PREDNISONE 10 MG/1
40 TABLET ORAL DAILY
Qty: 20 TABLET | Refills: 0 | Status: SHIPPED | OUTPATIENT
Start: 2024-08-20 | End: 2024-08-25

## 2024-08-20 NOTE — PROGRESS NOTES
Sisi GRIDER Rubenar, was evaluated through a synchronous (real-time) telephone encounter. The patient (or guardian if applicable) is aware that this is a billable service, which includes applicable co-pays. This Virtual Visit was conducted with patient's (and/or legal guardian's) consent. Patient identification was verified, and a caregiver was present when appropriate.   The patient was located at Home: [address] [place] [address] [place] [address]  Provider was located at Facility (Appt Dept): [address] [place] [id number]  Yes, I confirm.         Follow-up (3 Month F/U for Shortness of Breath) and Results (CT Chest and Bronchial Challenge)  Cough and shortness of breath    HPI  8/20/2024  Presents for follow-up, she has cough productive of clear phlegm, shortness of breath and low-grade fever for the last 1 to 2 days, she tested negative for COVID-19, no chest pain, no lower extremity edema, she was unable to do methacholine challenge test due to URI.  She does report occasional wheezing     Current Outpatient Medications on File Prior to Visit   Medication Sig Dispense Refill    amphetamine-dextroamphetamine (ADDERALL XR) 30 MG extended release capsule Take 1 capsule by mouth in the morning and 1 capsule in the evening. Do all this for 30 days. 60 capsule 0    ondansetron (ZOFRAN-ODT) 4 MG disintegrating tablet Take 1 tablet by mouth 3 times daily as needed for Nausea or Vomiting 21 tablet 0    dicyclomine (BENTYL) 10 MG capsule Take 1 capsule by mouth 4 times daily as needed (abd cramps/spasms) 24 capsule 0    Sodium Sulfate-Mag Sulfate-KCl [id number] MG TABS Take 12 tablets by mouth See Admin Instructions 24 tablet 0    topiramate (TOPAMAX) 100 MG tablet Take 1 tablet by mouth 2 times daily 60 tablet 5    metFORMIN (GLUCOPHAGE) 500 MG tablet Take 1 tablet by mouth daily (with breakfast) 30 tablet 3    nirmatrelvir/ritonavir 300/100 (PAXLOVID) 20 x 150 MG & 10 x 100MG TBPK Take 3 tablets (two 150 mg

## 2024-08-21 ENCOUNTER — TELEMEDICINE (OUTPATIENT)
Dept: FAMILY MEDICINE CLINIC | Age: 54
End: 2024-08-21

## 2024-08-21 DIAGNOSIS — F98.8 ATTENTION DEFICIT DISORDER, UNSPECIFIED HYPERACTIVITY PRESENCE: ICD-10-CM

## 2024-08-21 DIAGNOSIS — R10.31 RIGHT LOWER QUADRANT ABDOMINAL PAIN: Primary | ICD-10-CM

## 2024-08-21 PROCEDURE — RXMED WILLOW AMBULATORY MEDICATION CHARGE

## 2024-08-21 ASSESSMENT — ENCOUNTER SYMPTOMS
DIARRHEA: 1
ABDOMINAL PAIN: 1
SHORTNESS OF BREATH: 0
COUGH: 0

## 2024-08-21 NOTE — ASSESSMENT & PLAN NOTE
Discussed going to ER if not improving over next 25 hrs due to location of pain. Unable to r/o appendicitis over telehealth. Pt agrees

## 2024-08-21 NOTE — ASSESSMENT & PLAN NOTE
Well controlled. Continue current medications.     Side effects, adverse effects of the medication prescribed today, as well as treatment plan/ rationale and result expectations have been discussed with the patient who expresses understanding and desires to proceed.    Close follow up to evaluate treatment results and for coordination of care.  I have reviewed the patient's medical history in detail and updated the computerized patient record.    As always, patient is advised that if symptoms worsen in any way they will proceed to the nearest emergency room.

## 2024-08-21 NOTE — PROGRESS NOTES
Sisi Cam, was evaluated through a synchronous (real-time) audio-video encounter. The patient (or guardian if applicable) is aware that this is a billable service, which includes applicable co-pays. This Virtual Visit was conducted with patient's (and/or legal guardian's) consent. Patient identification was verified, and a caregiver was present when appropriate.   The patient was located at Home: [address] [place] [address] [place] [address]  Provider was located at Facility (Appt Dept): [address]  [place] [address]  Confirm you are appropriately licensed, registered, or certified to deliver care in the state where the patient is located as indicated above. If you are not or unsure, please re-schedule the visit: Yes, I confirm.     Sisi Cam (:  1970) is a Established patient, presenting virtually for evaluation of the following:      Below is the assessment and plan developed based on review of pertinent history, physical exam, labs, studies, and medications.     Assessment & Plan  Right lower quadrant abdominal pain  Discussed going to ER if not improving over next 25 hrs due to location of pain. Unable to r/o appendicitis over telehealth. Pt agrees       Attention deficit disorder, unspecified hyperactivity presence   Well controlled. Continue current medications.     Side effects, adverse effects of the medication prescribed today, as well as treatment plan/ rationale and result expectations have been discussed with the patient who expresses understanding and desires to proceed.    Close follow up to evaluate treatment results and for coordination of care.  I have reviewed the patient's medical history in detail and updated the computerized patient record.    As always, patient is advised that if symptoms worsen in any way they will proceed to the nearest emergency room.              Fu in 3-4 mos.     Subjective   Abdominal Pain  Associated symptoms include diarrhea and a

## 2024-08-22 ENCOUNTER — PHARMACY VISIT (OUTPATIENT)
Dept: PHARMACY | Facility: CLINIC | Age: 54
End: 2024-08-22
Payer: MEDICARE

## 2024-08-25 ENCOUNTER — APPOINTMENT (OUTPATIENT)
Dept: RADIOLOGY | Facility: HOSPITAL | Age: 54
DRG: 439 | End: 2024-08-25
Payer: MEDICARE

## 2024-08-25 ENCOUNTER — HOSPITAL ENCOUNTER (INPATIENT)
Facility: HOSPITAL | Age: 54
LOS: 1 days | Discharge: HOME | DRG: 439 | End: 2024-08-28
Attending: STUDENT IN AN ORGANIZED HEALTH CARE EDUCATION/TRAINING PROGRAM | Admitting: STUDENT IN AN ORGANIZED HEALTH CARE EDUCATION/TRAINING PROGRAM
Payer: MEDICARE

## 2024-08-25 ENCOUNTER — APPOINTMENT (OUTPATIENT)
Dept: CARDIOLOGY | Facility: HOSPITAL | Age: 54
DRG: 439 | End: 2024-08-25
Payer: MEDICARE

## 2024-08-25 DIAGNOSIS — R10.13 EPIGASTRIC PAIN: Primary | ICD-10-CM

## 2024-08-25 DIAGNOSIS — K85.90 ACUTE PANCREATITIS, UNSPECIFIED COMPLICATION STATUS, UNSPECIFIED PANCREATITIS TYPE (HHS-HCC): ICD-10-CM

## 2024-08-25 DIAGNOSIS — R11.2 INTRACTABLE NAUSEA AND VOMITING: ICD-10-CM

## 2024-08-25 DIAGNOSIS — K86.1 CHRONIC PANCREATITIS, UNSPECIFIED PANCREATITIS TYPE (MULTI): ICD-10-CM

## 2024-08-25 LAB
ALBUMIN SERPL BCP-MCNC: 4.2 G/DL (ref 3.4–5)
ALP SERPL-CCNC: 78 U/L (ref 33–110)
ALT SERPL W P-5'-P-CCNC: 8 U/L (ref 7–45)
ANION GAP SERPL CALC-SCNC: 12 MMOL/L (ref 10–20)
AST SERPL W P-5'-P-CCNC: 11 U/L (ref 9–39)
BASOPHILS # BLD AUTO: 0.06 X10*3/UL (ref 0–0.1)
BASOPHILS NFR BLD AUTO: 0.9 %
BILIRUB SERPL-MCNC: 0.3 MG/DL (ref 0–1.2)
BUN SERPL-MCNC: 20 MG/DL (ref 6–23)
CALCIUM SERPL-MCNC: 9.2 MG/DL (ref 8.6–10.3)
CARDIAC TROPONIN I PNL SERPL HS: 4 NG/L (ref 0–13)
CHLORIDE SERPL-SCNC: 102 MMOL/L (ref 98–107)
CO2 SERPL-SCNC: 25 MMOL/L (ref 21–32)
CREAT SERPL-MCNC: 0.89 MG/DL (ref 0.5–1.05)
EGFRCR SERPLBLD CKD-EPI 2021: 79 ML/MIN/1.73M*2
EOSINOPHIL # BLD AUTO: 0.05 X10*3/UL (ref 0–0.7)
EOSINOPHIL NFR BLD AUTO: 0.8 %
ERYTHROCYTE [DISTWIDTH] IN BLOOD BY AUTOMATED COUNT: 12 % (ref 11.5–14.5)
GLUCOSE SERPL-MCNC: 84 MG/DL (ref 74–99)
HCT VFR BLD AUTO: 35.5 % (ref 36–46)
HGB BLD-MCNC: 12.3 G/DL (ref 12–16)
IMM GRANULOCYTES # BLD AUTO: 0.01 X10*3/UL (ref 0–0.7)
IMM GRANULOCYTES NFR BLD AUTO: 0.2 % (ref 0–0.9)
LACTATE SERPL-SCNC: 0.8 MMOL/L (ref 0.4–2)
LIPASE SERPL-CCNC: 167 U/L (ref 9–82)
LYMPHOCYTES # BLD AUTO: 2.28 X10*3/UL (ref 1.2–4.8)
LYMPHOCYTES NFR BLD AUTO: 35.6 %
MAGNESIUM SERPL-MCNC: 1.67 MG/DL (ref 1.6–2.4)
MCH RBC QN AUTO: 28.9 PG (ref 26–34)
MCHC RBC AUTO-ENTMCNC: 34.6 G/DL (ref 32–36)
MCV RBC AUTO: 84 FL (ref 80–100)
MONOCYTES # BLD AUTO: 0.43 X10*3/UL (ref 0.1–1)
MONOCYTES NFR BLD AUTO: 6.7 %
NEUTROPHILS # BLD AUTO: 3.57 X10*3/UL (ref 1.2–7.7)
NEUTROPHILS NFR BLD AUTO: 55.8 %
NRBC BLD-RTO: 0 /100 WBCS (ref 0–0)
PLATELET # BLD AUTO: 332 X10*3/UL (ref 150–450)
POTASSIUM SERPL-SCNC: 2.5 MMOL/L (ref 3.5–5.3)
PROT SERPL-MCNC: 6.9 G/DL (ref 6.4–8.2)
RBC # BLD AUTO: 4.25 X10*6/UL (ref 4–5.2)
SARS-COV-2 RNA RESP QL NAA+PROBE: NOT DETECTED
SODIUM SERPL-SCNC: 136 MMOL/L (ref 136–145)
WBC # BLD AUTO: 6.4 X10*3/UL (ref 4.4–11.3)

## 2024-08-25 PROCEDURE — 83690 ASSAY OF LIPASE: CPT | Performed by: STUDENT IN AN ORGANIZED HEALTH CARE EDUCATION/TRAINING PROGRAM

## 2024-08-25 PROCEDURE — 96361 HYDRATE IV INFUSION ADD-ON: CPT

## 2024-08-25 PROCEDURE — 74177 CT ABD & PELVIS W/CONTRAST: CPT | Performed by: STUDENT IN AN ORGANIZED HEALTH CARE EDUCATION/TRAINING PROGRAM

## 2024-08-25 PROCEDURE — 82077 ASSAY SPEC XCP UR&BREATH IA: CPT | Performed by: STUDENT IN AN ORGANIZED HEALTH CARE EDUCATION/TRAINING PROGRAM

## 2024-08-25 PROCEDURE — 83605 ASSAY OF LACTIC ACID: CPT | Performed by: STUDENT IN AN ORGANIZED HEALTH CARE EDUCATION/TRAINING PROGRAM

## 2024-08-25 PROCEDURE — 2550000001 HC RX 255 CONTRASTS: Performed by: STUDENT IN AN ORGANIZED HEALTH CARE EDUCATION/TRAINING PROGRAM

## 2024-08-25 PROCEDURE — 99285 EMERGENCY DEPT VISIT HI MDM: CPT | Mod: 25,CS

## 2024-08-25 PROCEDURE — 87635 SARS-COV-2 COVID-19 AMP PRB: CPT | Performed by: STUDENT IN AN ORGANIZED HEALTH CARE EDUCATION/TRAINING PROGRAM

## 2024-08-25 PROCEDURE — 80053 COMPREHEN METABOLIC PANEL: CPT | Performed by: STUDENT IN AN ORGANIZED HEALTH CARE EDUCATION/TRAINING PROGRAM

## 2024-08-25 PROCEDURE — 85025 COMPLETE CBC W/AUTO DIFF WBC: CPT | Performed by: STUDENT IN AN ORGANIZED HEALTH CARE EDUCATION/TRAINING PROGRAM

## 2024-08-25 PROCEDURE — 74177 CT ABD & PELVIS W/CONTRAST: CPT

## 2024-08-25 PROCEDURE — 96375 TX/PRO/DX INJ NEW DRUG ADDON: CPT

## 2024-08-25 PROCEDURE — 71045 X-RAY EXAM CHEST 1 VIEW: CPT

## 2024-08-25 PROCEDURE — 36415 COLL VENOUS BLD VENIPUNCTURE: CPT | Performed by: STUDENT IN AN ORGANIZED HEALTH CARE EDUCATION/TRAINING PROGRAM

## 2024-08-25 PROCEDURE — 2500000004 HC RX 250 GENERAL PHARMACY W/ HCPCS (ALT 636 FOR OP/ED): Performed by: STUDENT IN AN ORGANIZED HEALTH CARE EDUCATION/TRAINING PROGRAM

## 2024-08-25 PROCEDURE — 93005 ELECTROCARDIOGRAM TRACING: CPT

## 2024-08-25 PROCEDURE — 71045 X-RAY EXAM CHEST 1 VIEW: CPT | Performed by: RADIOLOGY

## 2024-08-25 PROCEDURE — 83735 ASSAY OF MAGNESIUM: CPT | Performed by: STUDENT IN AN ORGANIZED HEALTH CARE EDUCATION/TRAINING PROGRAM

## 2024-08-25 PROCEDURE — 84484 ASSAY OF TROPONIN QUANT: CPT | Performed by: STUDENT IN AN ORGANIZED HEALTH CARE EDUCATION/TRAINING PROGRAM

## 2024-08-25 RX ORDER — ONDANSETRON HYDROCHLORIDE 2 MG/ML
4 INJECTION, SOLUTION INTRAVENOUS ONCE
Status: COMPLETED | OUTPATIENT
Start: 2024-08-25 | End: 2024-08-25

## 2024-08-25 ASSESSMENT — PAIN - FUNCTIONAL ASSESSMENT: PAIN_FUNCTIONAL_ASSESSMENT: 0-10

## 2024-08-25 ASSESSMENT — PAIN DESCRIPTION - PROGRESSION: CLINICAL_PROGRESSION: GRADUALLY WORSENING

## 2024-08-25 ASSESSMENT — COLUMBIA-SUICIDE SEVERITY RATING SCALE - C-SSRS
2. HAVE YOU ACTUALLY HAD ANY THOUGHTS OF KILLING YOURSELF?: NO
6. HAVE YOU EVER DONE ANYTHING, STARTED TO DO ANYTHING, OR PREPARED TO DO ANYTHING TO END YOUR LIFE?: NO
1. IN THE PAST MONTH, HAVE YOU WISHED YOU WERE DEAD OR WISHED YOU COULD GO TO SLEEP AND NOT WAKE UP?: NO

## 2024-08-25 ASSESSMENT — LIFESTYLE VARIABLES
EVER FELT BAD OR GUILTY ABOUT YOUR DRINKING: NO
TOTAL SCORE: 0
HAVE PEOPLE ANNOYED YOU BY CRITICIZING YOUR DRINKING: NO
HAVE YOU EVER FELT YOU SHOULD CUT DOWN ON YOUR DRINKING: NO
EVER HAD A DRINK FIRST THING IN THE MORNING TO STEADY YOUR NERVES TO GET RID OF A HANGOVER: NO

## 2024-08-25 ASSESSMENT — PAIN DESCRIPTION - LOCATION: LOCATION: ABDOMEN

## 2024-08-25 ASSESSMENT — PAIN SCALES - GENERAL: PAINLEVEL_OUTOF10: 7

## 2024-08-25 ASSESSMENT — PAIN DESCRIPTION - ORIENTATION: ORIENTATION: LEFT

## 2024-08-25 ASSESSMENT — PAIN DESCRIPTION - DESCRIPTORS: DESCRIPTORS: SHARP;DULL

## 2024-08-25 ASSESSMENT — PAIN DESCRIPTION - FREQUENCY: FREQUENCY: CONSTANT/CONTINUOUS

## 2024-08-25 ASSESSMENT — PAIN DESCRIPTION - PAIN TYPE: TYPE: ACUTE PAIN

## 2024-08-26 ENCOUNTER — APPOINTMENT (OUTPATIENT)
Dept: CARDIOLOGY | Facility: HOSPITAL | Age: 54
DRG: 439 | End: 2024-08-26
Payer: MEDICARE

## 2024-08-26 PROBLEM — N30.00 ACUTE CYSTITIS WITHOUT HEMATURIA: Status: ACTIVE | Noted: 2024-08-26

## 2024-08-26 PROBLEM — R11.2 INTRACTABLE NAUSEA AND VOMITING: Status: ACTIVE | Noted: 2024-08-26

## 2024-08-26 LAB
AMPHETAMINES UR QL SCN: ABNORMAL
ANION GAP SERPL CALC-SCNC: <7 MMOL/L (ref 10–20)
APPEARANCE UR: CLEAR
ATRIAL RATE: 73 BPM
ATRIAL RATE: 83 BPM
BACTERIA #/AREA URNS AUTO: ABNORMAL /HPF
BARBITURATES UR QL SCN: ABNORMAL
BENZODIAZ UR QL SCN: ABNORMAL
BILIRUB UR STRIP.AUTO-MCNC: NEGATIVE MG/DL
BUN SERPL-MCNC: 15 MG/DL (ref 6–23)
BZE UR QL SCN: ABNORMAL
CALCIUM SERPL-MCNC: 8.2 MG/DL (ref 8.6–10.3)
CANNABINOIDS UR QL SCN: ABNORMAL
CAOX CRY #/AREA UR COMP ASSIST: ABNORMAL /HPF
CARDIAC TROPONIN I PNL SERPL HS: 4 NG/L (ref 0–13)
CHLORIDE SERPL-SCNC: 109 MMOL/L (ref 98–107)
CO2 SERPL-SCNC: 27 MMOL/L (ref 21–32)
COLOR UR: YELLOW
CREAT SERPL-MCNC: 0.66 MG/DL (ref 0.5–1.05)
EGFRCR SERPLBLD CKD-EPI 2021: >90 ML/MIN/1.73M*2
ERYTHROCYTE [DISTWIDTH] IN BLOOD BY AUTOMATED COUNT: 12.1 % (ref 11.5–14.5)
ETHANOL SERPL-MCNC: <10 MG/DL
FENTANYL+NORFENTANYL UR QL SCN: ABNORMAL
GLUCOSE SERPL-MCNC: 83 MG/DL (ref 74–99)
GLUCOSE UR STRIP.AUTO-MCNC: NORMAL MG/DL
HCT VFR BLD AUTO: 31.4 % (ref 36–46)
HGB BLD-MCNC: 10.6 G/DL (ref 12–16)
HOLD SPECIMEN: NORMAL
HYALINE CASTS #/AREA URNS AUTO: ABNORMAL /LPF
KETONES UR STRIP.AUTO-MCNC: NEGATIVE MG/DL
LEUKOCYTE ESTERASE UR QL STRIP.AUTO: ABNORMAL
MCH RBC QN AUTO: 29.2 PG (ref 26–34)
MCHC RBC AUTO-ENTMCNC: 33.8 G/DL (ref 32–36)
MCV RBC AUTO: 87 FL (ref 80–100)
METHADONE UR QL SCN: ABNORMAL
MUCOUS THREADS #/AREA URNS AUTO: ABNORMAL /LPF
NITRITE UR QL STRIP.AUTO: NEGATIVE
NRBC BLD-RTO: 0 /100 WBCS (ref 0–0)
OPIATES UR QL SCN: ABNORMAL
OXYCODONE+OXYMORPHONE UR QL SCN: ABNORMAL
P AXIS: 71 DEGREES
P AXIS: 81 DEGREES
P OFFSET: 174 MS
P OFFSET: 201 MS
P ONSET: 137 MS
P ONSET: 145 MS
PCP UR QL SCN: ABNORMAL
PH UR STRIP.AUTO: 6 [PH]
PLATELET # BLD AUTO: 271 X10*3/UL (ref 150–450)
POTASSIUM SERPL-SCNC: 3.2 MMOL/L (ref 3.5–5.3)
PR INTERVAL: 152 MS
PR INTERVAL: 166 MS
PROT UR STRIP.AUTO-MCNC: ABNORMAL MG/DL
Q ONSET: 220 MS
Q ONSET: 221 MS
QRS COUNT: 12 BEATS
QRS COUNT: 13 BEATS
QRS DURATION: 82 MS
QRS DURATION: 86 MS
QT INTERVAL: 372 MS
QT INTERVAL: 410 MS
QTC CALCULATION(BAZETT): 437 MS
QTC CALCULATION(BAZETT): 451 MS
QTC FREDERICIA: 414 MS
QTC FREDERICIA: 438 MS
R AXIS: 55 DEGREES
R AXIS: 63 DEGREES
RBC # BLD AUTO: 3.63 X10*6/UL (ref 4–5.2)
RBC # UR STRIP.AUTO: ABNORMAL /UL
RBC #/AREA URNS AUTO: ABNORMAL /HPF
SODIUM SERPL-SCNC: 139 MMOL/L (ref 136–145)
SP GR UR STRIP.AUTO: 1.03
SQUAMOUS #/AREA URNS AUTO: ABNORMAL /HPF
T AXIS: 30 DEGREES
T AXIS: 47 DEGREES
T OFFSET: 407 MS
T OFFSET: 425 MS
UROBILINOGEN UR STRIP.AUTO-MCNC: NORMAL MG/DL
VENTRICULAR RATE: 73 BPM
VENTRICULAR RATE: 83 BPM
WBC # BLD AUTO: 6.2 X10*3/UL (ref 4.4–11.3)
WBC #/AREA URNS AUTO: ABNORMAL /HPF

## 2024-08-26 PROCEDURE — 96361 HYDRATE IV INFUSION ADD-ON: CPT

## 2024-08-26 PROCEDURE — 2500000004 HC RX 250 GENERAL PHARMACY W/ HCPCS (ALT 636 FOR OP/ED): Performed by: STUDENT IN AN ORGANIZED HEALTH CARE EDUCATION/TRAINING PROGRAM

## 2024-08-26 PROCEDURE — 2500000002 HC RX 250 W HCPCS SELF ADMINISTERED DRUGS (ALT 637 FOR MEDICARE OP, ALT 636 FOR OP/ED): Performed by: STUDENT IN AN ORGANIZED HEALTH CARE EDUCATION/TRAINING PROGRAM

## 2024-08-26 PROCEDURE — 80048 BASIC METABOLIC PNL TOTAL CA: CPT | Performed by: INTERNAL MEDICINE

## 2024-08-26 PROCEDURE — G0378 HOSPITAL OBSERVATION PER HR: HCPCS

## 2024-08-26 PROCEDURE — 2500000001 HC RX 250 WO HCPCS SELF ADMINISTERED DRUGS (ALT 637 FOR MEDICARE OP): Performed by: STUDENT IN AN ORGANIZED HEALTH CARE EDUCATION/TRAINING PROGRAM

## 2024-08-26 PROCEDURE — 2500000002 HC RX 250 W HCPCS SELF ADMINISTERED DRUGS (ALT 637 FOR MEDICARE OP, ALT 636 FOR OP/ED): Performed by: INTERNAL MEDICINE

## 2024-08-26 PROCEDURE — 80307 DRUG TEST PRSMV CHEM ANLYZR: CPT | Performed by: INTERNAL MEDICINE

## 2024-08-26 PROCEDURE — 2500000001 HC RX 250 WO HCPCS SELF ADMINISTERED DRUGS (ALT 637 FOR MEDICARE OP): Performed by: INTERNAL MEDICINE

## 2024-08-26 PROCEDURE — 84484 ASSAY OF TROPONIN QUANT: CPT | Performed by: STUDENT IN AN ORGANIZED HEALTH CARE EDUCATION/TRAINING PROGRAM

## 2024-08-26 PROCEDURE — 36415 COLL VENOUS BLD VENIPUNCTURE: CPT | Performed by: INTERNAL MEDICINE

## 2024-08-26 PROCEDURE — 2500000004 HC RX 250 GENERAL PHARMACY W/ HCPCS (ALT 636 FOR OP/ED): Performed by: INTERNAL MEDICINE

## 2024-08-26 PROCEDURE — 36415 COLL VENOUS BLD VENIPUNCTURE: CPT | Performed by: STUDENT IN AN ORGANIZED HEALTH CARE EDUCATION/TRAINING PROGRAM

## 2024-08-26 PROCEDURE — 99222 1ST HOSP IP/OBS MODERATE 55: CPT

## 2024-08-26 PROCEDURE — 81001 URINALYSIS AUTO W/SCOPE: CPT | Performed by: STUDENT IN AN ORGANIZED HEALTH CARE EDUCATION/TRAINING PROGRAM

## 2024-08-26 PROCEDURE — 85027 COMPLETE CBC AUTOMATED: CPT | Performed by: INTERNAL MEDICINE

## 2024-08-26 PROCEDURE — 96365 THER/PROPH/DIAG IV INF INIT: CPT

## 2024-08-26 PROCEDURE — 87086 URINE CULTURE/COLONY COUNT: CPT | Mod: ELYLAB | Performed by: STUDENT IN AN ORGANIZED HEALTH CARE EDUCATION/TRAINING PROGRAM

## 2024-08-26 PROCEDURE — 2500000005 HC RX 250 GENERAL PHARMACY W/O HCPCS: Performed by: STUDENT IN AN ORGANIZED HEALTH CARE EDUCATION/TRAINING PROGRAM

## 2024-08-26 PROCEDURE — 93005 ELECTROCARDIOGRAM TRACING: CPT

## 2024-08-26 PROCEDURE — 96375 TX/PRO/DX INJ NEW DRUG ADDON: CPT

## 2024-08-26 PROCEDURE — 99223 1ST HOSP IP/OBS HIGH 75: CPT | Performed by: INTERNAL MEDICINE

## 2024-08-26 RX ORDER — LEVOTHYROXINE SODIUM 88 UG/1
88 TABLET ORAL DAILY
Status: DISCONTINUED | OUTPATIENT
Start: 2024-08-26 | End: 2024-08-28 | Stop reason: HOSPADM

## 2024-08-26 RX ORDER — BUPROPION HYDROCHLORIDE 150 MG/1
300 TABLET ORAL
Status: DISCONTINUED | OUTPATIENT
Start: 2024-08-26 | End: 2024-08-28 | Stop reason: HOSPADM

## 2024-08-26 RX ORDER — ONDANSETRON HYDROCHLORIDE 2 MG/ML
4 INJECTION, SOLUTION INTRAVENOUS EVERY 8 HOURS PRN
Status: DISCONTINUED | OUTPATIENT
Start: 2024-08-26 | End: 2024-08-28 | Stop reason: HOSPADM

## 2024-08-26 RX ORDER — TOPIRAMATE 25 MG/1
25 TABLET ORAL DAILY
Status: DISCONTINUED | OUTPATIENT
Start: 2024-08-26 | End: 2024-08-28 | Stop reason: HOSPADM

## 2024-08-26 RX ORDER — POTASSIUM CHLORIDE 14.9 MG/ML
20 INJECTION INTRAVENOUS ONCE
Status: COMPLETED | OUTPATIENT
Start: 2024-08-26 | End: 2024-08-26

## 2024-08-26 RX ORDER — ACETAMINOPHEN 325 MG/1
650 TABLET ORAL EVERY 4 HOURS PRN
Status: DISCONTINUED | OUTPATIENT
Start: 2024-08-26 | End: 2024-08-27

## 2024-08-26 RX ORDER — CEPHALEXIN 500 MG/1
500 CAPSULE ORAL ONCE
Status: COMPLETED | OUTPATIENT
Start: 2024-08-26 | End: 2024-08-26

## 2024-08-26 RX ORDER — MAGNESIUM SULFATE 1 G/100ML
1 INJECTION INTRAVENOUS ONCE
Status: COMPLETED | OUTPATIENT
Start: 2024-08-26 | End: 2024-08-26

## 2024-08-26 RX ORDER — DEXTROSE MONOHYDRATE, SODIUM CHLORIDE, AND POTASSIUM CHLORIDE 50; 1.49; 9 G/1000ML; G/1000ML; G/1000ML
100 INJECTION, SOLUTION INTRAVENOUS CONTINUOUS
Status: DISCONTINUED | OUTPATIENT
Start: 2024-08-26 | End: 2024-08-28 | Stop reason: HOSPADM

## 2024-08-26 RX ORDER — HYDROXYCHLOROQUINE SULFATE 200 MG/1
200 TABLET, FILM COATED ORAL DAILY
Status: DISCONTINUED | OUTPATIENT
Start: 2024-08-26 | End: 2024-08-28 | Stop reason: HOSPADM

## 2024-08-26 RX ORDER — MORPHINE SULFATE 2 MG/ML
2 INJECTION, SOLUTION INTRAMUSCULAR; INTRAVENOUS
Status: DISCONTINUED | OUTPATIENT
Start: 2024-08-26 | End: 2024-08-28 | Stop reason: HOSPADM

## 2024-08-26 RX ORDER — CEFTRIAXONE 1 G/50ML
1 INJECTION, SOLUTION INTRAVENOUS EVERY 24 HOURS
Status: DISCONTINUED | OUTPATIENT
Start: 2024-08-26 | End: 2024-08-28 | Stop reason: HOSPADM

## 2024-08-26 RX ORDER — ONDANSETRON 4 MG/1
4 TABLET, FILM COATED ORAL EVERY 8 HOURS PRN
Status: DISCONTINUED | OUTPATIENT
Start: 2024-08-26 | End: 2024-08-28 | Stop reason: HOSPADM

## 2024-08-26 RX ORDER — POLYETHYLENE GLYCOL 3350 17 G/17G
17 POWDER, FOR SOLUTION ORAL DAILY PRN
Status: DISCONTINUED | OUTPATIENT
Start: 2024-08-26 | End: 2024-08-28

## 2024-08-26 RX ORDER — ACETAMINOPHEN 160 MG/5ML
650 SOLUTION ORAL EVERY 4 HOURS PRN
Status: DISCONTINUED | OUTPATIENT
Start: 2024-08-26 | End: 2024-08-27

## 2024-08-26 RX ORDER — KETOROLAC TROMETHAMINE 30 MG/ML
15 INJECTION, SOLUTION INTRAMUSCULAR; INTRAVENOUS ONCE
Status: COMPLETED | OUTPATIENT
Start: 2024-08-26 | End: 2024-08-26

## 2024-08-26 RX ORDER — PANTOPRAZOLE SODIUM 40 MG/10ML
40 INJECTION, POWDER, LYOPHILIZED, FOR SOLUTION INTRAVENOUS 2 TIMES DAILY
Status: DISCONTINUED | OUTPATIENT
Start: 2024-08-26 | End: 2024-08-28 | Stop reason: HOSPADM

## 2024-08-26 RX ORDER — POTASSIUM CHLORIDE 20 MEQ/1
40 TABLET, EXTENDED RELEASE ORAL ONCE
Status: COMPLETED | OUTPATIENT
Start: 2024-08-26 | End: 2024-08-26

## 2024-08-26 RX ORDER — ACETAMINOPHEN 650 MG/1
650 SUPPOSITORY RECTAL EVERY 4 HOURS PRN
Status: DISCONTINUED | OUTPATIENT
Start: 2024-08-26 | End: 2024-08-27

## 2024-08-26 RX ORDER — TALC
3 POWDER (GRAM) TOPICAL NIGHTLY PRN
Status: DISCONTINUED | OUTPATIENT
Start: 2024-08-26 | End: 2024-08-28 | Stop reason: HOSPADM

## 2024-08-26 RX ORDER — SENNOSIDES 8.6 MG/1
1 TABLET ORAL NIGHTLY
Status: DISCONTINUED | OUTPATIENT
Start: 2024-08-26 | End: 2024-08-28 | Stop reason: HOSPADM

## 2024-08-26 SDOH — SOCIAL STABILITY: SOCIAL INSECURITY: DOES ANYONE TRY TO KEEP YOU FROM HAVING/CONTACTING OTHER FRIENDS OR DOING THINGS OUTSIDE YOUR HOME?: NO

## 2024-08-26 SDOH — SOCIAL STABILITY: SOCIAL INSECURITY: WERE YOU ABLE TO COMPLETE ALL THE BEHAVIORAL HEALTH SCREENINGS?: YES

## 2024-08-26 SDOH — SOCIAL STABILITY: SOCIAL INSECURITY: HAVE YOU HAD ANY THOUGHTS OF HARMING ANYONE ELSE?: NO

## 2024-08-26 SDOH — SOCIAL STABILITY: SOCIAL INSECURITY: HAVE YOU HAD THOUGHTS OF HARMING ANYONE ELSE?: NO

## 2024-08-26 SDOH — SOCIAL STABILITY: SOCIAL INSECURITY: DO YOU FEEL UNSAFE GOING BACK TO THE PLACE WHERE YOU ARE LIVING?: NO

## 2024-08-26 SDOH — SOCIAL STABILITY: SOCIAL INSECURITY: HAS ANYONE EVER THREATENED TO HURT YOUR FAMILY OR YOUR PETS?: NO

## 2024-08-26 SDOH — SOCIAL STABILITY: SOCIAL INSECURITY: DO YOU FEEL ANYONE HAS EXPLOITED OR TAKEN ADVANTAGE OF YOU FINANCIALLY OR OF YOUR PERSONAL PROPERTY?: NO

## 2024-08-26 SDOH — SOCIAL STABILITY: SOCIAL INSECURITY: ARE THERE ANY APPARENT SIGNS OF INJURIES/BEHAVIORS THAT COULD BE RELATED TO ABUSE/NEGLECT?: NO

## 2024-08-26 SDOH — SOCIAL STABILITY: SOCIAL INSECURITY: ABUSE: ADULT

## 2024-08-26 SDOH — SOCIAL STABILITY: SOCIAL INSECURITY: ARE YOU OR HAVE YOU BEEN THREATENED OR ABUSED PHYSICALLY, EMOTIONALLY, OR SEXUALLY BY ANYONE?: NO

## 2024-08-26 ASSESSMENT — PAIN - FUNCTIONAL ASSESSMENT
PAIN_FUNCTIONAL_ASSESSMENT: 0-10

## 2024-08-26 ASSESSMENT — ENCOUNTER SYMPTOMS
CHEST TIGHTNESS: 0
UNEXPECTED WEIGHT CHANGE: 1
APPETITE CHANGE: 1
WEAKNESS: 1
DIARRHEA: 0
NAUSEA: 1
DYSURIA: 0
CHILLS: 1
VOMITING: 1
EYE PAIN: 0
SHORTNESS OF BREATH: 1
CONSTIPATION: 0
FATIGUE: 1
TROUBLE SWALLOWING: 1
BLOOD IN STOOL: 0
CONFUSION: 0
ABDOMINAL DISTENTION: 1
FEVER: 1
MYALGIAS: 1

## 2024-08-26 ASSESSMENT — LIFESTYLE VARIABLES
AUDIT-C TOTAL SCORE: 0
HOW MANY STANDARD DRINKS CONTAINING ALCOHOL DO YOU HAVE ON A TYPICAL DAY: PATIENT DOES NOT DRINK
HOW OFTEN DO YOU HAVE A DRINK CONTAINING ALCOHOL: NEVER
SKIP TO QUESTIONS 9-10: 1
HOW OFTEN DO YOU HAVE 6 OR MORE DRINKS ON ONE OCCASION: NEVER
AUDIT-C TOTAL SCORE: 0

## 2024-08-26 ASSESSMENT — ACTIVITIES OF DAILY LIVING (ADL)
PATIENT'S MEMORY ADEQUATE TO SAFELY COMPLETE DAILY ACTIVITIES?: YES
FEEDING YOURSELF: INDEPENDENT
HEARING - LEFT EAR: FUNCTIONAL
HEARING - RIGHT EAR: FUNCTIONAL
WALKS IN HOME: INDEPENDENT
GROOMING: INDEPENDENT
TOILETING: INDEPENDENT
ADEQUATE_TO_COMPLETE_ADL: YES
JUDGMENT_ADEQUATE_SAFELY_COMPLETE_DAILY_ACTIVITIES: YES
BATHING: INDEPENDENT
LACK_OF_TRANSPORTATION: NO
DRESSING YOURSELF: INDEPENDENT

## 2024-08-26 ASSESSMENT — PAIN SCALES - GENERAL
PAINLEVEL_OUTOF10: 7
PAINLEVEL_OUTOF10: 8
PAINLEVEL_OUTOF10: 7
PAINLEVEL_OUTOF10: 7
PAINLEVEL_OUTOF10: 8
PAINLEVEL_OUTOF10: 6

## 2024-08-26 ASSESSMENT — COGNITIVE AND FUNCTIONAL STATUS - GENERAL
MOBILITY SCORE: 20
CLIMB 3 TO 5 STEPS WITH RAILING: A LITTLE
WALKING IN HOSPITAL ROOM: A LITTLE
STANDING UP FROM CHAIR USING ARMS: A LITTLE
MOVING TO AND FROM BED TO CHAIR: A LITTLE
PATIENT BASELINE BEDBOUND: NO
DAILY ACTIVITIY SCORE: 24

## 2024-08-26 ASSESSMENT — PATIENT HEALTH QUESTIONNAIRE - PHQ9
2. FEELING DOWN, DEPRESSED OR HOPELESS: NOT AT ALL
1. LITTLE INTEREST OR PLEASURE IN DOING THINGS: NOT AT ALL
SUM OF ALL RESPONSES TO PHQ9 QUESTIONS 1 & 2: 0

## 2024-08-26 ASSESSMENT — PAIN DESCRIPTION - LOCATION: LOCATION: ABDOMEN

## 2024-08-26 ASSESSMENT — PAIN DESCRIPTION - DESCRIPTORS: DESCRIPTORS: ACHING

## 2024-08-26 NOTE — CONSULTS
Department of Internal Medicine  Gastroenterology  Consult note      Reason for Consult: Intractable Nausea and vomiting    Chief Complaint: Abdominal pain, n/v/d, decreased appetite, and weakness x 2 days    History Obtained from: chart review and patient reports    History of Present Illness      The patient is a 53 y.o. female with signficant past medical history of CHF, chronic pancreatitis, Depression, GERD, Fibromyalgia, gastroparesis, hypothyroidism, Sjogren Syndrome, and diabetes who presents for  abdominal pain, nausea and vomiting.    She states that over the course of the last week she has had abdominal pain which she describes as sharp, constant, nagging and sometimes worse with inspiration. She states that when she eats, this will make the pain worse and there is no alleviating factors. She also endorses occasional dysphagia where she needs to drink something in order to clear her throat, but denies any odynophagia.     She also endorses some shortness of breath, fever of up to (100 degrees), chills, body aches, weakness, and a loss of about 4lbs in the last week.     She states that she did have a bowel movement yesterday that was liquid and dark brown, with no melena, or hematochezia. She states that she normally has a bowel movement daily.     She states that her last colonoscopy was about 2 weeks ago which was done at the [facility] and she reports multiple polyps were removed. She states that she had an EGD many years ago and takes Nexium daily for GERD.     She denies any frequent NSAID use, blood thinners, tobacco, alcohol, or recreational drug usage.     Allergies  Patient has no known allergies.    Current Medication    Current Facility-Administered Medications:     acetaminophen (Tylenol) tablet 650 mg, 650 mg, oral, q4h PRN **OR** acetaminophen (Tylenol) oral liquid 650 mg, 650 mg, nasogastric tube, q4h PRN **OR** acetaminophen (Tylenol) suppository 650 mg, 650 mg, rectal, q4h PRN,  Nahomy Garcia MD    buPROPion XL (Wellbutrin XL) 24 hr tablet 300 mg, 300 mg, oral, Daily, Nahomy Garcia MD    cefTRIAXone (Rocephin) 1 g in dextrose (iso) IV 50 mL, 1 g, intravenous, q24h, Nahomy Garcia MD, Last Rate: 100 mL/hr at 08/26/24 0627, 1 g at 08/26/24 0627    dextrose 5 % and sodium chloride 0.9 % with KCl 20 mEq/L infusion, 100 mL/hr, intravenous, Continuous, Nahomy Garcia MD    hydroxychloroquine (Plaquenil) tablet 200 mg, 200 mg, oral, Daily, Nahomy Garcia MD    levothyroxine (Synthroid, Levoxyl) tablet 88 mcg, 88 mcg, oral, Daily, Nahomy Garcia MD    magnesium sulfate 1 g in dextrose 5%  mL, 1 g, intravenous, Once, Nahomy Garcia MD    melatonin tablet 3 mg, 3 mg, oral, Nightly PRN, Nahomy Garcia MD    morphine injection 2 mg, 2 mg, intravenous, q3h PRN, Nahomy Garcia MD, 2 mg at 08/26/24 0800    ondansetron (Zofran) tablet 4 mg, 4 mg, oral, q8h PRN **OR** ondansetron (Zofran) injection 4 mg, 4 mg, intravenous, q8h PRN, Nahomy Garcia MD    pancrelipase (Lip-Prot-Amyl) (Creon) 12,000-38,000 -60,000 unit per capsule 1 capsule, 1 capsule, oral, TID, Nahomy Garcia MD    pantoprazole (ProtoNix) injection 40 mg, 40 mg, intravenous, BID, Nahomy Garcia MD, 40 mg at 08/26/24 0427    polyethylene glycol (Glycolax, Miralax) packet 17 g, 17 g, oral, Daily PRN, Nahomy Garcia MD    topiramate (Topamax) tablet 25 mg, 25 mg, oral, Daily, Nahomy Garcia MD    Past Medical History  Active Ambulatory Problems     Diagnosis Date Noted    Abdominal distention 11/05/2023    Abnormal abdominal CT scan 11/05/2023    Abnormal defecation 11/05/2023    Incomplete defecation 11/05/2023    Abnormal MRI, liver 05/22/2018    Acquired hypothyroidism 05/22/2018    Acute left-sided low back pain with bilateral sciatica 03/09/2022    Acute recurrent pancreatitis (HHS-HCC) 05/13/2017    ADD (attention deficit disorder) 05/22/2018    Angioedema 11/05/2023    Anxiety 11/01/2017    Arthralgia 11/05/2023    Atypical chest  pain 11/01/2017    BV (bacterial vaginosis) 05/01/2013    Calcinosis 05/22/2018    Cataracta 11/05/2023    Cellulitis, face 04/06/2018    Chronic LUQ pain 11/05/2023    Left lower quadrant pain 11/01/2017    Chronic UTI 11/05/2023    Claudication of both lower extremities (CMS-HCC) 02/11/2022    Closed mallet fracture of distal phalanx of right little finger 09/29/2015    Conductive hearing loss in left ear 05/22/2018    Congestive heart failure (Multi) 02/11/2022    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    Defecation urgency 11/05/2023    Depression 11/01/2017    Depression, major, single episode, mild (CMS-HCC) 11/05/2023    Diverticulitis of large intestine without perforation or abscess without bleeding 11/01/2017    Dyspnea on exertion 11/05/2023    Chronic constipation 11/05/2023    Chronic pancreatitis (Multi) 08/11/2020    Chronic steatorrhea ([facility]) 11/05/2023    Dyssynergic constipation 11/05/2023    Early satiety 11/05/2023    Edema 05/22/2018    Fibroids 11/01/2017    Gastric AVM 11/05/2023    GERD (gastroesophageal reflux disease) 09/26/2016    Gastric stasis 11/05/2023    Hematuria 11/05/2023    Hepatomegaly 09/16/2021    Hereditary hemochromatosis (CMS-HCC) 01/12/2021    History of colon polyps 11/05/2023    History of shingles 11/05/2023    Hypokalemia 09/16/2021    Intussusception of jejunum (Multi) 11/05/2023    Iron overload 05/22/2018    Kidney disease 11/05/2023    Left pelvic adnexal fluid collection 01/15/2014    Lymphadenopathy 11/05/2023    Medullary sponge kidney of both kidneys 05/22/2018    Metabolic acidosis 05/22/2018    Microscopic hematuria 05/22/2018    Migraine headache 11/05/2023    Nausea 05/22/2018    Neutropenia (CMS-HCC) 01/12/2021    Non-recurrent acute suppurative otitis media of left ear without spontaneous rupture of tympanic membrane 12/09/2021    Palpitations 09/17/2021    Pelvic pain in female 09/09/2014    Raynauds phenomenon 11/05/2023    Endometriosis  09/09/2014    Recurrent infections 11/05/2023    Renal tubular acidosis type I 05/22/2018    S/P endometrial ablation 05/01/2013    Shingles 11/05/2023    Rheumatoid arthritis (Multi) 05/22/2018    Sjogren's syndrome (Multi) 05/22/2018    Small bowel mass 11/05/2023    Suspected COVID-19 virus infection 11/05/2023    Chronic headaches 05/22/2018    Fibromyalgia 11/05/2023    Mass of left side of neck 05/22/2018    Tension type headache 05/22/2018    Weight loss, abnormal 05/22/2018    Other fatigue 03/10/2024    Other skin changes 03/10/2024     Resolved Ambulatory Problems     Diagnosis Date Noted    No Resolved Ambulatory Problems     Past Medical History:   Diagnosis Date    Abdominal distension (gaseous) 02/02/2021    Age-related osteoporosis without current pathological fracture 02/17/2017    Angiodysplasia of stomach and duodenum without bleeding 08/28/2020    Angioneurotic edema, initial encounter 04/20/2022    CHF (congestive heart failure) (Multi)     Conductive hearing loss, unilateral, left ear, with unrestricted hearing on the contralateral side 03/10/2016    Depression, unspecified 09/17/2019    Disorder of kidney and ureter, unspecified     Fecal urgency 02/02/2021    Gastro-esophageal reflux disease without esophagitis 09/17/2019    Gastroparesis 04/15/2020    Hematuria, unspecified 02/17/2017    Hypothyroidism, unspecified 12/28/2017    Hypothyroidism, unspecified 06/07/2021    Ileus (Multi)     Migraine, unspecified, not intractable, without status migrainosus 01/09/2020    Other constipation 11/19/2021    Other disorders of calcium metabolism 08/04/2017    Other disorders of iron metabolism     Other disorders of iron metabolism 07/06/2018    Other forms of dyspnea 08/30/2022    Other long term (current) drug therapy 01/25/2019    Other specified behavioral and emotional disorders with onset usually occurring in childhood and adolescence 04/24/2018    Other specified cataract 11/27/2018    Other  specified diseases of intestine 07/15/2020    Other specified health status     Other specified postprocedural states     Other specified symptoms and signs involving the digestive system and abdomen 02/02/2021    Outlet dysfunction constipation 12/15/2020    Pain in unspecified joint 04/16/2022    Personal history of other infectious and parasitic diseases 01/26/2021    Raynaud's syndrome without gangrene 01/09/2020    Sjogren syndrome, unspecified (Multi) 04/28/2020    Sjogren syndrome, unspecified (Multi) 10/08/2022    Tension-type headache, unspecified, not intractable 04/30/2021    Unspecified infectious disease 07/08/2021    Zoster without complications 12/13/2018       Past Surgical History  Past Surgical History:   Procedure Laterality Date    CHOLECYSTECTOMY  05/18/2021    Cholecystectomy Laparoscopic    COLONOSCOPY  05/11/2018    Complete Colonoscopy    ESOPHAGOGASTRODUODENOSCOPY  05/11/2018    Diagnostic Esophagogastroduodenoscopy    HYSTERECTOMY  02/28/2018    Hysterectomy    US GUIDED NEEDLE LIVER BIOPSY  6/1/2018    US GUIDED NEEDLE LIVER BIOPSY 6/1/2018 [facility]       Family History  Family History   Problem Relation Name Age of Onset    Hypertension Mother      Heart disease Mother      Rheum arthritis Mother      Heart attack Mother      Other (phlebitis) Mother      Heart disease Father      Hypertension Father      Heart attack Father      Hypertension Sibling       No family history of stomach or colon cancer    Social History  TOBACCO:  reports that she has never smoked. She has been exposed to tobacco smoke. She has never used smokeless tobacco.  ETOH:  reports no history of alcohol use.  DRUGS:  reports no history of drug use.  MARITAL STATUS:   OCCUPATION:    Review of Systems  Review of Systems   Constitutional:  Positive for appetite change, chills, fatigue, fever and unexpected weight change (4lbs in the last week).   HENT:  Positive for trouble swallowing. Negative for mouth sores.   "  Eyes:  Negative for pain.   Respiratory:  Positive for shortness of breath. Negative for chest tightness.    Cardiovascular:  Negative for chest pain.   Gastrointestinal:  Positive for abdominal distention, nausea and vomiting. Negative for blood in stool, constipation and diarrhea.   Genitourinary:  Negative for dysuria.   Musculoskeletal:  Positive for myalgias.   Skin:  Negative for rash.   Neurological:  Positive for weakness.   Psychiatric/Behavioral:  Negative for confusion.        PHYSICAL EXAM  VS: /76   Pulse 65   Temp 36 °C (96.8 °F)   Resp 16   Ht 1.575 m (5' 2.01\")   Wt 54.1 kg (119 lb 4.3 oz)   SpO2 96%   BMI 21.81 kg/m²  Body mass index is 21.81 kg/m².  Physical Exam  Constitutional:       Appearance: Normal appearance.   HENT:      Head: Normocephalic and atraumatic.   Eyes:      Conjunctiva/sclera: Conjunctivae normal.      Pupils: Pupils are equal, round, and reactive to light.   Cardiovascular:      Rate and Rhythm: Normal rate and regular rhythm.      Pulses: Normal pulses.      Heart sounds: Normal heart sounds.   Pulmonary:      Effort: Pulmonary effort is normal.      Breath sounds: Normal breath sounds.   Abdominal:      General: Bowel sounds are normal. There is distension.      Palpations: Abdomen is soft.      Tenderness: There is abdominal tenderness (Epigastric/LUQ).   Skin:     General: Skin is warm and dry.   Neurological:      General: No focal deficit present.      Mental Status: She is alert and oriented to person, place, and time.        DATA  Recent blood work and relevant radiology and endoscopic studies were reviewed and discussed with the patient   Results from last 7 days   Lab Units 08/26/24  0500   WBC AUTO x10*3/uL 6.2   RBC AUTO x10*6/uL 3.63*   HEMOGLOBIN g/dL 10.6*   HEMATOCRIT % 31.4*   MCV fL 87   MCHC g/dL 33.8   RDW % 12.1   PLATELETS AUTO x10*3/uL 271       Results from last 72 hours   Lab Units 08/26/24  0500 08/25/24  2256   SODIUM mmol/L 139 136 "   POTASSIUM mmol/L 3.2* 2.5*   CHLORIDE mmol/L 109* 102   CO2 mmol/L 27 25   BUN mg/dL 15 20   CREATININE mg/dL 0.66 0.89   CALCIUM mg/dL 8.2* 9.2   PROTEIN TOTAL g/dL  --  6.9   BILIRUBIN TOTAL mg/dL  --  0.3   ALK PHOS U/L  --  78   AST U/L  --  11   ALT U/L  --  8             Results from last 72 hours   Lab Units 08/25/24  2256   LIPASE U/L 167*       RADIOLOGY REVIEW  CT Abdomen Pelvis W/IV Contast 8/25/24  -No acute findings in the abdomen or pelvis. Chronic findings are stable compared to prior as detailed in the body of the report.    ENDOSCOPIC REVIEW    Flexible Sigmoidoscopy at [facility] on 7/30/2024  - The examined portion of the ileum was normal.   - One 10 mm polyp in the sigmoid colon, removed with a cold snare. Resected and retrieved.   - Non-bleeding internal hemorrhoids.  - There was significant looping of the colon.   - No findings to explain constipation.     Enteroscopy by Dr. Hi on 9/3/2020  - Normal esophagus.  - Z-line, 35 cm from the incisors.  - Erythematous mucosa in the stomach.  - A single non-bleeding possible angioectasia in the  stomach. Treated with argon plasma coagulation (APC).  - Normal examined duodenum.  - The examined portion of the jejunum was normal. Biopsied.    EGD by Dr. Hi on 3/9/2020  - Isolated island of salmon colored mucosa at 35 cm in the distal most esophagus. Biopsied.  - Erythematous mucosa in the antrum, prepyloric region of the stomach and pylorus. Biopsied.  - Normal examined duodenum. Biopsied.    IMPRESSION/RECOMMENDATIONS    The patient is a 53 y.o. female with signficant past medical history of CHF, chronic pancreatitis, Depression, GERD, Fibromyalgia, gastroparesis, hypothyroidism, Sjogren Syndrome who presents for LUQ abdominal pain, nausea and vomiting.      ASSESSMENT    Chronic Pancreatitis- Patient with history of chronic pancreatitis, slightly elevated lipase at 167 however doubtful of dx as multiple imaging has been completed with no indication  of Pancreatitis despite being on Creon.   LUQ Abdominal Pain- Likely exacerbated from UTI, no hematemesis, melena, or hematochezia noted from patient.   Nausea/Vomiting- Patient is tolerating clear liquids this morning with no episodes of emesis and was advanced to full which patient is tolerating.       PLAN    -Follow up with established Gastroenterologist, may consider workup for SIBO   -Recommend switching Protonix from 40mg BID to 40mg daily  -Continue Supportive care with primary care team    Discussed case with Dr. Sanchez GI to sign off. Please call with any questions or concerns.     (Electronically signed byRONNIE Forrester on 8/26/2024 at 8:39 AM)           Inpatient consult to gastroenterology  Consult performed by: RONNIE Forrester  Consult ordered by: Nahomy Garcia MD

## 2024-08-26 NOTE — PROGRESS NOTES
08/26/24 1648   Discharge Planning   Living Arrangements Children   Support Systems Children;Family members   Type of Residence Private residence   Who is requesting discharge planning? Provider   Expected Discharge Disposition Home   Does the patient need discharge transport arranged? No  (car here in ER parking lot. Pt concerned in handicap place with handicap plaque. Notified nurse Nora to notify security pt admiited & car in ER parking lot. Black Honda CRV.)     Pt plan for discharge home. No dc needs currently identified. Pt independent prior to admit. Daughter supportive with IADL's around house if needed.

## 2024-08-26 NOTE — H&P
History Of Present Illness  Leigha Alexis is a 53 y.o. female presenting with abdominal pain associated with intractable nausea and vomiting for a week.  Vomiting described as nonbloody.  Her pain described as sharp, epigastric, radiated to the back.  The pain is worse with eating.  She denied smoking or alcohol abuse.  No recent EGD but mentioned remote gastric ulcer.      She has history of cholecystectomy for chronic abdominal pain and gallstone biliary obstruction.    ER course: Patient was awake, alert, oriented, no acute distress.  She was hemodynamically stable.  Labs shows no leukocytosis, and has normal lactate.  Her lipase was elevated at 167.  Chemistry panel was within normal range except for hypokalemia 2.5 and low normal magnesium 1.67.  Troponin was negative x 2.  Urine analysis shows urinary tract infection.  CT abdomen shows no evidence of acute pathology.  Patient was given IV pain control and Zofran but she continued to have pain and nausea.  Patient placed in observation for further management.     Past Medical History  She has a past medical history of Abdominal distension (gaseous) (02/02/2021), Age-related osteoporosis without current pathological fracture (02/17/2017), Angiodysplasia of stomach and duodenum without bleeding (08/28/2020), Angioneurotic edema, initial encounter (04/20/2022), CHF (congestive heart failure) (Multi), Chronic pancreatitis (Multi), Conductive hearing loss, unilateral, left ear, with unrestricted hearing on the contralateral side (03/10/2016), Depression, unspecified (09/17/2019), Disorder of kidney and ureter, unspecified, Fecal urgency (02/02/2021), Fibromyalgia (10/06/2022), Gastro-esophageal reflux disease without esophagitis (09/17/2019), Gastroparesis (04/15/2020), Hematuria, unspecified (02/17/2017), Hypothyroidism, unspecified (12/28/2017), Hypothyroidism, unspecified (06/07/2021), Ileus (Multi), Migraine, unspecified, not intractable, without status  migrainosus (01/09/2020), Nausea (07/15/2020), Other constipation (11/19/2021), Other disorders of calcium metabolism (08/04/2017), Other disorders of iron metabolism, Other disorders of iron metabolism (07/06/2018), Other forms of dyspnea (08/30/2022), Other long term (current) drug therapy (01/25/2019), Other specified behavioral and emotional disorders with onset usually occurring in childhood and adolescence (04/24/2018), Other specified cataract (11/27/2018), Other specified diseases of intestine (07/15/2020), Other specified health status, Other specified postprocedural states, Other specified symptoms and signs involving the digestive system and abdomen (02/02/2021), Outlet dysfunction constipation (12/15/2020), Pain in unspecified joint (04/16/2022), Palpitations (09/09/2022), Personal history of other infectious and parasitic diseases (01/26/2021), Raynaud's syndrome without gangrene (01/09/2020), Sjogren syndrome, unspecified (Multi) (04/28/2020), Sjogren syndrome, unspecified (Multi) (10/08/2022), Tension-type headache, unspecified, not intractable (04/30/2021), Unspecified infectious disease (07/08/2021), and Zoster without complications (12/13/2018).    Surgical History  She has a past surgical history that includes Hysterectomy (02/28/2018); Cholecystectomy (05/18/2021); Colonoscopy (05/11/2018); Esophagogastroduodenoscopy (05/11/2018); and US guided needle liver biopsy (6/1/2018).     Social History  She reports that she has never smoked. She has been exposed to tobacco smoke. She has never used smokeless tobacco. She reports that she does not drink alcohol and does not use drugs.    Family History  Family History   Problem Relation Name Age of Onset    Hypertension Mother      Heart disease Mother      Rheum arthritis Mother      Heart attack Mother      Other (phlebitis) Mother      Heart disease Father      Hypertension Father      Heart attack Father      Hypertension Sibling         "  Allergies  Patient has no known allergies.    Review of Systems  10/10 points review of system were conducted, negative except as above.    Physical Exam  Constitutional:       Appearance: She is not ill-appearing or diaphoretic.   HENT:      Head: Normocephalic and atraumatic.      Nose: Nose normal.      Mouth/Throat:      Mouth: Mucous membranes are dry.   Eyes:      General: No scleral icterus.     Extraocular Movements: Extraocular movements intact.      Conjunctiva/sclera: Conjunctivae normal.   Neck:      Vascular: No carotid bruit.   Cardiovascular:      Rate and Rhythm: Normal rate and regular rhythm.      Pulses: Normal pulses.      Heart sounds: No murmur heard.  Pulmonary:      Effort: No respiratory distress.      Breath sounds: No wheezing, rhonchi or rales.   Chest:      Chest wall: No tenderness.   Abdominal:      General: There is no distension.      Tenderness: There is abdominal tenderness. There is guarding and rebound. There is no right CVA tenderness or left CVA tenderness.   Musculoskeletal:         General: No signs of injury.      Cervical back: Normal range of motion and neck supple. No rigidity or tenderness.      Right lower leg: No edema.      Left lower leg: No edema.   Lymphadenopathy:      Cervical: No cervical adenopathy.   Skin:     General: Skin is warm and dry.      Coloration: Skin is not jaundiced or pale.      Findings: No lesion or rash.   Neurological:      General: No focal deficit present.      Mental Status: She is alert and oriented to person, place, and time. Mental status is at baseline.   Psychiatric:         Mood and Affect: Mood normal.         Behavior: Behavior normal.          Last Recorded Vitals  Blood pressure 126/76, pulse 65, temperature 36 °C (96.8 °F), resp. rate 16, height 1.575 m (5' 2\"), weight 49 kg (108 lb), SpO2 96%.    Relevant Results  Scheduled medications  buPROPion XL, 300 mg, oral, Daily  cefTRIAXone, 1 g, intravenous, " q24h  hydroxychloroquine, 200 mg, oral, Daily  levothyroxine, 88 mcg, oral, Daily  magnesium sulfate, 1 g, intravenous, Once  pancrelipase (Lip-Prot-Amyl), 1 capsule, oral, TID  pantoprazole, 40 mg, intravenous, BID  potassium chloride, 20 mEq, intravenous, Once  topiramate, 25 mg, oral, Daily      Continuous medications  potassium chloride-D5-0.9%NaCl, 100 mL/hr      PRN medications  PRN medications: acetaminophen **OR** acetaminophen **OR** acetaminophen, melatonin, morphine, ondansetron **OR** ondansetron, polyethylene glycol      Assessment/Plan   Assessment & Plan  Intractable nausea and vomiting    Hypokalemia    Sjogren's syndrome (Multi)    Hereditary hemochromatosis (CMS-HCC)    GERD (gastroesophageal reflux disease)    Acquired hypothyroidism    Acute cystitis without hematuria    This patient has history of multiple pain syndromes including fibromyalgia, pelvic pain, chronic abdominal pain, migraine and has Sjogren's syndrome, hypothyroidism, anxiety and depression.    She is status post cholecystectomy.  Presented with epigastric abdominal pain associated with nausea and vomiting.  She has hypokalemia likely combination of vomiting and diuretic use.  -Continue to replace potassium.  -Optimize pain control.  -Pantoprazole 40 mg twice daily.  -Clear liquid diet advance as tolerated.  -GI consult for possible EGD for further evaluation and rule out gastric ulcer.  -Replace magnesium.  -Zofran for nausea control.  -Continue with IV fluid hydration with potassium replacement.  -Ceftriaxone for urinary tract infection  -DVT prophylaxis with SCD.  Will use Lovenox if there is no EGD planned.  -Resume pancreatic enzyme replacement.  Evidently she has chronic pancreatitis.  Her lipase is elevated as well.      Nhaomy Garcia MD

## 2024-08-26 NOTE — ED PROVIDER NOTES
HPI   Chief Complaint   Patient presents with    Flu Symptoms     Abd pain, n/v,d decreased appetite, and weakness x 2 days       Patient is a 53-year-old female presenting to the emergency department with complaints of abdominal pain, nausea, vomiting, diarrhea, decreased appetite for the past few days.  Patient endorses a sharp sensation in her epigastric and left upper quadrant pain that has been constantly present for the past 2 days.  She endorses nausea and nonbloody nonbilious emesis.  Patient additionally states that she has had some yellow-colored stools that have been loose.  She additionally endorses subjective fevers and chills.  She denies any chest pain or difficulty breathing.  Denies any hemoptysis.      History provided by:  Patient          Patient History   Past Medical History:   Diagnosis Date    Abdominal distension (gaseous) 02/02/2021    Abdominal distention    Age-related osteoporosis without current pathological fracture 02/17/2017    At risk of fracture due to osteoporosis    Angiodysplasia of stomach and duodenum without bleeding 08/28/2020    Gastric AVM    Angioneurotic edema, initial encounter 04/20/2022    Angioedema    CHF (congestive heart failure) (Multi)     Chronic pancreatitis (Multi)     Conductive hearing loss, unilateral, left ear, with unrestricted hearing on the contralateral side 03/10/2016    Conductive hearing loss in left ear    Depression, unspecified 09/17/2019    Depression    Disorder of kidney and ureter, unspecified     Kidney disease    Fecal urgency 02/02/2021    Defecation urgency    Fibromyalgia 10/06/2022    Fibromyalgia    Gastro-esophageal reflux disease without esophagitis 09/17/2019    GERD (gastroesophageal reflux disease)    Gastroparesis 04/15/2020    Gastric stasis    Hematuria, unspecified 02/17/2017    Hematuria    Hypothyroidism, unspecified 12/28/2017    Hypothyroidism    Hypothyroidism, unspecified 06/07/2021    Acquired hypothyroidism    Ileus  (Multi)     Migraine, unspecified, not intractable, without status migrainosus 01/09/2020    Migraine headache    Nausea 07/15/2020    Moderate nausea    Other constipation 11/19/2021    Chronic constipation    Other disorders of calcium metabolism 08/04/2017    Calcinosis    Other disorders of iron metabolism     Iron overload    Other disorders of iron metabolism 07/06/2018    Iron overload syndrome    Other forms of dyspnea 08/30/2022    Dyspnea on exertion    Other long term (current) drug therapy 01/25/2019    High risk medication use    Other specified behavioral and emotional disorders with onset usually occurring in childhood and adolescence 04/24/2018    ADD (attention deficit disorder)    Other specified cataract 11/27/2018    Other cataract of both eyes    Other specified diseases of intestine 07/15/2020    Small bowel mass    Other specified health status     No pertinent past medical history    Other specified postprocedural states     History of ERCP    Other specified symptoms and signs involving the digestive system and abdomen 02/02/2021    Abnormal defecation    Outlet dysfunction constipation 12/15/2020    Dyssynergic constipation    Pain in unspecified joint 04/16/2022    Arthralgia    Palpitations 09/09/2022    Palpitations    Personal history of other infectious and parasitic diseases 01/26/2021    History of shingles    Raynaud's syndrome without gangrene 01/09/2020    Raynauds phenomenon    Sjogren syndrome, unspecified (Multi) 04/28/2020    Sjogren's disease    Sjogren syndrome, unspecified (Multi) 10/08/2022    Sjogren's syndrome    Tension-type headache, unspecified, not intractable 04/30/2021    Tension-type headache    Unspecified infectious disease 07/08/2021    Recurrent infections    Zoster without complications 12/13/2018    Shingles     Past Surgical History:   Procedure Laterality Date    CHOLECYSTECTOMY  05/18/2021    Cholecystectomy Laparoscopic    COLONOSCOPY  05/11/2018     Complete Colonoscopy    ESOPHAGOGASTRODUODENOSCOPY  05/11/2018    Diagnostic Esophagogastroduodenoscopy    HYSTERECTOMY  02/28/2018    Hysterectomy    US GUIDED NEEDLE LIVER BIOPSY  6/1/2018    US GUIDED NEEDLE LIVER BIOPSY 6/1/2018 [facility]     Family History   Problem Relation Name Age of Onset    Hypertension Mother      Heart disease Mother      Rheum arthritis Mother      Heart attack Mother      Other (phlebitis) Mother      Heart disease Father      Hypertension Father      Heart attack Father      Hypertension Sibling       Social History     Tobacco Use    Smoking status: Never     Passive exposure: Past    Smokeless tobacco: Never   Substance Use Topics    Alcohol use: Never    Drug use: Never       Physical Exam   ED Triage Vitals [08/25/24 2219]   Temperature Heart Rate Respirations BP   36.2 °C (97.2 °F) 87 (!) 7 135/76      Pulse Ox Temp Source Heart Rate Source Patient Position   99 % Temporal Monitor Sitting      BP Location FiO2 (%)     Right arm --       Physical Exam  Vitals and nursing note reviewed.   Constitutional:       General: She is not in acute distress.     Appearance: Normal appearance. She is not ill-appearing, toxic-appearing or diaphoretic.   HENT:      Head: Normocephalic and atraumatic.      Nose: Nose normal.      Mouth/Throat:      Mouth: Mucous membranes are moist.      Pharynx: No oropharyngeal exudate or posterior oropharyngeal erythema.   Eyes:      General: No scleral icterus.     Extraocular Movements: Extraocular movements intact.      Pupils: Pupils are equal, round, and reactive to light.   Cardiovascular:      Rate and Rhythm: Normal rate and regular rhythm.      Pulses: Normal pulses.      Heart sounds: Normal heart sounds. No murmur heard.     No friction rub. No gallop.   Pulmonary:      Effort: Pulmonary effort is normal. No respiratory distress.      Breath sounds: Normal breath sounds. No stridor. No wheezing, rhonchi or rales.   Chest:      Chest wall: No  tenderness.   Abdominal:      General: Abdomen is flat. There is no distension.      Palpations: Abdomen is soft. There is no mass.      Tenderness: There is abdominal tenderness. There is no guarding.      Hernia: No hernia is present.   Musculoskeletal:         General: No swelling, tenderness, deformity or signs of injury. Normal range of motion.      Cervical back: Normal range of motion and neck supple. No rigidity.   Skin:     General: Skin is warm and dry.      Capillary Refill: Capillary refill takes less than 2 seconds.      Coloration: Skin is not jaundiced or pale.      Findings: No bruising, erythema, lesion or rash.   Neurological:      General: No focal deficit present.      Mental Status: She is alert and oriented to person, place, and time. Mental status is at baseline.   Psychiatric:         Mood and Affect: Mood normal.         Behavior: Behavior normal.           ED Course & MDM   Diagnoses as of 08/26/24 0415   Epigastric pain   Acute pancreatitis, unspecified complication status, unspecified pancreatitis type ([facility])                 No data recorded     Connor Coma Scale Score: 15 (08/25/24 2222 : Aide Zamarripa RN)                           Medical Decision Making  Patient is an overall well-appearing 53-year-old female no acute distress presenting to the emergency department for complaints of abdominal pain nausea vomiting diarrhea and weakness.  Patient concerned that she has pancreatitis that she has had that in the past and this feels similar.  Labs and CT imaging were ordered.  CBC without concerning findings.  Metabolic panel with hypokalemia 2.5 replacement was ordered.  Lactate level was normal.  Magnesium level is normal.  COVID was negative.  Troponins negative x 2.  Ethanol level was negative.  Lipase was elevated at 167 elevated from patient's previous of 35 1 month ago.  Urinalysis with some minor leukocyte esterase and 1+ bacteria and the patient was given Keflex to treat for  possible UTI.  Chest x-ray showed no acute cardiopulmonary processes.  CT abdomen and pelvis with no acute intra-abdominal findings.  Patient was given IV fluids, Zofran, Dilaudid, Toradol, BMX without significant improvement in her symptoms.  Given her history and elevated lipase and epigastric discomfort admission was offered for possible pancreatitis.  Hospitalist was contacted and the case discussed and the patient was accepted for admission.    Amount and/or Complexity of Data Reviewed  Labs: ordered. Decision-making details documented in ED Course.  Radiology: ordered. Decision-making details documented in ED Course.  ECG/medicine tests: independent interpretation performed.     Details: 2234 hrs: Sinus rhythm with a ventricular rate of 83 bpm.  QRS interval 82 ms.  QTc 437 ms.  Normal axis.  No acute ischemic injury pattern noted.  0024 hrs.: Sinus rhythm with ventricular rate 73 bpm.  QRS normal 86 ms.  QTc 451 ms.  Normal axis.  No acute scheming injury pattern appreciated.      Labs Reviewed   CBC WITH AUTO DIFFERENTIAL - Abnormal       Result Value    WBC 6.4      nRBC 0.0      RBC 4.25      Hemoglobin 12.3      Hematocrit 35.5 (*)     MCV 84      MCH 28.9      MCHC 34.6      RDW 12.0      Platelets 332      Neutrophils % 55.8      Immature Granulocytes %, Automated 0.2      Lymphocytes % 35.6      Monocytes % 6.7      Eosinophils % 0.8      Basophils % 0.9      Neutrophils Absolute 3.57      Immature Granulocytes Absolute, Automated 0.01      Lymphocytes Absolute 2.28      Monocytes Absolute 0.43      Eosinophils Absolute 0.05      Basophils Absolute 0.06     COMPREHENSIVE METABOLIC PANEL - Abnormal    Glucose 84      Sodium 136      Potassium 2.5 (*)     Chloride 102      Bicarbonate 25      Anion Gap 12      Urea Nitrogen 20      Creatinine 0.89      eGFR 79      Calcium 9.2      Albumin 4.2      Alkaline Phosphatase 78      Total Protein 6.9      AST 11      Bilirubin, Total 0.3      ALT 8     LIPASE  - Abnormal    Lipase 167 (*)     Narrative:     Venipuncture immediately after or during the administration of Metamizole may lead to falsely low results. Testing should be performed immediately prior to Metamizole dosing.   URINALYSIS WITH REFLEX CULTURE AND MICROSCOPIC - Abnormal    Color, Urine Yellow      Appearance, Urine Clear      Specific Gravity, Urine 1.026      pH, Urine 6.0      Protein, Urine 20 (TRACE)      Glucose, Urine Normal      Blood, Urine 0.1 (1+) (*)     Ketones, Urine NEGATIVE      Bilirubin, Urine NEGATIVE      Urobilinogen, Urine Normal      Nitrite, Urine NEGATIVE      Leukocyte Esterase, Urine 75 Emelia/µL (*)    MICROSCOPIC ONLY, URINE - Abnormal    WBC, Urine 1-5      RBC, Urine 11-20 (*)     Squamous Epithelial Cells, Urine 1-9 (SPARSE)      Bacteria, Urine 1+ (*)     Mucus, Urine 4+      Hyaline Casts, Urine OCCASIONAL (*)     Calcium Oxalate Crystals, Urine 1+     MAGNESIUM - Normal    Magnesium 1.67     LACTATE - Normal    Lactate 0.8      Narrative:     Venipuncture immediately after or during the administration of Metamizole may lead to falsely low results. Testing should be performed immediately  prior to Metamizole dosing.   SARS-COV-2 PCR - Normal    Coronavirus 2019, PCR Not Detected      Narrative:     This assay has received FDA Emergency Use Authorization (EUA) and is only authorized for the duration of time that circumstances exist to justify the authorization of the emergency use of in vitro diagnostic tests for the detection of SARS-CoV-2 virus and/or diagnosis of COVID-19 infection under section 564(b)(1) of the Act, 21 U.S.C. 360bbb-3(b)(1). This assay is an in vitro diagnostic nucleic acid amplification test for the qualitative detection of SARS-CoV-2 from nasopharyngeal specimens and has been validated for use at [facility]. Negative results do not preclude COVID-19 infections and should not be used as the sole basis for diagnosis, treatment, or  other management decisions.     SERIAL TROPONIN-INITIAL - Normal    Troponin I, High Sensitivity 4      Narrative:     Less than 99th percentile of normal range cutoff-  Female and children under 18 years old <14 ng/L; Male <21 ng/L: Negative  Repeat testing should be performed if clinically indicated.     Female and children under 18 years old 14-50 ng/L; Male 21-50 ng/L:  Consistent with possible cardiac damage and possible increased clinical   risk. Serial measurements may help to assess extent of myocardial damage.     >50 ng/L: Consistent with cardiac damage, increased clinical risk and  myocardial infarction. Serial measurements may help assess extent of   myocardial damage.      NOTE: Children less than 1 year old may have higher baseline troponin   levels and results should be interpreted in conjunction with the overall   clinical context.     NOTE: Troponin I testing is performed using a different   testing methodology at [facility] than at other   [facility]. Direct result comparisons should only   be made within the same method.   SERIAL TROPONIN, 1 HOUR - Normal    Troponin I, High Sensitivity 4      Narrative:     Less than 99th percentile of normal range cutoff-  Female and children under 18 years old <14 ng/L; Male <21 ng/L: Negative  Repeat testing should be performed if clinically indicated.     Female and children under 18 years old 14-50 ng/L; Male 21-50 ng/L:  Consistent with possible cardiac damage and possible increased clinical   risk. Serial measurements may help to assess extent of myocardial damage.     >50 ng/L: Consistent with cardiac damage, increased clinical risk and  myocardial infarction. Serial measurements may help assess extent of   myocardial damage.      NOTE: Children less than 1 year old may have higher baseline troponin   levels and results should be interpreted in conjunction with the overall   clinical context.     NOTE: Troponin I testing is performed  using a different   testing methodology at [facility] than at other   [facility]. Direct result comparisons should only   be made within the same method.   ALCOHOL - Normal    Alcohol <10     URINE CULTURE   TROPONIN SERIES- (INITIAL, 1 HR)    Narrative:     The following orders were created for panel order Troponin I Series, High Sensitivity (0, 1 HR).  Procedure                               Abnormality         Status                     ---------                               -----------         ------                     Troponin I, High Sensiti...[[id number]]  Normal              Final result               Troponin, High Sensitivi...[[id number]]  Normal              Final result                 Please view results for these tests on the individual orders.   URINALYSIS WITH REFLEX CULTURE AND MICROSCOPIC    Narrative:     The following orders were created for panel order Urinalysis with Reflex Culture and Microscopic.  Procedure                               Abnormality         Status                     ---------                               -----------         ------                     Urinalysis with Reflex C...[[id number]]  Abnormal            Final result               Extra Urine Gray Tube[[id number]]                            In process                   Please view results for these tests on the individual orders.   EXTRA URINE GRAY TUBE   CBC   BASIC METABOLIC PANEL   DRUG SCREEN,URINE     CT abdomen pelvis w IV contrast   Final Result   No acute findings in the abdomen or pelvis. Chronic findings are   stable compared to prior as detailed in the body of the report.        MACRO:   None        Signed by: Yves Longo 8/26/2024 12:51 AM   Dictation workstation:   [id number]      XR chest 1 view   Final Result   1. No acute cardiopulmonary process.        Signed by: Gilberto Siddiqi 8/26/2024 12:14 AM   Dictation workstation:   [id number]            Procedure  Procedures      Sheldon Ortez, DO  08/26/24 0415

## 2024-08-27 PROBLEM — R10.13 EPIGASTRIC PAIN: Status: ACTIVE | Noted: 2024-08-27

## 2024-08-27 LAB
ALBUMIN SERPL BCP-MCNC: 3.4 G/DL (ref 3.4–5)
ANION GAP SERPL CALC-SCNC: 12 MMOL/L (ref 10–20)
BACTERIA UR CULT: NORMAL
BUN SERPL-MCNC: 5 MG/DL (ref 6–23)
CALCIUM SERPL-MCNC: 8.7 MG/DL (ref 8.6–10.3)
CHLORIDE SERPL-SCNC: 110 MMOL/L (ref 98–107)
CO2 SERPL-SCNC: 22 MMOL/L (ref 21–32)
CREAT SERPL-MCNC: 0.6 MG/DL (ref 0.5–1.05)
EGFRCR SERPLBLD CKD-EPI 2021: >90 ML/MIN/1.73M*2
GLUCOSE SERPL-MCNC: 68 MG/DL (ref 74–99)
HOLD SPECIMEN: NORMAL
HOLD SPECIMEN: NORMAL
PHOSPHATE SERPL-MCNC: 3 MG/DL (ref 2.5–4.9)
POTASSIUM SERPL-SCNC: 3.8 MMOL/L (ref 3.5–5.3)
SODIUM SERPL-SCNC: 140 MMOL/L (ref 136–145)

## 2024-08-27 PROCEDURE — 2500000001 HC RX 250 WO HCPCS SELF ADMINISTERED DRUGS (ALT 637 FOR MEDICARE OP): Performed by: STUDENT IN AN ORGANIZED HEALTH CARE EDUCATION/TRAINING PROGRAM

## 2024-08-27 PROCEDURE — 1210000001 HC SEMI-PRIVATE ROOM DAILY

## 2024-08-27 PROCEDURE — 2500000002 HC RX 250 W HCPCS SELF ADMINISTERED DRUGS (ALT 637 FOR MEDICARE OP, ALT 636 FOR OP/ED): Performed by: INTERNAL MEDICINE

## 2024-08-27 PROCEDURE — 36415 COLL VENOUS BLD VENIPUNCTURE: CPT | Performed by: STUDENT IN AN ORGANIZED HEALTH CARE EDUCATION/TRAINING PROGRAM

## 2024-08-27 PROCEDURE — 99232 SBSQ HOSP IP/OBS MODERATE 35: CPT | Performed by: STUDENT IN AN ORGANIZED HEALTH CARE EDUCATION/TRAINING PROGRAM

## 2024-08-27 PROCEDURE — 2500000004 HC RX 250 GENERAL PHARMACY W/ HCPCS (ALT 636 FOR OP/ED): Performed by: INTERNAL MEDICINE

## 2024-08-27 PROCEDURE — 2500000001 HC RX 250 WO HCPCS SELF ADMINISTERED DRUGS (ALT 637 FOR MEDICARE OP): Performed by: INTERNAL MEDICINE

## 2024-08-27 PROCEDURE — 80069 RENAL FUNCTION PANEL: CPT | Performed by: STUDENT IN AN ORGANIZED HEALTH CARE EDUCATION/TRAINING PROGRAM

## 2024-08-27 RX ORDER — DEXTROAMPHETAMINE SACCHARATE, AMPHETAMINE ASPARTATE, DEXTROAMPHETAMINE SULFATE AND AMPHETAMINE SULFATE 1.25; 1.25; 1.25; 1.25 MG/1; MG/1; MG/1; MG/1
15 TABLET ORAL
Status: DISCONTINUED | OUTPATIENT
Start: 2024-08-27 | End: 2024-08-28 | Stop reason: HOSPADM

## 2024-08-27 RX ORDER — ACETAMINOPHEN 160 MG/5ML
650 SOLUTION ORAL EVERY 4 HOURS PRN
Status: DISCONTINUED | OUTPATIENT
Start: 2024-08-27 | End: 2024-08-27

## 2024-08-27 RX ORDER — ACETAMINOPHEN 650 MG/1
650 SUPPOSITORY RECTAL EVERY 4 HOURS PRN
Status: DISCONTINUED | OUTPATIENT
Start: 2024-08-27 | End: 2024-08-28 | Stop reason: HOSPADM

## 2024-08-27 RX ORDER — ACETAMINOPHEN 325 MG/1
650 TABLET ORAL EVERY 4 HOURS PRN
Status: DISCONTINUED | OUTPATIENT
Start: 2024-08-27 | End: 2024-08-28 | Stop reason: HOSPADM

## 2024-08-27 RX ORDER — ACETAMINOPHEN 650 MG/1
650 SUPPOSITORY RECTAL EVERY 4 HOURS PRN
Status: DISCONTINUED | OUTPATIENT
Start: 2024-08-27 | End: 2024-08-27

## 2024-08-27 RX ORDER — ACETAMINOPHEN 160 MG/5ML
650 SOLUTION ORAL EVERY 4 HOURS PRN
Status: DISCONTINUED | OUTPATIENT
Start: 2024-08-27 | End: 2024-08-28 | Stop reason: HOSPADM

## 2024-08-27 RX ORDER — ACETAMINOPHEN 325 MG/1
650 TABLET ORAL EVERY 4 HOURS PRN
Status: DISCONTINUED | OUTPATIENT
Start: 2024-08-27 | End: 2024-08-27

## 2024-08-27 ASSESSMENT — PAIN SCALES - GENERAL
PAINLEVEL_OUTOF10: 7
PAINLEVEL_OUTOF10: 5 - MODERATE PAIN
PAINLEVEL_OUTOF10: 8

## 2024-08-27 ASSESSMENT — COGNITIVE AND FUNCTIONAL STATUS - GENERAL
DAILY ACTIVITIY SCORE: 24
MOBILITY SCORE: 22
CLIMB 3 TO 5 STEPS WITH RAILING: A LITTLE
WALKING IN HOSPITAL ROOM: A LITTLE
DAILY ACTIVITIY SCORE: 24
MOBILITY SCORE: 24

## 2024-08-27 ASSESSMENT — PAIN - FUNCTIONAL ASSESSMENT
PAIN_FUNCTIONAL_ASSESSMENT: 0-10

## 2024-08-27 ASSESSMENT — PAIN DESCRIPTION - DESCRIPTORS: DESCRIPTORS: ACHING

## 2024-08-27 NOTE — PROGRESS NOTES
Medical Group Progress Note  ASSESSMENT & PLAN:     ntractable nausea and vomiting     Hypokalemia     Sjogren's syndrome (Multi)     Hereditary hemochromatosis (CMS-HCC)     GERD (gastroesophageal reflux disease)     Acquired hypothyroidism     Acute cystitis without hematuria     This patient has history of multiple pain syndromes including fibromyalgia, pelvic pain, chronic abdominal pain, migraine and has Sjogren's syndrome, hypothyroidism, anxiety and depression.     She is status post cholecystectomy.  Presented with epigastric abdominal pain associated with nausea and vomiting.  She has hypokalemia likely combination of vomiting and diuretic use.  -Continue to replace potassium.  -Optimize pain control.  -Pantoprazole 40 mg twice daily.  -Clear liquid diet advance as tolerated.  -GI consult for possible EGD for further evaluation and rule out gastric ulcer.  -Replace magnesium.  -Zofran for nausea control.  -Continue with IV fluid hydration with potassium replacement.  -Ceftriaxone for urinary tract infection  -DVT prophylaxis with SCD.  Will use Lovenox if there is no EGD planned.  -Resume pancreatic enzyme replacement.  Evidently she has chronic pancreatitis.  Her lipase is elevated as well.    08/27  -  Patient has had no episodes of vomiting since being admitted.  On discussion with patient today she reports that she has persistent nausea for at least the past week and in general consistently since being admitted in July for gastritis.  -  She reports an increase in flares in terms of her acute on chronic pancreatitis.  Lipase levels are currently less than 3 times the upper limits of normal on could be due to persistent subacute pancreatitis at this point in time.  Recommending follow-up with primary GI.  -   GI saw and evaluated patient here on think that  patient would benefit from workup of SIBO.  -  Patient urinalysis not consistent with UTI will discontinue ceftriaxone.   -  If patient remains  stable overnight will discharge patient home with plan for follow-up with home GI as well as Protonix 40 mg daily.   -  Hypokalemia has resolved     VTE prophylaxis: SCDs and encourage ambulation  Diet: Progressing to low fiber  CODE STATUS: Full code        Total time >35 minutes; > 50% spent counseling/coordinating care    -----This note was prepared using Dragon Medical voice recognition software. As a result, errors may occur. When identified, these errors have been corrected. While every attempt is made to correct errors during dictation, errors may still exist.------    Sayra Recinos MD    SUBJECTIVE      seen assisting rounds.  Denies any vomiting or diarrhea.  Does endorse consistent flatulence.    OBJECTIVE:     Last Recorded Vitals:  Vitals:    08/26/24 2021 08/27/24 0400 08/27/24 0858 08/27/24 1200   BP: 92/55 96/58 106/55 101/54   BP Location:   Left arm Right arm   Patient Position:   Lying Lying   Pulse: 64 56 77 70   Resp:   18 18   Temp: 36.5 °C (97.7 °F) 36.6 °C (97.9 °F) 37.1 °C (98.8 °F) 37.1 °C (98.8 °F)   TempSrc:   Temporal Temporal   SpO2: 98% 99% 99% 100%   Weight:       Height:         Last I/O:  I/O last 3 completed shifts:  In: 3475 (64.2 mL/kg) [IV Piggyback:3475]  Out: - (0 mL/kg)   Weight: 54.1 kg     Physical Exam    Inpatient Medications:  amphetamine-dextroamphetamine, 15 mg, oral, BID  buPROPion XL, 300 mg, oral, Daily  cefTRIAXone, 1 g, intravenous, q24h  hydroxychloroquine, 200 mg, oral, Daily  levothyroxine, 88 mcg, oral, Daily  pancrelipase (Lip-Prot-Amyl), 1 capsule, oral, TID  pantoprazole, 40 mg, intravenous, BID  sennosides, 1 tablet, oral, Nightly  topiramate, 25 mg, oral, Daily    PRN Medications  PRN medications: acetaminophen **OR** acetaminophen **OR** acetaminophen, melatonin, morphine, ondansetron **OR** ondansetron, polyethylene glycol  Continuous Medications:  potassium chloride-D5-0.9%NaCl, 100 mL/hr, Last Rate: 100 mL/hr (08/27/24 1538)      LABS AND IMAGING:      Labs:  Results from last 7 days   Lab Units 08/26/24  0500 08/25/24  2256   WBC AUTO x10*3/uL 6.2 6.4   RBC AUTO x10*6/uL 3.63* 4.25   HEMOGLOBIN g/dL 10.6* 12.3   HEMATOCRIT % 31.4* 35.5*   MCV fL 87 84   MCH pg 29.2 28.9   MCHC g/dL 33.8 34.6   RDW % 12.1 12.0   PLATELETS AUTO x10*3/uL 271 332     Results from last 7 days   Lab Units 08/27/24  0900 08/26/24  0500 08/25/24  2256   SODIUM mmol/L 140 139 136   POTASSIUM mmol/L 3.8 3.2* 2.5*   CHLORIDE mmol/L 110* 109* 102   CO2 mmol/L 22 27 25   BUN mg/dL 5* 15 20   CREATININE mg/dL 0.60 0.66 0.89   GLUCOSE mg/dL 68* 83 84   PROTEIN TOTAL g/dL  --   --  6.9   CALCIUM mg/dL 8.7 8.2* 9.2   BILIRUBIN TOTAL mg/dL  --   --  0.3   ALK PHOS U/L  --   --  78   AST U/L  --   --  11   ALT U/L  --   --  8     Results from last 7 days   Lab Units 08/25/24  2256   MAGNESIUM mg/dL 1.67     Results from last 7 days   Lab Units 08/26/24  0007 08/25/24  2256   TROPHS ng/L 4 4     Imaging:  ECG 12 lead  Normal sinus rhythm  Nonspecific ST abnormality  Abnormal ECG  When compared with ECG of 25-AUG-2024 22:34, (unconfirmed)  No significant change was found  See ED provider note for full interpretation and clinical correlation  Confirmed by Amber Mai ([email or web address]) on 8/26/2024 9:49:25 PM  ECG 12 lead  Normal sinus rhythm  Septal infarct (cited on or before 08-JUL-2024)  Abnormal ECG  When compared with ECG of 08-JUL-2024 22:07,  Questionable change in initial forces of Septal leads  ST now depressed in Inferior leads  ST now depressed in Anterior leads  Nonspecific T wave abnormality now evident in Inferior leads  T wave inversion now evident in Anterior leads  See ED provider note for full interpretation and clinical correlation  Confirmed by Amber Mai ([email or web address]) on 8/26/2024 9:49:28 PM  CT abdomen pelvis w IV contrast  Narrative: Interpreted By:  Yves Longo,   STUDY:  CT ABDOMEN PELVIS W IV CONTRAST;  8/25/2024 11:57 pm      INDICATION:  Signs/Symptoms:epigastric  pain.      COMPARISON:  CT abdomen and pelvis 07/08/2024      ACCESSION NUMBER(S):  [id number]      ORDERING CLINICIAN:  DAWN SANTIAGO      TECHNIQUE:  Axial CT images of the abdomen and pelvis with coronal and sagittal  reconstructed images obtained after intravenous administration of  contrast.      FINDINGS:  LOWER CHEST: Unremarkable.  BONES: No acute osseous abnormality. Diffuse osseous demineralization.  ABDOMINAL WALL: Unremarkable.      ABDOMEN:      LIVER: Segment 7 simple cyst. Additional too small to characterize  hypodensities, likely benign cysts. BILE DUCTS: Prominence of the  bile ducts, likely related to reservoir effect. GALLBLADDER:  Cholecystectomy. PANCREAS:Dominant dorsal duct suggesting partial  divisum. SPLEEN: Unremarkable.  ADRENALS: Unremarkable.  KIDNEYS and URETERS: Punctate nonobstructive right interpolar  calculus. Stable renal cortical hypodensities likely representing  cysts, some with hemorrhagic/proteinaceous contents. VESSELS:  Unremarkable. LYMPH NODES: Unremarkable.  RETROPERITONEUM/PERITONEUM: Unremarkable.  BOWEL: Similar bowel morphology compared to prior exam.      PELVIS:      REPRODUCTIVE ORGANS: Hysterectomy.  BLADDER: Decompressed.      Impression: No acute findings in the abdomen or pelvis. Chronic findings are  stable compared to prior as detailed in the body of the report.      MACRO:  None      Signed by: Yves Longo 8/26/2024 12:51 AM  Dictation workstation:   [id number]  XR chest 1 view  Narrative: Interpreted By:  Gilberto Siddiqi,   STUDY:  XR CHEST 1 VIEW;  8/25/2024 11:27 pm      INDICATION:  Signs/Symptoms:epigastric pain.      COMPARISON:  09/12/2023      ACCESSION NUMBER(S):  [id number]      ORDERING CLINICIAN:  DAWN SANTIAGO      FINDINGS:          No consolidation, pleural effusion, or pneumothorax. Heart size is  normal. No acute osseous abnormality. Right upper quadrant surgical  clips.      Impression: 1. No acute cardiopulmonary  process.      Signed by: Gilberto Siddiqi 8/26/2024 12:14 AM  Dictation workstation:   [id number]

## 2024-08-27 NOTE — CONSULTS
Nutrition Initial Assessment:   Nutrition Assessment         Patient is a 53 y.o. female presenting with intractable nausea and vomiting      Nutrition History:  Energy Intake: Poor < 50 %  Food and Nutrient History: RD consulted for MST = 2. Per EMR, has a history of CHF, fibromyalgia, chronic abdominal pain, chronic pancreatitis, gastroparesis, Sjogren's syndrome, chronic constipation. Seen by a RD ~3 years ago for gastroparesis. At that time, reported not eating much meat and trying pureed foods and softer foods. At that time, was told to eat low fat, low fiber foods with 4-6 small meals per day. On full liquid diet at time of RD visit, says is tolerating. Says has been unable to consume large meals, so will eat small amounts. Tries to do softer foods d/t occasional dysphagia. Endorses nausea and abdominal pain following meals, though also says is eating foods high in fiber, such as oatmeal or salads. Discussed foods that are lower in fat and fiber. Finds some days can only eat club crackers and Gingerale 2/2 GI symptoms. Was trying to use oral nutritional supplements following meeting with a RD years ago, though did not continue 2/2 cost. Discussed cheaper oral nutritional supplements to try, such as [place] Instant Breakfast mixed with skim milk or almond milk. Discussed oral nutritional supplements on formulary and is agreeable to trial Mighty Shake BID, chocolate flavor. Not interested in trying Ensure Clear, which would be lowest fat supplement. Secure chat sent to attending that recommend low-fat, low fiber diet when able to advance to solid foods.  Vitamin/Herbal Supplement Use: potassium citrate. Of note, is on Creon per home med list. Pt reports taking Creon as prescribed.  Food Allergies/Intolerances:  None  GI Symptoms: Constipation, Nausea, and Abdominal pain. Last BM 8/25  Oral Problems:  reports intermittent dysphagia, declined altered textures/consistencies.       Anthropometrics:  Height: 157.5  "cm (5' 2.01\")   Weight: 54.1 kg (119 lb 4.3 oz)   BMI (Calculated): 21.81             Weight History:     Wt Readings from Last 15 Encounters:   08/26/24 54.1 kg (119 lb 4.3 oz)   07/08/24 51.7 kg (114 lb)   05/15/24 51.7 kg (114 lb)   03/05/24 53.5 kg (118 lb)   11/14/23 52.8 kg (116 lb 6.4 oz)   06/23/23 51.7 kg (114 lb)   10/06/22 50.1 kg (110 lb 8 oz)   08/30/22 51.3 kg (113 lb)   07/08/22 48.6 kg (107 lb 2.3 oz)   02/28/22 49 kg (108 lb)   07/14/21 49 kg (108 lb)   05/18/21 49 kg (108 lb)   05/11/21 49 kg (108 lb)   02/23/21 50.3 kg (111 lb)   01/12/21 49 kg (108 lb 2 oz)         Weight Change %:  Significant Weight Loss: No    Nutrition Focused Physical Exam Findings:    Subcutaneous Fat Loss:   Orbital Fat Pads: Mild-Moderate (slight dark circles and slight hollowing)  Buccal Fat Pads: Mild-Moderate (flat cheeks, minimal bounce)  Triceps: Mild-Moderate (less than ample fat tissue)  Ribs: Defer  Muscle Wasting:  Temporalis: Severe (hollowed scooping depression)  Pectoralis (Clavicular Region): Mild-Moderate (some protrusion of clavicle)  Deltoid/Trapezius: Mild-Moderate (slight protrusion of acromion process)  Interosseous: Mild-Moderate (slightly depressed area between thumb and forefinger)  Trapezius/Infraspinatus/Supraspinatus (Scapular Region): Defer  Quadriceps: Defer  Gastrocnemius: Defer  Edema:  Edema: none  Physical Findings:  Skin: Negative (for pressure injuries)    Nutrition Significant Labs:  BMP Trend:   Results from last 7 days   Lab Units 08/27/24  0900 08/26/24  0500 08/25/24  2256   GLUCOSE mg/dL 68* 83 84   CALCIUM mg/dL 8.7 8.2* 9.2   SODIUM mmol/L 140 139 136   POTASSIUM mmol/L 3.8 3.2* 2.5*   CO2 mmol/L 22 27 25   CHLORIDE mmol/L 110* 109* 102   BUN mg/dL 5* 15 20   CREATININE mg/dL 0.60 0.66 0.89    , A1C:  Lab Results   Component Value Date    HGBA1C 5.2 05/15/2024   , Renal Lab Trend:   Results from last 7 days   Lab Units 08/27/24  0900 08/26/24  0500 08/25/24  2256   POTASSIUM mmol/L " 3.8 3.2* 2.5*   PHOSPHORUS mg/dL 3.0  --   --    SODIUM mmol/L 140 139 136   MAGNESIUM mg/dL  --   --  1.67   EGFR mL/min/1.73m*2 >90 >90 79   BUN mg/dL 5* 15 20   CREATININE mg/dL 0.60 0.66 0.89        Nutrition Specific Medications:  Reviewed    I/O:   Last BM Date: 08/25/24;      Dietary Orders (From admission, onward)       Start     Ordered    08/27/24 1517  Oral nutritional supplements  Until discontinued        Comments: chocolate   Question Answer Comment   Deliver with Lunch    Deliver with Dinner    Select supplement: Mighty Shake        08/27/24 1516    08/26/24 1101  Adult diet Full Liquid  Diet effective now        Question:  Diet type  Answer:  Full Liquid    08/26/24 1100                     Estimated Needs:   Total Energy Estimated Needs (kCal): 1625 kCal  Method for Estimating Needs: 30 kcal/kg  Total Protein Estimated Needs (g): 73 g  Method for Estimating Needs: 65-81g (1.2-1.5 g/kg)  Total Fluid Estimated Needs (mL): 1625 mL  Method for Estimating Needs: 1 ml/kcal or per MD        Nutrition Diagnosis   Malnutrition Diagnosis  Patient has Malnutrition Diagnosis: Yes  Diagnosis Status: New  Malnutrition Diagnosis: Moderate malnutrition related to chronic disease or condition  As Evidenced by: suspect not consistently meeting at least 75% of estimated energy needs for at least 1 month, moderate-severe muscle losses, mild-moderate fat losses    Nutrition Diagnosis  Patient has Nutrition Diagnosis: Yes  Diagnosis Status (1): New  Nutrition Diagnosis 1: Altered GI function  Related to (1): changes in GI tract motility (gastroparesis)  As Evidenced by (1): nausea, chronic abdominal pain, inadequate intakes, need for low fat, low fiber diet when advanced to solid foods       Nutrition Interventions/Recommendations         Nutrition Prescription:  Individualized Nutrition Prescription Provided for : Full liquids. When able to advance to solids, recommend low-fat, low fiber diet. Mighty Shake BID         Nutrition Interventions:   Interventions: Meals and snacks, Medical food supplement  Goal: Consumes 3 meals per day  Medical Food Supplement: Commercial beverage  Goal: Mighty Shake BID (220 kcal and 6 g protein per serving)    Collaboration and Referral of Nutrition Care: Collaboration by nutrition professional with other providers  Goal: secure chat to attending    Nutrition Education:      Discussed need to follow low fat, low fiber diet with 4-6 small meals per day. Pt denies need for diet handouts.     Nutrition Monitoring and Evaluation   Food/Nutrient Related History Monitoring  Monitoring and Evaluation Plan: Energy intake, Amount of food, Fluid intake  Energy Intake: Estimated energy intake  Criteria: Pt meets >75% of estimated energy needs  Fluid Intake: Estimated fluid intake  Criteria: Meets >75% of estimated fluid needs  Amount of Food: Estimated amout of food, Medical food intake  Criteria: Pt consumes >50% of meals and supplements    Body Composition/Growth/Weight History  Monitoring and Evaluation Plan: Weight  Weight: Measured weight  Criteria: Maintains stable weight    Biochemical Data, Medical Tests and Procedures  Monitoring and Evaluation Plan: Glucose/endocrine profile, Electrolyte/renal panel  Electrolyte and Renal Panel: Sodium, Potassium, Phosphorus  Criteria: Electrolytes WNL  Glucose/Endocrine Profile: Glucose, casual  Criteria: BG within desirable range    Nutrition Focused Physical Findings  Monitoring and Evaluation Plan: Digestive System  Digestive System: Nausea, Other (Comment) (abdominal pain)  Criteria: Improvement in GI symptoms         Time Spent (min): 45 minutes

## 2024-08-27 NOTE — CARE PLAN
The patient's goals for the shift include pain and nausea control    The clinical goals for the shift include patient will verbalize pain at tolerable level through out shift with PRN medications      Problem: Pain  Goal: Takes deep breaths with improved pain control throughout the shift  Outcome: Progressing     Problem: Pain  Goal: Turns in bed with improved pain control throughout the shift  Outcome: Progressing     Problem: Pain  Goal: Free from acute confusion related to pain meds throughout the shift  Outcome: Progressing     Problem: Pain - Adult  Goal: Verbalizes/displays adequate comfort level or baseline comfort level  Outcome: Progressing  Flowsheets (Taken 8/27/2024 1142)  Verbalizes/displays adequate comfort level or baseline comfort level:   Assess pain using appropriate pain scale   Administer analgesics based on type and severity of pain and evaluate response

## 2024-08-28 VITALS
HEIGHT: 62 IN | SYSTOLIC BLOOD PRESSURE: 108 MMHG | WEIGHT: 119.27 LBS | RESPIRATION RATE: 18 BRPM | TEMPERATURE: 98.2 F | OXYGEN SATURATION: 99 % | HEART RATE: 72 BPM | DIASTOLIC BLOOD PRESSURE: 65 MMHG | BODY MASS INDEX: 21.95 KG/M2

## 2024-08-28 LAB
ANION GAP SERPL CALC-SCNC: 9 MMOL/L (ref 10–20)
BUN SERPL-MCNC: 7 MG/DL (ref 6–23)
CALCIUM SERPL-MCNC: 8.4 MG/DL (ref 8.6–10.3)
CHLORIDE SERPL-SCNC: 111 MMOL/L (ref 98–107)
CO2 SERPL-SCNC: 24 MMOL/L (ref 21–32)
CREAT SERPL-MCNC: 0.53 MG/DL (ref 0.5–1.05)
EGFRCR SERPLBLD CKD-EPI 2021: >90 ML/MIN/1.73M*2
GLUCOSE SERPL-MCNC: 88 MG/DL (ref 74–99)
HOLD SPECIMEN: NORMAL
HOLD SPECIMEN: NORMAL
LIPASE SERPL-CCNC: 92 U/L (ref 9–82)
POTASSIUM SERPL-SCNC: 3.9 MMOL/L (ref 3.5–5.3)
SODIUM SERPL-SCNC: 140 MMOL/L (ref 136–145)

## 2024-08-28 PROCEDURE — 2500000001 HC RX 250 WO HCPCS SELF ADMINISTERED DRUGS (ALT 637 FOR MEDICARE OP): Performed by: INTERNAL MEDICINE

## 2024-08-28 PROCEDURE — 36415 COLL VENOUS BLD VENIPUNCTURE: CPT | Performed by: STUDENT IN AN ORGANIZED HEALTH CARE EDUCATION/TRAINING PROGRAM

## 2024-08-28 PROCEDURE — 83690 ASSAY OF LIPASE: CPT

## 2024-08-28 PROCEDURE — 99231 SBSQ HOSP IP/OBS SF/LOW 25: CPT

## 2024-08-28 PROCEDURE — 80048 BASIC METABOLIC PNL TOTAL CA: CPT | Performed by: STUDENT IN AN ORGANIZED HEALTH CARE EDUCATION/TRAINING PROGRAM

## 2024-08-28 PROCEDURE — 2500000004 HC RX 250 GENERAL PHARMACY W/ HCPCS (ALT 636 FOR OP/ED)

## 2024-08-28 PROCEDURE — 99239 HOSP IP/OBS DSCHRG MGMT >30: CPT | Performed by: HOSPITALIST

## 2024-08-28 PROCEDURE — 2500000002 HC RX 250 W HCPCS SELF ADMINISTERED DRUGS (ALT 637 FOR MEDICARE OP, ALT 636 FOR OP/ED): Performed by: INTERNAL MEDICINE

## 2024-08-28 PROCEDURE — 2500000004 HC RX 250 GENERAL PHARMACY W/ HCPCS (ALT 636 FOR OP/ED): Performed by: INTERNAL MEDICINE

## 2024-08-28 PROCEDURE — 2500000001 HC RX 250 WO HCPCS SELF ADMINISTERED DRUGS (ALT 637 FOR MEDICARE OP): Performed by: STUDENT IN AN ORGANIZED HEALTH CARE EDUCATION/TRAINING PROGRAM

## 2024-08-28 RX ORDER — SENNOSIDES 8.6 MG/1
1 TABLET ORAL NIGHTLY
Status: DISCONTINUED | OUTPATIENT
Start: 2024-08-28 | End: 2024-08-28 | Stop reason: SDUPTHER

## 2024-08-28 RX ORDER — PROMETHAZINE HYDROCHLORIDE 12.5 MG/1
12.5 TABLET ORAL EVERY 8 HOURS PRN
Qty: 30 TABLET | Refills: 0 | Status: SHIPPED | OUTPATIENT
Start: 2024-08-28

## 2024-08-28 RX ORDER — POLYETHYLENE GLYCOL 3350 17 G/17G
17 POWDER, FOR SOLUTION ORAL DAILY
Status: DISCONTINUED | OUTPATIENT
Start: 2024-08-28 | End: 2024-08-28 | Stop reason: HOSPADM

## 2024-08-28 RX ORDER — ONDANSETRON 4 MG/1
4 TABLET, ORALLY DISINTEGRATING ORAL EVERY 8 HOURS PRN
Qty: 30 TABLET | Refills: 0 | Status: SHIPPED | OUTPATIENT
Start: 2024-08-28

## 2024-08-28 RX ORDER — OXYCODONE AND ACETAMINOPHEN 5; 325 MG/1; MG/1
1 TABLET ORAL EVERY 6 HOURS PRN
Qty: 5 TABLET | Refills: 0 | Status: SHIPPED | OUTPATIENT
Start: 2024-08-28

## 2024-08-28 ASSESSMENT — PAIN SCALES - GENERAL
PAINLEVEL_OUTOF10: 6
PAINLEVEL_OUTOF10: 7
PAINLEVEL_OUTOF10: 7

## 2024-08-28 ASSESSMENT — PAIN - FUNCTIONAL ASSESSMENT: PAIN_FUNCTIONAL_ASSESSMENT: 0-10

## 2024-08-28 NOTE — CARE PLAN
The patient's goals for the shift include pain and nausea control    The clinical goals for the shift include patient will verbalize pain at tolerable level through out shift    Problem: Pain  Goal: Turns in bed with improved pain control throughout the shift  Outcome: Progressing     Problem: Pain  Goal: Walks with improved pain control throughout the shift  Outcome: Progressing     Problem: Pain  Goal: Performs ADL's with improved pain control throughout shift  Outcome: Progressing     Problem: Pain  Goal: Free from acute confusion related to pain meds throughout the shift  Outcome: Progressing     Problem: Pain  Goal: Free from opioid side effects throughout the shift  Outcome: Progressing     Problem: Pain - Adult  Goal: Verbalizes/displays adequate comfort level or baseline comfort level  Outcome: Progressing

## 2024-08-28 NOTE — CARE PLAN
The patient's goals for the shift include pain and nausea control    The clinical goals for the shift include pain control      Problem: Nutrition  Goal: Less than 5 days NPO/clear liquids  Outcome: Progressing  Goal: Oral intake greater than 50%  Outcome: Progressing  Goal: Oral intake greater 75%  Outcome: Progressing  Goal: Consume prescribed supplement  Outcome: Progressing  Goal: Adequate PO fluid intake  Outcome: Progressing  Goal: Nutrition support goals are met within 48 hrs  Outcome: Progressing  Goal: Nutrition support is meeting 75% of nutrient needs  Outcome: Progressing  Goal: Tube feed tolerance  Outcome: Progressing  Goal: BG  mg/dL  Outcome: Progressing  Goal: Lab values WNL  Outcome: Progressing  Goal: Electrolytes WNL  Outcome: Progressing  Goal: Promote healing  Outcome: Progressing  Goal: Maintain stable weight  Outcome: Progressing  Goal: Reduce weight from edema/fluid  Outcome: Progressing  Goal: Gradual weight gain  Outcome: Progressing  Goal: Improve ostomy output  Outcome: Progressing     Problem: Pain  Goal: Takes deep breaths with improved pain control throughout the shift  Outcome: Progressing  Goal: Turns in bed with improved pain control throughout the shift  Outcome: Progressing  Goal: Walks with improved pain control throughout the shift  Outcome: Progressing  Goal: Performs ADL's with improved pain control throughout shift  Outcome: Progressing  Goal: Participates in PT with improved pain control throughout the shift  Outcome: Progressing  Goal: Free from opioid side effects throughout the shift  Outcome: Progressing  Goal: Free from acute confusion related to pain meds throughout the shift  Outcome: Progressing     Problem: Pain - Adult  Goal: Verbalizes/displays adequate comfort level or baseline comfort level  Outcome: Progressing     Problem: Safety - Adult  Goal: Free from fall injury  Outcome: Progressing     Problem: Discharge Planning  Goal: Discharge to home or other  facility with appropriate resources  Outcome: Progressing     Problem: Chronic Conditions and Co-morbidities  Goal: Patient's chronic conditions and co-morbidity symptoms are monitored and maintained or improved  Outcome: Progressing

## 2024-08-28 NOTE — NURSING NOTE
Discharge paper work reviewed with patient, verbalized understanding. IV removed, catheter intact. Belongings gathered up and bagged. Patient taken down to main lobby for discharge.

## 2024-08-28 NOTE — PROGRESS NOTES
Department of Internal Medicine  Gastroenterology  Progress note      Subjective  GI is following for Intractable nausea and vomiting.     Patient talked with hospitalist earlier and requested to see GI team again, as she states that her abdominal pain is still ongoing and believes her pancreatitis is acting up again.     During visit she states that her abdominal pain has significantly improved since admission, however still having some nausea with no vomiting and now complaining of more epigastric pain. She states that she has not had a bowel movement since Sunday 8/25/24. She also wanted to advance her diet.      Current Medication    Current Facility-Administered Medications:     acetaminophen (Tylenol) tablet 650 mg, 650 mg, oral, q4h PRN **OR** acetaminophen (Tylenol) oral liquid 650 mg, 650 mg, nasogastric tube, q4h PRN **OR** acetaminophen (Tylenol) suppository 650 mg, 650 mg, rectal, q4h PRN, Sayra Recinos MD    amphetamine-dextroamphetamine (Adderall) tablet 15 mg, 15 mg, oral, BID, Sayra Recinos MD, 15 mg at 08/28/24 0850    buPROPion XL (Wellbutrin XL) 24 hr tablet 300 mg, 300 mg, oral, Daily, Nahomy Garcia MD, 300 mg at 08/28/24 0850    cefTRIAXone (Rocephin) 1 g in dextrose (iso) IV 50 mL, 1 g, intravenous, q24h, Nahomy Garcia MD, Stopped at 08/28/24 0559    dextrose 5 % and sodium chloride 0.9 % with KCl 20 mEq/L infusion, 100 mL/hr, intravenous, Continuous, Nahomy Garcia MD, Last Rate: 100 mL/hr at 08/28/24 0529, 100 mL/hr at 08/28/24 0529    hydroxychloroquine (Plaquenil) tablet 200 mg, 200 mg, oral, Daily, Nahomy Garcia MD, 200 mg at 08/28/24 0850    levothyroxine (Synthroid, Levoxyl) tablet 88 mcg, 88 mcg, oral, Daily, Nahomy Garcia MD, 88 mcg at 08/28/24 0850    melatonin tablet 3 mg, 3 mg, oral, Nightly PRN, Nahomy Garcia MD    morphine injection 2 mg, 2 mg, intravenous, q3h PRN, Nahomy Garcia MD, 2 mg at 08/28/24 0903    ondansetron (Zofran) tablet 4 mg, 4 mg, oral, q8h PRN **OR**  ondansetron (Zofran) injection 4 mg, 4 mg, intravenous, q8h PRN, Nahomy Garcia MD    pancrelipase (Lip-Prot-Amyl) (Creon) 12,000-38,000 -60,000 unit per capsule 1 capsule, 1 capsule, oral, TID, Nahomy Garcia MD, 1 capsule at 08/28/24 0850    pantoprazole (ProtoNix) injection 40 mg, 40 mg, intravenous, BID, Nahomy Garcia MD, 40 mg at 08/28/24 0903    polyethylene glycol (Glycolax, Miralax) packet 17 g, 17 g, oral, Daily, MIKE Forrester-CNP    sennosides (Senokot) tablet 8.6 mg, 1 tablet, oral, Nightly, Edward Mcdonald MD, 8.6 mg at 08/27/24 2017    sennosides (Senokot) tablet 8.6 mg, 1 tablet, oral, Nightly, MIKE Forrester-CNP    topiramate (Topamax) tablet 25 mg, 25 mg, oral, Daily, Nahomy Garcia MD, 25 mg at 08/28/24 0850    Past Medical History  Active Ambulatory Problems     Diagnosis Date Noted    Abdominal distention 11/05/2023    Abnormal abdominal CT scan 11/05/2023    Abnormal defecation 11/05/2023    Incomplete defecation 11/05/2023    Abnormal MRI, liver 05/22/2018    Acquired hypothyroidism 05/22/2018    Acute left-sided low back pain with bilateral sciatica 03/09/2022    Acute recurrent pancreatitis ([facility]) 05/13/2017    ADD (attention deficit disorder) 05/22/2018    Angioedema 11/05/2023    Anxiety 11/01/2017    Arthralgia 11/05/2023    Atypical chest pain 11/01/2017    BV (bacterial vaginosis) 05/01/2013    Calcinosis 05/22/2018    Cataracta 11/05/2023    Cellulitis, face 04/06/2018    Chronic LUQ pain 11/05/2023    Left lower quadrant pain 11/01/2017    Chronic UTI 11/05/2023    Claudication of both lower extremities (CMS-HCC) 02/11/2022    Closed mallet fracture of distal phalanx of right little finger 09/29/2015    Conductive hearing loss in left ear 05/22/2018    Congestive heart failure (Multi) 02/11/2022    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    Defecation urgency 11/05/2023    Depression 11/01/2017    Depression, major, single episode, mild (CMS-HCC) 11/05/2023     Diverticulitis of large intestine without perforation or abscess without bleeding 11/01/2017    Dyspnea on exertion 11/05/2023    Chronic constipation 11/05/2023    Chronic pancreatitis (Multi) 08/11/2020    Chronic steatorrhea ([facility]-HCC) 11/05/2023    Dyssynergic constipation 11/05/2023    Early satiety 11/05/2023    Edema 05/22/2018    Fibroids 11/01/2017    Gastric AVM 11/05/2023    GERD (gastroesophageal reflux disease) 09/26/2016    Gastric stasis 11/05/2023    Hematuria 11/05/2023    Hepatomegaly 09/16/2021    Hereditary hemochromatosis (CMS-HCC) 01/12/2021    History of colon polyps 11/05/2023    History of shingles 11/05/2023    Hypokalemia 09/16/2021    Intussusception of jejunum (Multi) 11/05/2023    Iron overload 05/22/2018    Kidney disease 11/05/2023    Left pelvic adnexal fluid collection 01/15/2014    Lymphadenopathy 11/05/2023    Medullary sponge kidney of both kidneys 05/22/2018    Metabolic acidosis 05/22/2018    Microscopic hematuria 05/22/2018    Migraine headache 11/05/2023    Nausea 05/22/2018    Neutropenia ([facility]) 01/12/2021    Non-recurrent acute suppurative otitis media of left ear without spontaneous rupture of tympanic membrane 12/09/2021    Palpitations 09/17/2021    Pelvic pain in female 09/09/2014    Raynauds phenomenon 11/05/2023    Endometriosis 09/09/2014    Recurrent infections 11/05/2023    Renal tubular acidosis type I 05/22/2018    S/P endometrial ablation 05/01/2013    Shingles 11/05/2023    Rheumatoid arthritis (Multi) 05/22/2018    Sjogren's syndrome (Multi) 05/22/2018    Small bowel mass 11/05/2023    Suspected COVID-19 virus infection 11/05/2023    Chronic headaches 05/22/2018    Fibromyalgia 11/05/2023    Mass of left side of neck 05/22/2018    Tension type headache 05/22/2018    Weight loss, abnormal 05/22/2018    Other fatigue 03/10/2024    Other skin changes 03/10/2024     Resolved Ambulatory Problems     Diagnosis Date Noted    No Resolved Ambulatory Problems     Past  Medical History:   Diagnosis Date    Abdominal distension (gaseous) 02/02/2021    Age-related osteoporosis without current pathological fracture 02/17/2017    Angiodysplasia of stomach and duodenum without bleeding 08/28/2020    Angioneurotic edema, initial encounter 04/20/2022    CHF (congestive heart failure) (Multi)     Conductive hearing loss, unilateral, left ear, with unrestricted hearing on the contralateral side 03/10/2016    Depression, unspecified 09/17/2019    Disorder of kidney and ureter, unspecified     Fecal urgency 02/02/2021    Gastro-esophageal reflux disease without esophagitis 09/17/2019    Gastroparesis 04/15/2020    Hematuria, unspecified 02/17/2017    Hypothyroidism, unspecified 12/28/2017    Hypothyroidism, unspecified 06/07/2021    Ileus (Multi)     Migraine, unspecified, not intractable, without status migrainosus 01/09/2020    Other constipation 11/19/2021    Other disorders of calcium metabolism 08/04/2017    Other disorders of iron metabolism     Other disorders of iron metabolism 07/06/2018    Other forms of dyspnea 08/30/2022    Other long term (current) drug therapy 01/25/2019    Other specified behavioral and emotional disorders with onset usually occurring in childhood and adolescence 04/24/2018    Other specified cataract 11/27/2018    Other specified diseases of intestine 07/15/2020    Other specified health status     Other specified postprocedural states     Other specified symptoms and signs involving the digestive system and abdomen 02/02/2021    Outlet dysfunction constipation 12/15/2020    Pain in unspecified joint 04/16/2022    Personal history of other infectious and parasitic diseases 01/26/2021    Raynaud's syndrome without gangrene 01/09/2020    Sjogren syndrome, unspecified (Multi) 04/28/2020    Sjogren syndrome, unspecified (Multi) 10/08/2022    Tension-type headache, unspecified, not intractable 04/30/2021    Unspecified infectious disease 07/08/2021    Zoster  "without complications 12/13/2018       PHYSICAL EXAM  VS: /65 (BP Location: Right arm, Patient Position: Lying)   Pulse 72   Temp 36.8 °C (98.2 °F) (Temporal)   Resp 18   Ht 1.575 m (5' 2.01\")   Wt 54.1 kg (119 lb 4.3 oz)   SpO2 99%   BMI 21.81 kg/m²  Body mass index is 21.81 kg/m².  Physical Exam  Constitutional:       Appearance: Normal appearance.   HENT:      Head: Normocephalic and atraumatic.   Eyes:      Conjunctiva/sclera: Conjunctivae normal.      Pupils: Pupils are equal, round, and reactive to light.   Cardiovascular:      Rate and Rhythm: Normal rate and regular rhythm.      Pulses: Normal pulses.      Heart sounds: Normal heart sounds.   Pulmonary:      Effort: Pulmonary effort is normal.      Breath sounds: Normal breath sounds.   Abdominal:      General: Bowel sounds are normal. There is distension.      Palpations: Abdomen is soft.   Skin:     General: Skin is warm and dry.   Neurological:      General: No focal deficit present.      Mental Status: She is alert and oriented to person, place, and time.          DATA  Recent blood work  Results from last 7 days   Lab Units 08/26/24  0500   WBC AUTO x10*3/uL 6.2   RBC AUTO x10*6/uL 3.63*   HEMOGLOBIN g/dL 10.6*   HEMATOCRIT % 31.4*   MCV fL 87   MCHC g/dL 33.8   RDW % 12.1   PLATELETS AUTO x10*3/uL 271       Results from last 72 hours   Lab Units 08/28/24  0536 08/26/24  0500 08/25/24  2256   SODIUM mmol/L 140   < > 136   POTASSIUM mmol/L 3.9   < > 2.5*   CHLORIDE mmol/L 111*   < > 102   CO2 mmol/L 24   < > 25   BUN mg/dL 7   < > 20   CREATININE mg/dL 0.53   < > 0.89   CALCIUM mg/dL 8.4*   < > 9.2   PROTEIN TOTAL g/dL  --   --  6.9   BILIRUBIN TOTAL mg/dL  --   --  0.3   ALK PHOS U/L  --   --  78   AST U/L  --   --  11   ALT U/L  --   --  8    < > = values in this interval not displayed.         Results from last 72 hours   Lab Units 08/25/24  2256   LIPASE U/L 167*           IMPRESSION/RECOMMENDATIONS    IMPRESSION/RECOMMENDATIONS     The " patient is a 53 y.o. female with signficant past medical history of CHF, chronic pancreatitis, Depression, GERD, Fibromyalgia, gastroparesis, hypothyroidism, Sjogren Syndrome who presents for LUQ abdominal pain, nausea and vomiting.        ASSESSMENT     Chronic Pancreatitis- Patient with history of chronic pancreatitis, slightly elevated lipase at 167 however doubtful of dx as multiple imaging has been completed with no indication of Pancreatitis despite being on Creon.   LUQ Abdominal Pain- Likely exacerbated from UTI, no hematemesis, melena, or hematochezia noted from patient.   Nausea/Vomiting- Improved from admission, patient tolerating full liquid and will advance to low fat diet.   Constipation- Last BM 8/25/24     PLAN  - Advance diet from full liquid to low fat/high fiber   - Recommending Miralax daily for constipation.   -Follow up with established Gastroenterologist, may consider workup for SIBO   -Recommend switching Protonix from 40mg BID to 40mg daily  -Continue Supportive care with primary care team     Discussed case with Dr. Sanchez, GI to sign off. Please call with any questions or concerns.         (Electronically signed byRONNIE Forrester on 8/28/2024 at 10:03 AM)

## 2024-08-28 NOTE — DISCHARGE SUMMARY
Discharge summary.    Leigha Alexis  53 y.o.  1970  [id number]    Date of admission: 8/25/2024   Date of discharge:  8/28/2024.    Discharge Diagnosis:  #1 intractable nausea and vomiting.  #2 recurrent abdominal pain in the setting of history of chronic pancreatitis.  #3 hypokalemia.  #4 acute cystitis without hematuria, urine cultures negative.    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    Defecation urgency 11/05/2023    Depression, major, single episode, mild (CMS-HCC) 11/05/2023    Dyspnea on exertion 11/05/2023    Chronic constipation 11/05/2023    Chronic steatorrhea ([facility]-HCC) 11/05/2023    Dyssynergic constipation 11/05/2023    Early satiety 11/05/2023    Gastric AVM 11/05/2023    Gastric stasis 11/05/2023    Hematuria 11/05/2023    History of colon polyps 11/05/2023    History of shingles 11/05/2023    Intussusception of jejunum (Multi) 11/05/2023    Kidney disease 11/05/2023    Lymphadenopathy 11/05/2023    Migraine headache 11/05/2023    Raynauds phenomenon 11/05/2023    Recurrent infections 11/05/2023    Shingles 11/05/2023    Small bowel mass 11/05/2023    Suspected COVID-19 virus infection 11/05/2023    Fibromyalgia 11/05/2023    Acute left-sided low back pain with  bilateral sciatica 03/09/2022    Claudication of both lower extremities (CMS-HCC) 02/11/2022    Congestive heart failure (Multi) 02/11/2022    Non-recurrent acute suppurative otitis media of left ear without spontaneous rupture of tympanic membrane 12/09/2021    Palpitations 09/17/2021    Hepatomegaly 09/16/2021    Hypokalemia 09/16/2021    Hereditary hemochromatosis (CMS-HCC) 01/12/2021    Neutropenia (CMS-HCC) 01/12/2021    Chronic pancreatitis (Multi) 08/11/2020    Abnormal MRI, liver 05/22/2018    Acquired hypothyroidism 05/22/2018    ADD (attention deficit disorder) 05/22/2018    Calcinosis 05/22/2018    Conductive hearing loss in left ear 05/22/2018    Edema 05/22/2018    Iron overload 05/22/2018    Medullary sponge kidney of both kidneys 05/22/2018    Metabolic acidosis 05/22/2018    Microscopic hematuria 05/22/2018    Nausea 05/22/2018    Renal tubular acidosis type I 05/22/2018    Rheumatoid arthritis (Multi) 05/22/2018    Sjogren's syndrome (Multi) 05/22/2018    Chronic headaches 05/22/2018    Mass of left side of neck 05/22/2018    Tension type headache 05/22/2018    Weight loss, abnormal 05/22/2018    Cellulitis, face 04/06/2018    Anxiety 11/01/2017    Atypical chest pain 11/01/2017    Left lower quadrant pain 11/01/2017    Depression 11/01/2017    Diverticulitis of large intestine without perforation or abscess without bleeding 11/01/2017    Fibroids 11/01/2017    Acute recurrent pancreatitis ([facility]) 05/13/2017    GERD (gastroesophageal reflux disease) 09/26/2016    Closed mallet fracture of distal phalanx of right little finger 09/29/2015    Pelvic pain in female 09/09/2014    Endometriosis 09/09/2014    Left pelvic adnexal fluid collection 01/15/2014    BV (bacterial vaginosis) 05/01/2013    S/P endometrial ablation 05/01/2013     Discharge medications:     Your medication list        START taking these medications        Instructions Last Dose Given Next Dose Due   oxyCODONE-acetaminophen 5-325 mg  tablet  Commonly known as: Percocet      Take 1 tablet by mouth every 6 hours if needed for moderate pain (4 - 6) or severe pain (7 - 10).              CHANGE how you take these medications        Instructions Last Dose Given Next Dose Due   ondansetron ODT 4 mg disintegrating tablet  Commonly known as: Zofran-ODT  What changed:   when to take this  reasons to take this      Take 1 tablet (4 mg) by mouth every 8 hours if needed for nausea or vomiting.       promethazine 12.5 mg tablet  Commonly known as: Phenergan  What changed:   when to take this  reasons to take this      Take 1 tablet (12.5 mg) by mouth every 8 hours if needed for nausea or vomiting. prn              CONTINUE taking these medications        Instructions Last Dose Given Next Dose Due   albuterol 90 mcg/actuation inhaler           amphetamine-dextroamphetamine XR 30 mg 24 hr capsule  Commonly known as: Adderall XR           Ora Thyroid 30 mg tablet  Generic drug: thyroid (pork)           azelastine 137 mcg (0.1 %) nasal spray  Commonly known as: Astelin           bumetanide 2 mg tablet  Commonly known as: Bumex           buPROPion  mg 24 hr tablet  Commonly known as: Wellbutrin XL           Clenpiq 10 mg-3.5 gram- 12 gram/160 mL solution  Generic drug: sod picosulf-mag ox-citric ac           Cytomel 25 mcg tablet  Generic drug: liothyronine           diclofenac sodium 1 % gel  Commonly known as: Voltaren           famotidine 20 mg tablet  Commonly known as: Pepcid           fludrocortisone 0.1 mg tablet  Commonly known as: Florinef           fluticasone 50 mcg/actuation nasal spray  Commonly known as: Flonase           frovatriptan 2.5 mg tablet  Commonly known as: Frova           furosemide 40 mg tablet  Commonly known as: Lasix           hydrocortisone 10 mg tablet  Commonly known as: Cortef           hydroxychloroquine 200 mg tablet  Commonly known as: Plaquenil           levothyroxine 88 mcg tablet  Commonly known as: Synthroid,  Levoxyl           metFORMIN 500 mg tablet  Commonly known as: Glucophage           midodrine 10 mg tablet  Commonly known as: Proamatine           Motegrity 2 mg tablet  Generic drug: prucalopride           moxifloxacin 0.5 % ophthalmic solution  Commonly known as: Vigamox           NexIUM 40 mg DR capsule  Generic drug: esomeprazole           Nurtec ODT 75 mg tablet,disintegrating  Generic drug: rimegepant           pancrelipase (Lip-Prot-Amyl) 12,000-38,000 -60,000 unit capsule  Commonly known as: Creon           potassium citrate CR 15 mEq ER tablet  Commonly known as: Urocit-K-15           promethazine-DM 6.25-15 mg/5 mL syrup  Commonly known as: Phenergan-DM           sucralfate 100 mg/mL suspension  Commonly known as: Carafate           Topamax 25 mg tablet  Generic drug: topiramate           Trulance tablet tablet  Generic drug: plecanatide      TAKE ONE (1) TABLET BY MOUTH ONCE DAILY       Ubrelvy 100 mg tablet tablet  Generic drug: ubrogepant           valACYclovir 500 mg tablet  Commonly known as: Valtrex                     Where to Get Your Medications        These medications were sent to [id number] [place], [place] [facility] [address] [facility] [facility] [place]  [address] [address]      Phone: [phone]   ondansetron ODT 4 mg disintegrating tablet  promethazine 12.5 mg tablet       You can get these medications from any pharmacy    Bring a paper prescription for each of these medications  oxyCODONE-acetaminophen 5-325 mg tablet       Hospital Course  This is a 53 years old female patient with multiple medical problems as listed above presented to the emergency room because of abdominal pain, intractable nausea and vomiting.  Patient had a history of chronic pancreatitis.  On admission, her routine blood work was unremarkable except for potassium of 2.5.  LFT was unremarkable.  Initial lipase was 167 and repeat lipase on the day of discharge was 92.  CT scan abdomen and pelvis with IV contrast showed  "prominence of the bile ducts, status post cholecystectomy, dominant dorsal duct suggesting partial divisum, no other acute findings.  Patient was admitted, treated with IV fluids, IV antiemetics and antacids.  GI was consulted, recommended no indication for acute intervention or procedures and recommended follow-up with GI as outpatient.  Initial urinalysis was questionable for UTI.  Patient was started on IV Rocephin and she received 3 days of IV Rocephin.  Urine culture showed no growth.  Patient symptoms improved and she was started on modified diet, fiber restricted and fat restricted.  She did better.  Nausea and vomiting significant improved.  Patient discharged home in a stable medical condition, discharged on Zofran as needed, Phenergan as needed, continued on Nexium and Carafate, she was given prescription for Percocet only for severe pain, only 5 tablets were given, resumed on her previous other home medications without any changes, instructed to follow-up with GI and to call their office for appointment, recommended follow-up with PCP in 1 week.    Vital signs:  Visit Vitals  /65 (BP Location: Right arm, Patient Position: Lying)   Pulse 72   Temp 36.8 °C (98.2 °F) (Temporal)   Resp 18   Ht 1.575 m (5' 2.01\")   Wt 54.1 kg (119 lb 4.3 oz)   SpO2 99%   BMI 21.81 kg/m²   OB Status Hysterectomy   Smoking Status Never   BSA 1.54 m²     Discharge physical exam:  Physical Exam:  General: Awake, alert, oriented x3, no distress, cooperative.  HEENT: EOM intact, PERRLA.  Neck: Supple, no JVD, no masses, no lymphadenopathy.  Chest: Normal breath sounds bilateral, good chest expansion, no wheezes, no crackles, no rhonchi.  Heart: Regular rate and rhythm, S1-S2 normal, no murmur, no gallops.  Abdomen: Soft, nontender, no organomegaly, no ascites, no guarding or rigidity.  Neurological: Alert and oriented x3, cranial nerves are intact, normal power and tone of 4 limbs.  Skin: No lesions, no skin rash.  Warm and " dry.  Musculoskeletal: Normal, atraumatic, no obvious deformities.  Legs: No leg edema, no clubbing or cyanosis.  Psych: Appropriate mood and behavior.    Labs:  Lab Results   Component Value Date    WBC 6.2 08/26/2024    HGB 10.6 (L) 08/26/2024    HCT 31.4 (L) 08/26/2024    MCV 87 08/26/2024     08/26/2024     Lab Results   Component Value Date    GLUCOSE 88 08/28/2024    CALCIUM 8.4 (L) 08/28/2024     08/28/2024    K 3.9 08/28/2024    CO2 24 08/28/2024     (H) 08/28/2024    BUN 7 08/28/2024    CREATININE 0.53 08/28/2024     Lab Results   Component Value Date    ALT 8 08/25/2024    AST 11 08/25/2024    ALKPHOS 78 08/25/2024    BILITOT 0.3 08/25/2024     Lab Results   Component Value Date    LIPASE 92 (H) 08/28/2024     Imaging studies:   Procedure Component Value Units Date/Time   CT abdomen pelvis w IV contrast [[id number]] Collected: 08/26/24 0053   Order Status: Completed Updated: 08/26/24 0053   Narrative:     Interpreted By:  Yves Longo,  STUDY:  CT ABDOMEN PELVIS W IV CONTRAST;  8/25/2024 11:57 pm      INDICATION:  Signs/Symptoms:epigastric pain.      COMPARISON:  CT abdomen and pelvis 07/08/2024      ACCESSION NUMBER(S):  [id number]      ORDERING CLINICIAN:  DAWN SANTIAGO      TECHNIQUE:  Axial CT images of the abdomen and pelvis with coronal and sagittal  reconstructed images obtained after intravenous administration of  contrast.      FINDINGS:  LOWER CHEST: Unremarkable.  BONES: No acute osseous abnormality. Diffuse osseous demineralization.  ABDOMINAL WALL: Unremarkable.      ABDOMEN:      LIVER: Segment 7 simple cyst. Additional too small to characterize  hypodensities, likely benign cysts. BILE DUCTS: Prominence of the  bile ducts, likely related to reservoir effect. GALLBLADDER:  Cholecystectomy. PANCREAS:Dominant dorsal duct suggesting partial  divisum. SPLEEN: Unremarkable.  ADRENALS: Unremarkable.  KIDNEYS and URETERS: Punctate nonobstructive right interpolar  calculus.  Stable renal cortical hypodensities likely representing  cysts, some with hemorrhagic/proteinaceous contents. VESSELS:  Unremarkable. LYMPH NODES: Unremarkable.  RETROPERITONEUM/PERITONEUM: Unremarkable.  BOWEL: Similar bowel morphology compared to prior exam.      PELVIS:      REPRODUCTIVE ORGANS: Hysterectomy.  BLADDER: Decompressed.       Impression:     No acute findings in the abdomen or pelvis. Chronic findings are  stable compared to prior as detailed in the body of the report.      MACRO:  None      Signed by: Yves Longo 8/26/2024 12:51 AM  Dictation workstation:   [id number]     Disposition: Home self-care.    Time spent on discharge and discharge summary is 33 minutes.    Arlette Feng MD  08/28/24  1:58 PM

## 2024-08-30 ENCOUNTER — OFFICE VISIT (OUTPATIENT)
Dept: ORTHOPEDIC SURGERY | Facility: CLINIC | Age: 54
End: 2024-08-30
Payer: MEDICARE

## 2024-08-30 ENCOUNTER — HOSPITAL ENCOUNTER (OUTPATIENT)
Dept: RADIOLOGY | Facility: HOSPITAL | Age: 54
Discharge: HOME | End: 2024-08-30
Payer: MEDICARE

## 2024-08-30 ENCOUNTER — HOSPITAL ENCOUNTER (OUTPATIENT)
Dept: RADIOLOGY | Facility: EXTERNAL LOCATION | Age: 54
Discharge: HOME | End: 2024-08-30

## 2024-08-30 DIAGNOSIS — M77.01 MEDIAL EPICONDYLITIS OF RIGHT ELBOW: Primary | ICD-10-CM

## 2024-08-30 DIAGNOSIS — M25.521 RIGHT ELBOW PAIN: ICD-10-CM

## 2024-08-30 PROCEDURE — 73080 X-RAY EXAM OF ELBOW: CPT | Mod: RT

## 2024-08-30 RX ORDER — BETAMETHASONE SODIUM PHOSPHATE AND BETAMETHASONE ACETATE 3; 3 MG/ML; MG/ML
12 INJECTION, SUSPENSION INTRA-ARTICULAR; INTRALESIONAL; INTRAMUSCULAR; SOFT TISSUE
Status: COMPLETED | OUTPATIENT
Start: 2024-08-30 | End: 2024-08-30

## 2024-08-30 RX ORDER — LIDOCAINE HYDROCHLORIDE 10 MG/ML
2 INJECTION INFILTRATION; PERINEURAL
Status: COMPLETED | OUTPATIENT
Start: 2024-08-30 | End: 2024-08-30

## 2024-08-30 NOTE — PROGRESS NOTES
Acute Injury New Patient Visit    CC:   Chief Complaint   Patient presents with    Right Elbow - Pain     Swelling/ pain for a month. Xrays today        HPI: Leigha is a 53 y.o.female who presents today with new complaints of pain discomfort to the inside of the right elbow it has been going on wares of 6 weeks now.  She states she is autoimmune and she has had recent oral steroids and pancreatic irritation she states oral steroids have not done anything to help her pain she is also tried Motrin and icing.  She presents here today for further evaluation.  She points to the inside of the elbow and the soft tissues and adjacent to the epicondyle as area most discomfort denies any obvious known injury or trauma.  She is right-hand dominant but denies any excessive overuse.  Has very minimal numbness and tingling to all of the digits but not necessarily isolated to the fourth and fifth        Review of Systems   GENERAL: Negative for malaise, significant weight loss, fever  MUSCULOSKELETAL: See HPI  NEURO: Clinical for numbness / tingling     Past Medical History  Past Medical History:   Diagnosis Date    Abdominal distension (gaseous) 02/02/2021    Abdominal distention    Age-related osteoporosis without current pathological fracture 02/17/2017    At risk of fracture due to osteoporosis    Angiodysplasia of stomach and duodenum without bleeding 08/28/2020    Gastric AVM    Angioneurotic edema, initial encounter 04/20/2022    Angioedema    CHF (congestive heart failure) (Multi)     Chronic pancreatitis (Multi)     Conductive hearing loss, unilateral, left ear, with unrestricted hearing on the contralateral side 03/10/2016    Conductive hearing loss in left ear    Depression, unspecified 09/17/2019    Depression    Disorder of kidney and ureter, unspecified     Kidney disease    Fecal urgency 02/02/2021    Defecation urgency    Fibromyalgia 10/06/2022    Fibromyalgia    Gastro-esophageal reflux disease without  esophagitis 09/17/2019    GERD (gastroesophageal reflux disease)    Gastroparesis 04/15/2020    Gastric stasis    Hematuria, unspecified 02/17/2017    Hematuria    Hypothyroidism, unspecified 12/28/2017    Hypothyroidism    Hypothyroidism, unspecified 06/07/2021    Acquired hypothyroidism    Ileus (Multi)     Migraine, unspecified, not intractable, without status migrainosus 01/09/2020    Migraine headache    Nausea 07/15/2020    Moderate nausea    Other constipation 11/19/2021    Chronic constipation    Other disorders of calcium metabolism 08/04/2017    Calcinosis    Other disorders of iron metabolism     Iron overload    Other disorders of iron metabolism 07/06/2018    Iron overload syndrome    Other forms of dyspnea 08/30/2022    Dyspnea on exertion    Other long term (current) drug therapy 01/25/2019    High risk medication use    Other specified behavioral and emotional disorders with onset usually occurring in childhood and adolescence 04/24/2018    ADD (attention deficit disorder)    Other specified cataract 11/27/2018    Other cataract of both eyes    Other specified diseases of intestine 07/15/2020    Small bowel mass    Other specified health status     No pertinent past medical history    Other specified postprocedural states     History of ERCP    Other specified symptoms and signs involving the digestive system and abdomen 02/02/2021    Abnormal defecation    Outlet dysfunction constipation 12/15/2020    Dyssynergic constipation    Pain in unspecified joint 04/16/2022    Arthralgia    Palpitations 09/09/2022    Palpitations    Personal history of other infectious and parasitic diseases 01/26/2021    History of shingles    Raynaud's syndrome without gangrene 01/09/2020    Raynauds phenomenon    Sjogren syndrome, unspecified (Multi) 04/28/2020    Sjogren's disease    Sjogren syndrome, unspecified (Multi) 10/08/2022    Sjogren's syndrome    Tension-type headache, unspecified, not intractable 04/30/2021     Tension-type headache    Unspecified infectious disease 07/08/2021    Recurrent infections    Zoster without complications 12/13/2018    Shingles       Medication review  Medication Documentation Review Audit       Reviewed by Cole C Budinsky, MD (Physician) on 08/30/24 at 0945      Medication Order Taking? Sig Documenting Provider Last Dose Status   albuterol 90 mcg/actuation inhaler [id number] No Inhale 2-4 puffs every 2 hours if needed. Jose Marc MD Past Week Active   amphetamine-dextroamphetamine XR (Adderall XR) 30 mg 24 hr capsule [id number] No Take 1 capsule (30 mg) by mouth once daily in the morning. Jose Marc MD 8/25/2024 Active   [place] Thyroid 30 mg tablet [id number] No Take 1 tablet (30 mg) by mouth once daily. Jose Marc MD 8/25/2024 Active   azelastine (Astelin) 137 mcg (0.1 %) nasal spray [id number] No 2 sprays. Jose Marc MD Past Week Active   bumetanide (Bumex) 2 mg tablet [id number] No Take 1 tablet (2 mg) by mouth once daily. Jose Marc MD Past Week Active   buPROPion XL (Wellbutrin XL) 300 mg 24 hr tablet [id number] No Take 1 tablet (300 mg) by mouth once daily. Jose Marc MD 8/25/2024 Active   diclofenac sodium (Voltaren) 1 % gel gel [id number] No Place 4.5 inches (1 Application) on the skin see administration instructions. Jose Marc MD More than a month Active   famotidine (Pepcid) 20 mg tablet [id number] No Take 1 tablet (20 mg) by mouth 2 times a day. Jose Marc MD 8/25/2024 Active   fludrocortisone (Florinef) 0.1 mg tablet [id number] No Take 2 tablets (0.2 mg) by mouth 2 times a day. Jose Marc MD More than a month Active   fluticasone (Flonase) 50 mcg/actuation nasal spray [id number] No USE ONE SPRAY IN EACH NOSTRIL DAILY Jose Marc MD Past Week Active   frovatriptan (Frova) 2.5 mg tablet [id number] No Take one at onset of severe headache/migraine may repeat x 1 after 4 hours if needed.  Maximum  2/day and 2 days/week. Jose Marc MD Past Week Active   furosemide (Lasix) 40 mg tablet [id number] No Take 1 tablet (40 mg) by mouth 2 times a day as needed. Jose Provider, MD More than a month Active   hydrocortisone (Cortef) 10 mg tablet [id number] No Take 1.5 tablets (15 mg) by mouth once daily. Jose Marc MD Unknown Active   hydroxychloroquine (Plaquenil) 200 mg tablet [id number] No Take 1 tablet (200 mg) by mouth twice a day. Jose Marc MD 8/25/2024 Active   levothyroxine (Synthroid, Levoxyl) 88 mcg tablet [id number] No Take 1 tablet (88 mcg) by mouth once daily. Jose Marc MD 8/25/2024 Active   liothyronine (Cytomel) 25 mcg tablet [id number] No Take 1 tablet (25 mcg) by mouth every other day. Jose Marc MD 8/25/2024 Active   metFORMIN (Glucophage) 500 mg tablet [id number] No Take 1 tablet (500 mg) by mouth once daily with breakfast. Jose Marc MD 8/25/2024 Active   midodrine (Proamatine) 10 mg tablet [id number] No TAKE ONE TABLET BY MOUTH AS NEEDED FOR SBP LESS THAN 90 Jose Provider, MD Past Month Active   moxifloxacin (Vigamox) 0.5 % ophthalmic solution [id number] No Administer 1 drop into affected eye(s) 3 times a day. Historical Provider, MD Unknown Active   NexIUM 40 mg DR capsule [id number] No Take 1 capsule (40 mg) by mouth once daily. Jose Marc MD 8/25/2024 Active   Nurtec ODT 75 mg tablet,disintegrating [id number] No DISSOLVE ONE TABLET IN MOUTH EVERY DAY AS NEEDED for headaches Historical Provider, MD Past Week Active   Discontinued 08/28/24 1157   ondansetron ODT (Zofran-ODT) 4 mg disintegrating tablet [id number]  Take 1 tablet (4 mg) by mouth every 8 hours if needed for nausea or vomiting. Arlette Feng MD  Active   oxyCODONE-acetaminophen (Percocet) 5-325 mg tablet [id number]  Take 1 tablet by mouth every 6 hours if needed for moderate pain (4 - 6) or severe pain (7 - 10). Arlette Feng MD  Active   pancrelipase,  Lip-Prot-Amyl, (Creon) 12,000-38,000 -60,000 unit capsule [id number] No Take by mouth. Jose Marc MD 8/25/2024 Active   plecanatide (Trulance) tablet tablet [id number] No TAKE ONE (1) TABLET BY MOUTH ONCE DAILY AydenASH SalehDO 8/25/2024 Active   potassium citrate CR (Urocit-K-15) 15 mEq ER tablet [id number] No Take 2 tablets (30 mEq) by mouth twice a day. PRN Jose Marc MD Taking Active   Discontinued 08/28/24 1157   promethazine (Phenergan) 12.5 mg tablet [id number]  Take 1 tablet (12.5 mg) by mouth every 8 hours if needed for nausea or vomiting. prn Arlette Feng MD  Active   promethazine-DM (Phenergan-DM) 6.25-15 mg/5 mL syrup [id number] No TAKE 5 MLS BY MOUTH EVERY 6 HOURS AS NEEDED Jose Marc MD Unknown Active   prucalopride (Motegrity) 2 mg tablet [id number] No Take 1 tablet (2 mg) by mouth once daily. Jose Marc MD Unknown Active   sod picosulf-mag ox-citric ac (Clenpiq) 10 mg-3.5 gram- 12 gram/160 mL solution [id number] No  Jose Marc MD Not Taking Active   sucralfate (Carafate) 100 mg/mL suspension [id number] No Take 10 mL (1 g) by mouth 4 times a day. Jose Marc MD More than a month Active   topiramate (Topamax) 25 mg tablet [id number] No Take by mouth twice a day. Jose Marc MD 8/25/2024 Active   Ubrelvy 100 mg tablet tablet [id number] No TAKE 1 TABLET BY MOUTH AS NEEDED FOR MIGRAINE  SECOND DOSE CAN BE TAKEN AT LEAST 2 HOURS AFTER THE INITIAL DOSE IF NEEDED Jose Marc MD Unknown Active   valACYclovir (Valtrex) 500 mg tablet [id number] No Take 1 tablet (500 mg) by mouth once daily. PRN Jose Marc MD Unknown Active                    Allergies  No Known Allergies    Social History  Social History     Socioeconomic History    Marital status: Single     Spouse name: Not on file    Number of children: Not on file    Years of education: Not on file    Highest education level: Not on file   Occupational History    Not on file    Tobacco Use    Smoking status: Never     Passive exposure: Past    Smokeless tobacco: Never   Substance and Sexual Activity    Alcohol use: Never    Drug use: Never    Sexual activity: Not on file   Other Topics Concern    Not on file   Social History Narrative    Not on file     Social Determinants of Health     Financial Resource Strain: Low Risk  (8/26/2024)    Overall Financial Resource Strain (CARDIA)     Difficulty of Paying Living Expenses: Not very hard   Food Insecurity: No Food Insecurity (6/14/2024)    Received [facility]    Hunger Vital Sign     Worried About Running Out of Food in the Last Year: Never true     Ran Out of Food in the Last Year: Never true   Transportation Needs: No Transportation Needs (8/26/2024)    PRAPARE - Transportation     Lack of Transportation (Medical): No     Lack of Transportation (Non-Medical): No   Physical Activity: Unknown (9/2/2022)    Received from [facility]    Exercise Vital Sign     Days of Exercise per Week: 0 days     Minutes of Exercise per Session: Not on file   Stress: Stress Concern Present (5/27/2022)    Received from [facility]    [employer] [place] [employer] - Occupational Stress Questionnaire     Feeling of Stress : Rather much   Social Connections: Unknown (5/27/2022)    Received from [facility]    Social Connection and Isolation Panel [NHANES]     Frequency of Communication with Friends and Family: Twice a week     Frequency of Social Gatherings with Friends and Family: Never     Attends Christian Services: Not on file     Active Member of Clubs or Organizations: No     Attends Club or Organization Meetings: Never     Marital Status: Never    Intimate Partner Violence: Not on file   Housing Stability:  Low Risk  (8/26/2024)    Housing Stability Vital Sign     Unable to Pay for Housing in the Last Year: No     Number of Times Moved in the Last Year: 1     Homeless in the Last Year: No       Surgical History  Past Surgical History:   Procedure Laterality Date    CHOLECYSTECTOMY  05/18/2021    Cholecystectomy Laparoscopic    COLONOSCOPY  05/11/2018    Complete Colonoscopy    ESOPHAGOGASTRODUODENOSCOPY  05/11/2018    Diagnostic Esophagogastroduodenoscopy    HYSTERECTOMY  02/28/2018    Hysterectomy    US GUIDED NEEDLE LIVER BIOPSY  6/1/2018    US GUIDED NEEDLE LIVER BIOPSY 6/1/2018 [facility]       Physical Exam:  GENERAL:  Patient is awake, alert, and oriented to person place and time.  Patient appears well nourished and well kept.  Affect Calm, Not Acutely Distressed.  HEENT:  Normocephalic, Atraumatic, EOMI  CARDIOVASCULAR:  Hemodynamically stable.  RESPIRATORY:  Normal respirations with unlabored breathing.  NEURO: Gross sensation intact to the upper extremities bilaterally.  Extremity: Right elbow demonstrates skin which is warm pink well-perfused soft tissue swelling and fullness with tenderness palpation of the medial epicondyle no lateral epicondyle pain no radial head pain no olecranon pain limited range of motion secondary to soft tissue discomfort but can forcefully get out to 0 and full flexion of the elbow.  Normal pronation supination.   strength decreased with wrist flexion secondary to pain.  Distal pulses and sensation are intact no redness or erythema      Diagnostics: X-rays today demonstrate normal anatomy with no significant arthritis or fracture        Procedure: Elbow injection as below  Hand / UE Inj/Asp for medial epicondylitis on 8/30/2024 9:46 AM  Indications: diagnostic, pain, tendon swelling and therapeutic  Details: 25 G needle, ultrasound-guided medial approach  Medications: 2 mL lidocaine 10 mg/mL (1 %); 12 mg betamethasone acet,sod phos 6 mg/mL  Outcome: tolerated well, no  immediate complications  Procedure, treatment alternatives, risks and benefits explained, specific risks discussed. Consent was given by the patient. Immediately prior to procedure a time out was called to verify the correct patient, procedure, equipment, support staff and site/side marked as required. Patient was prepped and draped in the usual sterile fashion.           Assessment:   Problem List Items Addressed This Visit    None  Visit Diagnoses       Medial epicondylitis of right elbow    -  Primary    Relevant Orders    Referral to Physical Therapy    Point of Care Ultrasound (Completed)    Hand / UE Inj/Asp    Right elbow pain        Relevant Orders    XR elbow right 3+ views    Referral to Physical Therapy    Point of Care Ultrasound (Completed)    Hand / UE Inj/Asp             Plan: Patient was provided with a medial epicondyle injection here today we also recommended physical therapy going forward as well as topical muscle rubs creams or lidocaine.  Recommended persistent and intermittent icing as well as potential elbow sleeve for comfort and support.  We will see her back in 3 to 4 weeks for repeat evaluation if worse or no better we will consider further advanced imaging.  Orders Placed This Encounter    Hand / UE Inj/Asp    XR elbow right 3+ views    Point of Care Ultrasound    Referral to Physical Therapy      At the conclusion of the visit there were no further questions by the patient/family regarding their plan of care.  Patient was instructed to call or return with any issues, questions, or concerns regarding their injury and/or treatment plan described above.     08/30/24 at 3:51 PM - Cole C Budinsky, MD    Office: [phone]    This note was prepared using voice recognition software.  The details of this note are correct and have been reviewed, and corrected to the best of my ability.  Some grammatical errors may persist related to the Dragon software.

## 2024-08-31 ENCOUNTER — PATIENT MESSAGE (OUTPATIENT)
Dept: ENDOCRINOLOGY | Age: 54
End: 2024-08-31

## 2024-08-31 DIAGNOSIS — E03.9 ACQUIRED HYPOTHYROIDISM: Primary | ICD-10-CM

## 2024-09-04 DIAGNOSIS — E03.9 ACQUIRED HYPOTHYROIDISM: ICD-10-CM

## 2024-09-04 LAB
ALBUMIN SERPL-MCNC: 4.3 G/DL (ref 3.5–4.6)
ALP SERPL-CCNC: 85 U/L (ref 40–130)
ALT SERPL-CCNC: 12 U/L (ref 0–33)
ANION GAP SERPL CALCULATED.3IONS-SCNC: 12 MEQ/L (ref 9–15)
AST SERPL-CCNC: 13 U/L (ref 0–35)
BILIRUB SERPL-MCNC: <0.2 MG/DL (ref 0.2–0.7)
BUN SERPL-MCNC: 14 MG/DL (ref 6–20)
CALCIUM SERPL-MCNC: 9.3 MG/DL (ref 8.5–9.9)
CHLORIDE SERPL-SCNC: 96 MEQ/L (ref 95–107)
CO2 SERPL-SCNC: 28 MEQ/L (ref 20–31)
CREAT SERPL-MCNC: 0.67 MG/DL (ref 0.5–0.9)
GLOBULIN SER CALC-MCNC: 2.2 G/DL (ref 2.3–3.5)
GLUCOSE SERPL-MCNC: 80 MG/DL (ref 70–99)
POTASSIUM SERPL-SCNC: 3.4 MEQ/L (ref 3.4–4.9)
PROT SERPL-MCNC: 6.5 G/DL (ref 6.3–8)
SODIUM SERPL-SCNC: 136 MEQ/L (ref 135–144)
T4 FREE SERPL-MCNC: 1.35 NG/DL (ref 0.84–1.68)
TSH REFLEX: 0.47 UIU/ML (ref 0.44–3.86)

## 2024-09-05 NOTE — DOCUMENTATION CLARIFICATION NOTE
"    PATIENT:               [id number]  MRN:                       [id number]  :                       1970  ADMIT DATE:       2024 10:24 PM  DISCH DATE:        2024 2:53 PM  RESPONDING PROVIDER #:        [id number]          PROVIDER RESPONSE TEXT:    Chronic pancreatitis ruled in    CDI QUERY TEXT:    Clarification      Instruction:    Based on your assessment of the patient and the clinical information, please provide the requested documentation by clicking on the appropriate radio button and enter any additional information if prompted.    Question: Based on your medical judgment, can you please clarify which of these conditions is the most clinically supported    When answering this query, please exercise your independent professional judgment. The fact that a question is being asked, does not imply that any particular answer is desired or expected.    The patient's clinical indicators include:  Clinical Information: There is conflicting documentation in the medical record which requires clarification.    -The diagnosis of chronic pancreatitis was documented on  by Attending in DC summary    -The diagnosis of doubtful chronic pancreatitis was documented on Gastroenterology oon  and     Clinical Indicators:     Discharge summary: \"#2 recurrent abdominal pain in the setting of history of chronic pancreatitis.\"     and  Gastroenterology: \"Chronic Pancreatitis- Patient with history of chronic pancreatitis, slightly elevated lipase at 167 however doubtful of dx as multiple imaging has been completed with no indication of Pancreatitis despite being on Creon.\"    Lipase: 167/92     CT abdomen Pelvis:  \"FINDINGS:  LOWER CHEST: Unremarkable.  BONES: No acute osseous abnormality. Diffuse osseous demineralization.  ABDOMINAL WALL: Unremarkable.    ABDOMEN:    LIVER: Segment 7 simple cyst. Additional too small to characterize  hypodensities, likely " "benign cysts. BILE DUCTS: Prominence of the  bile ducts, likely related to reservoir effect. GALLBLADDER:  Cholecystectomy. PANCREAS:Dominant dorsal duct suggesting partial  divisum. SPLEEN: Unremarkable.  ADRENALS: Unremarkable.  KIDNEYS and URETERS: Punctate nonobstructive right interpolar  calculus. Stable renal cortical hypodensities likely representing  cysts, some with hemorrhagic/proteinaceous contents. VESSELS:  Unremarkable. LYMPH NODES: Unremarkable.  RETROPERITONEUM/PERITONEUM: Unremarkable.  BOWEL: Similar bowel morphology compared to prior exam.    PELVIS:    REPRODUCTIVE ORGANS: Hysterectomy.  BLADDER: Decompressed.    IMPRESSION:  No acute findings in the abdomen or pelvis. Chronic findings are  stable compared to prior as detailed in the body of the report.\"    Treatment: Gastroenterology consult, CT abdomen Pelvis, labs    Risk Factors: 53 yof, noted hx of chronic pancreatitis  Options provided:  -- Chronic pancreatitis ruled in  -- Chronic pancreatitis ruled out  -- Other - I will add my own diagnosis  -- Refer to Clinical Documentation Reviewer    Query created by: Teresa Flores on 9/3/2024 8:49 AM      Electronically signed by:  ADARSH ISBELL MD 9/4/2024 9:41 PM          "

## 2024-09-06 ENCOUNTER — OFFICE VISIT (OUTPATIENT)
Dept: ENDOCRINOLOGY | Age: 54
End: 2024-09-06
Payer: MEDICARE

## 2024-09-06 VITALS
SYSTOLIC BLOOD PRESSURE: 116 MMHG | WEIGHT: 111 LBS | HEIGHT: 62 IN | HEART RATE: 90 BPM | DIASTOLIC BLOOD PRESSURE: 84 MMHG | BODY MASS INDEX: 20.43 KG/M2

## 2024-09-06 DIAGNOSIS — E03.9 ACQUIRED HYPOTHYROIDISM: Primary | ICD-10-CM

## 2024-09-06 DIAGNOSIS — M81.0 OSTEOPOROSIS, UNSPECIFIED OSTEOPOROSIS TYPE, UNSPECIFIED PATHOLOGICAL FRACTURE PRESENCE: ICD-10-CM

## 2024-09-06 DIAGNOSIS — E03.9 HYPOTHYROIDISM, UNSPECIFIED TYPE: ICD-10-CM

## 2024-09-06 PROCEDURE — G8427 DOCREV CUR MEDS BY ELIG CLIN: HCPCS | Performed by: PHYSICIAN ASSISTANT

## 2024-09-06 PROCEDURE — 99214 OFFICE O/P EST MOD 30 MIN: CPT | Performed by: PHYSICIAN ASSISTANT

## 2024-09-06 PROCEDURE — G8420 CALC BMI NORM PARAMETERS: HCPCS | Performed by: PHYSICIAN ASSISTANT

## 2024-09-06 PROCEDURE — 3017F COLORECTAL CA SCREEN DOC REV: CPT | Performed by: PHYSICIAN ASSISTANT

## 2024-09-06 PROCEDURE — 1036F TOBACCO NON-USER: CPT | Performed by: PHYSICIAN ASSISTANT

## 2024-09-06 RX ORDER — LEVOTHYROXINE SODIUM 75 UG/1
TABLET ORAL
Qty: 60 TABLET | Refills: 5 | Status: SHIPPED | OUTPATIENT
Start: 2024-09-06

## 2024-09-06 RX ORDER — MAGNESIUM 30 MG
30 TABLET ORAL 2 TIMES DAILY
COMMUNITY

## 2024-09-06 RX ORDER — LEVOTHYROXINE SODIUM 100 UG/1
TABLET ORAL
Qty: 30 TABLET | Refills: 5 | Status: SHIPPED | OUTPATIENT
Start: 2024-09-06

## 2024-09-06 RX ORDER — ATORVASTATIN CALCIUM 10 MG/1
10 TABLET, FILM COATED ORAL DAILY
Qty: 90 TABLET | Refills: 3 | Status: SHIPPED | OUTPATIENT
Start: 2024-09-06

## 2024-09-06 ASSESSMENT — ENCOUNTER SYMPTOMS
COUGH: 0
SORE THROAT: 0
SINUS PRESSURE: 0
SHORTNESS OF BREATH: 0
WHEEZING: 0
RHINORRHEA: 0
ABDOMINAL PAIN: 0
NAUSEA: 0
VOMITING: 0
DIARRHEA: 0

## 2024-09-06 NOTE — PATIENT INSTRUCTIONS
Change Levothyroxine 75 mcg daily Monday-Friday  Start Levothyroxine 100 mcg Saturday-Sunday   Metformin 500 mg daily before breakfast  Follow up in 6 months

## 2024-09-06 NOTE — PROGRESS NOTES
[facility]    ERCP W/ PLASTIC STENT PLACEMENT N/A     HYSTERECTOMY (CERVIX STATUS UNKNOWN)  2014 sept (total)    OVARY REMOVAL Left 01/2014    UPPER GASTROINTESTINAL ENDOSCOPY  09/16/2016    EGD W/BX    UPPER GASTROINTESTINAL ENDOSCOPY N/A 05/06/2021    ENDOSCOPIC ULTRASOUND with EGD performed by Sergei Rangel MD at [facility]    UPPER GASTROINTESTINAL ENDOSCOPY N/A 08/01/2022    EGD DIAGNOSTIC ONLY performed by Delon Robles MD at [facility]    UPPER GASTROINTESTINAL ENDOSCOPY N/A 08/05/2022    EGD ESOPHAGOGASTRODUODENOSCOPY performed by Sergei Rangel MD at [facility]    UPPER GASTROINTESTINAL ENDOSCOPY  08/05/2022    ENDOSCOPIC ULTRASOUND performed by Sergei Rangel MD at [facility]    UPPER GASTROINTESTINAL ENDOSCOPY N/A 08/03/2023    EGD ESOPHAGOGASTRODUODENOSCOPY performed by Jing Quiroz MD at [facility]     Social History     Socioeconomic History    Marital status: Single     Spouse name: Not on file    Number of children: Not on file    Years of education: Not on file    Highest education level: Not on file   Occupational History    Not on file   Tobacco Use    Smoking status: Never    Smokeless tobacco: Never   Vaping Use    Vaping status: Never Used   Substance and Sexual Activity    Alcohol use: No     Alcohol/week: 0.0 standard drinks of alcohol     Comment: no alcohol since 2011    Drug use: No    Sexual activity: Not on file   Other Topics Concern    Not on file   Social History Narrative    ** Merged History Encounter **          Social Determinants of Health     Financial Resource Strain: Low Risk  (8/26/2024)    Received from [facility]    Overall Financial Resource Strain (CARDIA)     Difficulty of Paying Living Expenses: Not very hard   Food Insecurity: No Food Insecurity (6/14/2024)    Hunger Vital Sign     Worried About Running Out of Food in the Last Year: Never true     Ran Out of Food in the Last Year: Never true

## 2024-09-07 LAB
INSULIN FREE SERPL-ACNC: 4 UIU/ML (ref 3–25)
INSULIN SERPL-ACNC: 4 UIU/ML (ref 3–25)

## 2024-09-13 DIAGNOSIS — Z76.0 MEDICATION REFILL: ICD-10-CM

## 2024-09-13 DIAGNOSIS — F98.8 ATTENTION DEFICIT DISORDER, UNSPECIFIED HYPERACTIVITY PRESENCE: ICD-10-CM

## 2024-09-16 ENCOUNTER — PATIENT MESSAGE (OUTPATIENT)
Dept: FAMILY MEDICINE CLINIC | Age: 54
End: 2024-09-16

## 2024-09-16 DIAGNOSIS — Z76.0 MEDICATION REFILL: ICD-10-CM

## 2024-09-16 DIAGNOSIS — F98.8 ATTENTION DEFICIT DISORDER, UNSPECIFIED HYPERACTIVITY PRESENCE: ICD-10-CM

## 2024-09-16 RX ORDER — DEXTROAMPHETAMINE SACCHARATE, AMPHETAMINE ASPARTATE MONOHYDRATE, DEXTROAMPHETAMINE SULFATE AND AMPHETAMINE SULFATE 7.5; 7.5; 7.5; 7.5 MG/1; MG/1; MG/1; MG/1
30 CAPSULE, EXTENDED RELEASE ORAL 2 TIMES DAILY
Qty: 60 CAPSULE | Refills: 0 | Status: SHIPPED | OUTPATIENT
Start: 2024-09-16 | End: 2024-09-16 | Stop reason: SDUPTHER

## 2024-09-16 RX ORDER — DEXTROAMPHETAMINE SACCHARATE, AMPHETAMINE ASPARTATE MONOHYDRATE, DEXTROAMPHETAMINE SULFATE AND AMPHETAMINE SULFATE 7.5; 7.5; 7.5; 7.5 MG/1; MG/1; MG/1; MG/1
30 CAPSULE, EXTENDED RELEASE ORAL 2 TIMES DAILY
Qty: 60 CAPSULE | Refills: 0 | Status: SHIPPED | OUTPATIENT
Start: 2024-09-16 | End: 2024-10-16

## 2024-09-17 ENCOUNTER — PHARMACY VISIT (OUTPATIENT)
Dept: PHARMACY | Facility: CLINIC | Age: 54
End: 2024-09-17
Payer: MEDICARE

## 2024-09-17 PROCEDURE — RXMED WILLOW AMBULATORY MEDICATION CHARGE

## 2024-09-18 ENCOUNTER — TELEPHONE (OUTPATIENT)
Dept: FAMILY MEDICINE CLINIC | Age: 54
End: 2024-09-18

## 2024-09-18 DIAGNOSIS — U07.1 COVID: Primary | ICD-10-CM

## 2024-09-20 ENCOUNTER — APPOINTMENT (OUTPATIENT)
Dept: ORTHOPEDIC SURGERY | Facility: CLINIC | Age: 54
End: 2024-09-20
Payer: MEDICARE

## 2024-09-20 PROCEDURE — RXMED WILLOW AMBULATORY MEDICATION CHARGE

## 2024-09-21 ENCOUNTER — PHARMACY VISIT (OUTPATIENT)
Dept: PHARMACY | Facility: CLINIC | Age: 54
End: 2024-09-21
Payer: MEDICARE

## 2024-09-24 ENCOUNTER — TELEPHONE (OUTPATIENT)
Dept: FAMILY MEDICINE CLINIC | Age: 54
End: 2024-09-24

## 2024-09-25 ENCOUNTER — TELEMEDICINE (OUTPATIENT)
Dept: FAMILY MEDICINE CLINIC | Age: 54
End: 2024-09-25

## 2024-09-25 DIAGNOSIS — J01.90 ACUTE NON-RECURRENT SINUSITIS, UNSPECIFIED LOCATION: ICD-10-CM

## 2024-09-25 DIAGNOSIS — U07.1 COVID: Primary | ICD-10-CM

## 2024-09-25 RX ORDER — BROMPHENIRAMINE MALEATE, PSEUDOEPHEDRINE HYDROCHLORIDE, AND DEXTROMETHORPHAN HYDROBROMIDE 2; 30; 10 MG/5ML; MG/5ML; MG/5ML
5 SYRUP ORAL 4 TIMES DAILY PRN
Qty: 473 ML | Refills: 0 | Status: SHIPPED | OUTPATIENT
Start: 2024-09-25

## 2024-09-25 RX ORDER — POTASSIUM CITRATE 15 MEQ/1
15 TABLET, EXTENDED RELEASE ORAL 2 TIMES DAILY WITH MEALS
COMMUNITY
Start: 2024-09-06

## 2024-09-25 RX ORDER — PROMETHAZINE HYDROCHLORIDE 12.5 MG/1
12.5 TABLET ORAL EVERY 8 HOURS PRN
COMMUNITY
Start: 2024-08-28

## 2024-09-25 RX ORDER — BENZONATATE 200 MG/1
200 CAPSULE ORAL 3 TIMES DAILY PRN
Qty: 30 CAPSULE | Refills: 0 | Status: SHIPPED | OUTPATIENT
Start: 2024-09-25 | End: 2024-10-05

## 2024-09-25 RX ORDER — TENAPANOR HYDROCHLORIDE 53.2 MG/1
1 TABLET ORAL 2 TIMES DAILY
COMMUNITY
Start: 2024-09-18

## 2024-09-25 RX ORDER — DICYCLOMINE HCL 20 MG
20 TABLET ORAL EVERY 6 HOURS
COMMUNITY
Start: 2024-07-09

## 2024-09-25 RX ORDER — HYDROCORTISONE 5 MG/1
15 TABLET ORAL DAILY
COMMUNITY
Start: 2024-09-05

## 2024-09-25 RX ORDER — PLECANATIDE 3 MG/1
3 TABLET ORAL DAILY PRN
COMMUNITY
Start: 2024-09-20

## 2024-09-25 RX ORDER — IBUPROFEN 800 MG/1
800 TABLET, FILM COATED ORAL EVERY 8 HOURS PRN
COMMUNITY
Start: 2024-08-14

## 2024-09-25 ASSESSMENT — ENCOUNTER SYMPTOMS
SINUS PRESSURE: 1
SORE THROAT: 0
RHINORRHEA: 1
SINUS PAIN: 1
SHORTNESS OF BREATH: 1
COUGH: 1

## 2024-09-30 ENCOUNTER — APPOINTMENT (OUTPATIENT)
Dept: GASTROENTEROLOGY | Facility: CLINIC | Age: 54
End: 2024-09-30
Payer: MEDICARE

## 2024-10-01 ENCOUNTER — OFFICE VISIT (OUTPATIENT)
Dept: GASTROENTEROLOGY | Facility: CLINIC | Age: 54
End: 2024-10-01
Payer: MEDICARE

## 2024-10-01 ENCOUNTER — OFFICE VISIT (OUTPATIENT)
Dept: FAMILY MEDICINE CLINIC | Age: 54
End: 2024-10-01
Payer: MEDICARE

## 2024-10-01 VITALS
TEMPERATURE: 98.7 F | BODY MASS INDEX: 20.61 KG/M2 | HEART RATE: 78 BPM | OXYGEN SATURATION: 99 % | DIASTOLIC BLOOD PRESSURE: 66 MMHG | WEIGHT: 112 LBS | SYSTOLIC BLOOD PRESSURE: 100 MMHG | HEIGHT: 62 IN

## 2024-10-01 VITALS — BODY MASS INDEX: 20.3 KG/M2 | WEIGHT: 111 LBS

## 2024-10-01 DIAGNOSIS — K86.1 CHRONIC PANCREATITIS, UNSPECIFIED PANCREATITIS TYPE (HCC): ICD-10-CM

## 2024-10-01 DIAGNOSIS — K86.1 IDIOPATHIC CHRONIC PANCREATITIS (MULTI): ICD-10-CM

## 2024-10-01 DIAGNOSIS — R10.84 GENERALIZED ABDOMINAL PAIN: ICD-10-CM

## 2024-10-01 DIAGNOSIS — K86.81 EXOCRINE PANCREATIC INSUFFICIENCY (HHS-HCC): ICD-10-CM

## 2024-10-01 DIAGNOSIS — R19.7 DIARRHEA, UNSPECIFIED TYPE: ICD-10-CM

## 2024-10-01 DIAGNOSIS — R19.7 DIARRHEA, UNSPECIFIED TYPE: Primary | ICD-10-CM

## 2024-10-01 DIAGNOSIS — R51.9 NONINTRACTABLE HEADACHE, UNSPECIFIED CHRONICITY PATTERN, UNSPECIFIED HEADACHE TYPE: ICD-10-CM

## 2024-10-01 DIAGNOSIS — R63.4 WEIGHT LOSS, ABNORMAL: ICD-10-CM

## 2024-10-01 DIAGNOSIS — E44.0 MODERATE PROTEIN-CALORIE MALNUTRITION (MULTI): ICD-10-CM

## 2024-10-01 DIAGNOSIS — K90.3 PANCREATIC STEATORRHEA (HHS-HCC): Primary | ICD-10-CM

## 2024-10-01 PROBLEM — K59.09 CHRONIC CONSTIPATION: Status: RESOLVED | Noted: 2023-11-05 | Resolved: 2024-10-01

## 2024-10-01 LAB
ALBUMIN SERPL-MCNC: 4.5 G/DL (ref 3.5–4.6)
ALP SERPL-CCNC: 102 U/L (ref 40–130)
ALT SERPL-CCNC: <5 U/L (ref 0–33)
ANION GAP SERPL CALCULATED.3IONS-SCNC: 9 MEQ/L (ref 9–15)
AST SERPL-CCNC: 18 U/L (ref 0–35)
BILIRUB SERPL-MCNC: <0.2 MG/DL (ref 0.2–0.7)
BUN SERPL-MCNC: 18 MG/DL (ref 6–20)
CALCIUM SERPL-MCNC: 9.2 MG/DL (ref 8.5–9.9)
CHLORIDE SERPL-SCNC: 105 MEQ/L (ref 95–107)
CO2 SERPL-SCNC: 23 MEQ/L (ref 20–31)
CREAT SERPL-MCNC: 0.71 MG/DL (ref 0.5–0.9)
ERYTHROCYTE [DISTWIDTH] IN BLOOD BY AUTOMATED COUNT: 13.3 % (ref 11.5–14.5)
GLOBULIN SER CALC-MCNC: 2.5 G/DL (ref 2.3–3.5)
GLUCOSE SERPL-MCNC: 69 MG/DL (ref 70–99)
HCT VFR BLD AUTO: 36.5 % (ref 37–47)
HGB BLD-MCNC: 11.6 G/DL (ref 12–16)
LIPASE SERPL-CCNC: 72 U/L (ref 12–95)
MCH RBC QN AUTO: 28.5 PG (ref 27–31.3)
MCHC RBC AUTO-ENTMCNC: 31.8 % (ref 33–37)
MCV RBC AUTO: 89.7 FL (ref 79.4–94.8)
PLATELET # BLD AUTO: 429 K/UL (ref 130–400)
POTASSIUM SERPL-SCNC: 4.4 MEQ/L (ref 3.4–4.9)
PROT SERPL-MCNC: 7 G/DL (ref 6.3–8)
RBC # BLD AUTO: 4.07 M/UL (ref 4.2–5.4)
SODIUM SERPL-SCNC: 137 MEQ/L (ref 135–144)
WBC # BLD AUTO: 5.2 K/UL (ref 4.8–10.8)

## 2024-10-01 PROCEDURE — G8427 DOCREV CUR MEDS BY ELIG CLIN: HCPCS | Performed by: NURSE PRACTITIONER

## 2024-10-01 PROCEDURE — G8420 CALC BMI NORM PARAMETERS: HCPCS | Performed by: NURSE PRACTITIONER

## 2024-10-01 PROCEDURE — 3017F COLORECTAL CA SCREEN DOC REV: CPT | Performed by: NURSE PRACTITIONER

## 2024-10-01 PROCEDURE — 99214 OFFICE O/P EST MOD 30 MIN: CPT | Performed by: NURSE PRACTITIONER

## 2024-10-01 PROCEDURE — 99203 OFFICE O/P NEW LOW 30 MIN: CPT | Performed by: INTERNAL MEDICINE

## 2024-10-01 PROCEDURE — G8484 FLU IMMUNIZE NO ADMIN: HCPCS | Performed by: NURSE PRACTITIONER

## 2024-10-01 PROCEDURE — 1036F TOBACCO NON-USER: CPT | Performed by: NURSE PRACTITIONER

## 2024-10-01 RX ORDER — RIMEGEPANT SULFATE 75 MG/75MG
1 TABLET, ORALLY DISINTEGRATING ORAL DAILY PRN
Qty: 16 TABLET | Refills: 5 | Status: SHIPPED | OUTPATIENT
Start: 2024-10-01

## 2024-10-01 RX ORDER — PANCRELIPASE 24000; 76000; 120000 [USP'U]/1; [USP'U]/1; [USP'U]/1
2 CAPSULE, DELAYED RELEASE PELLETS ORAL
Qty: 180 CAPSULE | Refills: 11 | Status: SHIPPED | OUTPATIENT
Start: 2024-10-01 | End: 2025-10-01

## 2024-10-01 ASSESSMENT — ENCOUNTER SYMPTOMS
ABDOMINAL DISTENTION: 1
COUGH: 1
SHORTNESS OF BREATH: 0
DIARRHEA: 1
ABDOMINAL PAIN: 1

## 2024-10-01 NOTE — PATIENT INSTRUCTIONS
I feel it is essential for you to take Creon or equivalent pancreatic enzyme with your meals and large snacks.    We need to see you regain some weight with a goal of 125 lbs.

## 2024-10-01 NOTE — PROGRESS NOTES
Subjective   Patient ID: Leigha Alexis is a 53 y.o. female who presents for No chief complaint on file..  Audio visit for abdominal pain; last seen 2021  Currently with Covid     Pancreatic elastase low 112 in February 2024  Lipase slightly elevated in August but mostly normal other times  Feels stools are abnormal and not digesting food or medications  Had nausea about every 3 months treated with Zofran  Labs generally ok  Took some Creon but not sure of dose and ran out of refills    Weight down to 110 to 111 lbs.     Feels bloated again    CT ok  IgG 4 level ok  Alcohol negative; years since she had alcohol  Mother has chronic pancreatitis late in life                Review of Systems    Objective   Physical Exam  Constitutional:       Comments: Underweight  Dark complexion  Abdomen flat to distended a bit     Psychiatric:         Mood and Affect: Mood normal.         Behavior: Behavior normal.         Thought Content: Thought content normal.         Judgment: Judgment normal.         Assessment/Plan   Problem List Items Addressed This Visit             ICD-10-CM    Chronic pancreatitis (Multi) K86.1    Relevant Medications    lipase-protease-amylase (Creon) 24,000-76,000 -120,000 unit capsule    Weight loss, abnormal R63.4    Relevant Medications    lipase-protease-amylase (Creon) 24,000-76,000 -120,000 unit capsule    Moderate protein-calorie malnutrition (Multi) E44.0    Exocrine pancreatic insufficiency (HHS-HCC) K86.81    Pancreatic steatorrhea (HHS-HCC) - Primary K90.3    Relevant Medications    lipase-protease-amylase (Creon) 24,000-76,000 -120,000 unit capsule     Follow up in 2 months to report progress with Creon higher dose       Marcni Saleh DO 10/01/24 1:13 PM

## 2024-10-01 NOTE — PROGRESS NOTES
Cardiovascular:  Negative for chest pain.   Gastrointestinal:  Positive for abdominal distention, abdominal pain and diarrhea.       Objective  Vitals:    10/01/24 0758   BP: 100/66   Pulse: 78   Temp: 98.7 °F (37.1 °C)   SpO2: 99%   Weight: 50.8 kg (112 lb)   Height: 1.575 m (5' 2\")     Physical Exam  Vitals and nursing note reviewed.   Constitutional:       Appearance: Normal appearance.   HENT:      Head: Normocephalic.      Nose: Nose normal.      Mouth/Throat:      Mouth: Mucous membranes are moist.      Pharynx: Oropharynx is clear.   Eyes:      Extraocular Movements: Extraocular movements intact.      Conjunctiva/sclera: Conjunctivae normal.      Pupils: Pupils are equal, round, and reactive to light.   Cardiovascular:      Rate and Rhythm: Normal rate and regular rhythm.      Pulses: Normal pulses.      Heart sounds: Normal heart sounds.   Pulmonary:      Effort: Pulmonary effort is normal.      Breath sounds: Normal breath sounds.   Abdominal:      General: There is distension.      Palpations: Abdomen is soft.      Tenderness: There is abdominal tenderness.   Musculoskeletal:      Cervical back: Neck supple.   Skin:     General: Skin is warm.   Neurological:      General: No focal deficit present.      Mental Status: She is alert and oriented to person, place, and time. Mental status is at baseline.   Psychiatric:         Mood and Affect: Mood normal.         Behavior: Behavior normal.         Thought Content: Thought content normal.         Judgment: Judgment normal.         Assessment & Plan     Diagnosis Orders   1. Diarrhea, unspecified type  Clostridium Difficile Toxin/Antigen    Gastrointestinal Panel by DNA    CBC    Comprehensive Metabolic Panel    Lipase      2. Chronic pancreatitis, unspecified pancreatitis type (HCC)        3. Nonintractable headache, unspecified chronicity pattern, unspecified headache type  rimegepant sulfate (NURTEC) 75 MG TBDP      4. Generalized abdominal pain

## 2024-10-02 LAB
ADV 40+41 DNA STL QL NAA+NON-PROBE: NOT DETECTED
C CAYETANENSIS DNA STL QL NAA+NON-PROBE: NOT DETECTED
C COLI+JEJ+UPSA DNA STL QL NAA+NON-PROBE: NOT DETECTED
C DIFF TOX A+B STL QL IA: NORMAL
CRYPTOSP DNA STL QL NAA+NON-PROBE: NOT DETECTED
E HISTOLYT DNA STL QL NAA+NON-PROBE: NOT DETECTED
EAEC PAA PLAS AGGR+AATA ST NAA+NON-PRB: NOT DETECTED
EC STX1+STX2 GENES STL QL NAA+NON-PROBE: NOT DETECTED
EPEC EAE GENE STL QL NAA+NON-PROBE: NOT DETECTED
ETEC LTA+ST1A+ST1B TOX ST NAA+NON-PROBE: NOT DETECTED
G LAMBLIA DNA STL QL NAA+NON-PROBE: NOT DETECTED
GI PATH DNA+RNA PNL STL NAA+NON-PROBE: NOT DETECTED
NOROVIRUS GI+II RNA STL QL NAA+NON-PROBE: NOT DETECTED
P SHIGELLOIDES DNA STL QL NAA+NON-PROBE: NOT DETECTED
RVA RNA STL QL NAA+NON-PROBE: NOT DETECTED
S ENT+BONG DNA STL QL NAA+NON-PROBE: NOT DETECTED
SAPO I+II+IV+V RNA STL QL NAA+NON-PROBE: NOT DETECTED
SHIGELLA SP+EIEC IPAH ST NAA+NON-PROBE: NOT DETECTED
V CHOL+PARA+VUL DNA STL QL NAA+NON-PROBE: NOT DETECTED
V CHOLERAE DNA STL QL NAA+NON-PROBE: NOT DETECTED
Y ENTEROCOL DNA STL QL NAA+NON-PROBE: NOT DETECTED

## 2024-10-03 DIAGNOSIS — D64.9 ANEMIA, UNSPECIFIED TYPE: Primary | ICD-10-CM

## 2024-10-04 ENCOUNTER — HOSPITAL ENCOUNTER (OUTPATIENT)
Dept: CT IMAGING | Age: 54
Discharge: HOME | End: 2024-10-06
Payer: MEDICARE

## 2024-10-04 DIAGNOSIS — R10.84 GENERALIZED ABDOMINAL PAIN: ICD-10-CM

## 2024-10-04 DIAGNOSIS — R19.7 DIARRHEA, UNSPECIFIED TYPE: ICD-10-CM

## 2024-10-04 DIAGNOSIS — D64.9 ANEMIA, UNSPECIFIED TYPE: ICD-10-CM

## 2024-10-04 DIAGNOSIS — R14.0 BLOATING: ICD-10-CM

## 2024-10-04 LAB
FOLATE: 10.9 NG/ML (ref 4.8–24.2)
IRON % SATURATION: 17 % (ref 20–55)
IRON: 49 UG/DL (ref 37–145)
TOTAL IRON BINDING CAPACITY: 282 UG/DL (ref 250–450)
UNSATURATED IRON BINDING CAPACITY: 233 UG/DL (ref 112–347)
VITAMIN B-12: 433 PG/ML (ref 232–1245)

## 2024-10-04 PROCEDURE — 74177 CT ABD & PELVIS W/CONTRAST: CPT

## 2024-10-04 PROCEDURE — 6360000004 HC RX CONTRAST MEDICATION: Performed by: NURSE PRACTITIONER

## 2024-10-04 RX ORDER — IOPAMIDOL 755 MG/ML
18 INJECTION, SOLUTION INTRAVASCULAR
Status: COMPLETED | OUTPATIENT
Start: 2024-10-04 | End: 2024-10-04

## 2024-10-04 RX ORDER — IOPAMIDOL 755 MG/ML
75 INJECTION, SOLUTION INTRAVASCULAR
Status: COMPLETED | OUTPATIENT
Start: 2024-10-04 | End: 2024-10-04

## 2024-10-04 RX ADMIN — IOPAMIDOL 75 ML: 755 INJECTION, SOLUTION INTRAVENOUS at 09:24

## 2024-10-04 RX ADMIN — IOPAMIDOL 18 ML: 755 INJECTION, SOLUTION INTRAVENOUS at 09:24

## 2024-10-08 ENCOUNTER — APPOINTMENT (OUTPATIENT)
Dept: GASTROENTEROLOGY | Facility: CLINIC | Age: 54
End: 2024-10-08
Payer: MEDICARE

## 2024-10-10 ENCOUNTER — APPOINTMENT (OUTPATIENT)
Dept: GENERAL RADIOLOGY | Age: 54
End: 2024-10-10
Payer: MEDICARE

## 2024-10-10 ENCOUNTER — HOSPITAL ENCOUNTER (EMERGENCY)
Age: 54
Discharge: HOME | End: 2024-10-10
Payer: MEDICARE

## 2024-10-10 VITALS
HEART RATE: 92 BPM | WEIGHT: 112 LBS | DIASTOLIC BLOOD PRESSURE: 97 MMHG | TEMPERATURE: 98.6 F | BODY MASS INDEX: 20.61 KG/M2 | OXYGEN SATURATION: 100 % | SYSTOLIC BLOOD PRESSURE: 145 MMHG | RESPIRATION RATE: 17 BRPM | HEIGHT: 62 IN

## 2024-10-10 DIAGNOSIS — K59.00 CONSTIPATION, UNSPECIFIED CONSTIPATION TYPE: Primary | ICD-10-CM

## 2024-10-10 LAB
ALBUMIN SERPL-MCNC: 4.3 G/DL (ref 3.5–4.6)
ALP SERPL-CCNC: 108 U/L (ref 40–130)
ALT SERPL-CCNC: 10 U/L (ref 0–33)
ANION GAP SERPL CALCULATED.3IONS-SCNC: 10 MEQ/L (ref 9–15)
AST SERPL-CCNC: 13 U/L (ref 0–35)
B PARAP IS1001 DNA NPH QL NAA+NON-PROBE: NOT DETECTED
B PERT.PT PRMT NPH QL NAA+NON-PROBE: NOT DETECTED
BACTERIA URNS QL MICRO: NEGATIVE /HPF
BASOPHILS # BLD: 0.1 K/UL (ref 0–0.2)
BASOPHILS NFR BLD: 1.1 %
BILIRUB SERPL-MCNC: 0.3 MG/DL (ref 0.2–0.7)
BILIRUB UR QL STRIP: NEGATIVE
BUN SERPL-MCNC: 16 MG/DL (ref 6–20)
C PNEUM DNA NPH QL NAA+NON-PROBE: NOT DETECTED
CALCIUM SERPL-MCNC: 9.6 MG/DL (ref 8.5–9.9)
CHLORIDE SERPL-SCNC: 102 MEQ/L (ref 95–107)
CLARITY UR: CLEAR
CO2 SERPL-SCNC: 23 MEQ/L (ref 20–31)
COLOR UR: YELLOW
CREAT SERPL-MCNC: 0.66 MG/DL (ref 0.5–0.9)
EKG ATRIAL RATE: 73 BPM
EKG P AXIS: 62 DEGREES
EKG P-R INTERVAL: 148 MS
EKG Q-T INTERVAL: 378 MS
EKG QRS DURATION: 84 MS
EKG QTC CALCULATION (BAZETT): 416 MS
EKG R AXIS: 42 DEGREES
EKG T AXIS: 56 DEGREES
EKG VENTRICULAR RATE: 73 BPM
EOSINOPHIL # BLD: 0.1 K/UL (ref 0–0.7)
EOSINOPHIL NFR BLD: 1.8 %
EPI CELLS #/AREA URNS AUTO: ABNORMAL /HPF (ref 0–5)
ERYTHROCYTE [DISTWIDTH] IN BLOOD BY AUTOMATED COUNT: 13.1 % (ref 11.5–14.5)
FLUAV RNA NPH QL NAA+NON-PROBE: NOT DETECTED
FLUBV RNA NPH QL NAA+NON-PROBE: NOT DETECTED
GLOBULIN SER CALC-MCNC: 2.8 G/DL (ref 2.3–3.5)
GLUCOSE SERPL-MCNC: 106 MG/DL (ref 70–99)
GLUCOSE UR STRIP-MCNC: NEGATIVE MG/DL
HADV DNA NPH QL NAA+NON-PROBE: NOT DETECTED
HCOV 229E RNA NPH QL NAA+NON-PROBE: NOT DETECTED
HCOV HKU1 RNA NPH QL NAA+NON-PROBE: NOT DETECTED
HCOV NL63 RNA NPH QL NAA+NON-PROBE: NOT DETECTED
HCOV OC43 RNA NPH QL NAA+NON-PROBE: NOT DETECTED
HCT VFR BLD AUTO: 34.9 % (ref 37–47)
HGB BLD-MCNC: 11.7 G/DL (ref 12–16)
HGB UR QL STRIP: ABNORMAL
HMPV RNA NPH QL NAA+NON-PROBE: NOT DETECTED
HPIV1 RNA NPH QL NAA+NON-PROBE: NOT DETECTED
HPIV2 RNA NPH QL NAA+NON-PROBE: NOT DETECTED
HPIV3 RNA NPH QL NAA+NON-PROBE: NOT DETECTED
HPIV4 RNA NPH QL NAA+NON-PROBE: NOT DETECTED
HYALINE CASTS #/AREA URNS AUTO: ABNORMAL /HPF (ref 0–5)
KETONES UR STRIP-MCNC: NEGATIVE MG/DL
LEUKOCYTE ESTERASE UR QL STRIP: ABNORMAL
LIPASE SERPL-CCNC: 47 U/L (ref 12–95)
LYMPHOCYTES # BLD: 2.1 K/UL (ref 1–4.8)
LYMPHOCYTES NFR BLD: 33.1 %
M PNEUMO DNA NPH QL NAA+NON-PROBE: NOT DETECTED
MAGNESIUM SERPL-MCNC: 1.9 MG/DL (ref 1.7–2.4)
MCH RBC QN AUTO: 29.6 PG (ref 27–31.3)
MCHC RBC AUTO-ENTMCNC: 33.5 % (ref 33–37)
MCV RBC AUTO: 88.4 FL (ref 79.4–94.8)
MONOCYTES # BLD: 0.5 K/UL (ref 0.2–0.8)
MONOCYTES NFR BLD: 8.2 %
NEUTROPHILS # BLD: 3.5 K/UL (ref 1.4–6.5)
NEUTS SEG NFR BLD: 55.5 %
NITRITE UR QL STRIP: NEGATIVE
PH UR STRIP: 6.5 [PH] (ref 5–9)
PLATELET # BLD AUTO: 384 K/UL (ref 130–400)
POTASSIUM SERPL-SCNC: 3.5 MEQ/L (ref 3.4–4.9)
PROT SERPL-MCNC: 7.1 G/DL (ref 6.3–8)
PROT UR STRIP-MCNC: NEGATIVE MG/DL
RBC # BLD AUTO: 3.95 M/UL (ref 4.2–5.4)
RBC #/AREA URNS AUTO: ABNORMAL /HPF (ref 0–5)
RSV RNA NPH QL NAA+NON-PROBE: NOT DETECTED
RV+EV RNA NPH QL NAA+NON-PROBE: NOT DETECTED
SARS-COV-2 RNA NPH QL NAA+NON-PROBE: NOT DETECTED
SODIUM SERPL-SCNC: 135 MEQ/L (ref 135–144)
SP GR UR STRIP: 1.02 (ref 1–1.03)
STREP GRP A PCR: NEGATIVE
TROPONIN, HIGH SENSITIVITY: <6 NG/L (ref 0–19)
URINE REFLEX TO CULTURE: ABNORMAL
UROBILINOGEN UR STRIP-ACNC: 0.2 E.U./DL
WBC # BLD AUTO: 6.2 K/UL (ref 4.8–10.8)
WBC #/AREA URNS AUTO: ABNORMAL /HPF (ref 0–5)

## 2024-10-10 PROCEDURE — 6360000002 HC RX W HCPCS: Performed by: PERSONAL EMERGENCY RESPONSE ATTENDANT

## 2024-10-10 PROCEDURE — 93005 ELECTROCARDIOGRAM TRACING: CPT | Performed by: PERSONAL EMERGENCY RESPONSE ATTENDANT

## 2024-10-10 PROCEDURE — 6370000000 HC RX 637 (ALT 250 FOR IP): Performed by: PERSONAL EMERGENCY RESPONSE ATTENDANT

## 2024-10-10 PROCEDURE — 96361 HYDRATE IV INFUSION ADD-ON: CPT

## 2024-10-10 PROCEDURE — 83735 ASSAY OF MAGNESIUM: CPT

## 2024-10-10 PROCEDURE — 87651 STREP A DNA AMP PROBE: CPT

## 2024-10-10 PROCEDURE — 71045 X-RAY EXAM CHEST 1 VIEW: CPT

## 2024-10-10 PROCEDURE — 74018 RADEX ABDOMEN 1 VIEW: CPT

## 2024-10-10 PROCEDURE — 96375 TX/PRO/DX INJ NEW DRUG ADDON: CPT

## 2024-10-10 PROCEDURE — 99285 EMERGENCY DEPT VISIT HI MDM: CPT

## 2024-10-10 PROCEDURE — 2580000003 HC RX 258: Performed by: PERSONAL EMERGENCY RESPONSE ATTENDANT

## 2024-10-10 PROCEDURE — 85025 COMPLETE CBC W/AUTO DIFF WBC: CPT

## 2024-10-10 PROCEDURE — 81001 URINALYSIS AUTO W/SCOPE: CPT

## 2024-10-10 PROCEDURE — 93010 ELECTROCARDIOGRAM REPORT: CPT | Performed by: INTERNAL MEDICINE

## 2024-10-10 PROCEDURE — 0202U NFCT DS 22 TRGT SARS-COV-2: CPT

## 2024-10-10 PROCEDURE — 84484 ASSAY OF TROPONIN QUANT: CPT

## 2024-10-10 PROCEDURE — 96374 THER/PROPH/DIAG INJ IV PUSH: CPT

## 2024-10-10 PROCEDURE — 83690 ASSAY OF LIPASE: CPT

## 2024-10-10 PROCEDURE — 80053 COMPREHEN METABOLIC PANEL: CPT

## 2024-10-10 RX ORDER — ONDANSETRON 2 MG/ML
4 INJECTION INTRAMUSCULAR; INTRAVENOUS ONCE
Status: COMPLETED | OUTPATIENT
Start: 2024-10-10 | End: 2024-10-10

## 2024-10-10 RX ORDER — DICYCLOMINE HYDROCHLORIDE 10 MG/1
10 CAPSULE ORAL
Qty: 30 CAPSULE | Refills: 0 | Status: SHIPPED | OUTPATIENT
Start: 2024-10-10

## 2024-10-10 RX ORDER — DICYCLOMINE HYDROCHLORIDE 10 MG/1
10 CAPSULE ORAL ONCE
Status: COMPLETED | OUTPATIENT
Start: 2024-10-10 | End: 2024-10-10

## 2024-10-10 RX ORDER — 0.9 % SODIUM CHLORIDE 0.9 %
500 INTRAVENOUS SOLUTION INTRAVENOUS ONCE
Status: COMPLETED | OUTPATIENT
Start: 2024-10-10 | End: 2024-10-10

## 2024-10-10 RX ORDER — KETOROLAC TROMETHAMINE 30 MG/ML
30 INJECTION, SOLUTION INTRAMUSCULAR; INTRAVENOUS ONCE
Status: COMPLETED | OUTPATIENT
Start: 2024-10-10 | End: 2024-10-10

## 2024-10-10 RX ADMIN — ONDANSETRON 4 MG: 2 INJECTION, SOLUTION INTRAMUSCULAR; INTRAVENOUS at 03:37

## 2024-10-10 RX ADMIN — KETOROLAC TROMETHAMINE 30 MG: 30 INJECTION, SOLUTION INTRAMUSCULAR at 03:37

## 2024-10-10 RX ADMIN — SODIUM CHLORIDE 500 ML: 9 INJECTION, SOLUTION INTRAVENOUS at 03:37

## 2024-10-10 RX ADMIN — DICYCLOMINE HYDROCHLORIDE 10 MG: 10 CAPSULE ORAL at 05:07

## 2024-10-10 ASSESSMENT — PAIN DESCRIPTION - LOCATION: LOCATION: ABDOMEN

## 2024-10-10 ASSESSMENT — ENCOUNTER SYMPTOMS
NAUSEA: 1
COUGH: 1
BLOOD IN STOOL: 0
SORE THROAT: 1
ABDOMINAL DISTENTION: 1
COLOR CHANGE: 0
VOMITING: 0
SHORTNESS OF BREATH: 0
RHINORRHEA: 0
DIARRHEA: 1
ABDOMINAL PAIN: 1

## 2024-10-10 ASSESSMENT — PAIN DESCRIPTION - DESCRIPTORS: DESCRIPTORS: STABBING

## 2024-10-10 ASSESSMENT — PAIN DESCRIPTION - ORIENTATION: ORIENTATION: LEFT

## 2024-10-10 ASSESSMENT — PAIN SCALES - GENERAL: PAINLEVEL_OUTOF10: 8

## 2024-10-10 NOTE — ED NOTES
The following labs were labeled with appropriate pt sticker and tubed to lab:     [] Blue     [x] Lavender   [] on ice  [x] Green/yellow  [] Green/black [] on ice  [] Grey  [] on ice  [] Yellow  [x] Red  [] Pink  [] Type/ Screen  [] ABG  [] VBG    [x] COVID-19 swab    [] Rapid  [] PCR  [x] Flu swab  [] Peds Viral Panel     [x] Urine Sample  [] Fecal Sample  [] Pelvic Cultures  [] Blood Cultures  [] X 2  [x] STREP Cultures  [] Wound Cultures

## 2024-10-10 NOTE — ED PROVIDER NOTES
[facility] ED  eMERGENCY dEPARTMENT eNCOUnter      Pt Name: Sisi Cam  MRN: [id number]  Birthdate 1970  Date of evaluation: 10/10/2024  Provider: TRUDY LE  3:06 AM EDT     My attending is Dr. Soni.    HISTORY OF PRESENT ILLNESS    Sisi Cam is a 53 y.o. female with PMHx of hypothyroidism, ADD, anxiety, CHF, depression, right ovarian cyst, GERD, medullary sponge, Sjogren's, DM2, diverticulitis, pancreatitis, uterine fibroids, RA presents to the emergency department with abdominal pain.  September 14 patient had COVID for 2 weeks and has had chills and fatigue with intermittent runny nose and cough since then. October 1 seen GI for pancreatic steatorrhea, weight loss, chronic pancreatitis, exocrine pancreatic insufficiency.  She did start feeling bloated.  Messaged the family doctor the next day complaining of lower abdominal pain with abdominal distention with CT ordered showing fecal stasis.  Abdominal pain worsened today.  Present in left upper quadrant and left flank.  She had a sore throat last week which resolved and returned today with left ear pain, persistent dry cough, dry heaving.  This afternoon started with intermittent palpitations without aggravating factors.  Pain is worse with eating.  She initially did have runny stools and incontinent of stool, had negative stool samples performed.  Last bowel movement was today which was soft. October 1 Creon was increased.  She denies fevers, chest pain, shortness of breath, urinary symptoms, abnormal vaginal discharge, alcohol or drug use, frequent NSAID use.      HPI    Nursing Notes were reviewed.    REVIEW OF SYSTEMS       Review of Systems   Constitutional:  Positive for chills. Negative for appetite change and fever.   HENT:  Positive for ear pain and sore throat. Negative for congestion and rhinorrhea.    Respiratory:  Positive for cough. Negative for shortness of breath.    Cardiovascular:  Positive for palpitations.

## 2024-10-10 NOTE — TELEPHONE ENCOUNTER
81-year-old female who is a very poor historian presents to the hospital with complaints of pain and swelling to both lower extremities particularly her right lower extremity.  Patient obtained some significant wounds following traumatic fall a couple of months ago she had a surgical debridement approximately 1 month ago.  She has had 1 appointment with outpatient wound care.  She has been using what is described as Hydrofera Blue.  The right lower extremity has strong odor and copious amounts of yellow-green exudate.    The wound to the right lower extremity is mostly covered 60 to 70% in yellow eschar distal to the wound.  There is an entire edge of ecchymosis and skin breakdown copious amounts of yellow exudate with strong odor is noted.  I irrigated the wound with normal saline and a wound culture was obtained by nursing.  Will recommend using a 50-50 mix of Santyl Bactroban and covering the area.  Patient complains that pain worsens whenever she elevates her leg and utilizing the Ace wrap's.  Vascular is following.  Patient per vascular.  ABIs showed normal perfusion with moderate PAD in the left lower extremity.  Will recommend a 50-50 mix of Santyl Bactroban covering with ABD pads Curlex and very lightly applying Ace wrap.  She does have some firm intact blisters and does have some swelling to the lower extremity.  She is instructed that if this increases her pain to let nursing know and they can remove the Ace wrap's.    The left lower extremity is a full-thickness wound and wound has a ready base.  There is some fibrinous yellow exudate, slough noted in the wound.  There is no odor to moderate amount of serous is noted from this wound.  No overt symptoms of infection are noted from it.  I applied a Maxorb ABD pads Curlex very lightly wrapped her in Ace wrap's.    Patient may need surgical debridement of the right lower extremity.  Will continue to follow as needed   From: Gilford Alexandria  To: Ardis Boast  Sent: 7/21/2022 4:25 PM EDT  Subject: Adderrall    Adderall back order please resend to simon dorman

## 2024-10-10 NOTE — DISCHARGE INSTR - COC
example:[id number]}  Last Modified Barium Swallow with Video (Video Swallowing Test): {Done Not Done Date:}    Treatments at the Time of Hospital Discharge:   Respiratory Treatments: ***  Oxygen Therapy:  {Therapy; copd oxygen:12826}  Ventilator:    {[facility] Vent List:829608126}    Rehab Therapies: {THERAPEUTIC INTERVENTION:4144188193}  Weight Bearing Status/Restrictions: {[facility] Weight Bearin}  Other Medical Equipment (for information only, NOT a DME order):  {EQUIPMENT:743711846}  Other Treatments: ***    Patient's personal belongings (please select all that are sent with patient):  {[facility] DME Belongings:360978563}    RN SIGNATURE:  {Esignature:835191714}    CASE MANAGEMENT/SOCIAL WORK SECTION    Inpatient Status Date: ***    Readmission Risk Assessment Score:  Readmission Risk              Risk of Unplanned Readmission:  0           Discharging to Facility/ Agency   Name:   Address:  Phone:  Fax:    Dialysis Facility (if applicable)   Name:  Address:  Dialysis Schedule:  Phone:  Fax:    / signature: {Esignature:884902753}    PHYSICIAN SECTION    Prognosis: {Prognosis:3871483561}    Condition at Discharge: { Patient Condition:192507030}    Rehab Potential (if transferring to Rehab): {Prognosis:0502355771}    Recommended Labs or Other Treatments After Discharge: ***    Physician Certification: I certify the above information and transfer of Sisi Cam  is necessary for the continuing treatment of the diagnosis listed and that she requires {Admit to Appropriate Level of Care:37956} for {GREATER/LESS:124363037} 30 days.     Update Admission H&P: {[facility] DME Changes in HandP:157233498}    PHYSICIAN SIGNATURE:  {Esignature:458484339}

## 2024-10-14 DIAGNOSIS — F90.9 ATTENTION DEFICIT HYPERACTIVITY DISORDER (ADHD), UNSPECIFIED ADHD TYPE: Primary | ICD-10-CM

## 2024-10-14 DIAGNOSIS — Z76.0 MEDICATION REFILL: ICD-10-CM

## 2024-10-14 DIAGNOSIS — F98.8 ATTENTION DEFICIT DISORDER, UNSPECIFIED HYPERACTIVITY PRESENCE: ICD-10-CM

## 2024-10-14 DIAGNOSIS — K21.9 GASTROESOPHAGEAL REFLUX DISEASE WITHOUT ESOPHAGITIS: ICD-10-CM

## 2024-10-14 RX ORDER — ESOMEPRAZOLE MAGNESIUM 40 MG/1
CAPSULE, DELAYED RELEASE ORAL
Qty: 90 CAPSULE | Refills: 1 | Status: CANCELLED | OUTPATIENT
Start: 2024-10-14

## 2024-10-14 RX ORDER — DEXTROAMPHETAMINE SACCHARATE, AMPHETAMINE ASPARTATE MONOHYDRATE, DEXTROAMPHETAMINE SULFATE AND AMPHETAMINE SULFATE 7.5; 7.5; 7.5; 7.5 MG/1; MG/1; MG/1; MG/1
30 CAPSULE, EXTENDED RELEASE ORAL 2 TIMES DAILY
Qty: 60 CAPSULE | Refills: 0 | Status: CANCELLED | OUTPATIENT
Start: 2024-10-14 | End: 2024-11-13

## 2024-10-14 NOTE — TELEPHONE ENCOUNTER
Comments: compropago message sent regarding appt    Last Office Visit (last PCP visit):   10/1/2024    Next Visit Date:  Future Appointments   Date Time Provider Department Center   10/18/2024  4:00 PM [facility] ROOM 1 [facility]   10/23/2024  2:00 PM Jose Roberto Duque MD [facility]   3/7/2025 10:30 AM June, Darrion S, PA [facility]       **If hasn't been seen in over a year OR hasn't followed up according to last diabetes/ADHD visit, make appointment for patient before sending refill to provider.    Rx requested:  Requested Prescriptions     Pending Prescriptions Disp Refills    NEXIUM 40 MG delayed release capsule 90 capsule 1     Sig: TAKE 1 CAPSULE BY MOUTH DAILY    amphetamine-dextroamphetamine (ADDERALL XR) 30 MG extended release capsule 60 capsule 0     Sig: Take 1 capsule by mouth in the morning and 1 capsule in the evening. Do all this for 30 days.

## 2024-10-15 ENCOUNTER — TELEMEDICINE (OUTPATIENT)
Dept: FAMILY MEDICINE CLINIC | Age: 54
End: 2024-10-15

## 2024-10-15 DIAGNOSIS — Z76.0 MEDICATION REFILL: ICD-10-CM

## 2024-10-15 DIAGNOSIS — Z00.00 MEDICARE ANNUAL WELLNESS VISIT, SUBSEQUENT: Primary | ICD-10-CM

## 2024-10-15 DIAGNOSIS — F90.9 ATTENTION DEFICIT HYPERACTIVITY DISORDER (ADHD), UNSPECIFIED ADHD TYPE: ICD-10-CM

## 2024-10-15 DIAGNOSIS — K21.9 GASTROESOPHAGEAL REFLUX DISEASE WITHOUT ESOPHAGITIS: ICD-10-CM

## 2024-10-15 DIAGNOSIS — R73.09 ABNORMAL BLOOD SUGAR: ICD-10-CM

## 2024-10-15 RX ORDER — ESOMEPRAZOLE MAGNESIUM 40 MG/1
CAPSULE, DELAYED RELEASE ORAL
Qty: 90 CAPSULE | Refills: 1 | Status: SHIPPED | OUTPATIENT
Start: 2024-10-15

## 2024-10-15 RX ORDER — DEXTROAMPHETAMINE SACCHARATE, AMPHETAMINE ASPARTATE MONOHYDRATE, DEXTROAMPHETAMINE SULFATE AND AMPHETAMINE SULFATE 7.5; 7.5; 7.5; 7.5 MG/1; MG/1; MG/1; MG/1
30 CAPSULE, EXTENDED RELEASE ORAL 2 TIMES DAILY
Qty: 60 CAPSULE | Refills: 0 | Status: SHIPPED | OUTPATIENT
Start: 2024-10-15 | End: 2024-11-14

## 2024-10-15 ASSESSMENT — PATIENT HEALTH QUESTIONNAIRE - PHQ9
9. THOUGHTS THAT YOU WOULD BE BETTER OFF DEAD, OR OF HURTING YOURSELF: NOT AT ALL
2. FEELING DOWN, DEPRESSED OR HOPELESS: SEVERAL DAYS
5. POOR APPETITE OR OVEREATING: NOT AT ALL
6. FEELING BAD ABOUT YOURSELF - OR THAT YOU ARE A FAILURE OR HAVE LET YOURSELF OR YOUR FAMILY DOWN: NOT AT ALL
SUM OF ALL RESPONSES TO PHQ9 QUESTIONS 1 & 2: 2
SUM OF ALL RESPONSES TO PHQ QUESTIONS 1-9: 5
1. LITTLE INTEREST OR PLEASURE IN DOING THINGS: SEVERAL DAYS
SUM OF ALL RESPONSES TO PHQ QUESTIONS 1-9: 5
10. IF YOU CHECKED OFF ANY PROBLEMS, HOW DIFFICULT HAVE THESE PROBLEMS MADE IT FOR YOU TO DO YOUR WORK, TAKE CARE OF THINGS AT HOME, OR GET ALONG WITH OTHER PEOPLE: SOMEWHAT DIFFICULT
8. MOVING OR SPEAKING SO SLOWLY THAT OTHER PEOPLE COULD HAVE NOTICED. OR THE OPPOSITE, BEING SO FIGETY OR RESTLESS THAT YOU HAVE BEEN MOVING AROUND A LOT MORE THAN USUAL: NOT AT ALL
4. FEELING TIRED OR HAVING LITTLE ENERGY: NOT AT ALL
SUM OF ALL RESPONSES TO PHQ QUESTIONS 1-9: 5
SUM OF ALL RESPONSES TO PHQ QUESTIONS 1-9: 5
3. TROUBLE FALLING OR STAYING ASLEEP: NEARLY EVERY DAY
7. TROUBLE CONCENTRATING ON THINGS, SUCH AS READING THE NEWSPAPER OR WATCHING TELEVISION: NOT AT ALL

## 2024-10-15 NOTE — PROGRESS NOTES
furosemide (LASIX) 40 MG tablet Take 1 tablet by mouth 2 times daily Yes Igor Louis MD   midodrine (PROAMATINE) 10 MG tablet TAKE ONE TABLET BY MOUTH AS NEEDED FOR SBP LESS THAN 90 Yes Igor Louis MD   Ubrogepant (UBRELVY) 100 MG TABS Take one tablet as needed for migraine, second dose can be taken at least 2 hours after the initial dose, if needed Yes Emmie Avalos APRN - CNP   hydrocortisone 2.5 % cream Apply topically 2 times daily. Yes Sue Echeverria,    lidocaine (LIDODERM) 5 %  Yes Igor Louis MD   loratadine (CLARITIN) 10 MG tablet Take 1 tablet by mouth daily Yes Emmie Avalos APRN - CNP   valACYclovir (VALTREX) 500 MG tablet Take 1 tablet by mouth daily Yes Emmie Avalos APRN - CNP   moxifloxacin (VIGAMOX) 0.5 % ophthalmic solution 1 drop Yes ProviderIgor MD   hydroxychloroquine (PLAQUENIL) 200 MG tablet Take 1.5 tablets by mouth daily Yes Igor Louis MD   prochlorperazine (COMPAZINE) 10 MG tablet Take 1 tablet by mouth every 6 hours as needed (for nausea)  Emmie Avalos APRN - CNP   aspirin (ASPIRIN CHILDRENS) 81 MG chewable tablet Take 1 tablet by mouth daily  Patient not taking: Reported on 10/15/2024  Desire Dsouza PA       CareTeam (Including outside providers/suppliers regularly involved in providing care):   Patient Care Team:  Emmie Avalos APRN - CNP as PCP - General (Nurse Practitioner)  Emmie Avalos APRN - CNP as PCP - Empaneled Provider  Lindy Garcia MD (General Surgery)      Reviewed and updated this visit:          Sisi GRIDER Rubenar, was evaluated through a synchronous (real-time) audio-video encounter. The patient (or guardian if applicable) is aware that this is a billable service, which includes applicable co-pays. This Virtual Visit was conducted with patient's (and/or legal guardian's) consent. Patient identification was verified, and a caregiver was present when appropriate.   The patient was located at Home:

## 2024-10-16 ENCOUNTER — HOSPITAL ENCOUNTER (OUTPATIENT)
Dept: ULTRASOUND IMAGING | Age: 54
Discharge: HOME | End: 2024-10-18
Payer: MEDICARE

## 2024-10-16 DIAGNOSIS — R10.12 LUQ PAIN: ICD-10-CM

## 2024-10-16 PROCEDURE — 76700 US EXAM ABDOM COMPLETE: CPT

## 2024-10-16 NOTE — TELEPHONE ENCOUNTER
Comments:     Last Office Visit (last PCP visit):   10/1/2024    Next Visit Date:  Future Appointments   Date Time Provider Department Center   10/16/2024  3:00 PM [facility] [place]   10/18/2024  4:00 PM [facility] ROOM 1 [facility]   10/23/2024  2:00 PM Jose Roberto Duque MD [facility]   1/14/2025  8:30 AM Emm[facility] Avalos[facility] - [facility]   3/7/2025 10:30 AM June, Darrion S, PA [facility]       **If hasn't been seen in over a year OR hasn't followed up according to last diabetes/ADHD visit, make appointment for patient before sending refill to provider.    Rx requested:  Requested Prescriptions     Pending Prescriptions Disp Refills    metFORMIN (GLUCOPHAGE) 500 MG tablet 30 tablet 3     Sig: Take 1 tablet by mouth daily (with breakfast)

## 2024-10-17 ENCOUNTER — PREP FOR PROCEDURE (OUTPATIENT)
Dept: GASTROENTEROLOGY | Age: 54
End: 2024-10-17

## 2024-10-17 ENCOUNTER — ANESTHESIA EVENT (OUTPATIENT)
Dept: ENDOSCOPY | Age: 54
End: 2024-10-17
Payer: MEDICARE

## 2024-10-17 ENCOUNTER — HOSPITAL ENCOUNTER (OUTPATIENT)
Age: 54
Setting detail: OBSERVATION
Discharge: HOME | End: 2024-10-18
Attending: STUDENT IN AN ORGANIZED HEALTH CARE EDUCATION/TRAINING PROGRAM | Admitting: INTERNAL MEDICINE
Payer: MEDICARE

## 2024-10-17 ENCOUNTER — APPOINTMENT (OUTPATIENT)
Dept: CT IMAGING | Age: 54
End: 2024-10-17
Payer: MEDICARE

## 2024-10-17 ENCOUNTER — ANESTHESIA (OUTPATIENT)
Dept: ENDOSCOPY | Age: 54
End: 2024-10-17
Payer: MEDICARE

## 2024-10-17 VITALS
SYSTOLIC BLOOD PRESSURE: 102 MMHG | OXYGEN SATURATION: 100 % | HEART RATE: 58 BPM | RESPIRATION RATE: 16 BRPM | TEMPERATURE: 97.3 F | DIASTOLIC BLOOD PRESSURE: 66 MMHG

## 2024-10-17 DIAGNOSIS — N28.1 RENAL CYSTS, ACQUIRED, BILATERAL: Primary | ICD-10-CM

## 2024-10-17 DIAGNOSIS — K59.00 CONSTIPATION, UNSPECIFIED CONSTIPATION TYPE: ICD-10-CM

## 2024-10-17 DIAGNOSIS — R10.9 ABDOMINAL PAIN: ICD-10-CM

## 2024-10-17 DIAGNOSIS — K85.90 PANCREATITIS, UNSPECIFIED PANCREATITIS TYPE: ICD-10-CM

## 2024-10-17 DIAGNOSIS — R10.13 ABDOMINAL PAIN, EPIGASTRIC: ICD-10-CM

## 2024-10-17 LAB
ALBUMIN SERPL-MCNC: 4.2 G/DL (ref 3.5–4.6)
ALP SERPL-CCNC: 111 U/L (ref 40–130)
ALT SERPL-CCNC: 8 U/L (ref 0–33)
AMPHET UR QL SCN: POSITIVE
ANION GAP SERPL CALCULATED.3IONS-SCNC: 10 MEQ/L (ref 9–15)
AST SERPL-CCNC: 11 U/L (ref 0–35)
BARBITURATES UR QL SCN: ABNORMAL
BASOPHILS # BLD: 0.1 K/UL (ref 0–0.2)
BASOPHILS NFR BLD: 1.5 %
BENZODIAZ UR QL SCN: ABNORMAL
BILIRUB SERPL-MCNC: <0.2 MG/DL (ref 0.2–0.7)
BILIRUB UR QL STRIP: NEGATIVE
BUN SERPL-MCNC: 23 MG/DL (ref 6–20)
CALCIUM SERPL-MCNC: 9 MG/DL (ref 8.5–9.9)
CANNABINOIDS UR QL SCN: ABNORMAL
CHLORIDE SERPL-SCNC: 103 MEQ/L (ref 95–107)
CLARITY UR: CLEAR
CO2 SERPL-SCNC: 24 MEQ/L (ref 20–31)
COCAINE UR QL SCN: ABNORMAL
COLOR UR: YELLOW
CREAT SERPL-MCNC: 0.72 MG/DL (ref 0.5–0.9)
DRUG SCREEN COMMENT UR-IMP: ABNORMAL
EKG ATRIAL RATE: 69 BPM
EKG P AXIS: 28 DEGREES
EKG P-R INTERVAL: 130 MS
EKG Q-T INTERVAL: 392 MS
EKG QRS DURATION: 84 MS
EKG QTC CALCULATION (BAZETT): 420 MS
EKG R AXIS: 71 DEGREES
EKG T AXIS: 67 DEGREES
EKG VENTRICULAR RATE: 69 BPM
EOSINOPHIL # BLD: 0.1 K/UL (ref 0–0.7)
EOSINOPHIL NFR BLD: 2.4 %
ERYTHROCYTE [DISTWIDTH] IN BLOOD BY AUTOMATED COUNT: 13 % (ref 11.5–14.5)
FENTANYL SCREEN, URINE: ABNORMAL
GLOBULIN SER CALC-MCNC: 2.3 G/DL (ref 2.3–3.5)
GLUCOSE SERPL-MCNC: 80 MG/DL (ref 70–99)
GLUCOSE UR STRIP-MCNC: NEGATIVE MG/DL
HCT VFR BLD AUTO: 35.6 % (ref 37–47)
HGB BLD-MCNC: 12.1 G/DL (ref 12–16)
HGB UR QL STRIP: NEGATIVE
KETONES UR STRIP-MCNC: NEGATIVE MG/DL
LACTATE BLDV-SCNC: 1.5 MMOL/L (ref 0.5–2.2)
LEUKOCYTE ESTERASE UR QL STRIP: NEGATIVE
LIPASE SERPL-CCNC: 59 U/L (ref 12–95)
LYMPHOCYTES # BLD: 1.8 K/UL (ref 1–4.8)
LYMPHOCYTES NFR BLD: 32.7 %
MCH RBC QN AUTO: 30 PG (ref 27–31.3)
MCHC RBC AUTO-ENTMCNC: 34 % (ref 33–37)
MCV RBC AUTO: 88.3 FL (ref 79.4–94.8)
METHADONE UR QL SCN: ABNORMAL
MONOCYTES # BLD: 0.5 K/UL (ref 0.2–0.8)
MONOCYTES NFR BLD: 8.4 %
NEUTROPHILS # BLD: 2.9 K/UL (ref 1.4–6.5)
NEUTS SEG NFR BLD: 54.6 %
NITRITE UR QL STRIP: NEGATIVE
OPIATES UR QL SCN: POSITIVE
OXYCODONE UR QL SCN: ABNORMAL
PCP UR QL SCN: ABNORMAL
PERFORMED ON: NORMAL
PH UR STRIP: 7.5 [PH] (ref 5–9)
PLATELET # BLD AUTO: 306 K/UL (ref 130–400)
POC CREATININE WHOLE BLOOD: 0.8
POC CREATININE: 0.8 MG/DL (ref 0.6–1.2)
POC SAMPLE TYPE: NORMAL
POTASSIUM SERPL-SCNC: 3.4 MEQ/L (ref 3.4–4.9)
PROPOXYPH UR QL SCN: ABNORMAL
PROT SERPL-MCNC: 6.5 G/DL (ref 6.3–8)
PROT UR STRIP-MCNC: NEGATIVE MG/DL
RBC # BLD AUTO: 4.03 M/UL (ref 4.2–5.4)
SODIUM SERPL-SCNC: 137 MEQ/L (ref 135–144)
SP GR UR STRIP: 1.05 (ref 1–1.03)
URINE REFLEX TO CULTURE: NORMAL
UROBILINOGEN UR STRIP-ACNC: 0.2 E.U./DL
WBC # BLD AUTO: 5.4 K/UL (ref 4.8–10.8)

## 2024-10-17 PROCEDURE — 99221 1ST HOSP IP/OBS SF/LOW 40: CPT | Performed by: PAIN MEDICINE

## 2024-10-17 PROCEDURE — 6370000000 HC RX 637 (ALT 250 FOR IP): Performed by: INTERNAL MEDICINE

## 2024-10-17 PROCEDURE — 74177 CT ABD & PELVIS W/CONTRAST: CPT

## 2024-10-17 PROCEDURE — 6360000002 HC RX W HCPCS: Performed by: INTERNAL MEDICINE

## 2024-10-17 PROCEDURE — 93010 ELECTROCARDIOGRAM REPORT: CPT | Performed by: INTERNAL MEDICINE

## 2024-10-17 PROCEDURE — 7100000010 HC PHASE II RECOVERY - FIRST 15 MIN: Performed by: SPECIALIST

## 2024-10-17 PROCEDURE — 99221 1ST HOSP IP/OBS SF/LOW 40: CPT | Performed by: SPECIALIST

## 2024-10-17 PROCEDURE — 99285 EMERGENCY DEPT VISIT HI MDM: CPT

## 2024-10-17 PROCEDURE — 3609017100 HC EGD: Performed by: SPECIALIST

## 2024-10-17 PROCEDURE — 88305 TISSUE EXAM BY PATHOLOGIST: CPT

## 2024-10-17 PROCEDURE — 96374 THER/PROPH/DIAG INJ IV PUSH: CPT

## 2024-10-17 PROCEDURE — 43239 EGD BIOPSY SINGLE/MULTIPLE: CPT | Performed by: SPECIALIST

## 2024-10-17 PROCEDURE — 2709999900 HC NON-CHARGEABLE SUPPLY: Performed by: SPECIALIST

## 2024-10-17 PROCEDURE — 7100000011 HC PHASE II RECOVERY - ADDTL 15 MIN: Performed by: SPECIALIST

## 2024-10-17 PROCEDURE — 6360000002 HC RX W HCPCS: Performed by: NURSE ANESTHETIST, CERTIFIED REGISTERED

## 2024-10-17 PROCEDURE — 2500000003 HC RX 250 WO HCPCS: Performed by: NURSE ANESTHETIST, CERTIFIED REGISTERED

## 2024-10-17 PROCEDURE — 83605 ASSAY OF LACTIC ACID: CPT

## 2024-10-17 PROCEDURE — G0378 HOSPITAL OBSERVATION PER HR: HCPCS

## 2024-10-17 PROCEDURE — 81003 URINALYSIS AUTO W/O SCOPE: CPT

## 2024-10-17 PROCEDURE — 93005 ELECTROCARDIOGRAM TRACING: CPT | Performed by: STUDENT IN AN ORGANIZED HEALTH CARE EDUCATION/TRAINING PROGRAM

## 2024-10-17 PROCEDURE — 80053 COMPREHEN METABOLIC PANEL: CPT

## 2024-10-17 PROCEDURE — 85025 COMPLETE CBC W/AUTO DIFF WBC: CPT

## 2024-10-17 PROCEDURE — 2580000003 HC RX 258: Performed by: INTERNAL MEDICINE

## 2024-10-17 PROCEDURE — 6360000004 HC RX CONTRAST MEDICATION: Performed by: STUDENT IN AN ORGANIZED HEALTH CARE EDUCATION/TRAINING PROGRAM

## 2024-10-17 PROCEDURE — 83690 ASSAY OF LIPASE: CPT

## 2024-10-17 PROCEDURE — 6370000000 HC RX 637 (ALT 250 FOR IP): Performed by: STUDENT IN AN ORGANIZED HEALTH CARE EDUCATION/TRAINING PROGRAM

## 2024-10-17 PROCEDURE — 6360000002 HC RX W HCPCS: Performed by: STUDENT IN AN ORGANIZED HEALTH CARE EDUCATION/TRAINING PROGRAM

## 2024-10-17 PROCEDURE — 2580000003 HC RX 258: Performed by: SPECIALIST

## 2024-10-17 PROCEDURE — 96375 TX/PRO/DX INJ NEW DRUG ADDON: CPT

## 2024-10-17 PROCEDURE — 88342 IMHCHEM/IMCYTCHM 1ST ANTB: CPT

## 2024-10-17 PROCEDURE — 3700000000 HC ANESTHESIA ATTENDED CARE: Performed by: SPECIALIST

## 2024-10-17 PROCEDURE — 80307 DRUG TEST PRSMV CHEM ANLYZR: CPT

## 2024-10-17 RX ORDER — PANTOPRAZOLE SODIUM 40 MG/10ML
40 INJECTION, POWDER, LYOPHILIZED, FOR SOLUTION INTRAVENOUS DAILY
Status: DISCONTINUED | OUTPATIENT
Start: 2024-10-17 | End: 2024-10-18 | Stop reason: HOSPADM

## 2024-10-17 RX ORDER — LIDOCAINE HYDROCHLORIDE 20 MG/ML
15 SOLUTION OROPHARYNGEAL ONCE
Status: COMPLETED | OUTPATIENT
Start: 2024-10-17 | End: 2024-10-17

## 2024-10-17 RX ORDER — ONDANSETRON 2 MG/ML
4 INJECTION INTRAMUSCULAR; INTRAVENOUS EVERY 6 HOURS PRN
Status: DISCONTINUED | OUTPATIENT
Start: 2024-10-17 | End: 2024-10-18 | Stop reason: HOSPADM

## 2024-10-17 RX ORDER — MAGNESIUM SULFATE IN WATER 40 MG/ML
2000 INJECTION, SOLUTION INTRAVENOUS PRN
Status: DISCONTINUED | OUTPATIENT
Start: 2024-10-17 | End: 2024-10-18 | Stop reason: HOSPADM

## 2024-10-17 RX ORDER — KETOROLAC TROMETHAMINE 15 MG/ML
15 INJECTION, SOLUTION INTRAMUSCULAR; INTRAVENOUS ONCE
Status: DISCONTINUED | OUTPATIENT
Start: 2024-10-17 | End: 2024-10-17

## 2024-10-17 RX ORDER — BUDESONIDE AND FORMOTEROL FUMARATE DIHYDRATE 80; 4.5 UG/1; UG/1
2 AEROSOL RESPIRATORY (INHALATION) 2 TIMES DAILY
Status: DISCONTINUED | OUTPATIENT
Start: 2024-10-17 | End: 2024-10-18 | Stop reason: HOSPADM

## 2024-10-17 RX ORDER — POTASSIUM CHLORIDE 7.45 MG/ML
10 INJECTION INTRAVENOUS PRN
Status: DISCONTINUED | OUTPATIENT
Start: 2024-10-17 | End: 2024-10-18 | Stop reason: HOSPADM

## 2024-10-17 RX ORDER — POLYETHYLENE GLYCOL 3350 17 G/17G
17 POWDER, FOR SOLUTION ORAL DAILY PRN
Status: DISCONTINUED | OUTPATIENT
Start: 2024-10-17 | End: 2024-10-18 | Stop reason: HOSPADM

## 2024-10-17 RX ORDER — KETOROLAC TROMETHAMINE 30 MG/ML
30 INJECTION, SOLUTION INTRAMUSCULAR; INTRAVENOUS EVERY 6 HOURS PRN
Status: DISCONTINUED | OUTPATIENT
Start: 2024-10-17 | End: 2024-10-18 | Stop reason: HOSPADM

## 2024-10-17 RX ORDER — ONDANSETRON 2 MG/ML
4 INJECTION INTRAMUSCULAR; INTRAVENOUS ONCE
Status: COMPLETED | OUTPATIENT
Start: 2024-10-17 | End: 2024-10-17

## 2024-10-17 RX ORDER — SODIUM CHLORIDE 0.9 % (FLUSH) 0.9 %
5-40 SYRINGE (ML) INJECTION EVERY 12 HOURS SCHEDULED
Status: DISCONTINUED | OUTPATIENT
Start: 2024-10-17 | End: 2024-10-17 | Stop reason: HOSPADM

## 2024-10-17 RX ORDER — SODIUM CHLORIDE 0.9 % (FLUSH) 0.9 %
5-40 SYRINGE (ML) INJECTION PRN
Status: DISCONTINUED | OUTPATIENT
Start: 2024-10-17 | End: 2024-10-18 | Stop reason: HOSPADM

## 2024-10-17 RX ORDER — SODIUM CHLORIDE 0.9 % (FLUSH) 0.9 %
5-40 SYRINGE (ML) INJECTION PRN
Status: DISCONTINUED | OUTPATIENT
Start: 2024-10-17 | End: 2024-10-17 | Stop reason: HOSPADM

## 2024-10-17 RX ORDER — PROPOFOL 10 MG/ML
INJECTION, EMULSION INTRAVENOUS
Status: DISCONTINUED | OUTPATIENT
Start: 2024-10-17 | End: 2024-10-17 | Stop reason: SDUPTHER

## 2024-10-17 RX ORDER — ONDANSETRON 4 MG/1
4 TABLET, ORALLY DISINTEGRATING ORAL EVERY 8 HOURS PRN
Status: DISCONTINUED | OUTPATIENT
Start: 2024-10-17 | End: 2024-10-18 | Stop reason: HOSPADM

## 2024-10-17 RX ORDER — ENOXAPARIN SODIUM 100 MG/ML
30 INJECTION SUBCUTANEOUS DAILY
Status: DISCONTINUED | OUTPATIENT
Start: 2024-10-17 | End: 2024-10-18 | Stop reason: HOSPADM

## 2024-10-17 RX ORDER — SODIUM CHLORIDE 9 MG/ML
25 INJECTION, SOLUTION INTRAVENOUS PRN
Status: DISCONTINUED | OUTPATIENT
Start: 2024-10-17 | End: 2024-10-17 | Stop reason: HOSPADM

## 2024-10-17 RX ORDER — MORPHINE SULFATE 2 MG/ML
2 INJECTION, SOLUTION INTRAMUSCULAR; INTRAVENOUS ONCE
Status: COMPLETED | OUTPATIENT
Start: 2024-10-17 | End: 2024-10-17

## 2024-10-17 RX ORDER — HYDROMORPHONE HYDROCHLORIDE 1 MG/ML
0.5 INJECTION, SOLUTION INTRAMUSCULAR; INTRAVENOUS; SUBCUTANEOUS ONCE
Status: COMPLETED | OUTPATIENT
Start: 2024-10-17 | End: 2024-10-17

## 2024-10-17 RX ORDER — IOPAMIDOL 755 MG/ML
75 INJECTION, SOLUTION INTRAVASCULAR
Status: COMPLETED | OUTPATIENT
Start: 2024-10-17 | End: 2024-10-17

## 2024-10-17 RX ORDER — MAGNESIUM HYDROXIDE/ALUMINUM HYDROXICE/SIMETHICONE 120; 1200; 1200 MG/30ML; MG/30ML; MG/30ML
30 SUSPENSION ORAL ONCE
Status: COMPLETED | OUTPATIENT
Start: 2024-10-17 | End: 2024-10-17

## 2024-10-17 RX ORDER — NALOXONE HYDROCHLORIDE 0.4 MG/ML
INJECTION, SOLUTION INTRAMUSCULAR; INTRAVENOUS; SUBCUTANEOUS PRN
Status: DISCONTINUED | OUTPATIENT
Start: 2024-10-17 | End: 2024-10-17 | Stop reason: HOSPADM

## 2024-10-17 RX ORDER — DICYCLOMINE HYDROCHLORIDE 10 MG/1
10 CAPSULE ORAL
Status: DISCONTINUED | OUTPATIENT
Start: 2024-10-17 | End: 2024-10-18 | Stop reason: HOSPADM

## 2024-10-17 RX ORDER — SODIUM CHLORIDE 9 MG/ML
INJECTION, SOLUTION INTRAVENOUS PRN
Status: DISCONTINUED | OUTPATIENT
Start: 2024-10-17 | End: 2024-10-18 | Stop reason: HOSPADM

## 2024-10-17 RX ORDER — SODIUM CHLORIDE 0.9 % (FLUSH) 0.9 %
5-40 SYRINGE (ML) INJECTION EVERY 12 HOURS SCHEDULED
Status: DISCONTINUED | OUTPATIENT
Start: 2024-10-17 | End: 2024-10-18 | Stop reason: HOSPADM

## 2024-10-17 RX ORDER — SODIUM CHLORIDE 9 MG/ML
INJECTION, SOLUTION INTRAVENOUS PRN
Status: DISCONTINUED | OUTPATIENT
Start: 2024-10-17 | End: 2024-10-17 | Stop reason: HOSPADM

## 2024-10-17 RX ORDER — OXYCODONE HYDROCHLORIDE 5 MG/1
10 TABLET ORAL EVERY 4 HOURS PRN
Status: DISCONTINUED | OUTPATIENT
Start: 2024-10-17 | End: 2024-10-18 | Stop reason: HOSPADM

## 2024-10-17 RX ORDER — SODIUM CHLORIDE, SODIUM LACTATE, POTASSIUM CHLORIDE, CALCIUM CHLORIDE 600; 310; 30; 20 MG/100ML; MG/100ML; MG/100ML; MG/100ML
INJECTION, SOLUTION INTRAVENOUS CONTINUOUS
Status: DISCONTINUED | OUTPATIENT
Start: 2024-10-17 | End: 2024-10-18

## 2024-10-17 RX ORDER — SUCRALFATE 1 G/1
1 TABLET ORAL
Status: COMPLETED | OUTPATIENT
Start: 2024-10-17 | End: 2024-10-17

## 2024-10-17 RX ORDER — TIZANIDINE 2 MG/1
4 TABLET ORAL EVERY 6 HOURS PRN
Status: DISCONTINUED | OUTPATIENT
Start: 2024-10-17 | End: 2024-10-18 | Stop reason: HOSPADM

## 2024-10-17 RX ORDER — ACETAMINOPHEN 650 MG/1
650 SUPPOSITORY RECTAL EVERY 6 HOURS PRN
Status: DISCONTINUED | OUTPATIENT
Start: 2024-10-17 | End: 2024-10-18 | Stop reason: HOSPADM

## 2024-10-17 RX ORDER — POTASSIUM CHLORIDE 1500 MG/1
40 TABLET, EXTENDED RELEASE ORAL PRN
Status: DISCONTINUED | OUTPATIENT
Start: 2024-10-17 | End: 2024-10-18 | Stop reason: HOSPADM

## 2024-10-17 RX ORDER — ACETAMINOPHEN 325 MG/1
650 TABLET ORAL EVERY 6 HOURS PRN
Status: DISCONTINUED | OUTPATIENT
Start: 2024-10-17 | End: 2024-10-18 | Stop reason: HOSPADM

## 2024-10-17 RX ORDER — LIDOCAINE HYDROCHLORIDE 20 MG/ML
INJECTION, SOLUTION INFILTRATION; PERINEURAL
Status: DISCONTINUED | OUTPATIENT
Start: 2024-10-17 | End: 2024-10-17 | Stop reason: SDUPTHER

## 2024-10-17 RX ADMIN — PANCRELIPASE LIPASE, PANCRELIPASE PROTEASE, PANCRELIPASE AMYLASE 10000 UNITS: 5000; 17000; 24000 CAPSULE, DELAYED RELEASE ORAL at 18:04

## 2024-10-17 RX ADMIN — ONDANSETRON 4 MG: 2 INJECTION, SOLUTION INTRAMUSCULAR; INTRAVENOUS at 05:33

## 2024-10-17 RX ADMIN — OXYCODONE 10 MG: 5 TABLET ORAL at 10:21

## 2024-10-17 RX ADMIN — SODIUM CHLORIDE, PRESERVATIVE FREE 10 ML: 5 INJECTION INTRAVENOUS at 10:27

## 2024-10-17 RX ADMIN — DICYCLOMINE HYDROCHLORIDE 10 MG: 10 CAPSULE ORAL at 21:25

## 2024-10-17 RX ADMIN — KETOROLAC TROMETHAMINE 30 MG: 30 INJECTION, SOLUTION INTRAMUSCULAR; INTRAVENOUS at 21:24

## 2024-10-17 RX ADMIN — OXYCODONE 10 MG: 5 TABLET ORAL at 21:25

## 2024-10-17 RX ADMIN — DICYCLOMINE HYDROCHLORIDE 10 MG: 10 CAPSULE ORAL at 18:04

## 2024-10-17 RX ADMIN — KETOROLAC TROMETHAMINE 30 MG: 30 INJECTION, SOLUTION INTRAMUSCULAR; INTRAVENOUS at 14:56

## 2024-10-17 RX ADMIN — SUCRALFATE 1 G: 1 TABLET ORAL at 07:01

## 2024-10-17 RX ADMIN — PANTOPRAZOLE SODIUM 40 MG: 40 INJECTION, POWDER, FOR SOLUTION INTRAVENOUS at 10:25

## 2024-10-17 RX ADMIN — MORPHINE SULFATE 2 MG: 2 INJECTION, SOLUTION INTRAMUSCULAR; INTRAVENOUS at 05:16

## 2024-10-17 RX ADMIN — IOPAMIDOL 75 ML: 755 INJECTION, SOLUTION INTRAVENOUS at 05:56

## 2024-10-17 RX ADMIN — SODIUM CHLORIDE, POTASSIUM CHLORIDE, SODIUM LACTATE AND CALCIUM CHLORIDE: 600; 310; 30; 20 INJECTION, SOLUTION INTRAVENOUS at 10:21

## 2024-10-17 RX ADMIN — PHENYLEPHRINE HYDROCHLORIDE 100 MCG: 10 INJECTION INTRAVENOUS at 13:39

## 2024-10-17 RX ADMIN — ENOXAPARIN SODIUM 30 MG: 100 INJECTION SUBCUTANEOUS at 10:27

## 2024-10-17 RX ADMIN — PROPOFOL 170 MG: 10 INJECTION, EMULSION INTRAVENOUS at 13:32

## 2024-10-17 RX ADMIN — LIDOCAINE HYDROCHLORIDE 15 ML: 20 SOLUTION ORAL at 07:01

## 2024-10-17 RX ADMIN — OXYCODONE 10 MG: 5 TABLET ORAL at 14:55

## 2024-10-17 RX ADMIN — ALUMINUM HYDROXIDE, MAGNESIUM HYDROXIDE, AND SIMETHICONE 30 ML: 200; 200; 20 SUSPENSION ORAL at 07:01

## 2024-10-17 RX ADMIN — SODIUM CHLORIDE, PRESERVATIVE FREE 10 ML: 5 INJECTION INTRAVENOUS at 21:26

## 2024-10-17 RX ADMIN — LIDOCAINE HYDROCHLORIDE 40 MG: 20 INJECTION, SOLUTION INFILTRATION; PERINEURAL at 13:32

## 2024-10-17 RX ADMIN — HYDROMORPHONE HYDROCHLORIDE 0.5 MG: 1 INJECTION, SOLUTION INTRAMUSCULAR; INTRAVENOUS; SUBCUTANEOUS at 06:59

## 2024-10-17 ASSESSMENT — ENCOUNTER SYMPTOMS
ABDOMINAL DISTENTION: 0
COLOR CHANGE: 0
SHORTNESS OF BREATH: 0
ABDOMINAL PAIN: 1
WHEEZING: 0
ANAL BLEEDING: 0
BLOOD IN STOOL: 0
DIARRHEA: 0
EYE REDNESS: 0
COUGH: 0
SINUS PAIN: 0
CHEST TIGHTNESS: 0
CONSTIPATION: 0
BACK PAIN: 0
NAUSEA: 0
RHINORRHEA: 0
EYE DISCHARGE: 0
VOMITING: 0
PHOTOPHOBIA: 0
EYE ITCHING: 0
EYE PAIN: 0

## 2024-10-17 ASSESSMENT — PAIN SCALES - GENERAL
PAINLEVEL_OUTOF10: 3
PAINLEVEL_OUTOF10: 8
PAINLEVEL_OUTOF10: 7
PAINLEVEL_OUTOF10: 4
PAINLEVEL_OUTOF10: 7
PAINLEVEL_OUTOF10: 7
PAINLEVEL_OUTOF10: 3
PAINLEVEL_OUTOF10: 4

## 2024-10-17 ASSESSMENT — PAIN DESCRIPTION - ORIENTATION
ORIENTATION: MID
ORIENTATION: LEFT;UPPER
ORIENTATION: LEFT

## 2024-10-17 ASSESSMENT — PAIN DESCRIPTION - DESCRIPTORS
DESCRIPTORS: JABBING;NAGGING;SHARP
DESCRIPTORS: DISCOMFORT;SHARP
DESCRIPTORS: DISCOMFORT

## 2024-10-17 ASSESSMENT — PAIN DESCRIPTION - LOCATION
LOCATION: ABDOMEN

## 2024-10-17 ASSESSMENT — PAIN - FUNCTIONAL ASSESSMENT: PAIN_FUNCTIONAL_ASSESSMENT: 0-10

## 2024-10-17 ASSESSMENT — PAIN DESCRIPTION - PAIN TYPE: TYPE: ACUTE PAIN

## 2024-10-17 NOTE — CONSULTS
Consults    Patient Name: Sisi Cam  Admit Date: 10/17/2024  4:36 AM  MR #: [id number]  : 1970    Attending Physician: Leena Mcgrath MD  Reason for consult: Abdominal pain    History of Presenting Illness:      Sisi Cam is a 53 y.o. female on hospital day 0 with a history of abdominal pain is started about 2 weeks ago.  Pain is in the epigastric area radiating to the left upper quadrant and into back.  Feels nauseous no emesis.  Has postprandial bloating and fullness.  No history of GI bleeding.  Patient has a history of pancreas divisum and also EPI being followed by Dr. Oracio Fofana at .  Had ERCP and stent placement in the past. no fever no chills.  She had a CT of the abdomen pelvis done which showed no radiographic evidence of pancreatitis and serum lipase and LFTs are all normal.  Patient was given pain medications without complete relief of symptoms and continue to have abdominal discomfort and was admitted for further management.,  It seems patient had a recent colonoscopy at [facility] which showed hemorrhoid and benign colon polyp. History Obtained From:  patient      History:      Past Medical History:   Diagnosis Date    Acquired hypothyroidism 2016    Acute left-sided low back pain with bilateral sciatica 2022    Acute pancreatitis     ADD (attention deficit disorder) 2015    Anxiety     Claudication of both lower extremities (HCC) 2022    Congestive heart failure, unspecified HF chronicity, unspecified heart failure type (HCC) 2022    Depression 2015    Endometrial cyst of ovary 05/10/2014    RT ovary, ruptured    GERD (gastroesophageal reflux disease) 2016    Medullary sponge kidney of both kidneys 2018    Sjogren's disease (HCC)     Stress     Swelling     Type 2 diabetes mellitus 3/1/2024     Past Surgical History:   Procedure Laterality Date    BREAST BIOPSY Left ??    lump removed (benign) (Dr. Garcia)    BREAST CYST EXCISION

## 2024-10-17 NOTE — ED NOTES
Pt presents to the ED via private vehicle. Pt complains of left sided abd pain. Pt was at ED a week ago for same issue. Pt states that she has not had any follow up care after discharge from facility.   Pt describes pain that radiate int her back.   Pt states that she is using a lidocaine patch at this time and took her pain medications that were prescribed to her prior to coming to the ED>

## 2024-10-17 NOTE — CARE COORDINATION
Case Management Assessment  Initial Evaluation    Date/Time of Evaluation: 10/17/2024 12:42 PM  Assessment Completed by: Gracy Singh    If patient is discharged prior to next notation, then this note serves as note for discharge by case management.    Patient Name: Sisi Cam                   YOB: 1970  Diagnosis: Abdominal pain, epigastric [R10.13]  Abdominal pain [R10.9]  Renal cysts, acquired, bilateral [N28.1]  Constipation, unspecified constipation type [K59.00]                   Date / Time: 10/17/2024  4:36 AM    Patient Admission Status: Observation   Readmission Risk (Low < 19, Mod (19-27), High > 27): No data recorded  Current PCP: Emmie Cullen APRN - CNP  PCP verified by CM? Yes (PER PATIENT SAW DR. CULLEN WITHIN LAST 3 MONTHS)    Chart Reviewed: Yes      History Provided by: Patient  Patient Orientation: Alert and Oriented, Person, Situation    Patient Cognition: Alert    Hospitalization in the last 30 days (Readmission):  No    If yes, Readmission Assessment in  Navigator will be completed.    Advance Directives:      Code Status: Full Code   Patient's Primary Decision Maker is: Legal Next of Kin    Primary Decision Maker: Celestine Corado - Child - [phone]    Secondary Decision Maker: JAZMINSTACI OLIVER  Parent - [phone]    Discharge Planning:    Patient lives with:   Type of Home:    Primary Care Giver: Self  Patient Support Systems include: Children   Current Financial resources:    Current community resources:    Current services prior to admission:              Current DME:              Type of Home Care services:       ADLS  Prior functional level: Independent in ADLs/IADLs  Current functional level: Independent in ADLs/IADLs    PT AM-PAC:   /24  OT AM-PAC:   /24    Family can provide assistance at DC: Yes  Would you like Case Management to discuss the discharge plan with any other family members/significant others, and if so, who? No  Plans to Return to Present  Housing: Yes  Other Identified Issues/Barriers to RETURNING to current housing: N/A  Potential Assistance needed at discharge:              Potential DME:    Patient expects to discharge to:    Plan for transportation at discharge:      Financial    Payor: [facility] MEDICARE / Plan: [facility] MEDICARE COMPLETE / Product Type: *No Product type* /     Does insurance require precert for SNF: Yes    Potential assistance Purchasing Medications:    Meds-to-Beds request: Yes      [id number] [place] [place] - [address] - P [phone] - F [phone]  [address] [place] [address]  Phone: [phone] Fax: [phone]    [facility] - [place] - [address] - P [phone] - F [phone]  [address] [place] [address]  Phone: [phone] Fax: [phone]    [facility] - [place], [place] - [address] - P [phone] - F [phone]  [address] [facility] [place] [address]  Phone: [phone] Fax: [phone]    [id number] - [place], [place] - [address] - P [phone] - F [phone]  [address] [facility] [place] [address]  Phone: [phone] Fax: [phone]     [facility] - [place] - [address] - P [phone] - F [phone]  [address]  [place] [address]  Phone: [phone] Fax: [phone]    [facility] - [place] - [address] - P [phone] - F [phone]  [address]  [address]  [place] [address]  Phone: [phone] Fax: [phone]    [id number] - [place] - [address]  [phone] - F [phone] [address] [facility] [address]  Phone: [phone] Fax: [phone]      Notes:    Factors facilitating achievement of predicted outcomes: Family support, Cooperative, and Pleasant    Barriers to discharge: Medical complications    Additional Case Management Notes: MET WITH PATIENT AT BEDSIDE TO DISCUSS DISCHARGE PLAN. PATIENT STATES DAUGHTER

## 2024-10-17 NOTE — ANESTHESIA PRE PROCEDURE
Alcohol/week: 0.0 standard drinks of alcohol     Comment: no alcohol since 2011                                Counseling given: Not Answered      Vital Signs (Current):   Vitals:    10/17/24 0435 10/17/24 0535 10/17/24 0700 10/17/24 0800   BP: 121/84 104/79 113/73 106/70   Pulse: 78  64 64   Resp:   18 18   Temp: 36.6 °C (97.8 °F)      SpO2: 100% 100% 97% 99%   Weight: 50.3 kg (111 lb)                                                 BP Readings from Last 3 Encounters:   10/17/24 106/70   10/17/24 102/66   10/10/24 (!) 145/97       NPO Status:                                                                                 BMI:   Wt Readings from Last 3 Encounters:   10/17/24 50.3 kg (111 lb)   10/10/24 50.8 kg (112 lb)   10/01/24 50.8 kg (112 lb)     Body mass index is 20.3 kg/m².    CBC:   Lab Results   Component Value Date/Time    WBC 5.4 10/17/2024 05:25 AM    RBC 4.03 10/17/2024 05:25 AM    HGB 12.1 10/17/2024 05:25 AM    HCT 35.6 10/17/2024 05:25 AM    MCV 88.3 10/17/2024 05:25 AM    RDW 13.0 10/17/2024 05:25 AM     10/17/2024 05:25 AM       CMP:   Lab Results   Component Value Date/Time     10/17/2024 05:25 AM    K 3.4 10/17/2024 05:25 AM    K 3.3 07/03/2024 05:38 PM     10/17/2024 05:25 AM    CO2 24 10/17/2024 05:25 AM    BUN 23 10/17/2024 05:25 AM    CREATININE 0.8 10/17/2024 05:29 AM    CREATININE 0.72 10/17/2024 05:25 AM    GFRAA >60.0 08/01/2022 06:18 AM    AGRATIO 1.5 04/05/2019 08:05 AM    LABGLOM 89 10/17/2024 05:29 AM    LABGLOM >60.0 02/08/2024 04:00 AM    GLUCOSE 80 10/17/2024 05:25 AM    GLUCOSE 86 09/11/2023 11:13 PM    CALCIUM 9.0 10/17/2024 05:25 AM    BILITOT <0.2 10/17/2024 05:25 AM    ALKPHOS 111 10/17/2024 05:25 AM    AST 11 10/17/2024 05:25 AM    ALT 8 10/17/2024 05:25 AM       POC Tests: No results for input(s): \"POCGLU\", \"POCNA\", \"POCK\", \"POCCL\", \"POCBUN\", \"POCHEMO\", \"POCHCT\" in the last 72 hours.    Coags:   Lab Results   Component Value Date/Time    PROTIME 12.6

## 2024-10-17 NOTE — CONSULTS
650 mg, 650 mg, Rectal, Q6H PRN  lactated ringers IV soln infusion, , IntraVENous, Continuous  ketorolac (TORADOL) injection 30 mg, 30 mg, IntraVENous, Q6H PRN  pantoprazole (PROTONIX) injection 40 mg, 40 mg, IntraVENous, Daily  oxyCODONE (ROXICODONE) immediate release tablet 10 mg, 10 mg, Oral, Q4H PRN  tiZANidine (ZANAFLEX) tablet 4 mg, 4 mg, Oral, Q6H PRN  ROS:  Constitutional:  Positive for chills. Negative for activity change, appetite change, fatigue and fever.   HENT:  Negative for congestion, postnasal drip, rhinorrhea and sinus pain.    Eyes:  Negative for photophobia, pain, discharge, redness and itching.   Respiratory:  Negative for cough, chest tightness, shortness of breath and wheezing.    Cardiovascular:  Negative for chest pain, palpitations and leg swelling.   Gastrointestinal:  Positive for abdominal pain (Left upper quadrant and epigastric abdominal pain). Negative for abdominal distention, anal bleeding, blood in stool, constipation, diarrhea, nausea and vomiting.   Endocrine: Negative for cold intolerance and heat intolerance.   Genitourinary:  Positive for flank pain (Pain radiating from left upper quadrant into left flank). Negative for decreased urine volume, dysuria, frequency, hematuria and urgency.   Musculoskeletal:  Negative for arthralgias, back pain, gait problem, myalgias, neck pain and neck stiffness.   Skin:  Negative for color change and pallor.   Neurological:  Negative for syncope, weakness, light-headedness and headaches.   Psychiatric/Behavioral:  Negative for agitation, behavioral problems, confusion and decreased concentration.   Physical  VITALS:  /70   Pulse 64   Temp 97.8 °F (36.6 °C)   Resp 16   Wt 50.3 kg (111 lb)   SpO2 99%   BMI 20.30 kg/m²   CONSTITUTIONAL:  awake, alert, cooperative, no apparent distress, and appears stated age  EYES:  Lids and lashes normal, pupils equal, round and reactive to light, extra ocular muscles intact, sclera clear, conjunctiva  05:25 AM    BASOPCT 1.5 10/17/2024 05:25 AM    MONOSABS 0.5 10/17/2024 05:25 AM    LYMPHSABS 1.8 10/17/2024 05:25 AM    EOSABS 0.1 10/17/2024 05:25 AM    BASOSABS 0.1 10/17/2024 05:25 AM     BMP:    Lab Results   Component Value Date/Time     10/17/2024 05:25 AM    K 3.4 10/17/2024 05:25 AM    K 3.3 07/03/2024 05:38 PM     10/17/2024 05:25 AM    CO2 24 10/17/2024 05:25 AM    BUN 23 10/17/2024 05:25 AM    CREATININE 0.8 10/17/2024 05:29 AM    CREATININE 0.72 10/17/2024 05:25 AM    CALCIUM 9.0 10/17/2024 05:25 AM    GFRAA >60.0 08/01/2022 06:18 AM    LABGLOM 89 10/17/2024 05:29 AM    LABGLOM >60.0 02/08/2024 04:00 AM    GLUCOSE 80 10/17/2024 05:25 AM    GLUCOSE 86 09/11/2023 11:13 PM   Lipase WNL.  UA consistent    ASSESSMENT AND PLAN    Rib contusion mild tenderness not very prominenyt on Palpation vs pancreatitis. Will follow up as needed and titrate meds.

## 2024-10-17 NOTE — ANESTHESIA POSTPROCEDURE EVALUATION
Department of Anesthesiology  Postprocedure Note    Patient: Sisi Cam  MRN: [id number]  YOB: 1970  Date of evaluation: 10/17/2024    Procedure Summary       Date: 10/17/24 Room / Location: [facility] OR 01 / [facility]    Anesthesia Start: 1329 Anesthesia Stop: 1342    Procedure: ESOPHAGOGASTRODUODENOSCOPY with biopsy and polypectomy Diagnosis:       Abdominal pain      (Abdominal pain [R10.9])    Surgeons: Jing Quiroz MD Responsible Provider: Kevin Gardner APRN - CRNA    Anesthesia Type: MAC ASA Status: 3            Anesthesia Type: MAC    Hossein Phase I: Hossein Score: 10    Hossein Phase II:      Anesthesia Post Evaluation    Patient location during evaluation: bedside  Patient participation: complete - patient participated  Level of consciousness: awake and awake and alert  Pain score: 0  Airway patency: patent  Nausea & Vomiting: no nausea and no vomiting  Cardiovascular status: blood pressure returned to baseline and hemodynamically stable  Respiratory status: acceptable and face mask  Hydration status: euvolemic  Pain management: adequate        No notable events documented.

## 2024-10-17 NOTE — H&P
Patient is a 53-year-old woman with past medical history of chronic pancreatitis on Creon sees Dr. Rangel and a GI doctor in [place] comes in here for abdominal pain radiating to the back.  Recently was told that a possible rib hanging causing fibrosis next of the tail of the pancreas by her GI doctor at [place].  ER physician spoke with GI DrValentino Escoto recommend to have HD done.  Patient has nausea no vomiting cannot hold anything down for past 5 days.  Abdominal pain is severe 8 out of 10 on scale severity constant pain no alleviating or aggravating factors except Dilaudid helped with the pain.  Urine toxicology pending.            Past Medical History:   Diagnosis Date    Acquired hypothyroidism 09/26/2016    Acute left-sided low back pain with bilateral sciatica 03/09/2022    Acute pancreatitis     ADD (attention deficit disorder) 02/12/2015    Anxiety     Claudication of both lower extremities (HCC) 02/11/2022    Congestive heart failure, unspecified HF chronicity, unspecified heart failure type (HCC) 02/11/2022    Depression 12/09/2015    Endometrial cyst of ovary 05/10/2014    RT ovary, ruptured    GERD (gastroesophageal reflux disease) 09/26/2016    Medullary sponge kidney of both kidneys 05/22/2018    Sjogren's disease (HCC)     Stress     Swelling     Type 2 diabetes mellitus 3/1/2024     Past Surgical History:   Procedure Laterality Date    BREAST BIOPSY Left 2015??    lump removed (benign) (Dr. Garcia)    BREAST CYST EXCISION Left 2015??    Dr Garcia (benign)    BREAST LUMPECTOMY Left     2015? (benign)    CHOLECYSTECTOMY  2011    COLONOSCOPY N/A 12/16/2022    COLORECTAL CANCER SCREENING, HIGH RISK performed by Sergei Rangel MD at [facility]    ERCP W/ PLASTIC STENT PLACEMENT N/A     HYSTERECTOMY (CERVIX STATUS UNKNOWN)  2014 sept (total)    OVARY REMOVAL Left 01/2014    UPPER GASTROINTESTINAL ENDOSCOPY  09/16/2016    EGD W/BX    UPPER GASTROINTESTINAL ENDOSCOPY N/A 05/06/2021    ENDOSCOPIC

## 2024-10-17 NOTE — ED PROVIDER NOTES
[facility] ED  EMERGENCY DEPARTMENT ENCOUNTER      Pt Name: Sisi Cam  MRN: [id number]  Birthdate 1970  Date of evaluation: 10/17/2024  Provider: Jordan Dowling MD  7:16 AM    CHIEF COMPLAINT       Chief Complaint   Patient presents with    Abdominal Pain     Was here last week similar issue         HISTORY OF PRESENT ILLNESS    Sisi Cam is a 53 y.o. female who presents to the emergency department left upper quadrant abdominal pain    HPI  Patient is a 53-year-old female presenting to ED due to concern for left upper quadrant abdominal pain.  Patient has a past medical history of diverticulitis, chronic pancreatitis, pancreatic steatorrhea currently on creon, pancreatic divisum, GERD, TIIDM, medullary sponge, hypothyroidism, CHF, depression, ovarian cysts, uterine fibroids. Patient sees gastroenterology at . Patient endorsed that since the end of September 2024 and till today, she has had a constant sharp and burning discomfort to her left upper quadrant going into her left flank.  She denies any mid or lower abdominal discomfort.  The abdominal pain is constant at rest but worsens with direct palpation, twisting of her upper body, exertion, eating and drinking.  She denies any NSAID usage and denies any reflux symptoms.  She endorses that she is using lidocaine patches with no relief.  She endorses some chills but no documented fevers.  She denies any nausea or vomiting.  She denies any diarrhea, dark-tarry stooling or bright bleeding from the rectum.  She denies any recent constipation.  She denies any dysuria or hematuria.  She denies any trauma to her abdomen.  She endorsed that she just had an ultrasound performed of her abdomen which through chart review showed a vascular structure adjacent to tail the pancreas and aorta.  Patient endorses that she has a \"hanging rib\" and she feels that this is potentially \"catching onto her aorta and pancreas\".  Patient had an EGD performed in 2020 that  in-patient if her EGD does not reveal any obvious findings that could be causing her discomfort.  Patient is agreeable to this at this time.  Her vital signs were within normal limits and she was stable upon admission the hospital.      REASSESSMENT          CRITICAL CARE TIME   Total Critical Care time was 0 minutes, excluding separately reportable procedures.  There was a high probability of clinically significant/life threatening deterioration in the patient's condition which required my urgent intervention.      CONSULTS:  IP CONSULT TO PAIN MANAGEMENT  IP CONSULT TO GI    PROCEDURES:  Unless otherwise noted below, none     Procedures        FINAL IMPRESSION      1. Renal cysts, acquired, bilateral    2. Constipation, unspecified constipation type    3. Abdominal pain, epigastric          DISPOSITION/PLAN   DISPOSITION Admitted 10/17/2024 07:10:45 AM  Condition at Disposition: Data Unavailable      PATIENT REFERRED TO:  No follow-up provider specified.    DISCHARGE MEDICATIONS:  New Prescriptions    No medications on file     Controlled Substances Monitoring:     RX Monitoring Attestation   3/25/2019  12:39 PM The Prescription Monitoring Report for this patient was reviewed today.       (Please note that portions of this note were completed with a voice recognition program.  Efforts were made to edit the dictations but occasionally words are mis-transcribed.)    Jordan Dowling MD (electronically signed)  Attending Emergency Physician           Jordan Dowling MD  10/17/24 0722

## 2024-10-18 VITALS
BODY MASS INDEX: 20.61 KG/M2 | TEMPERATURE: 98.4 F | SYSTOLIC BLOOD PRESSURE: 89 MMHG | HEART RATE: 77 BPM | DIASTOLIC BLOOD PRESSURE: 61 MMHG | WEIGHT: 112 LBS | HEIGHT: 62 IN | RESPIRATION RATE: 16 BRPM | OXYGEN SATURATION: 99 %

## 2024-10-18 PROCEDURE — 6370000000 HC RX 637 (ALT 250 FOR IP): Performed by: INTERNAL MEDICINE

## 2024-10-18 PROCEDURE — G0378 HOSPITAL OBSERVATION PER HR: HCPCS

## 2024-10-18 PROCEDURE — 94640 AIRWAY INHALATION TREATMENT: CPT

## 2024-10-18 PROCEDURE — 2580000003 HC RX 258: Performed by: INTERNAL MEDICINE

## 2024-10-18 PROCEDURE — 2700000000 HC OXYGEN THERAPY PER DAY

## 2024-10-18 RX ORDER — 0.9 % SODIUM CHLORIDE 0.9 %
500 INTRAVENOUS SOLUTION INTRAVENOUS ONCE
Status: COMPLETED | OUTPATIENT
Start: 2024-10-18 | End: 2024-10-18

## 2024-10-18 RX ORDER — OXYCODONE AND ACETAMINOPHEN 5; 325 MG/1; MG/1
1 TABLET ORAL EVERY 8 HOURS PRN
Qty: 15 TABLET | Refills: 0 | Status: SHIPPED | OUTPATIENT
Start: 2024-10-18 | End: 2024-10-23

## 2024-10-18 RX ADMIN — OXYCODONE 10 MG: 5 TABLET ORAL at 05:55

## 2024-10-18 RX ADMIN — SODIUM CHLORIDE, POTASSIUM CHLORIDE, SODIUM LACTATE AND CALCIUM CHLORIDE: 600; 310; 30; 20 INJECTION, SOLUTION INTRAVENOUS at 03:31

## 2024-10-18 RX ADMIN — BUDESONIDE AND FORMOTEROL FUMARATE DIHYDRATE 2 PUFF: 80; 4.5 AEROSOL RESPIRATORY (INHALATION) at 08:34

## 2024-10-18 RX ADMIN — DICYCLOMINE HYDROCHLORIDE 10 MG: 10 CAPSULE ORAL at 05:55

## 2024-10-18 RX ADMIN — SODIUM CHLORIDE 500 ML: 9 INJECTION, SOLUTION INTRAVENOUS at 01:26

## 2024-10-18 ASSESSMENT — PAIN DESCRIPTION - DESCRIPTORS: DESCRIPTORS: SHOOTING;SHARP;DISCOMFORT

## 2024-10-18 ASSESSMENT — PAIN DESCRIPTION - LOCATION: LOCATION: BACK

## 2024-10-18 ASSESSMENT — PAIN SCALES - GENERAL
PAINLEVEL_OUTOF10: 2
PAINLEVEL_OUTOF10: 7

## 2024-10-18 ASSESSMENT — PAIN - FUNCTIONAL ASSESSMENT: PAIN_FUNCTIONAL_ASSESSMENT: ACTIVITIES ARE NOT PREVENTED

## 2024-10-18 NOTE — DISCHARGE SUMMARY
Discharge Summary    Date: 10/18/2024  Patient Name: Sisi Cam    YOB: 1970     Age: 53 y.o.    Admit Date: 10/17/2024  Discharge Date: 10/18/2024  Discharge Condition: Fair    Admission Diagnosis  Abdominal pain, epigastric [R10.13];Abdominal pain [R10.9];Renal cysts, acquired, bilateral [N28.1];Constipation, unspecified constipation type [K59.00]      Discharge Diagnosis  Principal Problem:    Abdominal pain  Active Problems:    Renal cysts, acquired, bilateral  Resolved Problems:    * No resolved hospital problems. *      Hospital Stay  Narrative of Hospital Course:  Patient comes abdominal pain  and possible acute on chronic pancreatitis, Lipase wnl, status post EGD with biopsy no acute findings. Evaluated by pain management recommend to FU as outpt , dc on percocet    Consultants:  IP CONSULT TO PAIN MANAGEMENT  IP CONSULT TO GI    Surgeries/procedures Performed:      Treatments:            Discharge Plan/Disposition:  Home    Hospital/Incidental Findings Requiring Follow Up:    Patient Instructions:    Diet:    Activity:  For number of days (if applicable):      Other Instructions:    Provider Follow-Up:   No follow-ups on file.     Significant Diagnostic Studies:    Recent Labs:  Admission on 10/17/2024  Sodium                                        Date: 10/17/2024  Value: 137         Ref range: 135 - 144 mEq/L    Status: Final  Potassium                                     Date: 10/17/2024  Value: 3.4         Ref range: 3.4 - 4.9 mEq/L    Status: Final  Chloride                                      Date: 10/17/2024  Value: 103         Ref range: 95 - 107 mEq/L     Status: Final  CO2                                           Date: 10/17/2024  Value: 24          Ref range: 20 - 31 mEq/L      Status: Final  Anion Gap                                     Date: 10/17/2024  Value: 10          Ref range: 9 - 15 mEq/L       Status: Final  Glucose                                       Date:  link  https://www.kidney.org/professionals/kdoqi/gfr_calculatorped  Effective Oct 3, 2022  These results are not intended for use in patients  <18 years of age.  eGFR results are calculated without  a race factor using the 2021 CKD-EPI equation.  Careful  clinical correlation is recommended, particularly when  comparing to results calculated using previous equations.  The CKD-EPI equation is less accurate in patients with  extremes of muscle mass, extra-renal metabolism of  creatinine, excessive creatinine ingestion, or following  therapy that affects renal tubular secretion.    Sample Type                                   Date: 10/17/2024  Value: SINDHU           Status: Final  Performed on                                  Date: 10/17/2024  Value: SEE BELOW     Status: Final                Comment: Performed on POC  ------------    Radiology last 7 days:  CT ABDOMEN PELVIS W IV CONTRAST Additional Contrast? None    Result Date: 10/17/2024  No acute intra-abdominal pelvic process appreciated. Moderate colonic stool burden which can be seen with constipation. Bilateral renal lesions which attenuate above that of simple fluid.  Favor proteinaceous/hemorrhagic cysts however cannot exclude solid neoplasm.  Would advise a follow-up renal ultrasound or renal mass protocol CT/MRI. Additional observations as above.     US ABDOMEN COMPLETE    Result Date: 10/16/2024  1. Avascular, hypoechoic structure measuring 5.9 x 5.2 x 4.1 cm identified adjacent to the tail of the pancreas and the aorta. Evaluation of the structure is limited on the images provided.  No discrete lesion appreciated at this location on recent CT examination and is presumably related to partially distended bowel loops. 2. Increased echogenicity and thinning of the renal cortices consistent with intrinsic renal disease. No hydronephrosis bilaterally. 3. Complex right renal cyst measuring 1.7 x 1.5 cm. Recommend follow-up ultrasound in 6 months to assess

## 2024-10-18 NOTE — DISCHARGE INSTR - DIET

## 2024-10-21 ENCOUNTER — CARE COORDINATION (OUTPATIENT)
Dept: CARE COORDINATION | Age: 54
End: 2024-10-21

## 2024-10-21 ENCOUNTER — TELEPHONE (OUTPATIENT)
Dept: PHARMACY | Facility: CLINIC | Age: 54
End: 2024-10-21

## 2024-10-21 ENCOUNTER — OFFICE VISIT (OUTPATIENT)
Dept: FAMILY MEDICINE CLINIC | Age: 54
End: 2024-10-21

## 2024-10-21 ENCOUNTER — TELEPHONE (OUTPATIENT)
Dept: FAMILY MEDICINE CLINIC | Age: 54
End: 2024-10-21

## 2024-10-21 VITALS
WEIGHT: 112 LBS | SYSTOLIC BLOOD PRESSURE: 124 MMHG | HEIGHT: 62 IN | BODY MASS INDEX: 20.61 KG/M2 | DIASTOLIC BLOOD PRESSURE: 78 MMHG | OXYGEN SATURATION: 99 % | TEMPERATURE: 99.2 F | HEART RATE: 82 BPM

## 2024-10-21 DIAGNOSIS — B02.9 HERPES ZOSTER WITHOUT COMPLICATION: Primary | ICD-10-CM

## 2024-10-21 RX ORDER — OXYCODONE AND ACETAMINOPHEN 5; 325 MG/1; MG/1
1 TABLET ORAL EVERY 8 HOURS PRN
Qty: 15 TABLET | Refills: 0 | Status: SHIPPED | OUTPATIENT
Start: 2024-10-21 | End: 2024-10-26

## 2024-10-21 RX ORDER — HYDROXYZINE HYDROCHLORIDE 25 MG/1
25 TABLET, FILM COATED ORAL EVERY 8 HOURS PRN
Qty: 30 TABLET | Refills: 0 | Status: SHIPPED | OUTPATIENT
Start: 2024-10-21 | End: 2024-10-31

## 2024-10-21 RX ORDER — PREGABALIN 50 MG/1
50 CAPSULE ORAL 2 TIMES DAILY
Qty: 60 CAPSULE | Refills: 1 | Status: SHIPPED | OUTPATIENT
Start: 2024-10-21 | End: 2024-12-20

## 2024-10-21 RX ORDER — VALACYCLOVIR HYDROCHLORIDE 1 G/1
1000 TABLET, FILM COATED ORAL 3 TIMES DAILY
Qty: 21 TABLET | Refills: 0 | Status: SHIPPED | OUTPATIENT
Start: 2024-10-21 | End: 2024-10-28

## 2024-10-21 ASSESSMENT — ENCOUNTER SYMPTOMS
ABDOMINAL PAIN: 1
SHORTNESS OF BREATH: 0
COUGH: 0

## 2024-10-21 NOTE — CARE COORDINATION
Care Transitions Note    Initial Call - Call within 2 business days of discharge: Yes    Care Transition Nurse isela[leatha intial CT outreach leaving HIPAA VM, purpose of call and my contact info.     Patient: Sisi Cam      Patient : 1970   MRN: [id number]      Reason for Admission: 10/17/2024 - 10/18/2024 [facility] Obs. Abd pain, Herpes zoster/Shingles.   Discharge Date: 10/18/24    RURS: No data recorded  NR  CT    PCP 10/21 10:15  Pulm/Tatiana 10/23 2:00    START taking:  oxyCODONE-acetaminophen (Percocet)  STOP taking:  aspirin 81 MG chewable tablet (Aspirin Childrens)  brompheniramine-pseudoephedrine-DM 2-30-10  MG/5ML syrup  lidocaine 5 % (LIDODERM)      Last Discharge Facility       Date Complaint Diagnosis Description Type Department Provider    10/17/24 Abdominal Pain Renal cysts, acquired, bilateral ... ED to Hosp-Admission (Discharged) (ADMITTED) [facility] Leena Mcgrath MD; Jordan Dowling MD            Follow Up Appointment:     Future Appointments         Provider Specialty Dept Phone    10/23/2024 2:00 PM Jose Roberto Duque MD Pulmonology [phone]    2025 8:30 AM Emmie Avalos APRN - CNP Family Medicine [phone]    3/7/2025 10:30 AM Darrion June PA Endocrinology [phone]            Leilani Piedra RN

## 2024-10-21 NOTE — TELEPHONE ENCOUNTER
CLINICAL PHARMACY NOTE - Population [facility] Pharmacy Referral    Patient can be scheduled with:  Team Schedule- General Referrals, etc.    Received a referral from: Care Coordinator to discuss patient’s medications. Called patient to schedule a time to speak with a pharmacist over the telephone.     No answer left VM: Please contact us at  [phone] option 1 to schedule this appointment.    Bambi Sutherland CPhT.   [facility] Clinical   [facility] Clinical Pharmacy  Toll free: [phone] Option 1

## 2024-10-21 NOTE — CARE COORDINATION
Care Transitions Note    Initial Call - Call within 2 business days of discharge: Yes    Patient Current Location:  Home: [address] [place] [address] [address] [place] [id number]    Care Transition Nurse contacted the patient by telephone to perform post hospital discharge assessment, verified name and  as identifiers. Provided introduction to self, and explanation of the Care Transition Nurse role.     Patient: Sisi Cam      Patient : 1970   MRN: [id number]      Reason for Admission: 10/17/2024 - 10/18/2024 [facility] Obs. Abd pain, Herpes zoster/Shingles.   Discharge Date: 10/18/24    RURS: No data recorded  NR  CT     PCP 10/21 10:15  Pulm/Tatiana 10/23 2:00     START taking:  oxyCODONE-acetaminophen (Percocet)  STOP taking:  aspirin 81 MG chewable tablet (Aspirin Childrens)  brompheniramine-pseudoephedrine-DM 2-30-10  MG/5ML syrup  lidocaine 5 % (LIDODERM)    Last Discharge Facility       Date Complaint Diagnosis Description Type Department Provider    10/17/24 Abdominal Pain Renal cysts, acquired, bilateral ... ED to Hosp-Admission (Discharged) (ADMITTED) [facility] Leena Sellers MD; Jordan Dowling MD            Was this an external facility discharge? No    Additional needs identified to be addressed with provider   No needs identified             Method of communication with provider: none.    Patients top risk factors for readmission: Shingles    Interventions to address risk factors:   Wash wounds w/ antiviral soap daily and pat dry. Do not use same wash cloth to noninfected areas of body. Wear loose clothing.  Take your medications as directed. Complete full doses as directed.  You can use oatmeal bath and cold compress for comfort.   Monitor skin for developing infection/cellulitis. Report to your physician for increasing redness w/ heat radiation, increasing pain, fevers, chills, flu-like symptoms, wound drainage. Go to ED for rash development worsening around eye.    Care Summary Note:

## 2024-10-21 NOTE — PROGRESS NOTES
Subjective  Chief Complaint   Patient presents with    Herpes Zoster     X 3 days        HPI    Pt is here for a follow up of flank pain.  She was recently in the ER and admitted for observation.   She had multiple tests done with no obvious cause of the pain.     All of these reports have been reviewed by myself.    Lab Results   Component Value Date    WBC 5.4 10/17/2024    HGB 12.1 10/17/2024    HCT 35.6 (L) 10/17/2024     10/17/2024    CHOL 232 (H) 05/15/2024    TRIG 51 05/15/2024    HDL 93 (H) 05/15/2024    ALT 8 10/17/2024    AST 11 10/17/2024     10/17/2024    K 3.4 10/17/2024     10/17/2024    CREATININE 0.8 10/17/2024    BUN 23 (H) 10/17/2024    CO2 24 10/17/2024    TSH 0.466 09/04/2024    INR 0.9 05/29/2024    LABA1C 5.2 05/15/2024       Since being discharge pt states that she developed a rash on one side of her body that is extremely painful.  It is located on her trunk and groin.   She believes it is shingles.   She has been taking the percocet from the hospital prn for pain. States that it helps some. She has not had any valtrex to start.    Patient Active Problem List    Diagnosis Date Noted    Change in bowel habits 12/16/2022    History of colon polyps 12/16/2022    Acute pancreatitis without infection or necrosis, unspecified pancreatitis type 07/30/2022    Abdominal pain 07/30/2022    Pancreatitis, unspecified pancreatitis type 07/29/2022    Renal cysts, acquired, bilateral 10/17/2024    Dysphagia 08/02/2023    Low serum cortisol level 03/31/2023    Acute left-sided low back pain with bilateral sciatica 03/09/2022    Claudication of both lower extremities (HCC) 02/11/2022    Neck pain 12/09/2021    Non-recurrent acute suppurative otitis media of left ear without spontaneous rupture of tympanic membrane 12/09/2021    Neutropenia (HCC) 01/12/2021    Chronic pancreatitis (HCC) 08/11/2020    Major depressive disorder, single episode, 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HCC) 01/15/2019    Abnormal

## 2024-10-22 ENCOUNTER — TELEPHONE (OUTPATIENT)
Dept: FAMILY MEDICINE CLINIC | Age: 54
End: 2024-10-22

## 2024-10-24 NOTE — TELEPHONE ENCOUNTER
Reached patient and scheduled for tomorrow at Noon    Bambi Sutherland [facility].   Population Health Clinical   [facility] Clinical Pharmacy  Toll free: [phone] Option 1     For Pharmacy Admin Tracking Only    Program: [employer]  CPA in place:  No  Recommendation Provided To: Patient/Caregiver: 1 via Telephone  Intervention Detail: Scheduled Appointment  Intervention Accepted By: Patient/Caregiver: 1  Gap Closed?: Yes   Time Spent (min): 15

## 2024-10-25 ENCOUNTER — TELEPHONE (OUTPATIENT)
Dept: PHARMACY | Facility: CLINIC | Age: 54
End: 2024-10-25

## 2024-10-25 NOTE — TELEPHONE ENCOUNTER
CLINICAL PHARMACY NOTE - Medication Review    Sisi Cam is a 53 y.o. female referred to a clinical pharmacy specialist by care coordination for ROSALINA med review    2 attempts made to reach patient by telephone for scheduled medication review. Unable to leave  (full).    Thank you,  SHANIQUE Pugh, PharmD, [facility]  Ambulatory Care Clinical Pharmacist- [facility] Clinical Pharmacy  Department, toll free: [phone]

## 2024-10-28 NOTE — TELEPHONE ENCOUNTER
Patient unavailable at the time of call. full    [employer] message sent to patient.  If unread, letter will be mailed to home.

## 2024-10-31 ENCOUNTER — CARE COORDINATION (OUTPATIENT)
Dept: CARE COORDINATION | Age: 54
End: 2024-10-31

## 2024-10-31 DIAGNOSIS — B02.9 HERPES ZOSTER WITHOUT COMPLICATION: ICD-10-CM

## 2024-10-31 NOTE — CARE COORDINATION
Care Transitions Note    Follow Up Call     Reason for Admission: 10/17/2024 - 10/18/2024 [facility] Obs. Abd pain, Herpes zoster/Shingles.     Patient Current Location:  [place]    Care Transition Nurse contacted the patient by telephone. Verified name and  as identifiers.    Additional needs identified to be addressed with provider   No needs identified                 Method of communication with provider: none.    Care Summary Note: CTN spoke briefy w/ Sisi who was taking her dtr to an appt. States rash is persistent, uncomfortable, and has few areas showing improvement. No immeditae needs/concerns and will fu next wk. Advised to call me back for any additional concerns/support needs.     Assessments:  Care Transitions Subsequent and Final Call    Subsequent and Final Calls  Do you have any ongoing symptoms?: Yes  Patient-reported symptoms: Rash  Have your medications changed?: No  Do you have any questions related to your medications?: No  Do you currently have any active services?: No  Do you have any needs or concerns that I can assist you with?: No  Identified Barriers: Lack of Education  Care Transitions Interventions    Pharmacist: Completed    Other Interventions:              Follow Up Appointment:     Future Appointments         Provider Specialty Dept Phone    2025 8:30 AM Emmie Avalos, APRN - CNP Family Medicine [phone]    3/7/2025 10:30 AM Darrion June PA Endocrinology [phone]            Care Transition Nurse provided contact information.  Plan for follow-up call in 6-10 days based on severity of symptoms and risk factors.  Plan for next call: CT FU, Symptom and rash check.     Leilani Piedra RN

## 2024-11-05 ENCOUNTER — APPOINTMENT (OUTPATIENT)
Dept: GASTROENTEROLOGY | Facility: CLINIC | Age: 54
End: 2024-11-05
Payer: MEDICARE

## 2024-11-07 ENCOUNTER — CARE COORDINATION (OUTPATIENT)
Dept: CARE COORDINATION | Age: 54
End: 2024-11-07

## 2024-11-07 NOTE — CARE COORDINATION
Care Transitions Note    Follow Up Call     CTN left HIPAA VM, purpose of call, my contact info for subsequent outreach attempt.    Reason for Admission: 10/17/2024 - 10/18/2024 [facility] Obs. Abd pain, Herpes zoster/Shingles.       Future Appointments         Provider Specialty Dept Phone    12/10/2024 9:30 AM Coleman Padron, DO Pain Management [phone]    1/14/2025 8:30 AM Emmie Avalos, APRN - CNP Family Medicine [phone]    3/7/2025 10:30 AM Darrion June PA Endocrinology [phone]            Leilani Piedra RN

## 2024-11-13 ENCOUNTER — CARE COORDINATION (OUTPATIENT)
Dept: CARE COORDINATION | Age: 54
End: 2024-11-13

## 2024-11-13 NOTE — CARE COORDINATION
Care Transitions Note    Follow Up Call     CTN made second subsequent outreach attempt leaving HIPAA VM, purpose of call, my contact info. CTN s/o.    Reason for Admission: 10/17/2024 - 10/18/2024 [facility] Obs. Abd pain, Herpes zoster/Shingles.     Follow Up Appointment:     Future Appointments         Provider Specialty Dept Phone    12/10/2024 9:30 AM Coleman Padron, DO Pain Management [phone]    1/14/2025 8:30 AM Emmie Avalos, APRN - CNP Family Medicine [phone]    3/7/2025 10:30 AM Darrion June PA Endocrinology [phone]            Leilani Piedra RN

## 2024-11-15 ENCOUNTER — APPOINTMENT (OUTPATIENT)
Dept: CARDIOLOGY | Facility: CLINIC | Age: 54
End: 2024-11-15
Payer: MEDICARE

## 2024-11-15 DIAGNOSIS — F90.9 ATTENTION DEFICIT HYPERACTIVITY DISORDER (ADHD), UNSPECIFIED ADHD TYPE: ICD-10-CM

## 2024-11-15 DIAGNOSIS — Z76.0 MEDICATION REFILL: ICD-10-CM

## 2024-11-15 NOTE — TELEPHONE ENCOUNTER
Comments:     Last Office Visit (last PCP visit):   10/21/2024    Next Visit Date:  Future Appointments   Date Time Provider Department Center   12/10/2024  9:30 AM Coleman Padron DO [facility]   1/14/2025  8:30 AM Emmie Avalos APRN - CNP [facility]   3/7/2025 10:30 AM [facility]       **If hasn't been seen in over a year OR hasn't followed up according to last diabetes/ADHD visit, make appointment for patient before sending refill to provider.    Rx requested:  Requested Prescriptions     Pending Prescriptions Disp Refills    amphetamine-dextroamphetamine (ADDERALL XR) 30 MG extended release capsule 60 capsule 0     Sig: Take 1 capsule by mouth in the morning and 1 capsule in the evening. Do all this for 30 days.

## 2024-11-16 NOTE — PROGRESS NOTES
[facility]   Vaginal Delivery Note    Patient Name: Rachel Pereira  : 1999  MRN: [id number]    Date of Delivery: 2024    Diagnosis     Pre & Post-Delivery:  Intrauterine pregnancy at 38w2d  Labor status: Spontaneous Onset of Labor     (normal spontaneous vaginal delivery)             Problem List    Transfer to Postpartum     Review the Delivery Report for details.     Delivery     Delivery: Vaginal, Spontaneous    YOB: 2022   Time of Birth:  Gestational Age 5:53 AM  38w2d     Anesthesia: Epidural    Delivering clinician: Saida Lopez   Forceps?   No   Vacuum? No    Shoulder dystocia present: No        Delivery narrative:  Progressed to complete  at 38w2d and pushed to deliver a viable male infant over a st degree laceration. Anterior shoulder delivered with ease. Infant vigorous at delivery so placed on maternal abdomen. Delayed cord clamping x 1 min. Cord doubly clamped and cut. Cord blood and cord segment obtained. Placenta delivered spontaneously and appeared intact. Pitocin to IVF. Laceration repaired in standard fashion using 2-0 vicryl.         Infant     Findings: male infant     Infant observations: Weight: 3335 g (7 lb 5.6 oz)  Length: 19.25 in  Observations/Comments:        Apgars: 7  @ 1 minute /    9  @ 5 minutes   Infant Name: Steel     Placenta & Cord         Placenta delivered  Spontaneous at   2024  5:59 AM    Cord: 3 vessels present.   Nuchal Cord?  no   Cord blood obtained: Yes   Cord gases obtained:  No   Cord gas results: N/A         Repair     Episiotomy: None    No    Lacerations: Yes  Laceration Information  Laceration Repaired?   Perineal: 1st Yes   Periurethral:       Labial:       Sulcus:       Vaginal:       Cervical:         Suture used for repair: 2-0 Vicryl     Estimated Blood Loss: Est. Blood Loss (mL): 50 mL (Filed from Delivery Summary) (24)     Quantitative Blood Loss:    QBL from VAG DEL: 87 (24)  A1C Level at 5.1     Complications     none    Disposition     Mother to Mother Baby/Postpartum  in stable condition currently.  Baby to remains with mom  in stable condition currently.    Saida Lopez CNM  11/16/24  07:44 EST

## 2024-11-17 RX ORDER — DEXTROAMPHETAMINE SACCHARATE, AMPHETAMINE ASPARTATE MONOHYDRATE, DEXTROAMPHETAMINE SULFATE AND AMPHETAMINE SULFATE 7.5; 7.5; 7.5; 7.5 MG/1; MG/1; MG/1; MG/1
30 CAPSULE, EXTENDED RELEASE ORAL 2 TIMES DAILY
Qty: 60 CAPSULE | Refills: 0 | Status: SHIPPED | OUTPATIENT
Start: 2024-11-17 | End: 2024-12-17

## 2024-11-19 ENCOUNTER — TELEMEDICINE (OUTPATIENT)
Dept: FAMILY MEDICINE CLINIC | Age: 54
End: 2024-11-19

## 2024-11-19 DIAGNOSIS — J98.8 RESPIRATORY TRACT INFECTION: Primary | ICD-10-CM

## 2024-11-19 RX ORDER — DOXYCYCLINE HYCLATE 100 MG
100 TABLET ORAL 2 TIMES DAILY
Qty: 14 TABLET | Refills: 0 | Status: SHIPPED | OUTPATIENT
Start: 2024-11-19 | End: 2024-11-26

## 2024-11-19 RX ORDER — METHYLPREDNISOLONE 4 MG/1
TABLET ORAL
Qty: 1 KIT | Refills: 0 | Status: SHIPPED | OUTPATIENT
Start: 2024-11-19

## 2024-11-19 ASSESSMENT — ENCOUNTER SYMPTOMS
WHEEZING: 0
ABDOMINAL PAIN: 0
TROUBLE SWALLOWING: 0
RHINORRHEA: 1
SINUS PRESSURE: 0
NAUSEA: 1
COUGH: 0
SORE THROAT: 1
DIARRHEA: 1
VOMITING: 1
CONSTIPATION: 0
CHEST TIGHTNESS: 0
SHORTNESS OF BREATH: 0

## 2024-11-19 NOTE — PROGRESS NOTES
GEE) 4 MG tablet; Take by mouth.           Subjective   I reviewed staff HPI/chief complaint and do agree with above    Cold Symptoms   This is a recurrent problem. Episode onset: Has been struggling with intermittent symptoms since diagnosed with with COVID.  Much worse since Saturday. The problem has been waxing and waning. Maximum temperature: Fever max of 100.9. Associated symptoms include congestion, diarrhea, ear pain, headaches, nausea, rhinorrhea, a sore throat and vomiting. Pertinent negatives include no abdominal pain, chest pain, coughing, rash or wheezing. She has tried decongestant and sleep for the symptoms. The treatment provided no relief.     Review of Systems   Constitutional:  Positive for fatigue and fever. Negative for appetite change and chills.   HENT:  Positive for congestion, ear pain, rhinorrhea and sore throat. Negative for hearing loss, postnasal drip, sinus pressure and trouble swallowing.    Respiratory:  Negative for cough, chest tightness, shortness of breath and wheezing.    Cardiovascular:  Negative for chest pain and palpitations.   Gastrointestinal:  Positive for diarrhea, nausea and vomiting. Negative for abdominal pain and constipation.   Endocrine: Negative for cold intolerance and heat intolerance.   Musculoskeletal:  Positive for myalgias. Negative for arthralgias.   Skin:  Negative for rash.   Neurological:  Positive for headaches. Negative for dizziness, weakness, light-headedness and numbness.   Hematological:  Negative for adenopathy.          Objective   Patient-Reported Vitals  Patient-Reported Weight: 111lbs         [INSTRUCTIONS:  \"[x]\" Indicates a positive item  \"[]\" Indicates a negative item  -- DELETE ALL ITEMS NOT EXAMINED]    Constitutional: [x] Appears well-developed and well-nourished [x] No apparent distress      [] Abnormal -     Mental status: [x] Alert and awake  [x] Oriented to person/place/time [x] Able to follow commands    [] Abnormal -     Eyes:   EOM

## 2024-12-10 ENCOUNTER — INITIAL CONSULT (OUTPATIENT)
Age: 54
End: 2024-12-10
Payer: MEDICARE

## 2024-12-10 VITALS — BODY MASS INDEX: 21.16 KG/M2 | WEIGHT: 115 LBS | HEIGHT: 62 IN | TEMPERATURE: 97.5 F

## 2024-12-10 DIAGNOSIS — B02.29 POST HERPETIC NEURALGIA: Primary | ICD-10-CM

## 2024-12-10 DIAGNOSIS — K86.1 IDIOPATHIC CHRONIC PANCREATITIS (HCC): ICD-10-CM

## 2024-12-10 PROCEDURE — 1036F TOBACCO NON-USER: CPT | Performed by: STUDENT IN AN ORGANIZED HEALTH CARE EDUCATION/TRAINING PROGRAM

## 2024-12-10 PROCEDURE — 99204 OFFICE O/P NEW MOD 45 MIN: CPT | Performed by: STUDENT IN AN ORGANIZED HEALTH CARE EDUCATION/TRAINING PROGRAM

## 2024-12-10 PROCEDURE — G8427 DOCREV CUR MEDS BY ELIG CLIN: HCPCS | Performed by: STUDENT IN AN ORGANIZED HEALTH CARE EDUCATION/TRAINING PROGRAM

## 2024-12-10 PROCEDURE — G8484 FLU IMMUNIZE NO ADMIN: HCPCS | Performed by: STUDENT IN AN ORGANIZED HEALTH CARE EDUCATION/TRAINING PROGRAM

## 2024-12-10 PROCEDURE — G8420 CALC BMI NORM PARAMETERS: HCPCS | Performed by: STUDENT IN AN ORGANIZED HEALTH CARE EDUCATION/TRAINING PROGRAM

## 2024-12-10 PROCEDURE — 3017F COLORECTAL CA SCREEN DOC REV: CPT | Performed by: STUDENT IN AN ORGANIZED HEALTH CARE EDUCATION/TRAINING PROGRAM

## 2024-12-10 ASSESSMENT — ENCOUNTER SYMPTOMS: ABDOMINAL PAIN: 1

## 2024-12-10 NOTE — ASSESSMENT & PLAN NOTE
Chronic illness with a severe exacerbation and/or progression which affects ADL's. Pain has been present for multiple years and is expected to last at least a year. Patient is unable to sleep, transfer, nor ambulate. Goals of care include pain relief >50% and ability to perform ADLs with less pain.    53-year-old female with history of chronic pancreatitis for multiple years.  Pain extremely debilitating affects ADLs.  Pain score 6 or greater with activity.  Pain limits mobility and functionality.    Upon assessment, pain mostly in the periumbilical region.  Patient has associated pain when eating, swallowing strong aromas, or any activity.  She has had multiple EGDs as well as multiple hospital admissions due to severe pain.  She has trialed multiple medications such as neuropathic's and opioids in the past with minimal effect.  She has had multiple imaging studies that have not shown anything surgically indicated to help with pain control.    Given her history, physical exam findings, imaging, and lack response to current measures, I believe she will benefit from the following:    - Intrathecal pump trial ordered.  Morphine and bupivacaine.  If patient tolerates injection and responds well, will consider intrathecal pump placement.

## 2024-12-10 NOTE — ASSESSMENT & PLAN NOTE
Acute illness with a severe exacerbation and/or progression which affects ADL's. Pain has been present for 2 months and is expected to last at least a year. Patient is unable to sleep, transfer, nor ambulate. Goals of care include pain relief >50% and ability to perform ADLs with less pain.    53-year-old female with history of acute left-sided chest wall pain for 2 months status post shingles.  Pain extreme debilitating fax ADLs.  Pain score 6 or greater with activity.  Pain limits mobility and functionality.    Upon assessment, patient displays midthoracic pain that are originates in the posterior aspect of the back and radiates anteriorly to the chest wall.  Dermatomal distribution is likely T6-T8.  Pain is reproducible upon palpation to this area.  She does not have any active lesions at this time.    She is currently on Lyrica with minimal effect.  Opioids have not been beneficial for this pain.    Given her history, physical exam findings, imaging, lack response to current measures, I believe she will benefit from the following:    - Left intercostal nerve block under ultrasound guidance at T6, T7, and T8

## 2024-12-10 NOTE — PROGRESS NOTES
HPI  Onset: October 2024  Location: Rib cage left side, back (thoracic)  Quality: burning/hot and sharp  Patient states that her pain is at a 6 at rest and a 8 with activity on the pain scale.   The pain is present: Intermittently and consistent  The pain is exacerbated by:  unknown  and alleviated by:  unknown .  The patient states the pain interferes with her  ADL's : Yes Transferring , Ambulating , and Sleeping  Recent falls: yes - Saturday  Bladder bowel dysfunction:yes - chronic constipation  She is taking the following medications for pain:   Neuropathic Agents: pregabalin and Opioids: Percocet   Prior treatments tried for chief complaint listed above:  Lidocaine patches, voltaren gel  Surgery: yes: hysterectomy, gallbladder removal, pancreatic stent, right wrist surgery  Physical Therapy: no.  Chiropractor: no  Acupuncture: no  Massage Therapy: no   Behavior/Wellness/Psychological support: yes  Expectations of Treatment at the Pain Center: The patient presents today for evaluation with the expectation that they will be able to perform their ADL's without excruciating pain.   Patient denies the following symptoms: Ataxia, saddle anesthesia, nausea, fever, vomiting, or recent antibiotics.       
Grandmother         breast    Breast Cancer Maternal Grandmother         in her 60's    Breast Cancer Paternal Grandmother         in her 60's    Colon Cancer Paternal Grandfather     Uterine Cancer Paternal Aunt     Breast Cancer Paternal Aunt         in her 30's    Breast Cancer Paternal Aunt         in her 60's    Heart Disease Other         mom and dad's side    Colon Cancer Paternal Uncle      Social History     Tobacco Use    Smoking status: Never    Smokeless tobacco: Never   Substance Use Topics    Alcohol use: No     Alcohol/week: 0.0 standard drinks of alcohol     Comment: no alcohol since 2011       PMH, Surgical Hx, Family Hx, and Social Hx reviewed and updated.  Health Maintenance reviewed.    Reviewed with the patient: current clinical status, medications, activities and diet.     Side effects, adverse effects of the medication prescribed today, as well as treatment plan/ rationale and result expectations have been discussed with the patient who expresses understanding and desires to proceed.  Close follow up to evaluate treatment results and for coordination of care.    I have reviewed the patient's medical history in detail and updated the computerized patient record.  Objective     Vitals:    12/10/24 0951   Temp: 97.5 °F (36.4 °C)   Weight: 52.2 kg (115 lb)   Height: 1.575 m (5' 2\")      Body mass index is 21.03 kg/m².    Lab Results   Component Value Date    INR 0.9 05/29/2024    INR 0.9 01/26/2024    INR 0.9 08/28/2021    PROTIME 12.6 05/29/2024    PROTIME 12.3 01/26/2024    PROTIME 12.3 08/28/2021     Lab Results   Component Value Date    APTT 27.3 08/28/2021     Lab Results   Component Value Date    CREATININE 0.8 10/17/2024     Hemoglobin A1C   Date Value Ref Range Status   05/15/2024 5.2 4.0 - 6.0 % Final         Thank you for this most interesting referral. Please do not hesitate to contact Dr. Coleman Padron DO at  with any questions or concerns.    Sincerely,    Coleman Padron, DO

## 2024-12-16 ENCOUNTER — PREP FOR PROCEDURE (OUTPATIENT)
Age: 54
End: 2024-12-16

## 2024-12-16 DIAGNOSIS — F90.9 ATTENTION DEFICIT HYPERACTIVITY DISORDER (ADHD), UNSPECIFIED ADHD TYPE: ICD-10-CM

## 2024-12-16 DIAGNOSIS — Z76.0 MEDICATION REFILL: ICD-10-CM

## 2024-12-16 RX ORDER — DEXTROAMPHETAMINE SACCHARATE, AMPHETAMINE ASPARTATE MONOHYDRATE, DEXTROAMPHETAMINE SULFATE AND AMPHETAMINE SULFATE 7.5; 7.5; 7.5; 7.5 MG/1; MG/1; MG/1; MG/1
30 CAPSULE, EXTENDED RELEASE ORAL 2 TIMES DAILY
Qty: 60 CAPSULE | Refills: 0 | Status: SHIPPED | OUTPATIENT
Start: 2024-12-16 | End: 2025-01-15

## 2024-12-16 NOTE — TELEPHONE ENCOUNTER
Comments:     Last Office Visit (last PCP visit):   10/21/2024    Next Visit Date:  Future Appointments   Date Time Provider Department Center   1/14/2025  8:30 AM Emmie Avalos APRN - CNP [facility]   1/29/2025  1:45 PM Coleman Padron DO [facility]   3/7/2025 10:30 [facility]       **If hasn't been seen in over a year OR hasn't followed up according to last diabetes/ADHD visit, make appointment for patient before sending refill to provider.    Rx requested:  Requested Prescriptions     Pending Prescriptions Disp Refills    amphetamine-dextroamphetamine (ADDERALL XR) 30 MG extended release capsule 60 capsule 0     Sig: Take 1 capsule by mouth in the morning and 1 capsule in the evening. Do all this for 30 days.

## 2024-12-31 DIAGNOSIS — K21.9 GASTROESOPHAGEAL REFLUX DISEASE, UNSPECIFIED WHETHER ESOPHAGITIS PRESENT: ICD-10-CM

## 2024-12-31 DIAGNOSIS — G89.29 CHRONIC NONINTRACTABLE HEADACHE, UNSPECIFIED HEADACHE TYPE: ICD-10-CM

## 2024-12-31 DIAGNOSIS — G47.00 INSOMNIA, UNSPECIFIED TYPE: ICD-10-CM

## 2024-12-31 DIAGNOSIS — R51.9 CHRONIC NONINTRACTABLE HEADACHE, UNSPECIFIED HEADACHE TYPE: ICD-10-CM

## 2025-01-02 RX ORDER — FAMOTIDINE 20 MG/1
20 TABLET, FILM COATED ORAL 2 TIMES DAILY
Qty: 60 TABLET | Refills: 0 | Status: SHIPPED | OUTPATIENT
Start: 2025-01-02

## 2025-01-02 RX ORDER — TRAZODONE HYDROCHLORIDE 150 MG/1
TABLET ORAL
Qty: 30 TABLET | Refills: 0 | Status: SHIPPED | OUTPATIENT
Start: 2025-01-02

## 2025-01-02 RX ORDER — TOPIRAMATE 100 MG/1
100 TABLET, FILM COATED ORAL 2 TIMES DAILY
Qty: 60 TABLET | Refills: 0 | Status: SHIPPED | OUTPATIENT
Start: 2025-01-02

## 2025-01-02 NOTE — TELEPHONE ENCOUNTER
Comments:     Last Office Visit (last PCP visit):   10/21/2024    Next Visit Date:  Future Appointments   Date Time Provider Department Center   1/14/2025  8:30 AM Emmie Avalos APRN - CNP [facility]   1/29/2025  1:45 PM Coleman Padron DO [facility]   3/7/2025 10:30 [facility]       **If hasn't been seen in over a year OR hasn't followed up according to last diabetes/ADHD visit, make appointment for patient before sending refill to provider.    Rx requested:  Requested Prescriptions     Pending Prescriptions Disp Refills    topiramate (TOPAMAX) 100 MG tablet [Pharmacy Med Name: Topiramate Oral Tablet 100 MG] 60 tablet 0     Sig: TAKE ONE TABLET BY MOUTH TWO TIMES A DAY    traZODone (DESYREL) 150 MG tablet [Pharmacy Med Name: traZODone HCl Oral Tablet 150 MG] 30 tablet 0     Sig: take one tablet by mouth nightly as needed for sleep

## 2025-01-09 ENCOUNTER — PREP FOR PROCEDURE (OUTPATIENT)
Age: 55
End: 2025-01-09

## 2025-01-09 DIAGNOSIS — K86.1 IDIOPATHIC CHRONIC PANCREATITIS (HCC): ICD-10-CM

## 2025-01-13 ENCOUNTER — HOSPITAL ENCOUNTER (EMERGENCY)
Age: 55
Discharge: HOME | End: 2025-01-14
Attending: EMERGENCY MEDICINE
Payer: MEDICARE

## 2025-01-13 ENCOUNTER — APPOINTMENT (OUTPATIENT)
Dept: CT IMAGING | Age: 55
End: 2025-01-13
Payer: MEDICARE

## 2025-01-13 DIAGNOSIS — K52.9 ACUTE GASTROENTERITIS: Primary | ICD-10-CM

## 2025-01-13 DIAGNOSIS — E87.6 HYPOKALEMIA: ICD-10-CM

## 2025-01-13 DIAGNOSIS — R31.29 OTHER MICROSCOPIC HEMATURIA: ICD-10-CM

## 2025-01-13 LAB
ALBUMIN SERPL-MCNC: 5.1 G/DL (ref 3.5–4.6)
ALP SERPL-CCNC: 176 U/L (ref 40–130)
ALT SERPL-CCNC: 48 U/L (ref 0–33)
AMYLASE SERPL-CCNC: 84 U/L (ref 22–93)
ANION GAP SERPL CALCULATED.3IONS-SCNC: 20 MEQ/L (ref 9–15)
AST SERPL-CCNC: 39 U/L (ref 0–35)
BACTERIA URNS QL MICRO: ABNORMAL /HPF
BASOPHILS # BLD: 0.1 K/UL (ref 0–0.1)
BASOPHILS NFR BLD: 0.5 % (ref 0.1–1.2)
BILIRUB SERPL-MCNC: 0.4 MG/DL (ref 0.2–0.7)
BILIRUB UR QL STRIP: ABNORMAL
BNP BLD-MCNC: 173 PG/ML
BUN SERPL-MCNC: 29 MG/DL (ref 6–20)
CALCIUM SERPL-MCNC: 10.7 MG/DL (ref 8.5–9.9)
CHLORIDE SERPL-SCNC: 101 MEQ/L (ref 95–107)
CLARITY UR: CLEAR
CO2 SERPL-SCNC: 21 MEQ/L (ref 20–31)
COLOR UR: YELLOW
CREAT SERPL-MCNC: 0.87 MG/DL (ref 0.5–0.9)
EOSINOPHIL # BLD: 0 K/UL (ref 0–0.4)
EOSINOPHIL NFR BLD: 0.2 % (ref 0.7–5.8)
EPI CELLS #/AREA URNS HPF: ABNORMAL /HPF
ERYTHROCYTE [DISTWIDTH] IN BLOOD BY AUTOMATED COUNT: 12.8 % (ref 11.7–14.4)
GLOBULIN SER CALC-MCNC: 3.6 G/DL (ref 2.3–3.5)
GLUCOSE SERPL-MCNC: 148 MG/DL (ref 70–99)
GLUCOSE UR STRIP-MCNC: 100 MG/DL
HCT VFR BLD AUTO: 42.3 % (ref 37–47)
HGB BLD-MCNC: 14.1 G/DL (ref 11.2–15.7)
HGB UR QL STRIP: ABNORMAL
IMM GRANULOCYTES # BLD: 0.1 K/UL
IMM GRANULOCYTES NFR BLD: 0.4 %
INFLUENZA A BY PCR: NEGATIVE
INFLUENZA B BY PCR: NEGATIVE
INR PPP: 0.9
KETONES UR STRIP-MCNC: NEGATIVE MG/DL
LACTATE BLDV-SCNC: 1.7 MMOL/L (ref 0.5–2.2)
LEUKOCYTE ESTERASE UR QL STRIP: NEGATIVE
LIPASE SERPL-CCNC: 29 U/L (ref 12–95)
LYMPHOCYTES # BLD: 0.5 K/UL (ref 1.2–3.7)
LYMPHOCYTES NFR BLD: 2.8 %
MAGNESIUM SERPL-MCNC: 1.9 MG/DL (ref 1.7–2.4)
MCH RBC QN AUTO: 28.4 PG (ref 25.6–32.2)
MCHC RBC AUTO-ENTMCNC: 33.3 % (ref 32.2–35.5)
MCV RBC AUTO: 85.3 FL (ref 79.4–94.8)
MONOCYTES # BLD: 0.6 K/UL (ref 0.2–0.9)
MONOCYTES NFR BLD: 3 % (ref 4.7–12.5)
NEUTROPHILS # BLD: 17.8 K/UL (ref 1.6–6.1)
NEUTS SEG NFR BLD: 93.1 % (ref 34–71.1)
NITRITE UR QL STRIP: NEGATIVE
PH UR STRIP: 5.5 [PH] (ref 5–9)
PLATELET # BLD AUTO: 475 K/UL (ref 182–369)
POTASSIUM SERPL-SCNC: 3.3 MEQ/L (ref 3.4–4.9)
PROT SERPL-MCNC: 8.7 G/DL (ref 6.3–8)
PROT UR STRIP-MCNC: 30 MG/DL
PROTHROMBIN TIME: 12.4 SEC (ref 12.3–14.9)
RBC # BLD AUTO: 4.96 M/UL (ref 3.93–5.22)
RBC #/AREA URNS HPF: ABNORMAL /HPF (ref 0–2)
SARS-COV-2 RDRP RESP QL NAA+PROBE: NOT DETECTED
SODIUM SERPL-SCNC: 142 MEQ/L (ref 135–144)
SP GR UR STRIP: >=1.03 (ref 1–1.03)
STREP GRP A PCR: NEGATIVE
UROBILINOGEN UR STRIP-ACNC: 0.2 E.U./DL
WBC # BLD AUTO: 19.1 K/UL (ref 4–10)
WBC #/AREA URNS HPF: ABNORMAL /HPF (ref 0–5)

## 2025-01-13 PROCEDURE — 99285 EMERGENCY DEPT VISIT HI MDM: CPT

## 2025-01-13 PROCEDURE — 36415 COLL VENOUS BLD VENIPUNCTURE: CPT

## 2025-01-13 PROCEDURE — 83690 ASSAY OF LIPASE: CPT

## 2025-01-13 PROCEDURE — 83880 ASSAY OF NATRIURETIC PEPTIDE: CPT

## 2025-01-13 PROCEDURE — 87040 BLOOD CULTURE FOR BACTERIA: CPT

## 2025-01-13 PROCEDURE — 81001 URINALYSIS AUTO W/SCOPE: CPT

## 2025-01-13 PROCEDURE — 80053 COMPREHEN METABOLIC PANEL: CPT

## 2025-01-13 PROCEDURE — 96375 TX/PRO/DX INJ NEW DRUG ADDON: CPT

## 2025-01-13 PROCEDURE — 85025 COMPLETE CBC W/AUTO DIFF WBC: CPT

## 2025-01-13 PROCEDURE — 87635 SARS-COV-2 COVID-19 AMP PRB: CPT

## 2025-01-13 PROCEDURE — 82150 ASSAY OF AMYLASE: CPT

## 2025-01-13 PROCEDURE — 6360000002 HC RX W HCPCS: Performed by: EMERGENCY MEDICINE

## 2025-01-13 PROCEDURE — 6360000004 HC RX CONTRAST MEDICATION: Performed by: EMERGENCY MEDICINE

## 2025-01-13 PROCEDURE — 87651 STREP A DNA AMP PROBE: CPT

## 2025-01-13 PROCEDURE — 2580000003 HC RX 258: Performed by: EMERGENCY MEDICINE

## 2025-01-13 PROCEDURE — 87502 INFLUENZA DNA AMP PROBE: CPT

## 2025-01-13 PROCEDURE — 74177 CT ABD & PELVIS W/CONTRAST: CPT

## 2025-01-13 PROCEDURE — 85610 PROTHROMBIN TIME: CPT

## 2025-01-13 PROCEDURE — 83605 ASSAY OF LACTIC ACID: CPT

## 2025-01-13 PROCEDURE — 83735 ASSAY OF MAGNESIUM: CPT

## 2025-01-13 RX ORDER — IOPAMIDOL 755 MG/ML
75 INJECTION, SOLUTION INTRAVASCULAR
Status: COMPLETED | OUTPATIENT
Start: 2025-01-13 | End: 2025-01-13

## 2025-01-13 RX ORDER — 0.9 % SODIUM CHLORIDE 0.9 %
1000 INTRAVENOUS SOLUTION INTRAVENOUS ONCE
Status: COMPLETED | OUTPATIENT
Start: 2025-01-13 | End: 2025-01-14

## 2025-01-13 RX ORDER — 0.9 % SODIUM CHLORIDE 0.9 %
1000 INTRAVENOUS SOLUTION INTRAVENOUS ONCE
Status: COMPLETED | OUTPATIENT
Start: 2025-01-13 | End: 2025-01-13

## 2025-01-13 RX ORDER — KETOROLAC TROMETHAMINE 30 MG/ML
30 INJECTION, SOLUTION INTRAMUSCULAR; INTRAVENOUS ONCE
Status: COMPLETED | OUTPATIENT
Start: 2025-01-13 | End: 2025-01-13

## 2025-01-13 RX ORDER — ONDANSETRON 2 MG/ML
4 INJECTION INTRAMUSCULAR; INTRAVENOUS ONCE
Status: COMPLETED | OUTPATIENT
Start: 2025-01-13 | End: 2025-01-13

## 2025-01-13 RX ORDER — CIPROFLOXACIN 2 MG/ML
400 INJECTION, SOLUTION INTRAVENOUS ONCE
Status: COMPLETED | OUTPATIENT
Start: 2025-01-13 | End: 2025-01-14

## 2025-01-13 RX ADMIN — ONDANSETRON 4 MG: 2 INJECTION, SOLUTION INTRAMUSCULAR; INTRAVENOUS at 22:40

## 2025-01-13 RX ADMIN — SODIUM CHLORIDE 1000 ML: 9 INJECTION, SOLUTION INTRAVENOUS at 22:40

## 2025-01-13 RX ADMIN — IOPAMIDOL 75 ML: 755 INJECTION, SOLUTION INTRAVENOUS at 23:46

## 2025-01-13 RX ADMIN — KETOROLAC TROMETHAMINE 30 MG: 30 INJECTION, SOLUTION INTRAMUSCULAR at 22:40

## 2025-01-13 ASSESSMENT — PAIN SCALES - GENERAL
PAINLEVEL_OUTOF10: 8
PAINLEVEL_OUTOF10: 8

## 2025-01-13 ASSESSMENT — ENCOUNTER SYMPTOMS
SORE THROAT: 0
ABDOMINAL PAIN: 1
APNEA: 0
PHOTOPHOBIA: 0
SINUS PRESSURE: 0
RHINORRHEA: 0
VOMITING: 1
SHORTNESS OF BREATH: 0
COUGH: 0
ABDOMINAL DISTENTION: 0
DIARRHEA: 1
WHEEZING: 0
EYE PAIN: 0
COLOR CHANGE: 0
BACK PAIN: 0
CONSTIPATION: 0
NAUSEA: 1

## 2025-01-13 ASSESSMENT — PAIN DESCRIPTION - PAIN TYPE: TYPE: ACUTE PAIN

## 2025-01-13 ASSESSMENT — PAIN - FUNCTIONAL ASSESSMENT
PAIN_FUNCTIONAL_ASSESSMENT: ACTIVITIES ARE NOT PREVENTED
PAIN_FUNCTIONAL_ASSESSMENT: 0-10

## 2025-01-13 ASSESSMENT — PAIN DESCRIPTION - ORIENTATION: ORIENTATION: LOWER;MID

## 2025-01-13 ASSESSMENT — PAIN DESCRIPTION - DESCRIPTORS
DESCRIPTORS: ACHING
DESCRIPTORS: CRAMPING

## 2025-01-13 ASSESSMENT — PAIN DESCRIPTION - LOCATION
LOCATION: ABDOMEN
LOCATION: ABDOMEN

## 2025-01-14 ENCOUNTER — TELEMEDICINE (OUTPATIENT)
Dept: FAMILY MEDICINE CLINIC | Age: 55
End: 2025-01-14

## 2025-01-14 VITALS
SYSTOLIC BLOOD PRESSURE: 132 MMHG | RESPIRATION RATE: 16 BRPM | TEMPERATURE: 97.6 F | WEIGHT: 114 LBS | HEIGHT: 62 IN | DIASTOLIC BLOOD PRESSURE: 84 MMHG | HEART RATE: 86 BPM | BODY MASS INDEX: 20.98 KG/M2 | OXYGEN SATURATION: 98 %

## 2025-01-14 DIAGNOSIS — R11.2 NAUSEA AND VOMITING, UNSPECIFIED VOMITING TYPE: Primary | ICD-10-CM

## 2025-01-14 PROCEDURE — 96365 THER/PROPH/DIAG IV INF INIT: CPT

## 2025-01-14 PROCEDURE — 2580000003 HC RX 258: Performed by: EMERGENCY MEDICINE

## 2025-01-14 PROCEDURE — 96376 TX/PRO/DX INJ SAME DRUG ADON: CPT

## 2025-01-14 PROCEDURE — 6370000000 HC RX 637 (ALT 250 FOR IP): Performed by: EMERGENCY MEDICINE

## 2025-01-14 PROCEDURE — 6360000002 HC RX W HCPCS: Performed by: EMERGENCY MEDICINE

## 2025-01-14 RX ORDER — ONDANSETRON 4 MG/1
4 TABLET, FILM COATED ORAL EVERY 8 HOURS PRN
Qty: 20 TABLET | Refills: 0 | Status: SHIPPED | OUTPATIENT
Start: 2025-01-14

## 2025-01-14 RX ORDER — FLUOROURACIL 50 MG/G
CREAM TOPICAL 2 TIMES DAILY
COMMUNITY
Start: 2025-01-02

## 2025-01-14 RX ORDER — KETOROLAC TROMETHAMINE 10 MG/1
10 TABLET, FILM COATED ORAL EVERY 6 HOURS PRN
Qty: 20 TABLET | Refills: 0 | Status: SHIPPED | OUTPATIENT
Start: 2025-01-14

## 2025-01-14 RX ORDER — ONDANSETRON 2 MG/ML
4 INJECTION INTRAMUSCULAR; INTRAVENOUS ONCE
Status: COMPLETED | OUTPATIENT
Start: 2025-01-14 | End: 2025-01-14

## 2025-01-14 RX ORDER — METOCLOPRAMIDE 10 MG/1
10 TABLET ORAL 4 TIMES DAILY PRN
Qty: 40 TABLET | Refills: 0 | Status: SHIPPED | OUTPATIENT
Start: 2025-01-14

## 2025-01-14 RX ORDER — CIPROFLOXACIN 500 MG/1
500 TABLET, FILM COATED ORAL 2 TIMES DAILY
Qty: 20 TABLET | Refills: 0 | Status: SHIPPED | OUTPATIENT
Start: 2025-01-14 | End: 2025-01-24

## 2025-01-14 RX ORDER — PROMETHAZINE HYDROCHLORIDE 25 MG/1
25 SUPPOSITORY RECTAL EVERY 6 HOURS PRN
Qty: 28 SUPPOSITORY | Refills: 0 | Status: SHIPPED | OUTPATIENT
Start: 2025-01-14 | End: 2025-01-21

## 2025-01-14 RX ORDER — POTASSIUM CHLORIDE 1500 MG/1
40 TABLET, EXTENDED RELEASE ORAL ONCE
Status: COMPLETED | OUTPATIENT
Start: 2025-01-14 | End: 2025-01-14

## 2025-01-14 RX ORDER — DICYCLOMINE HYDROCHLORIDE 10 MG/1
10 CAPSULE ORAL ONCE
Status: COMPLETED | OUTPATIENT
Start: 2025-01-14 | End: 2025-01-14

## 2025-01-14 RX ADMIN — CIPROFLOXACIN 400 MG: 400 INJECTION, SOLUTION INTRAVENOUS at 00:07

## 2025-01-14 RX ADMIN — ONDANSETRON 4 MG: 2 INJECTION, SOLUTION INTRAMUSCULAR; INTRAVENOUS at 01:42

## 2025-01-14 RX ADMIN — DICYCLOMINE HYDROCHLORIDE 10 MG: 10 CAPSULE ORAL at 00:13

## 2025-01-14 RX ADMIN — SODIUM CHLORIDE 1000 ML: 9 INJECTION, SOLUTION INTRAVENOUS at 00:07

## 2025-01-14 RX ADMIN — POTASSIUM CHLORIDE 40 MEQ: 1500 TABLET, EXTENDED RELEASE ORAL at 00:33

## 2025-01-14 ASSESSMENT — PATIENT HEALTH QUESTIONNAIRE - PHQ9
4. FEELING TIRED OR HAVING LITTLE ENERGY: NOT AT ALL
9. THOUGHTS THAT YOU WOULD BE BETTER OFF DEAD, OR OF HURTING YOURSELF: NOT AT ALL
SUM OF ALL RESPONSES TO PHQ QUESTIONS 1-9: 3
SUM OF ALL RESPONSES TO PHQ QUESTIONS 1-9: 3
7. TROUBLE CONCENTRATING ON THINGS, SUCH AS READING THE NEWSPAPER OR WATCHING TELEVISION: NOT AT ALL
10. IF YOU CHECKED OFF ANY PROBLEMS, HOW DIFFICULT HAVE THESE PROBLEMS MADE IT FOR YOU TO DO YOUR WORK, TAKE CARE OF THINGS AT HOME, OR GET ALONG WITH OTHER PEOPLE: SOMEWHAT DIFFICULT
5. POOR APPETITE OR OVEREATING: NOT AT ALL
8. MOVING OR SPEAKING SO SLOWLY THAT OTHER PEOPLE COULD HAVE NOTICED. OR THE OPPOSITE, BEING SO FIGETY OR RESTLESS THAT YOU HAVE BEEN MOVING AROUND A LOT MORE THAN USUAL: NOT AT ALL
6. FEELING BAD ABOUT YOURSELF - OR THAT YOU ARE A FAILURE OR HAVE LET YOURSELF OR YOUR FAMILY DOWN: NOT AT ALL
SUM OF ALL RESPONSES TO PHQ9 QUESTIONS 1 & 2: 0
1. LITTLE INTEREST OR PLEASURE IN DOING THINGS: NOT AT ALL
2. FEELING DOWN, DEPRESSED OR HOPELESS: NOT AT ALL
SUM OF ALL RESPONSES TO PHQ QUESTIONS 1-9: 3
SUM OF ALL RESPONSES TO PHQ QUESTIONS 1-9: 3
3. TROUBLE FALLING OR STAYING ASLEEP: NEARLY EVERY DAY

## 2025-01-14 ASSESSMENT — PAIN SCALES - GENERAL
PAINLEVEL_OUTOF10: 3
PAINLEVEL_OUTOF10: 5

## 2025-01-14 ASSESSMENT — ENCOUNTER SYMPTOMS
SHORTNESS OF BREATH: 0
NAUSEA: 1
COUGH: 0
VOMITING: 1
DIARRHEA: 1

## 2025-01-14 ASSESSMENT — PAIN DESCRIPTION - DESCRIPTORS: DESCRIPTORS: CRAMPING

## 2025-01-14 ASSESSMENT — PAIN DESCRIPTION - LOCATION: LOCATION: ABDOMEN

## 2025-01-14 NOTE — ED PROVIDER NOTES
[facility] EMERGENCY DEPARTMENT  eMERGENCY dEPARTMENT eNCOUnter      Pt Name: Sisi Cam  MRN: [id number]  Birthdate 1970  Date of evaluation: 1/13/2025  Provider: Rg Blair MD    CHIEF COMPLAINT       Chief Complaint   Patient presents with    Illness     Nausea, vomiting, and diarrhea x1 day         HISTORY OF PRESENT ILLNESS   (Location/Symptom, Timing/Onset,Context/Setting, Quality, Duration, Modifying Factors, Severity)  Note limiting factors.   Sisi Cam is a 54 y.o. female who presents to the emergency department with complaint of nausea, vomiting, diarrhea and abdominal pain since this morning.  Several other family members had come down with similar symptoms in the last few days.  Denies fever or chills.  Denies chest pain, palpitations, orthopnea or PND.  Has crampy abdominal pain of 8 in a scale of 1-10.  Denies alcohol or drugs.  Denies cigarette smoking.  Comorbid conditions includes hypothyroidism, chronic low back pain with sciatica, attention deficit disorder, claudication of both legs, congestive heart failure, depression, gastroesophageal reflux disease, medullary sponge kidneys, Sjogren's disease, type 2 diabetes, chronic pancreatitis.    HPI    Nursing Notes were reviewed.    REVIEW OF SYSTEMS    (2-9 systems for level 4, 10 or more for level 5)     Review of Systems   Constitutional: Negative.  Negative for activity change, appetite change, chills, fatigue and fever.   HENT:  Negative for congestion, ear discharge, ear pain, hearing loss, rhinorrhea, sinus pressure and sore throat.    Eyes:  Negative for photophobia, pain and visual disturbance.   Respiratory:  Negative for apnea, cough, shortness of breath and wheezing.    Cardiovascular:  Negative for chest pain, palpitations and leg swelling.   Gastrointestinal:  Positive for abdominal pain, diarrhea, nausea and vomiting. Negative for abdominal distention and constipation.   Endocrine: Negative for cold intolerance, heat

## 2025-01-14 NOTE — PROGRESS NOTES
Abnormal -     Eyes:   EOM    [x]  Normal    [] Abnormal -   Sclera  [x]  Normal    [] Abnormal -          Discharge [x]  None visible   [] Abnormal -     HENT: [x] Normocephalic, atraumatic  [] Abnormal -   [x] Mouth/Throat: Mucous membranes are moist    External Ears [x] Normal  [] Abnormal -    Neck: [x] No visualized mass [] Abnormal -     Pulmonary/Chest: [x] Respiratory effort normal   [x] No visualized signs of difficulty breathing or respiratory distress        [] Abnormal -      Musculoskeletal:   [x] Normal gait with no signs of ataxia         [x] Normal range of motion of neck        [] Abnormal -     Neurological:        [x] No Facial Asymmetry (Cranial nerve 7 motor function) (limited exam due to video visit)          [x] No gaze palsy        [] Abnormal -          Skin:        [x] No significant exanthematous lesions or discoloration noted on facial skin         [] Abnormal -            Psychiatric:       [x] Normal Affect [] Abnormal -        [x] No Hallucinations    Other pertinent observable physical exam findings:-             --Emmie Avalos, ORTIZ - CNP

## 2025-01-14 NOTE — ED TRIAGE NOTES
Patient states v/d began this AM. Patient c/o of stomach cramps rating 8/10. Patient  states she took compazine 10mg at approx 2000. Patient states she took Zofran and Zofran suppository at approx 1800 w/o relief. Patient states vomited approx 30mins PTA.

## 2025-01-19 ENCOUNTER — PATIENT MESSAGE (OUTPATIENT)
Dept: FAMILY MEDICINE CLINIC | Age: 55
End: 2025-01-19

## 2025-01-19 DIAGNOSIS — R10.84 GENERALIZED ABDOMINAL PAIN: ICD-10-CM

## 2025-01-19 DIAGNOSIS — R19.7 DIARRHEA, UNSPECIFIED TYPE: Primary | ICD-10-CM

## 2025-01-19 LAB
BACTERIA BLD CULT ORG #2: NORMAL
BACTERIA BLD CULT: NORMAL

## 2025-01-22 DIAGNOSIS — R19.7 DIARRHEA, UNSPECIFIED TYPE: ICD-10-CM

## 2025-01-22 DIAGNOSIS — F90.9 ATTENTION DEFICIT HYPERACTIVITY DISORDER (ADHD), UNSPECIFIED ADHD TYPE: ICD-10-CM

## 2025-01-22 DIAGNOSIS — R10.84 GENERALIZED ABDOMINAL PAIN: ICD-10-CM

## 2025-01-22 DIAGNOSIS — Z76.0 MEDICATION REFILL: ICD-10-CM

## 2025-01-22 LAB
ALBUMIN SERPL-MCNC: 4.5 G/DL (ref 3.5–4.6)
ALP SERPL-CCNC: 129 U/L (ref 40–130)
ALT SERPL-CCNC: 9 U/L (ref 0–33)
ANION GAP SERPL CALCULATED.3IONS-SCNC: 12 MEQ/L (ref 9–15)
AST SERPL-CCNC: 14 U/L (ref 0–35)
BASOPHILS # BLD: 0.1 K/UL (ref 0–0.2)
BASOPHILS NFR BLD: 1.2 %
BILIRUB SERPL-MCNC: <0.2 MG/DL (ref 0.2–0.7)
BUN SERPL-MCNC: 13 MG/DL (ref 6–20)
CALCIUM SERPL-MCNC: 9.3 MG/DL (ref 8.5–9.9)
CHLORIDE SERPL-SCNC: 97 MEQ/L (ref 95–107)
CO2 SERPL-SCNC: 27 MEQ/L (ref 20–31)
CREAT SERPL-MCNC: 0.82 MG/DL (ref 0.5–0.9)
EOSINOPHIL # BLD: 0.1 K/UL (ref 0–0.7)
EOSINOPHIL NFR BLD: 1.3 %
ERYTHROCYTE [DISTWIDTH] IN BLOOD BY AUTOMATED COUNT: 12.6 % (ref 11.5–14.5)
GLOBULIN SER CALC-MCNC: 2.8 G/DL (ref 2.3–3.5)
GLUCOSE SERPL-MCNC: 75 MG/DL (ref 70–99)
HCT VFR BLD AUTO: 39.7 % (ref 37–47)
HGB BLD-MCNC: 12.9 G/DL (ref 12–16)
LYMPHOCYTES # BLD: 2.2 K/UL (ref 1–4.8)
LYMPHOCYTES NFR BLD: 31.1 %
MCH RBC QN AUTO: 27.6 PG (ref 27–31.3)
MCHC RBC AUTO-ENTMCNC: 32.5 % (ref 33–37)
MCV RBC AUTO: 85 FL (ref 79.4–94.8)
MONOCYTES # BLD: 0.4 K/UL (ref 0.2–0.8)
MONOCYTES NFR BLD: 5.8 %
NEUTROPHILS # BLD: 4.2 K/UL (ref 1.4–6.5)
NEUTS SEG NFR BLD: 60.2 %
PLATELET # BLD AUTO: 571 K/UL (ref 130–400)
POTASSIUM SERPL-SCNC: 2.9 MEQ/L (ref 3.4–4.9)
PROT SERPL-MCNC: 7.3 G/DL (ref 6.3–8)
RBC # BLD AUTO: 4.67 M/UL (ref 4.2–5.4)
SODIUM SERPL-SCNC: 136 MEQ/L (ref 135–144)
WBC # BLD AUTO: 6.9 K/UL (ref 4.8–10.8)

## 2025-01-22 RX ORDER — DEXTROAMPHETAMINE SACCHARATE, AMPHETAMINE ASPARTATE MONOHYDRATE, DEXTROAMPHETAMINE SULFATE AND AMPHETAMINE SULFATE 7.5; 7.5; 7.5; 7.5 MG/1; MG/1; MG/1; MG/1
30 CAPSULE, EXTENDED RELEASE ORAL 2 TIMES DAILY
Qty: 60 CAPSULE | Refills: 0 | Status: SHIPPED | OUTPATIENT
Start: 2025-01-22 | End: 2025-02-21

## 2025-01-22 NOTE — TELEPHONE ENCOUNTER
Diagnosis Orders   1. Diarrhea, unspecified type  Clostridium Difficile Toxin/Antigen    Comprehensive Metabolic Panel    CBC with Auto Differential    US GALLBLADDER RUQ      2. Generalized abdominal pain  Gastrointestinal Panel by DNA    Comprehensive Metabolic Panel    CBC with Auto Differential    US GALLBLADDER RUQ

## 2025-01-22 NOTE — TELEPHONE ENCOUNTER
Do you think patient should have an US?Patient is also experiencing upper RT side flank pain. Patient also had labs drawn, yesterday.Please advise.

## 2025-01-22 NOTE — TELEPHONE ENCOUNTER
Comments: [employer] message sent regarding appt/Last refill 12-16-24    Last Office Visit (last PCP visit):   1/14/2025    Next Visit Date:  Future Appointments   Date Time Provider Department Center   1/28/2025  8:00 AM Slavik-Bosworth, Kelly J, APRN - CNP [facility]   3/7/2025 10:30 AM June, Darrion S, PA [facility]       **If hasn't been seen in over a year OR hasn't followed up according to last diabetes/ADHD visit, make appointment for patient before sending refill to provider.    Rx requested:  Requested Prescriptions     Pending Prescriptions Disp Refills    amphetamine-dextroamphetamine (ADDERALL XR) 30 MG extended release capsule 60 capsule 0     Sig: Take 1 capsule by mouth in the morning and 1 capsule in the evening. Do all this for 30 days.

## 2025-01-23 ENCOUNTER — HOSPITAL ENCOUNTER (OUTPATIENT)
Dept: ULTRASOUND IMAGING | Age: 55
Discharge: HOME | End: 2025-01-25
Payer: MEDICARE

## 2025-01-23 DIAGNOSIS — R10.84 GENERALIZED ABDOMINAL PAIN: ICD-10-CM

## 2025-01-23 DIAGNOSIS — R19.7 DIARRHEA, UNSPECIFIED TYPE: ICD-10-CM

## 2025-01-23 PROCEDURE — 76705 ECHO EXAM OF ABDOMEN: CPT

## 2025-02-05 ENCOUNTER — OFFICE VISIT (OUTPATIENT)
Dept: FAMILY MEDICINE CLINIC | Age: 55
End: 2025-02-05

## 2025-02-05 VITALS
OXYGEN SATURATION: 100 % | BODY MASS INDEX: 20.35 KG/M2 | TEMPERATURE: 97.7 F | HEIGHT: 62 IN | HEART RATE: 88 BPM | WEIGHT: 110.6 LBS | DIASTOLIC BLOOD PRESSURE: 82 MMHG | SYSTOLIC BLOOD PRESSURE: 100 MMHG

## 2025-02-05 DIAGNOSIS — R19.7 DIARRHEA, UNSPECIFIED TYPE: ICD-10-CM

## 2025-02-05 DIAGNOSIS — R10.13 EPIGASTRIC PAIN: ICD-10-CM

## 2025-02-05 DIAGNOSIS — E03.9 ACQUIRED HYPOTHYROIDISM: ICD-10-CM

## 2025-02-05 DIAGNOSIS — E03.9 HYPOTHYROIDISM, UNSPECIFIED TYPE: ICD-10-CM

## 2025-02-05 DIAGNOSIS — R23.3 EASY BRUISING: ICD-10-CM

## 2025-02-05 DIAGNOSIS — E87.6 HYPOKALEMIA: Primary | ICD-10-CM

## 2025-02-05 DIAGNOSIS — R53.83 FATIGUE, UNSPECIFIED TYPE: ICD-10-CM

## 2025-02-05 LAB
ALBUMIN SERPL-MCNC: 4.8 G/DL (ref 3.5–4.6)
ALP SERPL-CCNC: 161 U/L (ref 40–130)
ALT SERPL-CCNC: 10 U/L (ref 0–33)
ANION GAP SERPL CALCULATED.3IONS-SCNC: 14 MEQ/L (ref 9–15)
APTT PPP: 23.4 SEC (ref 24.4–36.8)
AST SERPL-CCNC: 12 U/L (ref 0–35)
BASOPHILS # BLD: 0.1 K/UL (ref 0–0.2)
BASOPHILS NFR BLD: 1 %
BILIRUB SERPL-MCNC: 0.3 MG/DL (ref 0.2–0.7)
BUN SERPL-MCNC: 17 MG/DL (ref 6–20)
CALCIUM SERPL-MCNC: 9.6 MG/DL (ref 8.5–9.9)
CHLORIDE SERPL-SCNC: 102 MEQ/L (ref 95–107)
CO2 SERPL-SCNC: 22 MEQ/L (ref 20–31)
CREAT SERPL-MCNC: 0.77 MG/DL (ref 0.5–0.9)
EOSINOPHIL # BLD: 0.1 K/UL (ref 0–0.7)
EOSINOPHIL NFR BLD: 1 %
ERYTHROCYTE [DISTWIDTH] IN BLOOD BY AUTOMATED COUNT: 12.1 % (ref 11.5–14.5)
GLOBULIN SER CALC-MCNC: 3 G/DL (ref 2.3–3.5)
GLUCOSE SERPL-MCNC: 109 MG/DL (ref 70–99)
HCT VFR BLD AUTO: 40.2 % (ref 37–47)
HGB BLD-MCNC: 13.6 G/DL (ref 12–16)
INR PPP: 1
LIPASE SERPL-CCNC: 35 U/L (ref 12–95)
LYMPHOCYTES # BLD: 1.7 K/UL (ref 1–4.8)
LYMPHOCYTES NFR BLD: 35.2 %
MCH RBC QN AUTO: 28.6 PG (ref 27–31.3)
MCHC RBC AUTO-ENTMCNC: 33.8 % (ref 33–37)
MCV RBC AUTO: 84.5 FL (ref 79.4–94.8)
MONOCYTES # BLD: 0.4 K/UL (ref 0.2–0.8)
MONOCYTES NFR BLD: 7.1 %
NEUTROPHILS # BLD: 2.7 K/UL (ref 1.4–6.5)
NEUTS SEG NFR BLD: 55.3 %
PLATELET # BLD AUTO: 389 K/UL (ref 130–400)
POTASSIUM SERPL-SCNC: 2.8 MEQ/L (ref 3.4–4.9)
PROT SERPL-MCNC: 7.8 G/DL (ref 6.3–8)
PROTHROMBIN TIME: 13.3 SEC (ref 12.3–14.9)
RBC # BLD AUTO: 4.76 M/UL (ref 4.2–5.4)
SODIUM SERPL-SCNC: 138 MEQ/L (ref 135–144)
T4 FREE SERPL-MCNC: 1.24 NG/DL (ref 0.84–1.68)
TSH REFLEX: 0.66 UIU/ML (ref 0.44–3.86)
WBC # BLD AUTO: 4.9 K/UL (ref 4.8–10.8)

## 2025-02-05 RX ORDER — HYDROXYZINE HYDROCHLORIDE 50 MG/1
50 TABLET, FILM COATED ORAL EVERY 8 HOURS PRN
Qty: 30 TABLET | Refills: 2 | Status: SHIPPED | OUTPATIENT
Start: 2025-02-05

## 2025-02-05 ASSESSMENT — ENCOUNTER SYMPTOMS
COUGH: 0
SHORTNESS OF BREATH: 0

## 2025-02-05 NOTE — PROGRESS NOTES
Patient was contacted and made aware of critically low potassium level.  We had discussed her diarrhea over the last few weeks as likely cause.   patient states she has been taking 2 20 mEq potassium supplements daily which has seemed to hold her somewhat stable over the last few weeks after reviewing labs.  Advised patient to take an extra dose of her oral potassium tonight as well as the very cautious use of antidiarrheal medication.  Can take a 40 mg dose of potassium in the morning and we will recheck her labs tomorrow.  Patient to monitor for any worsening hypotension, muscle weakness/cramping, numbness/tingling/paresthesias, worsening nausea/vomiting/diarrhea, heart palpitations/chest pain or worsening abdominal pain if these were to present she is referred to ED for further evaluation and management

## 2025-02-05 NOTE — PROGRESS NOTES
Take 1 tablet by mouth every 8 hours as needed for Itching     Dispense:  30 tablet     Refill:  2       There are no discontinued medications.     No follow-ups on file.    Assessment & Plan  1. Diarrhea.  Her symptoms may be attributed to pancreatitis or the absence of a gallbladder. The possibility of norovirus infection can not be ruled out. Her blood pressure is low, likely due to diarrhea-induced hypotension. She reports feeling dehydrated and having low potassium levels. A comprehensive lab workup will be conducted, including tests for potassium levels, clotting times, and lipase. Stool cultures will also be obtained to rule out bacterial causes. She is advised to consult with her rheumatologist regarding the possibility of receiving intravenous fluids.    2. Thrombocytosis.  Her elevated platelet count could be a reactive response to an infection. The bruising she experiences could be a manifestation of this condition. A re-evaluation of her platelet count will be conducted.    3. Epistaxis.  Her nasal spray may be contributing to dryness and subsequent nosebleeds. She is advised to use normal saline nasal spray to alleviate dryness.    4. Sjogren's syndrome.  She reports significant discomfort and symptoms related to Sjogren's syndrome, including dry mouth and eyes. She is advised to continue her current management plan and consult with her rheumatologist for further evaluation and potential adjustments in treatment.      Side effects, adverse effects of the medication prescribed today, as well as treatment plan/ rationale and result expectations have been discussed with the patient who expresses understanding and desires to proceed.    Close follow up to evaluate treatment results and for coordination of care.  I have reviewed the patient's medical history in detail and updated the computerized patient record.    As always, patient is advised that if symptoms worsen in any way they will proceed to the

## 2025-02-06 DIAGNOSIS — E87.6 HYPOKALEMIA: Primary | ICD-10-CM

## 2025-02-07 LAB — THYROPEROXIDASE IGG SERPL-ACNC: <4 IU/ML (ref 0–25)

## 2025-02-08 LAB — TSH RECEP AB SER-ACNC: <1.1 IU/L

## 2025-03-04 DIAGNOSIS — Z76.0 MEDICATION REFILL: ICD-10-CM

## 2025-03-04 DIAGNOSIS — F90.9 ATTENTION DEFICIT HYPERACTIVITY DISORDER (ADHD), UNSPECIFIED ADHD TYPE: ICD-10-CM

## 2025-03-04 RX ORDER — DEXTROAMPHETAMINE SACCHARATE, AMPHETAMINE ASPARTATE MONOHYDRATE, DEXTROAMPHETAMINE SULFATE AND AMPHETAMINE SULFATE 7.5; 7.5; 7.5; 7.5 MG/1; MG/1; MG/1; MG/1
30 CAPSULE, EXTENDED RELEASE ORAL 2 TIMES DAILY
Qty: 60 CAPSULE | Refills: 0 | Status: SHIPPED | OUTPATIENT
Start: 2025-03-04 | End: 2025-04-03

## 2025-03-04 NOTE — TELEPHONE ENCOUNTER
Comments: last fill 1/22/25  Mychart sent    Last Office Visit (last PCP visit):   Visit date not found    Next Visit Date:  Future Appointments   Date Time Provider Department Center   3/7/2025 10:30 AM June, Darrion S, PA [facility]   3/11/2025  8:00 AM Slavik-Bosworth, Kelly J, APRN - CNP [facility]       **If hasn't been seen in over a year OR hasn't followed up according to last diabetes/ADHD visit, make appointment for patient before sending refill to provider.    Rx requested:  Requested Prescriptions     Pending Prescriptions Disp Refills    amphetamine-dextroamphetamine (ADDERALL XR) 30 MG extended release capsule 60 capsule 0     Sig: Take 1 capsule by mouth in the morning and 1 capsule in the evening. Do all this for 30 days.

## 2025-03-07 ENCOUNTER — OFFICE VISIT (OUTPATIENT)
Dept: ENDOCRINOLOGY | Age: 55
End: 2025-03-07
Payer: MEDICARE

## 2025-03-07 VITALS
HEIGHT: 62 IN | DIASTOLIC BLOOD PRESSURE: 68 MMHG | SYSTOLIC BLOOD PRESSURE: 110 MMHG | WEIGHT: 107 LBS | HEART RATE: 71 BPM | BODY MASS INDEX: 19.69 KG/M2

## 2025-03-07 DIAGNOSIS — E03.9 HYPOTHYROIDISM, UNSPECIFIED TYPE: ICD-10-CM

## 2025-03-07 DIAGNOSIS — M81.0 OSTEOPOROSIS, UNSPECIFIED OSTEOPOROSIS TYPE, UNSPECIFIED PATHOLOGICAL FRACTURE PRESENCE: ICD-10-CM

## 2025-03-07 DIAGNOSIS — R53.83 OTHER FATIGUE: ICD-10-CM

## 2025-03-07 DIAGNOSIS — E03.9 ACQUIRED HYPOTHYROIDISM: Primary | ICD-10-CM

## 2025-03-07 PROCEDURE — G8427 DOCREV CUR MEDS BY ELIG CLIN: HCPCS | Performed by: PHYSICIAN ASSISTANT

## 2025-03-07 PROCEDURE — 3017F COLORECTAL CA SCREEN DOC REV: CPT | Performed by: PHYSICIAN ASSISTANT

## 2025-03-07 PROCEDURE — 1036F TOBACCO NON-USER: CPT | Performed by: PHYSICIAN ASSISTANT

## 2025-03-07 PROCEDURE — G8420 CALC BMI NORM PARAMETERS: HCPCS | Performed by: PHYSICIAN ASSISTANT

## 2025-03-07 PROCEDURE — 99214 OFFICE O/P EST MOD 30 MIN: CPT | Performed by: PHYSICIAN ASSISTANT

## 2025-03-07 RX ORDER — LEVOTHYROXINE SODIUM 75 UG/1
TABLET ORAL
Qty: 60 TABLET | Refills: 5 | Status: SHIPPED | OUTPATIENT
Start: 2025-03-07

## 2025-03-07 ASSESSMENT — ENCOUNTER SYMPTOMS
WHEEZING: 0
NAUSEA: 0
COUGH: 0
ABDOMINAL PAIN: 0
SINUS PRESSURE: 0
DIARRHEA: 0
VOMITING: 0
SORE THROAT: 0
SHORTNESS OF BREATH: 0
RHINORRHEA: 0

## 2025-03-07 NOTE — PATIENT INSTRUCTIONS
Contiue Levothyroxine 75 mcg daily Monday-Friday  Continue Levothyroxine 100 mcg Saturday-Sunday   Metformin 500 mg daily before breakfast  AM fasting labs one week before your next appointment   Follow up in 3 months

## 2025-03-07 NOTE — PROGRESS NOTES
Palpations: Abdomen is soft.   Musculoskeletal:         General: Normal range of motion.      Cervical back: Normal range of motion and neck supple.   Skin:     General: Skin is warm and dry.   Neurological:      General: No focal deficit present.      Mental Status: She is alert and oriented to person, place, and time.   Psychiatric:         Mood and Affect: Mood normal.           Reviewed with the patient: current clinical status, medications, activities and diet.      Side effects, adverse effects of the medication prescribed today, as well as treatment plan/ rationale and result expectations have been discussed with the patient who expresses understanding and desires to proceed.     Close follow up to evaluate treatment results and for coordination of care.  I have reviewed the patient's medical history in detail and updated the computerized patient record.

## 2025-03-10 NOTE — PROGRESS NOTES
Gastroenterology Clinic Follow up Visit    Sisi Cam  [id number]  Chief Complaint   Patient presents with    Follow-up       HPI: 54 y.o. female following up after last GI clinic on 5/3/2024     Interval change: Patient presents to GI clinic with 3 months of diarrhea having 3-5 episodes daily.  Patient is significant history of constipation in the past and was taking Trulance.  Patient was seen in ER in January with gastroenteritis.  She reports having 3- 5 episodes daily  No blood, nor mucus in stool.  She does endorses having associate symptom of abdominal cramping with episodes of diarrhea.  Patient reports she was placed on an antibiotic and January. Taking ibsrela for IBS.  Patient continues to follow with [facility] for pancreatic insufficiency, taking Creon as prescribed.  Denies any unintentional weight loss, dysphagia, hematemesis, hematochezia, nor melena.    HPI from last GI clinic visit on 5/3/2024 summarized below:  Patient presents to GI office for follow-up with longstanding history of chronic constipation.  Patient has been taking Linzess 290 mcg daily and reports bowel movements every 3 days.  She reports having mucous in her stool 4 to 5 episodes over the last 2 weeks.  She continues to have abdominal cramping.  Patient used to take Creon with meals and snacks as directed.  She does endorse increased stress with [place].  Denies any unintentional weight loss, dysphagia, melena emesis, hematochezia, nor melena.     HPI from last GI clinic visit on 4/2/2024 summarized below:  Patient did request Sutab a few days ago due to no bowel movement. Last bowel movement, 4 to 5 days ago.  Having small hard stools daily.  Patient has long standing history of constipation with recent hospitalization in February.  Patient reports using Soapsuds enema, Miralax, Metamucil, Lactulose, Trulance, Amitiza, Motegrity, Dulcolax, and Fleets enemas/ suppositories in the past and

## 2025-03-11 ENCOUNTER — OFFICE VISIT (OUTPATIENT)
Dept: GASTROENTEROLOGY | Age: 55
End: 2025-03-11
Payer: MEDICARE

## 2025-03-11 VITALS — HEART RATE: 83 BPM | WEIGHT: 109 LBS | BODY MASS INDEX: 20.06 KG/M2 | OXYGEN SATURATION: 95 % | HEIGHT: 62 IN

## 2025-03-11 DIAGNOSIS — R10.84 GENERALIZED ABDOMINAL PAIN: ICD-10-CM

## 2025-03-11 DIAGNOSIS — R19.7 DIARRHEA, UNSPECIFIED TYPE: ICD-10-CM

## 2025-03-11 DIAGNOSIS — R10.9 ABDOMINAL CRAMPING: ICD-10-CM

## 2025-03-11 DIAGNOSIS — R19.7 DIARRHEA, UNSPECIFIED TYPE: Primary | ICD-10-CM

## 2025-03-11 DIAGNOSIS — K86.81 EXOCRINE PANCREATIC INSUFFICIENCY: ICD-10-CM

## 2025-03-11 DIAGNOSIS — R19.5 CHANGE IN CONSISTENCY OF STOOL: ICD-10-CM

## 2025-03-11 DIAGNOSIS — R10.13 EPIGASTRIC PAIN: ICD-10-CM

## 2025-03-11 DIAGNOSIS — K21.9 GASTROESOPHAGEAL REFLUX DISEASE WITHOUT ESOPHAGITIS: ICD-10-CM

## 2025-03-11 PROCEDURE — 1036F TOBACCO NON-USER: CPT | Performed by: NURSE PRACTITIONER

## 2025-03-11 PROCEDURE — G8420 CALC BMI NORM PARAMETERS: HCPCS | Performed by: NURSE PRACTITIONER

## 2025-03-11 PROCEDURE — 3017F COLORECTAL CA SCREEN DOC REV: CPT | Performed by: NURSE PRACTITIONER

## 2025-03-11 PROCEDURE — G8427 DOCREV CUR MEDS BY ELIG CLIN: HCPCS | Performed by: NURSE PRACTITIONER

## 2025-03-11 PROCEDURE — 99213 OFFICE O/P EST LOW 20 MIN: CPT | Performed by: NURSE PRACTITIONER

## 2025-03-11 ASSESSMENT — ENCOUNTER SYMPTOMS
CONSTIPATION: 0
VOMITING: 0
RECTAL PAIN: 0
ANAL BLEEDING: 0
BLOOD IN STOOL: 0
ABDOMINAL DISTENTION: 0
DIARRHEA: 1
NAUSEA: 1
ABDOMINAL PAIN: 1

## 2025-03-12 ENCOUNTER — RESULTS FOLLOW-UP (OUTPATIENT)
Dept: FAMILY MEDICINE CLINIC | Age: 55
End: 2025-03-12

## 2025-03-12 DIAGNOSIS — E87.6 HYPOKALEMIA: ICD-10-CM

## 2025-03-12 DIAGNOSIS — A48.8: Primary | ICD-10-CM

## 2025-03-12 LAB
ADV 40+41 DNA STL QL NAA+NON-PROBE: NOT DETECTED
ALBUMIN SERPL-MCNC: 4.8 G/DL (ref 3.5–4.6)
ALP SERPL-CCNC: 128 U/L (ref 40–130)
ALT SERPL-CCNC: 6 U/L (ref 0–33)
ANION GAP SERPL CALCULATED.3IONS-SCNC: 16 MEQ/L (ref 9–15)
AST SERPL-CCNC: 7 U/L (ref 0–35)
BILIRUB SERPL-MCNC: 0.3 MG/DL (ref 0.2–0.7)
BUN SERPL-MCNC: 15 MG/DL (ref 6–20)
C CAYETANENSIS DNA STL QL NAA+NON-PROBE: NOT DETECTED
C COLI+JEJ+UPSA DNA STL QL NAA+NON-PROBE: NOT DETECTED
C DIFF TOX A+B STL QL IA: NORMAL
CALCIUM SERPL-MCNC: 9.6 MG/DL (ref 8.5–9.9)
CHLORIDE SERPL-SCNC: 101 MEQ/L (ref 95–107)
CO2 SERPL-SCNC: 22 MEQ/L (ref 20–31)
CREAT SERPL-MCNC: 0.76 MG/DL (ref 0.5–0.9)
CRYPTOSP DNA STL QL NAA+NON-PROBE: NOT DETECTED
E HISTOLYT DNA STL QL NAA+NON-PROBE: NOT DETECTED
EAEC PAA PLAS AGGR+AATA ST NAA+NON-PRB: NOT DETECTED
EC STX1+STX2 GENES STL QL NAA+NON-PROBE: NOT DETECTED
EPEC EAE GENE STL QL NAA+NON-PROBE: NOT DETECTED
ETEC LTA+ST1A+ST1B TOX ST NAA+NON-PROBE: NOT DETECTED
G LAMBLIA DNA STL QL NAA+NON-PROBE: NOT DETECTED
GI PATH DNA+RNA PNL STL NAA+NON-PROBE: NOT DETECTED
GLOBULIN SER CALC-MCNC: 2.8 G/DL (ref 2.3–3.5)
GLUCOSE SERPL-MCNC: 110 MG/DL (ref 70–99)
NOROVIRUS GI+II RNA STL QL NAA+NON-PROBE: NOT DETECTED
P SHIGELLOIDES DNA STL QL NAA+NON-PROBE: NOT DETECTED
POTASSIUM SERPL-SCNC: 2.8 MEQ/L (ref 3.4–4.9)
PROT SERPL-MCNC: 7.6 G/DL (ref 6.3–8)
RVA RNA STL QL NAA+NON-PROBE: NOT DETECTED
S ENT+BONG DNA STL QL NAA+NON-PROBE: NOT DETECTED
SAPO I+II+IV+V RNA STL QL NAA+NON-PROBE: NOT DETECTED
SHIGELLA SP+EIEC IPAH ST NAA+NON-PROBE: NOT DETECTED
SODIUM SERPL-SCNC: 139 MEQ/L (ref 135–144)
V CHOL+PARA+VUL DNA STL QL NAA+NON-PROBE: DETECTED
V CHOLERAE DNA STL QL NAA+NON-PROBE: NOT DETECTED
Y ENTEROCOL DNA STL QL NAA+NON-PROBE: NOT DETECTED

## 2025-03-12 RX ORDER — DOXYCYCLINE HYCLATE 100 MG
100 TABLET ORAL 2 TIMES DAILY
Qty: 20 TABLET | Refills: 0 | Status: SHIPPED | OUTPATIENT
Start: 2025-03-12 | End: 2025-03-14

## 2025-03-12 NOTE — TELEPHONE ENCOUNTER
Pt calling to say she's still having the GI symptoms and that they seem even worse now.  Please send a antibiotic to [facility] please.

## 2025-03-13 ENCOUNTER — RESULTS FOLLOW-UP (OUTPATIENT)
Dept: FAMILY MEDICINE CLINIC | Age: 55
End: 2025-03-13

## 2025-03-13 ENCOUNTER — HOSPITAL ENCOUNTER (EMERGENCY)
Age: 55
Discharge: HOME | End: 2025-03-14
Attending: STUDENT IN AN ORGANIZED HEALTH CARE EDUCATION/TRAINING PROGRAM
Payer: MEDICARE

## 2025-03-13 ENCOUNTER — APPOINTMENT (OUTPATIENT)
Dept: CT IMAGING | Age: 55
End: 2025-03-13
Payer: MEDICARE

## 2025-03-13 VITALS
DIASTOLIC BLOOD PRESSURE: 81 MMHG | HEIGHT: 62 IN | TEMPERATURE: 97.8 F | BODY MASS INDEX: 19.88 KG/M2 | WEIGHT: 108 LBS | HEART RATE: 102 BPM | SYSTOLIC BLOOD PRESSURE: 114 MMHG | OXYGEN SATURATION: 100 % | RESPIRATION RATE: 18 BRPM

## 2025-03-13 DIAGNOSIS — A04.8: ICD-10-CM

## 2025-03-13 DIAGNOSIS — K52.9 COLITIS: Primary | ICD-10-CM

## 2025-03-13 LAB
ALBUMIN SERPL-MCNC: 4.7 G/DL (ref 3.5–4.6)
ALP SERPL-CCNC: 127 U/L (ref 40–130)
ALT SERPL-CCNC: 7 U/L (ref 0–33)
ANION GAP SERPL CALCULATED.3IONS-SCNC: 11 MEQ/L (ref 9–15)
AST SERPL-CCNC: 12 U/L (ref 0–35)
BASOPHILS # BLD: 0.1 K/UL (ref 0–0.2)
BASOPHILS NFR BLD: 1 %
BILIRUB SERPL-MCNC: 0.3 MG/DL (ref 0.2–0.7)
BUN SERPL-MCNC: 21 MG/DL (ref 6–20)
CALCIUM SERPL-MCNC: 9.7 MG/DL (ref 8.5–9.9)
CHLORIDE SERPL-SCNC: 104 MEQ/L (ref 95–107)
CO2 SERPL-SCNC: 22 MEQ/L (ref 20–31)
CREAT SERPL-MCNC: 0.73 MG/DL (ref 0.5–0.9)
EOSINOPHIL # BLD: 0 K/UL (ref 0–0.7)
EOSINOPHIL NFR BLD: 0.6 %
ERYTHROCYTE [DISTWIDTH] IN BLOOD BY AUTOMATED COUNT: 12.5 % (ref 11.5–14.5)
GLOBULIN SER CALC-MCNC: 2.7 G/DL (ref 2.3–3.5)
GLUCOSE SERPL-MCNC: 95 MG/DL (ref 70–99)
HCT VFR BLD AUTO: 35.8 % (ref 37–47)
HGB BLD-MCNC: 12.6 G/DL (ref 12–16)
LACTATE BLDV-SCNC: 0.9 MMOL/L (ref 0.5–2.2)
LIPASE SERPL-CCNC: 49 U/L (ref 12–95)
LYMPHOCYTES # BLD: 1.8 K/UL (ref 1–4.8)
LYMPHOCYTES NFR BLD: 27.1 %
MCH RBC QN AUTO: 28.9 PG (ref 27–31.3)
MCHC RBC AUTO-ENTMCNC: 35.2 % (ref 33–37)
MCV RBC AUTO: 82.1 FL (ref 79.4–94.8)
MONOCYTES # BLD: 0.4 K/UL (ref 0.2–0.8)
MONOCYTES NFR BLD: 5.3 %
NEUTROPHILS # BLD: 4.4 K/UL (ref 1.4–6.5)
NEUTS SEG NFR BLD: 65.9 %
PLATELET # BLD AUTO: 346 K/UL (ref 130–400)
POTASSIUM SERPL-SCNC: 3.2 MEQ/L (ref 3.4–4.9)
PROT SERPL-MCNC: 7.4 G/DL (ref 6.3–8)
RBC # BLD AUTO: 4.36 M/UL (ref 4.2–5.4)
SODIUM SERPL-SCNC: 137 MEQ/L (ref 135–144)
TROPONIN, HIGH SENSITIVITY: <6 NG/L (ref 0–19)
TSH SERPL-MCNC: 0.3 UIU/ML (ref 0.44–3.86)
WBC # BLD AUTO: 6.8 K/UL (ref 4.8–10.8)

## 2025-03-13 PROCEDURE — 2580000003 HC RX 258: Performed by: STUDENT IN AN ORGANIZED HEALTH CARE EDUCATION/TRAINING PROGRAM

## 2025-03-13 PROCEDURE — 96365 THER/PROPH/DIAG IV INF INIT: CPT

## 2025-03-13 PROCEDURE — 84484 ASSAY OF TROPONIN QUANT: CPT

## 2025-03-13 PROCEDURE — 6360000002 HC RX W HCPCS: Performed by: STUDENT IN AN ORGANIZED HEALTH CARE EDUCATION/TRAINING PROGRAM

## 2025-03-13 PROCEDURE — 83690 ASSAY OF LIPASE: CPT

## 2025-03-13 PROCEDURE — 36415 COLL VENOUS BLD VENIPUNCTURE: CPT

## 2025-03-13 PROCEDURE — 6360000004 HC RX CONTRAST MEDICATION: Performed by: STUDENT IN AN ORGANIZED HEALTH CARE EDUCATION/TRAINING PROGRAM

## 2025-03-13 PROCEDURE — 84443 ASSAY THYROID STIM HORMONE: CPT

## 2025-03-13 PROCEDURE — 93005 ELECTROCARDIOGRAM TRACING: CPT | Performed by: STUDENT IN AN ORGANIZED HEALTH CARE EDUCATION/TRAINING PROGRAM

## 2025-03-13 PROCEDURE — 83605 ASSAY OF LACTIC ACID: CPT

## 2025-03-13 PROCEDURE — 96375 TX/PRO/DX INJ NEW DRUG ADDON: CPT

## 2025-03-13 PROCEDURE — 99285 EMERGENCY DEPT VISIT HI MDM: CPT

## 2025-03-13 PROCEDURE — 85025 COMPLETE CBC W/AUTO DIFF WBC: CPT

## 2025-03-13 PROCEDURE — 74177 CT ABD & PELVIS W/CONTRAST: CPT

## 2025-03-13 PROCEDURE — 80053 COMPREHEN METABOLIC PANEL: CPT

## 2025-03-13 PROCEDURE — 87040 BLOOD CULTURE FOR BACTERIA: CPT

## 2025-03-13 RX ORDER — ONDANSETRON 2 MG/ML
4 INJECTION INTRAMUSCULAR; INTRAVENOUS ONCE
Status: COMPLETED | OUTPATIENT
Start: 2025-03-13 | End: 2025-03-13

## 2025-03-13 RX ORDER — 0.9 % SODIUM CHLORIDE 0.9 %
1000 INTRAVENOUS SOLUTION INTRAVENOUS ONCE
Status: COMPLETED | OUTPATIENT
Start: 2025-03-13 | End: 2025-03-13

## 2025-03-13 RX ORDER — IOPAMIDOL 755 MG/ML
75 INJECTION, SOLUTION INTRAVASCULAR
Status: COMPLETED | OUTPATIENT
Start: 2025-03-13 | End: 2025-03-13

## 2025-03-13 RX ORDER — POTASSIUM CHLORIDE 7.45 MG/ML
10 INJECTION INTRAVENOUS ONCE
Status: COMPLETED | OUTPATIENT
Start: 2025-03-13 | End: 2025-03-13

## 2025-03-13 RX ADMIN — SODIUM CHLORIDE 1000 ML: 0.9 INJECTION, SOLUTION INTRAVENOUS at 22:27

## 2025-03-13 RX ADMIN — POTASSIUM CHLORIDE 10 MEQ: 7.46 INJECTION, SOLUTION INTRAVENOUS at 22:28

## 2025-03-13 RX ADMIN — IOPAMIDOL 75 ML: 755 INJECTION, SOLUTION INTRAVENOUS at 23:12

## 2025-03-13 RX ADMIN — ONDANSETRON 4 MG: 2 INJECTION, SOLUTION INTRAMUSCULAR; INTRAVENOUS at 22:28

## 2025-03-14 LAB
BACTERIA BLD CULT: NORMAL
EKG ATRIAL RATE: 68 BPM
EKG P AXIS: 64 DEGREES
EKG P-R INTERVAL: 162 MS
EKG Q-T INTERVAL: 392 MS
EKG QRS DURATION: 96 MS
EKG QTC CALCULATION (BAZETT): 416 MS
EKG R AXIS: 64 DEGREES
EKG T AXIS: 72 DEGREES
EKG VENTRICULAR RATE: 68 BPM

## 2025-03-14 PROCEDURE — 6370000000 HC RX 637 (ALT 250 FOR IP): Performed by: STUDENT IN AN ORGANIZED HEALTH CARE EDUCATION/TRAINING PROGRAM

## 2025-03-14 RX ORDER — CIPROFLOXACIN 500 MG/1
500 TABLET, FILM COATED ORAL
Status: COMPLETED | OUTPATIENT
Start: 2025-03-14 | End: 2025-03-14

## 2025-03-14 RX ORDER — PROMETHAZINE HYDROCHLORIDE 25 MG/1
25 TABLET ORAL EVERY 6 HOURS PRN
Qty: 10 TABLET | Refills: 0 | Status: SHIPPED | OUTPATIENT
Start: 2025-03-14 | End: 2025-03-21

## 2025-03-14 RX ORDER — CIPROFLOXACIN 500 MG/1
500 TABLET, FILM COATED ORAL 2 TIMES DAILY
Qty: 28 TABLET | Refills: 0 | Status: SHIPPED | OUTPATIENT
Start: 2025-03-14 | End: 2025-03-28

## 2025-03-14 RX ORDER — PROMETHAZINE HYDROCHLORIDE 12.5 MG/1
25 TABLET ORAL EVERY 6 HOURS PRN
Status: DISCONTINUED | OUTPATIENT
Start: 2025-03-14 | End: 2025-03-14 | Stop reason: HOSPADM

## 2025-03-14 RX ADMIN — CIPROFLOXACIN HYDROCHLORIDE 500 MG: 500 TABLET, FILM COATED ORAL at 00:31

## 2025-03-14 RX ADMIN — PROMETHAZINE HYDROCHLORIDE 25 MG: 12.5 TABLET ORAL at 00:31

## 2025-03-14 ASSESSMENT — ENCOUNTER SYMPTOMS
CONSTIPATION: 0
EYE ITCHING: 0
BLOOD IN STOOL: 0
EYE DISCHARGE: 0
COLOR CHANGE: 0
EYE PAIN: 0
WHEEZING: 0
NAUSEA: 1
PHOTOPHOBIA: 0
VOMITING: 1
ANAL BLEEDING: 0
RHINORRHEA: 0
DIARRHEA: 1
EYE REDNESS: 0
CHEST TIGHTNESS: 0
COUGH: 0
ABDOMINAL PAIN: 1
ABDOMINAL DISTENTION: 0

## 2025-03-14 ASSESSMENT — PAIN - FUNCTIONAL ASSESSMENT: PAIN_FUNCTIONAL_ASSESSMENT: NONE - DENIES PAIN

## 2025-03-14 NOTE — DISCHARGE INSTRUCTIONS
You were seen in the ER today due to a diarrheal illness given your recent infection with vibrio.  Your white blood cell count was not elevated and you are not anemic.  Your electrolytes and kidney function are also normal.  Your lactic acid level is normal, this is usually elevated with severe infection or dehydration and thus this is reassuring.    CT scan showed that you have diarrhea in the intestine although no obvious colitis such as intestinal swelling and no abscesses.  At this time I did speak with GI and they were agreeable with switching over to ciprofloxacin, please stop taking the doxycycline.  You will be on ciprofloxacin for the next 2 weeks.  I will prescribe you Phenergan which you can use intermittently with Zofran.  Please continue with your Klor-Con.  I will send a message over to the GI clinic as well for them to see you early next week for reevaluation.

## 2025-03-14 NOTE — ED PROVIDER NOTES
capsule, R-03Normal      !! ondansetron (ZOFRAN) 4 MG tablet Take 1 tablet by mouth 3 times daily as needed for Nausea or Vomiting, Disp-30 tablet, R-0Normal      Handicap Placard [facility] Starting Mon 9/25/2023, Disp-1 each, R-0, PrintGood for 5 years      azelastine (ASTELIN) 0.1 % nasal spray SPRAY 2 SPRAYS INTO EACH NOSTRIL TWICE DAILY.Historical Med      furosemide (LASIX) 40 MG tablet Take 1 tablet by mouth 2 times dailyHistorical Med      midodrine (PROAMATINE) 10 MG tablet TAKE ONE TABLET BY MOUTH AS NEEDED FOR SBP LESS THAN 90Historical Med      Ubrogepant (UBRELVY) 100 MG TABS Take one tablet as needed for migraine, second dose can be taken at least 2 hours after the initial dose, if needed, Disp-16 tablet, R-0Normal      hydrocortisone 2.5 % cream Apply topically 2 times daily., Disp-15 g, R-0, Normal      loratadine (CLARITIN) 10 MG tablet Take 1 tablet by mouth daily, Disp-30 tablet, R-5Normal      moxifloxacin (VIGAMOX) 0.5 % ophthalmic solution 1 dropHistorical Med      hydroxychloroquine (PLAQUENIL) 200 MG tablet Take 1.5 tablets by mouth dailyHistorical Med       !! - Potential duplicate medications found. Please discuss with provider.          ALLERGIES     Patient has no known allergies.    FAMILY HISTORY       Family History   Problem Relation Age of Onset    Heart Disease Father 53    Cancer Maternal Grandmother         breast    Breast Cancer Maternal Grandmother         in her 60's    Breast Cancer Paternal Grandmother         in her 60's    Colon Cancer Paternal Grandfather     Uterine Cancer Paternal Aunt     Breast Cancer Paternal Aunt         in her 30's    Breast Cancer Paternal Aunt         in her 60's    Heart Disease Other         mom and dad's side    Colon Cancer Paternal Uncle           SOCIAL HISTORY       Social History     Socioeconomic History    Marital status: Single   Tobacco Use    Smoking status: Never    Smokeless tobacco: Never   Vaping Use    Vaping status: Never Used    Substance and Sexual Activity    Alcohol use: No     Alcohol/week: 0.0 standard drinks of alcohol     Comment: no alcohol since 2011    Drug use: No   Social History Narrative    ** Merged History Encounter **          Social Drivers of Health     Financial Resource Strain: Low Risk  (8/26/2024)    Received from [facility]    Overall Financial Resource Strain (CARDIA)     Difficulty of Paying Living Expenses: Not very hard   Food Insecurity: No Food Insecurity (10/17/2024)    Hunger Vital Sign     Worried About Running Out of Food in the Last Year: Never true     Ran Out of Food in the Last Year: Never true   Transportation Needs: No Transportation Needs (10/17/2024)    PRAPARE - Transportation     Lack of Transportation (Medical): No     Lack of Transportation (Non-Medical): No   Physical Activity: Insufficiently Active (10/15/2024)    Exercise Vital Sign     Days of Exercise per Week: 2 days     Minutes of Exercise per Session: 20 min   Stress: Stress Concern Present (5/27/2022)    [employer] - Occupational Stress Questionnaire     Feeling of Stress : Rather much   Social Connections: Unknown (5/27/2022)    Social Connection and Isolation Panel [NHANES]     Frequency of Communication with Friends and Family: Twice a week     Frequency of Social Gatherings with Friends and Family: Never     Active Member of Clubs or Organizations: No     Attends Club or Organization Meetings: Never     Marital Status: Never    Housing Stability: Low Risk  (10/17/2024)    Housing Stability Vital Sign     Unable to Pay for Housing in the Last Year: No     Number of Times Moved in the Last Year: 1     Homeless in the Last Year: No   Recent Concern: Housing Stability - High Risk (10/17/2024)    Housing Stability Vital Sign     Unable to Pay for Housing in the Last Year: Yes     Homeless in the Last Year: No       SCREENINGS         [place] Coma Scale  Eye Opening:

## 2025-03-14 NOTE — ED PROVIDER NOTES
Basic Information   Time Seen: 8:23 PM   Primary Care Provider: Emmie Avalos APRN - CNP   No chief complaint on file.     HPI   Sisi Cam is a 54 yrs female who presents with low potassium.  Patient reports she had lab work done this week and her potassium was 2.8.  Patient complains of diarrhea intermittently since January.   Physical Exam     /89 (03/13/25 2018)    Temp 97.8 °F (36.6 °C) (03/13/25 2018)    Pulse (!) 102 (03/13/25 2018)   Resp 18 (03/13/25 2018)    SpO2 99 % (03/13/25 2018)       General: Awake and Alert, no acute distress   CV: RRR, S1, S2   Resp: LCTAB, even and non labored   Other:   Impression and Plan     Labs Reviewed   CULTURE, BLOOD 1   CBC WITH AUTO DIFFERENTIAL   COMPREHENSIVE METABOLIC PANEL        No orders to display      Final Impression   I have performed a medical screening exam on Sisi Cam. Based on this patient's chief complaint/symptoms of No chief complaint on file.   and my focused exam, their care will be started and transitioned to provider when room is available       Johanne Morillo APRN - CNP  03/13/25 2024       Johanne Morillo APRN - CNP  03/13/25 2027

## 2025-03-14 NOTE — ED TRIAGE NOTES
Ongoing problem since January with abdominal pain , diarrhea, emesis, fevers states she has a vibrio infection was told today that her K was critically low and she should come to the ED

## 2025-03-18 LAB — BACTERIA BLD CULT: NORMAL

## 2025-04-03 DIAGNOSIS — G47.00 INSOMNIA, UNSPECIFIED TYPE: ICD-10-CM

## 2025-04-03 DIAGNOSIS — E03.9 HYPOTHYROIDISM, UNSPECIFIED TYPE: ICD-10-CM

## 2025-04-03 RX ORDER — LEVOTHYROXINE SODIUM 75 UG/1
TABLET ORAL
Qty: 60 TABLET | Refills: 5 | Status: SHIPPED | OUTPATIENT
Start: 2025-04-03

## 2025-04-03 RX ORDER — TRAZODONE HYDROCHLORIDE 150 MG/1
150 TABLET ORAL NIGHTLY
Qty: 30 TABLET | Refills: 5 | Status: SHIPPED | OUTPATIENT
Start: 2025-04-03

## 2025-04-03 NOTE — TELEPHONE ENCOUNTER
Comments:     Last Office Visit (last PCP visit):   2/5/2025    Next Visit Date:  Future Appointments   Date Time Provider Department Center   4/22/2025  8:30 AM Dennis-Bosworth, Johanne J, APRN - CNP [facility]   6/13/2025 11:00 AM June, Darrion S, PA [facility]       **If hasn't been seen in over a year OR hasn't followed up according to last diabetes/ADHD visit, make appointment for patient before sending refill to provider.    Rx requested:  Requested Prescriptions     Pending Prescriptions Disp Refills    traZODone (DESYREL) 150 MG tablet 30 tablet 0

## 2025-04-04 ENCOUNTER — PATIENT MESSAGE (OUTPATIENT)
Dept: FAMILY MEDICINE CLINIC | Age: 55
End: 2025-04-04

## 2025-04-07 DIAGNOSIS — K58.1 IRRITABLE BOWEL SYNDROME WITH CONSTIPATION: Primary | ICD-10-CM

## 2025-04-07 RX ORDER — TENAPANOR HYDROCHLORIDE 53.2 MG/1
1 TABLET ORAL 2 TIMES DAILY
Qty: 60 TABLET | Refills: 0 | Status: SHIPPED | OUTPATIENT
Start: 2025-04-07 | End: 2025-05-07

## 2025-04-07 NOTE — TELEPHONE ENCOUNTER
Please let patient know that I did send in 60 tablets it was a 30-day prescription for twice a day.  Thank you

## 2025-04-16 ENCOUNTER — ANCILLARY PROCEDURE (OUTPATIENT)
Age: 55
End: 2025-04-16
Payer: MEDICARE

## 2025-04-16 ENCOUNTER — OFFICE VISIT (OUTPATIENT)
Age: 55
End: 2025-04-16
Payer: MEDICARE

## 2025-04-16 VITALS
HEART RATE: 100 BPM | OXYGEN SATURATION: 97 % | TEMPERATURE: 97.8 F | WEIGHT: 110 LBS | SYSTOLIC BLOOD PRESSURE: 104 MMHG | BODY MASS INDEX: 20.24 KG/M2 | HEIGHT: 62 IN | DIASTOLIC BLOOD PRESSURE: 82 MMHG

## 2025-04-16 DIAGNOSIS — R10.84 GENERALIZED ABDOMINAL PAIN: ICD-10-CM

## 2025-04-16 DIAGNOSIS — R10.84 GENERALIZED ABDOMINAL PAIN: Primary | ICD-10-CM

## 2025-04-16 LAB
ALBUMIN SERPL-MCNC: 4.8 G/DL (ref 3.5–4.6)
ALP SERPL-CCNC: 125 U/L (ref 40–130)
ALT SERPL-CCNC: 9 U/L (ref 0–33)
AMYLASE SERPL-CCNC: 64 U/L (ref 22–93)
ANION GAP SERPL CALCULATED.3IONS-SCNC: 13 MEQ/L (ref 9–15)
AST SERPL-CCNC: 11 U/L (ref 0–35)
BASOPHILS # BLD: 0.1 K/UL (ref 0–0.2)
BASOPHILS NFR BLD: 1.2 %
BILIRUB SERPL-MCNC: 0.3 MG/DL (ref 0.2–0.7)
BUN SERPL-MCNC: 15 MG/DL (ref 6–20)
CALCIUM SERPL-MCNC: 9.5 MG/DL (ref 8.5–9.9)
CHLORIDE SERPL-SCNC: 98 MEQ/L (ref 95–107)
CO2 SERPL-SCNC: 25 MEQ/L (ref 20–31)
CREAT SERPL-MCNC: 0.81 MG/DL (ref 0.5–0.9)
EOSINOPHIL # BLD: 0.1 K/UL (ref 0–0.7)
EOSINOPHIL NFR BLD: 1.4 %
ERYTHROCYTE [DISTWIDTH] IN BLOOD BY AUTOMATED COUNT: 12.9 % (ref 11.5–14.5)
GLOBULIN SER CALC-MCNC: 2.6 G/DL (ref 2.3–3.5)
GLUCOSE SERPL-MCNC: 86 MG/DL (ref 70–99)
HCT VFR BLD AUTO: 38.2 % (ref 37–47)
HGB BLD-MCNC: 12.9 G/DL (ref 12–16)
LIPASE SERPL-CCNC: 28 U/L (ref 12–95)
LYMPHOCYTES # BLD: 2.1 K/UL (ref 1–4.8)
LYMPHOCYTES NFR BLD: 37.3 %
MCH RBC QN AUTO: 28.4 PG (ref 27–31.3)
MCHC RBC AUTO-ENTMCNC: 33.8 % (ref 33–37)
MCV RBC AUTO: 84.1 FL (ref 79.4–94.8)
MONOCYTES # BLD: 0.3 K/UL (ref 0.2–0.8)
MONOCYTES NFR BLD: 5.8 %
NEUTROPHILS # BLD: 3 K/UL (ref 1.4–6.5)
NEUTS SEG NFR BLD: 53.9 %
PLATELET # BLD AUTO: 357 K/UL (ref 130–400)
POTASSIUM SERPL-SCNC: 3.4 MEQ/L (ref 3.4–4.9)
PROT SERPL-MCNC: 7.4 G/DL (ref 6.3–8)
RBC # BLD AUTO: 4.54 M/UL (ref 4.2–5.4)
SODIUM SERPL-SCNC: 136 MEQ/L (ref 135–144)
WBC # BLD AUTO: 5.7 K/UL (ref 4.8–10.8)

## 2025-04-16 PROCEDURE — 74018 RADEX ABDOMEN 1 VIEW: CPT | Performed by: RADIOLOGY

## 2025-04-16 PROCEDURE — 99212 OFFICE O/P EST SF 10 MIN: CPT | Performed by: NURSE PRACTITIONER

## 2025-04-16 PROCEDURE — G8427 DOCREV CUR MEDS BY ELIG CLIN: HCPCS | Performed by: NURSE PRACTITIONER

## 2025-04-16 PROCEDURE — 3017F COLORECTAL CA SCREEN DOC REV: CPT | Performed by: NURSE PRACTITIONER

## 2025-04-16 PROCEDURE — G8420 CALC BMI NORM PARAMETERS: HCPCS | Performed by: NURSE PRACTITIONER

## 2025-04-16 PROCEDURE — 99213 OFFICE O/P EST LOW 20 MIN: CPT | Performed by: NURSE PRACTITIONER

## 2025-04-16 PROCEDURE — 1036F TOBACCO NON-USER: CPT | Performed by: NURSE PRACTITIONER

## 2025-04-16 NOTE — PROGRESS NOTES
Subjective:      Patient ID: Sisi Cam is a 54 y.o. female who presents today for:  Chief Complaint   Patient presents with    Abdominal Pain     Pt states abdominal pain and bloating. Concern with pancreatis or constipation.        HPI  Patient is here with c/o left upper abdominal pain.   Says she has not had BM for the last 4 days.   Says she has taken some laxatives and enema and nothing helped. Says she took ducolax as well.   Says she has clots and bloody mucus.   Says she has had pancreatitis in the past.   Says she has not been eating.   Says she has bloating and nausea for the last few days.   Past Medical History:   Diagnosis Date    Acquired hypothyroidism 09/26/2016    Acute left-sided low back pain with bilateral sciatica 03/09/2022    Acute pancreatitis     ADD (attention deficit disorder) 02/12/2015    Anxiety     Claudication of both lower extremities 02/11/2022    Congestive heart failure, unspecified HF chronicity, unspecified heart failure type (HCC) 02/11/2022    Depression 12/09/2015    Endometrial cyst of ovary 05/10/2014    RT ovary, ruptured    GERD (gastroesophageal reflux disease) 09/26/2016    Intussusception (HCC)     no surgery required    Medullary sponge kidney of both kidneys 05/22/2018    Sjogren's disease     Stress     Swelling     Type 2 diabetes mellitus 03/01/2024     Past Surgical History:   Procedure Laterality Date    BREAST BIOPSY Left 2015??    lump removed (benign) (Dr. Garcia)    BREAST CYST EXCISION Left 2015??    Dr Garcia (benign)    BREAST LUMPECTOMY Left     2015? (benign)    CHOLECYSTECTOMY  2011    COLONOSCOPY N/A 12/16/2022    COLORECTAL CANCER SCREENING, HIGH RISK performed by Sergei Rangel MD at [facility]    ERCP W/ PLASTIC STENT PLACEMENT N/A     4 ercps    HYSTERECTOMY (CERVIX STATUS UNKNOWN)  2014 sept (total)    OTHER SURGICAL HISTORY      AVM in stomach    OVARY REMOVAL Left 01/2014    TENDON REPAIR Right     wrist area x3    UPPER

## 2025-04-17 ENCOUNTER — RESULTS FOLLOW-UP (OUTPATIENT)
Age: 55
End: 2025-04-17

## 2025-04-17 ENCOUNTER — TELEPHONE (OUTPATIENT)
Dept: GASTROENTEROLOGY | Age: 55
End: 2025-04-17

## 2025-04-17 ASSESSMENT — ENCOUNTER SYMPTOMS
PHOTOPHOBIA: 0
SORE THROAT: 0
EYE ITCHING: 0
EYE DISCHARGE: 0
EYE PAIN: 0
VOMITING: 0
COUGH: 0
CONSTIPATION: 1
RHINORRHEA: 0
SINUS PAIN: 0
ABDOMINAL DISTENTION: 1
NAUSEA: 1
ABDOMINAL PAIN: 1
CHEST TIGHTNESS: 0
SINUS PRESSURE: 0
WHEEZING: 0
DIARRHEA: 0
SHORTNESS OF BREATH: 0
EYE REDNESS: 0
STRIDOR: 0

## 2025-04-17 NOTE — RESULT ENCOUNTER NOTE
KUB showing stool retention in the sigmoid colon.   Would suggest laxative if she has not had any relief of her sx.  Suggest mag citrate.

## 2025-04-17 NOTE — TELEPHONE ENCOUNTER
Patient said she is having severe fecal impaction and has tried over the counter meds and even an enema and nothing was helpful. Can you please give patient a call regarding this she is very uncomfortable.

## 2025-04-18 RX ORDER — BISACODYL 5 MG/1
5 TABLET, DELAYED RELEASE ORAL DAILY PRN
Qty: 15 TABLET | Refills: 0 | Status: SHIPPED | OUTPATIENT
Start: 2025-04-18

## 2025-04-30 DIAGNOSIS — Z76.0 MEDICATION REFILL: ICD-10-CM

## 2025-04-30 DIAGNOSIS — F90.9 ATTENTION DEFICIT HYPERACTIVITY DISORDER (ADHD), UNSPECIFIED ADHD TYPE: ICD-10-CM

## 2025-04-30 RX ORDER — DEXTROAMPHETAMINE SACCHARATE, AMPHETAMINE ASPARTATE MONOHYDRATE, DEXTROAMPHETAMINE SULFATE AND AMPHETAMINE SULFATE 7.5; 7.5; 7.5; 7.5 MG/1; MG/1; MG/1; MG/1
30 CAPSULE, EXTENDED RELEASE ORAL 2 TIMES DAILY
Qty: 60 CAPSULE | Refills: 0 | Status: SHIPPED | OUTPATIENT
Start: 2025-04-30 | End: 2025-05-30

## 2025-04-30 NOTE — TELEPHONE ENCOUNTER
Comments:     Last Office Visit (last PCP visit):   2/5/2025    Next Visit Date:  Future Appointments   Date Time Provider Department Center   6/4/2025  2:30 PM Emmie Avalos APRN - CNP [facility]   6/13/2025 11:00 AM June, Darrion S, PA [facility]       **If hasn't been seen in over a year OR hasn't followed up according to last diabetes/ADHD visit, make appointment for patient before sending refill to provider.    Rx requested:  Requested Prescriptions     Pending Prescriptions Disp Refills    amphetamine-dextroamphetamine (ADDERALL XR) 30 MG extended release capsule 60 capsule 0     Sig: Take 1 capsule by mouth in the morning and 1 capsule in the evening. Do all this for 30 days.

## 2025-05-05 DIAGNOSIS — R73.09 ABNORMAL BLOOD SUGAR: ICD-10-CM

## 2025-05-05 DIAGNOSIS — R51.9 CHRONIC NONINTRACTABLE HEADACHE, UNSPECIFIED HEADACHE TYPE: ICD-10-CM

## 2025-05-05 DIAGNOSIS — G89.29 CHRONIC NONINTRACTABLE HEADACHE, UNSPECIFIED HEADACHE TYPE: ICD-10-CM

## 2025-05-05 RX ORDER — TOPIRAMATE 100 MG/1
100 TABLET, FILM COATED ORAL 2 TIMES DAILY
Qty: 60 TABLET | Refills: 3 | Status: SHIPPED | OUTPATIENT
Start: 2025-05-05

## 2025-05-05 NOTE — TELEPHONE ENCOUNTER
Comments:     Last Office Visit (last PCP visit):   2/5/2025    Next Visit Date:  Future Appointments   Date Time Provider Department Center   6/4/2025  2:30 PM Emmie Avalos APRN - CNP [facility]   6/13/2025 11:00 AM June, Darrion S, PA [facility]       **If hasn't been seen in over a year OR hasn't followed up according to last diabetes/ADHD visit, make appointment for patient before sending refill to provider.    Rx requested:  Requested Prescriptions     Pending Prescriptions Disp Refills    metFORMIN (GLUCOPHAGE) 500 MG tablet 30 tablet 3     Sig: Take 1 tablet by mouth daily (with breakfast)    topiramate (TOPAMAX) 100 MG tablet 60 tablet 0     Sig: Take 1 tablet by mouth 2 times daily

## 2025-05-15 NOTE — TELEPHONE ENCOUNTER
12/28 family member stayed with he: later tested positive. Her symptoms started Jan 1st. Can't get out of bed. Fever (101), chills, fatigue, severe body aches. Taking tylenol, Toradol, benadryl. She is \"miserable\"  Worst headache of her life. SOB with exertion, but fine with at rest.    Tearful during phone call. Informed may need to go Ed for symptom management. PCP made aware. Will order testing under pcp for continuity of care.
Pt had a VV appt on 12/29 and was tested for COVID. She was negative on 12/30. Pt was exposed to a positive COVID person and now has the majority of the symptoms, such as fever of 101, fatigued, etc. Pt is asking if she should be retested?
Tested ordered.
N/A

## 2025-05-23 ENCOUNTER — HOSPITAL ENCOUNTER (OUTPATIENT)
Age: 55
Setting detail: OBSERVATION
Discharge: HOME | End: 2025-05-25
Attending: EMERGENCY MEDICINE | Admitting: INTERNAL MEDICINE
Payer: MEDICARE

## 2025-05-23 ENCOUNTER — APPOINTMENT (OUTPATIENT)
Dept: CT IMAGING | Age: 55
End: 2025-05-23
Payer: MEDICARE

## 2025-05-23 DIAGNOSIS — K86.1 IDIOPATHIC CHRONIC PANCREATITIS (HCC): ICD-10-CM

## 2025-05-23 DIAGNOSIS — E87.6 HYPOKALEMIA: ICD-10-CM

## 2025-05-23 DIAGNOSIS — K85.90 ACUTE PANCREATITIS WITHOUT INFECTION OR NECROSIS, UNSPECIFIED PANCREATITIS TYPE: ICD-10-CM

## 2025-05-23 DIAGNOSIS — K56.7 ILEUS (HCC): ICD-10-CM

## 2025-05-23 DIAGNOSIS — R10.31 RIGHT LOWER QUADRANT ABDOMINAL PAIN: Primary | ICD-10-CM

## 2025-05-23 PROBLEM — E44.0 PROTEIN-CALORIE MALNUTRITION, MODERATE: Status: ACTIVE | Noted: 2025-05-23

## 2025-05-23 LAB
ALBUMIN SERPL-MCNC: 4.6 G/DL (ref 3.5–4.6)
ALP SERPL-CCNC: 152 U/L (ref 40–130)
ALT SERPL-CCNC: 10 U/L (ref 0–33)
AMPHET UR QL SCN: POSITIVE
ANION GAP SERPL CALCULATED.3IONS-SCNC: 10 MEQ/L (ref 9–15)
ANION GAP SERPL CALCULATED.3IONS-SCNC: 12 MEQ/L (ref 9–15)
AST SERPL-CCNC: 14 U/L (ref 0–35)
BACTERIA URNS QL MICRO: NEGATIVE /HPF
BARBITURATES UR QL SCN: NEGATIVE
BASOPHILS # BLD: 0.1 K/UL (ref 0–0.1)
BASOPHILS NFR BLD: 1.3 % (ref 0.1–1.2)
BENZODIAZ UR QL SCN: NEGATIVE
BILIRUB SERPL-MCNC: 0.4 MG/DL (ref 0.2–0.7)
BILIRUB UR QL STRIP: NEGATIVE
BUN SERPL-MCNC: 19 MG/DL (ref 6–20)
BUN SERPL-MCNC: 26 MG/DL (ref 6–20)
CALCIUM SERPL-MCNC: 8.8 MG/DL (ref 8.5–9.9)
CALCIUM SERPL-MCNC: 9.8 MG/DL (ref 8.5–9.9)
CANNABINOIDS UR QL SCN: NEGATIVE
CHLORIDE SERPL-SCNC: 107 MEQ/L (ref 95–107)
CHLORIDE SERPL-SCNC: 98 MEQ/L (ref 95–107)
CLARITY UR: CLEAR
CO2 SERPL-SCNC: 20 MEQ/L (ref 20–31)
CO2 SERPL-SCNC: 26 MEQ/L (ref 20–31)
COCAINE UR QL SCN: NEGATIVE
COLOR UR: YELLOW
CREAT SERPL-MCNC: 0.7 MG/DL (ref 0.5–0.9)
CREAT SERPL-MCNC: 0.7 MG/DL (ref 0.5–0.9)
DRUG SCREEN COMMENT UR-IMP: ABNORMAL
EOSINOPHIL # BLD: 0.1 K/UL (ref 0–0.4)
EOSINOPHIL NFR BLD: 0.8 % (ref 0.7–5.8)
EPI CELLS #/AREA URNS HPF: NORMAL /HPF
ERYTHROCYTE [DISTWIDTH] IN BLOOD BY AUTOMATED COUNT: 13 % (ref 11.7–14.4)
FENTANYL SCREEN, URINE: NEGATIVE
GLOBULIN SER CALC-MCNC: 2.7 G/DL (ref 2.3–3.5)
GLUCOSE SERPL-MCNC: 105 MG/DL (ref 70–99)
GLUCOSE SERPL-MCNC: 121 MG/DL (ref 70–99)
GLUCOSE UR STRIP-MCNC: NEGATIVE MG/DL
HCT VFR BLD AUTO: 36.6 % (ref 37–47)
HGB BLD-MCNC: 11.9 G/DL (ref 11.2–15.7)
HGB UR QL STRIP: NORMAL
IMM GRANULOCYTES # BLD: 0 K/UL
IMM GRANULOCYTES NFR BLD: 0.2 %
INR PPP: 1
KETONES UR STRIP-MCNC: NEGATIVE MG/DL
LACTATE BLDV-SCNC: 1.7 MMOL/L (ref 0.5–2.2)
LEUKOCYTE ESTERASE UR QL STRIP: NORMAL
LIPASE SERPL-CCNC: 214 U/L (ref 12–95)
LIPASE SERPL-CCNC: 271 U/L (ref 12–95)
LYMPHOCYTES # BLD: 2.5 K/UL (ref 1.2–3.7)
LYMPHOCYTES NFR BLD: 30.3 %
MAGNESIUM SERPL-MCNC: 1.6 MG/DL (ref 1.7–2.4)
MCH RBC QN AUTO: 27.7 PG (ref 25.6–32.2)
MCHC RBC AUTO-ENTMCNC: 32.5 % (ref 32.2–35.5)
MCV RBC AUTO: 85.3 FL (ref 79.4–94.8)
METHADONE UR QL SCN: NEGATIVE
MONOCYTES # BLD: 0.7 K/UL (ref 0.2–0.9)
MONOCYTES NFR BLD: 8.3 % (ref 4.7–12.5)
NEUTROPHILS # BLD: 5 K/UL (ref 1.6–6.1)
NEUTS SEG NFR BLD: 59.1 % (ref 34–71.1)
NITRITE UR QL STRIP: NEGATIVE
OPIATES UR QL SCN: NEGATIVE
OXYCODONE UR QL SCN: POSITIVE
PCP UR QL SCN: NEGATIVE
PH UR STRIP: 5.5 [PH] (ref 5–9)
PLATELET # BLD AUTO: 373 K/UL (ref 182–369)
POTASSIUM SERPL-SCNC: 3.2 MEQ/L (ref 3.4–4.9)
POTASSIUM SERPL-SCNC: 3.6 MEQ/L (ref 3.4–4.9)
PROT SERPL-MCNC: 7.3 G/DL (ref 6.3–8)
PROT UR STRIP-MCNC: NEGATIVE MG/DL
PROTHROMBIN TIME: 13.4 SEC (ref 12.3–14.9)
RBC # BLD AUTO: 4.29 M/UL (ref 3.93–5.22)
RBC #/AREA URNS HPF: NORMAL /HPF (ref 0–2)
SODIUM SERPL-SCNC: 136 MEQ/L (ref 135–144)
SODIUM SERPL-SCNC: 137 MEQ/L (ref 135–144)
SP GR UR STRIP: >=1.03 (ref 1–1.03)
TROPONIN, HIGH SENSITIVITY: <6 NG/L (ref 0–19)
UROBILINOGEN UR STRIP-ACNC: 0.2 E.U./DL
WBC # BLD AUTO: 8.4 K/UL (ref 4–10)
WBC #/AREA URNS HPF: NORMAL /HPF (ref 0–5)

## 2025-05-23 PROCEDURE — 96375 TX/PRO/DX INJ NEW DRUG ADDON: CPT

## 2025-05-23 PROCEDURE — 74177 CT ABD & PELVIS W/CONTRAST: CPT

## 2025-05-23 PROCEDURE — 6370000000 HC RX 637 (ALT 250 FOR IP): Performed by: INTERNAL MEDICINE

## 2025-05-23 PROCEDURE — 85025 COMPLETE CBC W/AUTO DIFF WBC: CPT

## 2025-05-23 PROCEDURE — 2580000003 HC RX 258: Performed by: INTERNAL MEDICINE

## 2025-05-23 PROCEDURE — 80053 COMPREHEN METABOLIC PANEL: CPT

## 2025-05-23 PROCEDURE — 80307 DRUG TEST PRSMV CHEM ANLYZR: CPT

## 2025-05-23 PROCEDURE — 93005 ELECTROCARDIOGRAM TRACING: CPT

## 2025-05-23 PROCEDURE — 71260 CT THORAX DX C+: CPT

## 2025-05-23 PROCEDURE — 2500000003 HC RX 250 WO HCPCS: Performed by: INTERNAL MEDICINE

## 2025-05-23 PROCEDURE — 83690 ASSAY OF LIPASE: CPT

## 2025-05-23 PROCEDURE — 6360000002 HC RX W HCPCS: Performed by: EMERGENCY MEDICINE

## 2025-05-23 PROCEDURE — 85610 PROTHROMBIN TIME: CPT

## 2025-05-23 PROCEDURE — 96376 TX/PRO/DX INJ SAME DRUG ADON: CPT

## 2025-05-23 PROCEDURE — 99285 EMERGENCY DEPT VISIT HI MDM: CPT

## 2025-05-23 PROCEDURE — 81001 URINALYSIS AUTO W/SCOPE: CPT

## 2025-05-23 PROCEDURE — 6360000004 HC RX CONTRAST MEDICATION: Performed by: EMERGENCY MEDICINE

## 2025-05-23 PROCEDURE — 2500000003 HC RX 250 WO HCPCS: Performed by: EMERGENCY MEDICINE

## 2025-05-23 PROCEDURE — 96361 HYDRATE IV INFUSION ADD-ON: CPT

## 2025-05-23 PROCEDURE — 36415 COLL VENOUS BLD VENIPUNCTURE: CPT

## 2025-05-23 PROCEDURE — 2580000003 HC RX 258: Performed by: EMERGENCY MEDICINE

## 2025-05-23 PROCEDURE — 84484 ASSAY OF TROPONIN QUANT: CPT

## 2025-05-23 PROCEDURE — 83735 ASSAY OF MAGNESIUM: CPT

## 2025-05-23 PROCEDURE — 6360000002 HC RX W HCPCS: Performed by: INTERNAL MEDICINE

## 2025-05-23 PROCEDURE — G0378 HOSPITAL OBSERVATION PER HR: HCPCS

## 2025-05-23 PROCEDURE — 96366 THER/PROPH/DIAG IV INF ADDON: CPT

## 2025-05-23 PROCEDURE — 83605 ASSAY OF LACTIC ACID: CPT

## 2025-05-23 PROCEDURE — 96365 THER/PROPH/DIAG IV INF INIT: CPT

## 2025-05-23 PROCEDURE — 96372 THER/PROPH/DIAG INJ SC/IM: CPT

## 2025-05-23 RX ORDER — HYDROXYCHLOROQUINE SULFATE 200 MG/1
300 TABLET, FILM COATED ORAL DAILY
Status: DISCONTINUED | OUTPATIENT
Start: 2025-05-23 | End: 2025-05-25 | Stop reason: HOSPADM

## 2025-05-23 RX ORDER — 0.9 % SODIUM CHLORIDE 0.9 %
1000 INTRAVENOUS SOLUTION INTRAVENOUS ONCE
Status: COMPLETED | OUTPATIENT
Start: 2025-05-23 | End: 2025-05-23

## 2025-05-23 RX ORDER — SODIUM CHLORIDE AND POTASSIUM CHLORIDE 300; 900 MG/100ML; MG/100ML
INJECTION, SOLUTION INTRAVENOUS CONTINUOUS
Status: DISCONTINUED | OUTPATIENT
Start: 2025-05-23 | End: 2025-05-23

## 2025-05-23 RX ORDER — SODIUM CHLORIDE, SODIUM LACTATE, POTASSIUM CHLORIDE, CALCIUM CHLORIDE 600; 310; 30; 20 MG/100ML; MG/100ML; MG/100ML; MG/100ML
INJECTION, SOLUTION INTRAVENOUS CONTINUOUS
Status: DISCONTINUED | OUTPATIENT
Start: 2025-05-23 | End: 2025-05-24

## 2025-05-23 RX ORDER — ENOXAPARIN SODIUM 100 MG/ML
30 INJECTION SUBCUTANEOUS DAILY
Status: DISCONTINUED | OUTPATIENT
Start: 2025-05-23 | End: 2025-05-25 | Stop reason: HOSPADM

## 2025-05-23 RX ORDER — TOPIRAMATE 100 MG/1
100 TABLET, FILM COATED ORAL 2 TIMES DAILY
Status: DISCONTINUED | OUTPATIENT
Start: 2025-05-23 | End: 2025-05-25 | Stop reason: HOSPADM

## 2025-05-23 RX ORDER — HYDROCORTISONE 5 MG/1
15 TABLET ORAL DAILY
Status: DISCONTINUED | OUTPATIENT
Start: 2025-05-23 | End: 2025-05-25 | Stop reason: HOSPADM

## 2025-05-23 RX ORDER — HYDROMORPHONE HYDROCHLORIDE 1 MG/ML
0.25 INJECTION, SOLUTION INTRAMUSCULAR; INTRAVENOUS; SUBCUTANEOUS ONCE
Status: COMPLETED | OUTPATIENT
Start: 2025-05-23 | End: 2025-05-23

## 2025-05-23 RX ORDER — METOCLOPRAMIDE 5 MG/1
10 TABLET ORAL
Status: DISCONTINUED | OUTPATIENT
Start: 2025-05-23 | End: 2025-05-25 | Stop reason: HOSPADM

## 2025-05-23 RX ORDER — MORPHINE SULFATE 4 MG/ML
4 INJECTION, SOLUTION INTRAMUSCULAR; INTRAVENOUS
Refills: 0 | Status: COMPLETED | OUTPATIENT
Start: 2025-05-23 | End: 2025-05-23

## 2025-05-23 RX ORDER — TRAZODONE HYDROCHLORIDE 50 MG/1
150 TABLET ORAL NIGHTLY
Status: DISCONTINUED | OUTPATIENT
Start: 2025-05-23 | End: 2025-05-25 | Stop reason: HOSPADM

## 2025-05-23 RX ORDER — IOPAMIDOL 755 MG/ML
75 INJECTION, SOLUTION INTRAVASCULAR
Status: COMPLETED | OUTPATIENT
Start: 2025-05-23 | End: 2025-05-23

## 2025-05-23 RX ORDER — SODIUM CHLORIDE, SODIUM LACTATE, POTASSIUM CHLORIDE, CALCIUM CHLORIDE 600; 310; 30; 20 MG/100ML; MG/100ML; MG/100ML; MG/100ML
INJECTION, SOLUTION INTRAVENOUS CONTINUOUS
Status: DISCONTINUED | OUTPATIENT
Start: 2025-05-23 | End: 2025-05-23

## 2025-05-23 RX ORDER — LANOLIN ALCOHOL/MO/W.PET/CERES
400 CREAM (GRAM) TOPICAL 2 TIMES DAILY
Status: DISCONTINUED | OUTPATIENT
Start: 2025-05-23 | End: 2025-05-25 | Stop reason: HOSPADM

## 2025-05-23 RX ORDER — PROMETHAZINE HYDROCHLORIDE 12.5 MG/1
12.5 TABLET ORAL EVERY 6 HOURS PRN
Status: DISCONTINUED | OUTPATIENT
Start: 2025-05-23 | End: 2025-05-25 | Stop reason: HOSPADM

## 2025-05-23 RX ORDER — POTASSIUM CHLORIDE 1500 MG/1
20 TABLET, EXTENDED RELEASE ORAL
Status: DISCONTINUED | OUTPATIENT
Start: 2025-05-23 | End: 2025-05-25 | Stop reason: HOSPADM

## 2025-05-23 RX ORDER — LEVOTHYROXINE SODIUM 75 UG/1
75 TABLET ORAL DAILY
Status: DISCONTINUED | OUTPATIENT
Start: 2025-05-23 | End: 2025-05-25 | Stop reason: HOSPADM

## 2025-05-23 RX ORDER — HYDROMORPHONE HYDROCHLORIDE 1 MG/ML
0.5 INJECTION, SOLUTION INTRAMUSCULAR; INTRAVENOUS; SUBCUTANEOUS
Status: COMPLETED | OUTPATIENT
Start: 2025-05-23 | End: 2025-05-23

## 2025-05-23 RX ORDER — POTASSIUM CHLORIDE 1500 MG/1
20 TABLET, EXTENDED RELEASE ORAL
Status: DISCONTINUED | OUTPATIENT
Start: 2025-05-24 | End: 2025-05-23

## 2025-05-23 RX ORDER — SODIUM CHLORIDE 0.9 % (FLUSH) 0.9 %
10 SYRINGE (ML) INJECTION EVERY 12 HOURS SCHEDULED
Status: DISCONTINUED | OUTPATIENT
Start: 2025-05-23 | End: 2025-05-25 | Stop reason: HOSPADM

## 2025-05-23 RX ORDER — BUPROPION HYDROCHLORIDE 150 MG/1
300 TABLET ORAL EVERY MORNING
Status: DISCONTINUED | OUTPATIENT
Start: 2025-05-23 | End: 2025-05-25 | Stop reason: HOSPADM

## 2025-05-23 RX ORDER — ACETAMINOPHEN 650 MG/1
650 SUPPOSITORY RECTAL EVERY 6 HOURS PRN
Status: DISCONTINUED | OUTPATIENT
Start: 2025-05-23 | End: 2025-05-25 | Stop reason: HOSPADM

## 2025-05-23 RX ORDER — ONDANSETRON 2 MG/ML
4 INJECTION INTRAMUSCULAR; INTRAVENOUS ONCE
Status: COMPLETED | OUTPATIENT
Start: 2025-05-23 | End: 2025-05-23

## 2025-05-23 RX ORDER — HYDROMORPHONE HYDROCHLORIDE 1 MG/ML
0.5 INJECTION, SOLUTION INTRAMUSCULAR; INTRAVENOUS; SUBCUTANEOUS EVERY 4 HOURS PRN
Status: DISCONTINUED | OUTPATIENT
Start: 2025-05-23 | End: 2025-05-25 | Stop reason: HOSPADM

## 2025-05-23 RX ORDER — KETOROLAC TROMETHAMINE 15 MG/ML
15 INJECTION, SOLUTION INTRAMUSCULAR; INTRAVENOUS EVERY 6 HOURS PRN
Status: COMPLETED | OUTPATIENT
Start: 2025-05-23 | End: 2025-05-24

## 2025-05-23 RX ORDER — KETOROLAC TROMETHAMINE 30 MG/ML
30 INJECTION, SOLUTION INTRAMUSCULAR; INTRAVENOUS ONCE
Status: COMPLETED | OUTPATIENT
Start: 2025-05-23 | End: 2025-05-23

## 2025-05-23 RX ORDER — ACETAMINOPHEN 325 MG/1
650 TABLET ORAL EVERY 6 HOURS PRN
Status: DISCONTINUED | OUTPATIENT
Start: 2025-05-23 | End: 2025-05-25 | Stop reason: HOSPADM

## 2025-05-23 RX ORDER — MIDODRINE HYDROCHLORIDE 10 MG/1
10 TABLET ORAL
Status: DISCONTINUED | OUTPATIENT
Start: 2025-05-23 | End: 2025-05-25 | Stop reason: HOSPADM

## 2025-05-23 RX ORDER — SODIUM CHLORIDE 9 MG/ML
INJECTION, SOLUTION INTRAVENOUS PRN
Status: DISCONTINUED | OUTPATIENT
Start: 2025-05-23 | End: 2025-05-25 | Stop reason: HOSPADM

## 2025-05-23 RX ORDER — OXYCODONE HYDROCHLORIDE 5 MG/1
5 TABLET ORAL EVERY 4 HOURS PRN
Status: DISCONTINUED | OUTPATIENT
Start: 2025-05-23 | End: 2025-05-25 | Stop reason: HOSPADM

## 2025-05-23 RX ORDER — SODIUM CHLORIDE AND POTASSIUM CHLORIDE 300; 900 MG/100ML; MG/100ML
INJECTION, SOLUTION INTRAVENOUS CONTINUOUS
Status: ACTIVE | OUTPATIENT
Start: 2025-05-23 | End: 2025-05-23

## 2025-05-23 RX ORDER — MIDODRINE HYDROCHLORIDE 10 MG/1
5 TABLET ORAL
Status: DISCONTINUED | OUTPATIENT
Start: 2025-05-23 | End: 2025-05-23

## 2025-05-23 RX ORDER — ONDANSETRON 2 MG/ML
4 INJECTION INTRAMUSCULAR; INTRAVENOUS EVERY 6 HOURS PRN
Status: DISCONTINUED | OUTPATIENT
Start: 2025-05-23 | End: 2025-05-25 | Stop reason: HOSPADM

## 2025-05-23 RX ORDER — POLYETHYLENE GLYCOL 3350 17 G/17G
17 POWDER, FOR SOLUTION ORAL DAILY PRN
Status: DISCONTINUED | OUTPATIENT
Start: 2025-05-23 | End: 2025-05-25 | Stop reason: HOSPADM

## 2025-05-23 RX ORDER — DICYCLOMINE HYDROCHLORIDE 10 MG/1
10 CAPSULE ORAL
Status: DISCONTINUED | OUTPATIENT
Start: 2025-05-23 | End: 2025-05-25 | Stop reason: HOSPADM

## 2025-05-23 RX ORDER — SODIUM CHLORIDE 0.9 % (FLUSH) 0.9 %
10 SYRINGE (ML) INJECTION PRN
Status: DISCONTINUED | OUTPATIENT
Start: 2025-05-23 | End: 2025-05-25 | Stop reason: HOSPADM

## 2025-05-23 RX ORDER — PANTOPRAZOLE SODIUM 40 MG/1
40 TABLET, DELAYED RELEASE ORAL
Status: DISCONTINUED | OUTPATIENT
Start: 2025-05-23 | End: 2025-05-25 | Stop reason: HOSPADM

## 2025-05-23 RX ADMIN — SODIUM CHLORIDE, PRESERVATIVE FREE 10 ML: 5 INJECTION INTRAVENOUS at 21:30

## 2025-05-23 RX ADMIN — MIDODRINE HYDROCHLORIDE 5 MG: 10 TABLET ORAL at 07:33

## 2025-05-23 RX ADMIN — SODIUM CHLORIDE, SODIUM LACTATE, POTASSIUM CHLORIDE, AND CALCIUM CHLORIDE: .6; .31; .03; .02 INJECTION, SOLUTION INTRAVENOUS at 05:09

## 2025-05-23 RX ADMIN — KETOROLAC TROMETHAMINE 15 MG: 15 INJECTION, SOLUTION INTRAMUSCULAR; INTRAVENOUS at 07:39

## 2025-05-23 RX ADMIN — HYDROXYCHLOROQUINE SULFATE 300 MG: 200 TABLET ORAL at 09:13

## 2025-05-23 RX ADMIN — MIDODRINE HYDROCHLORIDE 10 MG: 10 TABLET ORAL at 17:04

## 2025-05-23 RX ADMIN — ONDANSETRON 4 MG: 2 INJECTION, SOLUTION INTRAMUSCULAR; INTRAVENOUS at 01:12

## 2025-05-23 RX ADMIN — MIDODRINE HYDROCHLORIDE 10 MG: 10 TABLET ORAL at 09:11

## 2025-05-23 RX ADMIN — PANCRELIPASE LIPASE, PANCRELIPASE PROTEASE, PANCRELIPASE AMYLASE 30000 UNITS: 5000; 17000; 24000 CAPSULE, DELAYED RELEASE ORAL at 12:05

## 2025-05-23 RX ADMIN — PANCRELIPASE LIPASE, PANCRELIPASE PROTEASE, PANCRELIPASE AMYLASE 40000 UNITS: 20000; 63000; 84000 CAPSULE, DELAYED RELEASE ORAL at 17:02

## 2025-05-23 RX ADMIN — LEVOTHYROXINE SODIUM 75 MCG: 0.07 TABLET ORAL at 09:13

## 2025-05-23 RX ADMIN — DICYCLOMINE HYDROCHLORIDE 10 MG: 10 CAPSULE ORAL at 21:30

## 2025-05-23 RX ADMIN — MAGNESIUM OXIDE 400 MG: 400 TABLET ORAL at 12:07

## 2025-05-23 RX ADMIN — HYDROCORTISONE 15 MG: 5 TABLET ORAL at 09:11

## 2025-05-23 RX ADMIN — PANCRELIPASE LIPASE, PANCRELIPASE PROTEASE, PANCRELIPASE AMYLASE 40000 UNITS: 20000; 63000; 84000 CAPSULE, DELAYED RELEASE ORAL at 12:05

## 2025-05-23 RX ADMIN — POTASSIUM CHLORIDE AND SODIUM CHLORIDE: 900; 300 INJECTION, SOLUTION INTRAVENOUS at 05:12

## 2025-05-23 RX ADMIN — POLYETHYLENE GLYCOL 3350 17 G: 17 POWDER, FOR SOLUTION ORAL at 21:46

## 2025-05-23 RX ADMIN — MORPHINE SULFATE 4 MG: 4 INJECTION INTRAVENOUS at 01:12

## 2025-05-23 RX ADMIN — ONDANSETRON 4 MG: 2 INJECTION, SOLUTION INTRAMUSCULAR; INTRAVENOUS at 03:52

## 2025-05-23 RX ADMIN — DICYCLOMINE HYDROCHLORIDE 10 MG: 10 CAPSULE ORAL at 12:05

## 2025-05-23 RX ADMIN — OXYCODONE 5 MG: 5 TABLET ORAL at 10:36

## 2025-05-23 RX ADMIN — SODIUM CHLORIDE, SODIUM LACTATE, POTASSIUM CHLORIDE, AND CALCIUM CHLORIDE: .6; .31; .03; .02 INJECTION, SOLUTION INTRAVENOUS at 09:11

## 2025-05-23 RX ADMIN — SODIUM CHLORIDE 1000 ML: 0.9 INJECTION, SOLUTION INTRAVENOUS at 01:11

## 2025-05-23 RX ADMIN — ONDANSETRON 4 MG: 2 INJECTION, SOLUTION INTRAMUSCULAR; INTRAVENOUS at 01:47

## 2025-05-23 RX ADMIN — POTASSIUM CHLORIDE 20 MEQ: 1500 TABLET, EXTENDED RELEASE ORAL at 12:07

## 2025-05-23 RX ADMIN — PANTOPRAZOLE SODIUM 40 MG: 40 INJECTION, POWDER, LYOPHILIZED, FOR SOLUTION INTRAVENOUS at 01:12

## 2025-05-23 RX ADMIN — DICYCLOMINE HYDROCHLORIDE 10 MG: 10 CAPSULE ORAL at 17:02

## 2025-05-23 RX ADMIN — MIDODRINE HYDROCHLORIDE 10 MG: 10 TABLET ORAL at 12:07

## 2025-05-23 RX ADMIN — HYDROMORPHONE HYDROCHLORIDE 0.5 MG: 1 INJECTION, SOLUTION INTRAMUSCULAR; INTRAVENOUS; SUBCUTANEOUS at 01:47

## 2025-05-23 RX ADMIN — MAGNESIUM OXIDE 400 MG: 400 TABLET ORAL at 21:29

## 2025-05-23 RX ADMIN — METOCLOPRAMIDE 10 MG: 5 TABLET ORAL at 09:11

## 2025-05-23 RX ADMIN — POTASSIUM CHLORIDE AND SODIUM CHLORIDE 1000 ML: 900; 300 INJECTION, SOLUTION INTRAVENOUS at 02:35

## 2025-05-23 RX ADMIN — PANTOPRAZOLE SODIUM 40 MG: 40 TABLET, DELAYED RELEASE ORAL at 09:11

## 2025-05-23 RX ADMIN — TRAZODONE HYDROCHLORIDE 150 MG: 50 TABLET ORAL at 21:29

## 2025-05-23 RX ADMIN — ENOXAPARIN SODIUM 30 MG: 100 INJECTION SUBCUTANEOUS at 07:33

## 2025-05-23 RX ADMIN — PANCRELIPASE LIPASE, PANCRELIPASE PROTEASE, PANCRELIPASE AMYLASE 30000 UNITS: 5000; 17000; 24000 CAPSULE, DELAYED RELEASE ORAL at 17:01

## 2025-05-23 RX ADMIN — TOPIRAMATE 100 MG: 100 TABLET, FILM COATED ORAL at 09:11

## 2025-05-23 RX ADMIN — IOPAMIDOL 75 ML: 755 INJECTION, SOLUTION INTRAVENOUS at 01:58

## 2025-05-23 RX ADMIN — BUPROPION HYDROCHLORIDE 300 MG: 150 TABLET, EXTENDED RELEASE ORAL at 09:11

## 2025-05-23 RX ADMIN — METOCLOPRAMIDE 10 MG: 5 TABLET ORAL at 21:30

## 2025-05-23 RX ADMIN — ACETAMINOPHEN 650 MG: 325 TABLET ORAL at 21:46

## 2025-05-23 RX ADMIN — MIDODRINE HYDROCHLORIDE 5 MG: 10 TABLET ORAL at 05:04

## 2025-05-23 RX ADMIN — HYDROMORPHONE HYDROCHLORIDE 0.25 MG: 1 INJECTION, SOLUTION INTRAMUSCULAR; INTRAVENOUS; SUBCUTANEOUS at 03:52

## 2025-05-23 RX ADMIN — KETOROLAC TROMETHAMINE 15 MG: 15 INJECTION, SOLUTION INTRAMUSCULAR; INTRAVENOUS at 21:44

## 2025-05-23 RX ADMIN — METOCLOPRAMIDE 10 MG: 5 TABLET ORAL at 17:03

## 2025-05-23 RX ADMIN — TOPIRAMATE 100 MG: 100 TABLET, FILM COATED ORAL at 21:30

## 2025-05-23 RX ADMIN — KETOROLAC TROMETHAMINE 30 MG: 30 INJECTION, SOLUTION INTRAMUSCULAR at 02:27

## 2025-05-23 ASSESSMENT — PAIN - FUNCTIONAL ASSESSMENT: PAIN_FUNCTIONAL_ASSESSMENT: 0-10

## 2025-05-23 ASSESSMENT — PAIN SCALES - GENERAL
PAINLEVEL_OUTOF10: 8
PAINLEVEL_OUTOF10: 7
PAINLEVEL_OUTOF10: 10
PAINLEVEL_OUTOF10: 0
PAINLEVEL_OUTOF10: 8
PAINLEVEL_OUTOF10: 5
PAINLEVEL_OUTOF10: 8
PAINLEVEL_OUTOF10: 7
PAINLEVEL_OUTOF10: 4

## 2025-05-23 ASSESSMENT — PAIN DESCRIPTION - LOCATION
LOCATION: ABDOMEN;BACK
LOCATION: ABDOMEN

## 2025-05-23 ASSESSMENT — PAIN DESCRIPTION - DESCRIPTORS
DESCRIPTORS: PRESSURE;SHARP;TENDER
DESCRIPTORS: SHARP
DESCRIPTORS: PRESSURE
DESCRIPTORS: STABBING

## 2025-05-23 ASSESSMENT — PAIN DESCRIPTION - PAIN TYPE: TYPE: ACUTE PAIN

## 2025-05-23 ASSESSMENT — PAIN DESCRIPTION - ORIENTATION
ORIENTATION: RIGHT;LEFT
ORIENTATION: RIGHT
ORIENTATION: MID
ORIENTATION: RIGHT;MID;UPPER

## 2025-05-23 ASSESSMENT — PAIN DESCRIPTION - FREQUENCY
FREQUENCY: CONTINUOUS

## 2025-05-23 NOTE — H&P
Hospital Medicine History & Physical      PCP: Emmie Avalos APRN - CNP    Date of Admission: 5/23/2025    Date of Service: 5/23/25      Chief Complaint:  abd pain/nausea      History Of Present Illness:  54 y.o. female who presented to [facility] with above complains. Patient has chronic pancreatitis since 2010 after cholecystectomy, on creon, not smoking or using alcohol, had multiple admissions before, following by GI and pain management as outpatient developed epigastric abd pain with radiation to her back, nausea/diarrhea 5 days ago, had low grade fever several days ago, decreased her PO intake, had no vomitus. Since her condition wasn't improving she came to ER and after initial stabilization was admitted over night. Last BM was 2 days ago      Past Medical History:          Diagnosis Date    Acquired hypothyroidism 09/26/2016    Acute left-sided low back pain with bilateral sciatica 03/09/2022    Acute pancreatitis     ADD (attention deficit disorder) 02/12/2015    Anxiety     Claudication of both lower extremities 02/11/2022    Congestive heart failure, unspecified HF chronicity, unspecified heart failure type (HCC) 02/11/2022    Depression 12/09/2015    Endometrial cyst of ovary 05/10/2014    RT ovary, ruptured    GERD (gastroesophageal reflux disease) 09/26/2016    Intussusception (HCC)     no surgery required    Medullary sponge kidney of both kidneys 05/22/2018    Sjogren's disease     Stress     Swelling     Type 2 diabetes mellitus 03/01/2024       Past Surgical History:          Procedure Laterality Date    BREAST BIOPSY Left 2015??    lump removed (benign) (Dr. Garcia)    BREAST CYST EXCISION Left 2015??    Dr Garcia (benign)    BREAST LUMPECTOMY Left     2015? (benign)    CHOLECYSTECTOMY  2011    COLONOSCOPY N/A 12/16/2022    COLORECTAL CANCER SCREENING, HIGH RISK performed by Sergei Rangel MD at [facility]    ERCP W/ PLASTIC STENT PLACEMENT N/A     4 ercps    HYSTERECTOMY (CERVIX

## 2025-05-23 NOTE — ED PROVIDER NOTES
CC/HPI: 54-year-old female with past medical history of pancreatitis ileus GERD type 2 diabetes intussusception to the emergency department with abdominal pain and distention.  Pain began gradually 3 days ago.  Steadily worsening.  Patient states she is nauseated and has very little appetite.  Pain mostly right mid upper abdomen.  Radiates into her lower abdomen and back.  Patient states that her abdomen feels very bloated and is making it hard to breathe.  No recent travel no calf pain or swelling.  Believes that she was running a fever yesterday.  Denies urinary symptoms.  Patient states she also feels constipated.  Patient denies alcohol or drug use.      VITALS/PMH/PSH: Reviewed per nurses notes    REVIEW OF SYSTEMS: As in chief complaint history of present illness, otherwise all other systems are reviewed and negative the total 10 systems reviewed    PHYSICAL EXAM:  GEN: Pt alert and oriented, patient appears very uncomfortable.  HEENT:         Normocephalic/Atramatic        PERRL, EOMI       Throat non-edematous.  No erythema noted.  No exudates noted.  Moist membranes  NECK: Nontender, no signs of trauma, no lymphadenopathy  HEART: Reg S1/S2, without murmer, rub or gallop  LUNGS: Clear to auscultation bilaterally, respirations even and unlabored  ABDOMEN: Bowel sounds hyperactive x 4.  Abdomen appears moderately distended.  Hypertympanic to percussion.  Diffusely tender mostly right mid to upper abdomen.  Voluntary guarding with palpation in that area.  MUSCULOSKELETAL/EXTREMITITES:  No signs of trauma, cyanosis or edema.  Right-sided CVA tenderness  LYMPH: no peripheral lympadenopathy noted  SKIN:  Warm & dry, no rash  NEUROLOGIC:  Alert and oriented.  Speech clear    Medical decision making/ED course;  54-year-old female to the emergency department with right-sided abdominal pain into her right lower back and flank area.    ER EKG interpretation -normal sinus rhythm at 74 bpm with no signs of acute ST-T

## 2025-05-23 NOTE — PLAN OF CARE
Nutrition Problem #1: Inadequate oral intake  Intervention: Food and/or Nutrient Delivery: Start Oral Nutrition Supplement, Modify Current Diet (advance diet as/when tolerated)

## 2025-05-23 NOTE — PROGRESS NOTES
Comprehensive Nutrition Assessment    Type and Reason for Visit:  Initial, Positive nutrition screen    Nutrition Recommendations/Plan:   Continue full liquid diet and then advance as tolerated (and as per MD orders)  PO >75% meals  Start oral nutritional supplement (standard high calorie high protein) twice daily       Malnutrition Assessment:  Malnutrition Status:  Moderate malnutrition (05/23/25 1115)    Context:  Acute Illness     Findings of the 6 clinical characteristics of malnutrition:  Energy Intake:  75% or less of estimated energy requirements for 7 or more days  Weight Loss:  Greater than 2% over 1 week     Body Fat Loss:  Unable to assess     Muscle Mass Loss:  Unable to assess    Fluid Accumulation:  No fluid accumulation     Strength:  Not Performed    Nutrition Assessment:    Admit related to chronic pancreatitis. Triggered for positive malnutrition screen. Moderate malnutrition per NFPE.  Pt is on a full liquid diet due to pancreatitis. Agreable to trial standard high calorie high protein ONS twice daily. Recommend to advance diet as tolerated and as per MD orders.    Nutrition Related Findings:    \"54 y.o. female who presented to [facility] with above complains. Patient has chronic pancreatitis since 2010 after cholecystectomy, on creon, not smoking or using alcohol, had multiple admissions before, following by GI and pain management as outpatient developed epigastric abd pain with radiation to her back, nausea/diarrhea 5 days ago, had low grade fever several days ago, decreased her PO intake, had no vomitus. Since her condition wasn't improving she came to ER and after initial stabilization was admitted over night. Last BM was 2 days ago\" Pt current weight 110#, pt reported weight was about 6# heavier one week ago, decreased appetite and PO intake due to pain from pancreatitis. Diet is full liquid at this time. Pt agreable to trial ONS. Meds reviewed. Labs reviewed 5/23- lipase 214 (high),

## 2025-05-24 LAB
ALBUMIN SERPL-MCNC: 3.4 G/DL (ref 3.5–4.6)
ALP SERPL-CCNC: 106 U/L (ref 40–130)
ALT SERPL-CCNC: 10 U/L (ref 0–33)
ANION GAP SERPL CALCULATED.3IONS-SCNC: 7 MEQ/L (ref 9–15)
AST SERPL-CCNC: 13 U/L (ref 0–35)
BASOPHILS # BLD: 0.1 K/UL (ref 0–0.1)
BASOPHILS NFR BLD: 1.6 % (ref 0.1–1.2)
BILIRUB SERPL-MCNC: <0.2 MG/DL (ref 0.2–0.7)
BUN SERPL-MCNC: 12 MG/DL (ref 6–20)
CALCIUM SERPL-MCNC: 8.8 MG/DL (ref 8.5–9.9)
CHLORIDE SERPL-SCNC: 110 MEQ/L (ref 95–107)
CO2 SERPL-SCNC: 22 MEQ/L (ref 20–31)
CREAT SERPL-MCNC: 0.7 MG/DL (ref 0.5–0.9)
EOSINOPHIL # BLD: 0.1 K/UL (ref 0–0.4)
EOSINOPHIL NFR BLD: 2.7 % (ref 0.7–5.8)
ERYTHROCYTE [DISTWIDTH] IN BLOOD BY AUTOMATED COUNT: 13.4 % (ref 11.7–14.4)
GLOBULIN SER CALC-MCNC: 1.7 G/DL (ref 2.3–3.5)
GLUCOSE SERPL-MCNC: 103 MG/DL (ref 70–99)
HCT VFR BLD AUTO: 30.2 % (ref 37–47)
HGB BLD-MCNC: 9.6 G/DL (ref 11.2–15.7)
IMM GRANULOCYTES # BLD: 0 K/UL
IMM GRANULOCYTES NFR BLD: 0.2 %
LIPASE SERPL-CCNC: 159 U/L (ref 12–95)
LYMPHOCYTES # BLD: 2.4 K/UL (ref 1.2–3.7)
LYMPHOCYTES NFR BLD: 46 %
MCH RBC QN AUTO: 28.2 PG (ref 25.6–32.2)
MCHC RBC AUTO-ENTMCNC: 31.8 % (ref 32.2–35.5)
MCV RBC AUTO: 88.8 FL (ref 79.4–94.8)
MONOCYTES # BLD: 0.4 K/UL (ref 0.2–0.9)
MONOCYTES NFR BLD: 8 % (ref 4.7–12.5)
NEUTROPHILS # BLD: 2.1 K/UL (ref 1.6–6.1)
NEUTS SEG NFR BLD: 41.5 % (ref 34–71.1)
PLATELET # BLD AUTO: 291 K/UL (ref 182–369)
POTASSIUM SERPL-SCNC: 4.1 MEQ/L (ref 3.4–4.9)
PROT SERPL-MCNC: 5.1 G/DL (ref 6.3–8)
RBC # BLD AUTO: 3.4 M/UL (ref 3.93–5.22)
SODIUM SERPL-SCNC: 139 MEQ/L (ref 135–144)
WBC # BLD AUTO: 5.1 K/UL (ref 4–10)

## 2025-05-24 PROCEDURE — 96361 HYDRATE IV INFUSION ADD-ON: CPT

## 2025-05-24 PROCEDURE — 2580000003 HC RX 258: Performed by: INTERNAL MEDICINE

## 2025-05-24 PROCEDURE — 2500000003 HC RX 250 WO HCPCS: Performed by: INTERNAL MEDICINE

## 2025-05-24 PROCEDURE — G0378 HOSPITAL OBSERVATION PER HR: HCPCS

## 2025-05-24 PROCEDURE — 6360000002 HC RX W HCPCS: Performed by: INTERNAL MEDICINE

## 2025-05-24 PROCEDURE — 96367 TX/PROPH/DG ADDL SEQ IV INF: CPT

## 2025-05-24 PROCEDURE — 6370000000 HC RX 637 (ALT 250 FOR IP): Performed by: INTERNAL MEDICINE

## 2025-05-24 PROCEDURE — 96372 THER/PROPH/DIAG INJ SC/IM: CPT

## 2025-05-24 PROCEDURE — 85025 COMPLETE CBC W/AUTO DIFF WBC: CPT

## 2025-05-24 PROCEDURE — 83690 ASSAY OF LIPASE: CPT

## 2025-05-24 PROCEDURE — 96376 TX/PRO/DX INJ SAME DRUG ADON: CPT

## 2025-05-24 PROCEDURE — 80053 COMPREHEN METABOLIC PANEL: CPT

## 2025-05-24 PROCEDURE — 36415 COLL VENOUS BLD VENIPUNCTURE: CPT

## 2025-05-24 PROCEDURE — P9047 ALBUMIN (HUMAN), 25%, 50ML: HCPCS | Performed by: INTERNAL MEDICINE

## 2025-05-24 RX ORDER — MIDODRINE HYDROCHLORIDE 10 MG/1
10 TABLET ORAL
Qty: 90 TABLET | Refills: 0 | Status: SHIPPED | OUTPATIENT
Start: 2025-05-24

## 2025-05-24 RX ORDER — ALBUMIN (HUMAN) 12.5 G/50ML
25 SOLUTION INTRAVENOUS ONCE
Status: COMPLETED | OUTPATIENT
Start: 2025-05-24 | End: 2025-05-24

## 2025-05-24 RX ORDER — 0.9 % SODIUM CHLORIDE 0.9 %
500 INTRAVENOUS SOLUTION INTRAVENOUS ONCE
Status: COMPLETED | OUTPATIENT
Start: 2025-05-24 | End: 2025-05-24

## 2025-05-24 RX ORDER — OXYCODONE HYDROCHLORIDE 5 MG/1
5 TABLET ORAL EVERY 4 HOURS PRN
Qty: 20 TABLET | Refills: 0 | Status: SHIPPED | OUTPATIENT
Start: 2025-05-24 | End: 2025-05-31

## 2025-05-24 RX ADMIN — PANTOPRAZOLE SODIUM 40 MG: 40 TABLET, DELAYED RELEASE ORAL at 06:33

## 2025-05-24 RX ADMIN — PANCRELIPASE LIPASE, PANCRELIPASE PROTEASE, PANCRELIPASE AMYLASE 30000 UNITS: 5000; 17000; 24000 CAPSULE, DELAYED RELEASE ORAL at 07:53

## 2025-05-24 RX ADMIN — DICYCLOMINE HYDROCHLORIDE 10 MG: 10 CAPSULE ORAL at 11:38

## 2025-05-24 RX ADMIN — MIDODRINE HYDROCHLORIDE 10 MG: 10 TABLET ORAL at 07:54

## 2025-05-24 RX ADMIN — SODIUM CHLORIDE, PRESERVATIVE FREE 10 ML: 5 INJECTION INTRAVENOUS at 20:28

## 2025-05-24 RX ADMIN — ALBUMIN (HUMAN) 25 G: 0.25 INJECTION, SOLUTION INTRAVENOUS at 06:53

## 2025-05-24 RX ADMIN — PANCRELIPASE LIPASE, PANCRELIPASE PROTEASE, PANCRELIPASE AMYLASE 40000 UNITS: 20000; 63000; 84000 CAPSULE, DELAYED RELEASE ORAL at 16:58

## 2025-05-24 RX ADMIN — PANCRELIPASE LIPASE, PANCRELIPASE PROTEASE, PANCRELIPASE AMYLASE 30000 UNITS: 5000; 17000; 24000 CAPSULE, DELAYED RELEASE ORAL at 16:58

## 2025-05-24 RX ADMIN — PANCRELIPASE LIPASE, PANCRELIPASE PROTEASE, PANCRELIPASE AMYLASE 30000 UNITS: 5000; 17000; 24000 CAPSULE, DELAYED RELEASE ORAL at 11:37

## 2025-05-24 RX ADMIN — METOCLOPRAMIDE 10 MG: 5 TABLET ORAL at 06:32

## 2025-05-24 RX ADMIN — BUPROPION HYDROCHLORIDE 300 MG: 150 TABLET, EXTENDED RELEASE ORAL at 07:54

## 2025-05-24 RX ADMIN — HYDROCORTISONE 15 MG: 5 TABLET ORAL at 07:54

## 2025-05-24 RX ADMIN — OXYCODONE 5 MG: 5 TABLET ORAL at 11:41

## 2025-05-24 RX ADMIN — LEVOTHYROXINE SODIUM 75 MCG: 0.07 TABLET ORAL at 06:33

## 2025-05-24 RX ADMIN — HYDROXYCHLOROQUINE SULFATE 300 MG: 200 TABLET ORAL at 07:54

## 2025-05-24 RX ADMIN — TOPIRAMATE 100 MG: 100 TABLET, FILM COATED ORAL at 20:25

## 2025-05-24 RX ADMIN — ENOXAPARIN SODIUM 30 MG: 100 INJECTION SUBCUTANEOUS at 07:53

## 2025-05-24 RX ADMIN — METOCLOPRAMIDE 10 MG: 5 TABLET ORAL at 11:38

## 2025-05-24 RX ADMIN — MAGNESIUM OXIDE 400 MG: 400 TABLET ORAL at 20:25

## 2025-05-24 RX ADMIN — POTASSIUM CHLORIDE 20 MEQ: 1500 TABLET, EXTENDED RELEASE ORAL at 07:54

## 2025-05-24 RX ADMIN — PANCRELIPASE LIPASE, PANCRELIPASE PROTEASE, PANCRELIPASE AMYLASE 40000 UNITS: 20000; 63000; 84000 CAPSULE, DELAYED RELEASE ORAL at 11:37

## 2025-05-24 RX ADMIN — METOCLOPRAMIDE 10 MG: 5 TABLET ORAL at 20:25

## 2025-05-24 RX ADMIN — MAGNESIUM OXIDE 400 MG: 400 TABLET ORAL at 07:54

## 2025-05-24 RX ADMIN — DICYCLOMINE HYDROCHLORIDE 10 MG: 10 CAPSULE ORAL at 06:33

## 2025-05-24 RX ADMIN — TRAZODONE HYDROCHLORIDE 150 MG: 50 TABLET ORAL at 20:25

## 2025-05-24 RX ADMIN — MIDODRINE HYDROCHLORIDE 10 MG: 10 TABLET ORAL at 11:38

## 2025-05-24 RX ADMIN — SODIUM CHLORIDE 500 ML: 0.9 INJECTION, SOLUTION INTRAVENOUS at 06:52

## 2025-05-24 RX ADMIN — MIDODRINE HYDROCHLORIDE 10 MG: 10 TABLET ORAL at 16:58

## 2025-05-24 RX ADMIN — KETOROLAC TROMETHAMINE 15 MG: 15 INJECTION, SOLUTION INTRAMUSCULAR; INTRAVENOUS at 16:01

## 2025-05-24 RX ADMIN — DICYCLOMINE HYDROCHLORIDE 10 MG: 10 CAPSULE ORAL at 16:58

## 2025-05-24 RX ADMIN — TOPIRAMATE 100 MG: 100 TABLET, FILM COATED ORAL at 07:54

## 2025-05-24 RX ADMIN — METOCLOPRAMIDE 10 MG: 5 TABLET ORAL at 16:58

## 2025-05-24 RX ADMIN — PANCRELIPASE LIPASE, PANCRELIPASE PROTEASE, PANCRELIPASE AMYLASE 40000 UNITS: 20000; 63000; 84000 CAPSULE, DELAYED RELEASE ORAL at 07:53

## 2025-05-24 RX ADMIN — DICYCLOMINE HYDROCHLORIDE 10 MG: 10 CAPSULE ORAL at 20:25

## 2025-05-24 ASSESSMENT — PAIN DESCRIPTION - LOCATION
LOCATION: ABDOMEN
LOCATION: ABDOMEN;BACK
LOCATION: ABDOMEN

## 2025-05-24 ASSESSMENT — PAIN SCALES - GENERAL: PAINLEVEL_OUTOF10: 7

## 2025-05-24 ASSESSMENT — PAIN SCALES - WONG BAKER
WONGBAKER_NUMERICALRESPONSE: HURTS LITTLE MORE
WONGBAKER_NUMERICALRESPONSE: HURTS A LITTLE BIT

## 2025-05-24 ASSESSMENT — PAIN DESCRIPTION - DESCRIPTORS: DESCRIPTORS: ACHING;DISCOMFORT

## 2025-05-24 NOTE — PROGRESS NOTES
Patient medicated as ordered. Pt resting on her side with her eyes closed. Denies needs and prefers to rest. Call light in reach.

## 2025-05-24 NOTE — DISCHARGE SUMMARY
Gap 7 (L) 9 - 15 mEq/L    Glucose 103 (H) 70 - 99 mg/dL    BUN 12 6 - 20 mg/dL    Creatinine 0.70 0.50 - 0.90 mg/dL    Est, Glom Filt Rate >90.0 >60    Calcium 8.8 8.5 - 9.9 mg/dL    Total Protein 5.1 (L) 6.3 - 8.0 g/dL    Albumin 3.4 (L) 3.5 - 4.6 g/dL    Total Bilirubin <0.2 0.2 - 0.7 mg/dL    Alkaline Phosphatase 106 40 - 130 U/L    ALT 10 0 - 33 U/L    AST 13 0 - 35 U/L    Globulin 1.7 (L) 2.3 - 3.5 g/dL   CBC with Auto Differential   Result Value Ref Range    WBC 5.1 4.0 - 10.0 K/uL    RBC 3.40 (L) 3.93 - 5.22 M/uL    Hemoglobin 9.6 (L) 11.2 - 15.7 g/dL    Hematocrit 30.2 (L) 37.0 - 47.0 %    MCV 88.8 79.4 - 94.8 fL    MCH 28.2 25.6 - 32.2 pg    MCHC 31.8 (L) 32.2 - 35.5 %    RDW 13.4 11.7 - 14.4 %    Platelets 291 182 - 369 K/uL    Neutrophils % 41.5 34.0 - 71.1 %    Immature Granulocytes % 0.2 %    Lymphocytes % 46.0 %    Monocytes % 8.0 4.7 - 12.5 %    Eosinophils % 2.7 0.7 - 5.8 %    Basophils % 1.6 (H) 0.1 - 1.2 %    Neutrophils Absolute 2.1 1.6 - 6.1 K/uL    Immature Granulocytes # 0.0 K/uL    Lymphocytes Absolute 2.4 1.2 - 3.7 K/uL    Monocytes Absolute 0.4 0.2 - 0.9 K/uL    Eosinophils Absolute 0.1 0.0 - 0.4 K/uL    Basophils Absolute 0.1 0.0 - 0.1 K/uL   Lipase   Result Value Ref Range    Lipase 159 (H) 12 - 95 U/L   EKG 12 Lead   Result Value Ref Range    Ventricular Rate 74 BPM    Atrial Rate 74 BPM    P-R Interval 144 ms    QRS Duration 84 ms    Q-T Interval 380 ms    QTc Calculation (Bazett) 421 ms    P Axis 66 degrees    R Axis 51 degrees    T Axis 55 degrees    Diagnosis       Normal sinus rhythm  Nonspecific ST abnormality  Abnormal ECG  When compared with ECG of 13-Mar-2025 22:33,  No significant change was found       *Note: Due to a large number of results and/or encounters for the requested time period, some results have not been displayed. A complete set of results can be found in Results Review.       Diet:  ADULT DIET; Full Liquid  ADULT ORAL NUTRITION SUPPLEMENT; Lunch, Dinner; Standard

## 2025-05-24 NOTE — PROGRESS NOTES
Patient staying one more night, new IV established and IV toradol given for right flank discomfort. Pt states she has a history of endometriosis with several SBO's in the past. Billy Everett aware of pt's plan.

## 2025-05-24 NOTE — PROGRESS NOTES
Low BP's throughout the night, manual BP currently 74/52, pt states asymptomatic. Dr. Dickens notified, see orders. Albumin & bolus currently running, pt educated, verbalize understanding.

## 2025-05-25 VITALS
HEIGHT: 62 IN | DIASTOLIC BLOOD PRESSURE: 61 MMHG | HEART RATE: 58 BPM | OXYGEN SATURATION: 100 % | TEMPERATURE: 97.9 F | BODY MASS INDEX: 20.24 KG/M2 | RESPIRATION RATE: 16 BRPM | SYSTOLIC BLOOD PRESSURE: 99 MMHG | WEIGHT: 110 LBS

## 2025-05-25 LAB
EKG ATRIAL RATE: 74 BPM
EKG DIAGNOSIS: NORMAL
EKG P AXIS: 66 DEGREES
EKG P-R INTERVAL: 144 MS
EKG Q-T INTERVAL: 380 MS
EKG QRS DURATION: 84 MS
EKG QTC CALCULATION (BAZETT): 421 MS
EKG R AXIS: 51 DEGREES
EKG T AXIS: 55 DEGREES
EKG VENTRICULAR RATE: 74 BPM
LIPASE SERPL-CCNC: 92 U/L (ref 12–95)

## 2025-05-25 PROCEDURE — 83690 ASSAY OF LIPASE: CPT

## 2025-05-25 PROCEDURE — 6370000000 HC RX 637 (ALT 250 FOR IP): Performed by: INTERNAL MEDICINE

## 2025-05-25 PROCEDURE — G0378 HOSPITAL OBSERVATION PER HR: HCPCS

## 2025-05-25 PROCEDURE — 96372 THER/PROPH/DIAG INJ SC/IM: CPT

## 2025-05-25 PROCEDURE — 6360000002 HC RX W HCPCS: Performed by: INTERNAL MEDICINE

## 2025-05-25 PROCEDURE — 36415 COLL VENOUS BLD VENIPUNCTURE: CPT

## 2025-05-25 PROCEDURE — 2500000003 HC RX 250 WO HCPCS: Performed by: INTERNAL MEDICINE

## 2025-05-25 RX ADMIN — PANCRELIPASE LIPASE, PANCRELIPASE PROTEASE, PANCRELIPASE AMYLASE 30000 UNITS: 5000; 17000; 24000 CAPSULE, DELAYED RELEASE ORAL at 07:38

## 2025-05-25 RX ADMIN — HYDROCORTISONE 15 MG: 5 TABLET ORAL at 07:39

## 2025-05-25 RX ADMIN — MAGNESIUM OXIDE 400 MG: 400 TABLET ORAL at 07:40

## 2025-05-25 RX ADMIN — LEVOTHYROXINE SODIUM 75 MCG: 0.07 TABLET ORAL at 05:21

## 2025-05-25 RX ADMIN — TOPIRAMATE 100 MG: 100 TABLET, FILM COATED ORAL at 07:39

## 2025-05-25 RX ADMIN — METOCLOPRAMIDE 10 MG: 5 TABLET ORAL at 05:21

## 2025-05-25 RX ADMIN — DICYCLOMINE HYDROCHLORIDE 10 MG: 10 CAPSULE ORAL at 05:21

## 2025-05-25 RX ADMIN — SODIUM CHLORIDE, PRESERVATIVE FREE 10 ML: 5 INJECTION INTRAVENOUS at 07:40

## 2025-05-25 RX ADMIN — MIDODRINE HYDROCHLORIDE 10 MG: 10 TABLET ORAL at 07:40

## 2025-05-25 RX ADMIN — OXYCODONE 5 MG: 5 TABLET ORAL at 07:39

## 2025-05-25 RX ADMIN — PANCRELIPASE LIPASE, PANCRELIPASE PROTEASE, PANCRELIPASE AMYLASE 40000 UNITS: 20000; 63000; 84000 CAPSULE, DELAYED RELEASE ORAL at 07:36

## 2025-05-25 RX ADMIN — ENOXAPARIN SODIUM 30 MG: 100 INJECTION SUBCUTANEOUS at 07:36

## 2025-05-25 RX ADMIN — BUPROPION HYDROCHLORIDE 300 MG: 150 TABLET, EXTENDED RELEASE ORAL at 07:40

## 2025-05-25 RX ADMIN — PANTOPRAZOLE SODIUM 40 MG: 40 TABLET, DELAYED RELEASE ORAL at 05:21

## 2025-05-25 RX ADMIN — HYDROXYCHLOROQUINE SULFATE 300 MG: 200 TABLET ORAL at 07:39

## 2025-05-25 ASSESSMENT — PAIN DESCRIPTION - DESCRIPTORS: DESCRIPTORS: ACHING;SHARP

## 2025-05-25 ASSESSMENT — PAIN SCALES - GENERAL
PAINLEVEL_OUTOF10: 7
PAINLEVEL_OUTOF10: 7

## 2025-05-25 ASSESSMENT — PAIN DESCRIPTION - LOCATION
LOCATION: ABDOMEN
LOCATION: FLANK

## 2025-05-25 ASSESSMENT — PAIN DESCRIPTION - ORIENTATION
ORIENTATION: LEFT
ORIENTATION: RIGHT

## 2025-05-25 NOTE — PROGRESS NOTES
Hospitalist Progress Note      PCP: Emmie Avalos APRN - CNP    Date of Admission: 5/23/2025    Chief Complaint: abd pain    Subjective: patient awake, tolerated PO intake     Medications:  Reviewed    Infusion Medications    sodium chloride Stopped (05/24/25 0925)     Scheduled Medications    sodium chloride flush  10 mL IntraVENous 2 times per day    enoxaparin  30 mg SubCUTAneous Daily    midodrine  10 mg Oral TID WC    buPROPion  300 mg Oral QAM    dicyclomine  10 mg Oral 4x Daily AC & HS    hydrocortisone  15 mg Oral Daily    hydroxychloroquine  300 mg Oral Daily    levothyroxine  75 mcg Oral Daily    metoclopramide  10 mg Oral 4x Daily AC & HS    pantoprazole  40 mg Oral QAM AC    topiramate  100 mg Oral BID    traZODone  150 mg Oral Nightly    lipase-protease-amylas  30,000 Units Oral TID WC    lipase-protease-amylase  40,000 Units Oral TID WC    magnesium oxide  400 mg Oral BID    potassium chloride  20 mEq Oral Daily with breakfast     PRN Meds: sodium chloride flush, sodium chloride, promethazine **OR** ondansetron, polyethylene glycol, acetaminophen **OR** acetaminophen, HYDROmorphone, oxyCODONE      Intake/Output Summary (Last 24 hours) at 5/25/2025 0834  Last data filed at 5/25/2025 0520  Gross per 24 hour   Intake 130 ml   Output --   Net 130 ml       Exam:    BP 99/61   Pulse 58   Temp 97.9 °F (36.6 °C) (Oral)   Resp 16   Ht 1.575 m (5' 2\")   Wt 49.9 kg (110 lb)   SpO2 100%   BMI 20.12 kg/m²     General appearance: No apparent distress, appears stated age and cooperative.  HEENT: Pupils equal, round, and reactive to light. Conjunctivae/corneas clear.  Neck: Supple, with full range of motion. No jugular venous distention. Trachea midline.  Respiratory:  Normal respiratory effort. Clear to auscultation, bilaterally without Rales/Wheezes/Rhonchi.  Cardiovascular: Regular rate and rhythm with normal S1/S2 without murmurs, rubs or gallops.  Abdomen: Soft, non-tender, non-distended with normal

## 2025-05-25 NOTE — PLAN OF CARE
Problem: Chronic Conditions and Co-morbidities  Goal: Patient's chronic conditions and co-morbidity symptoms are monitored and maintained or improved  5/24/2025 0924 by Elisabeth Cline RN  Outcome: Completed     Problem: Discharge Planning  Goal: Discharge to home or other facility with appropriate resources  5/24/2025 0924 by Elisabeth Cline RN  Outcome: Completed     Problem: ABCDS Injury Assessment  Goal: Absence of physical injury  5/24/2025 0924 by Elisabeth Cline RN  Outcome: Completed     Problem: Nutrition Deficit:  Goal: Optimize nutritional status  5/24/2025 0924 by Elisabeth Cline RN  Outcome: Completed     Problem: Safety - Adult  Goal: Free from fall injury  5/24/2025 0924 by Elisabeth Cline RN  Outcome: Completed

## 2025-05-25 NOTE — PROGRESS NOTES
Discharge instructions reviewed with patient at bedside. Patient verbalized understanding. IV removed. Patient ambulated off unit in stable condition.

## 2025-05-28 ENCOUNTER — CARE COORDINATION (OUTPATIENT)
Dept: CARE COORDINATION | Age: 55
End: 2025-05-28

## 2025-05-28 NOTE — CARE COORDINATION
Care Transitions Note    Initial Call - Call within 2 business days of discharge: Yes    Attempted to reach patient for transitions of care follow up. Unable to reach patient.    Outreach Attempts:   HIPAA compliant voicemail left for patient.  First attempt.    Patient: Sisi Cam    Patient : 1970   MRN: [id number]    Reason for Admission: abdominal pain  Discharge Date: 25  RURS: No data recorded  Last Discharge Facility       Date Complaint Diagnosis Description Type Department Provider    25 Abdominal Pain Right lower quadrant abdominal pain ... ED to Hosp-Admission (Discharged) (ADMITTED) Luc Rodriguez MD; [place] [facility]...            Was this an external facility discharge? No    Follow Up Appointment:   Patient does not have a follow up appointment scheduled at time of call.   CTN will route to PCP front office pool under high priority need for TCM/HFU within 7 days of discharge.   Future Appointments         Provider Specialty Dept Phone    2025 9:00 AM Slavik-Bosworth, Kelly J, APRN - [facility] [phone]    2025 11:00 AM Darrion June PA Endocrinology [phone]    2025 2:30 PM Emmie Avalos, APRN - CNP Family Medicine [phone]            Plan for follow-up on next business day.      Laura Lacy RN

## 2025-05-29 ENCOUNTER — OFFICE VISIT (OUTPATIENT)
Dept: FAMILY MEDICINE CLINIC | Age: 55
End: 2025-05-29

## 2025-05-29 ENCOUNTER — CARE COORDINATION (OUTPATIENT)
Dept: CARE COORDINATION | Age: 55
End: 2025-05-29

## 2025-05-29 VITALS
DIASTOLIC BLOOD PRESSURE: 82 MMHG | HEART RATE: 104 BPM | TEMPERATURE: 99.1 F | HEIGHT: 62 IN | BODY MASS INDEX: 20.76 KG/M2 | OXYGEN SATURATION: 99 % | SYSTOLIC BLOOD PRESSURE: 102 MMHG | WEIGHT: 112.8 LBS

## 2025-05-29 DIAGNOSIS — R10.9 ABDOMINAL PAIN, UNSPECIFIED ABDOMINAL LOCATION: ICD-10-CM

## 2025-05-29 DIAGNOSIS — F41.9 ANXIETY: ICD-10-CM

## 2025-05-29 DIAGNOSIS — Z87.19 HISTORY OF INTESTINAL OBSTRUCTION: ICD-10-CM

## 2025-05-29 DIAGNOSIS — M06.9 RHEUMATOID ARTHRITIS, INVOLVING UNSPECIFIED SITE, UNSPECIFIED WHETHER RHEUMATOID FACTOR PRESENT (HCC): ICD-10-CM

## 2025-05-29 DIAGNOSIS — J47.9 BRONCHIECTASIS WITHOUT COMPLICATION (HCC): ICD-10-CM

## 2025-05-29 DIAGNOSIS — K85.90 ACUTE PANCREATITIS WITHOUT INFECTION OR NECROSIS, UNSPECIFIED PANCREATITIS TYPE: Primary | ICD-10-CM

## 2025-05-29 DIAGNOSIS — K85.90 ACUTE PANCREATITIS WITHOUT INFECTION OR NECROSIS, UNSPECIFIED PANCREATITIS TYPE: ICD-10-CM

## 2025-05-29 LAB
ALBUMIN SERPL-MCNC: 4.8 G/DL (ref 3.5–4.6)
ALP SERPL-CCNC: 132 U/L (ref 40–130)
ALT SERPL-CCNC: 12 U/L (ref 0–33)
ANION GAP SERPL CALCULATED.3IONS-SCNC: 14 MEQ/L (ref 9–15)
AST SERPL-CCNC: 18 U/L (ref 0–35)
BASOPHILS # BLD: 0.1 K/UL (ref 0–0.2)
BASOPHILS NFR BLD: 1.2 %
BILIRUB SERPL-MCNC: 0.3 MG/DL (ref 0.2–0.7)
BUN SERPL-MCNC: 17 MG/DL (ref 6–20)
CALCIUM SERPL-MCNC: 9.5 MG/DL (ref 8.5–9.9)
CHLORIDE SERPL-SCNC: 101 MEQ/L (ref 95–107)
CO2 SERPL-SCNC: 24 MEQ/L (ref 20–31)
CREAT SERPL-MCNC: 0.93 MG/DL (ref 0.5–0.9)
EOSINOPHIL # BLD: 0.1 K/UL (ref 0–0.7)
EOSINOPHIL NFR BLD: 1.2 %
ERYTHROCYTE [DISTWIDTH] IN BLOOD BY AUTOMATED COUNT: 13.3 % (ref 11.5–14.5)
GLOBULIN SER CALC-MCNC: 2.9 G/DL (ref 2.3–3.5)
GLUCOSE SERPL-MCNC: 81 MG/DL (ref 70–99)
HCT VFR BLD AUTO: 38.6 % (ref 37–47)
HGB BLD-MCNC: 12.4 G/DL (ref 12–16)
LIPASE SERPL-CCNC: 120 U/L (ref 12–95)
LYMPHOCYTES # BLD: 1.8 K/UL (ref 1–4.8)
LYMPHOCYTES NFR BLD: 30.6 %
MCH RBC QN AUTO: 27.6 PG (ref 27–31.3)
MCHC RBC AUTO-ENTMCNC: 32.1 % (ref 33–37)
MCV RBC AUTO: 86 FL (ref 79.4–94.8)
MONOCYTES # BLD: 0.5 K/UL (ref 0.2–0.8)
MONOCYTES NFR BLD: 7.7 %
NEUTROPHILS # BLD: 3.5 K/UL (ref 1.4–6.5)
NEUTS SEG NFR BLD: 59 %
PLATELET # BLD AUTO: 391 K/UL (ref 130–400)
POTASSIUM SERPL-SCNC: 3.9 MEQ/L (ref 3.4–4.9)
PROT SERPL-MCNC: 7.7 G/DL (ref 6.3–8)
RBC # BLD AUTO: 4.49 M/UL (ref 4.2–5.4)
SODIUM SERPL-SCNC: 139 MEQ/L (ref 135–144)
WBC # BLD AUTO: 5.9 K/UL (ref 4.8–10.8)

## 2025-05-29 RX ORDER — TRIAMCINOLONE ACETONIDE 1 MG/G
CREAM TOPICAL
COMMUNITY
Start: 2025-05-04

## 2025-05-29 RX ORDER — ONDANSETRON 4 MG/1
4 TABLET, ORALLY DISINTEGRATING ORAL 3 TIMES DAILY PRN
Qty: 21 TABLET | Refills: 0 | Status: SHIPPED | OUTPATIENT
Start: 2025-05-29

## 2025-05-29 RX ORDER — DIAZEPAM 5 MG/1
5 TABLET ORAL EVERY 8 HOURS PRN
Qty: 15 TABLET | Refills: 0 | Status: SHIPPED | OUTPATIENT
Start: 2025-05-29 | End: 2025-06-03

## 2025-05-29 RX ORDER — POTASSIUM CITRATE 15 MEQ/1
TABLET, EXTENDED RELEASE ORAL
COMMUNITY
Start: 2025-05-24

## 2025-05-29 RX ORDER — LORATADINE 10 MG/1
10 TABLET ORAL DAILY
Qty: 30 TABLET | Refills: 5 | Status: SHIPPED | OUTPATIENT
Start: 2025-05-29

## 2025-05-29 RX ORDER — POLYETHYLENE GLYCOL 3350 17 G/17G
17 POWDER, FOR SOLUTION ORAL DAILY PRN
Qty: 510 G | Refills: 5 | Status: SHIPPED | OUTPATIENT
Start: 2025-05-29 | End: 2025-11-25

## 2025-05-29 RX ORDER — BUMETANIDE 2 MG/1
2 TABLET ORAL
COMMUNITY
Start: 2025-05-24

## 2025-05-29 ASSESSMENT — ENCOUNTER SYMPTOMS
COUGH: 0
SHORTNESS OF BREATH: 0
CONSTIPATION: 1
ABDOMINAL PAIN: 1

## 2025-05-29 NOTE — CARE COORDINATION
Care Transitions Note    Initial Call - Call within 2 business days of discharge: Yes    Attempted to reach patient for transitions of care follow up. Unable to reach patient.    Outreach Attempts:   HIPAA compliant voicemail left for patient.  Second attempt.  [employer] message sent.    Patient: Sisi Cam    Patient : 1970   MRN: [id number]    Reason for Admission: abdominal pain  Discharge Date: 25  RURS: No data recorded  Last Discharge Facility       Date Complaint Diagnosis Description Type Department Provider    25 Abdominal Pain Right lower quadrant abdominal pain ... ED to Hosp-Admission (Discharged) (ADMITTED) [facility] Luc Draper MD; Ming Mi...            Was this an external facility discharge? No    Follow Up Appointment:   Patient has hospital follow up appointment scheduled within 7 days of discharge.  Marked as OVE.  CTN will route to PCP front office pool under high priority consideration to change visit type to a TCM/HFU.   Future Appointments         Provider Specialty Dept Phone    2025 10:30 AM Emmie Avalos, APRN - CNP Family Medicine [phone]    2025 9:00 AM Slavik-Bosworth, Kelly J, APRN [facility] Gastroenterology [phone]    2025 11:00 AM Darrion June PA Endocrinology [phone]    2025 2:30 PM Emmie Avalos, APRN - CNP Family Medicine [phone]            No further follow-up call indicated     Laura Lacy RN

## 2025-05-29 NOTE — PROGRESS NOTES
hard   Food Insecurity: No Food Insecurity (5/23/2025)    Hunger Vital Sign     Worried About Running Out of Food in the Last Year: Never true     Ran Out of Food in the Last Year: Never true   Transportation Needs: No Transportation Needs (5/23/2025)    PRAPARE - Transportation     Lack of Transportation (Medical): No     Lack of Transportation (Non-Medical): No   Physical Activity: Insufficiently Active (10/15/2024)    Exercise Vital Sign     Days of Exercise per Week: 2 days     Minutes of Exercise per Session: 20 min   Stress: Stress Concern Present (5/27/2022)    [employer] [place] [employer] - Occupational Stress Questionnaire     Feeling of Stress : Rather much   Social Connections: Unknown (5/27/2022)    Social Connection and Isolation Panel [NHANES]     Frequency of Communication with Friends and Family: Twice a week     Frequency of Social Gatherings with Friends and Family: Never     Active Member of Clubs or Organizations: No     Attends Club or Organization Meetings: Never     Marital Status: Never    Housing Stability: Low Risk  (5/23/2025)    Housing Stability Vital Sign     Unable to Pay for Housing in the Last Year: No     Number of Times Moved in the Last Year: 0     Homeless in the Last Year: No     Current Outpatient Medications on File Prior to Visit   Medication Sig Dispense Refill    bumetanide (BUMEX) 2 MG tablet Take 1 tablet by mouth      Potassium Citrate ER (UROCIT-K) 15 MEQ (1620 MG) TBCR extended release tablet TAKE TWO TABLETS BY MOUTH TWO TIMES A DAY      triamcinolone (KENALOG) 0.1 % cream apply to affected area of hands and feet twice daily for 2 to 3 weeks as needed for itching      oxyCODONE (ROXICODONE) 5 MG immediate release tablet Take 1 tablet by mouth every 4 hours as needed for Pain for up to 7 days. Max Daily Amount: 30 mg 20 tablet 0    midodrine (PROAMATINE) 10 MG tablet Take 1 tablet by mouth 3 times daily (with meals) 90 tablet 0    Tenapanor HCl 50 MG

## 2025-05-29 NOTE — PROGRESS NOTES
appearance of pancreas, had stool studies done including stool for occult blood about 2 weeks ago and were negative      HPI from last GI clinic visit on 3/29/2023 summarized below:  Patient presents to the GI clinic with worsening of constipation/abdominal pain symptoms over the past 2 weeks despite taking Trulance daily, lactulose twice daily, miralax, enemas and suppositories intermittently.  States she has only had 3 very small caliber/liquid bowel movements over the past 2 weeks.  She reports lower back pain and possible mucus in her stool.  States she is unsure if she is seeing mucus in her stool or residual of the suppositories she had previously tried in order to have a bowel movement.  She reports an increase in GERD symptoms/regurgitation along with sulfur like burps all the time with associated mild nausea however no vomiting at this time. Reports taking Nexium daily.  She denies vomiting, hematemesis, dysphagia, abdominal pain, hematochezia, melena or weight loss.       HPI from last GI clinic visit on 8/3/2022  summarized below:  51 y.o. female following up after last GI clinic on 5/4/2021. S/p recent inpatient hospitalization on 7/30/2022 - 8/1/2022 with epigastric/RUQ pain associated with radiation to her back, nausea, and early satiety.   She reports prior to admission, the pain began last Tuesday afternoon, states it was a dull epigastric pain, RUQ pain with some burning and felt like a knife is in her back. States the pain is worse with inspiration, bending and with movement. Laying down calms her symptoms.  She reports prior Carafate use helped her symptoms.  She reports history of gallstones, s/p cholecystectomy and also required ERCP x2 for stone removal.  She additionally reports history of EUS x2 with most recent one being with Dr. Rangel in 2021 with no stone identified. She reports loose stools beginning this morning. States she usually alternates between constipation and diarrhea.   She

## 2025-05-30 ENCOUNTER — OFFICE VISIT (OUTPATIENT)
Dept: GASTROENTEROLOGY | Age: 55
End: 2025-05-30
Payer: MEDICARE

## 2025-05-30 ENCOUNTER — RESULTS FOLLOW-UP (OUTPATIENT)
Dept: FAMILY MEDICINE CLINIC | Age: 55
End: 2025-05-30

## 2025-05-30 VITALS
BODY MASS INDEX: 20.49 KG/M2 | OXYGEN SATURATION: 97 % | SYSTOLIC BLOOD PRESSURE: 114 MMHG | WEIGHT: 112 LBS | DIASTOLIC BLOOD PRESSURE: 66 MMHG | HEART RATE: 93 BPM

## 2025-05-30 DIAGNOSIS — K85.90 ACUTE PANCREATITIS, UNSPECIFIED COMPLICATION STATUS, UNSPECIFIED PANCREATITIS TYPE: Primary | ICD-10-CM

## 2025-05-30 DIAGNOSIS — K59.00 CONSTIPATION, UNSPECIFIED CONSTIPATION TYPE: ICD-10-CM

## 2025-05-30 PROCEDURE — 1036F TOBACCO NON-USER: CPT | Performed by: NURSE PRACTITIONER

## 2025-05-30 PROCEDURE — 3017F COLORECTAL CA SCREEN DOC REV: CPT | Performed by: NURSE PRACTITIONER

## 2025-05-30 PROCEDURE — 99213 OFFICE O/P EST LOW 20 MIN: CPT | Performed by: NURSE PRACTITIONER

## 2025-05-30 PROCEDURE — G8420 CALC BMI NORM PARAMETERS: HCPCS | Performed by: NURSE PRACTITIONER

## 2025-05-30 PROCEDURE — G8427 DOCREV CUR MEDS BY ELIG CLIN: HCPCS | Performed by: NURSE PRACTITIONER

## 2025-05-30 RX ORDER — POLYETHYLENE GLYCOL 3350 17 G/17G
17 POWDER, FOR SOLUTION ORAL DAILY
Qty: 238 G | Refills: 3 | Status: SHIPPED | OUTPATIENT
Start: 2025-05-30 | End: 2025-06-29

## 2025-05-30 ASSESSMENT — ENCOUNTER SYMPTOMS
ABDOMINAL DISTENTION: 0
BLOOD IN STOOL: 0
VOMITING: 0
CONSTIPATION: 1
ANAL BLEEDING: 0
ABDOMINAL PAIN: 1
TROUBLE SWALLOWING: 0
DIARRHEA: 0
RECTAL PAIN: 0
NAUSEA: 0

## 2025-06-03 LAB
EKG ATRIAL RATE: 74 BPM
EKG DIAGNOSIS: NORMAL
EKG P AXIS: 66 DEGREES
EKG P-R INTERVAL: 144 MS
EKG Q-T INTERVAL: 380 MS
EKG QRS DURATION: 84 MS
EKG QTC CALCULATION (BAZETT): 421 MS
EKG R AXIS: 51 DEGREES
EKG T AXIS: 55 DEGREES
EKG VENTRICULAR RATE: 74 BPM

## 2025-06-05 ENCOUNTER — APPOINTMENT (OUTPATIENT)
Dept: GASTROENTEROLOGY | Facility: CLINIC | Age: 55
End: 2025-06-05
Payer: MEDICARE

## 2025-06-06 DIAGNOSIS — Z76.0 MEDICATION REFILL: ICD-10-CM

## 2025-06-06 DIAGNOSIS — F90.9 ATTENTION DEFICIT HYPERACTIVITY DISORDER (ADHD), UNSPECIFIED ADHD TYPE: ICD-10-CM

## 2025-06-08 NOTE — TELEPHONE ENCOUNTER
Comments: Last adderall refill was 4-30-25    Last Office Visit (last PCP visit):   5/29/2025    Next Visit Date:  Future Appointments   Date Time Provider Department Center   6/13/2025 11:00 AM June, Darrion S, PA [facility]   6/24/2025  2:30 PM Emmie Avalos, APRN - CNP [facility]       **If hasn't been seen in over a year OR hasn't followed up according to last diabetes/ADHD visit, make appointment for patient before sending refill to provider.    Rx requested:  Requested Prescriptions     Pending Prescriptions Disp Refills    amphetamine-dextroamphetamine (ADDERALL XR) 30 MG extended release capsule 60 capsule 0     Sig: Take 1 capsule by mouth in the morning and 1 capsule in the evening. Do all this for 30 days.

## 2025-06-09 RX ORDER — DEXTROAMPHETAMINE SACCHARATE, AMPHETAMINE ASPARTATE MONOHYDRATE, DEXTROAMPHETAMINE SULFATE AND AMPHETAMINE SULFATE 7.5; 7.5; 7.5; 7.5 MG/1; MG/1; MG/1; MG/1
30 CAPSULE, EXTENDED RELEASE ORAL 2 TIMES DAILY
Qty: 60 CAPSULE | Refills: 0 | Status: SHIPPED | OUTPATIENT
Start: 2025-06-09 | End: 2025-07-09

## 2025-06-13 ENCOUNTER — HOSPITAL ENCOUNTER (OUTPATIENT)
Dept: LAB | Age: 55
Discharge: HOME | End: 2025-06-13
Payer: MEDICARE

## 2025-06-13 ENCOUNTER — OFFICE VISIT (OUTPATIENT)
Dept: ENDOCRINOLOGY | Age: 55
End: 2025-06-13
Payer: MEDICARE

## 2025-06-13 VITALS
WEIGHT: 119 LBS | HEIGHT: 62 IN | HEART RATE: 82 BPM | OXYGEN SATURATION: 98 % | SYSTOLIC BLOOD PRESSURE: 126 MMHG | DIASTOLIC BLOOD PRESSURE: 86 MMHG | BODY MASS INDEX: 21.9 KG/M2

## 2025-06-13 DIAGNOSIS — E03.9 HYPOTHYROIDISM, UNSPECIFIED TYPE: Primary | ICD-10-CM

## 2025-06-13 DIAGNOSIS — E87.6 HYPOKALEMIA: ICD-10-CM

## 2025-06-13 DIAGNOSIS — E03.9 ACQUIRED HYPOTHYROIDISM: ICD-10-CM

## 2025-06-13 DIAGNOSIS — K21.9 GASTROESOPHAGEAL REFLUX DISEASE WITHOUT ESOPHAGITIS: ICD-10-CM

## 2025-06-13 DIAGNOSIS — R53.83 OTHER FATIGUE: ICD-10-CM

## 2025-06-13 DIAGNOSIS — E03.9 HYPOTHYROIDISM, UNSPECIFIED TYPE: ICD-10-CM

## 2025-06-13 LAB
ALBUMIN SERPL-MCNC: 4.3 G/DL (ref 3.5–4.6)
ALP SERPL-CCNC: 115 U/L (ref 40–130)
ALT SERPL-CCNC: 18 U/L (ref 0–33)
ANION GAP SERPL CALCULATED.3IONS-SCNC: 11 MEQ/L (ref 9–15)
AST SERPL-CCNC: 18 U/L (ref 0–35)
BILIRUB SERPL-MCNC: <0.2 MG/DL (ref 0.2–0.7)
BUN SERPL-MCNC: 24 MG/DL (ref 6–20)
CALCIUM SERPL-MCNC: 9.2 MG/DL (ref 8.5–9.9)
CHLORIDE SERPL-SCNC: 100 MEQ/L (ref 95–107)
CO2 SERPL-SCNC: 27 MEQ/L (ref 20–31)
CREAT SERPL-MCNC: 0.58 MG/DL (ref 0.5–0.9)
GLOBULIN SER CALC-MCNC: 2.3 G/DL (ref 2.3–3.5)
GLUCOSE SERPL-MCNC: 99 MG/DL (ref 70–99)
POTASSIUM SERPL-SCNC: 3.6 MEQ/L (ref 3.4–4.9)
PROT SERPL-MCNC: 6.6 G/DL (ref 6.3–8)
SODIUM SERPL-SCNC: 138 MEQ/L (ref 135–144)
T4 FREE SERPL-MCNC: 1.48 NG/DL (ref 0.84–1.68)
T4 FREE SERPL-MCNC: 1.5 NG/DL (ref 0.84–1.68)
TSH REFLEX: 0.03 UIU/ML (ref 0.44–3.86)
TSH SERPL-MCNC: 0.03 UIU/ML (ref 0.44–3.86)

## 2025-06-13 PROCEDURE — 86800 THYROGLOBULIN ANTIBODY: CPT

## 2025-06-13 PROCEDURE — 84432 ASSAY OF THYROGLOBULIN: CPT

## 2025-06-13 PROCEDURE — 82607 VITAMIN B-12: CPT

## 2025-06-13 PROCEDURE — 82306 VITAMIN D 25 HYDROXY: CPT

## 2025-06-13 PROCEDURE — G8420 CALC BMI NORM PARAMETERS: HCPCS | Performed by: PHYSICIAN ASSISTANT

## 2025-06-13 PROCEDURE — 82671 ASSAY OF ESTROGENS: CPT

## 2025-06-13 PROCEDURE — 3017F COLORECTAL CA SCREEN DOC REV: CPT | Performed by: PHYSICIAN ASSISTANT

## 2025-06-13 PROCEDURE — 82024 ASSAY OF ACTH: CPT

## 2025-06-13 PROCEDURE — 80053 COMPREHEN METABOLIC PANEL: CPT

## 2025-06-13 PROCEDURE — 82533 TOTAL CORTISOL: CPT

## 2025-06-13 PROCEDURE — 84439 ASSAY OF FREE THYROXINE: CPT

## 2025-06-13 PROCEDURE — 82746 ASSAY OF FOLIC ACID SERUM: CPT

## 2025-06-13 PROCEDURE — 36415 COLL VENOUS BLD VENIPUNCTURE: CPT

## 2025-06-13 PROCEDURE — 1036F TOBACCO NON-USER: CPT | Performed by: PHYSICIAN ASSISTANT

## 2025-06-13 PROCEDURE — 84481 FREE ASSAY (FT-3): CPT

## 2025-06-13 PROCEDURE — 84443 ASSAY THYROID STIM HORMONE: CPT

## 2025-06-13 PROCEDURE — 84270 ASSAY OF SEX HORMONE GLOBUL: CPT

## 2025-06-13 PROCEDURE — 99214 OFFICE O/P EST MOD 30 MIN: CPT | Performed by: PHYSICIAN ASSISTANT

## 2025-06-13 PROCEDURE — G8427 DOCREV CUR MEDS BY ELIG CLIN: HCPCS | Performed by: PHYSICIAN ASSISTANT

## 2025-06-13 PROCEDURE — 84403 ASSAY OF TOTAL TESTOSTERONE: CPT

## 2025-06-13 PROCEDURE — 84144 ASSAY OF PROGESTERONE: CPT

## 2025-06-13 RX ORDER — LEVOTHYROXINE SODIUM 100 UG/1
100 TABLET ORAL DAILY
COMMUNITY

## 2025-06-13 RX ORDER — ESOMEPRAZOLE MAGNESIUM 40 MG/1
CAPSULE, DELAYED RELEASE ORAL
Qty: 90 CAPSULE | Refills: 1 | Status: SHIPPED | OUTPATIENT
Start: 2025-06-13

## 2025-06-13 ASSESSMENT — ENCOUNTER SYMPTOMS
WHEEZING: 0
NAUSEA: 0
COUGH: 0
DIARRHEA: 0
SHORTNESS OF BREATH: 0
SORE THROAT: 0
VOMITING: 0
SINUS PRESSURE: 0
RHINORRHEA: 0
ABDOMINAL PAIN: 0

## 2025-06-13 NOTE — PROGRESS NOTES
disintegrating tablet, Take 1 tablet by mouth 3 times daily as needed for Nausea or Vomiting, Disp: 21 tablet, Rfl: 0    polyethylene glycol (GLYCOLAX) 17 GM/SCOOP powder, Take 17 g by mouth daily as needed (constipation), Disp: 510 g, Rfl: 5    midodrine (PROAMATINE) 10 MG tablet, Take 1 tablet by mouth 3 times daily (with meals), Disp: 90 tablet, Rfl: 0    Tenapanor HCl 50 MG TABS, Take 50 mg by mouth in the morning and 50 mg in the evening. Indications: Constipation caused by Irritable Bowel Syndrome., Disp: , Rfl:     topiramate (TOPAMAX) 100 MG tablet, Take 1 tablet by mouth 2 times daily, Disp: 60 tablet, Rfl: 3    bisacodyl (DULCOLAX) 5 MG EC tablet, Take 1 tablet by mouth daily as needed for Constipation, Disp: 15 tablet, Rfl: 0    traZODone (DESYREL) 150 MG tablet, Take 1 tablet by mouth nightly, Disp: 30 tablet, Rfl: 5    levothyroxine (SYNTHROID) 75 MCG tablet, Take one tablet daily Monday-Friday, Disp: 60 tablet, Rfl: 5    hydrOXYzine HCl (ATARAX) 50 MG tablet, Take 1 tablet by mouth every 8 hours as needed for Itching, Disp: 30 tablet, Rfl: 2    metoclopramide (REGLAN) 10 MG tablet, Take 1 tablet by mouth 4 times daily as needed (Nausea, vomiting), Disp: 40 tablet, Rfl: 0    fluorouracil (EFUDEX) 5 % cream, Apply topically 2 times daily, Disp: , Rfl:     famotidine (PEPCID) 20 MG tablet, TAKE ONE TABLET BY MOUTH TWO TIMES A DAY, Disp: 60 tablet, Rfl: 0    scopolamine (TRANSDERM-SCOP) transdermal patch, Place 1 patch onto the skin every 72 hours, Disp: 10 patch, Rfl: 0    vilazodone HCl (VIIBRYD) 10 MG TABS, Take 1 tablet by mouth daily, Disp: 30 tablet, Rfl: 1    NEXIUM 40 MG delayed release capsule, TAKE 1 CAPSULE BY MOUTH DAILY, Disp: 90 capsule, Rfl: 1    dicyclomine (BENTYL) 10 MG capsule, Take 1 capsule by mouth 4 times daily (before meals and nightly), Disp: 30 capsule, Rfl: 0    rimegepant sulfate (NURTEC) 75 MG TBDP, Take 1 tablet by mouth daily as needed (for headaches), Disp: 16 tablet, Rfl:

## 2025-06-13 NOTE — TELEPHONE ENCOUNTER
Comments: Can you please refill?    Last Office Visit (last PCP visit):   5/29/2025    Next Visit Date:  Future Appointments   Date Time Provider Department Center   6/24/2025  2:30 PM Emmie Avalos APRN - CNP [facility]   10/17/2025 11:00 AM June, Darrion S, PA [facility]       **If hasn't been seen in over a year OR hasn't followed up according to last diabetes/ADHD visit, make appointment for patient before sending refill to provider.    Rx requested:  Requested Prescriptions     Pending Prescriptions Disp Refills    NEXIUM 40 MG delayed release capsule 90 capsule 1     Sig: TAKE 1 CAPSULE BY MOUTH DAILY

## 2025-06-14 ENCOUNTER — RESULTS FOLLOW-UP (OUTPATIENT)
Dept: FAMILY MEDICINE CLINIC | Age: 55
End: 2025-06-14

## 2025-06-14 LAB
ACTH PLAS-MCNC: 12.2 PG/ML (ref 7.2–63.3)
CORTISOL COLLECTION INFO: NORMAL
CORTISOL: 4.8 UG/DL (ref 2.5–19.5)
FOLATE: 4.6 NG/ML (ref 4.8–24.2)
SHBG SERPL-SCNC: 112 NMOL/L (ref 17–125)
T3 FREE: 4.24 PG/ML (ref 2–4.4)
TESTOST FREE SERPL-MCNC: ABNORMAL PG/ML (ref 0.6–3.8)
TESTOST SERPL-MCNC: <3 NG/DL (ref 3–41)
THYROGLOB AB SERPL-ACNC: <1.5 IU/ML (ref 0–4)
THYROGLOB SERPL-MCNC: 1.9 NG/ML (ref 1.3–31.8)
THYROGLOB SERPL-MCNC: NORMAL NG/ML (ref 1.3–31.8)
VITAMIN B-12: 408 PG/ML (ref 232–1245)
VITAMIN D 25-HYDROXY: 51.6 NG/ML (ref 30–100)

## 2025-06-16 ENCOUNTER — RESULTS FOLLOW-UP (OUTPATIENT)
Dept: FAMILY MEDICINE CLINIC | Age: 55
End: 2025-06-16

## 2025-06-17 LAB — PROGEST SERPL-MCNC: <0.1 NG/ML

## 2025-06-19 LAB
ESTRADIOL SERPL HS-MCNC: <2 PG/ML
ESTROGEN SERPL CALC-MCNC: NORMAL PG/ML
ESTRONE SERPL-MCNC: 6.9 PG/ML

## 2025-06-20 ENCOUNTER — PATIENT MESSAGE (OUTPATIENT)
Dept: FAMILY MEDICINE CLINIC | Age: 55
End: 2025-06-20

## 2025-06-20 DIAGNOSIS — F32.A DEPRESSION, UNSPECIFIED DEPRESSION TYPE: Primary | ICD-10-CM

## 2025-06-23 ENCOUNTER — PATIENT MESSAGE (OUTPATIENT)
Dept: FAMILY MEDICINE CLINIC | Age: 55
End: 2025-06-23

## 2025-06-26 RX ORDER — VILAZODONE HYDROCHLORIDE 20 MG/1
TABLET ORAL
Qty: 30 TABLET | Refills: 2 | Status: SHIPPED | OUTPATIENT
Start: 2025-06-26

## 2025-06-30 ENCOUNTER — APPOINTMENT (OUTPATIENT)
Dept: GASTROENTEROLOGY | Facility: CLINIC | Age: 55
End: 2025-06-30
Payer: MEDICARE

## 2025-07-07 DIAGNOSIS — R51.9 NONINTRACTABLE HEADACHE, UNSPECIFIED CHRONICITY PATTERN, UNSPECIFIED HEADACHE TYPE: ICD-10-CM

## 2025-07-07 DIAGNOSIS — R51.9 CHRONIC NONINTRACTABLE HEADACHE, UNSPECIFIED HEADACHE TYPE: ICD-10-CM

## 2025-07-07 DIAGNOSIS — G89.29 CHRONIC NONINTRACTABLE HEADACHE, UNSPECIFIED HEADACHE TYPE: ICD-10-CM
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
## 2025-07-07 DIAGNOSIS — Z76.0 MEDICATION REFILL: ICD-10-CM

## 2025-07-07 DIAGNOSIS — F90.9 ATTENTION DEFICIT HYPERACTIVITY DISORDER (ADHD), UNSPECIFIED ADHD TYPE: ICD-10-CM

## 2025-07-07 RX ORDER — HYDROCORTISONE 25 MG/G
CREAM TOPICAL
Qty: 15 G | Refills: 0 | Status: SHIPPED | OUTPATIENT
Start: 2025-07-07

## 2025-07-07 RX ORDER — DEXTROAMPHETAMINE SACCHARATE, AMPHETAMINE ASPARTATE MONOHYDRATE, DEXTROAMPHETAMINE SULFATE AND AMPHETAMINE SULFATE 7.5; 7.5; 7.5; 7.5 MG/1; MG/1; MG/1; MG/1
30 CAPSULE, EXTENDED RELEASE ORAL 2 TIMES DAILY
Qty: 60 CAPSULE | Refills: 0 | Status: SHIPPED | OUTPATIENT
Start: 2025-07-07 | End: 2025-08-06

## 2025-07-07 RX ORDER — TOPIRAMATE 100 MG/1
100 TABLET, FILM COATED ORAL 2 TIMES DAILY
Qty: 60 TABLET | Refills: 3 | Status: SHIPPED | OUTPATIENT
Start: 2025-07-07

## 2025-07-07 RX ORDER — RIMEGEPANT SULFATE 75 MG/75MG
1 TABLET, ORALLY DISINTEGRATING ORAL DAILY PRN
Qty: 16 TABLET | Refills: 5 | Status: SHIPPED | OUTPATIENT
Start: 2025-07-07

## 2025-07-07 NOTE — TELEPHONE ENCOUNTER
Comments: Last Adderall refill was 6-9-25    Last Office Visit (last PCP visit):   5/29/2025    Next Visit Date:  Future Appointments   Date Time Provider Department Center   7/30/2025  9:30 AM Emmie Avalos APRN - CNP [facility]   10/17/2025 11:00 AM June, Darrion S, PA [facility]       **If hasn't been seen in over a year OR hasn't followed up according to last diabetes/ADHD visit, make appointment for patient before sending refill to provider.    Rx requested:  Requested Prescriptions     Pending Prescriptions Disp Refills    rimegepant sulfate (NURTEC) 75 MG TBDP 16 tablet 5     Sig: Take 1 tablet by mouth daily as needed (for headaches)    topiramate (TOPAMAX) 100 MG tablet 60 tablet 3     Sig: Take 1 tablet by mouth 2 times daily    amphetamine-dextroamphetamine (ADDERALL XR) 30 MG extended release capsule 60 capsule 0     Sig: Take 1 capsule by mouth in the morning and 1 capsule in the evening. Do all this for 30 days.

## 2025-07-07 NOTE — TELEPHONE ENCOUNTER
Comments:     Last Office Visit (last PCP visit):   11/19/2024    Next Visit Date:  Future Appointments   Date Time Provider Department Center   7/30/2025  9:30 AM Emime Avalos APRN - CNP [facility]   10/17/2025 11:00 AM June, Darrion S, PA [facility]       **If hasn't been seen in over a year OR hasn't followed up according to last diabetes/ADHD visit, make appointment for patient before sending refill to provider.    Rx requested:  Requested Prescriptions     Pending Prescriptions Disp Refills    hydrocortisone 2.5 % cream 15 g 0     Sig: Apply topically 2 times daily.

## 2025-07-24 ENCOUNTER — PATIENT MESSAGE (OUTPATIENT)
Dept: FAMILY MEDICINE CLINIC | Age: 55
End: 2025-07-24

## 2025-07-24 DIAGNOSIS — B02.9 HERPES ZOSTER WITHOUT COMPLICATION: Primary | ICD-10-CM

## 2025-07-24 RX ORDER — VALACYCLOVIR HYDROCHLORIDE 1 G/1
1000 TABLET, FILM COATED ORAL 3 TIMES DAILY
Qty: 21 TABLET | Refills: 0 | Status: SHIPPED | OUTPATIENT
Start: 2025-07-24 | End: 2025-07-31

## 2025-07-30 ENCOUNTER — TELEPHONE (OUTPATIENT)
Dept: FAMILY MEDICINE CLINIC | Age: 55
End: 2025-07-30

## 2025-07-30 ENCOUNTER — TELEMEDICINE (OUTPATIENT)
Dept: FAMILY MEDICINE CLINIC | Age: 55
End: 2025-07-30

## 2025-07-30 DIAGNOSIS — M25.50 ARTHRALGIA, UNSPECIFIED JOINT: ICD-10-CM

## 2025-07-30 DIAGNOSIS — R53.83 FATIGUE, UNSPECIFIED TYPE: Primary | ICD-10-CM

## 2025-07-30 DIAGNOSIS — R19.7 DIARRHEA, UNSPECIFIED TYPE: ICD-10-CM

## 2025-07-30 DIAGNOSIS — R53.83 FATIGUE, UNSPECIFIED TYPE: ICD-10-CM

## 2025-07-30 LAB
ALBUMIN SERPL-MCNC: 5 G/DL (ref 3.5–4.6)
ALP SERPL-CCNC: 140 U/L (ref 40–130)
ALT SERPL-CCNC: 9 U/L (ref 0–33)
ANION GAP SERPL CALCULATED.3IONS-SCNC: 15 MEQ/L (ref 9–15)
AST SERPL-CCNC: 7 U/L (ref 0–35)
BILIRUB SERPL-MCNC: 0.3 MG/DL (ref 0.2–0.7)
BUN SERPL-MCNC: 15 MG/DL (ref 6–20)
CALCIUM SERPL-MCNC: 9.7 MG/DL (ref 8.5–9.9)
CHLORIDE SERPL-SCNC: 99 MEQ/L (ref 95–107)
CO2 SERPL-SCNC: 22 MEQ/L (ref 20–31)
CREAT SERPL-MCNC: 0.88 MG/DL (ref 0.5–0.9)
ERYTHROCYTE [DISTWIDTH] IN BLOOD BY AUTOMATED COUNT: 12.3 % (ref 11.5–14.5)
GLOBULIN SER CALC-MCNC: 2.7 G/DL (ref 2.3–3.5)
GLUCOSE SERPL-MCNC: 85 MG/DL (ref 70–99)
HCT VFR BLD AUTO: 39.7 % (ref 37–47)
HGB BLD-MCNC: 13.5 G/DL (ref 12–16)
MCH RBC QN AUTO: 28.6 PG (ref 27–31.3)
MCHC RBC AUTO-ENTMCNC: 34 % (ref 33–37)
MCV RBC AUTO: 84.1 FL (ref 79.4–94.8)
PLATELET # BLD AUTO: 431 K/UL (ref 130–400)
POTASSIUM SERPL-SCNC: 2.9 MEQ/L (ref 3.4–4.9)
PROT SERPL-MCNC: 7.7 G/DL (ref 6.3–8)
RBC # BLD AUTO: 4.72 M/UL (ref 4.2–5.4)
SODIUM SERPL-SCNC: 136 MEQ/L (ref 135–144)
T4 FREE SERPL-MCNC: 1.89 NG/DL (ref 0.84–1.68)
TSH REFLEX: 0.05 UIU/ML (ref 0.44–3.86)
WBC # BLD AUTO: 6 K/UL (ref 4.8–10.8)

## 2025-07-30 RX ORDER — TRAZODONE HYDROCHLORIDE 50 MG/1
50 TABLET ORAL NIGHTLY PRN
COMMUNITY
Start: 2025-07-09

## 2025-07-30 RX ORDER — LORATADINE 10 MG/1
10 TABLET ORAL DAILY
Qty: 30 TABLET | Refills: 5 | Status: SHIPPED | OUTPATIENT
Start: 2025-07-30

## 2025-07-30 RX ORDER — POTASSIUM CHLORIDE 1500 MG/1
20 TABLET, EXTENDED RELEASE ORAL DAILY
Qty: 30 TABLET | Refills: 5 | Status: SHIPPED | OUTPATIENT
Start: 2025-07-30

## 2025-07-30 ASSESSMENT — ENCOUNTER SYMPTOMS
DIARRHEA: 1
SHORTNESS OF BREATH: 0
COUGH: 0

## 2025-07-30 NOTE — PROGRESS NOTES
Sisi Cam, was evaluated through a synchronous (real-time) audio-video encounter. The patient (or guardian if applicable) is aware that this is a billable service, which includes applicable co-pays. This Virtual Visit was conducted with patient's (and/or legal guardian's) consent. Patient identification was verified, and a caregiver was present when appropriate.   The patient was located at Home: [address] [place] [address] [place] [address]  Provider was located at Facility (Appt Dept): [address]  [place] [address]  Confirm you are appropriately licensed, registered, or certified to deliver care in the state where the patient is located as indicated above. If you are not or unsure, please re-schedule the visit: Yes, I confirm.     Sisi Cam (:  1970) is a Established patient, presenting virtually for evaluation of the following:      Below is the assessment and plan developed based on review of pertinent history, physical exam, labs, studies, and medications.     Assessment & Plan  Fatigue, unspecified type       Orders:    TSH reflex to FT4; Future    Diarrhea, unspecified type       Orders:    Comprehensive Metabolic Panel; Future    CBC; Future    Arthralgia, unspecified joint            We will fu after labs.     Assessment & Plan  1. Hyperthyroidism.  - Symptoms include diarrhea, memory issues, and joint pain, which may be attributed to hyperthyroidism.  - TSH level was significantly low at 0.032 in 2025, indicating hyperthyroidism.  - A new TSH test will be ordered to reassess thyroid function. Additional labs will be conducted to check electrolytes.  - If TSH levels remain low, thyroid medication dosage will be adjusted accordingly.    2. Diarrhea.  - Diarrhea may be related to hyperthyroidism.  - A new TSH test and basic labs will be ordered to confirm this.  - If TSH levels are still low, adjusting thyroid medication may help alleviate diarrhea.  - Electrolytes will

## 2025-07-31 DIAGNOSIS — E03.9 HYPOTHYROIDISM, UNSPECIFIED TYPE: Primary | ICD-10-CM

## 2025-07-31 RX ORDER — LEVOTHYROXINE SODIUM 88 UG/1
88 TABLET ORAL DAILY
Qty: 30 TABLET | Refills: 5 | Status: SHIPPED | OUTPATIENT
Start: 2025-07-31

## 2025-08-06 DIAGNOSIS — Z76.0 MEDICATION REFILL: ICD-10-CM

## 2025-08-06 DIAGNOSIS — F90.9 ATTENTION DEFICIT HYPERACTIVITY DISORDER (ADHD), UNSPECIFIED ADHD TYPE: ICD-10-CM

## 2025-08-07 DIAGNOSIS — R19.7 DIARRHEA, UNSPECIFIED TYPE: ICD-10-CM

## 2025-08-07 DIAGNOSIS — R10.9 ABDOMINAL PAIN, UNSPECIFIED ABDOMINAL LOCATION: ICD-10-CM

## 2025-08-07 LAB
ALBUMIN SERPL-MCNC: 5 G/DL (ref 3.5–4.6)
ALP SERPL-CCNC: 135 U/L (ref 40–130)
ALT SERPL-CCNC: <5 U/L (ref 0–33)
ANION GAP SERPL CALCULATED.3IONS-SCNC: 15 MEQ/L (ref 9–15)
AST SERPL-CCNC: 7 U/L (ref 0–35)
BILIRUB SERPL-MCNC: 0.3 MG/DL (ref 0.2–0.7)
BUN SERPL-MCNC: 18 MG/DL (ref 6–20)
CALCIUM SERPL-MCNC: 9.6 MG/DL (ref 8.5–9.9)
CHLORIDE SERPL-SCNC: 100 MEQ/L (ref 95–107)
CO2 SERPL-SCNC: 21 MEQ/L (ref 20–31)
CREAT SERPL-MCNC: 0.84 MG/DL (ref 0.5–0.9)
GLOBULIN SER CALC-MCNC: 2.7 G/DL (ref 2.3–3.5)
GLUCOSE SERPL-MCNC: 89 MG/DL (ref 70–99)
POTASSIUM SERPL-SCNC: 3.3 MEQ/L (ref 3.4–4.9)
PROT SERPL-MCNC: 7.7 G/DL (ref 6.3–8)
SODIUM SERPL-SCNC: 136 MEQ/L (ref 135–144)

## 2025-08-07 RX ORDER — DEXTROAMPHETAMINE SACCHARATE, AMPHETAMINE ASPARTATE MONOHYDRATE, DEXTROAMPHETAMINE SULFATE AND AMPHETAMINE SULFATE 7.5; 7.5; 7.5; 7.5 MG/1; MG/1; MG/1; MG/1
30 CAPSULE, EXTENDED RELEASE ORAL 2 TIMES DAILY
Qty: 60 CAPSULE | Refills: 0 | Status: SHIPPED | OUTPATIENT
Start: 2025-08-07 | End: 2025-09-06

## 2025-08-14 ENCOUNTER — APPOINTMENT (OUTPATIENT)
Dept: ULTRASOUND IMAGING | Age: 55
End: 2025-08-14
Payer: MEDICARE

## 2025-08-14 ENCOUNTER — HOSPITAL ENCOUNTER (EMERGENCY)
Age: 55
Discharge: HOME | End: 2025-08-14
Attending: EMERGENCY MEDICINE
Payer: MEDICARE

## 2025-08-14 VITALS
HEART RATE: 94 BPM | DIASTOLIC BLOOD PRESSURE: 88 MMHG | HEIGHT: 62 IN | WEIGHT: 114 LBS | OXYGEN SATURATION: 98 % | SYSTOLIC BLOOD PRESSURE: 113 MMHG | TEMPERATURE: 98.8 F | RESPIRATION RATE: 19 BRPM | BODY MASS INDEX: 20.98 KG/M2

## 2025-08-14 DIAGNOSIS — M79.605 LEFT LEG PAIN: Primary | ICD-10-CM

## 2025-08-14 PROCEDURE — 6360000002 HC RX W HCPCS: Performed by: EMERGENCY MEDICINE

## 2025-08-14 PROCEDURE — 93971 EXTREMITY STUDY: CPT

## 2025-08-14 PROCEDURE — 96372 THER/PROPH/DIAG INJ SC/IM: CPT

## 2025-08-14 PROCEDURE — 99284 EMERGENCY DEPT VISIT MOD MDM: CPT

## 2025-08-14 RX ORDER — TRAMADOL HYDROCHLORIDE 50 MG/1
50 TABLET ORAL EVERY 6 HOURS PRN
Qty: 12 TABLET | Refills: 0 | Status: SHIPPED | OUTPATIENT
Start: 2025-08-14 | End: 2025-08-17

## 2025-08-14 RX ORDER — CYCLOBENZAPRINE HCL 10 MG
10 TABLET ORAL 3 TIMES DAILY PRN
Qty: 21 TABLET | Refills: 0 | Status: SHIPPED | OUTPATIENT
Start: 2025-08-14 | End: 2025-08-24

## 2025-08-14 RX ORDER — KETOROLAC TROMETHAMINE 30 MG/ML
60 INJECTION, SOLUTION INTRAMUSCULAR; INTRAVENOUS ONCE
Status: COMPLETED | OUTPATIENT
Start: 2025-08-14 | End: 2025-08-14

## 2025-08-14 RX ADMIN — KETOROLAC TROMETHAMINE 60 MG: 60 INJECTION, SOLUTION INTRAMUSCULAR at 19:53

## 2025-08-14 ASSESSMENT — PAIN - FUNCTIONAL ASSESSMENT
PAIN_FUNCTIONAL_ASSESSMENT: 0-10

## 2025-08-14 ASSESSMENT — PAIN DESCRIPTION - DESCRIPTORS
DESCRIPTORS: PRESSURE;SHARP;SHOOTING
DESCRIPTORS: SORE
DESCRIPTORS: SHARP

## 2025-08-14 ASSESSMENT — ENCOUNTER SYMPTOMS
COUGH: 0
EYE REDNESS: 0
ABDOMINAL PAIN: 0
VOMITING: 0
EYE DISCHARGE: 0
SHORTNESS OF BREATH: 0
SORE THROAT: 0
NAUSEA: 0
BACK PAIN: 0

## 2025-08-14 ASSESSMENT — PAIN DESCRIPTION - FREQUENCY
FREQUENCY: CONTINUOUS
FREQUENCY: CONTINUOUS

## 2025-08-14 ASSESSMENT — PAIN SCALES - GENERAL
PAINLEVEL_OUTOF10: 2
PAINLEVEL_OUTOF10: 7
PAINLEVEL_OUTOF10: 7

## 2025-08-14 ASSESSMENT — PAIN DESCRIPTION - ORIENTATION
ORIENTATION: LEFT

## 2025-08-14 ASSESSMENT — PAIN DESCRIPTION - PAIN TYPE
TYPE: ACUTE PAIN
TYPE: ACUTE PAIN

## 2025-08-14 ASSESSMENT — PAIN DESCRIPTION - ONSET: ONSET: ON-GOING

## 2025-08-14 ASSESSMENT — PAIN DESCRIPTION - LOCATION
LOCATION: LEG

## (undated) DEVICE — Device: Brand: ENDO SMARTCAP

## (undated) DEVICE — GLOVE ORTHO 8   MSG9480

## (undated) DEVICE — CONMED SCOPE SAVER BITE BLOCK, 20X27 MM: Brand: SCOPE SAVER

## (undated) DEVICE — TUBE SET 96 MM 64 MM H2O PERISTALTIC STD AUX CHANNEL

## (undated) DEVICE — GLOVE ORANGE PI 8   MSG9080

## (undated) DEVICE — ADAPTER FLSH PMP FLD MGMT GI IRRIG OFP 2 DISPOSABLE

## (undated) DEVICE — BRUSH ENDO CLN L90.5IN SHTH DIA1.7MM BRIST DIA5-7MM 2-6MM

## (undated) DEVICE — TUBING, SUCTION, 1/4" X 10', STRAIGHT: Brand: MEDLINE

## (undated) DEVICE — ENDO CARRY-ON PROCEDURE KIT: Brand: ENDO CARRY-ON PROCEDURE KIT

## (undated) DEVICE — SINGLE PORT MANIFOLD: Brand: NEPTUNE 2

## (undated) DEVICE — GLOVE ORANGE PI 8 1/2   MSG9085

## (undated) DEVICE — LABEL MED MINI W/ MARKER

## (undated) DEVICE — BRUSH ENDO COMBO

## (undated) DEVICE — JELLY,LUBE,STERILE,FLIP TOP,TUBE,2-OZ: Brand: MEDLINE

## (undated) DEVICE — TUBING IRRIGATION 140/160/180/190 SER GI ENDOSCP SMARTCAP

## (undated) DEVICE — FORCEPS ENDOSCP BX OVL CUP SERR W/NEEDLE 2.3MM DIA 160CML

## (undated) DEVICE — GOWN,AURORA,NONREINFORCED,LARGE: Brand: MEDLINE

## (undated) DEVICE — Device: Brand: BALLOON3